# Patient Record
Sex: FEMALE | Race: WHITE | Employment: FULL TIME | URBAN - NONMETROPOLITAN AREA
[De-identification: names, ages, dates, MRNs, and addresses within clinical notes are randomized per-mention and may not be internally consistent; named-entity substitution may affect disease eponyms.]

---

## 2017-01-06 ENCOUNTER — OFFICE VISIT (OUTPATIENT)
Dept: PRIMARY CARE CLINIC | Age: 57
End: 2017-01-06

## 2017-01-06 VITALS
WEIGHT: 180 LBS | HEIGHT: 66 IN | TEMPERATURE: 97.8 F | SYSTOLIC BLOOD PRESSURE: 118 MMHG | RESPIRATION RATE: 18 BRPM | DIASTOLIC BLOOD PRESSURE: 74 MMHG | OXYGEN SATURATION: 96 % | HEART RATE: 106 BPM | BODY MASS INDEX: 28.93 KG/M2

## 2017-01-06 DIAGNOSIS — J20.9 ACUTE WHEEZY BRONCHITIS: ICD-10-CM

## 2017-01-06 DIAGNOSIS — J32.0 CHRONIC MAXILLARY SINUSITIS: Primary | ICD-10-CM

## 2017-01-06 PROCEDURE — 99213 OFFICE O/P EST LOW 20 MIN: CPT | Performed by: NURSE PRACTITIONER

## 2017-01-06 RX ORDER — DOXYCYCLINE HYCLATE 100 MG/1
100 CAPSULE ORAL 2 TIMES DAILY
Qty: 20 CAPSULE | Refills: 0 | Status: ON HOLD | OUTPATIENT
Start: 2017-01-06 | End: 2017-01-17 | Stop reason: HOSPADM

## 2017-01-06 RX ORDER — PREDNISONE 20 MG/1
20 TABLET ORAL 2 TIMES DAILY
Qty: 10 TABLET | Refills: 0 | Status: SHIPPED | OUTPATIENT
Start: 2017-01-06 | End: 2017-01-11

## 2017-01-06 ASSESSMENT — ENCOUNTER SYMPTOMS
SWOLLEN GLANDS: 0
SORE THROAT: 1
HOARSE VOICE: 0
SINUS COMPLAINT: 1
SINUS PRESSURE: 1
COUGH: 1
SHORTNESS OF BREATH: 1

## 2017-01-09 ENCOUNTER — PATIENT MESSAGE (OUTPATIENT)
Dept: FAMILY MEDICINE CLINIC | Age: 57
End: 2017-01-09

## 2017-01-09 DIAGNOSIS — Z12.11 SCREEN FOR COLON CANCER: Primary | ICD-10-CM

## 2017-01-12 PROBLEM — K63.1 BOWEL PERFORATION (HCC): Status: ACTIVE | Noted: 2017-01-12

## 2017-01-23 ENCOUNTER — OFFICE VISIT (OUTPATIENT)
Dept: FAMILY MEDICINE CLINIC | Age: 57
End: 2017-01-23

## 2017-01-23 ENCOUNTER — TELEPHONE (OUTPATIENT)
Dept: SURGERY | Age: 57
End: 2017-01-23

## 2017-01-23 VITALS
DIASTOLIC BLOOD PRESSURE: 70 MMHG | OXYGEN SATURATION: 97 % | HEIGHT: 67 IN | HEART RATE: 94 BPM | SYSTOLIC BLOOD PRESSURE: 118 MMHG | BODY MASS INDEX: 27.15 KG/M2 | WEIGHT: 173 LBS

## 2017-01-23 DIAGNOSIS — J40 BRONCHITIS: Primary | ICD-10-CM

## 2017-01-23 DIAGNOSIS — B37.31 VAGINAL YEAST INFECTION: ICD-10-CM

## 2017-01-23 DIAGNOSIS — K63.1 BOWEL PERFORATION (HCC): ICD-10-CM

## 2017-01-23 PROCEDURE — 99215 OFFICE O/P EST HI 40 MIN: CPT | Performed by: NURSE PRACTITIONER

## 2017-01-23 RX ORDER — FLUCONAZOLE 100 MG/1
100 TABLET ORAL DAILY
Qty: 7 TABLET | Refills: 0 | Status: SHIPPED | OUTPATIENT
Start: 2017-01-23 | End: 2017-01-30

## 2017-01-23 ASSESSMENT — ENCOUNTER SYMPTOMS
NAUSEA: 0
BLOOD IN STOOL: 0
COUGH: 1
RECTAL PAIN: 0
EYES NEGATIVE: 1
SHORTNESS OF BREATH: 0
VOMITING: 0
ALLERGIC/IMMUNOLOGIC NEGATIVE: 1
CONSTIPATION: 0
SINUS PRESSURE: 0
TROUBLE SWALLOWING: 0
ANAL BLEEDING: 0
DIARRHEA: 1
CHEST TIGHTNESS: 0
ABDOMINAL PAIN: 1

## 2017-01-31 ENCOUNTER — OFFICE VISIT (OUTPATIENT)
Dept: SURGERY | Age: 57
End: 2017-01-31

## 2017-01-31 VITALS
HEIGHT: 67 IN | TEMPERATURE: 97.3 F | DIASTOLIC BLOOD PRESSURE: 80 MMHG | SYSTOLIC BLOOD PRESSURE: 120 MMHG | HEART RATE: 80 BPM | WEIGHT: 172.6 LBS | BODY MASS INDEX: 27.09 KG/M2

## 2017-01-31 DIAGNOSIS — K57.20 DIVERTICULITIS OF LARGE INTESTINE WITH PERFORATION WITHOUT BLEEDING: Primary | ICD-10-CM

## 2017-01-31 PROCEDURE — G8419 CALC BMI OUT NRM PARAM NOF/U: HCPCS | Performed by: SURGERY

## 2017-01-31 PROCEDURE — G8427 DOCREV CUR MEDS BY ELIG CLIN: HCPCS | Performed by: SURGERY

## 2017-01-31 PROCEDURE — 99214 OFFICE O/P EST MOD 30 MIN: CPT | Performed by: SURGERY

## 2017-01-31 PROCEDURE — G8484 FLU IMMUNIZE NO ADMIN: HCPCS | Performed by: SURGERY

## 2017-01-31 PROCEDURE — 1111F DSCHRG MED/CURRENT MED MERGE: CPT | Performed by: SURGERY

## 2017-01-31 PROCEDURE — 3014F SCREEN MAMMO DOC REV: CPT | Performed by: SURGERY

## 2017-01-31 PROCEDURE — 3017F COLORECTAL CA SCREEN DOC REV: CPT | Performed by: SURGERY

## 2017-01-31 PROCEDURE — 1036F TOBACCO NON-USER: CPT | Performed by: SURGERY

## 2017-01-31 RX ORDER — POLYETHYLENE GLYCOL 3350, SODIUM CHLORIDE, SODIUM BICARBONATE, POTASSIUM CHLORIDE 420; 11.2; 5.72; 1.48 G/4L; G/4L; G/4L; G/4L
POWDER, FOR SOLUTION ORAL
Qty: 4000 ML | Refills: 0 | Status: SHIPPED | OUTPATIENT
Start: 2017-01-31 | End: 2017-03-14 | Stop reason: ALTCHOICE

## 2017-02-02 DIAGNOSIS — K57.20 COLONIC DIVERTICULAR ABSCESS: Primary | ICD-10-CM

## 2017-02-03 ENCOUNTER — TELEPHONE (OUTPATIENT)
Dept: SURGERY | Age: 57
End: 2017-02-03

## 2017-02-03 DIAGNOSIS — K57.20 COLONIC DIVERTICULAR ABSCESS: Primary | ICD-10-CM

## 2017-02-06 DIAGNOSIS — G89.29 CHRONIC PAIN OF LEFT KNEE: Primary | ICD-10-CM

## 2017-02-06 DIAGNOSIS — M25.562 CHRONIC PAIN OF LEFT KNEE: Primary | ICD-10-CM

## 2017-02-08 ENCOUNTER — OFFICE VISIT (OUTPATIENT)
Dept: ORTHOPEDIC SURGERY | Age: 57
End: 2017-02-08

## 2017-02-08 VITALS
BODY MASS INDEX: 27.09 KG/M2 | WEIGHT: 172.62 LBS | DIASTOLIC BLOOD PRESSURE: 68 MMHG | SYSTOLIC BLOOD PRESSURE: 122 MMHG | HEIGHT: 67 IN | HEART RATE: 88 BPM

## 2017-02-08 DIAGNOSIS — M17.12 PRIMARY OSTEOARTHRITIS OF LEFT KNEE: ICD-10-CM

## 2017-02-08 DIAGNOSIS — M25.562 LEFT KNEE PAIN, UNSPECIFIED CHRONICITY: Primary | ICD-10-CM

## 2017-02-08 PROCEDURE — 3014F SCREEN MAMMO DOC REV: CPT | Performed by: ORTHOPAEDIC SURGERY

## 2017-02-08 PROCEDURE — G8484 FLU IMMUNIZE NO ADMIN: HCPCS | Performed by: ORTHOPAEDIC SURGERY

## 2017-02-08 PROCEDURE — 99213 OFFICE O/P EST LOW 20 MIN: CPT | Performed by: ORTHOPAEDIC SURGERY

## 2017-02-08 PROCEDURE — 3017F COLORECTAL CA SCREEN DOC REV: CPT | Performed by: ORTHOPAEDIC SURGERY

## 2017-02-08 PROCEDURE — G8427 DOCREV CUR MEDS BY ELIG CLIN: HCPCS | Performed by: ORTHOPAEDIC SURGERY

## 2017-02-08 PROCEDURE — 1111F DSCHRG MED/CURRENT MED MERGE: CPT | Performed by: ORTHOPAEDIC SURGERY

## 2017-02-08 PROCEDURE — G8419 CALC BMI OUT NRM PARAM NOF/U: HCPCS | Performed by: ORTHOPAEDIC SURGERY

## 2017-02-08 PROCEDURE — 1036F TOBACCO NON-USER: CPT | Performed by: ORTHOPAEDIC SURGERY

## 2017-02-16 ENCOUNTER — OFFICE VISIT (OUTPATIENT)
Dept: SURGERY | Age: 57
End: 2017-02-16

## 2017-02-16 VITALS
BODY MASS INDEX: 27.28 KG/M2 | TEMPERATURE: 98.4 F | DIASTOLIC BLOOD PRESSURE: 70 MMHG | SYSTOLIC BLOOD PRESSURE: 120 MMHG | HEIGHT: 67 IN | WEIGHT: 173.8 LBS | HEART RATE: 104 BPM

## 2017-02-16 DIAGNOSIS — R12 HEARTBURN: ICD-10-CM

## 2017-02-16 DIAGNOSIS — K57.20 COLONIC DIVERTICULAR ABSCESS: Primary | ICD-10-CM

## 2017-02-16 DIAGNOSIS — R10.31 RIGHT LOWER QUADRANT ABDOMINAL PAIN: Primary | ICD-10-CM

## 2017-02-16 PROCEDURE — G8419 CALC BMI OUT NRM PARAM NOF/U: HCPCS | Performed by: SURGERY

## 2017-02-16 PROCEDURE — G8427 DOCREV CUR MEDS BY ELIG CLIN: HCPCS | Performed by: SURGERY

## 2017-02-16 PROCEDURE — 3017F COLORECTAL CA SCREEN DOC REV: CPT | Performed by: SURGERY

## 2017-02-16 PROCEDURE — 99214 OFFICE O/P EST MOD 30 MIN: CPT | Performed by: SURGERY

## 2017-02-16 PROCEDURE — 1111F DSCHRG MED/CURRENT MED MERGE: CPT | Performed by: SURGERY

## 2017-02-16 PROCEDURE — G8484 FLU IMMUNIZE NO ADMIN: HCPCS | Performed by: SURGERY

## 2017-02-16 PROCEDURE — 3014F SCREEN MAMMO DOC REV: CPT | Performed by: SURGERY

## 2017-02-16 PROCEDURE — 1036F TOBACCO NON-USER: CPT | Performed by: SURGERY

## 2017-02-27 ENCOUNTER — ANESTHESIA EVENT (OUTPATIENT)
Dept: OPERATING ROOM | Age: 57
End: 2017-02-27
Payer: COMMERCIAL

## 2017-02-27 ENCOUNTER — ANESTHESIA (OUTPATIENT)
Dept: OPERATING ROOM | Age: 57
End: 2017-02-27
Payer: COMMERCIAL

## 2017-02-27 ENCOUNTER — HOSPITAL ENCOUNTER (OUTPATIENT)
Age: 57
Setting detail: OUTPATIENT SURGERY
Discharge: HOME | End: 2017-02-27
Attending: SURGERY | Admitting: SURGERY
Payer: COMMERCIAL

## 2017-02-27 VITALS
SYSTOLIC BLOOD PRESSURE: 108 MMHG | OXYGEN SATURATION: 98 % | TEMPERATURE: 97.8 F | WEIGHT: 170 LBS | BODY MASS INDEX: 27.32 KG/M2 | DIASTOLIC BLOOD PRESSURE: 72 MMHG | HEART RATE: 82 BPM | HEIGHT: 66 IN | RESPIRATION RATE: 16 BRPM

## 2017-02-27 VITALS — DIASTOLIC BLOOD PRESSURE: 72 MMHG | SYSTOLIC BLOOD PRESSURE: 122 MMHG | OXYGEN SATURATION: 99 %

## 2017-02-27 PROCEDURE — 45378 DIAGNOSTIC COLONOSCOPY: CPT | Performed by: SURGERY

## 2017-02-27 PROCEDURE — 7100000011 HC PHASE II RECOVERY - ADDTL 15 MIN: Performed by: SURGERY

## 2017-02-27 PROCEDURE — 88305 TISSUE EXAM BY PATHOLOGIST: CPT

## 2017-02-27 PROCEDURE — 43239 EGD BIOPSY SINGLE/MULTIPLE: CPT | Performed by: SURGERY

## 2017-02-27 PROCEDURE — 6360000002 HC RX W HCPCS: Performed by: NURSE ANESTHETIST, CERTIFIED REGISTERED

## 2017-02-27 PROCEDURE — 3609012400 HC EGD TRANSORAL BIOPSY SINGLE/MULTIPLE: Performed by: SURGERY

## 2017-02-27 PROCEDURE — 7100000010 HC PHASE II RECOVERY - FIRST 15 MIN: Performed by: SURGERY

## 2017-02-27 PROCEDURE — 2580000003 HC RX 258: Performed by: SURGERY

## 2017-02-27 PROCEDURE — 3609027000 HC COLONOSCOPY: Performed by: SURGERY

## 2017-02-27 RX ORDER — PROPOFOL 10 MG/ML
INJECTION, EMULSION INTRAVENOUS PRN
Status: DISCONTINUED | OUTPATIENT
Start: 2017-02-27 | End: 2017-02-27 | Stop reason: SDUPTHER

## 2017-02-27 RX ORDER — SODIUM CHLORIDE, SODIUM LACTATE, POTASSIUM CHLORIDE, CALCIUM CHLORIDE 600; 310; 30; 20 MG/100ML; MG/100ML; MG/100ML; MG/100ML
INJECTION, SOLUTION INTRAVENOUS CONTINUOUS
Status: DISCONTINUED | OUTPATIENT
Start: 2017-02-27 | End: 2017-02-27 | Stop reason: HOSPADM

## 2017-02-27 RX ADMIN — SODIUM CHLORIDE, POTASSIUM CHLORIDE, SODIUM LACTATE AND CALCIUM CHLORIDE: 600; 310; 30; 20 INJECTION, SOLUTION INTRAVENOUS at 11:43

## 2017-02-27 RX ADMIN — PROPOFOL 380 MG: 10 INJECTION, EMULSION INTRAVENOUS at 12:47

## 2017-02-27 ASSESSMENT — PAIN SCALES - GENERAL
PAINLEVEL_OUTOF10: 0

## 2017-02-27 ASSESSMENT — PAIN - FUNCTIONAL ASSESSMENT: PAIN_FUNCTIONAL_ASSESSMENT: 0-10

## 2017-02-28 ENCOUNTER — PATIENT MESSAGE (OUTPATIENT)
Dept: SURGERY | Age: 57
End: 2017-02-28

## 2017-02-28 DIAGNOSIS — K57.20 DIVERTICULITIS OF LARGE INTESTINE WITH PERFORATION WITHOUT BLEEDING: Primary | ICD-10-CM

## 2017-03-01 LAB — SURGICAL PATHOLOGY REPORT: NORMAL

## 2017-03-03 ENCOUNTER — TELEPHONE (OUTPATIENT)
Dept: ORTHOPEDIC SURGERY | Age: 57
End: 2017-03-03

## 2017-03-03 DIAGNOSIS — J32.9 CHRONIC SINUSITIS, UNSPECIFIED LOCATION: ICD-10-CM

## 2017-03-14 ENCOUNTER — OFFICE VISIT (OUTPATIENT)
Dept: SURGERY | Age: 57
End: 2017-03-14
Payer: COMMERCIAL

## 2017-03-14 VITALS
BODY MASS INDEX: 28.54 KG/M2 | WEIGHT: 177.6 LBS | SYSTOLIC BLOOD PRESSURE: 120 MMHG | TEMPERATURE: 96.5 F | HEART RATE: 80 BPM | HEIGHT: 66 IN | DIASTOLIC BLOOD PRESSURE: 80 MMHG

## 2017-03-14 DIAGNOSIS — K21.00 GASTROESOPHAGEAL REFLUX DISEASE WITH ESOPHAGITIS: ICD-10-CM

## 2017-03-14 DIAGNOSIS — K57.20 DIVERTICULITIS OF LARGE INTESTINE WITH PERFORATION WITHOUT BLEEDING: Primary | ICD-10-CM

## 2017-03-14 PROCEDURE — 3017F COLORECTAL CA SCREEN DOC REV: CPT | Performed by: SURGERY

## 2017-03-14 PROCEDURE — G8419 CALC BMI OUT NRM PARAM NOF/U: HCPCS | Performed by: SURGERY

## 2017-03-14 PROCEDURE — 99213 OFFICE O/P EST LOW 20 MIN: CPT | Performed by: SURGERY

## 2017-03-14 PROCEDURE — G8427 DOCREV CUR MEDS BY ELIG CLIN: HCPCS | Performed by: SURGERY

## 2017-03-14 PROCEDURE — 3014F SCREEN MAMMO DOC REV: CPT | Performed by: SURGERY

## 2017-03-14 PROCEDURE — 1036F TOBACCO NON-USER: CPT | Performed by: SURGERY

## 2017-03-14 PROCEDURE — G8484 FLU IMMUNIZE NO ADMIN: HCPCS | Performed by: SURGERY

## 2017-03-22 ENCOUNTER — OFFICE VISIT (OUTPATIENT)
Dept: ALLERGY | Age: 57
End: 2017-03-22
Payer: COMMERCIAL

## 2017-03-22 VITALS
DIASTOLIC BLOOD PRESSURE: 88 MMHG | WEIGHT: 181 LBS | SYSTOLIC BLOOD PRESSURE: 126 MMHG | BODY MASS INDEX: 29.09 KG/M2 | HEART RATE: 88 BPM | HEIGHT: 66 IN

## 2017-03-22 DIAGNOSIS — K21.9 GASTROESOPHAGEAL REFLUX DISEASE, ESOPHAGITIS PRESENCE NOT SPECIFIED: ICD-10-CM

## 2017-03-22 DIAGNOSIS — J45.998 ASTHMA, PERSISTENT CONTROLLED: Primary | ICD-10-CM

## 2017-03-22 DIAGNOSIS — J32.9 CHRONIC RECURRENT SINUSITIS: ICD-10-CM

## 2017-03-22 DIAGNOSIS — N28.1 BILATERAL RENAL CYSTS: ICD-10-CM

## 2017-03-22 DIAGNOSIS — K21.9 LPRD (LARYNGOPHARYNGEAL REFLUX DISEASE): ICD-10-CM

## 2017-03-22 PROCEDURE — 1036F TOBACCO NON-USER: CPT | Performed by: NURSE PRACTITIONER

## 2017-03-22 PROCEDURE — G8484 FLU IMMUNIZE NO ADMIN: HCPCS | Performed by: NURSE PRACTITIONER

## 2017-03-22 PROCEDURE — G8419 CALC BMI OUT NRM PARAM NOF/U: HCPCS | Performed by: NURSE PRACTITIONER

## 2017-03-22 PROCEDURE — 99214 OFFICE O/P EST MOD 30 MIN: CPT | Performed by: NURSE PRACTITIONER

## 2017-03-22 PROCEDURE — G8427 DOCREV CUR MEDS BY ELIG CLIN: HCPCS | Performed by: NURSE PRACTITIONER

## 2017-03-22 PROCEDURE — 94010 BREATHING CAPACITY TEST: CPT | Performed by: NURSE PRACTITIONER

## 2017-03-22 PROCEDURE — 3017F COLORECTAL CA SCREEN DOC REV: CPT | Performed by: NURSE PRACTITIONER

## 2017-03-22 PROCEDURE — 3014F SCREEN MAMMO DOC REV: CPT | Performed by: NURSE PRACTITIONER

## 2017-04-24 DIAGNOSIS — E11.9 TYPE 2 DIABETES MELLITUS WITHOUT COMPLICATION, WITHOUT LONG-TERM CURRENT USE OF INSULIN (HCC): ICD-10-CM

## 2017-05-01 LAB
CHOLESTEROL, TOTAL: 252 MG/DL
CHOLESTEROL/HDL RATIO: ABNORMAL
HDLC SERPL-MCNC: ABNORMAL MG/DL (ref 35–70)
LDL CHOLESTEROL CALCULATED: 161 MG/DL (ref 0–160)
TRIGL SERPL-MCNC: 175 MG/DL
VLDLC SERPL CALC-MCNC: ABNORMAL MG/DL

## 2017-05-15 DIAGNOSIS — E03.9 ACQUIRED HYPOTHYROIDISM: ICD-10-CM

## 2017-05-15 RX ORDER — LEVOTHYROXINE SODIUM 0.15 MG/1
TABLET ORAL
Qty: 90 TABLET | Refills: 3 | OUTPATIENT
Start: 2017-05-15

## 2017-05-19 ENCOUNTER — HOSPITAL ENCOUNTER (OUTPATIENT)
Dept: MAMMOGRAPHY | Age: 57
Discharge: HOME | End: 2017-05-19
Payer: COMMERCIAL

## 2017-05-19 DIAGNOSIS — Z12.31 VISIT FOR SCREENING MAMMOGRAM: ICD-10-CM

## 2017-05-19 PROCEDURE — 77063 BREAST TOMOSYNTHESIS BI: CPT

## 2017-05-23 DIAGNOSIS — K21.9 GERD (GASTROESOPHAGEAL REFLUX DISEASE): ICD-10-CM

## 2017-05-23 DIAGNOSIS — K21.9 LPRD (LARYNGOPHARYNGEAL REFLUX DISEASE): ICD-10-CM

## 2017-05-23 RX ORDER — RANITIDINE 300 MG/1
TABLET ORAL
Qty: 90 TABLET | Refills: 2 | Status: SHIPPED | OUTPATIENT
Start: 2017-05-23 | End: 2018-05-16 | Stop reason: SDUPTHER

## 2017-05-30 ENCOUNTER — PATIENT MESSAGE (OUTPATIENT)
Dept: ALLERGY | Age: 57
End: 2017-05-30

## 2017-05-31 ENCOUNTER — OFFICE VISIT (OUTPATIENT)
Dept: FAMILY MEDICINE CLINIC | Age: 57
End: 2017-05-31
Payer: COMMERCIAL

## 2017-05-31 VITALS
BODY MASS INDEX: 30.63 KG/M2 | HEIGHT: 66 IN | WEIGHT: 190.6 LBS | SYSTOLIC BLOOD PRESSURE: 118 MMHG | HEART RATE: 86 BPM | OXYGEN SATURATION: 98 % | TEMPERATURE: 98.4 F | DIASTOLIC BLOOD PRESSURE: 70 MMHG

## 2017-05-31 DIAGNOSIS — E03.9 ACQUIRED HYPOTHYROIDISM: ICD-10-CM

## 2017-05-31 DIAGNOSIS — E11.9 TYPE 2 DIABETES MELLITUS WITHOUT COMPLICATION, WITHOUT LONG-TERM CURRENT USE OF INSULIN (HCC): Primary | ICD-10-CM

## 2017-05-31 PROCEDURE — 3017F COLORECTAL CA SCREEN DOC REV: CPT | Performed by: FAMILY MEDICINE

## 2017-05-31 PROCEDURE — 1036F TOBACCO NON-USER: CPT | Performed by: FAMILY MEDICINE

## 2017-05-31 PROCEDURE — G8427 DOCREV CUR MEDS BY ELIG CLIN: HCPCS | Performed by: FAMILY MEDICINE

## 2017-05-31 PROCEDURE — 99214 OFFICE O/P EST MOD 30 MIN: CPT | Performed by: FAMILY MEDICINE

## 2017-05-31 PROCEDURE — 3014F SCREEN MAMMO DOC REV: CPT | Performed by: FAMILY MEDICINE

## 2017-05-31 PROCEDURE — 3044F HG A1C LEVEL LT 7.0%: CPT | Performed by: FAMILY MEDICINE

## 2017-05-31 PROCEDURE — G8417 CALC BMI ABV UP PARAM F/U: HCPCS | Performed by: FAMILY MEDICINE

## 2017-05-31 RX ORDER — LEVOTHYROXINE SODIUM 0.15 MG/1
TABLET ORAL
Qty: 90 TABLET | Refills: 3 | Status: SHIPPED | OUTPATIENT
Start: 2017-05-31 | End: 2019-09-24

## 2017-05-31 RX ORDER — BORON 6 MG
1 TABLET ORAL DAILY
COMMUNITY
Start: 2016-07-01 | End: 2017-10-31

## 2017-05-31 RX ORDER — PANTOPRAZOLE SODIUM 40 MG/1
40 TABLET, DELAYED RELEASE ORAL DAILY
Qty: 90 TABLET | Refills: 3 | Status: SHIPPED | OUTPATIENT
Start: 2017-05-31 | End: 2018-05-16 | Stop reason: SDUPTHER

## 2017-05-31 ASSESSMENT — ENCOUNTER SYMPTOMS
CHEST TIGHTNESS: 0
NAUSEA: 0
COUGH: 0
BACK PAIN: 0
DIARRHEA: 0
CONSTIPATION: 0
WHEEZING: 0
SHORTNESS OF BREATH: 0
ABDOMINAL PAIN: 1
SINUS PRESSURE: 0

## 2017-06-08 ENCOUNTER — TELEPHONE (OUTPATIENT)
Dept: FAMILY MEDICINE CLINIC | Age: 57
End: 2017-06-08

## 2017-06-14 LAB
CREATININE URINE: 102 MG/DL
HBA1C MFR BLD: 5.9 %
MICROALBUMIN/CREAT 24H UR: 1.8 MG/G{CREAT}

## 2017-06-30 ENCOUNTER — HOSPITAL ENCOUNTER (EMERGENCY)
Age: 57
Discharge: HOME | End: 2017-06-30
Attending: EMERGENCY MEDICINE
Payer: COMMERCIAL

## 2017-06-30 ENCOUNTER — APPOINTMENT (OUTPATIENT)
Dept: CT IMAGING | Age: 57
End: 2017-06-30
Payer: COMMERCIAL

## 2017-06-30 VITALS
TEMPERATURE: 97.2 F | BODY MASS INDEX: 30.22 KG/M2 | DIASTOLIC BLOOD PRESSURE: 80 MMHG | SYSTOLIC BLOOD PRESSURE: 139 MMHG | OXYGEN SATURATION: 96 % | WEIGHT: 188 LBS | RESPIRATION RATE: 16 BRPM | HEIGHT: 66 IN | HEART RATE: 99 BPM

## 2017-06-30 DIAGNOSIS — K57.32 DIVERTICULITIS OF COLON: Primary | ICD-10-CM

## 2017-06-30 LAB
-: ABNORMAL
ABSOLUTE EOS #: 0.35 K/UL (ref 0–0.4)
ABSOLUTE LYMPH #: 1.47 K/UL (ref 1–4.8)
ABSOLUTE MONO #: 1.1 K/UL (ref 0.1–1.2)
ALBUMIN SERPL-MCNC: 4.5 G/DL (ref 3.5–5.2)
ALBUMIN/GLOBULIN RATIO: 1.5 (ref 1–2.5)
ALP BLD-CCNC: 69 U/L (ref 35–104)
ALT SERPL-CCNC: 19 U/L (ref 5–33)
AMORPHOUS: ABNORMAL
AMYLASE: 44 U/L (ref 28–100)
ANION GAP SERPL CALCULATED.3IONS-SCNC: 15 MMOL/L (ref 9–17)
AST SERPL-CCNC: 16 U/L
BACTERIA: ABNORMAL
BASOPHILS # BLD: 1 %
BASOPHILS ABSOLUTE: 0.09 K/UL (ref 0–0.2)
BILIRUB SERPL-MCNC: 0.27 MG/DL (ref 0.3–1.2)
BILIRUBIN DIRECT: 0.12 MG/DL
BILIRUBIN URINE: NEGATIVE
BILIRUBIN, INDIRECT: 0.15 MG/DL (ref 0–1)
BUN BLDV-MCNC: 14 MG/DL (ref 6–20)
BUN/CREAT BLD: 25 (ref 9–20)
CALCIUM SERPL-MCNC: 9.6 MG/DL (ref 8.6–10.4)
CASTS UA: ABNORMAL /LPF (ref 0–2)
CHLORIDE BLD-SCNC: 101 MMOL/L (ref 98–107)
CO2: 25 MMOL/L (ref 20–31)
COLOR: ABNORMAL
COMMENT UA: ABNORMAL
CREAT SERPL-MCNC: 0.57 MG/DL (ref 0.5–0.9)
CRYSTALS, UA: ABNORMAL /HPF
DIFFERENTIAL TYPE: ABNORMAL
EOSINOPHILS RELATIVE PERCENT: 3 %
EPITHELIAL CELLS UA: ABNORMAL /HPF (ref 0–5)
GFR AFRICAN AMERICAN: >60 ML/MIN
GFR NON-AFRICAN AMERICAN: >60 ML/MIN
GFR SERPL CREATININE-BSD FRML MDRD: ABNORMAL ML/MIN/{1.73_M2}
GFR SERPL CREATININE-BSD FRML MDRD: ABNORMAL ML/MIN/{1.73_M2}
GLOBULIN: 3 G/DL (ref 1.5–3.8)
GLUCOSE BLD-MCNC: 124 MG/DL (ref 70–99)
GLUCOSE URINE: NEGATIVE
HCT VFR BLD CALC: 37.7 % (ref 36–46)
HEMOGLOBIN: 12.8 G/DL (ref 12–16)
KETONES, URINE: NEGATIVE
LEUKOCYTE ESTERASE, URINE: ABNORMAL
LIPASE: 29 U/L (ref 13–60)
LYMPHOCYTES # BLD: 11 %
MCH RBC QN AUTO: 29.8 PG (ref 26–34)
MCHC RBC AUTO-ENTMCNC: 34 G/DL (ref 31–37)
MCV RBC AUTO: 87.8 FL (ref 80–100)
MONOCYTES # BLD: 8 %
MUCUS: ABNORMAL
NITRITE, URINE: NEGATIVE
OTHER OBSERVATIONS UA: ABNORMAL
PDW BLD-RTO: 12.8 % (ref 11–14.5)
PH UA: 5.5 (ref 5–6)
PLATELET # BLD: 250 K/UL (ref 140–450)
PLATELET ESTIMATE: ABNORMAL
PMV BLD AUTO: 7.3 FL (ref 6–12)
POTASSIUM SERPL-SCNC: 3.8 MMOL/L (ref 3.7–5.3)
PROTEIN UA: NEGATIVE
RBC # BLD: 4.3 M/UL (ref 4–5.2)
RBC # BLD: ABNORMAL 10*6/UL
RBC UA: ABNORMAL /HPF (ref 0–4)
RENAL EPITHELIAL, UA: ABNORMAL /HPF
SEG NEUTROPHILS: 77 %
SEGMENTED NEUTROPHILS ABSOLUTE COUNT: 10.37 K/UL (ref 1.8–7.7)
SODIUM BLD-SCNC: 141 MMOL/L (ref 135–144)
SPECIFIC GRAVITY UA: 1.03 (ref 1.01–1.02)
TOTAL PROTEIN: 7.5 G/DL (ref 6.4–8.3)
TRICHOMONAS: ABNORMAL
TURBIDITY: ABNORMAL
URINE HGB: NEGATIVE
UROBILINOGEN, URINE: NORMAL
WBC # BLD: 13.3 K/UL (ref 3.5–11)
WBC # BLD: ABNORMAL 10*3/UL
WBC UA: ABNORMAL /HPF (ref 0–4)
YEAST: ABNORMAL

## 2017-06-30 PROCEDURE — 6370000000 HC RX 637 (ALT 250 FOR IP): Performed by: EMERGENCY MEDICINE

## 2017-06-30 PROCEDURE — 80048 BASIC METABOLIC PNL TOTAL CA: CPT

## 2017-06-30 PROCEDURE — 81001 URINALYSIS AUTO W/SCOPE: CPT

## 2017-06-30 PROCEDURE — 82150 ASSAY OF AMYLASE: CPT

## 2017-06-30 PROCEDURE — 74177 CT ABD & PELVIS W/CONTRAST: CPT | Performed by: RADIOLOGY

## 2017-06-30 PROCEDURE — 96375 TX/PRO/DX INJ NEW DRUG ADDON: CPT

## 2017-06-30 PROCEDURE — 6360000002 HC RX W HCPCS: Performed by: EMERGENCY MEDICINE

## 2017-06-30 PROCEDURE — 36415 COLL VENOUS BLD VENIPUNCTURE: CPT

## 2017-06-30 PROCEDURE — 96374 THER/PROPH/DIAG INJ IV PUSH: CPT

## 2017-06-30 PROCEDURE — 2580000003 HC RX 258: Performed by: EMERGENCY MEDICINE

## 2017-06-30 PROCEDURE — 96361 HYDRATE IV INFUSION ADD-ON: CPT

## 2017-06-30 PROCEDURE — 83690 ASSAY OF LIPASE: CPT

## 2017-06-30 PROCEDURE — 74177 CT ABD & PELVIS W/CONTRAST: CPT

## 2017-06-30 PROCEDURE — 85025 COMPLETE CBC W/AUTO DIFF WBC: CPT

## 2017-06-30 PROCEDURE — 6360000004 HC RX CONTRAST MEDICATION: Performed by: EMERGENCY MEDICINE

## 2017-06-30 PROCEDURE — 6370000000 HC RX 637 (ALT 250 FOR IP)

## 2017-06-30 PROCEDURE — 80076 HEPATIC FUNCTION PANEL: CPT

## 2017-06-30 PROCEDURE — 99284 EMERGENCY DEPT VISIT MOD MDM: CPT

## 2017-06-30 RX ORDER — HYDROCODONE BITARTRATE AND ACETAMINOPHEN 5; 325 MG/1; MG/1
1 TABLET ORAL EVERY 6 HOURS PRN
Qty: 10 TABLET | Refills: 0 | Status: ON HOLD | OUTPATIENT
Start: 2017-06-30 | End: 2017-09-04 | Stop reason: HOSPADM

## 2017-06-30 RX ORDER — HYDROCODONE BITARTRATE AND ACETAMINOPHEN 5; 325 MG/1; MG/1
2 TABLET ORAL ONCE
Status: DISCONTINUED | OUTPATIENT
Start: 2017-06-30 | End: 2017-06-30 | Stop reason: HOSPADM

## 2017-06-30 RX ORDER — ONDANSETRON 4 MG/1
4 TABLET, FILM COATED ORAL EVERY 8 HOURS PRN
Qty: 10 TABLET | Refills: 0 | Status: ON HOLD | OUTPATIENT
Start: 2017-06-30 | End: 2017-09-04 | Stop reason: HOSPADM

## 2017-06-30 RX ORDER — 0.9 % SODIUM CHLORIDE 0.9 %
1000 INTRAVENOUS SOLUTION INTRAVENOUS ONCE
Status: COMPLETED | OUTPATIENT
Start: 2017-06-30 | End: 2017-06-30

## 2017-06-30 RX ORDER — MULTIVIT WITH MINERALS/LUTEIN
1000 TABLET ORAL 2 TIMES DAILY
COMMUNITY
End: 2018-03-27

## 2017-06-30 RX ORDER — CIPROFLOXACIN 250 MG/1
500 TABLET, FILM COATED ORAL ONCE
Status: COMPLETED | OUTPATIENT
Start: 2017-06-30 | End: 2017-06-30

## 2017-06-30 RX ORDER — ONDANSETRON 2 MG/ML
4 INJECTION INTRAMUSCULAR; INTRAVENOUS ONCE
Status: COMPLETED | OUTPATIENT
Start: 2017-06-30 | End: 2017-06-30

## 2017-06-30 RX ORDER — METRONIDAZOLE 250 MG/1
500 TABLET ORAL ONCE
Status: COMPLETED | OUTPATIENT
Start: 2017-06-30 | End: 2017-06-30

## 2017-06-30 RX ORDER — CIPROFLOXACIN 500 MG/1
500 TABLET, FILM COATED ORAL 2 TIMES DAILY
Qty: 20 TABLET | Refills: 0 | Status: SHIPPED | OUTPATIENT
Start: 2017-06-30 | End: 2017-07-10

## 2017-06-30 RX ORDER — METRONIDAZOLE 500 MG/1
500 TABLET ORAL 3 TIMES DAILY
Qty: 30 TABLET | Refills: 0 | Status: ON HOLD | OUTPATIENT
Start: 2017-06-30 | End: 2017-08-30 | Stop reason: ALTCHOICE

## 2017-06-30 RX ADMIN — METRONIDAZOLE 500 MG: 250 TABLET ORAL at 05:21

## 2017-06-30 RX ADMIN — IOHEXOL 50 ML: 300 INJECTION, SOLUTION INTRAVENOUS at 04:13

## 2017-06-30 RX ADMIN — IOVERSOL 130 ML: 741 INJECTION INTRA-ARTERIAL; INTRAVENOUS at 04:17

## 2017-06-30 RX ADMIN — CIPROFLOXACIN HYDROCHLORIDE 500 MG: 250 TABLET, FILM COATED ORAL at 05:21

## 2017-06-30 RX ADMIN — SODIUM CHLORIDE 1000 ML: 9 INJECTION, SOLUTION INTRAVENOUS at 03:15

## 2017-06-30 RX ADMIN — ONDANSETRON 4 MG: 2 INJECTION INTRAMUSCULAR; INTRAVENOUS at 03:15

## 2017-06-30 RX ADMIN — HYDROMORPHONE HYDROCHLORIDE 0.5 MG: 1 INJECTION, SOLUTION INTRAMUSCULAR; INTRAVENOUS; SUBCUTANEOUS at 03:23

## 2017-06-30 ASSESSMENT — PAIN SCALES - GENERAL
PAINLEVEL_OUTOF10: 5
PAINLEVEL_OUTOF10: 6
PAINLEVEL_OUTOF10: 8

## 2017-06-30 ASSESSMENT — PAIN DESCRIPTION - PAIN TYPE: TYPE: CHRONIC PAIN

## 2017-06-30 ASSESSMENT — PAIN DESCRIPTION - LOCATION: LOCATION: ABDOMEN

## 2017-07-13 ENCOUNTER — TELEPHONE (OUTPATIENT)
Dept: SURGERY | Age: 57
End: 2017-07-13

## 2017-07-27 ENCOUNTER — OFFICE VISIT (OUTPATIENT)
Dept: SURGERY | Age: 57
End: 2017-07-27
Payer: COMMERCIAL

## 2017-07-27 VITALS
HEIGHT: 66 IN | SYSTOLIC BLOOD PRESSURE: 148 MMHG | TEMPERATURE: 97.2 F | DIASTOLIC BLOOD PRESSURE: 88 MMHG | BODY MASS INDEX: 30.7 KG/M2 | HEART RATE: 82 BPM | WEIGHT: 191 LBS

## 2017-07-27 DIAGNOSIS — K57.20 DIVERTICULITIS OF LARGE INTESTINE WITH ABSCESS WITHOUT BLEEDING: Primary | ICD-10-CM

## 2017-07-27 PROCEDURE — 3014F SCREEN MAMMO DOC REV: CPT | Performed by: SURGERY

## 2017-07-27 PROCEDURE — G8417 CALC BMI ABV UP PARAM F/U: HCPCS | Performed by: SURGERY

## 2017-07-27 PROCEDURE — 99215 OFFICE O/P EST HI 40 MIN: CPT | Performed by: SURGERY

## 2017-07-27 PROCEDURE — 1036F TOBACCO NON-USER: CPT | Performed by: SURGERY

## 2017-07-27 PROCEDURE — 3017F COLORECTAL CA SCREEN DOC REV: CPT | Performed by: SURGERY

## 2017-07-27 PROCEDURE — G8427 DOCREV CUR MEDS BY ELIG CLIN: HCPCS | Performed by: SURGERY

## 2017-07-31 ENCOUNTER — OFFICE VISIT (OUTPATIENT)
Dept: UROLOGY | Age: 57
End: 2017-07-31

## 2017-07-31 VITALS
HEART RATE: 76 BPM | HEIGHT: 66 IN | DIASTOLIC BLOOD PRESSURE: 70 MMHG | BODY MASS INDEX: 30.89 KG/M2 | TEMPERATURE: 98.5 F | WEIGHT: 192.2 LBS | SYSTOLIC BLOOD PRESSURE: 130 MMHG

## 2017-07-31 DIAGNOSIS — R39.15 URGENCY OF MICTURITION: Primary | ICD-10-CM

## 2017-07-31 DIAGNOSIS — N82.4 COLOVAGINAL FISTULA: ICD-10-CM

## 2017-08-21 ENCOUNTER — NURSE ONLY (OUTPATIENT)
Dept: SURGERY | Age: 57
End: 2017-08-21

## 2017-08-21 ENCOUNTER — HOSPITAL ENCOUNTER (OUTPATIENT)
Dept: LAB | Age: 57
Discharge: HOME | End: 2017-08-21
Payer: COMMERCIAL

## 2017-08-21 VITALS
WEIGHT: 193 LBS | DIASTOLIC BLOOD PRESSURE: 88 MMHG | BODY MASS INDEX: 31.02 KG/M2 | TEMPERATURE: 98.1 F | HEIGHT: 66 IN | HEART RATE: 100 BPM | SYSTOLIC BLOOD PRESSURE: 130 MMHG

## 2017-08-21 DIAGNOSIS — K57.20 DIVERTICULITIS OF LARGE INTESTINE WITH ABSCESS WITHOUT BLEEDING: Primary | ICD-10-CM

## 2017-08-21 DIAGNOSIS — Z01.818 PRE-OP TESTING: ICD-10-CM

## 2017-08-21 DIAGNOSIS — K57.20 DIVERTICULITIS OF LARGE INTESTINE WITH ABSCESS WITHOUT BLEEDING: ICD-10-CM

## 2017-08-21 LAB
ALBUMIN SERPL-MCNC: 4.6 G/DL (ref 3.5–5.2)
ALBUMIN/GLOBULIN RATIO: 1.6 (ref 1–2.5)
ALP BLD-CCNC: 67 U/L (ref 35–104)
ALT SERPL-CCNC: 36 U/L (ref 5–33)
ANION GAP SERPL CALCULATED.3IONS-SCNC: 12 MMOL/L (ref 9–17)
AST SERPL-CCNC: 20 U/L
BILIRUB SERPL-MCNC: 0.36 MG/DL (ref 0.3–1.2)
BUN BLDV-MCNC: 16 MG/DL (ref 6–20)
BUN/CREAT BLD: 36 (ref 9–20)
CALCIUM SERPL-MCNC: 9.6 MG/DL (ref 8.6–10.4)
CHLORIDE BLD-SCNC: 101 MMOL/L (ref 98–107)
CO2: 27 MMOL/L (ref 20–31)
CREAT SERPL-MCNC: 0.44 MG/DL (ref 0.5–0.9)
GFR AFRICAN AMERICAN: >60 ML/MIN
GFR NON-AFRICAN AMERICAN: >60 ML/MIN
GFR SERPL CREATININE-BSD FRML MDRD: ABNORMAL ML/MIN/{1.73_M2}
GFR SERPL CREATININE-BSD FRML MDRD: ABNORMAL ML/MIN/{1.73_M2}
GLUCOSE BLD-MCNC: 122 MG/DL (ref 70–99)
HCT VFR BLD CALC: 42.3 % (ref 36–46)
HEMOGLOBIN: 14.1 G/DL (ref 12–16)
MCH RBC QN AUTO: 29.6 PG (ref 26–34)
MCHC RBC AUTO-ENTMCNC: 33.2 G/DL (ref 31–37)
MCV RBC AUTO: 88.9 FL (ref 80–100)
PDW BLD-RTO: 14.3 % (ref 11–14.5)
PLATELET # BLD: 250 K/UL (ref 140–450)
PMV BLD AUTO: 7 FL (ref 6–12)
POTASSIUM SERPL-SCNC: 3.9 MMOL/L (ref 3.7–5.3)
RBC # BLD: 4.76 M/UL (ref 4–5.2)
SODIUM BLD-SCNC: 140 MMOL/L (ref 135–144)
TOTAL PROTEIN: 7.5 G/DL (ref 6.4–8.3)
WBC # BLD: 6.9 K/UL (ref 3.5–11)

## 2017-08-21 PROCEDURE — 85027 COMPLETE CBC AUTOMATED: CPT

## 2017-08-21 PROCEDURE — 80053 COMPREHEN METABOLIC PANEL: CPT

## 2017-08-21 PROCEDURE — 36415 COLL VENOUS BLD VENIPUNCTURE: CPT

## 2017-08-21 RX ORDER — NEOMYCIN SULFATE 500 MG/1
TABLET ORAL
Qty: 6 TABLET | Refills: 0 | Status: ON HOLD | OUTPATIENT
Start: 2017-08-21 | End: 2017-09-04 | Stop reason: HOSPADM

## 2017-08-21 RX ORDER — ERYTHROMYCIN 500 MG/1
TABLET, COATED ORAL
Qty: 6 TABLET | Refills: 0 | Status: ON HOLD | OUTPATIENT
Start: 2017-08-21 | End: 2017-09-04 | Stop reason: HOSPADM

## 2017-08-21 RX ORDER — POLYETHYLENE GLYCOL 3350, SODIUM CHLORIDE, SODIUM BICARBONATE, POTASSIUM CHLORIDE 420; 11.2; 5.72; 1.48 G/4L; G/4L; G/4L; G/4L
4000 POWDER, FOR SOLUTION ORAL ONCE
Qty: 1 BOTTLE | Refills: 0 | Status: SHIPPED | OUTPATIENT
Start: 2017-08-21 | End: 2017-08-21

## 2017-08-22 ENCOUNTER — HOSPITAL ENCOUNTER (OUTPATIENT)
Dept: NON INVASIVE DIAGNOSTICS | Age: 57
Discharge: HOME | End: 2017-08-22
Payer: COMMERCIAL

## 2017-08-22 DIAGNOSIS — Z01.818 PRE-OP TESTING: ICD-10-CM

## 2017-08-22 PROCEDURE — 93005 ELECTROCARDIOGRAM TRACING: CPT

## 2017-08-23 LAB
EKG ATRIAL RATE: 87 BPM
EKG P AXIS: 0 DEGREES
EKG P-R INTERVAL: 142 MS
EKG Q-T INTERVAL: 384 MS
EKG QRS DURATION: 82 MS
EKG QTC CALCULATION (BAZETT): 462 MS
EKG R AXIS: 38 DEGREES
EKG T AXIS: 29 DEGREES
EKG VENTRICULAR RATE: 87 BPM

## 2017-08-25 ENCOUNTER — TELEPHONE (OUTPATIENT)
Dept: SURGERY | Age: 57
End: 2017-08-25

## 2017-08-28 ENCOUNTER — HOSPITAL ENCOUNTER (OUTPATIENT)
Dept: LAB | Age: 57
Discharge: HOME | DRG: 330 | End: 2017-08-28
Payer: COMMERCIAL

## 2017-08-28 ENCOUNTER — OFFICE VISIT (OUTPATIENT)
Dept: OBGYN | Age: 57
End: 2017-08-28
Payer: COMMERCIAL

## 2017-08-28 VITALS
DIASTOLIC BLOOD PRESSURE: 72 MMHG | RESPIRATION RATE: 16 BRPM | HEART RATE: 80 BPM | HEIGHT: 66 IN | SYSTOLIC BLOOD PRESSURE: 124 MMHG | WEIGHT: 195 LBS | BODY MASS INDEX: 31.34 KG/M2

## 2017-08-28 DIAGNOSIS — K57.20 DIVERTICULITIS OF LARGE INTESTINE WITH ABSCESS WITHOUT BLEEDING: ICD-10-CM

## 2017-08-28 DIAGNOSIS — N82.8 FISTULA OF VAGINA: Primary | ICD-10-CM

## 2017-08-28 DIAGNOSIS — Z01.818 PRE-OP TESTING: ICD-10-CM

## 2017-08-28 LAB
ABO/RH: NORMAL
ANTIBODY SCREEN: NEGATIVE
ARM BAND NUMBER: NORMAL
EXPIRATION DATE: NORMAL

## 2017-08-28 PROCEDURE — 3014F SCREEN MAMMO DOC REV: CPT | Performed by: OBSTETRICS & GYNECOLOGY

## 2017-08-28 PROCEDURE — 86850 RBC ANTIBODY SCREEN: CPT

## 2017-08-28 PROCEDURE — 86900 BLOOD TYPING SEROLOGIC ABO: CPT

## 2017-08-28 PROCEDURE — G8417 CALC BMI ABV UP PARAM F/U: HCPCS | Performed by: OBSTETRICS & GYNECOLOGY

## 2017-08-28 PROCEDURE — 1036F TOBACCO NON-USER: CPT | Performed by: OBSTETRICS & GYNECOLOGY

## 2017-08-28 PROCEDURE — 86901 BLOOD TYPING SEROLOGIC RH(D): CPT

## 2017-08-28 PROCEDURE — G8427 DOCREV CUR MEDS BY ELIG CLIN: HCPCS | Performed by: OBSTETRICS & GYNECOLOGY

## 2017-08-28 PROCEDURE — 36415 COLL VENOUS BLD VENIPUNCTURE: CPT

## 2017-08-28 PROCEDURE — 99203 OFFICE O/P NEW LOW 30 MIN: CPT | Performed by: OBSTETRICS & GYNECOLOGY

## 2017-08-28 PROCEDURE — 3017F COLORECTAL CA SCREEN DOC REV: CPT | Performed by: OBSTETRICS & GYNECOLOGY

## 2017-08-29 ENCOUNTER — OFFICE VISIT (OUTPATIENT)
Dept: FAMILY MEDICINE CLINIC | Age: 57
End: 2017-08-29
Payer: COMMERCIAL

## 2017-08-29 ENCOUNTER — OFFICE VISIT (OUTPATIENT)
Dept: SURGERY | Age: 57
End: 2017-08-29
Payer: COMMERCIAL

## 2017-08-29 ENCOUNTER — ANESTHESIA EVENT (OUTPATIENT)
Dept: OPERATING ROOM | Age: 57
DRG: 330 | End: 2017-08-29
Payer: COMMERCIAL

## 2017-08-29 VITALS
TEMPERATURE: 97.6 F | HEART RATE: 93 BPM | SYSTOLIC BLOOD PRESSURE: 138 MMHG | BODY MASS INDEX: 31.31 KG/M2 | DIASTOLIC BLOOD PRESSURE: 84 MMHG | HEIGHT: 66 IN | WEIGHT: 194.8 LBS

## 2017-08-29 VITALS
DIASTOLIC BLOOD PRESSURE: 78 MMHG | HEART RATE: 98 BPM | BODY MASS INDEX: 31.66 KG/M2 | SYSTOLIC BLOOD PRESSURE: 130 MMHG | HEIGHT: 66 IN | WEIGHT: 197 LBS | OXYGEN SATURATION: 97 %

## 2017-08-29 DIAGNOSIS — E11.9 TYPE 2 DIABETES MELLITUS WITHOUT COMPLICATION, WITHOUT LONG-TERM CURRENT USE OF INSULIN (HCC): Primary | ICD-10-CM

## 2017-08-29 DIAGNOSIS — K63.1 BOWEL PERFORATION (HCC): ICD-10-CM

## 2017-08-29 DIAGNOSIS — K57.20 DIVERTICULITIS OF LARGE INTESTINE WITH ABSCESS WITHOUT BLEEDING: Primary | ICD-10-CM

## 2017-08-29 DIAGNOSIS — Z01.818 PREOPERATIVE CLEARANCE: ICD-10-CM

## 2017-08-29 PROCEDURE — 3014F SCREEN MAMMO DOC REV: CPT | Performed by: FAMILY MEDICINE

## 2017-08-29 PROCEDURE — G8417 CALC BMI ABV UP PARAM F/U: HCPCS | Performed by: SURGERY

## 2017-08-29 PROCEDURE — 1036F TOBACCO NON-USER: CPT | Performed by: FAMILY MEDICINE

## 2017-08-29 PROCEDURE — 99214 OFFICE O/P EST MOD 30 MIN: CPT | Performed by: FAMILY MEDICINE

## 2017-08-29 PROCEDURE — 3014F SCREEN MAMMO DOC REV: CPT | Performed by: SURGERY

## 2017-08-29 PROCEDURE — G8427 DOCREV CUR MEDS BY ELIG CLIN: HCPCS | Performed by: SURGERY

## 2017-08-29 PROCEDURE — 3017F COLORECTAL CA SCREEN DOC REV: CPT | Performed by: SURGERY

## 2017-08-29 PROCEDURE — G8417 CALC BMI ABV UP PARAM F/U: HCPCS | Performed by: FAMILY MEDICINE

## 2017-08-29 PROCEDURE — 99214 OFFICE O/P EST MOD 30 MIN: CPT | Performed by: SURGERY

## 2017-08-29 PROCEDURE — 3046F HEMOGLOBIN A1C LEVEL >9.0%: CPT | Performed by: FAMILY MEDICINE

## 2017-08-29 PROCEDURE — G8427 DOCREV CUR MEDS BY ELIG CLIN: HCPCS | Performed by: FAMILY MEDICINE

## 2017-08-29 PROCEDURE — 1036F TOBACCO NON-USER: CPT | Performed by: SURGERY

## 2017-08-29 PROCEDURE — 3017F COLORECTAL CA SCREEN DOC REV: CPT | Performed by: FAMILY MEDICINE

## 2017-08-29 ASSESSMENT — ENCOUNTER SYMPTOMS
CONSTIPATION: 0
CHEST TIGHTNESS: 0
COUGH: 0
DIARRHEA: 0
WHEEZING: 0
VOMITING: 1
BACK PAIN: 0
NAUSEA: 1
ABDOMINAL PAIN: 1
SHORTNESS OF BREATH: 0

## 2017-08-30 ENCOUNTER — HOSPITAL ENCOUNTER (INPATIENT)
Age: 57
LOS: 5 days | Discharge: HOME | DRG: 330 | End: 2017-09-04
Attending: SURGERY | Admitting: SURGERY
Payer: COMMERCIAL

## 2017-08-30 ENCOUNTER — ANESTHESIA (OUTPATIENT)
Dept: OPERATING ROOM | Age: 57
DRG: 330 | End: 2017-08-30
Payer: COMMERCIAL

## 2017-08-30 VITALS
RESPIRATION RATE: 14 BRPM | SYSTOLIC BLOOD PRESSURE: 127 MMHG | TEMPERATURE: 96.4 F | DIASTOLIC BLOOD PRESSURE: 83 MMHG | OXYGEN SATURATION: 99 %

## 2017-08-30 PROBLEM — N82.4 COLOVAGINAL FISTULA: Status: ACTIVE | Noted: 2017-08-30

## 2017-08-30 LAB — GLUCOSE BLD-MCNC: 112 MG/DL (ref 65–105)

## 2017-08-30 PROCEDURE — 6360000002 HC RX W HCPCS: Performed by: SURGERY

## 2017-08-30 PROCEDURE — 82947 ASSAY GLUCOSE BLOOD QUANT: CPT

## 2017-08-30 PROCEDURE — 6360000002 HC RX W HCPCS: Performed by: NURSE ANESTHETIST, CERTIFIED REGISTERED

## 2017-08-30 PROCEDURE — 1200000000 HC SEMI PRIVATE

## 2017-08-30 PROCEDURE — 62322 NJX INTERLAMINAR LMBR/SAC: CPT | Performed by: NURSE ANESTHETIST, CERTIFIED REGISTERED

## 2017-08-30 PROCEDURE — 2580000003 HC RX 258: Performed by: SURGERY

## 2017-08-30 PROCEDURE — 3600000004 HC SURGERY LEVEL 4 BASE: Performed by: SURGERY

## 2017-08-30 PROCEDURE — 6370000000 HC RX 637 (ALT 250 FOR IP): Performed by: SURGERY

## 2017-08-30 PROCEDURE — 7100000000 HC PACU RECOVERY - FIRST 15 MIN: Performed by: SURGERY

## 2017-08-30 PROCEDURE — 2580000003 HC RX 258: Performed by: NURSE ANESTHETIST, CERTIFIED REGISTERED

## 2017-08-30 PROCEDURE — 88307 TISSUE EXAM BY PATHOLOGIST: CPT

## 2017-08-30 PROCEDURE — 3700000000 HC ANESTHESIA ATTENDED CARE: Performed by: SURGERY

## 2017-08-30 PROCEDURE — 0T9B80Z DRAINAGE OF BLADDER WITH DRAINAGE DEVICE, VIA NATURAL OR ARTIFICIAL OPENING ENDOSCOPIC: ICD-10-PCS | Performed by: UROLOGY

## 2017-08-30 PROCEDURE — 0UT10ZZ RESECTION OF LEFT OVARY, OPEN APPROACH: ICD-10-PCS | Performed by: SURGERY

## 2017-08-30 PROCEDURE — 94770 HC ETCO2 MONITOR DAILY: CPT

## 2017-08-30 PROCEDURE — 44139 MOBILIZATION OF COLON: CPT | Performed by: SURGERY

## 2017-08-30 PROCEDURE — 2500000003 HC RX 250 WO HCPCS: Performed by: NURSE ANESTHETIST, CERTIFIED REGISTERED

## 2017-08-30 PROCEDURE — 88304 TISSUE EXAM BY PATHOLOGIST: CPT

## 2017-08-30 PROCEDURE — 6370000000 HC RX 637 (ALT 250 FOR IP): Performed by: NURSE ANESTHETIST, CERTIFIED REGISTERED

## 2017-08-30 PROCEDURE — 3600000014 HC SURGERY LEVEL 4 ADDTL 15MIN: Performed by: SURGERY

## 2017-08-30 PROCEDURE — 7100000001 HC PACU RECOVERY - ADDTL 15 MIN: Performed by: SURGERY

## 2017-08-30 PROCEDURE — 0DTN0ZZ RESECTION OF SIGMOID COLON, OPEN APPROACH: ICD-10-PCS | Performed by: SURGERY

## 2017-08-30 PROCEDURE — 88305 TISSUE EXAM BY PATHOLOGIST: CPT

## 2017-08-30 PROCEDURE — 2720000010 HC SURG SUPPLY STERILE: Performed by: SURGERY

## 2017-08-30 PROCEDURE — 0TNB0ZZ RELEASE BLADDER, OPEN APPROACH: ICD-10-PCS | Performed by: SURGERY

## 2017-08-30 PROCEDURE — 2060000000 HC ICU INTERMEDIATE R&B

## 2017-08-30 PROCEDURE — 44145 PARTIAL REMOVAL OF COLON: CPT | Performed by: SURGERY

## 2017-08-30 PROCEDURE — 2500000003 HC RX 250 WO HCPCS: Performed by: SURGERY

## 2017-08-30 PROCEDURE — 94761 N-INVAS EAR/PLS OXIMETRY MLT: CPT

## 2017-08-30 PROCEDURE — 00840 ANES IPER PX LOWER ABD NOS: CPT | Performed by: NURSE ANESTHETIST, CERTIFIED REGISTERED

## 2017-08-30 PROCEDURE — 94640 AIRWAY INHALATION TREATMENT: CPT

## 2017-08-30 PROCEDURE — 0UQG0ZZ REPAIR VAGINA, OPEN APPROACH: ICD-10-PCS | Performed by: SURGERY

## 2017-08-30 PROCEDURE — 3700000001 HC ADD 15 MINUTES (ANESTHESIA): Performed by: SURGERY

## 2017-08-30 RX ORDER — FENTANYL CITRATE 50 UG/ML
25 INJECTION, SOLUTION INTRAMUSCULAR; INTRAVENOUS EVERY 5 MIN PRN
Status: DISCONTINUED | OUTPATIENT
Start: 2017-08-30 | End: 2017-08-30 | Stop reason: HOSPADM

## 2017-08-30 RX ORDER — IPRATROPIUM BROMIDE AND ALBUTEROL SULFATE 2.5; .5 MG/3ML; MG/3ML
1 SOLUTION RESPIRATORY (INHALATION) ONCE
Status: COMPLETED | OUTPATIENT
Start: 2017-08-30 | End: 2017-08-30

## 2017-08-30 RX ORDER — ONDANSETRON 2 MG/ML
INJECTION INTRAMUSCULAR; INTRAVENOUS PRN
Status: DISCONTINUED | OUTPATIENT
Start: 2017-08-30 | End: 2017-08-30 | Stop reason: SDUPTHER

## 2017-08-30 RX ORDER — MORPHINE SULFATE 2 MG/ML
1 INJECTION, SOLUTION INTRAMUSCULAR; INTRAVENOUS EVERY 5 MIN PRN
Status: DISCONTINUED | OUTPATIENT
Start: 2017-08-30 | End: 2017-08-30 | Stop reason: HOSPADM

## 2017-08-30 RX ORDER — MORPHINE SULFATE 2 MG/ML
2 INJECTION, SOLUTION INTRAMUSCULAR; INTRAVENOUS EVERY 5 MIN PRN
Status: DISCONTINUED | OUTPATIENT
Start: 2017-08-30 | End: 2017-08-30 | Stop reason: HOSPADM

## 2017-08-30 RX ORDER — MORPHINE SULFATE 2 MG/ML
2 INJECTION, SOLUTION INTRAMUSCULAR; INTRAVENOUS
Status: DISCONTINUED | OUTPATIENT
Start: 2017-08-30 | End: 2017-09-02

## 2017-08-30 RX ORDER — HYDROCODONE BITARTRATE AND ACETAMINOPHEN 5; 325 MG/1; MG/1
1 TABLET ORAL PRN
Status: DISCONTINUED | OUTPATIENT
Start: 2017-08-30 | End: 2017-08-30 | Stop reason: HOSPADM

## 2017-08-30 RX ORDER — SODIUM CHLORIDE, SODIUM LACTATE, POTASSIUM CHLORIDE, CALCIUM CHLORIDE 600; 310; 30; 20 MG/100ML; MG/100ML; MG/100ML; MG/100ML
INJECTION, SOLUTION INTRAVENOUS CONTINUOUS
Status: DISCONTINUED | OUTPATIENT
Start: 2017-08-30 | End: 2017-08-30

## 2017-08-30 RX ORDER — FENTANYL CITRATE 50 UG/ML
INJECTION, SOLUTION INTRAMUSCULAR; INTRAVENOUS PRN
Status: DISCONTINUED | OUTPATIENT
Start: 2017-08-30 | End: 2017-08-30 | Stop reason: SDUPTHER

## 2017-08-30 RX ORDER — DEXTROSE, SODIUM CHLORIDE, AND POTASSIUM CHLORIDE 5; .45; .15 G/100ML; G/100ML; G/100ML
INJECTION INTRAVENOUS CONTINUOUS
Status: DISCONTINUED | OUTPATIENT
Start: 2017-08-30 | End: 2017-09-03

## 2017-08-30 RX ORDER — BUPIVACAINE HYDROCHLORIDE 2.5 MG/ML
INJECTION, SOLUTION EPIDURAL; INFILTRATION; INTRACAUDAL PRN
Status: DISCONTINUED | OUTPATIENT
Start: 2017-08-30 | End: 2017-08-30 | Stop reason: SDUPTHER

## 2017-08-30 RX ORDER — ROCURONIUM BROMIDE 10 MG/ML
INJECTION, SOLUTION INTRAVENOUS PRN
Status: DISCONTINUED | OUTPATIENT
Start: 2017-08-30 | End: 2017-08-30 | Stop reason: SDUPTHER

## 2017-08-30 RX ORDER — DEXAMETHASONE SODIUM PHOSPHATE 4 MG/ML
INJECTION, SOLUTION INTRA-ARTICULAR; INTRALESIONAL; INTRAMUSCULAR; INTRAVENOUS; SOFT TISSUE PRN
Status: DISCONTINUED | OUTPATIENT
Start: 2017-08-30 | End: 2017-08-30 | Stop reason: SDUPTHER

## 2017-08-30 RX ORDER — FENTANYL CITRATE 50 UG/ML
50 INJECTION, SOLUTION INTRAMUSCULAR; INTRAVENOUS EVERY 5 MIN PRN
Status: DISCONTINUED | OUTPATIENT
Start: 2017-08-30 | End: 2017-08-30 | Stop reason: HOSPADM

## 2017-08-30 RX ORDER — DIPHENHYDRAMINE HYDROCHLORIDE 50 MG/ML
12.5 INJECTION INTRAMUSCULAR; INTRAVENOUS
Status: DISCONTINUED | OUTPATIENT
Start: 2017-08-30 | End: 2017-08-30 | Stop reason: HOSPADM

## 2017-08-30 RX ORDER — PROMETHAZINE HYDROCHLORIDE 25 MG/ML
12.5 INJECTION, SOLUTION INTRAMUSCULAR; INTRAVENOUS ONCE
Status: COMPLETED | OUTPATIENT
Start: 2017-08-30 | End: 2017-08-30

## 2017-08-30 RX ORDER — NALOXONE HYDROCHLORIDE 0.4 MG/ML
0.4 INJECTION, SOLUTION INTRAMUSCULAR; INTRAVENOUS; SUBCUTANEOUS PRN
Status: DISCONTINUED | OUTPATIENT
Start: 2017-08-30 | End: 2017-08-31

## 2017-08-30 RX ORDER — DIPHENHYDRAMINE HYDROCHLORIDE 50 MG/ML
25 INJECTION INTRAMUSCULAR; INTRAVENOUS EVERY 6 HOURS PRN
Status: DISCONTINUED | OUTPATIENT
Start: 2017-08-30 | End: 2017-08-31

## 2017-08-30 RX ORDER — GLYCOPYRROLATE 0.2 MG/ML
INJECTION INTRAMUSCULAR; INTRAVENOUS PRN
Status: DISCONTINUED | OUTPATIENT
Start: 2017-08-30 | End: 2017-08-30 | Stop reason: SDUPTHER

## 2017-08-30 RX ORDER — ONDANSETRON 2 MG/ML
4 INJECTION INTRAMUSCULAR; INTRAVENOUS EVERY 6 HOURS PRN
Status: DISCONTINUED | OUTPATIENT
Start: 2017-08-30 | End: 2017-08-30

## 2017-08-30 RX ORDER — MORPHINE SULFATE 4 MG/ML
4 INJECTION, SOLUTION INTRAMUSCULAR; INTRAVENOUS
Status: DISCONTINUED | OUTPATIENT
Start: 2017-08-30 | End: 2017-09-02

## 2017-08-30 RX ORDER — HYDROCODONE BITARTRATE AND ACETAMINOPHEN 5; 325 MG/1; MG/1
2 TABLET ORAL EVERY 4 HOURS PRN
Status: DISCONTINUED | OUTPATIENT
Start: 2017-08-30 | End: 2017-09-04 | Stop reason: HOSPADM

## 2017-08-30 RX ORDER — LIDOCAINE HYDROCHLORIDE 10 MG/ML
INJECTION, SOLUTION INFILTRATION; PERINEURAL PRN
Status: DISCONTINUED | OUTPATIENT
Start: 2017-08-30 | End: 2017-08-30 | Stop reason: SDUPTHER

## 2017-08-30 RX ORDER — ACETAMINOPHEN 325 MG/1
650 TABLET ORAL EVERY 4 HOURS PRN
Status: DISCONTINUED | OUTPATIENT
Start: 2017-08-30 | End: 2017-09-04 | Stop reason: HOSPADM

## 2017-08-30 RX ORDER — METOCLOPRAMIDE HYDROCHLORIDE 5 MG/ML
INJECTION INTRAMUSCULAR; INTRAVENOUS PRN
Status: DISCONTINUED | OUTPATIENT
Start: 2017-08-30 | End: 2017-08-30 | Stop reason: SDUPTHER

## 2017-08-30 RX ORDER — ONDANSETRON 2 MG/ML
4 INJECTION INTRAMUSCULAR; INTRAVENOUS
Status: DISCONTINUED | OUTPATIENT
Start: 2017-08-30 | End: 2017-08-30 | Stop reason: HOSPADM

## 2017-08-30 RX ORDER — MEPERIDINE HYDROCHLORIDE 50 MG/ML
12.5 INJECTION INTRAMUSCULAR; INTRAVENOUS; SUBCUTANEOUS EVERY 5 MIN PRN
Status: DISCONTINUED | OUTPATIENT
Start: 2017-08-30 | End: 2017-08-30 | Stop reason: HOSPADM

## 2017-08-30 RX ORDER — SCOLOPAMINE TRANSDERMAL SYSTEM 1 MG/1
1 PATCH, EXTENDED RELEASE TRANSDERMAL
Status: DISCONTINUED | OUTPATIENT
Start: 2017-08-30 | End: 2017-08-31

## 2017-08-30 RX ORDER — NALBUPHINE HCL 10 MG/ML
5 AMPUL (ML) INJECTION EVERY 4 HOURS PRN
Status: DISCONTINUED | OUTPATIENT
Start: 2017-08-30 | End: 2017-08-31

## 2017-08-30 RX ORDER — ONDANSETRON 2 MG/ML
4 INJECTION INTRAMUSCULAR; INTRAVENOUS EVERY 6 HOURS PRN
Status: DISCONTINUED | OUTPATIENT
Start: 2017-08-30 | End: 2017-09-04 | Stop reason: HOSPADM

## 2017-08-30 RX ORDER — PROPOFOL 10 MG/ML
INJECTION, EMULSION INTRAVENOUS PRN
Status: DISCONTINUED | OUTPATIENT
Start: 2017-08-30 | End: 2017-08-30 | Stop reason: SDUPTHER

## 2017-08-30 RX ORDER — MIDAZOLAM HYDROCHLORIDE 1 MG/ML
INJECTION INTRAMUSCULAR; INTRAVENOUS PRN
Status: DISCONTINUED | OUTPATIENT
Start: 2017-08-30 | End: 2017-08-30 | Stop reason: SDUPTHER

## 2017-08-30 RX ORDER — HYDROCODONE BITARTRATE AND ACETAMINOPHEN 5; 325 MG/1; MG/1
2 TABLET ORAL PRN
Status: DISCONTINUED | OUTPATIENT
Start: 2017-08-30 | End: 2017-08-30 | Stop reason: HOSPADM

## 2017-08-30 RX ORDER — HYDROCODONE BITARTRATE AND ACETAMINOPHEN 5; 325 MG/1; MG/1
1 TABLET ORAL EVERY 4 HOURS PRN
Status: DISCONTINUED | OUTPATIENT
Start: 2017-08-30 | End: 2017-09-04 | Stop reason: HOSPADM

## 2017-08-30 RX ADMIN — FENTANYL CITRATE 100 MCG: 50 INJECTION, SOLUTION INTRAMUSCULAR; INTRAVENOUS at 09:20

## 2017-08-30 RX ADMIN — PHENYLEPHRINE HYDROCHLORIDE 200 MCG: 10 INJECTION INTRAMUSCULAR; INTRAVENOUS; SUBCUTANEOUS at 09:56

## 2017-08-30 RX ADMIN — SODIUM CHLORIDE, POTASSIUM CHLORIDE, SODIUM LACTATE AND CALCIUM CHLORIDE: 600; 310; 30; 20 INJECTION, SOLUTION INTRAVENOUS at 09:43

## 2017-08-30 RX ADMIN — NEOSTIGMINE METHYLSULFATE 3 MG: 1 INJECTION INTRAMUSCULAR; INTRAVENOUS; SUBCUTANEOUS at 14:25

## 2017-08-30 RX ADMIN — PROMETHAZINE HYDROCHLORIDE 12.5 MG: 25 INJECTION INTRAMUSCULAR; INTRAVENOUS at 15:39

## 2017-08-30 RX ADMIN — SODIUM CHLORIDE, POTASSIUM CHLORIDE, SODIUM LACTATE AND CALCIUM CHLORIDE: 600; 310; 30; 20 INJECTION, SOLUTION INTRAVENOUS at 13:21

## 2017-08-30 RX ADMIN — PHENYLEPHRINE HYDROCHLORIDE 300 MCG: 10 INJECTION INTRAMUSCULAR; INTRAVENOUS; SUBCUTANEOUS at 11:42

## 2017-08-30 RX ADMIN — LIDOCAINE HYDROCHLORIDE 2 ML: 10 INJECTION, SOLUTION INFILTRATION; PERINEURAL at 09:26

## 2017-08-30 RX ADMIN — DEXAMETHASONE SODIUM PHOSPHATE 8 MG: 4 INJECTION, SOLUTION INTRAMUSCULAR; INTRAVENOUS at 14:15

## 2017-08-30 RX ADMIN — BUPIVACAINE HYDROCHLORIDE: 5 INJECTION, SOLUTION EPIDURAL; INTRACAUDAL at 22:55

## 2017-08-30 RX ADMIN — ENOXAPARIN SODIUM 40 MG: 40 INJECTION SUBCUTANEOUS at 09:50

## 2017-08-30 RX ADMIN — CEFOXITIN 2 G: 2 INJECTION, POWDER, FOR SOLUTION INTRAVENOUS at 09:55

## 2017-08-30 RX ADMIN — CEFOXITIN 2 G: 2 INJECTION, POWDER, FOR SOLUTION INTRAVENOUS at 20:34

## 2017-08-30 RX ADMIN — MORPHINE SULFATE 2 MG: 2 INJECTION, SOLUTION INTRAMUSCULAR; INTRAVENOUS at 23:00

## 2017-08-30 RX ADMIN — SODIUM CHLORIDE, POTASSIUM CHLORIDE, SODIUM LACTATE AND CALCIUM CHLORIDE: 600; 310; 30; 20 INJECTION, SOLUTION INTRAVENOUS at 12:09

## 2017-08-30 RX ADMIN — PROPOFOL 50 MG: 10 INJECTION, EMULSION INTRAVENOUS at 14:14

## 2017-08-30 RX ADMIN — PHENYLEPHRINE HYDROCHLORIDE 200 MCG: 10 INJECTION INTRAMUSCULAR; INTRAVENOUS; SUBCUTANEOUS at 10:05

## 2017-08-30 RX ADMIN — SODIUM CHLORIDE, POTASSIUM CHLORIDE, SODIUM LACTATE AND CALCIUM CHLORIDE: 600; 310; 30; 20 INJECTION, SOLUTION INTRAVENOUS at 10:42

## 2017-08-30 RX ADMIN — SODIUM CHLORIDE, POTASSIUM CHLORIDE, SODIUM LACTATE AND CALCIUM CHLORIDE: 600; 310; 30; 20 INJECTION, SOLUTION INTRAVENOUS at 09:20

## 2017-08-30 RX ADMIN — ROCURONIUM BROMIDE 20 MG: 10 INJECTION INTRAVENOUS at 12:24

## 2017-08-30 RX ADMIN — PROPOFOL 150 MG: 10 INJECTION, EMULSION INTRAVENOUS at 09:38

## 2017-08-30 RX ADMIN — FENTANYL CITRATE 100 MCG: 50 INJECTION, SOLUTION INTRAMUSCULAR; INTRAVENOUS at 12:01

## 2017-08-30 RX ADMIN — FENTANYL CITRATE 50 MCG: 50 INJECTION, SOLUTION INTRAMUSCULAR; INTRAVENOUS at 11:03

## 2017-08-30 RX ADMIN — MIDAZOLAM HYDROCHLORIDE 2 MG: 1 INJECTION, SOLUTION INTRAMUSCULAR; INTRAVENOUS at 09:20

## 2017-08-30 RX ADMIN — METOCLOPRAMIDE 10 MG: 5 INJECTION, SOLUTION INTRAMUSCULAR; INTRAVENOUS at 14:15

## 2017-08-30 RX ADMIN — GLYCOPYRROLATE 0.4 MG: 0.2 INJECTION INTRAMUSCULAR; INTRAVENOUS at 14:25

## 2017-08-30 RX ADMIN — POTASSIUM CHLORIDE, DEXTROSE MONOHYDRATE AND SODIUM CHLORIDE: 150; 5; 450 INJECTION, SOLUTION INTRAVENOUS at 17:24

## 2017-08-30 RX ADMIN — MORPHINE SULFATE 1 MG: 2 INJECTION, SOLUTION INTRAMUSCULAR; INTRAVENOUS at 16:19

## 2017-08-30 RX ADMIN — BUPIVACAINE HYDROCHLORIDE 10 ML: 2.5 INJECTION, SOLUTION EPIDURAL; INFILTRATION; INTRACAUDAL at 15:03

## 2017-08-30 RX ADMIN — ONDANSETRON 8 MG: 2 INJECTION INTRAMUSCULAR; INTRAVENOUS at 14:15

## 2017-08-30 RX ADMIN — ROCURONIUM BROMIDE 50 MG: 10 INJECTION INTRAVENOUS at 09:38

## 2017-08-30 RX ADMIN — BUPIVACAINE HYDROCHLORIDE: 5 INJECTION, SOLUTION EPIDURAL; INTRACAUDAL at 17:55

## 2017-08-30 RX ADMIN — PHENYLEPHRINE HYDROCHLORIDE 15 MCG/MIN: 10 INJECTION INTRAMUSCULAR; INTRAVENOUS; SUBCUTANEOUS at 10:05

## 2017-08-30 RX ADMIN — CEFOXITIN 2 G: 2 INJECTION, POWDER, FOR SOLUTION INTRAVENOUS at 13:53

## 2017-08-30 RX ADMIN — SODIUM CHLORIDE, POTASSIUM CHLORIDE, SODIUM LACTATE AND CALCIUM CHLORIDE: 600; 310; 30; 20 INJECTION, SOLUTION INTRAVENOUS at 09:00

## 2017-08-30 RX ADMIN — BUPIVACAINE HYDROCHLORIDE: 5 INJECTION, SOLUTION EPIDURAL; INTRACAUDAL at 17:21

## 2017-08-30 RX ADMIN — CEFOXITIN 2 G: 2 INJECTION, POWDER, FOR SOLUTION INTRAVENOUS at 11:53

## 2017-08-30 RX ADMIN — IPRATROPIUM BROMIDE AND ALBUTEROL SULFATE 1 AMPULE: .5; 3 SOLUTION RESPIRATORY (INHALATION) at 15:12

## 2017-08-30 RX ADMIN — ROCURONIUM BROMIDE 10 MG: 10 INJECTION INTRAVENOUS at 14:14

## 2017-08-30 RX ADMIN — SODIUM CHLORIDE, POTASSIUM CHLORIDE, SODIUM LACTATE AND CALCIUM CHLORIDE: 600; 310; 30; 20 INJECTION, SOLUTION INTRAVENOUS at 13:43

## 2017-08-30 RX ADMIN — FENTANYL CITRATE 50 MCG: 50 INJECTION, SOLUTION INTRAMUSCULAR; INTRAVENOUS at 11:10

## 2017-08-30 RX ADMIN — ROCURONIUM BROMIDE 30 MG: 10 INJECTION INTRAVENOUS at 11:10

## 2017-08-30 RX ADMIN — FENTANYL CITRATE 100 MCG: 50 INJECTION, SOLUTION INTRAMUSCULAR; INTRAVENOUS at 12:58

## 2017-08-30 RX ADMIN — SODIUM CHLORIDE, POTASSIUM CHLORIDE, SODIUM LACTATE AND CALCIUM CHLORIDE: 600; 310; 30; 20 INJECTION, SOLUTION INTRAVENOUS at 16:45

## 2017-08-30 RX ADMIN — LIDOCAINE HYDROCHLORIDE 3 ML: 10; .005 INJECTION, SOLUTION EPIDURAL; INFILTRATION; INTRACAUDAL; PERINEURAL at 09:37

## 2017-08-30 RX ADMIN — FENTANYL CITRATE 8 ML/HR: 50 INJECTION, SOLUTION INTRAMUSCULAR; INTRAVENOUS at 12:12

## 2017-08-30 ASSESSMENT — PULMONARY FUNCTION TESTS
PIF_VALUE: 24
PIF_VALUE: 25
PIF_VALUE: 24
PIF_VALUE: 26
PIF_VALUE: 20
PIF_VALUE: 23
PIF_VALUE: 12
PIF_VALUE: 23
PIF_VALUE: 23
PIF_VALUE: 25
PIF_VALUE: 23
PIF_VALUE: 22
PIF_VALUE: 20
PIF_VALUE: 23
PIF_VALUE: 23
PIF_VALUE: 24
PIF_VALUE: 21
PIF_VALUE: 28
PIF_VALUE: 23
PIF_VALUE: 23
PIF_VALUE: 19

## 2017-08-30 ASSESSMENT — PAIN DESCRIPTION - PAIN TYPE
TYPE: SURGICAL PAIN

## 2017-08-30 ASSESSMENT — PAIN - FUNCTIONAL ASSESSMENT: PAIN_FUNCTIONAL_ASSESSMENT: 0-10

## 2017-08-30 ASSESSMENT — PAIN SCALES - GENERAL
PAINLEVEL_OUTOF10: 8
PAINLEVEL_OUTOF10: 5
PAINLEVEL_OUTOF10: 8
PAINLEVEL_OUTOF10: 6
PAINLEVEL_OUTOF10: 8
PAINLEVEL_OUTOF10: 8
PAINLEVEL_OUTOF10: 7
PAINLEVEL_OUTOF10: 5

## 2017-08-30 ASSESSMENT — PAIN DESCRIPTION - ORIENTATION
ORIENTATION: MID

## 2017-08-30 ASSESSMENT — PAIN DESCRIPTION - LOCATION
LOCATION: ABDOMEN

## 2017-08-30 ASSESSMENT — PAIN DESCRIPTION - DESCRIPTORS
DESCRIPTORS: SHARP

## 2017-08-30 ASSESSMENT — PAIN DESCRIPTION - PROGRESSION
CLINICAL_PROGRESSION: NOT CHANGED

## 2017-08-31 LAB
ALBUMIN SERPL-MCNC: 3.4 G/DL (ref 3.5–5.2)
ALBUMIN/GLOBULIN RATIO: 1.5 (ref 1–2.5)
ALP BLD-CCNC: 37 U/L (ref 35–104)
ALT SERPL-CCNC: 27 U/L (ref 5–33)
ANION GAP SERPL CALCULATED.3IONS-SCNC: 11 MMOL/L (ref 9–17)
AST SERPL-CCNC: 16 U/L
BILIRUB SERPL-MCNC: 0.37 MG/DL (ref 0.3–1.2)
BUN BLDV-MCNC: 10 MG/DL (ref 6–20)
BUN/CREAT BLD: 18 (ref 9–20)
CALCIUM SERPL-MCNC: 8.6 MG/DL (ref 8.6–10.4)
CHLORIDE BLD-SCNC: 103 MMOL/L (ref 98–107)
CO2: 25 MMOL/L (ref 20–31)
CREAT SERPL-MCNC: 0.56 MG/DL (ref 0.5–0.9)
GFR AFRICAN AMERICAN: >60 ML/MIN
GFR NON-AFRICAN AMERICAN: >60 ML/MIN
GFR SERPL CREATININE-BSD FRML MDRD: ABNORMAL ML/MIN/{1.73_M2}
GFR SERPL CREATININE-BSD FRML MDRD: ABNORMAL ML/MIN/{1.73_M2}
GLUCOSE BLD-MCNC: 126 MG/DL (ref 65–105)
GLUCOSE BLD-MCNC: 130 MG/DL (ref 65–105)
GLUCOSE BLD-MCNC: 137 MG/DL (ref 65–105)
GLUCOSE BLD-MCNC: 181 MG/DL (ref 70–99)
HCT VFR BLD CALC: 31 % (ref 36–46)
HEMOGLOBIN: 10.3 G/DL (ref 12–16)
MCH RBC QN AUTO: 29.6 PG (ref 26–34)
MCHC RBC AUTO-ENTMCNC: 33.3 G/DL (ref 31–37)
MCV RBC AUTO: 88.8 FL (ref 80–100)
PDW BLD-RTO: 14.2 % (ref 11–14.5)
PLATELET # BLD: 226 K/UL (ref 140–450)
PMV BLD AUTO: 6.8 FL (ref 6–12)
POTASSIUM SERPL-SCNC: 4.5 MMOL/L (ref 3.7–5.3)
RBC # BLD: 3.49 M/UL (ref 4–5.2)
SODIUM BLD-SCNC: 139 MMOL/L (ref 135–144)
TOTAL PROTEIN: 5.7 G/DL (ref 6.4–8.3)
WBC # BLD: 10.5 K/UL (ref 3.5–11)

## 2017-08-31 PROCEDURE — 2580000003 HC RX 258: Performed by: SURGERY

## 2017-08-31 PROCEDURE — 2500000003 HC RX 250 WO HCPCS: Performed by: SURGERY

## 2017-08-31 PROCEDURE — 36415 COLL VENOUS BLD VENIPUNCTURE: CPT

## 2017-08-31 PROCEDURE — 1200000000 HC SEMI PRIVATE

## 2017-08-31 PROCEDURE — 6360000002 HC RX W HCPCS: Performed by: SURGERY

## 2017-08-31 PROCEDURE — 94640 AIRWAY INHALATION TREATMENT: CPT

## 2017-08-31 PROCEDURE — 94770 HC ETCO2 MONITOR DAILY: CPT

## 2017-08-31 PROCEDURE — 94150 VITAL CAPACITY TEST: CPT

## 2017-08-31 PROCEDURE — 80053 COMPREHEN METABOLIC PANEL: CPT

## 2017-08-31 PROCEDURE — 99232 SBSQ HOSP IP/OBS MODERATE 35: CPT | Performed by: INTERNAL MEDICINE

## 2017-08-31 PROCEDURE — 6360000002 HC RX W HCPCS: Performed by: INTERNAL MEDICINE

## 2017-08-31 PROCEDURE — 6370000000 HC RX 637 (ALT 250 FOR IP): Performed by: INTERNAL MEDICINE

## 2017-08-31 PROCEDURE — 94761 N-INVAS EAR/PLS OXIMETRY MLT: CPT

## 2017-08-31 PROCEDURE — 82947 ASSAY GLUCOSE BLOOD QUANT: CPT

## 2017-08-31 PROCEDURE — 85027 COMPLETE CBC AUTOMATED: CPT

## 2017-08-31 RX ORDER — ALBUTEROL SULFATE 2.5 MG/3ML
2.5 SOLUTION RESPIRATORY (INHALATION) 4 TIMES DAILY
Status: DISCONTINUED | OUTPATIENT
Start: 2017-08-31 | End: 2017-09-04 | Stop reason: HOSPADM

## 2017-08-31 RX ORDER — NICOTINE POLACRILEX 4 MG
15 LOZENGE BUCCAL PRN
Status: DISCONTINUED | OUTPATIENT
Start: 2017-08-31 | End: 2017-09-04 | Stop reason: HOSPADM

## 2017-08-31 RX ORDER — ALBUTEROL SULFATE 2.5 MG/3ML
2.5 SOLUTION RESPIRATORY (INHALATION)
Status: DISCONTINUED | OUTPATIENT
Start: 2017-08-31 | End: 2017-08-31 | Stop reason: SDUPTHER

## 2017-08-31 RX ORDER — DEXTROSE MONOHYDRATE 25 G/50ML
12.5 INJECTION, SOLUTION INTRAVENOUS PRN
Status: DISCONTINUED | OUTPATIENT
Start: 2017-08-31 | End: 2017-09-04 | Stop reason: HOSPADM

## 2017-08-31 RX ORDER — DEXTROSE MONOHYDRATE 50 MG/ML
100 INJECTION, SOLUTION INTRAVENOUS PRN
Status: DISCONTINUED | OUTPATIENT
Start: 2017-08-31 | End: 2017-09-04 | Stop reason: HOSPADM

## 2017-08-31 RX ORDER — INSULIN GLARGINE 100 [IU]/ML
10 INJECTION, SOLUTION SUBCUTANEOUS DAILY
Status: DISCONTINUED | OUTPATIENT
Start: 2017-08-31 | End: 2017-09-04 | Stop reason: HOSPADM

## 2017-08-31 RX ORDER — SODIUM CHLORIDE FOR INHALATION 0.9 %
3 VIAL, NEBULIZER (ML) INHALATION
Status: DISCONTINUED | OUTPATIENT
Start: 2017-08-31 | End: 2017-08-31 | Stop reason: ALTCHOICE

## 2017-08-31 RX ORDER — ALBUTEROL SULFATE 2.5 MG/3ML
2.5 SOLUTION RESPIRATORY (INHALATION)
Status: DISCONTINUED | OUTPATIENT
Start: 2017-08-31 | End: 2017-09-04 | Stop reason: HOSPADM

## 2017-08-31 RX ADMIN — MORPHINE SULFATE 4 MG: 4 INJECTION, SOLUTION INTRAMUSCULAR; INTRAVENOUS at 16:21

## 2017-08-31 RX ADMIN — BUPIVACAINE HYDROCHLORIDE: 5 INJECTION, SOLUTION EPIDURAL; INTRACAUDAL at 09:11

## 2017-08-31 RX ADMIN — MORPHINE SULFATE 2 MG: 2 INJECTION, SOLUTION INTRAMUSCULAR; INTRAVENOUS at 09:15

## 2017-08-31 RX ADMIN — MORPHINE SULFATE 4 MG: 4 INJECTION, SOLUTION INTRAMUSCULAR; INTRAVENOUS at 20:34

## 2017-08-31 RX ADMIN — POTASSIUM CHLORIDE, DEXTROSE MONOHYDRATE AND SODIUM CHLORIDE: 150; 5; 450 INJECTION, SOLUTION INTRAVENOUS at 00:01

## 2017-08-31 RX ADMIN — POTASSIUM CHLORIDE, DEXTROSE MONOHYDRATE AND SODIUM CHLORIDE: 150; 5; 450 INJECTION, SOLUTION INTRAVENOUS at 22:10

## 2017-08-31 RX ADMIN — CEFOXITIN 2 G: 2 INJECTION, POWDER, FOR SOLUTION INTRAVENOUS at 14:05

## 2017-08-31 RX ADMIN — POTASSIUM CHLORIDE, DEXTROSE MONOHYDRATE AND SODIUM CHLORIDE: 150; 5; 450 INJECTION, SOLUTION INTRAVENOUS at 07:18

## 2017-08-31 RX ADMIN — ALBUTEROL SULFATE 2.5 MG: 2.5 SOLUTION RESPIRATORY (INHALATION) at 16:52

## 2017-08-31 RX ADMIN — ALBUTEROL SULFATE 2.5 MG: 2.5 SOLUTION RESPIRATORY (INHALATION) at 20:10

## 2017-08-31 RX ADMIN — CEFOXITIN 2 G: 2 INJECTION, POWDER, FOR SOLUTION INTRAVENOUS at 20:34

## 2017-08-31 RX ADMIN — Medication 10 UNITS: at 09:35

## 2017-08-31 RX ADMIN — POTASSIUM CHLORIDE, DEXTROSE MONOHYDRATE AND SODIUM CHLORIDE: 150; 5; 450 INJECTION, SOLUTION INTRAVENOUS at 14:54

## 2017-08-31 RX ADMIN — MORPHINE SULFATE 4 MG: 4 INJECTION, SOLUTION INTRAMUSCULAR; INTRAVENOUS at 14:05

## 2017-08-31 RX ADMIN — BUPIVACAINE HYDROCHLORIDE: 5 INJECTION, SOLUTION EPIDURAL; INTRACAUDAL at 04:02

## 2017-08-31 RX ADMIN — CEFOXITIN 2 G: 2 INJECTION, POWDER, FOR SOLUTION INTRAVENOUS at 07:28

## 2017-08-31 RX ADMIN — CEFOXITIN 2 G: 2 INJECTION, POWDER, FOR SOLUTION INTRAVENOUS at 02:51

## 2017-08-31 RX ADMIN — ENOXAPARIN SODIUM 40 MG: 40 INJECTION SUBCUTANEOUS at 17:56

## 2017-08-31 ASSESSMENT — PAIN SCALES - GENERAL
PAINLEVEL_OUTOF10: 8
PAINLEVEL_OUTOF10: 8
PAINLEVEL_OUTOF10: 6
PAINLEVEL_OUTOF10: 7
PAINLEVEL_OUTOF10: 6
PAINLEVEL_OUTOF10: 4

## 2017-08-31 ASSESSMENT — PAIN DESCRIPTION - FREQUENCY
FREQUENCY: CONTINUOUS

## 2017-08-31 ASSESSMENT — PAIN DESCRIPTION - PROGRESSION
CLINICAL_PROGRESSION: RAPIDLY WORSENING
CLINICAL_PROGRESSION: GRADUALLY IMPROVING
CLINICAL_PROGRESSION: NOT CHANGED
CLINICAL_PROGRESSION: GRADUALLY WORSENING

## 2017-08-31 ASSESSMENT — PAIN DESCRIPTION - PAIN TYPE
TYPE: SURGICAL PAIN

## 2017-08-31 ASSESSMENT — PAIN DESCRIPTION - ORIENTATION
ORIENTATION: MID

## 2017-08-31 ASSESSMENT — PAIN DESCRIPTION - LOCATION
LOCATION: ABDOMEN

## 2017-08-31 ASSESSMENT — PAIN DESCRIPTION - DESCRIPTORS
DESCRIPTORS: ACHING
DESCRIPTORS: SHARP;ACHING

## 2017-08-31 ASSESSMENT — PAIN DESCRIPTION - ONSET
ONSET: ON-GOING

## 2017-09-01 LAB
ABSOLUTE EOS #: 0.2 K/UL (ref 0–0.4)
ABSOLUTE LYMPH #: 1.1 K/UL (ref 1–4.8)
ABSOLUTE MONO #: 1 K/UL (ref 0.1–1.2)
ANION GAP SERPL CALCULATED.3IONS-SCNC: 10 MMOL/L (ref 9–17)
BASOPHILS # BLD: 0 %
BASOPHILS ABSOLUTE: 0 K/UL (ref 0–0.2)
BUN BLDV-MCNC: 6 MG/DL (ref 6–20)
BUN/CREAT BLD: 14 (ref 9–20)
CALCIUM SERPL-MCNC: 8.5 MG/DL (ref 8.6–10.4)
CHLORIDE BLD-SCNC: 101 MMOL/L (ref 98–107)
CO2: 26 MMOL/L (ref 20–31)
CREAT SERPL-MCNC: 0.42 MG/DL (ref 0.5–0.9)
DIFFERENTIAL TYPE: ABNORMAL
EOSINOPHILS RELATIVE PERCENT: 2 %
GFR AFRICAN AMERICAN: >60 ML/MIN
GFR NON-AFRICAN AMERICAN: >60 ML/MIN
GFR SERPL CREATININE-BSD FRML MDRD: ABNORMAL ML/MIN/{1.73_M2}
GFR SERPL CREATININE-BSD FRML MDRD: ABNORMAL ML/MIN/{1.73_M2}
GLUCOSE BLD-MCNC: 109 MG/DL (ref 65–105)
GLUCOSE BLD-MCNC: 113 MG/DL (ref 65–105)
GLUCOSE BLD-MCNC: 139 MG/DL (ref 65–105)
GLUCOSE BLD-MCNC: 149 MG/DL (ref 65–105)
GLUCOSE BLD-MCNC: 156 MG/DL (ref 70–99)
HCT VFR BLD CALC: 28.2 % (ref 36–46)
HEMOGLOBIN: 9.3 G/DL (ref 12–16)
LYMPHOCYTES # BLD: 11 %
MCH RBC QN AUTO: 29.2 PG (ref 26–34)
MCHC RBC AUTO-ENTMCNC: 33 G/DL (ref 31–37)
MCV RBC AUTO: 88.5 FL (ref 80–100)
MONOCYTES # BLD: 10 %
PDW BLD-RTO: 14 % (ref 11–14.5)
PLATELET # BLD: 191 K/UL (ref 140–450)
PLATELET ESTIMATE: ABNORMAL
PMV BLD AUTO: 6.8 FL (ref 6–12)
POTASSIUM SERPL-SCNC: 3.9 MMOL/L (ref 3.7–5.3)
RBC # BLD: 3.18 M/UL (ref 4–5.2)
RBC # BLD: ABNORMAL 10*6/UL
SEG NEUTROPHILS: 77 %
SEGMENTED NEUTROPHILS ABSOLUTE COUNT: 7.7 K/UL (ref 1.8–7.7)
SODIUM BLD-SCNC: 137 MMOL/L (ref 135–144)
SURGICAL PATHOLOGY REPORT: NORMAL
WBC # BLD: 10.2 K/UL (ref 3.5–11)
WBC # BLD: ABNORMAL 10*3/UL

## 2017-09-01 PROCEDURE — 6360000002 HC RX W HCPCS: Performed by: SURGERY

## 2017-09-01 PROCEDURE — 6370000000 HC RX 637 (ALT 250 FOR IP): Performed by: INTERNAL MEDICINE

## 2017-09-01 PROCEDURE — 2580000003 HC RX 258: Performed by: INTERNAL MEDICINE

## 2017-09-01 PROCEDURE — 94640 AIRWAY INHALATION TREATMENT: CPT

## 2017-09-01 PROCEDURE — 99232 SBSQ HOSP IP/OBS MODERATE 35: CPT | Performed by: INTERNAL MEDICINE

## 2017-09-01 PROCEDURE — 6360000002 HC RX W HCPCS: Performed by: INTERNAL MEDICINE

## 2017-09-01 PROCEDURE — 36415 COLL VENOUS BLD VENIPUNCTURE: CPT

## 2017-09-01 PROCEDURE — 94760 N-INVAS EAR/PLS OXIMETRY 1: CPT

## 2017-09-01 PROCEDURE — 1200000000 HC SEMI PRIVATE

## 2017-09-01 PROCEDURE — 6370000000 HC RX 637 (ALT 250 FOR IP): Performed by: PHYSICIAN ASSISTANT

## 2017-09-01 PROCEDURE — 2580000003 HC RX 258: Performed by: SURGERY

## 2017-09-01 PROCEDURE — 6370000000 HC RX 637 (ALT 250 FOR IP)

## 2017-09-01 PROCEDURE — 85025 COMPLETE CBC W/AUTO DIFF WBC: CPT

## 2017-09-01 PROCEDURE — G8978 MOBILITY CURRENT STATUS: HCPCS | Performed by: PHYSICAL THERAPIST

## 2017-09-01 PROCEDURE — 97116 GAIT TRAINING THERAPY: CPT | Performed by: PHYSICAL THERAPIST

## 2017-09-01 PROCEDURE — 82947 ASSAY GLUCOSE BLOOD QUANT: CPT

## 2017-09-01 PROCEDURE — 80048 BASIC METABOLIC PNL TOTAL CA: CPT

## 2017-09-01 PROCEDURE — 97162 PT EVAL MOD COMPLEX 30 MIN: CPT | Performed by: PHYSICAL THERAPIST

## 2017-09-01 RX ORDER — BACLOFEN 10 MG/1
TABLET ORAL
Status: COMPLETED
Start: 2017-09-01 | End: 2017-09-01

## 2017-09-01 RX ORDER — BACLOFEN 10 MG/1
10 TABLET ORAL 3 TIMES DAILY
Status: DISCONTINUED | OUTPATIENT
Start: 2017-09-01 | End: 2017-09-04 | Stop reason: HOSPADM

## 2017-09-01 RX ORDER — LOSARTAN POTASSIUM 25 MG/1
25 TABLET ORAL DAILY
Status: DISCONTINUED | OUTPATIENT
Start: 2017-09-01 | End: 2017-09-04 | Stop reason: HOSPADM

## 2017-09-01 RX ORDER — SIMETHICONE 80 MG
80 TABLET,CHEWABLE ORAL ONCE
Status: COMPLETED | OUTPATIENT
Start: 2017-09-01 | End: 2017-09-01

## 2017-09-01 RX ORDER — LEVOTHYROXINE SODIUM 0.07 MG/1
150 TABLET ORAL DAILY
Status: DISCONTINUED | OUTPATIENT
Start: 2017-09-01 | End: 2017-09-04 | Stop reason: HOSPADM

## 2017-09-01 RX ADMIN — CEFOXITIN 2 G: 2 INJECTION, POWDER, FOR SOLUTION INTRAVENOUS at 02:00

## 2017-09-01 RX ADMIN — ALBUTEROL SULFATE 2.5 MG: 2.5 SOLUTION RESPIRATORY (INHALATION) at 20:13

## 2017-09-01 RX ADMIN — ALBUTEROL SULFATE 2.5 MG: 2.5 SOLUTION RESPIRATORY (INHALATION) at 12:07

## 2017-09-01 RX ADMIN — CEFOXITIN 2 G: 2 INJECTION, POWDER, FOR SOLUTION INTRAVENOUS at 07:54

## 2017-09-01 RX ADMIN — POTASSIUM CHLORIDE, DEXTROSE MONOHYDRATE AND SODIUM CHLORIDE: 150; 5; 450 INJECTION, SOLUTION INTRAVENOUS at 04:52

## 2017-09-01 RX ADMIN — MORPHINE SULFATE 2 MG: 2 INJECTION, SOLUTION INTRAMUSCULAR; INTRAVENOUS at 20:50

## 2017-09-01 RX ADMIN — INSULIN LISPRO 1 UNITS: 100 INJECTION, SOLUTION INTRAVENOUS; SUBCUTANEOUS at 06:33

## 2017-09-01 RX ADMIN — ALBUTEROL SULFATE 2.5 MG: 2.5 SOLUTION RESPIRATORY (INHALATION) at 16:02

## 2017-09-01 RX ADMIN — MORPHINE SULFATE 2 MG: 2 INJECTION, SOLUTION INTRAMUSCULAR; INTRAVENOUS at 14:00

## 2017-09-01 RX ADMIN — CEFOXITIN SODIUM 2 G: 2 POWDER, FOR SOLUTION INTRAVENOUS at 16:03

## 2017-09-01 RX ADMIN — SIMETHICONE CHEW TAB 80 MG 80 MG: 80 TABLET ORAL at 04:31

## 2017-09-01 RX ADMIN — BACLOFEN 10 MG: 10 TABLET ORAL at 10:53

## 2017-09-01 RX ADMIN — MORPHINE SULFATE 2 MG: 2 INJECTION, SOLUTION INTRAMUSCULAR; INTRAVENOUS at 04:52

## 2017-09-01 RX ADMIN — BACLOFEN 10 MG: 10 TABLET ORAL at 13:44

## 2017-09-01 RX ADMIN — POTASSIUM CHLORIDE, DEXTROSE MONOHYDRATE AND SODIUM CHLORIDE: 150; 5; 450 INJECTION, SOLUTION INTRAVENOUS at 12:14

## 2017-09-01 RX ADMIN — LEVOTHYROXINE SODIUM 150 MCG: 75 TABLET ORAL at 13:43

## 2017-09-01 RX ADMIN — MORPHINE SULFATE 4 MG: 4 INJECTION, SOLUTION INTRAMUSCULAR; INTRAVENOUS at 07:51

## 2017-09-01 RX ADMIN — ALBUTEROL SULFATE 2.5 MG: 2.5 SOLUTION RESPIRATORY (INHALATION) at 08:04

## 2017-09-01 RX ADMIN — MORPHINE SULFATE 2 MG: 2 INJECTION, SOLUTION INTRAMUSCULAR; INTRAVENOUS at 00:13

## 2017-09-01 RX ADMIN — ENOXAPARIN SODIUM 40 MG: 40 INJECTION SUBCUTANEOUS at 10:53

## 2017-09-01 RX ADMIN — ONDANSETRON 4 MG: 2 INJECTION INTRAMUSCULAR; INTRAVENOUS at 07:50

## 2017-09-01 RX ADMIN — Medication 10 UNITS: at 07:55

## 2017-09-01 RX ADMIN — MORPHINE SULFATE 4 MG: 4 INJECTION, SOLUTION INTRAMUSCULAR; INTRAVENOUS at 16:15

## 2017-09-01 RX ADMIN — LOSARTAN POTASSIUM 25 MG: 25 TABLET, FILM COATED ORAL at 13:44

## 2017-09-01 RX ADMIN — MORPHINE SULFATE 4 MG: 4 INJECTION, SOLUTION INTRAMUSCULAR; INTRAVENOUS at 10:45

## 2017-09-01 RX ADMIN — CEFOXITIN SODIUM 2 G: 2 POWDER, FOR SOLUTION INTRAVENOUS at 22:18

## 2017-09-01 RX ADMIN — BACLOFEN 10 MG: 10 TABLET ORAL at 20:50

## 2017-09-01 RX ADMIN — ONDANSETRON 4 MG: 2 INJECTION INTRAMUSCULAR; INTRAVENOUS at 14:03

## 2017-09-01 RX ADMIN — POTASSIUM CHLORIDE, DEXTROSE MONOHYDRATE AND SODIUM CHLORIDE: 150; 5; 450 INJECTION, SOLUTION INTRAVENOUS at 19:56

## 2017-09-01 ASSESSMENT — PAIN SCALES - GENERAL
PAINLEVEL_OUTOF10: 3
PAINLEVEL_OUTOF10: 7
PAINLEVEL_OUTOF10: 6
PAINLEVEL_OUTOF10: 5
PAINLEVEL_OUTOF10: 0
PAINLEVEL_OUTOF10: 8
PAINLEVEL_OUTOF10: 0
PAINLEVEL_OUTOF10: 0
PAINLEVEL_OUTOF10: 3
PAINLEVEL_OUTOF10: 5
PAINLEVEL_OUTOF10: 0
PAINLEVEL_OUTOF10: 5
PAINLEVEL_OUTOF10: 8
PAINLEVEL_OUTOF10: 0
PAINLEVEL_OUTOF10: 6

## 2017-09-01 ASSESSMENT — PAIN DESCRIPTION - LOCATION
LOCATION: ABDOMEN

## 2017-09-01 ASSESSMENT — PAIN DESCRIPTION - PAIN TYPE
TYPE: SURGICAL PAIN

## 2017-09-01 ASSESSMENT — PAIN DESCRIPTION - DESCRIPTORS
DESCRIPTORS: ACHING;SHARP
DESCRIPTORS: CRAMPING
DESCRIPTORS: ACHING
DESCRIPTORS: BURNING;SORE
DESCRIPTORS: ACHING

## 2017-09-01 ASSESSMENT — PAIN DESCRIPTION - ORIENTATION
ORIENTATION: MID
ORIENTATION: MID

## 2017-09-01 ASSESSMENT — PAIN DESCRIPTION - PROGRESSION: CLINICAL_PROGRESSION: GRADUALLY IMPROVING

## 2017-09-01 ASSESSMENT — PAIN DESCRIPTION - FREQUENCY
FREQUENCY: CONTINUOUS
FREQUENCY: INTERMITTENT
FREQUENCY: CONTINUOUS
FREQUENCY: INTERMITTENT

## 2017-09-01 ASSESSMENT — PAIN DESCRIPTION - ONSET: ONSET: ON-GOING

## 2017-09-02 LAB
ABSOLUTE EOS #: 0.3 K/UL (ref 0–0.4)
ABSOLUTE LYMPH #: 1.4 K/UL (ref 1–4.8)
ABSOLUTE MONO #: 0.9 K/UL (ref 0.1–1.2)
ANION GAP SERPL CALCULATED.3IONS-SCNC: 10 MMOL/L (ref 9–17)
BASOPHILS # BLD: 0 %
BASOPHILS ABSOLUTE: 0 K/UL (ref 0–0.2)
BUN BLDV-MCNC: 4 MG/DL (ref 6–20)
BUN/CREAT BLD: 10 (ref 9–20)
CALCIUM SERPL-MCNC: 8.6 MG/DL (ref 8.6–10.4)
CHLORIDE BLD-SCNC: 100 MMOL/L (ref 98–107)
CO2: 28 MMOL/L (ref 20–31)
CREAT SERPL-MCNC: 0.42 MG/DL (ref 0.5–0.9)
DIFFERENTIAL TYPE: ABNORMAL
EOSINOPHILS RELATIVE PERCENT: 3 %
GFR AFRICAN AMERICAN: >60 ML/MIN
GFR NON-AFRICAN AMERICAN: >60 ML/MIN
GFR SERPL CREATININE-BSD FRML MDRD: ABNORMAL ML/MIN/{1.73_M2}
GFR SERPL CREATININE-BSD FRML MDRD: ABNORMAL ML/MIN/{1.73_M2}
GLUCOSE BLD-MCNC: 114 MG/DL (ref 65–105)
GLUCOSE BLD-MCNC: 135 MG/DL (ref 65–105)
GLUCOSE BLD-MCNC: 136 MG/DL (ref 70–99)
GLUCOSE BLD-MCNC: 140 MG/DL (ref 65–105)
HCT VFR BLD CALC: 28.4 % (ref 36–46)
HEMOGLOBIN: 9.3 G/DL (ref 12–16)
LYMPHOCYTES # BLD: 15 %
MCH RBC QN AUTO: 29.3 PG (ref 26–34)
MCHC RBC AUTO-ENTMCNC: 32.8 G/DL (ref 31–37)
MCV RBC AUTO: 89.3 FL (ref 80–100)
MONOCYTES # BLD: 9 %
PDW BLD-RTO: 14 % (ref 11–14.5)
PLATELET # BLD: 213 K/UL (ref 140–450)
PLATELET ESTIMATE: ABNORMAL
PMV BLD AUTO: 6.9 FL (ref 6–12)
POTASSIUM SERPL-SCNC: 4.1 MMOL/L (ref 3.7–5.3)
RBC # BLD: 3.18 M/UL (ref 4–5.2)
RBC # BLD: ABNORMAL 10*6/UL
SEG NEUTROPHILS: 73 %
SEGMENTED NEUTROPHILS ABSOLUTE COUNT: 7.1 K/UL (ref 1.8–7.7)
SODIUM BLD-SCNC: 138 MMOL/L (ref 135–144)
WBC # BLD: 9.7 K/UL (ref 3.5–11)
WBC # BLD: ABNORMAL 10*3/UL

## 2017-09-02 PROCEDURE — 36415 COLL VENOUS BLD VENIPUNCTURE: CPT

## 2017-09-02 PROCEDURE — 99232 SBSQ HOSP IP/OBS MODERATE 35: CPT | Performed by: FAMILY MEDICINE

## 2017-09-02 PROCEDURE — 6360000002 HC RX W HCPCS: Performed by: INTERNAL MEDICINE

## 2017-09-02 PROCEDURE — 82947 ASSAY GLUCOSE BLOOD QUANT: CPT

## 2017-09-02 PROCEDURE — 80048 BASIC METABOLIC PNL TOTAL CA: CPT

## 2017-09-02 PROCEDURE — 6370000000 HC RX 637 (ALT 250 FOR IP): Performed by: SURGERY

## 2017-09-02 PROCEDURE — 99024 POSTOP FOLLOW-UP VISIT: CPT | Performed by: SURGERY

## 2017-09-02 PROCEDURE — 2580000003 HC RX 258: Performed by: SURGERY

## 2017-09-02 PROCEDURE — 97116 GAIT TRAINING THERAPY: CPT

## 2017-09-02 PROCEDURE — 6360000002 HC RX W HCPCS: Performed by: SURGERY

## 2017-09-02 PROCEDURE — 1200000000 HC SEMI PRIVATE

## 2017-09-02 PROCEDURE — 94760 N-INVAS EAR/PLS OXIMETRY 1: CPT

## 2017-09-02 PROCEDURE — 94150 VITAL CAPACITY TEST: CPT

## 2017-09-02 PROCEDURE — 94640 AIRWAY INHALATION TREATMENT: CPT

## 2017-09-02 PROCEDURE — 6370000000 HC RX 637 (ALT 250 FOR IP): Performed by: INTERNAL MEDICINE

## 2017-09-02 PROCEDURE — 2580000003 HC RX 258: Performed by: INTERNAL MEDICINE

## 2017-09-02 PROCEDURE — 97110 THERAPEUTIC EXERCISES: CPT

## 2017-09-02 PROCEDURE — 85025 COMPLETE CBC W/AUTO DIFF WBC: CPT

## 2017-09-02 RX ORDER — MORPHINE SULFATE 2 MG/ML
2 INJECTION, SOLUTION INTRAMUSCULAR; INTRAVENOUS EVERY 4 HOURS PRN
Status: DISCONTINUED | OUTPATIENT
Start: 2017-09-02 | End: 2017-09-04 | Stop reason: HOSPADM

## 2017-09-02 RX ADMIN — MORPHINE SULFATE 4 MG: 4 INJECTION, SOLUTION INTRAMUSCULAR; INTRAVENOUS at 04:19

## 2017-09-02 RX ADMIN — CEFOXITIN SODIUM 2 G: 2 POWDER, FOR SOLUTION INTRAVENOUS at 21:49

## 2017-09-02 RX ADMIN — BACLOFEN 10 MG: 10 TABLET ORAL at 13:11

## 2017-09-02 RX ADMIN — POTASSIUM CHLORIDE, DEXTROSE MONOHYDRATE AND SODIUM CHLORIDE: 150; 5; 450 INJECTION, SOLUTION INTRAVENOUS at 21:01

## 2017-09-02 RX ADMIN — CEFOXITIN SODIUM 2 G: 2 POWDER, FOR SOLUTION INTRAVENOUS at 15:42

## 2017-09-02 RX ADMIN — MORPHINE SULFATE 2 MG: 2 INJECTION, SOLUTION INTRAMUSCULAR; INTRAVENOUS at 07:55

## 2017-09-02 RX ADMIN — Medication 10 UNITS: at 10:21

## 2017-09-02 RX ADMIN — MORPHINE SULFATE 2 MG: 2 INJECTION, SOLUTION INTRAMUSCULAR; INTRAVENOUS at 10:37

## 2017-09-02 RX ADMIN — ALBUTEROL SULFATE 2.5 MG: 2.5 SOLUTION RESPIRATORY (INHALATION) at 08:13

## 2017-09-02 RX ADMIN — CEFOXITIN SODIUM 2 G: 2 POWDER, FOR SOLUTION INTRAVENOUS at 04:04

## 2017-09-02 RX ADMIN — ALBUTEROL SULFATE 2.5 MG: 2.5 SOLUTION RESPIRATORY (INHALATION) at 15:56

## 2017-09-02 RX ADMIN — ALBUTEROL SULFATE 2.5 MG: 2.5 SOLUTION RESPIRATORY (INHALATION) at 19:43

## 2017-09-02 RX ADMIN — BACLOFEN 10 MG: 10 TABLET ORAL at 20:43

## 2017-09-02 RX ADMIN — CEFOXITIN SODIUM 2 G: 2 POWDER, FOR SOLUTION INTRAVENOUS at 10:20

## 2017-09-02 RX ADMIN — MORPHINE SULFATE 4 MG: 4 INJECTION, SOLUTION INTRAMUSCULAR; INTRAVENOUS at 00:18

## 2017-09-02 RX ADMIN — BACLOFEN 10 MG: 10 TABLET ORAL at 07:54

## 2017-09-02 RX ADMIN — ONDANSETRON 4 MG: 2 INJECTION INTRAMUSCULAR; INTRAVENOUS at 21:01

## 2017-09-02 RX ADMIN — POTASSIUM CHLORIDE, DEXTROSE MONOHYDRATE AND SODIUM CHLORIDE: 150; 5; 450 INJECTION, SOLUTION INTRAVENOUS at 13:12

## 2017-09-02 RX ADMIN — HYDROCODONE BITARTRATE AND ACETAMINOPHEN 2 TABLET: 5; 325 TABLET ORAL at 11:35

## 2017-09-02 RX ADMIN — LOSARTAN POTASSIUM 25 MG: 25 TABLET, FILM COATED ORAL at 07:54

## 2017-09-02 RX ADMIN — ALBUTEROL SULFATE 2.5 MG: 2.5 SOLUTION RESPIRATORY (INHALATION) at 11:56

## 2017-09-02 RX ADMIN — POTASSIUM CHLORIDE, DEXTROSE MONOHYDRATE AND SODIUM CHLORIDE: 150; 5; 450 INJECTION, SOLUTION INTRAVENOUS at 04:04

## 2017-09-02 RX ADMIN — HYDROCODONE BITARTRATE AND ACETAMINOPHEN 1 TABLET: 5; 325 TABLET ORAL at 15:50

## 2017-09-02 RX ADMIN — ONDANSETRON 4 MG: 2 INJECTION INTRAMUSCULAR; INTRAVENOUS at 04:16

## 2017-09-02 RX ADMIN — ENOXAPARIN SODIUM 40 MG: 40 INJECTION SUBCUTANEOUS at 07:55

## 2017-09-02 RX ADMIN — ONDANSETRON 4 MG: 2 INJECTION INTRAMUSCULAR; INTRAVENOUS at 15:50

## 2017-09-02 RX ADMIN — HYDROCODONE BITARTRATE AND ACETAMINOPHEN 1 TABLET: 5; 325 TABLET ORAL at 22:45

## 2017-09-02 RX ADMIN — INSULIN LISPRO 1 UNITS: 100 INJECTION, SOLUTION INTRAVENOUS; SUBCUTANEOUS at 13:02

## 2017-09-02 RX ADMIN — LEVOTHYROXINE SODIUM 150 MCG: 75 TABLET ORAL at 07:54

## 2017-09-02 ASSESSMENT — PAIN SCALES - GENERAL
PAINLEVEL_OUTOF10: 7
PAINLEVEL_OUTOF10: 6
PAINLEVEL_OUTOF10: 5
PAINLEVEL_OUTOF10: 4
PAINLEVEL_OUTOF10: 0
PAINLEVEL_OUTOF10: 6
PAINLEVEL_OUTOF10: 0
PAINLEVEL_OUTOF10: 8
PAINLEVEL_OUTOF10: 0
PAINLEVEL_OUTOF10: 6
PAINLEVEL_OUTOF10: 8
PAINLEVEL_OUTOF10: 5
PAINLEVEL_OUTOF10: 0
PAINLEVEL_OUTOF10: 0

## 2017-09-02 ASSESSMENT — PAIN DESCRIPTION - ORIENTATION: ORIENTATION: LEFT

## 2017-09-02 ASSESSMENT — PAIN DESCRIPTION - LOCATION
LOCATION: ABDOMEN

## 2017-09-02 ASSESSMENT — PAIN DESCRIPTION - DESCRIPTORS
DESCRIPTORS: SORE;SPASM
DESCRIPTORS: CONSTANT
DESCRIPTORS: ACHING;CRAMPING;SPASM
DESCRIPTORS: ACHING

## 2017-09-02 ASSESSMENT — PAIN DESCRIPTION - PAIN TYPE
TYPE: SURGICAL PAIN

## 2017-09-03 ENCOUNTER — APPOINTMENT (OUTPATIENT)
Dept: GENERAL RADIOLOGY | Age: 57
DRG: 330 | End: 2017-09-03
Attending: SURGERY
Payer: COMMERCIAL

## 2017-09-03 LAB
ABSOLUTE EOS #: 0.3 K/UL (ref 0–0.4)
ABSOLUTE LYMPH #: 0.9 K/UL (ref 1–4.8)
ABSOLUTE MONO #: 0.6 K/UL (ref 0.1–1.2)
ANION GAP SERPL CALCULATED.3IONS-SCNC: 11 MMOL/L (ref 9–17)
BASOPHILS # BLD: 0 %
BASOPHILS ABSOLUTE: 0 K/UL (ref 0–0.2)
BUN BLDV-MCNC: 4 MG/DL (ref 6–20)
BUN/CREAT BLD: 10 (ref 9–20)
CALCIUM SERPL-MCNC: 8.8 MG/DL (ref 8.6–10.4)
CHLORIDE BLD-SCNC: 101 MMOL/L (ref 98–107)
CO2: 28 MMOL/L (ref 20–31)
CREAT SERPL-MCNC: 0.41 MG/DL (ref 0.5–0.9)
DIFFERENTIAL TYPE: ABNORMAL
EOSINOPHILS RELATIVE PERCENT: 4 %
GFR AFRICAN AMERICAN: >60 ML/MIN
GFR NON-AFRICAN AMERICAN: >60 ML/MIN
GFR SERPL CREATININE-BSD FRML MDRD: ABNORMAL ML/MIN/{1.73_M2}
GFR SERPL CREATININE-BSD FRML MDRD: ABNORMAL ML/MIN/{1.73_M2}
GLUCOSE BLD-MCNC: 107 MG/DL (ref 65–105)
GLUCOSE BLD-MCNC: 107 MG/DL (ref 70–99)
GLUCOSE BLD-MCNC: 115 MG/DL (ref 65–105)
HCT VFR BLD CALC: 28 % (ref 36–46)
HEMOGLOBIN: 9.3 G/DL (ref 12–16)
LYMPHOCYTES # BLD: 12 %
MCH RBC QN AUTO: 29.7 PG (ref 26–34)
MCHC RBC AUTO-ENTMCNC: 33.2 G/DL (ref 31–37)
MCV RBC AUTO: 89.5 FL (ref 80–100)
MONOCYTES # BLD: 8 %
PDW BLD-RTO: 13.9 % (ref 11–14.5)
PLATELET # BLD: 230 K/UL (ref 140–450)
PLATELET ESTIMATE: ABNORMAL
PMV BLD AUTO: 6.7 FL (ref 6–12)
POTASSIUM SERPL-SCNC: 4 MMOL/L (ref 3.7–5.3)
RBC # BLD: 3.13 M/UL (ref 4–5.2)
RBC # BLD: ABNORMAL 10*6/UL
SEG NEUTROPHILS: 76 %
SEGMENTED NEUTROPHILS ABSOLUTE COUNT: 5.7 K/UL (ref 1.8–7.7)
SODIUM BLD-SCNC: 140 MMOL/L (ref 135–144)
WBC # BLD: 7.6 K/UL (ref 3.5–11)
WBC # BLD: ABNORMAL 10*3/UL

## 2017-09-03 PROCEDURE — 2580000003 HC RX 258: Performed by: INTERNAL MEDICINE

## 2017-09-03 PROCEDURE — 85025 COMPLETE CBC W/AUTO DIFF WBC: CPT

## 2017-09-03 PROCEDURE — 2580000003 HC RX 258: Performed by: SURGERY

## 2017-09-03 PROCEDURE — 99232 SBSQ HOSP IP/OBS MODERATE 35: CPT | Performed by: FAMILY MEDICINE

## 2017-09-03 PROCEDURE — 74022 RADEX COMPL AQT ABD SERIES: CPT

## 2017-09-03 PROCEDURE — 6360000002 HC RX W HCPCS: Performed by: INTERNAL MEDICINE

## 2017-09-03 PROCEDURE — 97116 GAIT TRAINING THERAPY: CPT

## 2017-09-03 PROCEDURE — 6370000000 HC RX 637 (ALT 250 FOR IP): Performed by: INTERNAL MEDICINE

## 2017-09-03 PROCEDURE — 6370000000 HC RX 637 (ALT 250 FOR IP): Performed by: SURGERY

## 2017-09-03 PROCEDURE — 94150 VITAL CAPACITY TEST: CPT

## 2017-09-03 PROCEDURE — 94640 AIRWAY INHALATION TREATMENT: CPT

## 2017-09-03 PROCEDURE — 36415 COLL VENOUS BLD VENIPUNCTURE: CPT

## 2017-09-03 PROCEDURE — 97110 THERAPEUTIC EXERCISES: CPT

## 2017-09-03 PROCEDURE — 1200000000 HC SEMI PRIVATE

## 2017-09-03 PROCEDURE — 80048 BASIC METABOLIC PNL TOTAL CA: CPT

## 2017-09-03 PROCEDURE — 99024 POSTOP FOLLOW-UP VISIT: CPT | Performed by: SURGERY

## 2017-09-03 PROCEDURE — 82947 ASSAY GLUCOSE BLOOD QUANT: CPT

## 2017-09-03 PROCEDURE — 94760 N-INVAS EAR/PLS OXIMETRY 1: CPT

## 2017-09-03 PROCEDURE — 6360000002 HC RX W HCPCS: Performed by: SURGERY

## 2017-09-03 RX ORDER — PANTOPRAZOLE SODIUM 40 MG/1
40 TABLET, DELAYED RELEASE ORAL
Status: DISCONTINUED | OUTPATIENT
Start: 2017-09-04 | End: 2017-09-04 | Stop reason: HOSPADM

## 2017-09-03 RX ORDER — MAGNESIUM HYDROXIDE/ALUMINUM HYDROXICE/SIMETHICONE 120; 1200; 1200 MG/30ML; MG/30ML; MG/30ML
30 SUSPENSION ORAL EVERY 4 HOURS PRN
Status: DISCONTINUED | OUTPATIENT
Start: 2017-09-03 | End: 2017-09-04 | Stop reason: HOSPADM

## 2017-09-03 RX ADMIN — POTASSIUM CHLORIDE, DEXTROSE MONOHYDRATE AND SODIUM CHLORIDE: 150; 5; 450 INJECTION, SOLUTION INTRAVENOUS at 07:16

## 2017-09-03 RX ADMIN — HYDROCODONE BITARTRATE AND ACETAMINOPHEN 1 TABLET: 5; 325 TABLET ORAL at 15:52

## 2017-09-03 RX ADMIN — CEFOXITIN SODIUM 2 G: 2 POWDER, FOR SOLUTION INTRAVENOUS at 22:13

## 2017-09-03 RX ADMIN — HYDROCODONE BITARTRATE AND ACETAMINOPHEN 1 TABLET: 5; 325 TABLET ORAL at 06:36

## 2017-09-03 RX ADMIN — ALBUTEROL SULFATE 2.5 MG: 2.5 SOLUTION RESPIRATORY (INHALATION) at 20:06

## 2017-09-03 RX ADMIN — CEFOXITIN SODIUM 2 G: 2 POWDER, FOR SOLUTION INTRAVENOUS at 03:47

## 2017-09-03 RX ADMIN — LEVOTHYROXINE SODIUM 150 MCG: 75 TABLET ORAL at 08:55

## 2017-09-03 RX ADMIN — ALBUTEROL SULFATE 2.5 MG: 2.5 SOLUTION RESPIRATORY (INHALATION) at 16:48

## 2017-09-03 RX ADMIN — ENOXAPARIN SODIUM 40 MG: 40 INJECTION SUBCUTANEOUS at 08:55

## 2017-09-03 RX ADMIN — ALBUTEROL SULFATE 2.5 MG: 2.5 SOLUTION RESPIRATORY (INHALATION) at 08:03

## 2017-09-03 RX ADMIN — ALUMINUM HYDROXIDE, MAGNESIUM HYDROXIDE, AND SIMETHICONE 30 ML: 200; 200; 20 SUSPENSION ORAL at 22:51

## 2017-09-03 RX ADMIN — CEFOXITIN SODIUM 2 G: 2 POWDER, FOR SOLUTION INTRAVENOUS at 15:20

## 2017-09-03 RX ADMIN — CEFOXITIN SODIUM 2 G: 2 POWDER, FOR SOLUTION INTRAVENOUS at 09:36

## 2017-09-03 RX ADMIN — ALBUTEROL SULFATE 2.5 MG: 2.5 SOLUTION RESPIRATORY (INHALATION) at 12:10

## 2017-09-03 RX ADMIN — BACLOFEN 10 MG: 10 TABLET ORAL at 20:45

## 2017-09-03 RX ADMIN — Medication 10 UNITS: at 08:55

## 2017-09-03 RX ADMIN — BACLOFEN 10 MG: 10 TABLET ORAL at 15:20

## 2017-09-03 RX ADMIN — BACLOFEN 10 MG: 10 TABLET ORAL at 08:55

## 2017-09-03 RX ADMIN — ACETAMINOPHEN 650 MG: 325 TABLET ORAL at 20:45

## 2017-09-03 RX ADMIN — LOSARTAN POTASSIUM 25 MG: 25 TABLET, FILM COATED ORAL at 08:55

## 2017-09-03 RX ADMIN — HYDROCODONE BITARTRATE AND ACETAMINOPHEN 1 TABLET: 5; 325 TABLET ORAL at 11:49

## 2017-09-03 RX ADMIN — ONDANSETRON 4 MG: 2 INJECTION INTRAMUSCULAR; INTRAVENOUS at 15:53

## 2017-09-03 ASSESSMENT — PAIN SCALES - GENERAL
PAINLEVEL_OUTOF10: 6
PAINLEVEL_OUTOF10: 2
PAINLEVEL_OUTOF10: 0
PAINLEVEL_OUTOF10: 6
PAINLEVEL_OUTOF10: 3
PAINLEVEL_OUTOF10: 2
PAINLEVEL_OUTOF10: 0
PAINLEVEL_OUTOF10: 7

## 2017-09-03 ASSESSMENT — PAIN DESCRIPTION - PAIN TYPE
TYPE: SURGICAL PAIN
TYPE: SURGICAL PAIN

## 2017-09-03 ASSESSMENT — PAIN DESCRIPTION - DESCRIPTORS
DESCRIPTORS: SPASM
DESCRIPTORS: SPASM

## 2017-09-03 ASSESSMENT — PAIN DESCRIPTION - LOCATION
LOCATION: ABDOMEN
LOCATION: ABDOMEN

## 2017-09-04 VITALS
DIASTOLIC BLOOD PRESSURE: 83 MMHG | HEIGHT: 66 IN | BODY MASS INDEX: 30.62 KG/M2 | SYSTOLIC BLOOD PRESSURE: 145 MMHG | RESPIRATION RATE: 16 BRPM | WEIGHT: 190.5 LBS | OXYGEN SATURATION: 100 % | HEART RATE: 98 BPM | TEMPERATURE: 98.9 F

## 2017-09-04 LAB
ABSOLUTE EOS #: 0.3 K/UL (ref 0–0.4)
ABSOLUTE LYMPH #: 1.1 K/UL (ref 1–4.8)
ABSOLUTE MONO #: 0.6 K/UL (ref 0.1–1.2)
ANION GAP SERPL CALCULATED.3IONS-SCNC: 11 MMOL/L (ref 9–17)
BASOPHILS # BLD: 1 %
BASOPHILS ABSOLUTE: 0 K/UL (ref 0–0.2)
BUN BLDV-MCNC: 5 MG/DL (ref 6–20)
BUN/CREAT BLD: 10 (ref 9–20)
CALCIUM SERPL-MCNC: 9 MG/DL (ref 8.6–10.4)
CHLORIDE BLD-SCNC: 103 MMOL/L (ref 98–107)
CO2: 29 MMOL/L (ref 20–31)
CREAT SERPL-MCNC: 0.49 MG/DL (ref 0.5–0.9)
DIFFERENTIAL TYPE: ABNORMAL
EOSINOPHILS RELATIVE PERCENT: 5 %
GFR AFRICAN AMERICAN: >60 ML/MIN
GFR NON-AFRICAN AMERICAN: >60 ML/MIN
GFR SERPL CREATININE-BSD FRML MDRD: ABNORMAL ML/MIN/{1.73_M2}
GFR SERPL CREATININE-BSD FRML MDRD: ABNORMAL ML/MIN/{1.73_M2}
GLUCOSE BLD-MCNC: 103 MG/DL (ref 65–105)
GLUCOSE BLD-MCNC: 108 MG/DL (ref 65–105)
GLUCOSE BLD-MCNC: 115 MG/DL (ref 70–99)
HCT VFR BLD CALC: 28 % (ref 36–46)
HEMOGLOBIN: 9.2 G/DL (ref 12–16)
LYMPHOCYTES # BLD: 16 %
MCH RBC QN AUTO: 29.1 PG (ref 26–34)
MCHC RBC AUTO-ENTMCNC: 32.8 G/DL (ref 31–37)
MCV RBC AUTO: 88.8 FL (ref 80–100)
MONOCYTES # BLD: 9 %
PDW BLD-RTO: 13.8 % (ref 11–14.5)
PLATELET # BLD: 271 K/UL (ref 140–450)
PLATELET ESTIMATE: ABNORMAL
PMV BLD AUTO: 6.5 FL (ref 6–12)
POTASSIUM SERPL-SCNC: 3.8 MMOL/L (ref 3.7–5.3)
RBC # BLD: 3.16 M/UL (ref 4–5.2)
RBC # BLD: ABNORMAL 10*6/UL
SEG NEUTROPHILS: 69 %
SEGMENTED NEUTROPHILS ABSOLUTE COUNT: 4.7 K/UL (ref 1.8–7.7)
SODIUM BLD-SCNC: 143 MMOL/L (ref 135–144)
WBC # BLD: 6.7 K/UL (ref 3.5–11)
WBC # BLD: ABNORMAL 10*3/UL

## 2017-09-04 PROCEDURE — 6360000002 HC RX W HCPCS: Performed by: INTERNAL MEDICINE

## 2017-09-04 PROCEDURE — 97116 GAIT TRAINING THERAPY: CPT

## 2017-09-04 PROCEDURE — 6360000002 HC RX W HCPCS: Performed by: SURGERY

## 2017-09-04 PROCEDURE — 99232 SBSQ HOSP IP/OBS MODERATE 35: CPT | Performed by: FAMILY MEDICINE

## 2017-09-04 PROCEDURE — 99024 POSTOP FOLLOW-UP VISIT: CPT | Performed by: SURGERY

## 2017-09-04 PROCEDURE — 6370000000 HC RX 637 (ALT 250 FOR IP): Performed by: INTERNAL MEDICINE

## 2017-09-04 PROCEDURE — 94640 AIRWAY INHALATION TREATMENT: CPT

## 2017-09-04 PROCEDURE — 6370000000 HC RX 637 (ALT 250 FOR IP): Performed by: SURGERY

## 2017-09-04 PROCEDURE — 2580000003 HC RX 258: Performed by: INTERNAL MEDICINE

## 2017-09-04 PROCEDURE — 85025 COMPLETE CBC W/AUTO DIFF WBC: CPT

## 2017-09-04 PROCEDURE — 94760 N-INVAS EAR/PLS OXIMETRY 1: CPT

## 2017-09-04 PROCEDURE — 36415 COLL VENOUS BLD VENIPUNCTURE: CPT

## 2017-09-04 PROCEDURE — 80048 BASIC METABOLIC PNL TOTAL CA: CPT

## 2017-09-04 PROCEDURE — 82947 ASSAY GLUCOSE BLOOD QUANT: CPT

## 2017-09-04 PROCEDURE — 97110 THERAPEUTIC EXERCISES: CPT

## 2017-09-04 RX ORDER — IBUPROFEN 200 MG
400 TABLET ORAL 3 TIMES DAILY PRN
Qty: 120 TABLET | Refills: 3 | Status: ON HOLD | COMMUNITY
Start: 2017-09-04 | End: 2017-09-14 | Stop reason: HOSPADM

## 2017-09-04 RX ADMIN — ACETAMINOPHEN 650 MG: 325 TABLET ORAL at 12:22

## 2017-09-04 RX ADMIN — CEFOXITIN SODIUM 2 G: 2 POWDER, FOR SOLUTION INTRAVENOUS at 04:39

## 2017-09-04 RX ADMIN — ALUMINUM HYDROXIDE, MAGNESIUM HYDROXIDE, AND SIMETHICONE 30 ML: 200; 200; 20 SUSPENSION ORAL at 10:50

## 2017-09-04 RX ADMIN — LOSARTAN POTASSIUM 25 MG: 25 TABLET, FILM COATED ORAL at 08:16

## 2017-09-04 RX ADMIN — CEFOXITIN SODIUM 2 G: 2 POWDER, FOR SOLUTION INTRAVENOUS at 10:50

## 2017-09-04 RX ADMIN — ACETAMINOPHEN 650 MG: 325 TABLET ORAL at 04:44

## 2017-09-04 RX ADMIN — LEVOTHYROXINE SODIUM 150 MCG: 75 TABLET ORAL at 08:16

## 2017-09-04 RX ADMIN — Medication 10 UNITS: at 08:16

## 2017-09-04 RX ADMIN — ENOXAPARIN SODIUM 40 MG: 40 INJECTION SUBCUTANEOUS at 08:16

## 2017-09-04 RX ADMIN — BACLOFEN 10 MG: 10 TABLET ORAL at 08:16

## 2017-09-04 RX ADMIN — PANTOPRAZOLE SODIUM 40 MG: 40 TABLET, DELAYED RELEASE ORAL at 08:16

## 2017-09-04 RX ADMIN — ALBUTEROL SULFATE 2.5 MG: 2.5 SOLUTION RESPIRATORY (INHALATION) at 08:08

## 2017-09-04 ASSESSMENT — PAIN DESCRIPTION - LOCATION: LOCATION: ABDOMEN

## 2017-09-04 ASSESSMENT — PAIN SCALES - GENERAL
PAINLEVEL_OUTOF10: 3
PAINLEVEL_OUTOF10: 4
PAINLEVEL_OUTOF10: 5

## 2017-09-04 ASSESSMENT — PAIN DESCRIPTION - DESCRIPTORS: DESCRIPTORS: CONSTANT

## 2017-09-04 ASSESSMENT — PAIN DESCRIPTION - PAIN TYPE: TYPE: CHRONIC PAIN

## 2017-09-05 ENCOUNTER — TELEPHONE (OUTPATIENT)
Dept: FAMILY MEDICINE CLINIC | Age: 57
End: 2017-09-05

## 2017-09-06 ENCOUNTER — APPOINTMENT (OUTPATIENT)
Dept: CT IMAGING | Age: 57
DRG: 856 | End: 2017-09-06
Payer: COMMERCIAL

## 2017-09-06 ENCOUNTER — APPOINTMENT (OUTPATIENT)
Dept: GENERAL RADIOLOGY | Age: 57
DRG: 856 | End: 2017-09-06
Payer: COMMERCIAL

## 2017-09-06 ENCOUNTER — HOSPITAL ENCOUNTER (INPATIENT)
Age: 57
LOS: 8 days | Discharge: HOME | DRG: 856 | End: 2017-09-14
Attending: EMERGENCY MEDICINE | Admitting: SURGERY
Payer: COMMERCIAL

## 2017-09-06 DIAGNOSIS — R10.84 GENERALIZED ABDOMINAL PAIN: Primary | ICD-10-CM

## 2017-09-06 LAB
ABSOLUTE EOS #: 0.1 K/UL (ref 0–0.4)
ABSOLUTE EOS #: 0.4 K/UL (ref 0–0.4)
ABSOLUTE LYMPH #: 0.9 K/UL (ref 1–4.8)
ABSOLUTE LYMPH #: 1.3 K/UL (ref 1–4.8)
ABSOLUTE MONO #: 1 K/UL (ref 0.1–1.2)
ABSOLUTE MONO #: 1.3 K/UL (ref 0.1–1.2)
ALBUMIN SERPL-MCNC: 3.2 G/DL (ref 3.5–5.2)
ALBUMIN SERPL-MCNC: 3.9 G/DL (ref 3.5–5.2)
ALBUMIN/GLOBULIN RATIO: 1.3 (ref 1–2.5)
ALBUMIN/GLOBULIN RATIO: 1.3 (ref 1–2.5)
ALP BLD-CCNC: 102 U/L (ref 35–104)
ALP BLD-CCNC: 127 U/L (ref 35–104)
ALT SERPL-CCNC: 53 U/L (ref 5–33)
ALT SERPL-CCNC: 59 U/L (ref 5–33)
ANION GAP SERPL CALCULATED.3IONS-SCNC: 14 MMOL/L (ref 9–17)
ANION GAP SERPL CALCULATED.3IONS-SCNC: 15 MMOL/L (ref 9–17)
AST SERPL-CCNC: 32 U/L
AST SERPL-CCNC: 42 U/L
BASOPHILS # BLD: 1 %
BASOPHILS # BLD: 1 %
BASOPHILS ABSOLUTE: 0.1 K/UL (ref 0–0.2)
BASOPHILS ABSOLUTE: 0.1 K/UL (ref 0–0.2)
BILIRUB SERPL-MCNC: 0.3 MG/DL (ref 0.3–1.2)
BILIRUB SERPL-MCNC: 0.35 MG/DL (ref 0.3–1.2)
BILIRUBIN DIRECT: 0.09 MG/DL
BILIRUBIN, INDIRECT: 0.21 MG/DL (ref 0–1)
BUN BLDV-MCNC: 7 MG/DL (ref 6–20)
BUN BLDV-MCNC: 8 MG/DL (ref 6–20)
BUN/CREAT BLD: 17 (ref 9–20)
CALCIUM SERPL-MCNC: 8.1 MG/DL (ref 8.6–10.4)
CALCIUM SERPL-MCNC: 9.1 MG/DL (ref 8.6–10.4)
CHLORIDE BLD-SCNC: 100 MMOL/L (ref 98–107)
CHLORIDE BLD-SCNC: 101 MMOL/L (ref 98–107)
CO2: 22 MMOL/L (ref 20–31)
CO2: 26 MMOL/L (ref 20–31)
CREAT SERPL-MCNC: 0.42 MG/DL (ref 0.5–0.9)
CREAT SERPL-MCNC: 0.47 MG/DL (ref 0.5–0.9)
DIFFERENTIAL TYPE: ABNORMAL
DIFFERENTIAL TYPE: ABNORMAL
EOSINOPHILS RELATIVE PERCENT: 1 %
EOSINOPHILS RELATIVE PERCENT: 3 %
GFR AFRICAN AMERICAN: >60 ML/MIN
GFR AFRICAN AMERICAN: >60 ML/MIN
GFR NON-AFRICAN AMERICAN: >60 ML/MIN
GFR NON-AFRICAN AMERICAN: >60 ML/MIN
GFR SERPL CREATININE-BSD FRML MDRD: ABNORMAL ML/MIN/{1.73_M2}
GLUCOSE BLD-MCNC: 111 MG/DL (ref 70–99)
GLUCOSE BLD-MCNC: 114 MG/DL (ref 70–99)
HCT VFR BLD CALC: 31.3 % (ref 36–46)
HCT VFR BLD CALC: 36.4 % (ref 36–46)
HEMOGLOBIN: 10.5 G/DL (ref 12–16)
HEMOGLOBIN: 12.1 G/DL (ref 12–16)
LACTIC ACID: 0.5 MMOL/L (ref 0.5–2.2)
LYMPHOCYTES # BLD: 10 %
LYMPHOCYTES # BLD: 9 %
MCH RBC QN AUTO: 29.1 PG (ref 26–34)
MCH RBC QN AUTO: 29.4 PG (ref 26–34)
MCHC RBC AUTO-ENTMCNC: 33.1 G/DL (ref 31–37)
MCHC RBC AUTO-ENTMCNC: 33.4 G/DL (ref 31–37)
MCV RBC AUTO: 87.1 FL (ref 80–100)
MCV RBC AUTO: 88.7 FL (ref 80–100)
MONOCYTES # BLD: 10 %
MONOCYTES # BLD: 9 %
PDW BLD-RTO: 13.5 % (ref 11–14.5)
PDW BLD-RTO: 14 % (ref 11–14.5)
PLATELET # BLD: 264 K/UL (ref 140–450)
PLATELET # BLD: 359 K/UL (ref 140–450)
PLATELET ESTIMATE: ABNORMAL
PLATELET ESTIMATE: ABNORMAL
PMV BLD AUTO: 6.3 FL (ref 6–12)
PMV BLD AUTO: 6.3 FL (ref 6–12)
POTASSIUM SERPL-SCNC: 3.5 MMOL/L (ref 3.7–5.3)
POTASSIUM SERPL-SCNC: 3.9 MMOL/L (ref 3.7–5.3)
RBC # BLD: 3.59 M/UL (ref 4–5.2)
RBC # BLD: 4.1 M/UL (ref 4–5.2)
RBC # BLD: ABNORMAL 10*6/UL
RBC # BLD: ABNORMAL 10*6/UL
SEG NEUTROPHILS: 77 %
SEG NEUTROPHILS: 79 %
SEGMENTED NEUTROPHILS ABSOLUTE COUNT: 10.6 K/UL (ref 1.8–7.7)
SEGMENTED NEUTROPHILS ABSOLUTE COUNT: 8.2 K/UL (ref 1.8–7.7)
SODIUM BLD-SCNC: 137 MMOL/L (ref 135–144)
SODIUM BLD-SCNC: 141 MMOL/L (ref 135–144)
TOTAL PROTEIN: 6.1 G/DL (ref 6.4–8.3)
TOTAL PROTEIN: 6.9 G/DL (ref 6.4–8.3)
WBC # BLD: 10.3 K/UL (ref 3.5–11)
WBC # BLD: 13.6 K/UL (ref 3.5–11)
WBC # BLD: ABNORMAL 10*3/UL
WBC # BLD: ABNORMAL 10*3/UL

## 2017-09-06 PROCEDURE — 74177 CT ABD & PELVIS W/CONTRAST: CPT

## 2017-09-06 PROCEDURE — 96375 TX/PRO/DX INJ NEW DRUG ADDON: CPT

## 2017-09-06 PROCEDURE — 6360000002 HC RX W HCPCS: Performed by: INTERNAL MEDICINE

## 2017-09-06 PROCEDURE — 87505 NFCT AGENT DETECTION GI: CPT

## 2017-09-06 PROCEDURE — 6360000004 HC RX CONTRAST MEDICATION: Performed by: SURGERY

## 2017-09-06 PROCEDURE — 85025 COMPLETE CBC W/AUTO DIFF WBC: CPT

## 2017-09-06 PROCEDURE — 2500000003 HC RX 250 WO HCPCS: Performed by: INTERNAL MEDICINE

## 2017-09-06 PROCEDURE — 80053 COMPREHEN METABOLIC PANEL: CPT

## 2017-09-06 PROCEDURE — 2580000003 HC RX 258: Performed by: EMERGENCY MEDICINE

## 2017-09-06 PROCEDURE — 87493 C DIFF AMPLIFIED PROBE: CPT

## 2017-09-06 PROCEDURE — 2060000000 HC ICU INTERMEDIATE R&B

## 2017-09-06 PROCEDURE — C9113 INJ PANTOPRAZOLE SODIUM, VIA: HCPCS | Performed by: NURSE PRACTITIONER

## 2017-09-06 PROCEDURE — 82248 BILIRUBIN DIRECT: CPT

## 2017-09-06 PROCEDURE — 74022 RADEX COMPL AQT ABD SERIES: CPT

## 2017-09-06 PROCEDURE — 87040 BLOOD CULTURE FOR BACTERIA: CPT

## 2017-09-06 PROCEDURE — 99255 IP/OBS CONSLTJ NEW/EST HI 80: CPT | Performed by: INTERNAL MEDICINE

## 2017-09-06 PROCEDURE — 36415 COLL VENOUS BLD VENIPUNCTURE: CPT

## 2017-09-06 PROCEDURE — 6360000002 HC RX W HCPCS: Performed by: NURSE PRACTITIONER

## 2017-09-06 PROCEDURE — 6360000002 HC RX W HCPCS: Performed by: EMERGENCY MEDICINE

## 2017-09-06 PROCEDURE — 74176 CT ABD & PELVIS W/O CONTRAST: CPT

## 2017-09-06 PROCEDURE — 2580000003 HC RX 258: Performed by: INTERNAL MEDICINE

## 2017-09-06 PROCEDURE — 83605 ASSAY OF LACTIC ACID: CPT

## 2017-09-06 PROCEDURE — 99285 EMERGENCY DEPT VISIT HI MDM: CPT

## 2017-09-06 PROCEDURE — 96374 THER/PROPH/DIAG INJ IV PUSH: CPT

## 2017-09-06 PROCEDURE — 6360000004 HC RX CONTRAST MEDICATION: Performed by: EMERGENCY MEDICINE

## 2017-09-06 PROCEDURE — 94640 AIRWAY INHALATION TREATMENT: CPT

## 2017-09-06 PROCEDURE — 2580000003 HC RX 258: Performed by: NURSE PRACTITIONER

## 2017-09-06 PROCEDURE — 51702 INSERT TEMP BLADDER CATH: CPT

## 2017-09-06 PROCEDURE — 96376 TX/PRO/DX INJ SAME DRUG ADON: CPT

## 2017-09-06 RX ORDER — MORPHINE SULFATE 2 MG/ML
2 INJECTION, SOLUTION INTRAMUSCULAR; INTRAVENOUS
Status: DISCONTINUED | OUTPATIENT
Start: 2017-09-06 | End: 2017-09-08

## 2017-09-06 RX ORDER — ALBUTEROL SULFATE 2.5 MG/3ML
2.5 SOLUTION RESPIRATORY (INHALATION) 3 TIMES DAILY
Status: DISCONTINUED | OUTPATIENT
Start: 2017-09-06 | End: 2017-09-14 | Stop reason: HOSPADM

## 2017-09-06 RX ORDER — FENTANYL CITRATE 50 UG/ML
50 INJECTION, SOLUTION INTRAMUSCULAR; INTRAVENOUS ONCE
Status: COMPLETED | OUTPATIENT
Start: 2017-09-06 | End: 2017-09-06

## 2017-09-06 RX ORDER — ONDANSETRON 2 MG/ML
8 INJECTION INTRAMUSCULAR; INTRAVENOUS EVERY 4 HOURS PRN
Status: DISCONTINUED | OUTPATIENT
Start: 2017-09-06 | End: 2017-09-14 | Stop reason: HOSPADM

## 2017-09-06 RX ORDER — MORPHINE SULFATE 4 MG/ML
4 INJECTION, SOLUTION INTRAMUSCULAR; INTRAVENOUS
Status: DISCONTINUED | OUTPATIENT
Start: 2017-09-06 | End: 2017-09-08

## 2017-09-06 RX ORDER — SODIUM CHLORIDE 9 MG/ML
INJECTION, SOLUTION INTRAVENOUS CONTINUOUS
Status: DISCONTINUED | OUTPATIENT
Start: 2017-09-06 | End: 2017-09-08

## 2017-09-06 RX ORDER — ALBUTEROL SULFATE 2.5 MG/3ML
2.5 SOLUTION RESPIRATORY (INHALATION)
Status: DISCONTINUED | OUTPATIENT
Start: 2017-09-06 | End: 2017-09-14 | Stop reason: HOSPADM

## 2017-09-06 RX ORDER — POTASSIUM CHLORIDE 7.45 MG/ML
10 INJECTION INTRAVENOUS PRN
Status: DISCONTINUED | OUTPATIENT
Start: 2017-09-06 | End: 2017-09-14 | Stop reason: HOSPADM

## 2017-09-06 RX ORDER — 0.9 % SODIUM CHLORIDE 0.9 %
1000 INTRAVENOUS SOLUTION INTRAVENOUS ONCE
Status: COMPLETED | OUTPATIENT
Start: 2017-09-06 | End: 2017-09-06

## 2017-09-06 RX ORDER — ONDANSETRON 2 MG/ML
4 INJECTION INTRAMUSCULAR; INTRAVENOUS ONCE
Status: COMPLETED | OUTPATIENT
Start: 2017-09-06 | End: 2017-09-06

## 2017-09-06 RX ORDER — SODIUM CHLORIDE 0.9 % (FLUSH) 0.9 %
10 SYRINGE (ML) INJECTION PRN
Status: DISCONTINUED | OUTPATIENT
Start: 2017-09-06 | End: 2017-09-14 | Stop reason: HOSPADM

## 2017-09-06 RX ORDER — 0.9 % SODIUM CHLORIDE 0.9 %
10 VIAL (ML) INJECTION DAILY
Status: DISCONTINUED | OUTPATIENT
Start: 2017-09-06 | End: 2017-09-09

## 2017-09-06 RX ORDER — PROMETHAZINE HYDROCHLORIDE 25 MG/ML
INJECTION, SOLUTION INTRAMUSCULAR; INTRAVENOUS
Status: DISPENSED
Start: 2017-09-06 | End: 2017-09-07

## 2017-09-06 RX ORDER — SODIUM CHLORIDE FOR INHALATION 0.9 %
3 VIAL, NEBULIZER (ML) INHALATION
Status: DISCONTINUED | OUTPATIENT
Start: 2017-09-06 | End: 2017-09-06 | Stop reason: ALTCHOICE

## 2017-09-06 RX ORDER — SODIUM CHLORIDE 0.9 % (FLUSH) 0.9 %
10 SYRINGE (ML) INJECTION EVERY 12 HOURS SCHEDULED
Status: DISCONTINUED | OUTPATIENT
Start: 2017-09-06 | End: 2017-09-14 | Stop reason: HOSPADM

## 2017-09-06 RX ORDER — PANTOPRAZOLE SODIUM 40 MG/10ML
40 INJECTION, POWDER, LYOPHILIZED, FOR SOLUTION INTRAVENOUS DAILY
Status: DISCONTINUED | OUTPATIENT
Start: 2017-09-06 | End: 2017-09-09

## 2017-09-06 RX ORDER — ALBUTEROL SULFATE 2.5 MG/3ML
2.5 SOLUTION RESPIRATORY (INHALATION)
Status: DISCONTINUED | OUTPATIENT
Start: 2017-09-06 | End: 2017-09-06 | Stop reason: SDUPTHER

## 2017-09-06 RX ORDER — 0.9 % SODIUM CHLORIDE 0.9 %
500 INTRAVENOUS SOLUTION INTRAVENOUS ONCE
Status: COMPLETED | OUTPATIENT
Start: 2017-09-06 | End: 2017-09-06

## 2017-09-06 RX ORDER — KETOROLAC TROMETHAMINE 30 MG/ML
30 INJECTION, SOLUTION INTRAMUSCULAR; INTRAVENOUS ONCE
Status: COMPLETED | OUTPATIENT
Start: 2017-09-06 | End: 2017-09-06

## 2017-09-06 RX ORDER — HYDROMORPHONE HCL 110MG/55ML
3 PATIENT CONTROLLED ANALGESIA SYRINGE INTRAVENOUS ONCE
Status: DISCONTINUED | OUTPATIENT
Start: 2017-09-06 | End: 2017-09-14 | Stop reason: HOSPADM

## 2017-09-06 RX ADMIN — KETOROLAC TROMETHAMINE 30 MG: 30 INJECTION, SOLUTION INTRAMUSCULAR at 06:19

## 2017-09-06 RX ADMIN — FENTANYL CITRATE 50 MCG: 50 INJECTION INTRAMUSCULAR; INTRAVENOUS at 08:18

## 2017-09-06 RX ADMIN — SODIUM CHLORIDE: 9 INJECTION, SOLUTION INTRAVENOUS at 19:30

## 2017-09-06 RX ADMIN — CEFOXITIN SODIUM 1 G: 2 POWDER, FOR SOLUTION INTRAVENOUS at 15:27

## 2017-09-06 RX ADMIN — ALBUTEROL SULFATE 2.5 MG: 2.5 SOLUTION RESPIRATORY (INHALATION) at 20:13

## 2017-09-06 RX ADMIN — Medication 10 ML: at 20:58

## 2017-09-06 RX ADMIN — HYDROMORPHONE HYDROCHLORIDE 1 MG: 1 INJECTION, SOLUTION INTRAMUSCULAR; INTRAVENOUS; SUBCUTANEOUS at 17:08

## 2017-09-06 RX ADMIN — Medication 25 MG: at 20:59

## 2017-09-06 RX ADMIN — FENTANYL CITRATE 50 MCG: 50 INJECTION INTRAMUSCULAR; INTRAVENOUS at 06:22

## 2017-09-06 RX ADMIN — HYDROMORPHONE HYDROCHLORIDE 1 MG: 1 INJECTION, SOLUTION INTRAMUSCULAR; INTRAVENOUS; SUBCUTANEOUS at 14:55

## 2017-09-06 RX ADMIN — PANTOPRAZOLE SODIUM 40 MG: 40 INJECTION, POWDER, FOR SOLUTION INTRAVENOUS at 20:58

## 2017-09-06 RX ADMIN — SODIUM CHLORIDE 1000 ML: 9 INJECTION, SOLUTION INTRAVENOUS at 06:19

## 2017-09-06 RX ADMIN — SODIUM CHLORIDE 1000 ML: 9 INJECTION, SOLUTION INTRAVENOUS at 14:54

## 2017-09-06 RX ADMIN — CEFOXITIN SODIUM 2 G: 2 POWDER, FOR SOLUTION INTRAVENOUS at 10:36

## 2017-09-06 RX ADMIN — METRONIDAZOLE 500 MG: 500 INJECTION, SOLUTION INTRAVENOUS at 16:11

## 2017-09-06 RX ADMIN — Medication 10 ML: at 20:59

## 2017-09-06 RX ADMIN — ONDANSETRON 4 MG: 2 INJECTION INTRAMUSCULAR; INTRAVENOUS at 09:01

## 2017-09-06 RX ADMIN — ONDANSETRON 4 MG: 2 INJECTION INTRAMUSCULAR; INTRAVENOUS at 06:19

## 2017-09-06 RX ADMIN — IOHEXOL 130 ML: 350 INJECTION, SOLUTION INTRAVENOUS at 09:44

## 2017-09-06 RX ADMIN — SODIUM CHLORIDE 500 ML: 9 INJECTION, SOLUTION INTRAVENOUS at 17:54

## 2017-09-06 RX ADMIN — ENOXAPARIN SODIUM 40 MG: 40 INJECTION SUBCUTANEOUS at 18:00

## 2017-09-06 RX ADMIN — POTASSIUM CHLORIDE 10 MEQ: 10 INJECTION, SOLUTION INTRAVENOUS at 22:22

## 2017-09-06 RX ADMIN — DIATRIZOATE MEGLUMINE AND DIATRIZOATE SODIUM 120 ML: 660; 100 LIQUID ORAL; RECTAL at 17:19

## 2017-09-06 RX ADMIN — IOHEXOL 50 ML: 240 INJECTION, SOLUTION INTRATHECAL; INTRAVASCULAR; INTRAVENOUS; ORAL at 08:30

## 2017-09-06 RX ADMIN — SODIUM CHLORIDE 1000 ML: 9 INJECTION, SOLUTION INTRAVENOUS at 18:38

## 2017-09-06 RX ADMIN — POTASSIUM CHLORIDE 10 MEQ: 10 INJECTION, SOLUTION INTRAVENOUS at 23:08

## 2017-09-06 RX ADMIN — METRONIDAZOLE 500 MG: 500 INJECTION, SOLUTION INTRAVENOUS at 20:58

## 2017-09-06 ASSESSMENT — PAIN DESCRIPTION - DESCRIPTORS: DESCRIPTORS: SPASM

## 2017-09-06 ASSESSMENT — ENCOUNTER SYMPTOMS
DIARRHEA: 1
EYES NEGATIVE: 1
RESPIRATORY NEGATIVE: 1
ABDOMINAL PAIN: 1

## 2017-09-06 ASSESSMENT — PAIN SCALES - GENERAL
PAINLEVEL_OUTOF10: 6
PAINLEVEL_OUTOF10: 6
PAINLEVEL_OUTOF10: 5
PAINLEVEL_OUTOF10: 8
PAINLEVEL_OUTOF10: 4
PAINLEVEL_OUTOF10: 10
PAINLEVEL_OUTOF10: 3
PAINLEVEL_OUTOF10: 7
PAINLEVEL_OUTOF10: 10

## 2017-09-06 ASSESSMENT — PAIN DESCRIPTION - PAIN TYPE
TYPE: ACUTE PAIN
TYPE: ACUTE PAIN

## 2017-09-06 ASSESSMENT — PAIN DESCRIPTION - LOCATION
LOCATION: ABDOMEN
LOCATION: ABDOMEN

## 2017-09-06 ASSESSMENT — PAIN DESCRIPTION - PROGRESSION: CLINICAL_PROGRESSION: GRADUALLY WORSENING

## 2017-09-06 ASSESSMENT — PAIN DESCRIPTION - DIRECTION: RADIATING_TOWARDS: BACK

## 2017-09-06 ASSESSMENT — PAIN DESCRIPTION - ONSET: ONSET: ON-GOING

## 2017-09-06 ASSESSMENT — PAIN DESCRIPTION - ORIENTATION: ORIENTATION: LOWER

## 2017-09-07 LAB
-: ABNORMAL
ABSOLUTE EOS #: 0.2 K/UL (ref 0–0.4)
ABSOLUTE EOS #: 0.2 K/UL (ref 0–0.4)
ABSOLUTE LYMPH #: 1.1 K/UL (ref 1–4.8)
ABSOLUTE LYMPH #: 1.1 K/UL (ref 1–4.8)
ABSOLUTE MONO #: 1 K/UL (ref 0.1–1.2)
ABSOLUTE MONO #: 1 K/UL (ref 0.1–1.2)
ALBUMIN SERPL-MCNC: 2.9 G/DL (ref 3.5–5.2)
ALBUMIN/GLOBULIN RATIO: 1 (ref 1–2.5)
ALP BLD-CCNC: 91 U/L (ref 35–104)
ALT SERPL-CCNC: 54 U/L (ref 5–33)
AMORPHOUS: ABNORMAL
ANION GAP SERPL CALCULATED.3IONS-SCNC: 14 MMOL/L (ref 9–17)
ANION GAP SERPL CALCULATED.3IONS-SCNC: 14 MMOL/L (ref 9–17)
AST SERPL-CCNC: 33 U/L
BACTERIA: ABNORMAL
BASOPHILS # BLD: 0 %
BASOPHILS # BLD: 1 %
BASOPHILS ABSOLUTE: 0 K/UL (ref 0–0.2)
BASOPHILS ABSOLUTE: 0.1 K/UL (ref 0–0.2)
BILIRUB SERPL-MCNC: 0.35 MG/DL (ref 0.3–1.2)
BILIRUBIN URINE: NEGATIVE
BUN BLDV-MCNC: 5 MG/DL (ref 6–20)
BUN BLDV-MCNC: 6 MG/DL (ref 6–20)
BUN/CREAT BLD: 12 (ref 9–20)
BUN/CREAT BLD: ABNORMAL (ref 9–20)
CALCIUM SERPL-MCNC: 8 MG/DL (ref 8.6–10.4)
CALCIUM SERPL-MCNC: 8.2 MG/DL (ref 8.6–10.4)
CAMPYLOBACTER PCR: NORMAL
CASTS UA: ABNORMAL /LPF (ref 0–2)
CHLORIDE BLD-SCNC: 103 MMOL/L (ref 98–107)
CHLORIDE BLD-SCNC: 104 MMOL/L (ref 98–107)
CO2: 22 MMOL/L (ref 20–31)
CO2: 22 MMOL/L (ref 20–31)
COLOR: ABNORMAL
COMMENT UA: ABNORMAL
CREAT SERPL-MCNC: 0.51 MG/DL (ref 0.5–0.9)
CREAT SERPL-MCNC: <0.4 MG/DL (ref 0.5–0.9)
CRYSTALS, UA: ABNORMAL /HPF
DIFFERENTIAL TYPE: ABNORMAL
DIFFERENTIAL TYPE: ABNORMAL
DIRECT EXAM: NORMAL
EOSINOPHILS RELATIVE PERCENT: 2 %
EOSINOPHILS RELATIVE PERCENT: 2 %
EPITHELIAL CELLS UA: ABNORMAL /HPF (ref 0–5)
GFR AFRICAN AMERICAN: >60 ML/MIN
GFR AFRICAN AMERICAN: ABNORMAL ML/MIN
GFR NON-AFRICAN AMERICAN: >60 ML/MIN
GFR NON-AFRICAN AMERICAN: ABNORMAL ML/MIN
GFR SERPL CREATININE-BSD FRML MDRD: ABNORMAL ML/MIN/{1.73_M2}
GLUCOSE BLD-MCNC: 109 MG/DL (ref 70–99)
GLUCOSE BLD-MCNC: 91 MG/DL (ref 70–99)
GLUCOSE URINE: NEGATIVE
HCT VFR BLD CALC: 27 % (ref 36–46)
HCT VFR BLD CALC: 27.7 % (ref 36–46)
HEMOGLOBIN: 8.6 G/DL (ref 12–16)
HEMOGLOBIN: 8.6 G/DL (ref 12–16)
KETONES, URINE: NEGATIVE
LEUKOCYTE ESTERASE, URINE: ABNORMAL
LYMPHOCYTES # BLD: 10 %
LYMPHOCYTES # BLD: 9 %
Lab: NORMAL
MCH RBC QN AUTO: 28.4 PG (ref 26–34)
MCH RBC QN AUTO: 29.4 PG (ref 26–34)
MCHC RBC AUTO-ENTMCNC: 30.9 G/DL (ref 31–37)
MCHC RBC AUTO-ENTMCNC: 31.9 G/DL (ref 31–37)
MCV RBC AUTO: 89.2 FL (ref 80–100)
MCV RBC AUTO: 95.3 FL (ref 80–100)
MONOCYTES # BLD: 8 %
MONOCYTES # BLD: 9 %
MUCUS: ABNORMAL
NITRITE, URINE: NEGATIVE
OTHER OBSERVATIONS UA: ABNORMAL
PDW BLD-RTO: 14 % (ref 11–14.5)
PDW BLD-RTO: 14.7 % (ref 11–14.5)
PH UA: 5.5 (ref 5–6)
PLATELET # BLD: 271 K/UL (ref 140–450)
PLATELET # BLD: 317 K/UL (ref 140–450)
PLATELET ESTIMATE: ABNORMAL
PLATELET ESTIMATE: ABNORMAL
PMV BLD AUTO: 5.9 FL (ref 6–12)
PMV BLD AUTO: 6.4 FL (ref 6–12)
POTASSIUM SERPL-SCNC: 3.6 MMOL/L (ref 3.7–5.3)
POTASSIUM SERPL-SCNC: 3.8 MMOL/L (ref 3.7–5.3)
POTASSIUM SERPL-SCNC: 3.8 MMOL/L (ref 3.7–5.3)
PROTEIN UA: NEGATIVE
RBC # BLD: 2.91 M/UL (ref 4–5.2)
RBC # BLD: 3.03 M/UL (ref 4–5.2)
RBC # BLD: ABNORMAL 10*6/UL
RBC # BLD: ABNORMAL 10*6/UL
RBC UA: ABNORMAL /HPF (ref 0–4)
RENAL EPITHELIAL, UA: ABNORMAL /HPF
SALMONELLA PCR: NORMAL
SEG NEUTROPHILS: 79 %
SEG NEUTROPHILS: 80 %
SEGMENTED NEUTROPHILS ABSOLUTE COUNT: 8.8 K/UL (ref 1.8–7.7)
SEGMENTED NEUTROPHILS ABSOLUTE COUNT: 9.9 K/UL (ref 1.8–7.7)
SHIGATOXIN GENE PCR: NORMAL
SHIGELLA SP PCR: NORMAL
SODIUM BLD-SCNC: 139 MMOL/L (ref 135–144)
SODIUM BLD-SCNC: 140 MMOL/L (ref 135–144)
SPECIFIC GRAVITY UA: 1 (ref 1.01–1.02)
SPECIMEN DESCRIPTION: NORMAL
SPECIMEN: NORMAL
STATUS: NORMAL
TOTAL PROTEIN: 5.7 G/DL (ref 6.4–8.3)
TRICHOMONAS: ABNORMAL
TURBIDITY: ABNORMAL
URINE HGB: ABNORMAL
UROBILINOGEN, URINE: NORMAL
WBC # BLD: 11.2 K/UL (ref 3.5–11)
WBC # BLD: 12.4 K/UL (ref 3.5–11)
WBC # BLD: ABNORMAL 10*3/UL
WBC # BLD: ABNORMAL 10*3/UL
WBC UA: ABNORMAL /HPF (ref 0–4)
YEAST: ABNORMAL

## 2017-09-07 PROCEDURE — 80048 BASIC METABOLIC PNL TOTAL CA: CPT

## 2017-09-07 PROCEDURE — 6360000002 HC RX W HCPCS: Performed by: NURSE PRACTITIONER

## 2017-09-07 PROCEDURE — 94640 AIRWAY INHALATION TREATMENT: CPT

## 2017-09-07 PROCEDURE — 6370000000 HC RX 637 (ALT 250 FOR IP)

## 2017-09-07 PROCEDURE — 85025 COMPLETE CBC W/AUTO DIFF WBC: CPT

## 2017-09-07 PROCEDURE — 36556 INSERT NON-TUNNEL CV CATH: CPT

## 2017-09-07 PROCEDURE — 2580000003 HC RX 258: Performed by: NURSE PRACTITIONER

## 2017-09-07 PROCEDURE — C9113 INJ PANTOPRAZOLE SODIUM, VIA: HCPCS | Performed by: NURSE PRACTITIONER

## 2017-09-07 PROCEDURE — 6360000002 HC RX W HCPCS: Performed by: INTERNAL MEDICINE

## 2017-09-07 PROCEDURE — 94761 N-INVAS EAR/PLS OXIMETRY MLT: CPT

## 2017-09-07 PROCEDURE — 2060000000 HC ICU INTERMEDIATE R&B

## 2017-09-07 PROCEDURE — 2580000003 HC RX 258: Performed by: INTERNAL MEDICINE

## 2017-09-07 PROCEDURE — 80053 COMPREHEN METABOLIC PANEL: CPT

## 2017-09-07 PROCEDURE — 36415 COLL VENOUS BLD VENIPUNCTURE: CPT

## 2017-09-07 PROCEDURE — 84132 ASSAY OF SERUM POTASSIUM: CPT

## 2017-09-07 PROCEDURE — 2500000003 HC RX 250 WO HCPCS: Performed by: INTERNAL MEDICINE

## 2017-09-07 PROCEDURE — 81001 URINALYSIS AUTO W/SCOPE: CPT

## 2017-09-07 PROCEDURE — 6360000002 HC RX W HCPCS

## 2017-09-07 PROCEDURE — 99232 SBSQ HOSP IP/OBS MODERATE 35: CPT | Performed by: INTERNAL MEDICINE

## 2017-09-07 RX ORDER — DIAZEPAM 5 MG/ML
10 INJECTION, SOLUTION INTRAMUSCULAR; INTRAVENOUS ONCE
Status: DISCONTINUED | OUTPATIENT
Start: 2017-09-07 | End: 2017-09-07

## 2017-09-07 RX ORDER — DIAZEPAM 5 MG/1
5 TABLET ORAL EVERY 6 HOURS PRN
Status: DISCONTINUED | OUTPATIENT
Start: 2017-09-07 | End: 2017-09-08

## 2017-09-07 RX ORDER — OXYBUTYNIN CHLORIDE 5 MG/1
15 TABLET, EXTENDED RELEASE ORAL NIGHTLY
Status: DISCONTINUED | OUTPATIENT
Start: 2017-09-07 | End: 2017-09-12

## 2017-09-07 RX ORDER — ACETAMINOPHEN 10 MG/ML
1000 INJECTION, SOLUTION INTRAVENOUS EVERY 6 HOURS PRN
Status: DISCONTINUED | OUTPATIENT
Start: 2017-09-07 | End: 2017-09-14 | Stop reason: HOSPADM

## 2017-09-07 RX ORDER — DIAZEPAM 5 MG/ML
5 INJECTION, SOLUTION INTRAMUSCULAR; INTRAVENOUS ONCE
Status: COMPLETED | OUTPATIENT
Start: 2017-09-07 | End: 2017-09-07

## 2017-09-07 RX ORDER — OXYBUTYNIN CHLORIDE 5 MG/1
TABLET, EXTENDED RELEASE ORAL
Status: COMPLETED
Start: 2017-09-07 | End: 2017-09-07

## 2017-09-07 RX ORDER — DIAZEPAM 5 MG/ML
INJECTION, SOLUTION INTRAMUSCULAR; INTRAVENOUS
Status: COMPLETED
Start: 2017-09-07 | End: 2017-09-07

## 2017-09-07 RX ADMIN — CEFOXITIN SODIUM 1 G: 2 POWDER, FOR SOLUTION INTRAVENOUS at 17:36

## 2017-09-07 RX ADMIN — HYDROMORPHONE HYDROCHLORIDE 1 MG: 1 INJECTION, SOLUTION INTRAMUSCULAR; INTRAVENOUS; SUBCUTANEOUS at 13:22

## 2017-09-07 RX ADMIN — ENOXAPARIN SODIUM 40 MG: 40 INJECTION SUBCUTANEOUS at 08:53

## 2017-09-07 RX ADMIN — POTASSIUM CHLORIDE 10 MEQ: 10 INJECTION, SOLUTION INTRAVENOUS at 01:13

## 2017-09-07 RX ADMIN — METRONIDAZOLE 500 MG: 500 INJECTION, SOLUTION INTRAVENOUS at 20:35

## 2017-09-07 RX ADMIN — CEFOXITIN SODIUM 1 G: 2 POWDER, FOR SOLUTION INTRAVENOUS at 00:11

## 2017-09-07 RX ADMIN — ACETAMINOPHEN 1000 MG: 10 INJECTION, SOLUTION INTRAVENOUS at 00:42

## 2017-09-07 RX ADMIN — SODIUM CHLORIDE: 9 INJECTION, SOLUTION INTRAVENOUS at 03:00

## 2017-09-07 RX ADMIN — OXYBUTYNIN CHLORIDE 15 MG: 5 TABLET, EXTENDED RELEASE ORAL at 19:42

## 2017-09-07 RX ADMIN — ALBUTEROL SULFATE 2.5 MG: 2.5 SOLUTION RESPIRATORY (INHALATION) at 20:12

## 2017-09-07 RX ADMIN — CEFOXITIN SODIUM 1 G: 2 POWDER, FOR SOLUTION INTRAVENOUS at 05:50

## 2017-09-07 RX ADMIN — Medication 10 ML: at 08:54

## 2017-09-07 RX ADMIN — METRONIDAZOLE 500 MG: 500 INJECTION, SOLUTION INTRAVENOUS at 15:09

## 2017-09-07 RX ADMIN — POTASSIUM CHLORIDE 10 MEQ: 10 INJECTION, SOLUTION INTRAVENOUS at 00:11

## 2017-09-07 RX ADMIN — CEFOXITIN SODIUM 1 G: 2 POWDER, FOR SOLUTION INTRAVENOUS at 11:40

## 2017-09-07 RX ADMIN — MORPHINE SULFATE 2 MG: 2 INJECTION, SOLUTION INTRAMUSCULAR; INTRAVENOUS at 08:54

## 2017-09-07 RX ADMIN — METRONIDAZOLE 500 MG: 500 INJECTION, SOLUTION INTRAVENOUS at 03:00

## 2017-09-07 RX ADMIN — MORPHINE SULFATE 2 MG: 2 INJECTION, SOLUTION INTRAMUSCULAR; INTRAVENOUS at 00:42

## 2017-09-07 RX ADMIN — PANTOPRAZOLE SODIUM 40 MG: 40 INJECTION, POWDER, FOR SOLUTION INTRAVENOUS at 08:53

## 2017-09-07 RX ADMIN — DIAZEPAM 5 MG: 5 INJECTION, SOLUTION INTRAMUSCULAR; INTRAVENOUS at 18:24

## 2017-09-07 RX ADMIN — HYDROMORPHONE HYDROCHLORIDE 1 MG: 1 INJECTION, SOLUTION INTRAMUSCULAR; INTRAVENOUS; SUBCUTANEOUS at 10:25

## 2017-09-07 RX ADMIN — METRONIDAZOLE 500 MG: 500 INJECTION, SOLUTION INTRAVENOUS at 08:53

## 2017-09-07 RX ADMIN — ALBUTEROL SULFATE 2.5 MG: 2.5 SOLUTION RESPIRATORY (INHALATION) at 07:57

## 2017-09-07 RX ADMIN — SODIUM CHLORIDE: 9 INJECTION, SOLUTION INTRAVENOUS at 11:40

## 2017-09-07 RX ADMIN — HYDROMORPHONE HYDROCHLORIDE 1 MG: 1 INJECTION, SOLUTION INTRAMUSCULAR; INTRAVENOUS; SUBCUTANEOUS at 19:19

## 2017-09-07 RX ADMIN — ALBUTEROL SULFATE 2.5 MG: 2.5 SOLUTION RESPIRATORY (INHALATION) at 15:12

## 2017-09-07 RX ADMIN — SODIUM CHLORIDE: 9 INJECTION, SOLUTION INTRAVENOUS at 20:30

## 2017-09-07 ASSESSMENT — PAIN SCALES - GENERAL
PAINLEVEL_OUTOF10: 0
PAINLEVEL_OUTOF10: 5
PAINLEVEL_OUTOF10: 10
PAINLEVEL_OUTOF10: 6
PAINLEVEL_OUTOF10: 7
PAINLEVEL_OUTOF10: 4
PAINLEVEL_OUTOF10: 7
PAINLEVEL_OUTOF10: 6
PAINLEVEL_OUTOF10: 5
PAINLEVEL_OUTOF10: 5

## 2017-09-07 ASSESSMENT — PAIN DESCRIPTION - LOCATION
LOCATION: ABDOMEN
LOCATION: ABDOMEN

## 2017-09-07 ASSESSMENT — PAIN DESCRIPTION - PAIN TYPE
TYPE: ACUTE PAIN
TYPE: ACUTE PAIN

## 2017-09-07 ASSESSMENT — PAIN DESCRIPTION - ORIENTATION: ORIENTATION: LOWER

## 2017-09-07 ASSESSMENT — PAIN DESCRIPTION - PROGRESSION: CLINICAL_PROGRESSION: NOT CHANGED

## 2017-09-07 ASSESSMENT — PAIN DESCRIPTION - DESCRIPTORS: DESCRIPTORS: SPASM

## 2017-09-07 ASSESSMENT — PAIN DESCRIPTION - FREQUENCY: FREQUENCY: CONTINUOUS

## 2017-09-07 ASSESSMENT — PAIN DESCRIPTION - DIRECTION: RADIATING_TOWARDS: BACK

## 2017-09-07 ASSESSMENT — PAIN DESCRIPTION - ONSET: ONSET: ON-GOING

## 2017-09-08 ENCOUNTER — ANESTHESIA (OUTPATIENT)
Dept: OPERATING ROOM | Age: 57
DRG: 856 | End: 2017-09-08
Payer: COMMERCIAL

## 2017-09-08 ENCOUNTER — APPOINTMENT (OUTPATIENT)
Dept: GENERAL RADIOLOGY | Age: 57
DRG: 856 | End: 2017-09-08
Payer: COMMERCIAL

## 2017-09-08 ENCOUNTER — ANESTHESIA EVENT (OUTPATIENT)
Dept: OPERATING ROOM | Age: 57
DRG: 856 | End: 2017-09-08
Payer: COMMERCIAL

## 2017-09-08 ENCOUNTER — APPOINTMENT (OUTPATIENT)
Dept: CT IMAGING | Age: 57
DRG: 856 | End: 2017-09-08
Payer: COMMERCIAL

## 2017-09-08 VITALS
SYSTOLIC BLOOD PRESSURE: 131 MMHG | OXYGEN SATURATION: 99 % | RESPIRATION RATE: 5 BRPM | DIASTOLIC BLOOD PRESSURE: 73 MMHG

## 2017-09-08 VITALS
TEMPERATURE: 98.2 F | DIASTOLIC BLOOD PRESSURE: 65 MMHG | SYSTOLIC BLOOD PRESSURE: 119 MMHG | OXYGEN SATURATION: 99 % | RESPIRATION RATE: 7 BRPM

## 2017-09-08 LAB
ABSOLUTE EOS #: 0.4 K/UL (ref 0–0.4)
ABSOLUTE EOS #: 0.4 K/UL (ref 0–0.4)
ABSOLUTE LYMPH #: 1 K/UL (ref 1–4.8)
ABSOLUTE LYMPH #: 1 K/UL (ref 1–4.8)
ABSOLUTE MONO #: 1 K/UL (ref 0.1–1.2)
ABSOLUTE MONO #: 1 K/UL (ref 0.1–1.2)
ALBUMIN SERPL-MCNC: 3.2 G/DL (ref 3.5–5.2)
ALBUMIN/GLOBULIN RATIO: 1.2 (ref 1–2.5)
ALP BLD-CCNC: 111 U/L (ref 35–104)
ALT SERPL-CCNC: 43 U/L (ref 5–33)
ANION GAP SERPL CALCULATED.3IONS-SCNC: 12 MMOL/L (ref 9–17)
ANION GAP SERPL CALCULATED.3IONS-SCNC: 14 MMOL/L (ref 9–17)
AST SERPL-CCNC: 18 U/L
BASOPHILS # BLD: 1 %
BASOPHILS # BLD: 1 %
BASOPHILS ABSOLUTE: 0.1 K/UL (ref 0–0.2)
BASOPHILS ABSOLUTE: 0.1 K/UL (ref 0–0.2)
BILIRUB SERPL-MCNC: 0.24 MG/DL (ref 0.3–1.2)
BUN BLDV-MCNC: 3 MG/DL (ref 6–20)
BUN BLDV-MCNC: 5 MG/DL (ref 6–20)
BUN/CREAT BLD: ABNORMAL (ref 9–20)
BUN/CREAT BLD: ABNORMAL (ref 9–20)
CALCIUM SERPL-MCNC: 8.2 MG/DL (ref 8.6–10.4)
CALCIUM SERPL-MCNC: 8.4 MG/DL (ref 8.6–10.4)
CHLORIDE BLD-SCNC: 100 MMOL/L (ref 98–107)
CHLORIDE BLD-SCNC: 103 MMOL/L (ref 98–107)
CO2: 23 MMOL/L (ref 20–31)
CO2: 25 MMOL/L (ref 20–31)
CREAT SERPL-MCNC: <0.4 MG/DL (ref 0.5–0.9)
CREAT SERPL-MCNC: <0.4 MG/DL (ref 0.5–0.9)
DIFFERENTIAL TYPE: ABNORMAL
DIFFERENTIAL TYPE: ABNORMAL
EOSINOPHILS RELATIVE PERCENT: 3 %
EOSINOPHILS RELATIVE PERCENT: 3 %
GFR AFRICAN AMERICAN: ABNORMAL ML/MIN
GFR AFRICAN AMERICAN: ABNORMAL ML/MIN
GFR NON-AFRICAN AMERICAN: ABNORMAL ML/MIN
GFR NON-AFRICAN AMERICAN: ABNORMAL ML/MIN
GFR SERPL CREATININE-BSD FRML MDRD: ABNORMAL ML/MIN/{1.73_M2}
GLUCOSE BLD-MCNC: 93 MG/DL (ref 70–99)
GLUCOSE BLD-MCNC: 93 MG/DL (ref 70–99)
HCT VFR BLD CALC: 25 % (ref 36–46)
HCT VFR BLD CALC: 25.3 % (ref 36–46)
HCT VFR BLD CALC: 26.1 % (ref 36–46)
HEMOGLOBIN: 8.1 G/DL (ref 12–16)
HEMOGLOBIN: 8.2 G/DL (ref 12–16)
HEMOGLOBIN: 8.5 G/DL (ref 12–16)
LACTIC ACID: 0.6 MMOL/L (ref 0.5–2.2)
LYMPHOCYTES # BLD: 8 %
LYMPHOCYTES # BLD: 8 %
MCH RBC QN AUTO: 28.6 PG (ref 26–34)
MCH RBC QN AUTO: 28.8 PG (ref 26–34)
MCH RBC QN AUTO: 28.9 PG (ref 26–34)
MCHC RBC AUTO-ENTMCNC: 32.2 G/DL (ref 31–37)
MCHC RBC AUTO-ENTMCNC: 32.3 G/DL (ref 31–37)
MCHC RBC AUTO-ENTMCNC: 32.5 G/DL (ref 31–37)
MCV RBC AUTO: 88.4 FL (ref 80–100)
MCV RBC AUTO: 88.8 FL (ref 80–100)
MCV RBC AUTO: 89.4 FL (ref 80–100)
MONOCYTES # BLD: 8 %
MONOCYTES # BLD: 9 %
PDW BLD-RTO: 13.7 % (ref 11–14.5)
PDW BLD-RTO: 14.1 % (ref 11–14.5)
PDW BLD-RTO: 14.1 % (ref 11–14.5)
PLATELET # BLD: 334 K/UL (ref 140–450)
PLATELET # BLD: 357 K/UL (ref 140–450)
PLATELET # BLD: 368 K/UL (ref 140–450)
PLATELET ESTIMATE: ABNORMAL
PLATELET ESTIMATE: ABNORMAL
PMV BLD AUTO: 5.9 FL (ref 6–12)
PMV BLD AUTO: 5.9 FL (ref 6–12)
PMV BLD AUTO: 6.4 FL (ref 6–12)
POTASSIUM SERPL-SCNC: 3.5 MMOL/L (ref 3.7–5.3)
POTASSIUM SERPL-SCNC: 3.8 MMOL/L (ref 3.7–5.3)
RBC # BLD: 2.82 M/UL (ref 4–5.2)
RBC # BLD: 2.83 M/UL (ref 4–5.2)
RBC # BLD: 2.95 M/UL (ref 4–5.2)
RBC # BLD: ABNORMAL 10*6/UL
RBC # BLD: ABNORMAL 10*6/UL
SEG NEUTROPHILS: 79 %
SEG NEUTROPHILS: 80 %
SEGMENTED NEUTROPHILS ABSOLUTE COUNT: 9 K/UL (ref 1.8–7.7)
SEGMENTED NEUTROPHILS ABSOLUTE COUNT: 9.8 K/UL (ref 1.8–7.7)
SODIUM BLD-SCNC: 137 MMOL/L (ref 135–144)
SODIUM BLD-SCNC: 140 MMOL/L (ref 135–144)
TOTAL PROTEIN: 5.8 G/DL (ref 6.4–8.3)
WBC # BLD: 11.4 K/UL (ref 3.5–11)
WBC # BLD: 11.5 K/UL (ref 3.5–11)
WBC # BLD: 12.3 K/UL (ref 3.5–11)
WBC # BLD: ABNORMAL 10*3/UL
WBC # BLD: ABNORMAL 10*3/UL

## 2017-09-08 PROCEDURE — 7100000001 HC PACU RECOVERY - ADDTL 15 MIN: Performed by: SURGERY

## 2017-09-08 PROCEDURE — 2580000003 HC RX 258: Performed by: NURSE ANESTHETIST, CERTIFIED REGISTERED

## 2017-09-08 PROCEDURE — 6360000002 HC RX W HCPCS

## 2017-09-08 PROCEDURE — 85025 COMPLETE CBC W/AUTO DIFF WBC: CPT

## 2017-09-08 PROCEDURE — 6360000002 HC RX W HCPCS: Performed by: INTERNAL MEDICINE

## 2017-09-08 PROCEDURE — 2720000010 HC SURG SUPPLY STERILE: Performed by: SURGERY

## 2017-09-08 PROCEDURE — 87075 CULTR BACTERIA EXCEPT BLOOD: CPT

## 2017-09-08 PROCEDURE — 2060000000 HC ICU INTERMEDIATE R&B

## 2017-09-08 PROCEDURE — 0DJD8ZZ INSPECTION OF LOWER INTESTINAL TRACT, VIA NATURAL OR ARTIFICIAL OPENING ENDOSCOPIC: ICD-10-PCS | Performed by: SURGERY

## 2017-09-08 PROCEDURE — C9113 INJ PANTOPRAZOLE SODIUM, VIA: HCPCS | Performed by: NURSE PRACTITIONER

## 2017-09-08 PROCEDURE — A6222 GAUZE <=16 IN NO W/SAL W/O B: HCPCS | Performed by: SURGERY

## 2017-09-08 PROCEDURE — 00810 PR ANESTH,INTESTINE,SCOPE,LOW: CPT | Performed by: NURSE ANESTHETIST, CERTIFIED REGISTERED

## 2017-09-08 PROCEDURE — 87070 CULTURE OTHR SPECIMN AEROBIC: CPT

## 2017-09-08 PROCEDURE — 94640 AIRWAY INHALATION TREATMENT: CPT

## 2017-09-08 PROCEDURE — 87205 SMEAR GRAM STAIN: CPT

## 2017-09-08 PROCEDURE — 2500000003 HC RX 250 WO HCPCS: Performed by: SURGERY

## 2017-09-08 PROCEDURE — 2500000003 HC RX 250 WO HCPCS: Performed by: INTERNAL MEDICINE

## 2017-09-08 PROCEDURE — 36415 COLL VENOUS BLD VENIPUNCTURE: CPT

## 2017-09-08 PROCEDURE — 83605 ASSAY OF LACTIC ACID: CPT

## 2017-09-08 PROCEDURE — 45330 DIAGNOSTIC SIGMOIDOSCOPY: CPT | Performed by: SURGERY

## 2017-09-08 PROCEDURE — 3609027000 HC COLONOSCOPY: Performed by: SURGERY

## 2017-09-08 PROCEDURE — 3700000001 HC ADD 15 MINUTES (ANESTHESIA): Performed by: SURGERY

## 2017-09-08 PROCEDURE — 51798 US URINE CAPACITY MEASURE: CPT

## 2017-09-08 PROCEDURE — 71010 XR CHEST PORTABLE: CPT

## 2017-09-08 PROCEDURE — 74177 CT ABD & PELVIS W/CONTRAST: CPT

## 2017-09-08 PROCEDURE — 94761 N-INVAS EAR/PLS OXIMETRY MLT: CPT

## 2017-09-08 PROCEDURE — 3600000014 HC SURGERY LEVEL 4 ADDTL 15MIN: Performed by: SURGERY

## 2017-09-08 PROCEDURE — 85027 COMPLETE CBC AUTOMATED: CPT

## 2017-09-08 PROCEDURE — 0W9J00Z DRAINAGE OF PELVIC CAVITY WITH DRAINAGE DEVICE, OPEN APPROACH: ICD-10-PCS | Performed by: SURGERY

## 2017-09-08 PROCEDURE — 6360000002 HC RX W HCPCS: Performed by: NURSE ANESTHETIST, CERTIFIED REGISTERED

## 2017-09-08 PROCEDURE — 49020 DRAINAGE ABDOM ABSCESS OPEN: CPT | Performed by: SURGERY

## 2017-09-08 PROCEDURE — 2580000003 HC RX 258: Performed by: NURSE PRACTITIONER

## 2017-09-08 PROCEDURE — 3700000000 HC ANESTHESIA ATTENDED CARE: Performed by: SURGERY

## 2017-09-08 PROCEDURE — 2580000003 HC RX 258: Performed by: INTERNAL MEDICINE

## 2017-09-08 PROCEDURE — 3600000004 HC SURGERY LEVEL 4 BASE: Performed by: SURGERY

## 2017-09-08 PROCEDURE — 00840 ANES IPER PX LOWER ABD NOS: CPT | Performed by: NURSE ANESTHETIST, CERTIFIED REGISTERED

## 2017-09-08 PROCEDURE — 6360000004 HC RX CONTRAST MEDICATION: Performed by: INTERNAL MEDICINE

## 2017-09-08 PROCEDURE — 0DNW0ZZ RELEASE PERITONEUM, OPEN APPROACH: ICD-10-PCS | Performed by: SURGERY

## 2017-09-08 PROCEDURE — 2500000003 HC RX 250 WO HCPCS: Performed by: NURSE ANESTHETIST, CERTIFIED REGISTERED

## 2017-09-08 PROCEDURE — 6360000002 HC RX W HCPCS: Performed by: NURSE PRACTITIONER

## 2017-09-08 PROCEDURE — 6370000000 HC RX 637 (ALT 250 FOR IP): Performed by: SURGERY

## 2017-09-08 PROCEDURE — 80048 BASIC METABOLIC PNL TOTAL CA: CPT

## 2017-09-08 PROCEDURE — 7100000000 HC PACU RECOVERY - FIRST 15 MIN: Performed by: SURGERY

## 2017-09-08 PROCEDURE — 99232 SBSQ HOSP IP/OBS MODERATE 35: CPT | Performed by: INTERNAL MEDICINE

## 2017-09-08 PROCEDURE — A6257 TRANSPARENT FILM <= 16 SQ IN: HCPCS | Performed by: SURGERY

## 2017-09-08 PROCEDURE — 80053 COMPREHEN METABOLIC PANEL: CPT

## 2017-09-08 RX ORDER — PROPOFOL 10 MG/ML
INJECTION, EMULSION INTRAVENOUS PRN
Status: DISCONTINUED | OUTPATIENT
Start: 2017-09-08 | End: 2017-09-08 | Stop reason: SDUPTHER

## 2017-09-08 RX ORDER — NEOSTIGMINE METHYLSULFATE 1 MG/ML
INJECTION, SOLUTION INTRAVENOUS PRN
Status: DISCONTINUED | OUTPATIENT
Start: 2017-09-08 | End: 2017-09-08 | Stop reason: SDUPTHER

## 2017-09-08 RX ORDER — MORPHINE SULFATE 2 MG/ML
2 INJECTION, SOLUTION INTRAMUSCULAR; INTRAVENOUS EVERY 5 MIN PRN
Status: DISCONTINUED | OUTPATIENT
Start: 2017-09-08 | End: 2017-09-08 | Stop reason: HOSPADM

## 2017-09-08 RX ORDER — METOPROLOL TARTRATE 5 MG/5ML
INJECTION INTRAVENOUS PRN
Status: DISCONTINUED | OUTPATIENT
Start: 2017-09-08 | End: 2017-09-08 | Stop reason: SDUPTHER

## 2017-09-08 RX ORDER — HYDROCODONE BITARTRATE AND ACETAMINOPHEN 5; 325 MG/1; MG/1
2 TABLET ORAL EVERY 4 HOURS PRN
Status: DISCONTINUED | OUTPATIENT
Start: 2017-09-08 | End: 2017-09-14 | Stop reason: HOSPADM

## 2017-09-08 RX ORDER — HYDROCODONE BITARTRATE AND ACETAMINOPHEN 5; 325 MG/1; MG/1
1 TABLET ORAL EVERY 4 HOURS PRN
Status: DISCONTINUED | OUTPATIENT
Start: 2017-09-08 | End: 2017-09-14 | Stop reason: HOSPADM

## 2017-09-08 RX ORDER — MORPHINE SULFATE 2 MG/ML
1 INJECTION, SOLUTION INTRAMUSCULAR; INTRAVENOUS EVERY 5 MIN PRN
Status: DISCONTINUED | OUTPATIENT
Start: 2017-09-08 | End: 2017-09-08 | Stop reason: HOSPADM

## 2017-09-08 RX ORDER — DEXTROSE, SODIUM CHLORIDE, AND POTASSIUM CHLORIDE 5; .45; .15 G/100ML; G/100ML; G/100ML
INJECTION INTRAVENOUS CONTINUOUS
Status: DISCONTINUED | OUTPATIENT
Start: 2017-09-08 | End: 2017-09-13

## 2017-09-08 RX ORDER — ONDANSETRON 2 MG/ML
4 INJECTION INTRAMUSCULAR; INTRAVENOUS
Status: DISCONTINUED | OUTPATIENT
Start: 2017-09-08 | End: 2017-09-08 | Stop reason: HOSPADM

## 2017-09-08 RX ORDER — ONDANSETRON 2 MG/ML
4 INJECTION INTRAMUSCULAR; INTRAVENOUS EVERY 6 HOURS PRN
Status: DISCONTINUED | OUTPATIENT
Start: 2017-09-08 | End: 2017-09-08 | Stop reason: SDUPTHER

## 2017-09-08 RX ORDER — DEXAMETHASONE SODIUM PHOSPHATE 4 MG/ML
INJECTION, SOLUTION INTRA-ARTICULAR; INTRALESIONAL; INTRAMUSCULAR; INTRAVENOUS; SOFT TISSUE PRN
Status: DISCONTINUED | OUTPATIENT
Start: 2017-09-08 | End: 2017-09-08 | Stop reason: SDUPTHER

## 2017-09-08 RX ORDER — FENTANYL CITRATE 50 UG/ML
50 INJECTION, SOLUTION INTRAMUSCULAR; INTRAVENOUS EVERY 5 MIN PRN
Status: DISCONTINUED | OUTPATIENT
Start: 2017-09-08 | End: 2017-09-08 | Stop reason: HOSPADM

## 2017-09-08 RX ORDER — ROCURONIUM BROMIDE 10 MG/ML
INJECTION, SOLUTION INTRAVENOUS PRN
Status: DISCONTINUED | OUTPATIENT
Start: 2017-09-08 | End: 2017-09-08 | Stop reason: SDUPTHER

## 2017-09-08 RX ORDER — KETOROLAC TROMETHAMINE 30 MG/ML
INJECTION, SOLUTION INTRAMUSCULAR; INTRAVENOUS PRN
Status: DISCONTINUED | OUTPATIENT
Start: 2017-09-08 | End: 2017-09-08 | Stop reason: SDUPTHER

## 2017-09-08 RX ORDER — MORPHINE SULFATE 2 MG/ML
2 INJECTION, SOLUTION INTRAMUSCULAR; INTRAVENOUS
Status: DISCONTINUED | OUTPATIENT
Start: 2017-09-08 | End: 2017-09-13

## 2017-09-08 RX ORDER — HYDROCODONE BITARTRATE AND ACETAMINOPHEN 5; 325 MG/1; MG/1
2 TABLET ORAL PRN
Status: DISCONTINUED | OUTPATIENT
Start: 2017-09-08 | End: 2017-09-08 | Stop reason: HOSPADM

## 2017-09-08 RX ORDER — SODIUM CHLORIDE, SODIUM LACTATE, POTASSIUM CHLORIDE, CALCIUM CHLORIDE 600; 310; 30; 20 MG/100ML; MG/100ML; MG/100ML; MG/100ML
INJECTION, SOLUTION INTRAVENOUS CONTINUOUS PRN
Status: DISCONTINUED | OUTPATIENT
Start: 2017-09-08 | End: 2017-09-08 | Stop reason: SDUPTHER

## 2017-09-08 RX ORDER — FENTANYL CITRATE 50 UG/ML
25 INJECTION, SOLUTION INTRAMUSCULAR; INTRAVENOUS EVERY 5 MIN PRN
Status: DISCONTINUED | OUTPATIENT
Start: 2017-09-08 | End: 2017-09-08 | Stop reason: HOSPADM

## 2017-09-08 RX ORDER — GLYCOPYRROLATE 0.2 MG/ML
INJECTION INTRAMUSCULAR; INTRAVENOUS PRN
Status: DISCONTINUED | OUTPATIENT
Start: 2017-09-08 | End: 2017-09-08 | Stop reason: SDUPTHER

## 2017-09-08 RX ORDER — MIDAZOLAM HYDROCHLORIDE 1 MG/ML
INJECTION INTRAMUSCULAR; INTRAVENOUS
Status: COMPLETED
Start: 2017-09-08 | End: 2017-09-08

## 2017-09-08 RX ORDER — MORPHINE SULFATE 4 MG/ML
4 INJECTION, SOLUTION INTRAMUSCULAR; INTRAVENOUS
Status: DISCONTINUED | OUTPATIENT
Start: 2017-09-08 | End: 2017-09-13

## 2017-09-08 RX ORDER — MEPERIDINE HYDROCHLORIDE 50 MG/ML
12.5 INJECTION INTRAMUSCULAR; INTRAVENOUS; SUBCUTANEOUS EVERY 5 MIN PRN
Status: DISCONTINUED | OUTPATIENT
Start: 2017-09-08 | End: 2017-09-08 | Stop reason: HOSPADM

## 2017-09-08 RX ORDER — ACETAMINOPHEN 325 MG/1
650 TABLET ORAL EVERY 4 HOURS PRN
Status: DISCONTINUED | OUTPATIENT
Start: 2017-09-08 | End: 2017-09-14 | Stop reason: HOSPADM

## 2017-09-08 RX ORDER — DIAZEPAM 5 MG/ML
INJECTION, SOLUTION INTRAMUSCULAR; INTRAVENOUS
Status: COMPLETED
Start: 2017-09-08 | End: 2017-09-08

## 2017-09-08 RX ORDER — DIPHENHYDRAMINE HYDROCHLORIDE 50 MG/ML
12.5 INJECTION INTRAMUSCULAR; INTRAVENOUS
Status: DISCONTINUED | OUTPATIENT
Start: 2017-09-08 | End: 2017-09-08 | Stop reason: HOSPADM

## 2017-09-08 RX ORDER — DIAZEPAM 5 MG/ML
5 INJECTION, SOLUTION INTRAMUSCULAR; INTRAVENOUS EVERY 6 HOURS PRN
Status: DISCONTINUED | OUTPATIENT
Start: 2017-09-08 | End: 2017-09-14 | Stop reason: HOSPADM

## 2017-09-08 RX ORDER — ONDANSETRON 2 MG/ML
INJECTION INTRAMUSCULAR; INTRAVENOUS PRN
Status: DISCONTINUED | OUTPATIENT
Start: 2017-09-08 | End: 2017-09-08 | Stop reason: SDUPTHER

## 2017-09-08 RX ORDER — MIDAZOLAM HYDROCHLORIDE 1 MG/ML
2 INJECTION INTRAMUSCULAR; INTRAVENOUS ONCE
Status: COMPLETED | OUTPATIENT
Start: 2017-09-08 | End: 2017-09-08

## 2017-09-08 RX ORDER — HYDROCODONE BITARTRATE AND ACETAMINOPHEN 5; 325 MG/1; MG/1
1 TABLET ORAL PRN
Status: DISCONTINUED | OUTPATIENT
Start: 2017-09-08 | End: 2017-09-08 | Stop reason: HOSPADM

## 2017-09-08 RX ADMIN — ROCURONIUM BROMIDE 40 MG: 10 INJECTION INTRAVENOUS at 18:16

## 2017-09-08 RX ADMIN — GLYCOPYRROLATE 0.6 MG: 0.2 INJECTION, SOLUTION INTRAMUSCULAR; INTRAVENOUS at 20:12

## 2017-09-08 RX ADMIN — HYDROMORPHONE HYDROCHLORIDE 1 MG: 1 INJECTION, SOLUTION INTRAMUSCULAR; INTRAVENOUS; SUBCUTANEOUS at 00:18

## 2017-09-08 RX ADMIN — MIDAZOLAM 2 MG: 1 INJECTION INTRAMUSCULAR; INTRAVENOUS at 15:25

## 2017-09-08 RX ADMIN — ONDANSETRON 4 MG: 2 INJECTION INTRAMUSCULAR; INTRAVENOUS at 18:19

## 2017-09-08 RX ADMIN — IOHEXOL 130 ML: 350 INJECTION, SOLUTION INTRAVENOUS at 10:43

## 2017-09-08 RX ADMIN — ALBUTEROL SULFATE 2.5 MG: 2.5 SOLUTION RESPIRATORY (INHALATION) at 07:59

## 2017-09-08 RX ADMIN — DEXAMETHASONE SODIUM PHOSPHATE 8 MG: 4 INJECTION, SOLUTION INTRAMUSCULAR; INTRAVENOUS at 18:19

## 2017-09-08 RX ADMIN — HYDROMORPHONE HYDROCHLORIDE 1 MG: 1 INJECTION, SOLUTION INTRAMUSCULAR; INTRAVENOUS; SUBCUTANEOUS at 04:30

## 2017-09-08 RX ADMIN — HYDROMORPHONE HYDROCHLORIDE 1 MG: 1 INJECTION, SOLUTION INTRAMUSCULAR; INTRAVENOUS; SUBCUTANEOUS at 11:38

## 2017-09-08 RX ADMIN — Medication 10 ML: at 08:08

## 2017-09-08 RX ADMIN — HYDROMORPHONE HYDROCHLORIDE 1 MG: 1 INJECTION, SOLUTION INTRAMUSCULAR; INTRAVENOUS; SUBCUTANEOUS at 15:02

## 2017-09-08 RX ADMIN — HYDROMORPHONE HYDROCHLORIDE 1 MG: 1 INJECTION, SOLUTION INTRAMUSCULAR; INTRAVENOUS; SUBCUTANEOUS at 17:07

## 2017-09-08 RX ADMIN — MIDAZOLAM HYDROCHLORIDE 2 MG: 1 INJECTION INTRAMUSCULAR; INTRAVENOUS at 15:25

## 2017-09-08 RX ADMIN — NEOSTIGMINE METHYLSULFATE 3 MG: 1 INJECTION, SOLUTION INTRAVENOUS at 20:12

## 2017-09-08 RX ADMIN — HYDROMORPHONE HYDROCHLORIDE 1 MG: 1 INJECTION, SOLUTION INTRAMUSCULAR; INTRAVENOUS; SUBCUTANEOUS at 09:16

## 2017-09-08 RX ADMIN — ONDANSETRON 4 MG: 2 INJECTION INTRAMUSCULAR; INTRAVENOUS at 20:03

## 2017-09-08 RX ADMIN — METOPROLOL TARTRATE 1 MG: 5 INJECTION, SOLUTION INTRAVENOUS at 18:20

## 2017-09-08 RX ADMIN — POTASSIUM CHLORIDE 10 MEQ: 10 INJECTION, SOLUTION INTRAVENOUS at 14:31

## 2017-09-08 RX ADMIN — CEFOXITIN SODIUM 1 G: 2 POWDER, FOR SOLUTION INTRAVENOUS at 12:08

## 2017-09-08 RX ADMIN — KETOROLAC TROMETHAMINE 30 MG: 30 INJECTION, SOLUTION INTRAMUSCULAR at 20:01

## 2017-09-08 RX ADMIN — POTASSIUM CHLORIDE 10 MEQ: 10 INJECTION, SOLUTION INTRAVENOUS at 08:01

## 2017-09-08 RX ADMIN — METRONIDAZOLE 500 MG: 500 INJECTION, SOLUTION INTRAVENOUS at 15:02

## 2017-09-08 RX ADMIN — POTASSIUM CHLORIDE 10 MEQ: 10 INJECTION, SOLUTION INTRAVENOUS at 11:55

## 2017-09-08 RX ADMIN — METRONIDAZOLE 500 MG: 500 INJECTION, SOLUTION INTRAVENOUS at 08:04

## 2017-09-08 RX ADMIN — PANTOPRAZOLE SODIUM 40 MG: 40 INJECTION, POWDER, FOR SOLUTION INTRAVENOUS at 08:06

## 2017-09-08 RX ADMIN — SODIUM CHLORIDE, POTASSIUM CHLORIDE, SODIUM LACTATE AND CALCIUM CHLORIDE: 600; 310; 30; 20 INJECTION, SOLUTION INTRAVENOUS at 19:55

## 2017-09-08 RX ADMIN — PHENYLEPHRINE HYDROCHLORIDE 200 MCG: 10 INJECTION INTRAVENOUS at 19:46

## 2017-09-08 RX ADMIN — Medication 10 ML: at 00:06

## 2017-09-08 RX ADMIN — Medication 10 ML: at 08:07

## 2017-09-08 RX ADMIN — SODIUM CHLORIDE, POTASSIUM CHLORIDE, SODIUM LACTATE AND CALCIUM CHLORIDE: 600; 310; 30; 20 INJECTION, SOLUTION INTRAVENOUS at 18:06

## 2017-09-08 RX ADMIN — PROPOFOL 60 MG: 10 INJECTION, EMULSION INTRAVENOUS at 18:00

## 2017-09-08 RX ADMIN — ROCURONIUM BROMIDE 20 MG: 10 INJECTION INTRAVENOUS at 19:30

## 2017-09-08 RX ADMIN — HYDROMORPHONE HYDROCHLORIDE 1 MG: 1 INJECTION, SOLUTION INTRAMUSCULAR; INTRAVENOUS; SUBCUTANEOUS at 16:02

## 2017-09-08 RX ADMIN — SODIUM CHLORIDE: 9 INJECTION, SOLUTION INTRAVENOUS at 05:42

## 2017-09-08 RX ADMIN — METRONIDAZOLE 500 MG: 500 INJECTION, SOLUTION INTRAVENOUS at 03:49

## 2017-09-08 RX ADMIN — POTASSIUM CHLORIDE 10 MEQ: 10 INJECTION, SOLUTION INTRAVENOUS at 13:16

## 2017-09-08 RX ADMIN — CEFOXITIN SODIUM 1 G: 2 POWDER, FOR SOLUTION INTRAVENOUS at 00:06

## 2017-09-08 RX ADMIN — METOPROLOL TARTRATE 1 MG: 5 INJECTION, SOLUTION INTRAVENOUS at 18:22

## 2017-09-08 RX ADMIN — ENOXAPARIN SODIUM 40 MG: 40 INJECTION SUBCUTANEOUS at 08:11

## 2017-09-08 RX ADMIN — SODIUM CHLORIDE, POTASSIUM CHLORIDE, SODIUM LACTATE AND CALCIUM CHLORIDE: 600; 310; 30; 20 INJECTION, SOLUTION INTRAVENOUS at 19:02

## 2017-09-08 RX ADMIN — CEFOXITIN SODIUM 1 G: 2 POWDER, FOR SOLUTION INTRAVENOUS at 05:42

## 2017-09-08 RX ADMIN — PROPOFOL 150 MG: 10 INJECTION, EMULSION INTRAVENOUS at 18:15

## 2017-09-08 RX ADMIN — HYDROMORPHONE HYDROCHLORIDE 0.25 MG: 1 INJECTION, SOLUTION INTRAMUSCULAR; INTRAVENOUS; SUBCUTANEOUS at 19:10

## 2017-09-08 RX ADMIN — DIAZEPAM 5 MG: 5 INJECTION, SOLUTION INTRAMUSCULAR; INTRAVENOUS at 11:52

## 2017-09-08 RX ADMIN — DIAZEPAM 5 MG: 5 TABLET ORAL at 04:30

## 2017-09-08 RX ADMIN — HYDROMORPHONE HYDROCHLORIDE 1.5 MG: 1 INJECTION, SOLUTION INTRAMUSCULAR; INTRAVENOUS; SUBCUTANEOUS at 18:15

## 2017-09-08 RX ADMIN — SODIUM CHLORIDE, POTASSIUM CHLORIDE, SODIUM LACTATE AND CALCIUM CHLORIDE: 600; 310; 30; 20 INJECTION, SOLUTION INTRAVENOUS at 18:15

## 2017-09-08 RX ADMIN — METOPROLOL TARTRATE 1 MG: 5 INJECTION, SOLUTION INTRAVENOUS at 18:24

## 2017-09-08 RX ADMIN — MORPHINE SULFATE 2 MG: 2 INJECTION, SOLUTION INTRAMUSCULAR; INTRAVENOUS at 08:15

## 2017-09-08 ASSESSMENT — PULMONARY FUNCTION TESTS
PIF_VALUE: 17
PIF_VALUE: 32
PIF_VALUE: 34
PIF_VALUE: 31
PIF_VALUE: 22
PIF_VALUE: 25
PIF_VALUE: 35
PIF_VALUE: 29
PIF_VALUE: 30
PIF_VALUE: 30
PIF_VALUE: 34
PIF_VALUE: 31
PIF_VALUE: 34
PIF_VALUE: 24
PIF_VALUE: 31
PIF_VALUE: 32
PIF_VALUE: 36
PIF_VALUE: 35
PIF_VALUE: 25
PIF_VALUE: 31
PIF_VALUE: 29
PIF_VALUE: 30
PIF_VALUE: 27
PIF_VALUE: 30
PIF_VALUE: 35
PIF_VALUE: 28
PIF_VALUE: 8
PIF_VALUE: 31
PIF_VALUE: 34
PIF_VALUE: 34
PIF_VALUE: 35
PIF_VALUE: 30
PIF_VALUE: 34
PIF_VALUE: 24
PIF_VALUE: 32
PIF_VALUE: 26
PIF_VALUE: 30
PIF_VALUE: 19
PIF_VALUE: 35
PIF_VALUE: 19
PIF_VALUE: 33
PIF_VALUE: 28
PIF_VALUE: 29
PIF_VALUE: 36
PIF_VALUE: 21
PIF_VALUE: 35
PIF_VALUE: 25
PIF_VALUE: 35
PIF_VALUE: 22
PIF_VALUE: 29
PIF_VALUE: 34
PIF_VALUE: 32
PIF_VALUE: 30
PIF_VALUE: 22
PIF_VALUE: 34
PIF_VALUE: 29
PIF_VALUE: 20
PIF_VALUE: 29
PIF_VALUE: 35
PIF_VALUE: 33
PIF_VALUE: 30
PIF_VALUE: 29
PIF_VALUE: 30
PIF_VALUE: 29
PIF_VALUE: 26
PIF_VALUE: 30
PIF_VALUE: 29
PIF_VALUE: 25
PIF_VALUE: 32
PIF_VALUE: 21
PIF_VALUE: 28
PIF_VALUE: 20
PIF_VALUE: 22
PIF_VALUE: 30
PIF_VALUE: 30
PIF_VALUE: 35
PIF_VALUE: 34
PIF_VALUE: 25
PIF_VALUE: 32
PIF_VALUE: 27
PIF_VALUE: 34
PIF_VALUE: 28
PIF_VALUE: 25
PIF_VALUE: 30
PIF_VALUE: 24
PIF_VALUE: 23
PIF_VALUE: 29
PIF_VALUE: 27
PIF_VALUE: 35
PIF_VALUE: 19
PIF_VALUE: 27
PIF_VALUE: 20
PIF_VALUE: 27
PIF_VALUE: 24
PIF_VALUE: 26
PIF_VALUE: 26
PIF_VALUE: 25
PIF_VALUE: 25
PIF_VALUE: 35
PIF_VALUE: 29
PIF_VALUE: 26
PIF_VALUE: 35
PIF_VALUE: 2
PIF_VALUE: 32
PIF_VALUE: 6
PIF_VALUE: 24
PIF_VALUE: 28
PIF_VALUE: 25
PIF_VALUE: 28
PIF_VALUE: 22
PIF_VALUE: 32
PIF_VALUE: 25
PIF_VALUE: 22
PIF_VALUE: 32
PIF_VALUE: 26
PIF_VALUE: 32
PIF_VALUE: 20
PIF_VALUE: 35
PIF_VALUE: 33
PIF_VALUE: 26
PIF_VALUE: 36
PIF_VALUE: 29
PIF_VALUE: 36
PIF_VALUE: 27
PIF_VALUE: 25
PIF_VALUE: 0
PIF_VALUE: 34
PIF_VALUE: 32
PIF_VALUE: 35
PIF_VALUE: 34
PIF_VALUE: 30
PIF_VALUE: 19
PIF_VALUE: 29
PIF_VALUE: 25
PIF_VALUE: 27
PIF_VALUE: 1
PIF_VALUE: 20
PIF_VALUE: 35
PIF_VALUE: 30
PIF_VALUE: 28
PIF_VALUE: 0
PIF_VALUE: 21
PIF_VALUE: 26
PIF_VALUE: 27

## 2017-09-08 ASSESSMENT — PAIN DESCRIPTION - ORIENTATION
ORIENTATION: LOWER

## 2017-09-08 ASSESSMENT — PAIN DESCRIPTION - PAIN TYPE
TYPE: ACUTE PAIN

## 2017-09-08 ASSESSMENT — PAIN SCALES - GENERAL
PAINLEVEL_OUTOF10: 4
PAINLEVEL_OUTOF10: 10
PAINLEVEL_OUTOF10: 7
PAINLEVEL_OUTOF10: 0
PAINLEVEL_OUTOF10: 8
PAINLEVEL_OUTOF10: 0
PAINLEVEL_OUTOF10: 4
PAINLEVEL_OUTOF10: 0
PAINLEVEL_OUTOF10: 4
PAINLEVEL_OUTOF10: 0
PAINLEVEL_OUTOF10: 3
PAINLEVEL_OUTOF10: 8
PAINLEVEL_OUTOF10: 0
PAINLEVEL_OUTOF10: 7
PAINLEVEL_OUTOF10: 4
PAINLEVEL_OUTOF10: 7
PAINLEVEL_OUTOF10: 0
PAINLEVEL_OUTOF10: 0
PAINLEVEL_OUTOF10: 3
PAINLEVEL_OUTOF10: 4
PAINLEVEL_OUTOF10: 0
PAINLEVEL_OUTOF10: 7

## 2017-09-08 ASSESSMENT — PAIN DESCRIPTION - DESCRIPTORS
DESCRIPTORS: OTHER (COMMENT)
DESCRIPTORS: SPASM
DESCRIPTORS: CRAMPING;SPASM

## 2017-09-08 ASSESSMENT — PAIN DESCRIPTION - FREQUENCY
FREQUENCY: CONTINUOUS
FREQUENCY: INTERMITTENT
FREQUENCY: INTERMITTENT

## 2017-09-08 ASSESSMENT — PAIN DESCRIPTION - PROGRESSION: CLINICAL_PROGRESSION: NOT CHANGED

## 2017-09-08 ASSESSMENT — PAIN DESCRIPTION - LOCATION
LOCATION: ABDOMEN

## 2017-09-09 LAB
ABSOLUTE EOS #: 0 K/UL (ref 0–0.4)
ABSOLUTE LYMPH #: 0.6 K/UL (ref 1–4.8)
ABSOLUTE MONO #: 0.6 K/UL (ref 0.1–1.2)
ALBUMIN SERPL-MCNC: 2.9 G/DL (ref 3.5–5.2)
ALBUMIN/GLOBULIN RATIO: 1.2 (ref 1–2.5)
ALP BLD-CCNC: 97 U/L (ref 35–104)
ALT SERPL-CCNC: 32 U/L (ref 5–33)
ANION GAP SERPL CALCULATED.3IONS-SCNC: 13 MMOL/L (ref 9–17)
AST SERPL-CCNC: 11 U/L
BASOPHILS # BLD: 0 %
BASOPHILS ABSOLUTE: 0 K/UL (ref 0–0.2)
BILIRUB SERPL-MCNC: 0.17 MG/DL (ref 0.3–1.2)
BUN BLDV-MCNC: 8 MG/DL (ref 6–20)
BUN/CREAT BLD: ABNORMAL (ref 9–20)
CALCIUM SERPL-MCNC: 8.4 MG/DL (ref 8.6–10.4)
CHLORIDE BLD-SCNC: 100 MMOL/L (ref 98–107)
CO2: 23 MMOL/L (ref 20–31)
CREAT SERPL-MCNC: <0.4 MG/DL (ref 0.5–0.9)
DIFFERENTIAL TYPE: ABNORMAL
EOSINOPHILS RELATIVE PERCENT: 0 %
GFR AFRICAN AMERICAN: ABNORMAL ML/MIN
GFR NON-AFRICAN AMERICAN: ABNORMAL ML/MIN
GFR SERPL CREATININE-BSD FRML MDRD: ABNORMAL ML/MIN/{1.73_M2}
GFR SERPL CREATININE-BSD FRML MDRD: ABNORMAL ML/MIN/{1.73_M2}
GLUCOSE BLD-MCNC: 234 MG/DL (ref 70–99)
HCT VFR BLD CALC: 25.3 % (ref 36–46)
HEMOGLOBIN: 8.1 G/DL (ref 12–16)
LYMPHOCYTES # BLD: 5 %
MCH RBC QN AUTO: 28.2 PG (ref 26–34)
MCHC RBC AUTO-ENTMCNC: 32.2 G/DL (ref 31–37)
MCV RBC AUTO: 87.7 FL (ref 80–100)
MONOCYTES # BLD: 5 %
PDW BLD-RTO: 13.8 % (ref 11–14.5)
PLATELET # BLD: 397 K/UL (ref 140–450)
PLATELET ESTIMATE: ABNORMAL
PMV BLD AUTO: 6 FL (ref 6–12)
POTASSIUM SERPL-SCNC: 4.3 MMOL/L (ref 3.7–5.3)
RBC # BLD: 2.88 M/UL (ref 4–5.2)
RBC # BLD: ABNORMAL 10*6/UL
SEG NEUTROPHILS: 90 %
SEGMENTED NEUTROPHILS ABSOLUTE COUNT: 11.3 K/UL (ref 1.8–7.7)
SODIUM BLD-SCNC: 136 MMOL/L (ref 135–144)
TOTAL PROTEIN: 5.4 G/DL (ref 6.4–8.3)
WBC # BLD: 12.4 K/UL (ref 3.5–11)
WBC # BLD: ABNORMAL 10*3/UL

## 2017-09-09 PROCEDURE — 2580000003 HC RX 258: Performed by: NURSE PRACTITIONER

## 2017-09-09 PROCEDURE — S0028 INJECTION, FAMOTIDINE, 20 MG: HCPCS | Performed by: NURSE PRACTITIONER

## 2017-09-09 PROCEDURE — 80053 COMPREHEN METABOLIC PANEL: CPT

## 2017-09-09 PROCEDURE — 36415 COLL VENOUS BLD VENIPUNCTURE: CPT

## 2017-09-09 PROCEDURE — 94760 N-INVAS EAR/PLS OXIMETRY 1: CPT

## 2017-09-09 PROCEDURE — 2580000003 HC RX 258: Performed by: INTERNAL MEDICINE

## 2017-09-09 PROCEDURE — 2500000003 HC RX 250 WO HCPCS: Performed by: INTERNAL MEDICINE

## 2017-09-09 PROCEDURE — 6360000002 HC RX W HCPCS: Performed by: INTERNAL MEDICINE

## 2017-09-09 PROCEDURE — 6360000002 HC RX W HCPCS

## 2017-09-09 PROCEDURE — 2060000000 HC ICU INTERMEDIATE R&B

## 2017-09-09 PROCEDURE — 99232 SBSQ HOSP IP/OBS MODERATE 35: CPT | Performed by: FAMILY MEDICINE

## 2017-09-09 PROCEDURE — 2500000003 HC RX 250 WO HCPCS: Performed by: SURGERY

## 2017-09-09 PROCEDURE — 85025 COMPLETE CBC W/AUTO DIFF WBC: CPT

## 2017-09-09 PROCEDURE — C9113 INJ PANTOPRAZOLE SODIUM, VIA: HCPCS | Performed by: NURSE PRACTITIONER

## 2017-09-09 PROCEDURE — 2500000003 HC RX 250 WO HCPCS: Performed by: NURSE PRACTITIONER

## 2017-09-09 PROCEDURE — 94761 N-INVAS EAR/PLS OXIMETRY MLT: CPT

## 2017-09-09 PROCEDURE — 94640 AIRWAY INHALATION TREATMENT: CPT

## 2017-09-09 PROCEDURE — 6360000002 HC RX W HCPCS: Performed by: NURSE PRACTITIONER

## 2017-09-09 PROCEDURE — 6360000002 HC RX W HCPCS: Performed by: SURGERY

## 2017-09-09 RX ORDER — BUDESONIDE AND FORMOTEROL FUMARATE DIHYDRATE 80; 4.5 UG/1; UG/1
2 AEROSOL RESPIRATORY (INHALATION) 2 TIMES DAILY
Status: DISCONTINUED | OUTPATIENT
Start: 2017-09-09 | End: 2017-09-14 | Stop reason: HOSPADM

## 2017-09-09 RX ORDER — HEPARIN SODIUM (PORCINE) LOCK FLUSH IV SOLN 100 UNIT/ML 100 UNIT/ML
SOLUTION INTRAVENOUS
Status: COMPLETED
Start: 2017-09-09 | End: 2017-09-09

## 2017-09-09 RX ORDER — PANTOPRAZOLE SODIUM 40 MG/10ML
40 INJECTION, POWDER, LYOPHILIZED, FOR SOLUTION INTRAVENOUS 2 TIMES DAILY
Status: DISCONTINUED | OUTPATIENT
Start: 2017-09-09 | End: 2017-09-12

## 2017-09-09 RX ORDER — 0.9 % SODIUM CHLORIDE 0.9 %
10 VIAL (ML) INJECTION 2 TIMES DAILY
Status: DISCONTINUED | OUTPATIENT
Start: 2017-09-09 | End: 2017-09-12

## 2017-09-09 RX ADMIN — I.V. FAT EMULSION 250 ML: 20 EMULSION INTRAVENOUS at 00:14

## 2017-09-09 RX ADMIN — CEFOXITIN SODIUM 1 G: 2 POWDER, FOR SOLUTION INTRAVENOUS at 13:14

## 2017-09-09 RX ADMIN — METRONIDAZOLE 500 MG: 500 INJECTION, SOLUTION INTRAVENOUS at 09:26

## 2017-09-09 RX ADMIN — CEFOXITIN SODIUM 1 G: 2 POWDER, FOR SOLUTION INTRAVENOUS at 06:22

## 2017-09-09 RX ADMIN — Medication 10 ML: at 09:50

## 2017-09-09 RX ADMIN — METRONIDAZOLE 500 MG: 500 INJECTION, SOLUTION INTRAVENOUS at 15:11

## 2017-09-09 RX ADMIN — DIAZEPAM 5 MG: 5 INJECTION, SOLUTION INTRAMUSCULAR; INTRAVENOUS at 15:48

## 2017-09-09 RX ADMIN — HYDROMORPHONE HYDROCHLORIDE 1 MG: 1 INJECTION, SOLUTION INTRAMUSCULAR; INTRAVENOUS; SUBCUTANEOUS at 22:52

## 2017-09-09 RX ADMIN — PANTOPRAZOLE SODIUM 40 MG: 40 INJECTION, POWDER, FOR SOLUTION INTRAVENOUS at 09:50

## 2017-09-09 RX ADMIN — METRONIDAZOLE 500 MG: 500 INJECTION, SOLUTION INTRAVENOUS at 20:55

## 2017-09-09 RX ADMIN — CEFOXITIN SODIUM 1 G: 2 POWDER, FOR SOLUTION INTRAVENOUS at 18:52

## 2017-09-09 RX ADMIN — FAMOTIDINE 20 MG: 10 INJECTION, SOLUTION INTRAVENOUS at 20:44

## 2017-09-09 RX ADMIN — METRONIDAZOLE 500 MG: 500 INJECTION, SOLUTION INTRAVENOUS at 03:52

## 2017-09-09 RX ADMIN — HYDROMORPHONE HYDROCHLORIDE 0.5 MG: 1 INJECTION, SOLUTION INTRAMUSCULAR; INTRAVENOUS; SUBCUTANEOUS at 13:20

## 2017-09-09 RX ADMIN — PANTOPRAZOLE SODIUM 40 MG: 40 INJECTION, POWDER, FOR SOLUTION INTRAVENOUS at 20:44

## 2017-09-09 RX ADMIN — HYDROMORPHONE HYDROCHLORIDE 0.5 MG: 1 INJECTION, SOLUTION INTRAMUSCULAR; INTRAVENOUS; SUBCUTANEOUS at 19:22

## 2017-09-09 RX ADMIN — ASCORBIC ACID, VITAMIN A PALMITATE, CHOLECALCIFEROL, THIAMINE HYDROCHLORIDE, RIBOFLAVIN-5 PHOSPHATE SODIUM, PYRIDOXINE HYDROCHLORIDE, NIACINAMIDE, DEXPANTHENOL, ALPHA-TOCOPHEROL ACETATE, VITAMIN K1, FOLIC ACID, BIOTIN, CYANOCOBALAMIN: 200; 3300; 200; 6; 3.6; 6; 40; 15; 10; 150; 600; 60; 5 INJECTION, SOLUTION INTRAVENOUS at 00:17

## 2017-09-09 RX ADMIN — Medication 10 ML: at 07:05

## 2017-09-09 RX ADMIN — ASCORBIC ACID, VITAMIN A PALMITATE, CHOLECALCIFEROL, THIAMINE HYDROCHLORIDE, RIBOFLAVIN-5 PHOSPHATE SODIUM, PYRIDOXINE HYDROCHLORIDE, NIACINAMIDE, DEXPANTHENOL, ALPHA-TOCOPHEROL ACETATE, VITAMIN K1, FOLIC ACID, BIOTIN, CYANOCOBALAMIN: 200; 3300; 200; 6; 3.6; 6; 40; 15; 10; 150; 600; 60; 5 INJECTION, SOLUTION INTRAVENOUS at 19:13

## 2017-09-09 RX ADMIN — HEPARIN SODIUM (PORCINE) LOCK FLUSH IV SOLN 100 UNIT/ML 100 UNITS: 100 SOLUTION at 20:54

## 2017-09-09 RX ADMIN — HEPARIN SODIUM (PORCINE) LOCK FLUSH IV SOLN 100 UNIT/ML 100 UNITS: 100 SOLUTION at 10:00

## 2017-09-09 RX ADMIN — HYDROMORPHONE HYDROCHLORIDE 1 MG: 1 INJECTION, SOLUTION INTRAMUSCULAR; INTRAVENOUS; SUBCUTANEOUS at 15:17

## 2017-09-09 RX ADMIN — POTASSIUM CHLORIDE, DEXTROSE MONOHYDRATE AND SODIUM CHLORIDE: 150; 5; 450 INJECTION, SOLUTION INTRAVENOUS at 15:16

## 2017-09-09 RX ADMIN — ENOXAPARIN SODIUM 40 MG: 40 INJECTION SUBCUTANEOUS at 09:26

## 2017-09-09 RX ADMIN — Medication 10 ML: at 20:44

## 2017-09-09 RX ADMIN — HYDROMORPHONE HYDROCHLORIDE 1 MG: 1 INJECTION, SOLUTION INTRAMUSCULAR; INTRAVENOUS; SUBCUTANEOUS at 07:02

## 2017-09-09 RX ADMIN — Medication 10 ML: at 09:26

## 2017-09-09 RX ADMIN — POTASSIUM CHLORIDE, DEXTROSE MONOHYDRATE AND SODIUM CHLORIDE: 150; 5; 450 INJECTION, SOLUTION INTRAVENOUS at 00:16

## 2017-09-09 RX ADMIN — I.V. FAT EMULSION 250 ML: 20 EMULSION INTRAVENOUS at 18:52

## 2017-09-09 RX ADMIN — CEFOXITIN SODIUM 1 G: 2 POWDER, FOR SOLUTION INTRAVENOUS at 00:14

## 2017-09-09 RX ADMIN — ALBUTEROL SULFATE 2.5 MG: 2.5 SOLUTION RESPIRATORY (INHALATION) at 08:25

## 2017-09-09 RX ADMIN — HYDROMORPHONE HYDROCHLORIDE 1 MG: 1 INJECTION, SOLUTION INTRAMUSCULAR; INTRAVENOUS; SUBCUTANEOUS at 10:43

## 2017-09-09 RX ADMIN — BUDESONIDE AND FORMOTEROL FUMARATE DIHYDRATE 2 PUFF: 80; 4.5 AEROSOL RESPIRATORY (INHALATION) at 21:23

## 2017-09-09 RX ADMIN — ALBUTEROL SULFATE 2.5 MG: 2.5 SOLUTION RESPIRATORY (INHALATION) at 21:23

## 2017-09-09 ASSESSMENT — PAIN SCALES - GENERAL
PAINLEVEL_OUTOF10: 7
PAINLEVEL_OUTOF10: 2
PAINLEVEL_OUTOF10: 2
PAINLEVEL_OUTOF10: 7
PAINLEVEL_OUTOF10: 7
PAINLEVEL_OUTOF10: 6
PAINLEVEL_OUTOF10: 0
PAINLEVEL_OUTOF10: 7
PAINLEVEL_OUTOF10: 6
PAINLEVEL_OUTOF10: 3
PAINLEVEL_OUTOF10: 6

## 2017-09-09 ASSESSMENT — PAIN DESCRIPTION - LOCATION
LOCATION: ABDOMEN
LOCATION: ABDOMEN

## 2017-09-09 ASSESSMENT — PAIN DESCRIPTION - DESCRIPTORS
DESCRIPTORS: ACHING;SORE;CRAMPING
DESCRIPTORS: ACHING;SORE;TENDER

## 2017-09-09 ASSESSMENT — PAIN DESCRIPTION - FREQUENCY: FREQUENCY: INTERMITTENT

## 2017-09-09 ASSESSMENT — PAIN DESCRIPTION - PAIN TYPE
TYPE: SURGICAL PAIN
TYPE: SURGICAL PAIN

## 2017-09-09 ASSESSMENT — PAIN DESCRIPTION - ONSET: ONSET: GRADUAL

## 2017-09-10 LAB
ABSOLUTE EOS #: 0.3 K/UL (ref 0–0.4)
ABSOLUTE LYMPH #: 1.4 K/UL (ref 1–4.8)
ABSOLUTE MONO #: 0.8 K/UL (ref 0.1–1.2)
ANION GAP SERPL CALCULATED.3IONS-SCNC: 11 MMOL/L (ref 9–17)
BASOPHILS # BLD: 1 %
BASOPHILS ABSOLUTE: 0.1 K/UL (ref 0–0.2)
BUN BLDV-MCNC: 9 MG/DL (ref 6–20)
BUN/CREAT BLD: ABNORMAL (ref 9–20)
CALCIUM SERPL-MCNC: 8.2 MG/DL (ref 8.6–10.4)
CHLORIDE BLD-SCNC: 104 MMOL/L (ref 98–107)
CO2: 28 MMOL/L (ref 20–31)
CREAT SERPL-MCNC: <0.4 MG/DL (ref 0.5–0.9)
DIFFERENTIAL TYPE: ABNORMAL
EOSINOPHILS RELATIVE PERCENT: 3 %
GFR AFRICAN AMERICAN: ABNORMAL ML/MIN
GFR NON-AFRICAN AMERICAN: ABNORMAL ML/MIN
GFR SERPL CREATININE-BSD FRML MDRD: ABNORMAL ML/MIN/{1.73_M2}
GFR SERPL CREATININE-BSD FRML MDRD: ABNORMAL ML/MIN/{1.73_M2}
GLUCOSE BLD-MCNC: 156 MG/DL (ref 65–105)
GLUCOSE BLD-MCNC: 176 MG/DL (ref 70–99)
HCT VFR BLD CALC: 24.3 % (ref 36–46)
HEMOGLOBIN: 8 G/DL (ref 12–16)
LYMPHOCYTES # BLD: 14 %
MCH RBC QN AUTO: 28.8 PG (ref 26–34)
MCHC RBC AUTO-ENTMCNC: 32.8 G/DL (ref 31–37)
MCV RBC AUTO: 87.8 FL (ref 80–100)
MONOCYTES # BLD: 8 %
PDW BLD-RTO: 13.6 % (ref 11–14.5)
PLATELET # BLD: 457 K/UL (ref 140–450)
PLATELET ESTIMATE: ABNORMAL
PMV BLD AUTO: 6.2 FL (ref 6–12)
POTASSIUM SERPL-SCNC: 3.9 MMOL/L (ref 3.7–5.3)
RBC # BLD: 2.77 M/UL (ref 4–5.2)
RBC # BLD: ABNORMAL 10*6/UL
SEG NEUTROPHILS: 74 %
SEGMENTED NEUTROPHILS ABSOLUTE COUNT: 7.8 K/UL (ref 1.8–7.7)
SODIUM BLD-SCNC: 143 MMOL/L (ref 135–144)
WBC # BLD: 10.5 K/UL (ref 3.5–11)
WBC # BLD: ABNORMAL 10*3/UL

## 2017-09-10 PROCEDURE — 82947 ASSAY GLUCOSE BLOOD QUANT: CPT

## 2017-09-10 PROCEDURE — G8979 MOBILITY GOAL STATUS: HCPCS | Performed by: PHYSICAL THERAPIST

## 2017-09-10 PROCEDURE — 36591 DRAW BLOOD OFF VENOUS DEVICE: CPT

## 2017-09-10 PROCEDURE — 97116 GAIT TRAINING THERAPY: CPT | Performed by: PHYSICAL THERAPIST

## 2017-09-10 PROCEDURE — 6360000002 HC RX W HCPCS: Performed by: SURGERY

## 2017-09-10 PROCEDURE — 6370000000 HC RX 637 (ALT 250 FOR IP): Performed by: SURGERY

## 2017-09-10 PROCEDURE — S0028 INJECTION, FAMOTIDINE, 20 MG: HCPCS | Performed by: NURSE PRACTITIONER

## 2017-09-10 PROCEDURE — 2580000003 HC RX 258: Performed by: NURSE PRACTITIONER

## 2017-09-10 PROCEDURE — 94640 AIRWAY INHALATION TREATMENT: CPT

## 2017-09-10 PROCEDURE — 6360000002 HC RX W HCPCS: Performed by: INTERNAL MEDICINE

## 2017-09-10 PROCEDURE — 6360000002 HC RX W HCPCS

## 2017-09-10 PROCEDURE — C9113 INJ PANTOPRAZOLE SODIUM, VIA: HCPCS | Performed by: NURSE PRACTITIONER

## 2017-09-10 PROCEDURE — 2500000003 HC RX 250 WO HCPCS: Performed by: INTERNAL MEDICINE

## 2017-09-10 PROCEDURE — 94760 N-INVAS EAR/PLS OXIMETRY 1: CPT

## 2017-09-10 PROCEDURE — 6360000002 HC RX W HCPCS: Performed by: NURSE PRACTITIONER

## 2017-09-10 PROCEDURE — 2060000000 HC ICU INTERMEDIATE R&B

## 2017-09-10 PROCEDURE — 80048 BASIC METABOLIC PNL TOTAL CA: CPT

## 2017-09-10 PROCEDURE — 97162 PT EVAL MOD COMPLEX 30 MIN: CPT | Performed by: PHYSICAL THERAPIST

## 2017-09-10 PROCEDURE — 2500000003 HC RX 250 WO HCPCS: Performed by: FAMILY MEDICINE

## 2017-09-10 PROCEDURE — G8978 MOBILITY CURRENT STATUS: HCPCS | Performed by: PHYSICAL THERAPIST

## 2017-09-10 PROCEDURE — 2580000003 HC RX 258: Performed by: INTERNAL MEDICINE

## 2017-09-10 PROCEDURE — 2500000003 HC RX 250 WO HCPCS: Performed by: NURSE PRACTITIONER

## 2017-09-10 PROCEDURE — 99232 SBSQ HOSP IP/OBS MODERATE 35: CPT | Performed by: FAMILY MEDICINE

## 2017-09-10 PROCEDURE — 36415 COLL VENOUS BLD VENIPUNCTURE: CPT

## 2017-09-10 PROCEDURE — 85025 COMPLETE CBC W/AUTO DIFF WBC: CPT

## 2017-09-10 RX ORDER — NYSTATIN 100000 U/G
CREAM TOPICAL 2 TIMES DAILY
Status: DISCONTINUED | OUTPATIENT
Start: 2017-09-10 | End: 2017-09-14 | Stop reason: HOSPADM

## 2017-09-10 RX ORDER — HEPARIN SODIUM (PORCINE) LOCK FLUSH IV SOLN 100 UNIT/ML 100 UNIT/ML
SOLUTION INTRAVENOUS
Status: COMPLETED
Start: 2017-09-10 | End: 2017-09-10

## 2017-09-10 RX ORDER — HEPARIN SODIUM (PORCINE) LOCK FLUSH IV SOLN 100 UNIT/ML 100 UNIT/ML
SOLUTION INTRAVENOUS
Status: DISPENSED
Start: 2017-09-10 | End: 2017-09-11

## 2017-09-10 RX ADMIN — PANTOPRAZOLE SODIUM 40 MG: 40 INJECTION, POWDER, FOR SOLUTION INTRAVENOUS at 09:22

## 2017-09-10 RX ADMIN — MAGNESIUM HYDROXIDE 30 ML: 400 SUSPENSION ORAL at 12:19

## 2017-09-10 RX ADMIN — METRONIDAZOLE 500 MG: 500 INJECTION, SOLUTION INTRAVENOUS at 02:43

## 2017-09-10 RX ADMIN — ALBUTEROL SULFATE 2.5 MG: 2.5 SOLUTION RESPIRATORY (INHALATION) at 21:08

## 2017-09-10 RX ADMIN — PANTOPRAZOLE SODIUM 40 MG: 40 INJECTION, POWDER, FOR SOLUTION INTRAVENOUS at 21:24

## 2017-09-10 RX ADMIN — METRONIDAZOLE 500 MG: 500 INJECTION, SOLUTION INTRAVENOUS at 09:22

## 2017-09-10 RX ADMIN — MAGNESIUM HYDROXIDE 30 ML: 400 SUSPENSION ORAL at 21:22

## 2017-09-10 RX ADMIN — OXYBUTYNIN CHLORIDE 15 MG: 5 TABLET, EXTENDED RELEASE ORAL at 21:23

## 2017-09-10 RX ADMIN — HEPARIN SODIUM (PORCINE) LOCK FLUSH IV SOLN 100 UNIT/ML 100 UNITS: 100 SOLUTION at 21:26

## 2017-09-10 RX ADMIN — BUDESONIDE AND FORMOTEROL FUMARATE DIHYDRATE 2 PUFF: 80; 4.5 AEROSOL RESPIRATORY (INHALATION) at 09:35

## 2017-09-10 RX ADMIN — HYDROMORPHONE HYDROCHLORIDE 1 MG: 1 INJECTION, SOLUTION INTRAMUSCULAR; INTRAVENOUS; SUBCUTANEOUS at 02:43

## 2017-09-10 RX ADMIN — HYDROMORPHONE HYDROCHLORIDE 1 MG: 1 INJECTION, SOLUTION INTRAMUSCULAR; INTRAVENOUS; SUBCUTANEOUS at 15:19

## 2017-09-10 RX ADMIN — HYDROMORPHONE HYDROCHLORIDE 1 MG: 1 INJECTION, SOLUTION INTRAMUSCULAR; INTRAVENOUS; SUBCUTANEOUS at 06:37

## 2017-09-10 RX ADMIN — HYDROMORPHONE HYDROCHLORIDE 0.5 MG: 1 INJECTION, SOLUTION INTRAMUSCULAR; INTRAVENOUS; SUBCUTANEOUS at 18:14

## 2017-09-10 RX ADMIN — ENOXAPARIN SODIUM 40 MG: 40 INJECTION SUBCUTANEOUS at 09:24

## 2017-09-10 RX ADMIN — CEFOXITIN SODIUM 1 G: 2 POWDER, FOR SOLUTION INTRAVENOUS at 18:11

## 2017-09-10 RX ADMIN — HYDROMORPHONE HYDROCHLORIDE 0.5 MG: 1 INJECTION, SOLUTION INTRAMUSCULAR; INTRAVENOUS; SUBCUTANEOUS at 12:20

## 2017-09-10 RX ADMIN — Medication 10 ML: at 09:24

## 2017-09-10 RX ADMIN — FAMOTIDINE 20 MG: 10 INJECTION, SOLUTION INTRAVENOUS at 09:23

## 2017-09-10 RX ADMIN — Medication 10 ML: at 21:25

## 2017-09-10 RX ADMIN — Medication 10 ML: at 21:26

## 2017-09-10 RX ADMIN — ASCORBIC ACID, VITAMIN A PALMITATE, CHOLECALCIFEROL, THIAMINE HYDROCHLORIDE, RIBOFLAVIN-5 PHOSPHATE SODIUM, PYRIDOXINE HYDROCHLORIDE, NIACINAMIDE, DEXPANTHENOL, ALPHA-TOCOPHEROL ACETATE, VITAMIN K1, FOLIC ACID, BIOTIN, CYANOCOBALAMIN: 200; 3300; 200; 6; 3.6; 6; 40; 15; 10; 150; 600; 60; 5 INJECTION, SOLUTION INTRAVENOUS at 19:15

## 2017-09-10 RX ADMIN — ALBUTEROL SULFATE 2.5 MG: 2.5 SOLUTION RESPIRATORY (INHALATION) at 09:34

## 2017-09-10 RX ADMIN — CEFOXITIN SODIUM 1 G: 2 POWDER, FOR SOLUTION INTRAVENOUS at 12:20

## 2017-09-10 RX ADMIN — METRONIDAZOLE 500 MG: 500 INJECTION, SOLUTION INTRAVENOUS at 22:02

## 2017-09-10 RX ADMIN — CEFOXITIN SODIUM 1 G: 2 POWDER, FOR SOLUTION INTRAVENOUS at 00:29

## 2017-09-10 RX ADMIN — ALBUTEROL SULFATE 2.5 MG: 2.5 SOLUTION RESPIRATORY (INHALATION) at 16:09

## 2017-09-10 RX ADMIN — NYSTATIN: 100000 CREAM TOPICAL at 21:27

## 2017-09-10 RX ADMIN — HEPARIN SODIUM (PORCINE) LOCK FLUSH IV SOLN 100 UNIT/ML 100 UNITS: 100 SOLUTION at 09:23

## 2017-09-10 RX ADMIN — HYDROMORPHONE HYDROCHLORIDE 0.5 MG: 1 INJECTION, SOLUTION INTRAMUSCULAR; INTRAVENOUS; SUBCUTANEOUS at 08:19

## 2017-09-10 RX ADMIN — METRONIDAZOLE 500 MG: 500 INJECTION, SOLUTION INTRAVENOUS at 16:01

## 2017-09-10 RX ADMIN — DIAZEPAM 5 MG: 5 INJECTION, SOLUTION INTRAMUSCULAR; INTRAVENOUS at 02:53

## 2017-09-10 RX ADMIN — BUDESONIDE AND FORMOTEROL FUMARATE DIHYDRATE 2 PUFF: 80; 4.5 AEROSOL RESPIRATORY (INHALATION) at 21:09

## 2017-09-10 RX ADMIN — CEFOXITIN SODIUM 1 G: 2 POWDER, FOR SOLUTION INTRAVENOUS at 06:13

## 2017-09-10 RX ADMIN — NYSTATIN: 100000 CREAM TOPICAL at 12:20

## 2017-09-10 RX ADMIN — HYDROMORPHONE HYDROCHLORIDE 1 MG: 1 INJECTION, SOLUTION INTRAMUSCULAR; INTRAVENOUS; SUBCUTANEOUS at 22:00

## 2017-09-10 ASSESSMENT — PAIN SCALES - GENERAL
PAINLEVEL_OUTOF10: 2
PAINLEVEL_OUTOF10: 4
PAINLEVEL_OUTOF10: 0
PAINLEVEL_OUTOF10: 7
PAINLEVEL_OUTOF10: 0
PAINLEVEL_OUTOF10: 7
PAINLEVEL_OUTOF10: 0
PAINLEVEL_OUTOF10: 8
PAINLEVEL_OUTOF10: 0
PAINLEVEL_OUTOF10: 6
PAINLEVEL_OUTOF10: 7
PAINLEVEL_OUTOF10: 7
PAINLEVEL_OUTOF10: 6

## 2017-09-10 ASSESSMENT — PAIN DESCRIPTION - PAIN TYPE
TYPE: SURGICAL PAIN
TYPE: SURGICAL PAIN

## 2017-09-10 ASSESSMENT — PAIN DESCRIPTION - DESCRIPTORS
DESCRIPTORS: ACHING;CRAMPING;SORE
DESCRIPTORS: ACHING;SORE;CRAMPING

## 2017-09-10 ASSESSMENT — PAIN DESCRIPTION - ONSET: ONSET: SUDDEN

## 2017-09-10 ASSESSMENT — PAIN DESCRIPTION - PROGRESSION: CLINICAL_PROGRESSION: NOT CHANGED

## 2017-09-10 ASSESSMENT — PAIN DESCRIPTION - LOCATION
LOCATION: ABDOMEN
LOCATION: ABDOMEN

## 2017-09-10 ASSESSMENT — PAIN DESCRIPTION - ORIENTATION: ORIENTATION: LOWER

## 2017-09-10 ASSESSMENT — PAIN DESCRIPTION - FREQUENCY: FREQUENCY: CONTINUOUS

## 2017-09-11 LAB
ABSOLUTE BANDS #: 0.45 K/UL
ABSOLUTE EOS #: 0.27 K/UL (ref 0–0.4)
ABSOLUTE LYMPH #: 1.26 K/UL (ref 1–4.8)
ABSOLUTE MONO #: 0.72 K/UL (ref 0.1–1.2)
ALBUMIN SERPL-MCNC: 2.9 G/DL (ref 3.5–5.2)
ALBUMIN/GLOBULIN RATIO: 1.3 (ref 1–2.5)
ALP BLD-CCNC: 83 U/L (ref 35–104)
ALT SERPL-CCNC: 23 U/L (ref 5–33)
ANION GAP SERPL CALCULATED.3IONS-SCNC: 11 MMOL/L (ref 9–17)
AST SERPL-CCNC: 15 U/L
BANDS: 5 %
BASOPHILS # BLD: 0 % (ref 0–1)
BASOPHILS ABSOLUTE: 0 K/UL (ref 0–0.2)
BILIRUB SERPL-MCNC: 0.11 MG/DL (ref 0.3–1.2)
BILIRUBIN DIRECT: <0.08 MG/DL
BILIRUBIN, INDIRECT: ABNORMAL MG/DL (ref 0–1)
BUN BLDV-MCNC: 8 MG/DL (ref 6–20)
CALCIUM SERPL-MCNC: 8.2 MG/DL (ref 8.6–10.4)
CHLORIDE BLD-SCNC: 102 MMOL/L (ref 98–107)
CO2: 28 MMOL/L (ref 20–31)
CREAT SERPL-MCNC: <0.4 MG/DL (ref 0.5–0.9)
DIFFERENTIAL TYPE: ABNORMAL
EOSINOPHILS RELATIVE PERCENT: 3 % (ref 1–7)
GFR AFRICAN AMERICAN: ABNORMAL ML/MIN
GFR NON-AFRICAN AMERICAN: ABNORMAL ML/MIN
GFR SERPL CREATININE-BSD FRML MDRD: ABNORMAL ML/MIN/{1.73_M2}
GFR SERPL CREATININE-BSD FRML MDRD: ABNORMAL ML/MIN/{1.73_M2}
GLUCOSE BLD-MCNC: 141 MG/DL (ref 65–105)
GLUCOSE BLD-MCNC: 166 MG/DL (ref 70–99)
GLUCOSE BLD-MCNC: 185 MG/DL (ref 65–105)
HCT VFR BLD CALC: 25.6 % (ref 36–46)
HEMOGLOBIN: 8.3 G/DL (ref 12–16)
LYMPHOCYTES # BLD: 14 % (ref 16–46)
MCH RBC QN AUTO: 28.5 PG (ref 26–34)
MCHC RBC AUTO-ENTMCNC: 32.3 G/DL (ref 31–37)
MCV RBC AUTO: 88.2 FL (ref 80–100)
MONOCYTES # BLD: 8 % (ref 4–11)
MORPHOLOGY: ABNORMAL
PDW BLD-RTO: 14 % (ref 11–14.5)
PLATELET # BLD: 443 K/UL (ref 140–450)
PLATELET ESTIMATE: ABNORMAL
PMV BLD AUTO: 5.9 FL (ref 6–12)
POTASSIUM SERPL-SCNC: 4.2 MMOL/L (ref 3.7–5.3)
RBC # BLD: 2.9 M/UL (ref 4–5.2)
RBC # BLD: ABNORMAL 10*6/UL
SEG NEUTROPHILS: 70 % (ref 43–77)
SEGMENTED NEUTROPHILS ABSOLUTE COUNT: 6.3 K/UL (ref 1.8–7.7)
SODIUM BLD-SCNC: 141 MMOL/L (ref 135–144)
TOTAL PROTEIN: 5.2 G/DL (ref 6.4–8.3)
WBC # BLD: 9 K/UL (ref 3.5–11)
WBC # BLD: ABNORMAL 10*3/UL

## 2017-09-11 PROCEDURE — 2060000000 HC ICU INTERMEDIATE R&B

## 2017-09-11 PROCEDURE — 97110 THERAPEUTIC EXERCISES: CPT

## 2017-09-11 PROCEDURE — 82947 ASSAY GLUCOSE BLOOD QUANT: CPT

## 2017-09-11 PROCEDURE — 6370000000 HC RX 637 (ALT 250 FOR IP): Performed by: SURGERY

## 2017-09-11 PROCEDURE — 2580000003 HC RX 258: Performed by: NURSE PRACTITIONER

## 2017-09-11 PROCEDURE — C9113 INJ PANTOPRAZOLE SODIUM, VIA: HCPCS | Performed by: NURSE PRACTITIONER

## 2017-09-11 PROCEDURE — 2500000003 HC RX 250 WO HCPCS: Performed by: INTERNAL MEDICINE

## 2017-09-11 PROCEDURE — 2500000003 HC RX 250 WO HCPCS: Performed by: NURSE PRACTITIONER

## 2017-09-11 PROCEDURE — 82248 BILIRUBIN DIRECT: CPT

## 2017-09-11 PROCEDURE — 2500000003 HC RX 250 WO HCPCS: Performed by: SURGERY

## 2017-09-11 PROCEDURE — 94760 N-INVAS EAR/PLS OXIMETRY 1: CPT

## 2017-09-11 PROCEDURE — 99232 SBSQ HOSP IP/OBS MODERATE 35: CPT | Performed by: INTERNAL MEDICINE

## 2017-09-11 PROCEDURE — 6360000002 HC RX W HCPCS: Performed by: NURSE PRACTITIONER

## 2017-09-11 PROCEDURE — 80053 COMPREHEN METABOLIC PANEL: CPT

## 2017-09-11 PROCEDURE — 94640 AIRWAY INHALATION TREATMENT: CPT

## 2017-09-11 PROCEDURE — S0028 INJECTION, FAMOTIDINE, 20 MG: HCPCS | Performed by: NURSE PRACTITIONER

## 2017-09-11 PROCEDURE — 6360000002 HC RX W HCPCS: Performed by: INTERNAL MEDICINE

## 2017-09-11 PROCEDURE — 6360000002 HC RX W HCPCS: Performed by: SURGERY

## 2017-09-11 PROCEDURE — 2580000003 HC RX 258: Performed by: SURGERY

## 2017-09-11 PROCEDURE — 36591 DRAW BLOOD OFF VENOUS DEVICE: CPT

## 2017-09-11 PROCEDURE — 2580000003 HC RX 258: Performed by: INTERNAL MEDICINE

## 2017-09-11 PROCEDURE — 6360000002 HC RX W HCPCS

## 2017-09-11 PROCEDURE — 85025 COMPLETE CBC W/AUTO DIFF WBC: CPT

## 2017-09-11 PROCEDURE — 36415 COLL VENOUS BLD VENIPUNCTURE: CPT

## 2017-09-11 RX ORDER — PROMETHAZINE HYDROCHLORIDE 25 MG/ML
INJECTION, SOLUTION INTRAMUSCULAR; INTRAVENOUS
Status: COMPLETED
Start: 2017-09-11 | End: 2017-09-11

## 2017-09-11 RX ORDER — HEPARIN SODIUM (PORCINE) LOCK FLUSH IV SOLN 100 UNIT/ML 100 UNIT/ML
SOLUTION INTRAVENOUS
Status: DISPENSED
Start: 2017-09-11 | End: 2017-09-11

## 2017-09-11 RX ORDER — HEPARIN SODIUM (PORCINE) LOCK FLUSH IV SOLN 100 UNIT/ML 100 UNIT/ML
SOLUTION INTRAVENOUS
Status: COMPLETED
Start: 2017-09-11 | End: 2017-09-11

## 2017-09-11 RX ORDER — HEPARIN SODIUM (PORCINE) LOCK FLUSH IV SOLN 100 UNIT/ML 100 UNIT/ML
SOLUTION INTRAVENOUS
Status: DISCONTINUED
Start: 2017-09-11 | End: 2017-09-11 | Stop reason: WASHOUT

## 2017-09-11 RX ADMIN — MORPHINE SULFATE 2 MG: 2 INJECTION, SOLUTION INTRAMUSCULAR; INTRAVENOUS at 19:55

## 2017-09-11 RX ADMIN — I.V. FAT EMULSION 250 ML: 20 EMULSION INTRAVENOUS at 18:38

## 2017-09-11 RX ADMIN — MORPHINE SULFATE 2 MG: 2 INJECTION, SOLUTION INTRAMUSCULAR; INTRAVENOUS at 12:30

## 2017-09-11 RX ADMIN — PANTOPRAZOLE SODIUM 40 MG: 40 INJECTION, POWDER, FOR SOLUTION INTRAVENOUS at 22:09

## 2017-09-11 RX ADMIN — BUDESONIDE AND FORMOTEROL FUMARATE DIHYDRATE 2 PUFF: 80; 4.5 AEROSOL RESPIRATORY (INHALATION) at 07:49

## 2017-09-11 RX ADMIN — BUDESONIDE AND FORMOTEROL FUMARATE DIHYDRATE 2 PUFF: 80; 4.5 AEROSOL RESPIRATORY (INHALATION) at 20:59

## 2017-09-11 RX ADMIN — DIAZEPAM 5 MG: 5 INJECTION, SOLUTION INTRAMUSCULAR; INTRAVENOUS at 08:47

## 2017-09-11 RX ADMIN — METRONIDAZOLE 500 MG: 500 INJECTION, SOLUTION INTRAVENOUS at 15:08

## 2017-09-11 RX ADMIN — HEPARIN SODIUM (PORCINE) LOCK FLUSH IV SOLN 100 UNIT/ML 100 UNITS: 100 SOLUTION at 10:50

## 2017-09-11 RX ADMIN — CEFOXITIN SODIUM 1 G: 2 POWDER, FOR SOLUTION INTRAVENOUS at 05:47

## 2017-09-11 RX ADMIN — ALBUTEROL SULFATE 2.5 MG: 2.5 SOLUTION RESPIRATORY (INHALATION) at 20:58

## 2017-09-11 RX ADMIN — DEXTROSE MONOHYDRATE 300 MG: 50 INJECTION, SOLUTION INTRAVENOUS at 17:30

## 2017-09-11 RX ADMIN — POTASSIUM CHLORIDE, DEXTROSE MONOHYDRATE AND SODIUM CHLORIDE: 150; 5; 450 INJECTION, SOLUTION INTRAVENOUS at 10:54

## 2017-09-11 RX ADMIN — Medication 10 ML: at 22:07

## 2017-09-11 RX ADMIN — ENOXAPARIN SODIUM 40 MG: 40 INJECTION SUBCUTANEOUS at 08:41

## 2017-09-11 RX ADMIN — CEFOXITIN SODIUM 1 G: 2 POWDER, FOR SOLUTION INTRAVENOUS at 18:36

## 2017-09-11 RX ADMIN — HEPARIN SODIUM (PORCINE) LOCK FLUSH IV SOLN 100 UNIT/ML 100 UNITS: 100 SOLUTION at 22:06

## 2017-09-11 RX ADMIN — Medication 10 ML: at 22:12

## 2017-09-11 RX ADMIN — HYDROMORPHONE HYDROCHLORIDE 1 MG: 1 INJECTION, SOLUTION INTRAMUSCULAR; INTRAVENOUS; SUBCUTANEOUS at 22:04

## 2017-09-11 RX ADMIN — MAGNESIUM HYDROXIDE 30 ML: 400 SUSPENSION ORAL at 09:02

## 2017-09-11 RX ADMIN — PANTOPRAZOLE SODIUM 40 MG: 40 INJECTION, POWDER, FOR SOLUTION INTRAVENOUS at 10:17

## 2017-09-11 RX ADMIN — ALBUTEROL SULFATE 2.5 MG: 2.5 SOLUTION RESPIRATORY (INHALATION) at 16:46

## 2017-09-11 RX ADMIN — MAGNESIUM HYDROXIDE 30 ML: 400 SUSPENSION ORAL at 14:32

## 2017-09-11 RX ADMIN — DEXTROSE MONOHYDRATE 300 MG: 50 INJECTION, SOLUTION INTRAVENOUS at 22:46

## 2017-09-11 RX ADMIN — Medication 10 ML: at 03:24

## 2017-09-11 RX ADMIN — HYDROMORPHONE HYDROCHLORIDE 1 MG: 1 INJECTION, SOLUTION INTRAMUSCULAR; INTRAVENOUS; SUBCUTANEOUS at 03:23

## 2017-09-11 RX ADMIN — MORPHINE SULFATE 2 MG: 2 INJECTION, SOLUTION INTRAMUSCULAR; INTRAVENOUS at 17:41

## 2017-09-11 RX ADMIN — FAMOTIDINE 20 MG: 10 INJECTION, SOLUTION INTRAVENOUS at 08:32

## 2017-09-11 RX ADMIN — ASCORBIC ACID, VITAMIN A PALMITATE, CHOLECALCIFEROL, THIAMINE HYDROCHLORIDE, RIBOFLAVIN-5 PHOSPHATE SODIUM, PYRIDOXINE HYDROCHLORIDE, NIACINAMIDE, DEXPANTHENOL, ALPHA-TOCOPHEROL ACETATE, VITAMIN K1, FOLIC ACID, BIOTIN, CYANOCOBALAMIN: 200; 3300; 200; 6; 3.6; 6; 40; 15; 10; 150; 600; 60; 5 INJECTION, SOLUTION INTRAVENOUS at 18:39

## 2017-09-11 RX ADMIN — Medication 10 ML: at 08:32

## 2017-09-11 RX ADMIN — Medication 10 ML: at 10:18

## 2017-09-11 RX ADMIN — PROMETHAZINE HYDROCHLORIDE 25 MG: 25 INJECTION INTRAMUSCULAR; INTRAVENOUS at 22:13

## 2017-09-11 RX ADMIN — CEFOXITIN SODIUM 1 G: 2 POWDER, FOR SOLUTION INTRAVENOUS at 11:59

## 2017-09-11 RX ADMIN — METRONIDAZOLE 500 MG: 500 INJECTION, SOLUTION INTRAVENOUS at 08:35

## 2017-09-11 RX ADMIN — ACETAMINOPHEN 1000 MG: 10 INJECTION, SOLUTION INTRAVENOUS at 10:23

## 2017-09-11 RX ADMIN — NYSTATIN: 100000 CREAM TOPICAL at 08:51

## 2017-09-11 RX ADMIN — CEFOXITIN SODIUM 1 G: 2 POWDER, FOR SOLUTION INTRAVENOUS at 00:31

## 2017-09-11 RX ADMIN — ALBUTEROL SULFATE 2.5 MG: 2.5 SOLUTION RESPIRATORY (INHALATION) at 07:47

## 2017-09-11 RX ADMIN — METRONIDAZOLE 500 MG: 500 INJECTION, SOLUTION INTRAVENOUS at 03:04

## 2017-09-11 RX ADMIN — HYDROMORPHONE HYDROCHLORIDE 1 MG: 1 INJECTION, SOLUTION INTRAMUSCULAR; INTRAVENOUS; SUBCUTANEOUS at 00:30

## 2017-09-11 RX ADMIN — HYDROMORPHONE HYDROCHLORIDE 1 MG: 1 INJECTION, SOLUTION INTRAMUSCULAR; INTRAVENOUS; SUBCUTANEOUS at 08:29

## 2017-09-11 ASSESSMENT — PAIN SCALES - GENERAL
PAINLEVEL_OUTOF10: 0
PAINLEVEL_OUTOF10: 0
PAINLEVEL_OUTOF10: 5
PAINLEVEL_OUTOF10: 5
PAINLEVEL_OUTOF10: 1
PAINLEVEL_OUTOF10: 5
PAINLEVEL_OUTOF10: 0
PAINLEVEL_OUTOF10: 5
PAINLEVEL_OUTOF10: 7
PAINLEVEL_OUTOF10: 7
PAINLEVEL_OUTOF10: 2
PAINLEVEL_OUTOF10: 10
PAINLEVEL_OUTOF10: 3
PAINLEVEL_OUTOF10: 8
PAINLEVEL_OUTOF10: 0
PAINLEVEL_OUTOF10: 5
PAINLEVEL_OUTOF10: 7

## 2017-09-11 ASSESSMENT — PAIN DESCRIPTION - PAIN TYPE
TYPE: SURGICAL PAIN
TYPE: SURGICAL PAIN
TYPE: ACUTE PAIN
TYPE: SURGICAL PAIN

## 2017-09-11 ASSESSMENT — PAIN DESCRIPTION - DESCRIPTORS
DESCRIPTORS: ACHING;CONSTANT;CRAMPING
DESCRIPTORS: SORE
DESCRIPTORS: THROBBING

## 2017-09-11 ASSESSMENT — PAIN DESCRIPTION - LOCATION
LOCATION: ABDOMEN
LOCATION: ABDOMEN
LOCATION: HEAD
LOCATION: ABDOMEN

## 2017-09-11 ASSESSMENT — PAIN DESCRIPTION - FREQUENCY: FREQUENCY: CONTINUOUS

## 2017-09-11 ASSESSMENT — PAIN DESCRIPTION - ORIENTATION
ORIENTATION: MID
ORIENTATION: RIGHT;LEFT;LOWER;MID;UPPER
ORIENTATION: RIGHT;LEFT;LOWER;MID;UPPER
ORIENTATION: MID

## 2017-09-11 ASSESSMENT — PAIN DESCRIPTION - PROGRESSION: CLINICAL_PROGRESSION: NOT CHANGED

## 2017-09-12 LAB
ABSOLUTE EOS #: 0.5 K/UL (ref 0–0.4)
ABSOLUTE LYMPH #: 1.5 K/UL (ref 1–4.8)
ABSOLUTE MONO #: 0.7 K/UL (ref 0.1–1.2)
ALBUMIN SERPL-MCNC: 3.1 G/DL (ref 3.5–5.2)
ALBUMIN/GLOBULIN RATIO: 1.2 (ref 1–2.5)
ALP BLD-CCNC: 86 U/L (ref 35–104)
ALT SERPL-CCNC: 25 U/L (ref 5–33)
ANION GAP SERPL CALCULATED.3IONS-SCNC: 11 MMOL/L (ref 9–17)
AST SERPL-CCNC: 19 U/L
BASOPHILS # BLD: 1 % (ref 0–1)
BASOPHILS ABSOLUTE: 0.1 K/UL (ref 0–0.2)
BILIRUB SERPL-MCNC: 0.12 MG/DL (ref 0.3–1.2)
BUN BLDV-MCNC: 8 MG/DL (ref 6–20)
BUN/CREAT BLD: 20 (ref 9–20)
CALCIUM SERPL-MCNC: 8.7 MG/DL (ref 8.6–10.4)
CHLORIDE BLD-SCNC: 101 MMOL/L (ref 98–107)
CO2: 28 MMOL/L (ref 20–31)
CREAT SERPL-MCNC: 0.41 MG/DL (ref 0.5–0.9)
DIFFERENTIAL TYPE: ABNORMAL
EOSINOPHILS RELATIVE PERCENT: 5 % (ref 1–7)
GFR AFRICAN AMERICAN: >60 ML/MIN
GFR NON-AFRICAN AMERICAN: >60 ML/MIN
GFR SERPL CREATININE-BSD FRML MDRD: ABNORMAL ML/MIN/{1.73_M2}
GFR SERPL CREATININE-BSD FRML MDRD: ABNORMAL ML/MIN/{1.73_M2}
GLUCOSE BLD-MCNC: 162 MG/DL (ref 70–99)
GLUCOSE BLD-MCNC: 163 MG/DL (ref 65–105)
GLUCOSE BLD-MCNC: 188 MG/DL (ref 65–105)
HCT VFR BLD CALC: 29.7 % (ref 36–46)
HEMOGLOBIN: 9.5 G/DL (ref 12–16)
LYMPHOCYTES # BLD: 15 % (ref 16–46)
MCH RBC QN AUTO: 28.4 PG (ref 26–34)
MCHC RBC AUTO-ENTMCNC: 32 G/DL (ref 31–37)
MCV RBC AUTO: 88.8 FL (ref 80–100)
MONOCYTES # BLD: 7 % (ref 4–11)
PDW BLD-RTO: 14.4 % (ref 11–14.5)
PLATELET # BLD: 457 K/UL (ref 140–450)
PLATELET ESTIMATE: ABNORMAL
PMV BLD AUTO: 6.1 FL (ref 6–12)
POTASSIUM SERPL-SCNC: 4.5 MMOL/L (ref 3.7–5.3)
RBC # BLD: 3.35 M/UL (ref 4–5.2)
RBC # BLD: ABNORMAL 10*6/UL
SEG NEUTROPHILS: 72 % (ref 43–77)
SEGMENTED NEUTROPHILS ABSOLUTE COUNT: 7.3 K/UL (ref 1.8–7.7)
SODIUM BLD-SCNC: 140 MMOL/L (ref 135–144)
TOTAL PROTEIN: 5.6 G/DL (ref 6.4–8.3)
WBC # BLD: 10.1 K/UL (ref 3.5–11)
WBC # BLD: ABNORMAL 10*3/UL

## 2017-09-12 PROCEDURE — 2500000003 HC RX 250 WO HCPCS: Performed by: SURGERY

## 2017-09-12 PROCEDURE — 6370000000 HC RX 637 (ALT 250 FOR IP): Performed by: SURGERY

## 2017-09-12 PROCEDURE — G8987 SELF CARE CURRENT STATUS: HCPCS | Performed by: OCCUPATIONAL THERAPIST

## 2017-09-12 PROCEDURE — 6360000002 HC RX W HCPCS: Performed by: INTERNAL MEDICINE

## 2017-09-12 PROCEDURE — 2500000003 HC RX 250 WO HCPCS: Performed by: INTERNAL MEDICINE

## 2017-09-12 PROCEDURE — 36415 COLL VENOUS BLD VENIPUNCTURE: CPT

## 2017-09-12 PROCEDURE — 2580000003 HC RX 258: Performed by: INTERNAL MEDICINE

## 2017-09-12 PROCEDURE — S0028 INJECTION, FAMOTIDINE, 20 MG: HCPCS | Performed by: NURSE PRACTITIONER

## 2017-09-12 PROCEDURE — 97110 THERAPEUTIC EXERCISES: CPT | Performed by: PHYSICAL THERAPY ASSISTANT

## 2017-09-12 PROCEDURE — 94761 N-INVAS EAR/PLS OXIMETRY MLT: CPT

## 2017-09-12 PROCEDURE — C9113 INJ PANTOPRAZOLE SODIUM, VIA: HCPCS | Performed by: NURSE PRACTITIONER

## 2017-09-12 PROCEDURE — 2580000003 HC RX 258: Performed by: NURSE PRACTITIONER

## 2017-09-12 PROCEDURE — 6370000000 HC RX 637 (ALT 250 FOR IP)

## 2017-09-12 PROCEDURE — G8988 SELF CARE GOAL STATUS: HCPCS | Performed by: OCCUPATIONAL THERAPIST

## 2017-09-12 PROCEDURE — 2500000003 HC RX 250 WO HCPCS: Performed by: NURSE PRACTITIONER

## 2017-09-12 PROCEDURE — 99232 SBSQ HOSP IP/OBS MODERATE 35: CPT | Performed by: INTERNAL MEDICINE

## 2017-09-12 PROCEDURE — G8989 SELF CARE D/C STATUS: HCPCS | Performed by: OCCUPATIONAL THERAPIST

## 2017-09-12 PROCEDURE — 85025 COMPLETE CBC W/AUTO DIFF WBC: CPT

## 2017-09-12 PROCEDURE — 97165 OT EVAL LOW COMPLEX 30 MIN: CPT | Performed by: OCCUPATIONAL THERAPIST

## 2017-09-12 PROCEDURE — 6360000002 HC RX W HCPCS: Performed by: NURSE PRACTITIONER

## 2017-09-12 PROCEDURE — 6360000002 HC RX W HCPCS: Performed by: SURGERY

## 2017-09-12 PROCEDURE — 6360000002 HC RX W HCPCS

## 2017-09-12 PROCEDURE — 36591 DRAW BLOOD OFF VENOUS DEVICE: CPT

## 2017-09-12 PROCEDURE — 2580000003 HC RX 258: Performed by: SURGERY

## 2017-09-12 PROCEDURE — 2060000000 HC ICU INTERMEDIATE R&B

## 2017-09-12 PROCEDURE — 6370000000 HC RX 637 (ALT 250 FOR IP): Performed by: INTERNAL MEDICINE

## 2017-09-12 PROCEDURE — 94640 AIRWAY INHALATION TREATMENT: CPT

## 2017-09-12 PROCEDURE — 82947 ASSAY GLUCOSE BLOOD QUANT: CPT

## 2017-09-12 PROCEDURE — 80053 COMPREHEN METABOLIC PANEL: CPT

## 2017-09-12 PROCEDURE — 94150 VITAL CAPACITY TEST: CPT

## 2017-09-12 RX ORDER — LEVOTHYROXINE SODIUM 0.07 MG/1
150 TABLET ORAL DAILY
Status: DISCONTINUED | OUTPATIENT
Start: 2017-09-12 | End: 2017-09-14 | Stop reason: HOSPADM

## 2017-09-12 RX ORDER — SACCHAROMYCES BOULARDII 250 MG
250 CAPSULE ORAL 2 TIMES DAILY
Status: DISCONTINUED | OUTPATIENT
Start: 2017-09-12 | End: 2017-09-14 | Stop reason: HOSPADM

## 2017-09-12 RX ORDER — RANITIDINE 150 MG/1
300 CAPSULE ORAL NIGHTLY
Status: DISCONTINUED | OUTPATIENT
Start: 2017-09-12 | End: 2017-09-12 | Stop reason: CLARIF

## 2017-09-12 RX ORDER — BUDESONIDE AND FORMOTEROL FUMARATE DIHYDRATE 80; 4.5 UG/1; UG/1
2 AEROSOL RESPIRATORY (INHALATION) 2 TIMES DAILY
Status: DISCONTINUED | OUTPATIENT
Start: 2017-09-12 | End: 2017-09-12 | Stop reason: CLARIF

## 2017-09-12 RX ORDER — PANTOPRAZOLE SODIUM 40 MG/1
40 TABLET, DELAYED RELEASE ORAL
Status: DISCONTINUED | OUTPATIENT
Start: 2017-09-13 | End: 2017-09-14 | Stop reason: HOSPADM

## 2017-09-12 RX ORDER — LOSARTAN POTASSIUM 25 MG/1
25 TABLET ORAL DAILY
Status: DISCONTINUED | OUTPATIENT
Start: 2017-09-12 | End: 2017-09-14 | Stop reason: HOSPADM

## 2017-09-12 RX ORDER — PROMETHAZINE HYDROCHLORIDE 25 MG/ML
INJECTION, SOLUTION INTRAMUSCULAR; INTRAVENOUS
Status: COMPLETED
Start: 2017-09-12 | End: 2017-09-12

## 2017-09-12 RX ORDER — FLUTICASONE PROPIONATE 50 MCG
SPRAY, SUSPENSION (ML) NASAL
Status: COMPLETED
Start: 2017-09-12 | End: 2017-09-12

## 2017-09-12 RX ORDER — LEVOTHYROXINE SODIUM 0.07 MG/1
TABLET ORAL
Status: COMPLETED
Start: 2017-09-12 | End: 2017-09-12

## 2017-09-12 RX ORDER — LOSARTAN POTASSIUM 25 MG/1
TABLET ORAL
Status: COMPLETED
Start: 2017-09-12 | End: 2017-09-12

## 2017-09-12 RX ORDER — ASCORBIC ACID 500 MG
1000 TABLET ORAL 2 TIMES DAILY
Status: DISCONTINUED | OUTPATIENT
Start: 2017-09-12 | End: 2017-09-14 | Stop reason: HOSPADM

## 2017-09-12 RX ORDER — HEPARIN SODIUM (PORCINE) LOCK FLUSH IV SOLN 100 UNIT/ML 100 UNIT/ML
SOLUTION INTRAVENOUS
Status: COMPLETED
Start: 2017-09-12 | End: 2017-09-12

## 2017-09-12 RX ORDER — FAMOTIDINE 20 MG/1
40 TABLET, FILM COATED ORAL EVERY EVENING
Status: DISCONTINUED | OUTPATIENT
Start: 2017-09-12 | End: 2017-09-14 | Stop reason: HOSPADM

## 2017-09-12 RX ORDER — FLUTICASONE PROPIONATE 50 MCG
2 SPRAY, SUSPENSION (ML) NASAL DAILY
Status: DISCONTINUED | OUTPATIENT
Start: 2017-09-12 | End: 2017-09-14 | Stop reason: HOSPADM

## 2017-09-12 RX ORDER — LACTOBACILLUS RHAMNOSUS GG 10B CELL
1 CAPSULE ORAL 3 TIMES DAILY
Status: DISCONTINUED | OUTPATIENT
Start: 2017-09-12 | End: 2017-09-14 | Stop reason: HOSPADM

## 2017-09-12 RX ORDER — PROMETHAZINE HYDROCHLORIDE 25 MG/ML
12.5 INJECTION, SOLUTION INTRAMUSCULAR; INTRAVENOUS EVERY 6 HOURS PRN
Status: DISCONTINUED | OUTPATIENT
Start: 2017-09-12 | End: 2017-09-14 | Stop reason: HOSPADM

## 2017-09-12 RX ADMIN — Medication 10 ML: at 09:13

## 2017-09-12 RX ADMIN — Medication 10 ML: at 13:55

## 2017-09-12 RX ADMIN — DIAZEPAM 5 MG: 5 INJECTION, SOLUTION INTRAMUSCULAR; INTRAVENOUS at 22:01

## 2017-09-12 RX ADMIN — MAGNESIUM HYDROXIDE 30 ML: 400 SUSPENSION ORAL at 19:14

## 2017-09-12 RX ADMIN — HYDROMORPHONE HYDROCHLORIDE 1 MG: 1 INJECTION, SOLUTION INTRAMUSCULAR; INTRAVENOUS; SUBCUTANEOUS at 13:54

## 2017-09-12 RX ADMIN — DEXTROSE MONOHYDRATE 300 MG: 50 INJECTION, SOLUTION INTRAVENOUS at 04:26

## 2017-09-12 RX ADMIN — HYDROMORPHONE HYDROCHLORIDE 1 MG: 1 INJECTION, SOLUTION INTRAMUSCULAR; INTRAVENOUS; SUBCUTANEOUS at 05:26

## 2017-09-12 RX ADMIN — BUDESONIDE AND FORMOTEROL FUMARATE DIHYDRATE 2 PUFF: 80; 4.5 AEROSOL RESPIRATORY (INHALATION) at 08:30

## 2017-09-12 RX ADMIN — DEXTROSE MONOHYDRATE 300 MG: 50 INJECTION, SOLUTION INTRAVENOUS at 16:39

## 2017-09-12 RX ADMIN — Medication 10 ML: at 09:14

## 2017-09-12 RX ADMIN — Medication 10 ML: at 11:28

## 2017-09-12 RX ADMIN — ALBUTEROL SULFATE 2.5 MG: 2.5 SOLUTION RESPIRATORY (INHALATION) at 15:19

## 2017-09-12 RX ADMIN — LOSARTAN POTASSIUM 25 MG: 25 TABLET, FILM COATED ORAL at 12:12

## 2017-09-12 RX ADMIN — ENOXAPARIN SODIUM 40 MG: 40 INJECTION SUBCUTANEOUS at 09:16

## 2017-09-12 RX ADMIN — Medication 250 MG: at 22:07

## 2017-09-12 RX ADMIN — LEVOTHYROXINE SODIUM 150 MCG: 75 TABLET ORAL at 12:12

## 2017-09-12 RX ADMIN — PANTOPRAZOLE SODIUM 40 MG: 40 INJECTION, POWDER, FOR SOLUTION INTRAVENOUS at 09:05

## 2017-09-12 RX ADMIN — DIAZEPAM 5 MG: 5 INJECTION, SOLUTION INTRAMUSCULAR; INTRAVENOUS at 11:27

## 2017-09-12 RX ADMIN — Medication 10 ML: at 09:06

## 2017-09-12 RX ADMIN — NYSTATIN: 100000 CREAM TOPICAL at 03:09

## 2017-09-12 RX ADMIN — Medication 10 ML: at 09:05

## 2017-09-12 RX ADMIN — Medication 250 MG: at 12:13

## 2017-09-12 RX ADMIN — Medication 2 SPRAY: at 12:13

## 2017-09-12 RX ADMIN — BUDESONIDE AND FORMOTEROL FUMARATE DIHYDRATE 2 PUFF: 80; 4.5 AEROSOL RESPIRATORY (INHALATION) at 19:48

## 2017-09-12 RX ADMIN — MAGNESIUM HYDROXIDE 30 ML: 400 SUSPENSION ORAL at 13:44

## 2017-09-12 RX ADMIN — NYSTATIN: 100000 CREAM TOPICAL at 09:04

## 2017-09-12 RX ADMIN — CEFOXITIN SODIUM 1 G: 2 POWDER, FOR SOLUTION INTRAVENOUS at 05:53

## 2017-09-12 RX ADMIN — ALBUTEROL SULFATE 2.5 MG: 2.5 SOLUTION RESPIRATORY (INHALATION) at 08:30

## 2017-09-12 RX ADMIN — MAGNESIUM HYDROXIDE 30 ML: 400 SUSPENSION ORAL at 09:09

## 2017-09-12 RX ADMIN — Medication 1 CAPSULE: at 12:13

## 2017-09-12 RX ADMIN — Medication 10 ML: at 11:29

## 2017-09-12 RX ADMIN — Medication 10 ML: at 09:21

## 2017-09-12 RX ADMIN — Medication 10 ML: at 09:10

## 2017-09-12 RX ADMIN — ASCORBIC ACID, VITAMIN A PALMITATE, CHOLECALCIFEROL, THIAMINE HYDROCHLORIDE, RIBOFLAVIN-5 PHOSPHATE SODIUM, PYRIDOXINE HYDROCHLORIDE, NIACINAMIDE, DEXPANTHENOL, ALPHA-TOCOPHEROL ACETATE, VITAMIN K1, FOLIC ACID, BIOTIN, CYANOCOBALAMIN: 200; 3300; 200; 6; 3.6; 6; 40; 15; 10; 150; 600; 60; 5 INJECTION, SOLUTION INTRAVENOUS at 17:45

## 2017-09-12 RX ADMIN — FAMOTIDINE 40 MG: 20 TABLET ORAL at 17:23

## 2017-09-12 RX ADMIN — Medication 1 CAPSULE: at 22:07

## 2017-09-12 RX ADMIN — CEFOXITIN SODIUM 1 G: 2 POWDER, FOR SOLUTION INTRAVENOUS at 00:24

## 2017-09-12 RX ADMIN — OXYCODONE HYDROCHLORIDE AND ACETAMINOPHEN 1000 MG: 500 TABLET ORAL at 22:02

## 2017-09-12 RX ADMIN — POTASSIUM CHLORIDE, DEXTROSE MONOHYDRATE AND SODIUM CHLORIDE: 150; 5; 450 INJECTION, SOLUTION INTRAVENOUS at 23:42

## 2017-09-12 RX ADMIN — FAMOTIDINE 20 MG: 10 INJECTION, SOLUTION INTRAVENOUS at 09:09

## 2017-09-12 RX ADMIN — CEFOXITIN SODIUM 1 G: 2 POWDER, FOR SOLUTION INTRAVENOUS at 23:38

## 2017-09-12 RX ADMIN — I.V. FAT EMULSION 250 ML: 20 EMULSION INTRAVENOUS at 17:34

## 2017-09-12 RX ADMIN — CEFOXITIN SODIUM 1 G: 2 POWDER, FOR SOLUTION INTRAVENOUS at 17:27

## 2017-09-12 RX ADMIN — ALBUTEROL SULFATE 2.5 MG: 2.5 SOLUTION RESPIRATORY (INHALATION) at 19:47

## 2017-09-12 RX ADMIN — CEFOXITIN SODIUM 1 G: 2 POWDER, FOR SOLUTION INTRAVENOUS at 11:35

## 2017-09-12 RX ADMIN — DEXTROSE MONOHYDRATE 300 MG: 50 INJECTION, SOLUTION INTRAVENOUS at 10:40

## 2017-09-12 ASSESSMENT — PAIN DESCRIPTION - PAIN TYPE
TYPE: SURGICAL PAIN
TYPE: ACUTE PAIN

## 2017-09-12 ASSESSMENT — PAIN SCALES - GENERAL
PAINLEVEL_OUTOF10: 0
PAINLEVEL_OUTOF10: 3
PAINLEVEL_OUTOF10: 8
PAINLEVEL_OUTOF10: 2
PAINLEVEL_OUTOF10: 7
PAINLEVEL_OUTOF10: 2
PAINLEVEL_OUTOF10: 0
PAINLEVEL_OUTOF10: 4

## 2017-09-12 ASSESSMENT — PAIN DESCRIPTION - LOCATION
LOCATION: ABDOMEN

## 2017-09-12 ASSESSMENT — PAIN DESCRIPTION - FREQUENCY: FREQUENCY: INTERMITTENT

## 2017-09-12 ASSESSMENT — PAIN DESCRIPTION - PROGRESSION: CLINICAL_PROGRESSION: GRADUALLY WORSENING

## 2017-09-12 ASSESSMENT — PAIN DESCRIPTION - ORIENTATION: ORIENTATION: LOWER

## 2017-09-12 ASSESSMENT — PAIN DESCRIPTION - DESCRIPTORS
DESCRIPTORS: SPASM
DESCRIPTORS: SPASM

## 2017-09-13 LAB
ABSOLUTE EOS #: 0.4 K/UL (ref 0–0.4)
ABSOLUTE LYMPH #: 1.4 K/UL (ref 1–4.8)
ABSOLUTE MONO #: 0.7 K/UL (ref 0.1–1.2)
ANION GAP SERPL CALCULATED.3IONS-SCNC: 9 MMOL/L (ref 9–17)
BASOPHILS # BLD: 1 % (ref 0–1)
BASOPHILS ABSOLUTE: 0.1 K/UL (ref 0–0.2)
BUN BLDV-MCNC: 10 MG/DL (ref 6–20)
BUN/CREAT BLD: 24 (ref 9–20)
CALCIUM SERPL-MCNC: 8.6 MG/DL (ref 8.6–10.4)
CHLORIDE BLD-SCNC: 99 MMOL/L (ref 98–107)
CO2: 30 MMOL/L (ref 20–31)
CREAT SERPL-MCNC: 0.41 MG/DL (ref 0.5–0.9)
CULTURE: NORMAL
DIFFERENTIAL TYPE: ABNORMAL
EOSINOPHILS RELATIVE PERCENT: 5 % (ref 1–7)
GFR AFRICAN AMERICAN: >60 ML/MIN
GFR NON-AFRICAN AMERICAN: >60 ML/MIN
GFR SERPL CREATININE-BSD FRML MDRD: ABNORMAL ML/MIN/{1.73_M2}
GFR SERPL CREATININE-BSD FRML MDRD: ABNORMAL ML/MIN/{1.73_M2}
GLUCOSE BLD-MCNC: 182 MG/DL (ref 70–99)
HCT VFR BLD CALC: 28.3 % (ref 36–46)
HEMOGLOBIN: 9.2 G/DL (ref 12–16)
LYMPHOCYTES # BLD: 16 % (ref 16–46)
Lab: NORMAL
MCH RBC QN AUTO: 28.7 PG (ref 26–34)
MCHC RBC AUTO-ENTMCNC: 32.4 G/DL (ref 31–37)
MCV RBC AUTO: 88.6 FL (ref 80–100)
MONOCYTES # BLD: 8 % (ref 4–11)
PDW BLD-RTO: 14.5 % (ref 11–14.5)
PLATELET # BLD: 406 K/UL (ref 140–450)
PLATELET ESTIMATE: ABNORMAL
PMV BLD AUTO: 6.2 FL (ref 6–12)
POTASSIUM SERPL-SCNC: 4.4 MMOL/L (ref 3.7–5.3)
RBC # BLD: 3.19 M/UL (ref 4–5.2)
RBC # BLD: ABNORMAL 10*6/UL
SEG NEUTROPHILS: 70 % (ref 43–77)
SEGMENTED NEUTROPHILS ABSOLUTE COUNT: 6.3 K/UL (ref 1.8–7.7)
SODIUM BLD-SCNC: 138 MMOL/L (ref 135–144)
SPECIMEN DESCRIPTION: NORMAL
STATUS: NORMAL
WBC # BLD: 8.9 K/UL (ref 3.5–11)
WBC # BLD: ABNORMAL 10*3/UL

## 2017-09-13 PROCEDURE — 85025 COMPLETE CBC W/AUTO DIFF WBC: CPT

## 2017-09-13 PROCEDURE — 2580000003 HC RX 258: Performed by: SURGERY

## 2017-09-13 PROCEDURE — 2500000003 HC RX 250 WO HCPCS: Performed by: SURGERY

## 2017-09-13 PROCEDURE — 94640 AIRWAY INHALATION TREATMENT: CPT

## 2017-09-13 PROCEDURE — 36415 COLL VENOUS BLD VENIPUNCTURE: CPT

## 2017-09-13 PROCEDURE — 6360000002 HC RX W HCPCS: Performed by: INTERNAL MEDICINE

## 2017-09-13 PROCEDURE — 6370000000 HC RX 637 (ALT 250 FOR IP): Performed by: INTERNAL MEDICINE

## 2017-09-13 PROCEDURE — 2580000003 HC RX 258: Performed by: INTERNAL MEDICINE

## 2017-09-13 PROCEDURE — 99232 SBSQ HOSP IP/OBS MODERATE 35: CPT | Performed by: INTERNAL MEDICINE

## 2017-09-13 PROCEDURE — 97116 GAIT TRAINING THERAPY: CPT | Performed by: PHYSICAL THERAPY ASSISTANT

## 2017-09-13 PROCEDURE — 6370000000 HC RX 637 (ALT 250 FOR IP)

## 2017-09-13 PROCEDURE — 97110 THERAPEUTIC EXERCISES: CPT | Performed by: PHYSICAL THERAPY ASSISTANT

## 2017-09-13 PROCEDURE — 6370000000 HC RX 637 (ALT 250 FOR IP): Performed by: SURGERY

## 2017-09-13 PROCEDURE — 80048 BASIC METABOLIC PNL TOTAL CA: CPT

## 2017-09-13 PROCEDURE — 6360000002 HC RX W HCPCS

## 2017-09-13 PROCEDURE — 6360000002 HC RX W HCPCS: Performed by: SURGERY

## 2017-09-13 PROCEDURE — 2060000000 HC ICU INTERMEDIATE R&B

## 2017-09-13 PROCEDURE — 94760 N-INVAS EAR/PLS OXIMETRY 1: CPT

## 2017-09-13 RX ORDER — FENTANYL 25 UG/H
1 PATCH TRANSDERMAL
Status: DISCONTINUED | OUTPATIENT
Start: 2017-09-13 | End: 2017-09-13

## 2017-09-13 RX ORDER — HEPARIN SODIUM (PORCINE) LOCK FLUSH IV SOLN 100 UNIT/ML 100 UNIT/ML
SOLUTION INTRAVENOUS
Status: COMPLETED
Start: 2017-09-13 | End: 2017-09-13

## 2017-09-13 RX ORDER — FENTANYL 12 UG/H
PATCH TRANSDERMAL
Status: DISCONTINUED
Start: 2017-09-13 | End: 2017-09-14 | Stop reason: HOSPADM

## 2017-09-13 RX ORDER — FENTANYL 12 UG/H
1 PATCH TRANSDERMAL
Status: DISCONTINUED | OUTPATIENT
Start: 2017-09-13 | End: 2017-09-14 | Stop reason: HOSPADM

## 2017-09-13 RX ORDER — PANTOPRAZOLE SODIUM 40 MG/1
TABLET, DELAYED RELEASE ORAL
Status: COMPLETED
Start: 2017-09-13 | End: 2017-09-13

## 2017-09-13 RX ORDER — HEPARIN SODIUM (PORCINE) LOCK FLUSH IV SOLN 100 UNIT/ML 100 UNIT/ML
SOLUTION INTRAVENOUS
Status: DISPENSED
Start: 2017-09-13 | End: 2017-09-13

## 2017-09-13 RX ORDER — HEPARIN SODIUM (PORCINE) LOCK FLUSH IV SOLN 100 UNIT/ML 100 UNIT/ML
SOLUTION INTRAVENOUS
Status: DISPENSED
Start: 2017-09-13 | End: 2017-09-14

## 2017-09-13 RX ADMIN — Medication: at 20:18

## 2017-09-13 RX ADMIN — Medication 10 ML: at 10:03

## 2017-09-13 RX ADMIN — Medication 1 CAPSULE: at 20:18

## 2017-09-13 RX ADMIN — DEXTROSE MONOHYDRATE 300 MG: 50 INJECTION, SOLUTION INTRAVENOUS at 00:26

## 2017-09-13 RX ADMIN — Medication 10 ML: at 09:59

## 2017-09-13 RX ADMIN — OXYCODONE HYDROCHLORIDE AND ACETAMINOPHEN 1000 MG: 500 TABLET ORAL at 20:18

## 2017-09-13 RX ADMIN — BUDESONIDE AND FORMOTEROL FUMARATE DIHYDRATE 2 PUFF: 80; 4.5 AEROSOL RESPIRATORY (INHALATION) at 08:36

## 2017-09-13 RX ADMIN — Medication 10 ML: at 17:35

## 2017-09-13 RX ADMIN — Medication 2 SPRAY: at 08:48

## 2017-09-13 RX ADMIN — LOSARTAN POTASSIUM 25 MG: 25 TABLET, FILM COATED ORAL at 08:50

## 2017-09-13 RX ADMIN — NYSTATIN: 100000 CREAM TOPICAL at 21:00

## 2017-09-13 RX ADMIN — PANTOPRAZOLE SODIUM 40 MG: 40 TABLET, DELAYED RELEASE ORAL at 06:43

## 2017-09-13 RX ADMIN — HEPARIN SODIUM (PORCINE) LOCK FLUSH IV SOLN 100 UNIT/ML 100 UNITS: 100 SOLUTION at 08:52

## 2017-09-13 RX ADMIN — CEFOXITIN SODIUM 1 G: 2 POWDER, FOR SOLUTION INTRAVENOUS at 06:01

## 2017-09-13 RX ADMIN — HYDROMORPHONE HYDROCHLORIDE 1 MG: 1 INJECTION, SOLUTION INTRAMUSCULAR; INTRAVENOUS; SUBCUTANEOUS at 00:35

## 2017-09-13 RX ADMIN — DIAZEPAM 5 MG: 5 INJECTION, SOLUTION INTRAMUSCULAR; INTRAVENOUS at 10:03

## 2017-09-13 RX ADMIN — Medication 10 ML: at 10:00

## 2017-09-13 RX ADMIN — MAGNESIUM HYDROXIDE 30 ML: 400 SUSPENSION ORAL at 13:37

## 2017-09-13 RX ADMIN — DEXTROSE MONOHYDRATE 300 MG: 50 INJECTION, SOLUTION INTRAVENOUS at 05:00

## 2017-09-13 RX ADMIN — Medication 10 ML: at 10:08

## 2017-09-13 RX ADMIN — BUDESONIDE AND FORMOTEROL FUMARATE DIHYDRATE 2 PUFF: 80; 4.5 AEROSOL RESPIRATORY (INHALATION) at 19:51

## 2017-09-13 RX ADMIN — Medication 1 CAPSULE: at 08:49

## 2017-09-13 RX ADMIN — Medication 10 ML: at 10:02

## 2017-09-13 RX ADMIN — Medication 10 ML: at 17:36

## 2017-09-13 RX ADMIN — Medication 10 ML: at 17:34

## 2017-09-13 RX ADMIN — Medication 1 CAPSULE: at 13:37

## 2017-09-13 RX ADMIN — Medication 10 ML: at 08:52

## 2017-09-13 RX ADMIN — ALBUTEROL SULFATE 2.5 MG: 2.5 SOLUTION RESPIRATORY (INHALATION) at 16:05

## 2017-09-13 RX ADMIN — NYSTATIN: 100000 CREAM TOPICAL at 00:39

## 2017-09-13 RX ADMIN — Medication 250 MG: at 08:49

## 2017-09-13 RX ADMIN — LEVOTHYROXINE SODIUM 150 MCG: 75 TABLET ORAL at 06:44

## 2017-09-13 RX ADMIN — Medication 10 ML: at 11:30

## 2017-09-13 RX ADMIN — MAGNESIUM HYDROXIDE 30 ML: 400 SUSPENSION ORAL at 08:49

## 2017-09-13 RX ADMIN — ACETAMINOPHEN 650 MG: 325 TABLET ORAL at 20:17

## 2017-09-13 RX ADMIN — CEFOXITIN SODIUM 1 G: 2 POWDER, FOR SOLUTION INTRAVENOUS at 11:32

## 2017-09-13 RX ADMIN — DEXTROSE MONOHYDRATE 300 MG: 50 INJECTION, SOLUTION INTRAVENOUS at 10:57

## 2017-09-13 RX ADMIN — NYSTATIN: 100000 CREAM TOPICAL at 08:51

## 2017-09-13 RX ADMIN — Medication 10 ML: at 10:01

## 2017-09-13 RX ADMIN — OXYCODONE HYDROCHLORIDE AND ACETAMINOPHEN 1000 MG: 500 TABLET ORAL at 08:50

## 2017-09-13 RX ADMIN — ALBUTEROL SULFATE 2.5 MG: 2.5 SOLUTION RESPIRATORY (INHALATION) at 08:35

## 2017-09-13 RX ADMIN — ALBUTEROL SULFATE 2.5 MG: 2.5 SOLUTION RESPIRATORY (INHALATION) at 19:51

## 2017-09-13 RX ADMIN — FAMOTIDINE 40 MG: 20 TABLET ORAL at 17:34

## 2017-09-13 RX ADMIN — ENOXAPARIN SODIUM 40 MG: 40 INJECTION SUBCUTANEOUS at 08:49

## 2017-09-13 ASSESSMENT — PAIN SCALES - GENERAL
PAINLEVEL_OUTOF10: 4
PAINLEVEL_OUTOF10: 0
PAINLEVEL_OUTOF10: 7
PAINLEVEL_OUTOF10: 0
PAINLEVEL_OUTOF10: 0
PAINLEVEL_OUTOF10: 3
PAINLEVEL_OUTOF10: 0

## 2017-09-13 ASSESSMENT — PAIN DESCRIPTION - PAIN TYPE: TYPE: SURGICAL PAIN

## 2017-09-13 ASSESSMENT — PAIN DESCRIPTION - LOCATION: LOCATION: ABDOMEN

## 2017-09-14 VITALS
OXYGEN SATURATION: 97 % | WEIGHT: 194.2 LBS | SYSTOLIC BLOOD PRESSURE: 124 MMHG | RESPIRATION RATE: 18 BRPM | DIASTOLIC BLOOD PRESSURE: 78 MMHG | HEIGHT: 66 IN | HEART RATE: 102 BPM | BODY MASS INDEX: 31.21 KG/M2 | TEMPERATURE: 97.8 F

## 2017-09-14 LAB
ABSOLUTE EOS #: 0.4 K/UL (ref 0–0.4)
ABSOLUTE LYMPH #: 1.2 K/UL (ref 1–4.8)
ABSOLUTE MONO #: 0.7 K/UL (ref 0.1–1.2)
ANION GAP SERPL CALCULATED.3IONS-SCNC: 13 MMOL/L (ref 9–17)
BASOPHILS # BLD: 1 % (ref 0–1)
BASOPHILS ABSOLUTE: 0.1 K/UL (ref 0–0.2)
BUN BLDV-MCNC: 11 MG/DL (ref 6–20)
BUN/CREAT BLD: 23 (ref 9–20)
CALCIUM SERPL-MCNC: 8.8 MG/DL (ref 8.6–10.4)
CHLORIDE BLD-SCNC: 100 MMOL/L (ref 98–107)
CO2: 26 MMOL/L (ref 20–31)
CREAT SERPL-MCNC: 0.48 MG/DL (ref 0.5–0.9)
CULTURE: ABNORMAL
DIFFERENTIAL TYPE: ABNORMAL
DIRECT EXAM: ABNORMAL
DIRECT EXAM: ABNORMAL
EOSINOPHILS RELATIVE PERCENT: 6 % (ref 1–7)
GFR AFRICAN AMERICAN: >60 ML/MIN
GFR NON-AFRICAN AMERICAN: >60 ML/MIN
GFR SERPL CREATININE-BSD FRML MDRD: ABNORMAL ML/MIN/{1.73_M2}
GFR SERPL CREATININE-BSD FRML MDRD: ABNORMAL ML/MIN/{1.73_M2}
GLUCOSE BLD-MCNC: 138 MG/DL (ref 70–99)
HCT VFR BLD CALC: 29.9 % (ref 36–46)
HEMOGLOBIN: 9.6 G/DL (ref 12–16)
LYMPHOCYTES # BLD: 17 % (ref 16–46)
Lab: ABNORMAL
MCH RBC QN AUTO: 28.3 PG (ref 26–34)
MCHC RBC AUTO-ENTMCNC: 32.1 G/DL (ref 31–37)
MCV RBC AUTO: 88.2 FL (ref 80–100)
MONOCYTES # BLD: 10 % (ref 4–11)
PDW BLD-RTO: 14.8 % (ref 11–14.5)
PLATELET # BLD: 432 K/UL (ref 140–450)
PLATELET ESTIMATE: ABNORMAL
PMV BLD AUTO: 6.5 FL (ref 6–12)
POTASSIUM SERPL-SCNC: 4.1 MMOL/L (ref 3.7–5.3)
RBC # BLD: 3.39 M/UL (ref 4–5.2)
RBC # BLD: ABNORMAL 10*6/UL
SEG NEUTROPHILS: 66 % (ref 43–77)
SEGMENTED NEUTROPHILS ABSOLUTE COUNT: 4.9 K/UL (ref 1.8–7.7)
SODIUM BLD-SCNC: 139 MMOL/L (ref 135–144)
SPECIMEN DESCRIPTION: ABNORMAL
SPECIMEN DESCRIPTION: ABNORMAL
STATUS: ABNORMAL
WBC # BLD: 7.2 K/UL (ref 3.5–11)
WBC # BLD: ABNORMAL 10*3/UL

## 2017-09-14 PROCEDURE — 6370000000 HC RX 637 (ALT 250 FOR IP): Performed by: INTERNAL MEDICINE

## 2017-09-14 PROCEDURE — 6360000002 HC RX W HCPCS: Performed by: INTERNAL MEDICINE

## 2017-09-14 PROCEDURE — 36415 COLL VENOUS BLD VENIPUNCTURE: CPT

## 2017-09-14 PROCEDURE — 99232 SBSQ HOSP IP/OBS MODERATE 35: CPT | Performed by: INTERNAL MEDICINE

## 2017-09-14 PROCEDURE — 85025 COMPLETE CBC W/AUTO DIFF WBC: CPT

## 2017-09-14 PROCEDURE — 2580000003 HC RX 258: Performed by: INTERNAL MEDICINE

## 2017-09-14 PROCEDURE — 94640 AIRWAY INHALATION TREATMENT: CPT

## 2017-09-14 PROCEDURE — 94760 N-INVAS EAR/PLS OXIMETRY 1: CPT

## 2017-09-14 PROCEDURE — 80048 BASIC METABOLIC PNL TOTAL CA: CPT

## 2017-09-14 PROCEDURE — 6360000002 HC RX W HCPCS: Performed by: SURGERY

## 2017-09-14 RX ORDER — SACCHAROMYCES BOULARDII 250 MG
250 CAPSULE ORAL 2 TIMES DAILY
Qty: 60 CAPSULE | Refills: 0 | COMMUNITY
Start: 2017-09-14 | End: 2017-10-14

## 2017-09-14 RX ORDER — LACTOBACILLUS RHAMNOSUS GG 10B CELL
1 CAPSULE ORAL 3 TIMES DAILY
Qty: 90 CAPSULE | Refills: 0 | COMMUNITY
Start: 2017-09-14 | End: 2017-10-31

## 2017-09-14 RX ORDER — FENTANYL 12 UG/H
1 PATCH TRANSDERMAL
Qty: 5 PATCH | Refills: 0 | Status: SHIPPED | OUTPATIENT
Start: 2017-09-14 | End: 2017-09-26

## 2017-09-14 RX ORDER — NYSTATIN 100000 U/G
CREAM TOPICAL
Qty: 1 TUBE | Refills: 0 | Status: SHIPPED | OUTPATIENT
Start: 2017-09-14 | End: 2018-03-27

## 2017-09-14 RX ADMIN — ALBUTEROL SULFATE 2.5 MG: 2.5 SOLUTION RESPIRATORY (INHALATION) at 07:50

## 2017-09-14 RX ADMIN — Medication 250 MG: at 09:16

## 2017-09-14 RX ADMIN — ENOXAPARIN SODIUM 40 MG: 40 INJECTION SUBCUTANEOUS at 09:16

## 2017-09-14 RX ADMIN — OXYCODONE HYDROCHLORIDE AND ACETAMINOPHEN 1000 MG: 500 TABLET ORAL at 09:16

## 2017-09-14 RX ADMIN — BUDESONIDE AND FORMOTEROL FUMARATE DIHYDRATE 2 PUFF: 80; 4.5 AEROSOL RESPIRATORY (INHALATION) at 07:51

## 2017-09-14 RX ADMIN — Medication 10 ML: at 10:04

## 2017-09-14 RX ADMIN — Medication 10 ML: at 05:47

## 2017-09-14 RX ADMIN — Medication 2 SPRAY: at 09:17

## 2017-09-14 RX ADMIN — Medication 1 CAPSULE: at 14:17

## 2017-09-14 RX ADMIN — LOSARTAN POTASSIUM 25 MG: 25 TABLET, FILM COATED ORAL at 09:16

## 2017-09-14 RX ADMIN — PANTOPRAZOLE SODIUM 40 MG: 40 TABLET, DELAYED RELEASE ORAL at 05:50

## 2017-09-14 RX ADMIN — Medication 1 CAPSULE: at 09:16

## 2017-09-14 RX ADMIN — LEVOTHYROXINE SODIUM 150 MCG: 75 TABLET ORAL at 05:49

## 2017-09-14 RX ADMIN — NYSTATIN: 100000 CREAM TOPICAL at 09:18

## 2017-09-15 LAB
CULTURE: ABNORMAL
CULTURE: ABNORMAL
DIRECT EXAM: ABNORMAL
Lab: ABNORMAL
SPECIMEN DESCRIPTION: ABNORMAL
STATUS: ABNORMAL

## 2017-09-18 DIAGNOSIS — E11.9 TYPE 2 DIABETES MELLITUS WITHOUT COMPLICATION, WITHOUT LONG-TERM CURRENT USE OF INSULIN (HCC): ICD-10-CM

## 2017-09-18 LAB
CULTURE: NORMAL
DIRECT EXAM: NORMAL
Lab: NORMAL
SPECIMEN DESCRIPTION: NORMAL
SPECIMEN DESCRIPTION: NORMAL
STATUS: NORMAL

## 2017-09-20 ENCOUNTER — NURSE ONLY (OUTPATIENT)
Dept: SURGERY | Age: 57
End: 2017-09-20

## 2017-09-20 VITALS
DIASTOLIC BLOOD PRESSURE: 80 MMHG | WEIGHT: 181 LBS | BODY MASS INDEX: 29.21 KG/M2 | HEART RATE: 90 BPM | TEMPERATURE: 98.2 F | SYSTOLIC BLOOD PRESSURE: 110 MMHG

## 2017-09-20 DIAGNOSIS — Z98.890 POST-OPERATIVE STATE: Primary | ICD-10-CM

## 2017-09-26 ENCOUNTER — OFFICE VISIT (OUTPATIENT)
Dept: SURGERY | Age: 57
End: 2017-09-26

## 2017-09-26 VITALS
DIASTOLIC BLOOD PRESSURE: 76 MMHG | HEIGHT: 66 IN | SYSTOLIC BLOOD PRESSURE: 114 MMHG | WEIGHT: 180 LBS | TEMPERATURE: 97.9 F | HEART RATE: 94 BPM | BODY MASS INDEX: 28.93 KG/M2

## 2017-09-26 DIAGNOSIS — Z98.890 POST-OPERATIVE STATE: Primary | ICD-10-CM

## 2017-09-26 PROCEDURE — 99024 POSTOP FOLLOW-UP VISIT: CPT | Performed by: SURGERY

## 2017-09-28 ENCOUNTER — PATIENT MESSAGE (OUTPATIENT)
Dept: ALLERGY | Age: 57
End: 2017-09-28

## 2017-09-28 ENCOUNTER — PATIENT MESSAGE (OUTPATIENT)
Dept: FAMILY MEDICINE CLINIC | Age: 57
End: 2017-09-28

## 2017-09-29 RX ORDER — FLUCONAZOLE 150 MG/1
150 TABLET ORAL ONCE
Qty: 1 TABLET | Refills: 1 | Status: SHIPPED | OUTPATIENT
Start: 2017-09-29 | End: 2017-09-29

## 2017-10-10 DIAGNOSIS — J45.998 ASTHMA, PERSISTENT CONTROLLED: ICD-10-CM

## 2017-10-10 RX ORDER — BUDESONIDE AND FORMOTEROL FUMARATE DIHYDRATE 80; 4.5 UG/1; UG/1
AEROSOL RESPIRATORY (INHALATION)
Qty: 3 INHALER | Refills: 3 | Status: SHIPPED | OUTPATIENT
Start: 2017-10-10 | End: 2018-08-20 | Stop reason: SDUPTHER

## 2017-10-10 NOTE — TELEPHONE ENCOUNTER
From: John Giraldo  Sent: 10/10/2017 10:03 AM EDT  Subject: Medication Renewal Request    Sudhakar Herrera.  Flor Trejo would like a refill of the following medications:  budesonide-formoterol (SYMBICORT) 80-4.5 MCG/ACT LYNNE Streeter NP]    Preferred pharmacy: [facility], [address] [phone] - F [phone]    Comment:

## 2017-10-13 ENCOUNTER — APPOINTMENT (OUTPATIENT)
Dept: GENERAL RADIOLOGY | Age: 57
DRG: 247 | End: 2017-10-13
Payer: COMMERCIAL

## 2017-10-13 ENCOUNTER — HOSPITAL ENCOUNTER (INPATIENT)
Age: 57
LOS: 1 days | Discharge: HOME | DRG: 247 | End: 2017-10-14
Attending: SPECIALIST | Admitting: INTERNAL MEDICINE
Payer: COMMERCIAL

## 2017-10-13 ENCOUNTER — TELEPHONE (OUTPATIENT)
Dept: SURGERY | Age: 57
End: 2017-10-13

## 2017-10-13 ENCOUNTER — APPOINTMENT (OUTPATIENT)
Dept: CT IMAGING | Age: 57
DRG: 247 | End: 2017-10-13
Payer: COMMERCIAL

## 2017-10-13 DIAGNOSIS — K56.600 PARTIAL SMALL BOWEL OBSTRUCTION (HCC): ICD-10-CM

## 2017-10-13 DIAGNOSIS — R10.13 ABDOMINAL PAIN, EPIGASTRIC: Primary | ICD-10-CM

## 2017-10-13 LAB
-: NORMAL
ABSOLUTE EOS #: 0.5 K/UL (ref 0–0.4)
ABSOLUTE LYMPH #: 1.7 K/UL (ref 1–4.8)
ABSOLUTE MONO #: 0.6 K/UL (ref 0.1–1.2)
ALBUMIN SERPL-MCNC: 4.9 G/DL (ref 3.5–5.2)
ALBUMIN/GLOBULIN RATIO: 1.8 (ref 1–2.5)
ALP BLD-CCNC: 56 U/L (ref 35–104)
ALT SERPL-CCNC: 20 U/L (ref 5–33)
AMORPHOUS: NORMAL
AMYLASE: 58 U/L (ref 28–100)
ANION GAP SERPL CALCULATED.3IONS-SCNC: 20 MMOL/L (ref 9–17)
AST SERPL-CCNC: 18 U/L
BACTERIA: NORMAL
BASOPHILS # BLD: 1 % (ref 0–1)
BASOPHILS ABSOLUTE: 0.1 K/UL (ref 0–0.2)
BILIRUB SERPL-MCNC: 0.2 MG/DL (ref 0.3–1.2)
BILIRUBIN URINE: NEGATIVE
BUN BLDV-MCNC: 16 MG/DL (ref 6–20)
BUN/CREAT BLD: 29 (ref 9–20)
CALCIUM SERPL-MCNC: 10.2 MG/DL (ref 8.6–10.4)
CASTS UA: NORMAL /LPF (ref 0–2)
CHLORIDE BLD-SCNC: 100 MMOL/L (ref 98–107)
CO2: 23 MMOL/L (ref 20–31)
COLOR: ABNORMAL
COMMENT UA: ABNORMAL
CREAT SERPL-MCNC: 0.55 MG/DL (ref 0.5–0.9)
CRYSTALS, UA: NORMAL /HPF
DIFFERENTIAL TYPE: ABNORMAL
EOSINOPHILS RELATIVE PERCENT: 5 % (ref 1–7)
EPITHELIAL CELLS UA: NORMAL /HPF (ref 0–5)
GFR AFRICAN AMERICAN: >60 ML/MIN
GFR NON-AFRICAN AMERICAN: >60 ML/MIN
GFR SERPL CREATININE-BSD FRML MDRD: ABNORMAL ML/MIN/{1.73_M2}
GFR SERPL CREATININE-BSD FRML MDRD: ABNORMAL ML/MIN/{1.73_M2}
GLUCOSE BLD-MCNC: 121 MG/DL (ref 70–99)
GLUCOSE URINE: NEGATIVE
HCT VFR BLD CALC: 39.5 % (ref 36–46)
HEMOGLOBIN: 13.1 G/DL (ref 12–16)
KETONES, URINE: NEGATIVE
LACTIC ACID: 1 MMOL/L (ref 0.5–2.2)
LACTIC ACID: 2.8 MMOL/L (ref 0.5–2.2)
LEUKOCYTE ESTERASE, URINE: NEGATIVE
LIPASE: 41 U/L (ref 13–60)
LYMPHOCYTES # BLD: 16 % (ref 16–46)
MAGNESIUM: 2 MG/DL (ref 1.6–2.6)
MCH RBC QN AUTO: 28.9 PG (ref 26–34)
MCHC RBC AUTO-ENTMCNC: 33.2 G/DL (ref 31–37)
MCV RBC AUTO: 86.9 FL (ref 80–100)
MONOCYTES # BLD: 5 % (ref 4–11)
MUCUS: NORMAL
NITRITE, URINE: NEGATIVE
OTHER OBSERVATIONS UA: NORMAL
PDW BLD-RTO: 14.5 % (ref 11–14.5)
PH UA: 5.5 (ref 5–6)
PLATELET # BLD: 310 K/UL (ref 140–450)
PLATELET ESTIMATE: ABNORMAL
PMV BLD AUTO: 7.4 FL (ref 6–12)
POTASSIUM SERPL-SCNC: 4.1 MMOL/L (ref 3.7–5.3)
PROTEIN UA: NEGATIVE
RBC # BLD: 4.55 M/UL (ref 4–5.2)
RBC # BLD: ABNORMAL 10*6/UL
RBC UA: NORMAL /HPF (ref 0–4)
RENAL EPITHELIAL, UA: NORMAL /HPF
SEG NEUTROPHILS: 73 % (ref 43–77)
SEGMENTED NEUTROPHILS ABSOLUTE COUNT: 7.6 K/UL (ref 1.8–7.7)
SODIUM BLD-SCNC: 143 MMOL/L (ref 135–144)
SPECIFIC GRAVITY UA: 1 (ref 1.01–1.02)
TOTAL PROTEIN: 7.7 G/DL (ref 6.4–8.3)
TRICHOMONAS: NORMAL
TURBIDITY: ABNORMAL
URINE HGB: NEGATIVE
UROBILINOGEN, URINE: NORMAL
WBC # BLD: 10.5 K/UL (ref 3.5–11)
WBC # BLD: ABNORMAL 10*3/UL
WBC UA: NORMAL /HPF (ref 0–4)
YEAST: NORMAL

## 2017-10-13 PROCEDURE — 6360000004 HC RX CONTRAST MEDICATION: Performed by: SPECIALIST

## 2017-10-13 PROCEDURE — 36415 COLL VENOUS BLD VENIPUNCTURE: CPT

## 2017-10-13 PROCEDURE — 82150 ASSAY OF AMYLASE: CPT

## 2017-10-13 PROCEDURE — 6360000002 HC RX W HCPCS: Performed by: SPECIALIST

## 2017-10-13 PROCEDURE — 99253 IP/OBS CNSLTJ NEW/EST LOW 45: CPT | Performed by: SURGERY

## 2017-10-13 PROCEDURE — 83605 ASSAY OF LACTIC ACID: CPT

## 2017-10-13 PROCEDURE — 99223 1ST HOSP IP/OBS HIGH 75: CPT | Performed by: INTERNAL MEDICINE

## 2017-10-13 PROCEDURE — 6360000002 HC RX W HCPCS: Performed by: INTERNAL MEDICINE

## 2017-10-13 PROCEDURE — 96375 TX/PRO/DX INJ NEW DRUG ADDON: CPT

## 2017-10-13 PROCEDURE — G0378 HOSPITAL OBSERVATION PER HR: HCPCS

## 2017-10-13 PROCEDURE — 74177 CT ABD & PELVIS W/CONTRAST: CPT

## 2017-10-13 PROCEDURE — 96374 THER/PROPH/DIAG INJ IV PUSH: CPT

## 2017-10-13 PROCEDURE — 99285 EMERGENCY DEPT VISIT HI MDM: CPT

## 2017-10-13 PROCEDURE — 96372 THER/PROPH/DIAG INJ SC/IM: CPT

## 2017-10-13 PROCEDURE — 74000 XR ABDOMEN LIMITED (KUB): CPT

## 2017-10-13 PROCEDURE — 2580000003 HC RX 258: Performed by: INTERNAL MEDICINE

## 2017-10-13 PROCEDURE — 80053 COMPREHEN METABOLIC PANEL: CPT

## 2017-10-13 PROCEDURE — 85025 COMPLETE CBC W/AUTO DIFF WBC: CPT

## 2017-10-13 PROCEDURE — 94760 N-INVAS EAR/PLS OXIMETRY 1: CPT

## 2017-10-13 PROCEDURE — 83690 ASSAY OF LIPASE: CPT

## 2017-10-13 PROCEDURE — 81001 URINALYSIS AUTO W/SCOPE: CPT

## 2017-10-13 PROCEDURE — 83735 ASSAY OF MAGNESIUM: CPT

## 2017-10-13 PROCEDURE — 2060000000 HC ICU INTERMEDIATE R&B

## 2017-10-13 PROCEDURE — 6370000000 HC RX 637 (ALT 250 FOR IP): Performed by: SURGERY

## 2017-10-13 PROCEDURE — 93005 ELECTROCARDIOGRAM TRACING: CPT

## 2017-10-13 PROCEDURE — 2580000003 HC RX 258: Performed by: SPECIALIST

## 2017-10-13 RX ORDER — ONDANSETRON 2 MG/ML
INJECTION INTRAMUSCULAR; INTRAVENOUS
Status: DISPENSED
Start: 2017-10-13 | End: 2017-10-14

## 2017-10-13 RX ORDER — 0.9 % SODIUM CHLORIDE 0.9 %
2000 INTRAVENOUS SOLUTION INTRAVENOUS ONCE
Status: COMPLETED | OUTPATIENT
Start: 2017-10-13 | End: 2017-10-13

## 2017-10-13 RX ORDER — ONDANSETRON 2 MG/ML
4 INJECTION INTRAMUSCULAR; INTRAVENOUS EVERY 6 HOURS PRN
Status: DISCONTINUED | OUTPATIENT
Start: 2017-10-13 | End: 2017-10-13 | Stop reason: SDUPTHER

## 2017-10-13 RX ORDER — SODIUM CHLORIDE 0.9 % (FLUSH) 0.9 %
10 SYRINGE (ML) INJECTION PRN
Status: DISCONTINUED | OUTPATIENT
Start: 2017-10-13 | End: 2017-10-14 | Stop reason: HOSPADM

## 2017-10-13 RX ORDER — SODIUM CHLORIDE 9 MG/ML
INJECTION, SOLUTION INTRAVENOUS CONTINUOUS
Status: DISCONTINUED | OUTPATIENT
Start: 2017-10-13 | End: 2017-10-13

## 2017-10-13 RX ORDER — 0.9 % SODIUM CHLORIDE 0.9 %
500 INTRAVENOUS SOLUTION INTRAVENOUS ONCE
Status: COMPLETED | OUTPATIENT
Start: 2017-10-13 | End: 2017-10-13

## 2017-10-13 RX ORDER — LACTOBACILLUS RHAMNOSUS GG 10B CELL
1 CAPSULE ORAL 3 TIMES DAILY
Status: DISCONTINUED | OUTPATIENT
Start: 2017-10-13 | End: 2017-10-14 | Stop reason: HOSPADM

## 2017-10-13 RX ORDER — ONDANSETRON 2 MG/ML
4 INJECTION INTRAMUSCULAR; INTRAVENOUS ONCE
Status: COMPLETED | OUTPATIENT
Start: 2017-10-13 | End: 2017-10-13

## 2017-10-13 RX ORDER — FLUTICASONE PROPIONATE 50 MCG
2 SPRAY, SUSPENSION (ML) NASAL DAILY
Status: DISCONTINUED | OUTPATIENT
Start: 2017-10-13 | End: 2017-10-14 | Stop reason: HOSPADM

## 2017-10-13 RX ORDER — LOSARTAN POTASSIUM 25 MG/1
25 TABLET ORAL DAILY
Status: DISCONTINUED | OUTPATIENT
Start: 2017-10-13 | End: 2017-10-14 | Stop reason: HOSPADM

## 2017-10-13 RX ORDER — LEVOTHYROXINE SODIUM 0.07 MG/1
150 TABLET ORAL DAILY
Status: DISCONTINUED | OUTPATIENT
Start: 2017-10-13 | End: 2017-10-14 | Stop reason: HOSPADM

## 2017-10-13 RX ORDER — LORAZEPAM 2 MG/ML
1 INJECTION INTRAMUSCULAR EVERY 6 HOURS PRN
Status: DISCONTINUED | OUTPATIENT
Start: 2017-10-13 | End: 2017-10-14 | Stop reason: HOSPADM

## 2017-10-13 RX ORDER — ACETAMINOPHEN 325 MG/1
650 TABLET ORAL EVERY 4 HOURS PRN
Status: DISCONTINUED | OUTPATIENT
Start: 2017-10-13 | End: 2017-10-14 | Stop reason: HOSPADM

## 2017-10-13 RX ORDER — SODIUM CHLORIDE 0.9 % (FLUSH) 0.9 %
10 SYRINGE (ML) INJECTION EVERY 12 HOURS SCHEDULED
Status: DISCONTINUED | OUTPATIENT
Start: 2017-10-13 | End: 2017-10-14 | Stop reason: HOSPADM

## 2017-10-13 RX ORDER — ONDANSETRON 2 MG/ML
4 INJECTION INTRAMUSCULAR; INTRAVENOUS EVERY 6 HOURS PRN
Status: DISCONTINUED | OUTPATIENT
Start: 2017-10-13 | End: 2017-10-13

## 2017-10-13 RX ORDER — SACCHAROMYCES BOULARDII 250 MG
250 CAPSULE ORAL 2 TIMES DAILY
Status: DISCONTINUED | OUTPATIENT
Start: 2017-10-13 | End: 2017-10-14 | Stop reason: HOSPADM

## 2017-10-13 RX ORDER — SODIUM CHLORIDE 9 MG/ML
INJECTION, SOLUTION INTRAVENOUS CONTINUOUS
Status: DISCONTINUED | OUTPATIENT
Start: 2017-10-13 | End: 2017-10-14

## 2017-10-13 RX ORDER — HYDROCODONE BITARTRATE AND ACETAMINOPHEN 5; 325 MG/1; MG/1
1 TABLET ORAL EVERY 4 HOURS PRN
Status: DISCONTINUED | OUTPATIENT
Start: 2017-10-13 | End: 2017-10-14 | Stop reason: HOSPADM

## 2017-10-13 RX ORDER — PANTOPRAZOLE SODIUM 40 MG/1
40 TABLET, DELAYED RELEASE ORAL DAILY
Status: DISCONTINUED | OUTPATIENT
Start: 2017-10-13 | End: 2017-10-13

## 2017-10-13 RX ORDER — ONDANSETRON 2 MG/ML
8 INJECTION INTRAMUSCULAR; INTRAVENOUS EVERY 4 HOURS PRN
Status: DISCONTINUED | OUTPATIENT
Start: 2017-10-13 | End: 2017-10-14 | Stop reason: HOSPADM

## 2017-10-13 RX ORDER — HYDROCODONE BITARTRATE AND ACETAMINOPHEN 5; 325 MG/1; MG/1
2 TABLET ORAL EVERY 4 HOURS PRN
Status: DISCONTINUED | OUTPATIENT
Start: 2017-10-13 | End: 2017-10-14 | Stop reason: HOSPADM

## 2017-10-13 RX ADMIN — SODIUM CHLORIDE 2000 ML: 9 INJECTION, SOLUTION INTRAVENOUS at 17:59

## 2017-10-13 RX ADMIN — LORAZEPAM 1 MG: 2 INJECTION INTRAMUSCULAR; INTRAVENOUS at 17:59

## 2017-10-13 RX ADMIN — SODIUM CHLORIDE 500 ML: 9 INJECTION, SOLUTION INTRAVENOUS at 14:00

## 2017-10-13 RX ADMIN — HYDROMORPHONE HYDROCHLORIDE 1 MG: 1 INJECTION, SOLUTION INTRAMUSCULAR; INTRAVENOUS; SUBCUTANEOUS at 14:01

## 2017-10-13 RX ADMIN — SODIUM CHLORIDE: 9 INJECTION, SOLUTION INTRAVENOUS at 20:39

## 2017-10-13 RX ADMIN — ONDANSETRON 4 MG: 2 INJECTION INTRAMUSCULAR; INTRAVENOUS at 14:01

## 2017-10-13 RX ADMIN — HYDROMORPHONE HYDROCHLORIDE 1 MG: 1 INJECTION, SOLUTION INTRAMUSCULAR; INTRAVENOUS; SUBCUTANEOUS at 20:34

## 2017-10-13 RX ADMIN — IOVERSOL 130 ML: 741 INJECTION INTRA-ARTERIAL; INTRAVENOUS at 14:43

## 2017-10-13 RX ADMIN — ENOXAPARIN SODIUM 40 MG: 40 INJECTION SUBCUTANEOUS at 18:15

## 2017-10-13 RX ADMIN — ONDANSETRON 4 MG: 2 INJECTION INTRAMUSCULAR; INTRAVENOUS at 16:55

## 2017-10-13 RX ADMIN — CHLORASEPTIC 1 SPRAY: 1.5 LIQUID ORAL at 18:14

## 2017-10-13 ASSESSMENT — PAIN SCALES - GENERAL
PAINLEVEL_OUTOF10: 10
PAINLEVEL_OUTOF10: 3
PAINLEVEL_OUTOF10: 4
PAINLEVEL_OUTOF10: 3
PAINLEVEL_OUTOF10: 10
PAINLEVEL_OUTOF10: 5

## 2017-10-13 ASSESSMENT — PAIN DESCRIPTION - PAIN TYPE
TYPE: ACUTE PAIN

## 2017-10-13 ASSESSMENT — PAIN DESCRIPTION - LOCATION
LOCATION: ABDOMEN
LOCATION: ABDOMEN
LOCATION: HEAD

## 2017-10-13 ASSESSMENT — ENCOUNTER SYMPTOMS
VOMITING: 1
NAUSEA: 1
SHORTNESS OF BREATH: 0
BLOOD IN STOOL: 0
ABDOMINAL PAIN: 1

## 2017-10-13 ASSESSMENT — PAIN DESCRIPTION - DESCRIPTORS
DESCRIPTORS: CRAMPING
DESCRIPTORS: CRAMPING

## 2017-10-13 ASSESSMENT — PAIN DESCRIPTION - FREQUENCY: FREQUENCY: INTERMITTENT

## 2017-10-13 ASSESSMENT — PAIN DESCRIPTION - ORIENTATION
ORIENTATION: UPPER
ORIENTATION: MID;UPPER

## 2017-10-13 ASSESSMENT — PAIN DESCRIPTION - ONSET: ONSET: ON-GOING

## 2017-10-13 NOTE — H&P
HOSPITALIST ADMISSION H&P      REASON FOR ADMISSION:  Partial small bowel obstruction--vs--ileus---uncontrolled abdominal pain--note prior extensive bowel surgeries and extended hospitalizations for the same  ESTIMATED LENGTH OF STAY:  > 2 midnights---2-5 days    ATTENDING/ADMITTING PHYSICIAN: Dominique Osborne MD  PCP: Olga Lara MD    HISTORY OF PRESENT ILLNESS:      The patient is a 61 y.o. female patient of Olga Lara MD who presents with onset of abdominal pain---sharp and cramping---CT abdomen-pelvis---10.13.2017--demonstrates ileus-vs---developing PSBO---General Surgery contacted by ER and recommended admission----lactic acid = 2.8---actively vomiting at this time---gastric contents------onset was sudden nausea upon awakening---subsequently, abdominal cramping--contraction-like---last bowel movement c. 1400h today---small--hard-----since hospitalization stated that she had been doing well and eating increasing meals---probably not as much water as she would have liked----denies fever-chills     See below for additional PMH.     Patient jrdl-licwyxygzi-ohhvjbqu-available records reviewed, including, but not limited to,  ER reports--labs---imaging--office records---personal notes        Past Medical History:   Diagnosis Date    Bilateral corneal scars     Diverticulitis     ON COLONSCOPY    DJD (degenerative joint disease), lumbar     Dry eye syndrome     DVT (deep venous thrombosis) (HCC)     after splenic artery repair in right calf    Gastro - esophageal reflux disease     Hyperlipidemia     HIGH CHOLESTEROL/HIGH TRIGLYCERIDES    Hyperplastic colon polyp     Hypertension     Hypothyroidism     LGSIL (low grade squamous intraepithelial dysplasia) 11/2014    referred to GYN/S/P colpo with bx LGSIL, repeat pap in 6 months    Non-celiac gluten sensitivity     wheezing is directly proportionate to wheat intake    Osteoarthritis 2004    RT KNEE S/P INJURY    Sciatica of right side     Severe CMP:    Lab Results   Component Value Date     10/13/2017    K 4.1 10/13/2017     10/13/2017    CO2 23 10/13/2017    BUN 16 10/13/2017    CREATININE 0.55 10/13/2017    GFRAA >60 10/13/2017    LABGLOM >60 10/13/2017    GLUCOSE 121 10/13/2017    PROT 7.7 10/13/2017    LABALBU 4.9 10/13/2017    CALCIUM 10.2 10/13/2017    BILITOT 0.20 10/13/2017    ALKPHOS 56 10/13/2017    AST 18 10/13/2017    ALT 20 10/13/2017     AMYLASE:    Lab Results   Component Value Date    AMYLASE 58 10/13/2017     LIPASE:    Lab Results   Component Value Date    LIPASE 41 10/13/2017       ASSESSMENT:      Patient Active Problem List   Diagnosis    Gastroesophageal reflux disease    Hypothyroidism    Essential hypertension    Osteoarthritis    Hyperlipidemia    DJD (degenerative joint disease), lumbar    Acute diverticulitis of intestine    Hyperplastic colon polyp    Tinnitus    Sciatica of right side    Splenic artery aneurysm (HCC)    Presence of endovascular aneurysm coil compatible with safe magnetic resonance imaging    DVT (deep venous thrombosis) (HCC)    LGSIL (low grade squamous intraepithelial dysplasia)    Sinusitis, chronic    Asthma    Non-celiac gluten sensitivity    Severe persistent asthma    Diabetes mellitus, type 2 ([address]    Varicose veins of bilateral lower extremities with pain    Bowel perforation (HCC)    Bronchitis    Diverticulitis of large intestine with abscess without bleeding    Colovaginal fistula    Abdominal pain    Intra-abdominal abscess (HCC)    Partial small bowel obstruction    Abdominal pain, epigastric     BRUNNER, TANYA S. mdh dc  FP  61  WF  Gila Carlin,  INES;  Jalyn Thorne, GS,  hi Paul, Urology;  Misael Pickering, OB-Gyn]  FULL CODE     LOVENOX        Anti-infectives:      Abdominal pain---10.13.2017---ileus--vs--PSBO--consider acute gastroenteritis          CT abdomen--pelvis---10.13.2017dilated loops small bowel---                   developing inflammation rectosigmoid colonmild-to-                             moderate colonic diverticulosisfree air                             present normal appendix---prior splenic                             artery aneurysm embolizationgall bladder                             distension small HH2 mm pulmonary                              nodule inferior right fissure                    AAS---2017no obstruction     Hypertension  Hyperlipidemia  Hypothyroidism  GERD  Diabetes Mellitus Type 2  Asthma   DVT---   Hyponatremia   Non-celiac gluten sensitivity     PMH:  osteoarthritis, DJD, hyperplastic colon polyps, tinnitus,             right-sided sciatica, splenic artery aneurysmtreated             with MRI-safe coil, LGSILlow grade squamous              epithelial dysplasia, chronic sinusitis BLE varicose veins,             bilateral corneal scars, coughmixed zulema--hypoxia---             2017, urinary retention2017---cuevas, sinusitis  PSH:   right partial knee--synovectomychondroplasty              meniscus tear, tubal MHYQRUIQ8598, ---,               wisdom tooth extraction, skin tag removal, right TKA---              , L4-5 ROT3049, colonoscopy---hyperplastic               polyp colposcopybiopsy---LGSIL--diverticular              disease, right kneeSynvisc2006,               nasal sinus, arterial aneurysm repairsplenic              artery EIAS9340,    Allergies:        NKDA  Intolerances:  codeine---nausea-vomiting, Darvonpropoxyphene                         nausea-vomiting, Percocetoxycodonenausea-vomiting    Code Status:   FULL CODE  OARRS---10.13.2017---no abuse or diversion---medications have been from hospital and/or ER and from either [facility] or hospital pharmacies    PLAN:    1. Abdominal pain----IV fluids---lactic acid----General Surgery  2. AAS AM---10.14.2017  3.   NPO except for medications until seen-evaluated

## 2017-10-13 NOTE — ED PROVIDER NOTES
[facility]  eMERGENCY dEPARTMENT eNCOUnter      Pt Name: Eitan Dean  MRN: [id number]  [place] 1960  Date of evaluation: 10/13/2017      CHIEF COMPLAINT       Chief Complaint   Patient presents with    Abdominal Pain     upper abdomen- had bowel resection on  with revision on  for abscess/perforation - severe upper abdominal pain- like a contraction for the past 2 hours         HISTORY OF PRESENT ILLNESS    Eitan Dean is a 61 y.o. female who presents to the emergency department accompanied by her family after patient started having nausea and one episode of vomiting at 9 a.m. This morning followed by upper abdominal pain mostly in the epigastric area as well as mid back pain since 10 a.m. This morning. She denies any fever or chills. She denies any hematemesis, melena or hematochezia and denies any urinary frequency, urgency, dysuria or hematuria. Patient has history of perforated diverticulitis in the past and underwent the bowel resection and the vaginal wall repair on 2017 followed by drainage of the pelvic abscess on 2017. Her only other abdominal surgery includes  section times one. She describes pain as a severe crampy upper abdominal pain 10 or 10 in intensity. There are no exacerbating or relieving factors and patient has not taken any pain medications. She denies any chest pain, shortness of breath, lightheadedness, dizziness, palpitations or diaphoresis. REVIEW OF SYSTEMS       Review of Systems   Constitutional: Negative for chills and fever. Respiratory: Negative for shortness of breath. Cardiovascular: Negative for chest pain and palpitations. Gastrointestinal: Positive for abdominal pain, nausea and vomiting. Negative for blood in stool. Genitourinary: Negative for dysuria, frequency and hematuria. All other systems reviewed and are negative.          PAST MEDICAL HISTORY    has a past medical history of Bilateral corneal scars; Diverticulitis; DJD (degenerative joint disease), lumbar; Dry eye syndrome; DVT (deep venous thrombosis) ([facility] Gastro - esophageal reflux disease; Hyperlipidemia; Hyperplastic colon polyp; Hypertension; Hypothyroidism; LGSIL (low grade squamous intraepithelial dysplasia); Non-celiac gluten sensitivity; Osteoarthritis; Sciatica of right side; Severe persistent asthma; Splenic artery aneurysm ([facility] Tinnitus; and Type 2 diabetes mellitus, controlled ([facility] SURGICAL HISTORY      has a past surgical history that includes Knee arthroscopy (Right, ); Tubal ligation ();  section (); [place] tooth extraction; Skin tag removal; Total knee arthroplasty (Right, 2011); other surgical history (10/25/2011); other surgical history (Right,  and ); Nasal sinus surgery (2014); Arterial aneurysm repair (); Colposcopy (2015); colon ca scrn not hi rsk ind (N/A, 2017); egd transoral biopsy single/multiple (N/A, 2017); Colonoscopy (2012); Colonoscopy (2017); Small intestine surgery (N/A, 2017); Cystoscopy (Bilateral, 2017); laparoscopy (N/A, 2017); and colon ca scrn not hi rsk ind (N/A, 2017). CURRENT MEDICATIONS       Previous Medications    ASCORBIC ACID (VITAMIN C) 1000 MG TABLET    Take 1,000 mg by mouth 2 times daily    B COMPLEX VITAMINS (VITAMIN B COMPLEX PO)    Take 1 capsule by mouth    BORON 6 MG TABS    1 tablet daily     BUDESONIDE-FORMOTEROL (SYMBICORT) 80-4.5 MCG/ACT AERO    2 puffs inhaled twice daily. Rinse mouth well after use.     CHOLECALCIFEROL (VITAMIN D3) 5000 UNITS CAPS    Take 2 capsules by mouth daily     FLUTICASONE (FLONASE) 50 MCG/ACT NASAL SPRAY    2 sprays by Nasal route daily    LACTOBACILLUS (CULTURELLE) CAPS CAPSULE    Take 1 capsule by mouth 3 times daily    LEVOTHYROXINE (SYNTHROID) 150 MCG TABLET    TAKE 1 TABLET DAILY    LOSARTAN (COZAAR) 25 MG TABLET    Take 1 tablet by mouth daily well-nourished. No distress. HENT:   Head: Normocephalic and atraumatic. Nose: Nose normal.   Mouth/Throat: Oropharynx is clear and moist. No oropharyngeal exudate. Eyes: EOM are normal. Pupils are equal, round, and reactive to light. No scleral icterus. Neck: Neck supple. No JVD present. No tracheal deviation present. No thyromegaly present. Cardiovascular: Normal rate, regular rhythm, normal heart sounds and intact distal pulses. Exam reveals no gallop and no friction rub. No murmur heard. Pulmonary/Chest: Effort normal and breath sounds normal. No respiratory distress. She has no wheezes. She has no rales. Abdominal: Soft. She exhibits no distension and no mass. Bowel sounds are decreased. There is tenderness in the epigastric area. There is no rigidity, no rebound, no guarding and no CVA tenderness. Musculoskeletal: She exhibits no edema or tenderness. Lymphadenopathy:     She has no cervical adenopathy. Neurological: She is alert and oriented to person, place, and time. She displays normal reflexes. No cranial nerve deficit. Skin: Skin is warm and dry. No rash noted. No erythema. Psychiatric: She has a normal mood and affect. Her behavior is normal. Judgment and thought content normal.   Nursing note and vitals reviewed. DIFFERENTIAL DIAGNOSIS/ MDM:     Cholecystitis, appendicitis, pancreatitis,  urinary tract infection, renal stone, small bowel obstruction,diverticulitis, gastritis, enteritis, colitis.     DIAGNOSTIC RESULTS     EKG: All EKG's are interpreted by the Emergency Department Physician who either signs or Co-signs this chart in the absence of a cardiologist.    EKG Interpretation    Interpreted by me    Rhythm: normal sinus   Rate: normal  Axis: normal  Ectopy: none  Conduction: normal  ST Segments: no acute change  T Waves: no acute change  Q Waves: none    Clinical Impression: no acute changes and normal EKG      RADIOLOGY:   I directly visualized the following ileus. Recommend follow-up radiographic imaging. 2. Fatty liver. 3. Bilateral renal cysts. ED BEDSIDE ULTRASOUND:       LABS:  Labs Reviewed   CBC WITH AUTO DIFFERENTIAL - Abnormal; Notable for the following:        Result Value    Absolute Eos # 0.50 (*)     All other components within normal limits   COMPREHENSIVE METABOLIC PANEL - Abnormal; Notable for the following:     Glucose 121 (*)     Bun/Cre Ratio 29 (*)     Anion Gap 20 (*)     Total Bilirubin 0.20 (*)     All other components within normal limits   LACTIC ACID - Abnormal; Notable for the following:     Lactic Acid 2.8 (*)     All other components within normal limits   LIPASE   AMYLASE   UA W/REFLEX CULTURE       I reviewed all the lab results, patient has elevated lactic acid level, rest of the labs are unremarkable. EMERGENCY DEPARTMENT COURSE:   Vitals:    Vitals:    10/13/17 1334 10/13/17 1459 10/13/17 1520 10/13/17 1522   BP: (!) 145/76 (!) 146/69     Pulse: 76 77     Resp: 20 14  12   Temp: 97.3 °F (36.3 °C) 97.7 °F (36.5 °C)     TempSrc: Tympanic      SpO2: 95%  (!) 85% 95%   Weight: 180 lb (81.6 kg)      Height: 5' 8\" (1.727 m)        -------------------------  BP: (!) 146/69, Temp: 97.7 °F (36.5 °C), Pulse: 77, Resp: 12    Orders Placed This Encounter   Medications    0.9 % sodium chloride bolus    0.9 % sodium chloride infusion    HYDROmorphone (DILAUDID) injection 1 mg    ondansetron (ZOFRAN) injection 4 mg    ioversol (OPTIRAY) 74 % injection 130 mL       During emergency department course, patient was given normal saline bolus followed by infusion as well as Dilaudid 1 mg and Zofran 4 mg IV push. She is feeling much better and resting comfortably. Her nausea is resolved and abdominal pain has improved significantly. I have reviewed the disposition diagnosis with the patient and or their family/guardian. I have answered their questions and given discharge instructions.  They voiced understanding of these instructions and did not have any further questions or complaints. Re-evaluation Notes    Upon reevaluation, patient is resting comfortably and states the she is feeling much better. Her abdomen is soft with mild epigastric tenderness but no rebound tenderness. She has hypoactive bowel sounds. CRITICAL CARE:   None        CONSULTS:    I discussed the case with Dr. Elias Soria and Dr. Jessica Wilburn and plan is to admit the patient on the medical floor for further evaluation and management. Admission condition is stable. PROCEDURES:  None    FINAL IMPRESSION      1. Abdominal pain, epigastric    2. Partial small bowel obstruction          DISPOSITION/PLAN   DISPOSITION     Condition on Disposition    stable    PATIENT REFERRED TO:  No follow-up provider specified. DISCHARGE MEDICATIONS:  New Prescriptions    No medications on file       (Please note that portions of this note were completed with a voice recognition program.  Efforts were made to edit the dictations but occasionally words are mis-transcribed.)    Mg MD, F.A.C.E.P.   Attending Emergency Physician                           José Manuel Thompson MD  10/13/17 2588

## 2017-10-13 NOTE — TELEPHONE ENCOUNTER
10/13/2017 Patient called letting Dr Chester Spore office know that she is heading to the Emergency Room as she feels she may have a blockage.   Writer informed patient she will let Dr Lexus Pichardo and his staff know

## 2017-10-14 ENCOUNTER — APPOINTMENT (OUTPATIENT)
Dept: GENERAL RADIOLOGY | Age: 57
DRG: 247 | End: 2017-10-14
Payer: COMMERCIAL

## 2017-10-14 VITALS
BODY MASS INDEX: 29.1 KG/M2 | HEIGHT: 67 IN | OXYGEN SATURATION: 97 % | RESPIRATION RATE: 14 BRPM | TEMPERATURE: 99.2 F | DIASTOLIC BLOOD PRESSURE: 85 MMHG | SYSTOLIC BLOOD PRESSURE: 136 MMHG | WEIGHT: 185.4 LBS | HEART RATE: 97 BPM

## 2017-10-14 LAB
ANION GAP SERPL CALCULATED.3IONS-SCNC: 12 MMOL/L (ref 9–17)
BUN BLDV-MCNC: 11 MG/DL (ref 6–20)
BUN/CREAT BLD: 26 (ref 9–20)
CALCIUM SERPL-MCNC: 8.8 MG/DL (ref 8.6–10.4)
CHLORIDE BLD-SCNC: 108 MMOL/L (ref 98–107)
CO2: 24 MMOL/L (ref 20–31)
CREAT SERPL-MCNC: 0.43 MG/DL (ref 0.5–0.9)
GFR AFRICAN AMERICAN: >60 ML/MIN
GFR NON-AFRICAN AMERICAN: >60 ML/MIN
GFR SERPL CREATININE-BSD FRML MDRD: ABNORMAL ML/MIN/{1.73_M2}
GFR SERPL CREATININE-BSD FRML MDRD: ABNORMAL ML/MIN/{1.73_M2}
GLUCOSE BLD-MCNC: 120 MG/DL (ref 65–105)
GLUCOSE BLD-MCNC: 94 MG/DL (ref 70–99)
GLUCOSE BLD-MCNC: 97 MG/DL (ref 65–105)
HCT VFR BLD CALC: 34.7 % (ref 36–46)
HEMOGLOBIN: 10.8 G/DL (ref 12–16)
MCH RBC QN AUTO: 28.1 PG (ref 26–34)
MCHC RBC AUTO-ENTMCNC: 31.2 G/DL (ref 31–37)
MCV RBC AUTO: 90 FL (ref 80–100)
PDW BLD-RTO: 15 % (ref 11–14.5)
PLATELET # BLD: 214 K/UL (ref 140–450)
PMV BLD AUTO: 7.8 FL (ref 6–12)
POTASSIUM SERPL-SCNC: 3.8 MMOL/L (ref 3.7–5.3)
RBC # BLD: 3.86 M/UL (ref 4–5.2)
SODIUM BLD-SCNC: 144 MMOL/L (ref 135–144)
WBC # BLD: 6.8 K/UL (ref 3.5–11)

## 2017-10-14 PROCEDURE — 36415 COLL VENOUS BLD VENIPUNCTURE: CPT

## 2017-10-14 PROCEDURE — 96376 TX/PRO/DX INJ SAME DRUG ADON: CPT

## 2017-10-14 PROCEDURE — G0378 HOSPITAL OBSERVATION PER HR: HCPCS

## 2017-10-14 PROCEDURE — 82947 ASSAY GLUCOSE BLOOD QUANT: CPT

## 2017-10-14 PROCEDURE — 6360000002 HC RX W HCPCS: Performed by: INTERNAL MEDICINE

## 2017-10-14 PROCEDURE — 74022 RADEX COMPL AQT ABD SERIES: CPT

## 2017-10-14 PROCEDURE — 96372 THER/PROPH/DIAG INJ SC/IM: CPT

## 2017-10-14 PROCEDURE — 94760 N-INVAS EAR/PLS OXIMETRY 1: CPT

## 2017-10-14 PROCEDURE — 2580000003 HC RX 258: Performed by: SURGERY

## 2017-10-14 PROCEDURE — 6360000002 HC RX W HCPCS

## 2017-10-14 PROCEDURE — 99239 HOSP IP/OBS DSCHRG MGMT >30: CPT | Performed by: FAMILY MEDICINE

## 2017-10-14 PROCEDURE — 2580000003 HC RX 258: Performed by: INTERNAL MEDICINE

## 2017-10-14 PROCEDURE — 6370000000 HC RX 637 (ALT 250 FOR IP): Performed by: INTERNAL MEDICINE

## 2017-10-14 PROCEDURE — 85027 COMPLETE CBC AUTOMATED: CPT

## 2017-10-14 PROCEDURE — 80048 BASIC METABOLIC PNL TOTAL CA: CPT

## 2017-10-14 PROCEDURE — 99231 SBSQ HOSP IP/OBS SF/LOW 25: CPT | Performed by: SURGERY

## 2017-10-14 PROCEDURE — C9113 INJ PANTOPRAZOLE SODIUM, VIA: HCPCS

## 2017-10-14 PROCEDURE — 96375 TX/PRO/DX INJ NEW DRUG ADDON: CPT

## 2017-10-14 RX ORDER — PANTOPRAZOLE SODIUM 40 MG/10ML
40 INJECTION, POWDER, LYOPHILIZED, FOR SOLUTION INTRAVENOUS DAILY
Status: DISCONTINUED | OUTPATIENT
Start: 2017-10-14 | End: 2017-10-14 | Stop reason: HOSPADM

## 2017-10-14 RX ORDER — PANTOPRAZOLE SODIUM 40 MG/10ML
INJECTION, POWDER, LYOPHILIZED, FOR SOLUTION INTRAVENOUS
Status: COMPLETED
Start: 2017-10-14 | End: 2017-10-14

## 2017-10-14 RX ORDER — 0.9 % SODIUM CHLORIDE 0.9 %
10 VIAL (ML) INJECTION DAILY
Status: DISCONTINUED | OUTPATIENT
Start: 2017-10-14 | End: 2017-10-14 | Stop reason: HOSPADM

## 2017-10-14 RX ADMIN — LORAZEPAM 1 MG: 2 INJECTION INTRAMUSCULAR; INTRAVENOUS at 04:18

## 2017-10-14 RX ADMIN — ACETAMINOPHEN 650 MG: 325 TABLET ORAL at 17:47

## 2017-10-14 RX ADMIN — ONDANSETRON 8 MG: 2 INJECTION INTRAMUSCULAR; INTRAVENOUS at 06:54

## 2017-10-14 RX ADMIN — Medication 10 ML: at 09:56

## 2017-10-14 RX ADMIN — CHLORASEPTIC 1 SPRAY: 1.5 LIQUID ORAL at 00:09

## 2017-10-14 RX ADMIN — SODIUM CHLORIDE: 9 INJECTION, SOLUTION INTRAVENOUS at 06:59

## 2017-10-14 RX ADMIN — ACETAMINOPHEN 650 MG: 325 TABLET ORAL at 12:38

## 2017-10-14 RX ADMIN — Medication 1 CAPSULE: at 13:59

## 2017-10-14 RX ADMIN — PANTOPRAZOLE SODIUM 40 MG: 40 INJECTION, POWDER, LYOPHILIZED, FOR SOLUTION INTRAVENOUS at 09:56

## 2017-10-14 RX ADMIN — HYDROMORPHONE HYDROCHLORIDE 1 MG: 1 INJECTION, SOLUTION INTRAMUSCULAR; INTRAVENOUS; SUBCUTANEOUS at 04:08

## 2017-10-14 RX ADMIN — ONDANSETRON 8 MG: 2 INJECTION INTRAMUSCULAR; INTRAVENOUS at 00:13

## 2017-10-14 RX ADMIN — PANTOPRAZOLE SODIUM 40 MG: 40 INJECTION, POWDER, FOR SOLUTION INTRAVENOUS at 09:56

## 2017-10-14 RX ADMIN — CHLORASEPTIC 1 SPRAY: 1.5 LIQUID ORAL at 12:16

## 2017-10-14 RX ADMIN — SODIUM CHLORIDE: 9 INJECTION, SOLUTION INTRAVENOUS at 12:40

## 2017-10-14 RX ADMIN — CHLORASEPTIC 1 SPRAY: 1.5 LIQUID ORAL at 03:54

## 2017-10-14 RX ADMIN — FLUTICASONE PROPIONATE 2 SPRAY: 50 SPRAY, METERED NASAL at 08:16

## 2017-10-14 RX ADMIN — SODIUM CHLORIDE: 9 INJECTION, SOLUTION INTRAVENOUS at 03:04

## 2017-10-14 RX ADMIN — CHLORASEPTIC 1 SPRAY: 1.5 LIQUID ORAL at 09:56

## 2017-10-14 RX ADMIN — ENOXAPARIN SODIUM 40 MG: 40 INJECTION SUBCUTANEOUS at 08:19

## 2017-10-14 ASSESSMENT — PAIN SCALES - GENERAL
PAINLEVEL_OUTOF10: 0
PAINLEVEL_OUTOF10: 4
PAINLEVEL_OUTOF10: 0
PAINLEVEL_OUTOF10: 3
PAINLEVEL_OUTOF10: 0
PAINLEVEL_OUTOF10: 3
PAINLEVEL_OUTOF10: 0
PAINLEVEL_OUTOF10: 0

## 2017-10-14 ASSESSMENT — PAIN DESCRIPTION - DESCRIPTORS: DESCRIPTORS: HEADACHE

## 2017-10-14 ASSESSMENT — PAIN DESCRIPTION - LOCATION: LOCATION: HEAD

## 2017-10-14 ASSESSMENT — PAIN DESCRIPTION - PAIN TYPE: TYPE: ACUTE PAIN

## 2017-10-14 NOTE — PROGRESS NOTES
pneumoperitoneum or organomegaly. There is no intestinal dilatation or obstructive pattern. Metallic density is noted in the left upper abdomen adjacent to stomach, stable. There is a surgical clip in the pelvis. No intestinal dilatation or obstructive pattern is identified. Ct Abdomen Pelvis W Iv Contrast    Result Date: 10/13/2017  EXAMINATION: CT OF THE ABDOMEN AND PELVIS WITH CONTRAST, 10/13/2017 2:47 pm TECHNIQUE: CT of the abdomen and pelvis was performed with the administration of intravenous contrast. Multiplanar reformatted images are provided for review. Dose modulation, iterative reconstruction, and/or weight based adjustment of the mA/kV was utilized to reduce the radiation dose to as low as reasonably achievable. COMPARISON: 09/08/2017 HISTORY: ORDERING SYSTEM PROVIDED HISTORY: Upper abdominal pain, TECHNOLOGIST PROVIDED HISTORY: Ordering Physician Provided Reason for Exam: Severe abdominal and back pain that started this morning. History of bowel perforation, 14 inches of colon removed, abcess behind uterus, vaginal fistula, splenic aneurysm repair. Acuity: Acute Type of Exam: Initial FINDINGS: Lower Chest:  Visualized portion of the lower chest demonstrates no acute abnormality. Organs: The liver is fatty infiltrated. The spleen, gallbladder, adrenal glands and pancreas are normal.  The kidneys enhance normally with stable right renal midpole cyst measuring 9.6 mm. The left kidney demonstrates a stable parapelvic cyst measuring 1.4 cm and cortical cyst involving the inferior pole measuring 7.7 mm. GI/Bowel: Postsurgical anastomosis involving the sigmoid colon from partial resection. The small bowel loops are fluid-filled and dilated. Findings represent either developing ileus or partial small bowel obstruction. Question transition point within the central abdomen. Pelvis: The bladder and rectum are normal. Peritoneum/Retroperitoneum: No intraperitoneal free air or free fluid.   No evidence of mesenteric or retroperitoneal lymphadenopathy. Bones/Soft Tissues: No suspicious lytic or blastic osseous lesion. 1. Dilated loops of small bowel representing either developing partial small bowel obstruction or ileus. Recommend follow-up radiographic imaging. 2. Fatty liver. 3. Bilateral renal cysts. Assessment :   1. Ileus/resolving/tolerating reg diet/surgery seeing  2. htn     Plan:   1. Will d/c pt home as surgery as oked pt to be d/c  2.   Follow up with pcp in 1 week    Patient Active Problem List:     Gastroesophageal reflux disease     Hypothyroidism     Essential hypertension     Osteoarthritis     Hyperlipidemia     DJD (degenerative joint disease), lumbar     Acute diverticulitis of intestine     Hyperplastic colon polyp     Tinnitus     Sciatica of right side     Splenic artery aneurysm (HCC)     Presence of endovascular aneurysm coil compatible with safe magnetic resonance imaging     DVT (deep venous thrombosis) (HCC)     LGSIL (low grade squamous intraepithelial dysplasia)     Sinusitis, chronic     Asthma     Non-celiac gluten sensitivity     Severe persistent asthma     Diabetes mellitus, type 2 (HCC)     Varicose veins of bilateral lower extremities with pain     Bowel perforation (HCC)     Bronchitis     Diverticulitis of large intestine with abscess without bleeding     Colovaginal fistula     Abdominal pain     Intra-abdominal abscess (HCC)     Partial small bowel obstruction     Abdominal pain, epigastric      Anticipated Disposition upon discharge: [x] Home                                                                         [] Home with Home Health                                                                         [] Rony Jim                                                                         [] [address] [facility] [address]      Patient is admitted as inpatient status because of co-morbidities listed above, severity of signs and symptoms as

## 2017-10-14 NOTE — PROGRESS NOTES
Pt tolerates supper well. No c/o nausea/vomiting. Pt has had three bowel movements today. Cleared for discharge.

## 2017-10-14 NOTE — PROGRESS NOTES
Dr Renee Yanez contacted for pt c/o heartburn. Verbal order over the phone to give 40 mg of IV Protonix - order placed per verbal read back. N/G tube to be clamped for 4 hrs to see how patient tolerates it. Dr to be in later to see pt.

## 2017-10-14 NOTE — PROGRESS NOTES
Feeling much better today. Tolerated NG clamping and xray looked normal.    Started on clear liquids after NG was pulled. She is tolerating those without a problem. SBO vs ileus, seems to be resolved;  I she continues to tolerate he diet, I think she could go home.

## 2017-10-14 NOTE — PROGRESS NOTES
Pt refuses all morning meds, except Lovenox, due to placement of N/G tube and unable to swallow meds.

## 2017-10-14 NOTE — PROGRESS NOTES
NG tube removed. Pt tolerated removal well. A total of 250 mL was recorded prior to removing it from the charting. 250 mL total was based on the time it was put in, until the time it was removed - this information was given to Dr John Pimentel.

## 2017-10-14 NOTE — PLAN OF CARE
Problem: SAFETY  Goal: Free from accidental physical injury  Outcome: Ongoing    Goal: Free from intentional harm  Outcome: Ongoing      Problem: DAILY CARE  Goal: Daily care needs are met  Outcome: Ongoing      Problem: PAIN  Goal: Patient's pain/discomfort is manageable  Outcome: Ongoing      Problem: SKIN INTEGRITY  Goal: Skin integrity is maintained or improved  Outcome: Ongoing      Problem: KNOWLEDGE DEFICIT  Goal: Patient/S.O. demonstrates understanding of disease process, treatment plan, medications, and discharge instructions.   Outcome: Ongoing      Problem: DISCHARGE BARRIERS  Goal: Patient's continuum of care needs are met  Outcome: Ongoing      Problem: Fluid Volume - Imbalance:  Goal: Absence of imbalanced fluid volume signs and symptoms  Absence of imbalanced fluid volume signs and symptoms   Outcome: Ongoing      Problem: Nausea/Vomiting:  Goal: Absence of nausea/vomiting  Absence of nausea/vomiting   Outcome: Ongoing    Goal: Able to drink  Able to drink   Outcome: Ongoing    Goal: Able to eat  Able to eat   Outcome: Ongoing    Goal: Ability to achieve adequate nutritional intake will improve  Ability to achieve adequate nutritional intake will improve   Outcome: Ongoing

## 2017-10-14 NOTE — PLAN OF CARE
Problem: SAFETY  Goal: Free from accidental physical injury  Outcome: Ongoing    Goal: Free from intentional harm  Outcome: Ongoing      Problem: DAILY CARE  Goal: Daily care needs are met  Outcome: Ongoing      Problem: PAIN  Goal: Patient's pain/discomfort is manageable  Outcome: Ongoing      Problem: SKIN INTEGRITY  Goal: Skin integrity is maintained or improved  Outcome: Ongoing      Problem: KNOWLEDGE DEFICIT  Goal: Patient/S.O. demonstrates understanding of disease process, treatment plan, medications, and discharge instructions. Outcome: Ongoing      Problem: DISCHARGE BARRIERS  Goal: Patient's continuum of care needs are met  Outcome: Ongoing      Problem: Fluid Volume - Imbalance:  Goal: Absence of imbalanced fluid volume signs and symptoms  Absence of imbalanced fluid volume signs and symptoms   Outcome: Ongoing      Problem: Nausea/Vomiting:  Goal: Absence of nausea/vomiting  Absence of nausea/vomiting   Outcome: Ongoing    Goal: Able to drink  Able to drink   Outcome: Ongoing    Goal: Able to eat  Able to eat   Outcome: Ongoing      Problem: Pain:  Goal: Pain level will decrease  Pain level will decrease   Outcome: Ongoing    Goal: Control of acute pain  Control of acute pain   Outcome: Ongoing    Goal: Control of chronic pain  Control of chronic pain   Outcome: Ongoing      Problem: Falls - Risk of  Goal: Absence of falls  Outcome: Ongoing      Problem:  Bowel/Gastric:  Goal: Control of bowel function will improve  Control of bowel function will improve  Outcome: Ongoing    Goal: Ability to achieve a regular elimination pattern will improve  Ability to achieve a regular elimination pattern will improve  Outcome: Ongoing      Problem: Nutritional:  Goal: Ability to follow a diet with enough fiber (20 to 30 grams) for normal bowel function will improve  Ability to follow a diet with enough fiber (20 to 30 grams) for normal bowel function will improve  Outcome: Ongoing      Problem: Skin Integrity:  Goal: Risk for impaired skin integrity will decrease  Risk for impaired skin integrity will decrease  Outcome: Ongoing

## 2017-10-16 ENCOUNTER — TELEPHONE (OUTPATIENT)
Dept: SURGERY | Age: 57
End: 2017-10-16

## 2017-10-16 ENCOUNTER — TELEPHONE (OUTPATIENT)
Dept: FAMILY MEDICINE CLINIC | Age: 57
End: 2017-10-16

## 2017-10-16 LAB
EKG ATRIAL RATE: 76 BPM
EKG P AXIS: 48 DEGREES
EKG P-R INTERVAL: 148 MS
EKG Q-T INTERVAL: 434 MS
EKG QRS DURATION: 86 MS
EKG QTC CALCULATION (BAZETT): 488 MS
EKG R AXIS: 23 DEGREES
EKG T AXIS: 29 DEGREES
EKG VENTRICULAR RATE: 76 BPM

## 2017-10-16 NOTE — TELEPHONE ENCOUNTER
[address] [facility] Discharge Phone [phone] [facility] Transitions Initial Follow Up Call    Call within 2 business days of discharge: Yes  Attempt 1 10-16-17  attepmt 2 10-17-17  1. Hospital Information    Discharged from: [facility]      Discharge to home  Discharge Date: 10/14/17   Admission Date: 10/13/17    Non-face-to-face services provided:  Scheduled appointment with PCP- Dr RAMOS  [facility] records: reviewed    Was instructed to follow up in: 1 week    Hospital Diagnosis: abdominal pain       Hospital Consultants:  Gen Surgeon    Initial contact Date: 10/16/2017  Type of Contact:  by phone      2. Assessment of Current Condition      Questions: How have you felt since discharge from the hospital:   n/a      Did you receive a discharge summary from the hospital? n/a      Are you eating and drinking OK? n/a    Are there any new complaints of pain? n/a    If you have a wound - is the dressing clean, dry, and intact?  n/a    Reinforce Education    Does the patient know -   1. What to do if her symptoms worsen? n/a   2. For what symptoms she should call the doctor?  n/a   3. What symptoms she should get help with right away?  n/a   4. Does she know how to contact her physician?n/a      Are there any questions about the patients medications? n/a   Does the patient have a list of all the medications that were prescribed to be taken after discharge? n/a   Does the patient have all the medications that were prescribed?  n/a   Is the patient taking all the prescribed medications?n/a     Does the patient understand what all the medications are taken for? ( Update medication list and refresh medication smartlinks below)        All Active Meds in Chart - (keep all current active meds, add hospital meds)  Current Outpatient Prescriptions   Medication Sig Dispense Refill    budesonide-formoterol (SYMBICORT) 80-4.5 MCG/ACT AERO 2 puffs inhaled twice daily. Rinse mouth well after use.  3

## 2017-10-17 NOTE — TELEPHONE ENCOUNTER
I am glad she is feeling better. I think her OV in 2 weeks is ok as long as she is feeling better  If she is worried or not feeling right I could work her in sooner. Thanks.

## 2017-10-31 ENCOUNTER — HOSPITAL ENCOUNTER (OUTPATIENT)
Dept: LAB | Age: 57
Setting detail: SPECIMEN
Discharge: HOME | End: 2017-10-31
Payer: COMMERCIAL

## 2017-10-31 ENCOUNTER — OFFICE VISIT (OUTPATIENT)
Dept: SURGERY | Age: 57
End: 2017-10-31

## 2017-10-31 VITALS
TEMPERATURE: 97.3 F | DIASTOLIC BLOOD PRESSURE: 80 MMHG | WEIGHT: 183 LBS | HEART RATE: 98 BPM | BODY MASS INDEX: 28.72 KG/M2 | HEIGHT: 67 IN | SYSTOLIC BLOOD PRESSURE: 104 MMHG

## 2017-10-31 DIAGNOSIS — Z98.890 POST-OPERATIVE STATE: Primary | ICD-10-CM

## 2017-10-31 DIAGNOSIS — Z98.890 POST-OPERATIVE STATE: ICD-10-CM

## 2017-10-31 LAB
HCT VFR BLD CALC: 40.3 % (ref 36–46)
HEMOGLOBIN: 13.4 G/DL (ref 12–16)
MCH RBC QN AUTO: 28.6 PG (ref 26–34)
MCHC RBC AUTO-ENTMCNC: 33.2 G/DL (ref 31–37)
MCV RBC AUTO: 86.2 FL (ref 80–100)
PDW BLD-RTO: 14.3 % (ref 11–14.5)
PLATELET # BLD: 303 K/UL (ref 140–450)
PMV BLD AUTO: 7.2 FL (ref 6–12)
RBC # BLD: 4.68 M/UL (ref 4–5.2)
WBC # BLD: 8.4 K/UL (ref 3.5–11)

## 2017-10-31 PROCEDURE — 85027 COMPLETE CBC AUTOMATED: CPT

## 2017-10-31 PROCEDURE — 36415 COLL VENOUS BLD VENIPUNCTURE: CPT

## 2017-10-31 PROCEDURE — 99024 POSTOP FOLLOW-UP VISIT: CPT | Performed by: SURGERY

## 2017-10-31 RX ORDER — IBUPROFEN 200 MG
200 TABLET ORAL EVERY 6 HOURS PRN
COMMUNITY
End: 2018-03-27

## 2017-11-08 ENCOUNTER — OFFICE VISIT (OUTPATIENT)
Dept: ALLERGY | Age: 57
End: 2017-11-08
Payer: COMMERCIAL

## 2017-11-08 ENCOUNTER — HOSPITAL ENCOUNTER (OUTPATIENT)
Dept: LAB | Age: 57
Setting detail: SPECIMEN
Discharge: HOME | End: 2017-11-08
Payer: COMMERCIAL

## 2017-11-08 ENCOUNTER — OFFICE VISIT (OUTPATIENT)
Dept: INTERNAL MEDICINE | Age: 57
End: 2017-11-08
Payer: COMMERCIAL

## 2017-11-08 VITALS
RESPIRATION RATE: 16 BRPM | SYSTOLIC BLOOD PRESSURE: 130 MMHG | DIASTOLIC BLOOD PRESSURE: 74 MMHG | WEIGHT: 185 LBS | BODY MASS INDEX: 29.03 KG/M2 | HEART RATE: 80 BPM | HEIGHT: 67 IN

## 2017-11-08 VITALS
HEIGHT: 67 IN | WEIGHT: 184 LBS | SYSTOLIC BLOOD PRESSURE: 126 MMHG | DIASTOLIC BLOOD PRESSURE: 80 MMHG | HEART RATE: 88 BPM | BODY MASS INDEX: 28.88 KG/M2

## 2017-11-08 DIAGNOSIS — E11.9 TYPE 2 DIABETES MELLITUS WITHOUT COMPLICATION, WITHOUT LONG-TERM CURRENT USE OF INSULIN (HCC): ICD-10-CM

## 2017-11-08 DIAGNOSIS — E03.9 ACQUIRED HYPOTHYROIDISM: ICD-10-CM

## 2017-11-08 DIAGNOSIS — J32.9 CHRONIC RECURRENT SINUSITIS: ICD-10-CM

## 2017-11-08 DIAGNOSIS — K21.9 LPRD (LARYNGOPHARYNGEAL REFLUX DISEASE): Primary | ICD-10-CM

## 2017-11-08 DIAGNOSIS — K21.9 GASTROESOPHAGEAL REFLUX DISEASE, ESOPHAGITIS PRESENCE NOT SPECIFIED: ICD-10-CM

## 2017-11-08 DIAGNOSIS — E78.2 MIXED HYPERLIPIDEMIA: ICD-10-CM

## 2017-11-08 DIAGNOSIS — I10 ESSENTIAL HYPERTENSION: Primary | ICD-10-CM

## 2017-11-08 DIAGNOSIS — J45.998 ASTHMA, PERSISTENT CONTROLLED: ICD-10-CM

## 2017-11-08 DIAGNOSIS — I10 ESSENTIAL HYPERTENSION: ICD-10-CM

## 2017-11-08 LAB
ESTIMATED AVERAGE GLUCOSE: 108 MG/DL
HBA1C MFR BLD: 5.4 % (ref 4.8–5.9)

## 2017-11-08 PROCEDURE — 36415 COLL VENOUS BLD VENIPUNCTURE: CPT

## 2017-11-08 PROCEDURE — 3014F SCREEN MAMMO DOC REV: CPT | Performed by: NURSE PRACTITIONER

## 2017-11-08 PROCEDURE — 83036 HEMOGLOBIN GLYCOSYLATED A1C: CPT

## 2017-11-08 PROCEDURE — 94010 BREATHING CAPACITY TEST: CPT | Performed by: NURSE PRACTITIONER

## 2017-11-08 PROCEDURE — 99204 OFFICE O/P NEW MOD 45 MIN: CPT | Performed by: INTERNAL MEDICINE

## 2017-11-08 PROCEDURE — 1036F TOBACCO NON-USER: CPT | Performed by: INTERNAL MEDICINE

## 2017-11-08 PROCEDURE — 1111F DSCHRG MED/CURRENT MED MERGE: CPT | Performed by: INTERNAL MEDICINE

## 2017-11-08 PROCEDURE — 3017F COLORECTAL CA SCREEN DOC REV: CPT | Performed by: INTERNAL MEDICINE

## 2017-11-08 PROCEDURE — 99213 OFFICE O/P EST LOW 20 MIN: CPT | Performed by: NURSE PRACTITIONER

## 2017-11-08 PROCEDURE — 3044F HG A1C LEVEL LT 7.0%: CPT | Performed by: INTERNAL MEDICINE

## 2017-11-08 PROCEDURE — 1111F DSCHRG MED/CURRENT MED MERGE: CPT | Performed by: NURSE PRACTITIONER

## 2017-11-08 PROCEDURE — 3014F SCREEN MAMMO DOC REV: CPT | Performed by: INTERNAL MEDICINE

## 2017-11-08 PROCEDURE — 1036F TOBACCO NON-USER: CPT | Performed by: NURSE PRACTITIONER

## 2017-11-08 PROCEDURE — G8484 FLU IMMUNIZE NO ADMIN: HCPCS | Performed by: NURSE PRACTITIONER

## 2017-11-08 PROCEDURE — G8417 CALC BMI ABV UP PARAM F/U: HCPCS | Performed by: INTERNAL MEDICINE

## 2017-11-08 PROCEDURE — 3017F COLORECTAL CA SCREEN DOC REV: CPT | Performed by: NURSE PRACTITIONER

## 2017-11-08 PROCEDURE — G8427 DOCREV CUR MEDS BY ELIG CLIN: HCPCS | Performed by: INTERNAL MEDICINE

## 2017-11-08 PROCEDURE — G8427 DOCREV CUR MEDS BY ELIG CLIN: HCPCS | Performed by: NURSE PRACTITIONER

## 2017-11-08 PROCEDURE — G8417 CALC BMI ABV UP PARAM F/U: HCPCS | Performed by: NURSE PRACTITIONER

## 2017-11-08 PROCEDURE — G8484 FLU IMMUNIZE NO ADMIN: HCPCS | Performed by: INTERNAL MEDICINE

## 2017-11-08 ASSESSMENT — ENCOUNTER SYMPTOMS
DIARRHEA: 0
BACK PAIN: 0
SHORTNESS OF BREATH: 0
EYE PAIN: 0
COUGH: 0
BLOOD IN STOOL: 0
ABDOMINAL PAIN: 0
VOMITING: 0
CONSTIPATION: 0
NAUSEA: 0

## 2017-11-08 NOTE — PROGRESS NOTES
[address]  [facility] INTERNAL MED  [address] [id number]  Dept: [phone]  Dept Fax: [phone]  Loc: [phone]    John Giraldo is a 61 y.o. female who presents today for her medical conditions/complaints as noted below. John Giraldo is c/o of   Chief Complaint   Patient presents with    Established New Doctor    Hypertension    Hyperlipidemia    Diabetes         HPI:     Hypertension   This is a chronic (Essential hypertension) problem. The current episode started more than 1 year ago. The problem has been waxing and waning since onset. The problem is controlled. Pertinent negatives include no chest pain, headaches, neck pain, palpitations or shortness of breath. Hyperlipidemia   This is a chronic problem. The current episode started more than 1 year ago. The problem is controlled. Recent lipid tests were reviewed and are variable. Pertinent negatives include no chest pain or shortness of breath. Diabetes   She presents for her follow-up diabetic visit. She has type 2 diabetes mellitus. Her disease course has been fluctuating. Pertinent negatives for hypoglycemia include no confusion, dizziness, headaches, nervousness/anxiousness or pallor. Pertinent negatives for diabetes include no chest pain, no polydipsia, no polyuria and no weakness. Hemoglobin A1C (%)   Date Value   06/14/2017 5.9   06/30/2016 6.1   02/26/2016 6.7                Microalb/Crt.  Ratio (mcg/mg creat)   Date Value   06/05/2015 CANNOT BE CALCULATED     LDL Cholesterol (mg/dL)   Date Value   06/05/2015 131 (H)   03/03/2015 115   10/28/2014 92     LDL Calculated (mg/dL)   Date Value   05/01/2017 161 (A)   06/30/2016 150   11/12/2015 145         AST (U/L)   Date Value   10/13/2017 18     ALT (U/L)   Date Value   10/13/2017 20     BUN (mg/dL)   Date Value   10/14/2017 11     BP Readings from Last 3 Encounters:   11/08/17 130/74   11/08/17 126/80   10/31/17 104/80              Past Medical Krystian Russell MD at [address] [facility] [place] SCRN NOT  [address] IND N/A 9/8/2017    COLONOSCOPY performed by Harriet Berg MD at [address] [facility] EGD TRANSORAL BIOPSY SINGLE/MULTIPLE N/A 2/27/2017    EGD BIOPSY performed by Harriet Berg MD at [address] N/A 8/30/2017    ATTEMPTED LAPAROCOPY PROCEDURE CONVERTED TO OPEN Sigmoid Colectomy PERFORMED BY DR. VO resection colovaginal fistula PERFORMED BY. DR. Grzegorz Ortez  performed by Harriet Berg MD at [facility] [address] Right 07/2011    TOTAL    TUBAL LIGATION  1991    WISDOM TOOTH EXTRACTION         Family History   Problem Relation Age of Onset    Coronary Art Dis Mother     High Blood Pressure Mother     High Cholesterol Mother     Allergies Mother     Arthritis Mother     Diabetes Daughter      pre diabetes    Other Maternal Grandfather      PUD GI problems       Social History   Substance Use Topics    Smoking status: Never Smoker    Smokeless tobacco: Never Used      Comment: never smoked syant rrt 10/13/2017    Alcohol use No      Comment: Consume about 4 wine coolers a year      Current Outpatient Prescriptions   Medication Sig Dispense Refill    UNABLE TO FIND betamethasone 500 mg, tobramycin 100 mg and fluconazole 100 mg - all diluted in one liter of normal saline. Patient sig: add 20 ml to one irrigation per day.  Barberry-Oreg Grape-Goldenseal (BERBERINE COMPLEX PO) Take by mouth daily      Probiotic Product (SOLUBLE FIBER/PROBIOTICS PO) Take by mouth 2 times daily      ibuprofen (ADVIL;MOTRIN) 200 MG tablet Take 200 mg by mouth every 6 hours as needed for Pain      budesonide-formoterol (SYMBICORT) 80-4.5 MCG/ACT AERO 2 puffs inhaled twice daily. Rinse mouth well after use.  3 Inhaler 3    metFORMIN (GLUCOPHAGE) 500 MG tablet TAKE 2 TABLETS BY MOUTH TWO TIMES A DAY WITH MEALS 120 tablet 3    Ascorbic Acid (VITAMIN C) 1000 MG tablet Take 1,000 mg by mouth 2 times daily      levothyroxine (SYNTHROID) 150 MCG tablet TAKE 1 TABLET DAILY 90 tablet 3    pantoprazole (PROTONIX) 40 MG tablet Take 1 tablet by mouth daily 90 tablet 3    ranitidine (ZANTAC) 300 MG tablet TAKE 1 TABLET ONCE DAILY AT BEDTIME 90 tablet 2    losartan (COZAAR) 25 MG tablet Take 1 tablet by mouth daily 90 tablet 2    fluticasone (FLONASE) 50 MCG/ACT nasal spray 2 sprays by Nasal route daily 3 Bottle 1    Cholecalciferol (VITAMIN D3) 5000 UNITS CAPS Take 2 capsules by mouth daily       nystatin (MYCOSTATIN) 304480 UNIT/GM cream Apply topically 2 times daily. 1 Tube 0     No current facility-administered medications for this visit. Allergies   Allergen Reactions    Codeine Nausea And Vomiting and Other (See Comments)     Chest tightness    Darvon [Propoxyphene] Nausea And Vomiting and Other (See Comments)     Chest tightness    Lisinopril Other (See Comments)     COUGH    Percocet [Oxycodone-Acetaminophen] Nausea And Vomiting and Other (See Comments)     Chest tightness       Health Maintenance   Topic Date Due    Diabetic retinal exam  06/18/2016    Diabetic foot exam  01/21/2017    Cervical cancer screen  06/26/2017    Flu vaccine (1) 12/08/2017 (Originally 9/1/2017)    Hepatitis C screen  12/07/2036 (Originally 1960)    HIV screen  12/07/2036 (Originally 1/22/1974)    Lipid screen  05/01/2018    Diabetic hemoglobin A1C test  06/14/2018    Diabetic microalbuminuria test  06/14/2018    Breast cancer screen  05/19/2019    DTaP/Tdap/Td vaccine (2 - Td) 08/30/2019    Colon cancer screen colonoscopy  09/08/2022    Pneumococcal med risk  Completed       Subjective:      Review of Systems   Constitutional: Negative for chills and fever. HENT: Negative for hearing loss. Eyes: Negative for pain and visual disturbance. Respiratory: Negative for cough and shortness of breath. Cardiovascular: Negative for chest pain, palpitations and leg swelling.    Gastrointestinal: Negative for

## 2017-11-08 NOTE — PROGRESS NOTES
Symptoms experienced by patient  Runny nose  Yes Circles under eyes No Post nasal drip Yes Difficulty breathing No   Stuffy nose Yes Grumpiness No Hoarseness Yes Short of breath No   Sniffling  Yes Fatigue Yes Face pain/pressure Yes Tight/heavy chest No   Sneezing Yes Nosebleeds No Sore throat Yes cough yes   Blowing nose Yes Nasal/sinus surgery Yes Headache  Yes Cough up mucus Yes   Itchy/teary eyes No Nasal polyps No Upper teeth hurt No Cough of blood No   Itchy ears No Hearing loss Yes Night cough No Chest pain No   Itchy nose No Ear problems No Throat clearing yes Asthma No   Itchy throat No Tubes in ears No Bad breath No Wheezing Yes   Itchy roof of mouth No Snoring yes Bad taste in mouth No Pneumonia No   Nose rubbing No Mouth breathing No  Ankle swelling No    Overbite No  Difficulty breathing on exertion No    Drooling (toddler) No             Does patient experience? Heartburn and indigestion Yes  Vomiting easily Yes  Use of antacids (Rolaids, Tums, Maalox) Yes  Regurgitation of stomach contents Yes  Chronic cough worse after meals No  Chronic cough cough worse after laying down No  Chronic hoarseness or voice change Yes  Chest pain Yes  Stomach pain No  Neck pain No  Sore throat-frequent No  Feeling of throat closing or something stuck in throat No  Frequent burps or hiccups No  Adult onset asthma No  Asthma not relieved with usual treatment No  Anemia No  Asthma worse after meals, alcohol, lying down, bending to tie shoes, or after heartburn No  Chronic sinus disease Yes    Within the last month, how did the following problems affect the patient?   0=No Problem; 5=Severe Problem    Hoarseness or a problem with your voice 3  Clearing your throat 3  Excess throat mucus or postnasal drip 3  Difficulty swallowing food, liquids, pills 3  Coughing after you ate or after lying down 3  Breathing difficulties or choking episodes 0  Troublesome or annoying cough 0  Sensations of something sticking in your throat

## 2017-11-08 NOTE — PROGRESS NOTES
Subjective:      Patient ID: Tremaine Dumont is a 61 y.o. female. HPI Presents for follow up. She had a series of hospitalizations recently for resection of a colon perforation. She says she is just this week feeling like she's \"getting back to being myself. \"  She did not have any problems with breathing during her hospitalizations. She has noticed an increase in sinus symptoms she believes to be related to not being able to stay on a Paleo diet with she was in the hospital.  She has no concerns today other than needing her sinus rinse solution refilled. Review of Systems   See symptom questionnaires and history as above. All other systems reviewed and are negative as pertaining to this appointment. Grade in school/occupation: 's Services  Patient lives with:     Objective:   Physical Exam     Constitutional  Appears stated age. Head normocephalic and atraumatic. Psych  Alert and oriented. Pleasant and cooperative. Chest  Rises and falls symmetrically with no evidence of respiratory distress. Lungs  0/40 - no wheeze or other adventitious breath sounds    Nose  Pallor: 2-Healthy/Pink    Bogginess: 2-turbinate occupies less than or equal to 50% of the diameter of naris    Wetness: 2-scant secretions    Redness: 2-healthy pink    Mouth  Pharynx clear, uvula midline, dentition WDL. Ears  Ear canals WDL, TM's dereck grey with visible light reflex. Eyes  Pupils equal and reactive. EOM's WDL. Heart  Heart rate regular. Rhythm without murmur, click, rub or gallop. Skin  Warm, dry and intact. Neck  Supple. ROM WDL for age. No lymphadenopathy or thyromegaly. Musculoskeletal  ROM WDL for stated age. Extremities without clubbing, cyanosis or edema.      Assessment:      LPR  GERD  History of chronic, recurrent sinusitis  Dyspnea and cough secondary to LPR      Plan:      Continue present medications  Continue sinus rinses - additive solution refilled  RV 6 months

## 2017-12-05 ENCOUNTER — OFFICE VISIT (OUTPATIENT)
Dept: SURGERY | Age: 57
End: 2017-12-05

## 2017-12-05 VITALS
DIASTOLIC BLOOD PRESSURE: 60 MMHG | WEIGHT: 184 LBS | HEART RATE: 84 BPM | SYSTOLIC BLOOD PRESSURE: 150 MMHG | HEIGHT: 67 IN | TEMPERATURE: 97.6 F | BODY MASS INDEX: 28.88 KG/M2

## 2017-12-05 DIAGNOSIS — Z98.890 POST-OPERATIVE STATE: Primary | ICD-10-CM

## 2017-12-05 PROCEDURE — 99024 POSTOP FOLLOW-UP VISIT: CPT | Performed by: SURGERY

## 2017-12-05 NOTE — PROGRESS NOTES
Patient is a 67-year-old female who underwent a sigmoid colectomy and repair of a colovaginal fistula secondary to diverticulosis. This was done around the first week of September. She had a very significant amount of inflammation. The first surgery. She had a postop abscess had to be drained. She had a rough postop. .    Today, she is doing better. Her energy level is coming back. She is eating well, having normal bowel movements. She does have a vaginal discharge which is minimal, but she is worried about it with her previous fistula. At this point, we will continue to encourage her that it'll take a good 6 months to get a full recovery. We will see her back in March. We will also get her an OB/GYN consult to evaluate the vaginal discharge.

## 2017-12-15 ENCOUNTER — OFFICE VISIT (OUTPATIENT)
Dept: OBGYN | Age: 57
End: 2017-12-15
Payer: COMMERCIAL

## 2017-12-15 ENCOUNTER — HOSPITAL ENCOUNTER (OUTPATIENT)
Age: 57
Setting detail: SPECIMEN
Discharge: HOME | End: 2017-12-15
Payer: COMMERCIAL

## 2017-12-15 VITALS
BODY MASS INDEX: 29.19 KG/M2 | HEIGHT: 67 IN | HEART RATE: 72 BPM | WEIGHT: 186 LBS | SYSTOLIC BLOOD PRESSURE: 132 MMHG | DIASTOLIC BLOOD PRESSURE: 84 MMHG | RESPIRATION RATE: 16 BRPM

## 2017-12-15 DIAGNOSIS — N89.8 VAGINAL DISCHARGE: Primary | ICD-10-CM

## 2017-12-15 DIAGNOSIS — N89.8 VAGINAL DISCHARGE: ICD-10-CM

## 2017-12-15 PROCEDURE — 1036F TOBACCO NON-USER: CPT | Performed by: OBSTETRICS & GYNECOLOGY

## 2017-12-15 PROCEDURE — G8417 CALC BMI ABV UP PARAM F/U: HCPCS | Performed by: OBSTETRICS & GYNECOLOGY

## 2017-12-15 PROCEDURE — 87070 CULTURE OTHR SPECIMN AEROBIC: CPT

## 2017-12-15 PROCEDURE — 3017F COLORECTAL CA SCREEN DOC REV: CPT | Performed by: OBSTETRICS & GYNECOLOGY

## 2017-12-15 PROCEDURE — 3014F SCREEN MAMMO DOC REV: CPT | Performed by: OBSTETRICS & GYNECOLOGY

## 2017-12-15 PROCEDURE — G8427 DOCREV CUR MEDS BY ELIG CLIN: HCPCS | Performed by: OBSTETRICS & GYNECOLOGY

## 2017-12-15 PROCEDURE — 99213 OFFICE O/P EST LOW 20 MIN: CPT | Performed by: OBSTETRICS & GYNECOLOGY

## 2017-12-15 PROCEDURE — G8484 FLU IMMUNIZE NO ADMIN: HCPCS | Performed by: OBSTETRICS & GYNECOLOGY

## 2017-12-19 LAB
CULTURE: ABNORMAL
Lab: ABNORMAL
SPECIMEN DESCRIPTION: ABNORMAL
SPECIMEN DESCRIPTION: ABNORMAL
STATUS: ABNORMAL

## 2017-12-26 DIAGNOSIS — J32.4 CHRONIC PANSINUSITIS: ICD-10-CM

## 2017-12-27 RX ORDER — FLUTICASONE PROPIONATE 50 MCG
SPRAY, SUSPENSION (ML) NASAL
Qty: 3 BOTTLE | Refills: 3 | Status: SHIPPED | OUTPATIENT
Start: 2017-12-27 | End: 2019-04-01 | Stop reason: SDUPTHER

## 2018-02-22 ENCOUNTER — TELEPHONE (OUTPATIENT)
Dept: INTERNAL MEDICINE | Age: 58
End: 2018-02-22

## 2018-02-22 ENCOUNTER — PATIENT MESSAGE (OUTPATIENT)
Dept: INTERNAL MEDICINE | Age: 58
End: 2018-02-22

## 2018-02-22 DIAGNOSIS — M25.561 BILATERAL CHRONIC KNEE PAIN: Primary | ICD-10-CM

## 2018-02-22 DIAGNOSIS — M25.562 BILATERAL CHRONIC KNEE PAIN: Primary | ICD-10-CM

## 2018-02-22 DIAGNOSIS — G89.29 BILATERAL CHRONIC KNEE PAIN: Primary | ICD-10-CM

## 2018-02-22 RX ORDER — DICLOFENAC SODIUM 75 MG/1
75 TABLET, DELAYED RELEASE ORAL 2 TIMES DAILY PRN
Qty: 90 TABLET | Refills: 2 | Status: SHIPPED | OUTPATIENT
Start: 2018-02-22 | End: 2022-10-27

## 2018-02-26 LAB
AVERAGE GLUCOSE: NORMAL
AVERAGE GLUCOSE: NORMAL
CHOLESTEROL, TOTAL: 257 MG/DL
CHOLESTEROL, TOTAL: 257 MG/DL
CHOLESTEROL/HDL RATIO: 4.8
CHOLESTEROL/HDL RATIO: ABNORMAL
HBA1C MFR BLD: 5.6 %
HBA1C MFR BLD: 5.6 %
HDLC SERPL-MCNC: 54 MG/DL (ref 35–70)
HDLC SERPL-MCNC: 54 MG/DL (ref 35–70)
LDL CHOLESTEROL CALCULATED: 164 MG/DL (ref 0–160)
LDL CHOLESTEROL CALCULATED: 164 MG/DL (ref 0–160)
TRIGL SERPL-MCNC: 229 MG/DL
TRIGL SERPL-MCNC: 229 MG/DL
VLDLC SERPL CALC-MCNC: 203 MG/DL
VLDLC SERPL CALC-MCNC: ABNORMAL MG/DL

## 2018-03-06 ENCOUNTER — TELEPHONE (OUTPATIENT)
Dept: PRIMARY CARE CLINIC | Age: 58
End: 2018-03-06

## 2018-03-27 ENCOUNTER — OFFICE VISIT (OUTPATIENT)
Dept: SURGERY | Age: 58
End: 2018-03-27

## 2018-03-27 VITALS
HEART RATE: 78 BPM | HEIGHT: 67 IN | SYSTOLIC BLOOD PRESSURE: 126 MMHG | DIASTOLIC BLOOD PRESSURE: 90 MMHG | WEIGHT: 194 LBS | BODY MASS INDEX: 30.45 KG/M2

## 2018-03-27 DIAGNOSIS — Z98.890 POST-OPERATIVE STATE: Primary | ICD-10-CM

## 2018-03-27 PROCEDURE — 99024 POSTOP FOLLOW-UP VISIT: CPT | Performed by: SURGERY

## 2018-03-27 NOTE — PROGRESS NOTES
Patient is about 6 months out from her sigmoid colectomy for diverticulitis and rectovaginal fistula. She is doing fairly well. She finally has gotten back to her baseline according to her. After about 6 months. She has occasional twinges of pain and a questionable vaginal discharge times. She was seen by OB/GYN and they did not think there was a definite fistula. At this point. Abdomen is completely benign. We will continue to observe her and I will see her on a when necessary basis.

## 2018-05-16 ENCOUNTER — OFFICE VISIT (OUTPATIENT)
Dept: ALLERGY | Age: 58
End: 2018-05-16
Payer: COMMERCIAL

## 2018-05-16 VITALS
DIASTOLIC BLOOD PRESSURE: 88 MMHG | HEIGHT: 67 IN | SYSTOLIC BLOOD PRESSURE: 132 MMHG | BODY MASS INDEX: 29.82 KG/M2 | HEART RATE: 98 BPM | WEIGHT: 190 LBS

## 2018-05-16 DIAGNOSIS — J45.998 ASTHMA, PERSISTENT CONTROLLED: ICD-10-CM

## 2018-05-16 DIAGNOSIS — K21.9 LPRD (LARYNGOPHARYNGEAL REFLUX DISEASE): Primary | ICD-10-CM

## 2018-05-16 DIAGNOSIS — J32.9 CHRONIC RECURRENT SINUSITIS: ICD-10-CM

## 2018-05-16 DIAGNOSIS — K21.9 GASTROESOPHAGEAL REFLUX DISEASE, ESOPHAGITIS PRESENCE NOT SPECIFIED: ICD-10-CM

## 2018-05-16 PROCEDURE — 3017F COLORECTAL CA SCREEN DOC REV: CPT | Performed by: NURSE PRACTITIONER

## 2018-05-16 PROCEDURE — 1036F TOBACCO NON-USER: CPT | Performed by: NURSE PRACTITIONER

## 2018-05-16 PROCEDURE — G8417 CALC BMI ABV UP PARAM F/U: HCPCS | Performed by: NURSE PRACTITIONER

## 2018-05-16 PROCEDURE — G8427 DOCREV CUR MEDS BY ELIG CLIN: HCPCS | Performed by: NURSE PRACTITIONER

## 2018-05-16 PROCEDURE — 99213 OFFICE O/P EST LOW 20 MIN: CPT | Performed by: NURSE PRACTITIONER

## 2018-05-16 RX ORDER — RANITIDINE 300 MG/1
TABLET ORAL
Qty: 90 TABLET | Refills: 2 | Status: SHIPPED | OUTPATIENT
Start: 2018-05-16 | End: 2019-04-01 | Stop reason: SDUPTHER

## 2018-05-16 RX ORDER — PANTOPRAZOLE SODIUM 40 MG/1
40 TABLET, DELAYED RELEASE ORAL DAILY
Qty: 90 TABLET | Refills: 3 | Status: SHIPPED | OUTPATIENT
Start: 2018-05-16 | End: 2018-09-25 | Stop reason: SDUPTHER

## 2018-06-11 ENCOUNTER — OFFICE VISIT (OUTPATIENT)
Dept: OPHTHALMOLOGY | Age: 58
End: 2018-06-11
Payer: COMMERCIAL

## 2018-06-11 DIAGNOSIS — H25.813 COMBINED FORMS OF AGE-RELATED CATARACT OF BOTH EYES: ICD-10-CM

## 2018-06-11 DIAGNOSIS — Z98.890 HISTORY OF RADIAL KERATOTOMY: ICD-10-CM

## 2018-06-11 DIAGNOSIS — E11.9 DIABETES TYPE 2, NO OCULAR INVOLVEMENT (HCC): Primary | ICD-10-CM

## 2018-06-11 PROCEDURE — 99214 OFFICE O/P EST MOD 30 MIN: CPT | Performed by: OPHTHALMOLOGY

## 2018-06-11 RX ORDER — BENOXINATE HCL/FLUORESCEIN SOD 0.4%-0.25%
1 DROPS OPHTHALMIC (EYE) ONCE
Status: COMPLETED | OUTPATIENT
Start: 2018-06-11 | End: 2018-06-11

## 2018-06-11 RX ORDER — TROPICAMIDE 10 MG/ML
1 SOLUTION/ DROPS OPHTHALMIC ONCE
Status: COMPLETED | OUTPATIENT
Start: 2018-06-11 | End: 2018-06-11

## 2018-06-11 RX ORDER — PHENYLEPHRINE HCL 2.5 %
1 DROPS OPHTHALMIC (EYE) ONCE
Status: COMPLETED | OUTPATIENT
Start: 2018-06-11 | End: 2018-06-11

## 2018-06-11 RX ADMIN — Medication 1 DROP: at 14:16

## 2018-06-11 RX ADMIN — TROPICAMIDE 1 DROP: 10 SOLUTION/ DROPS OPHTHALMIC at 14:16

## 2018-06-11 ASSESSMENT — TONOMETRY
IOP_METHOD: TONOPEN
OS_IOP_MMHG: 16
OD_IOP_MMHG: 17

## 2018-06-11 ASSESSMENT — VISUAL ACUITY
OS_SC: 20/30
METHOD: SNELLEN - LINEAR
OD_SC: 20/25

## 2018-06-11 ASSESSMENT — SLIT LAMP EXAM - LIDS
COMMENTS: MILD BLEPHARITIS. MILD MGD
COMMENTS: MILD BLEPHARITIS. MILD MGD

## 2018-06-15 ENCOUNTER — HOSPITAL ENCOUNTER (OUTPATIENT)
Dept: MAMMOGRAPHY | Age: 58
Discharge: HOME | End: 2018-06-17
Payer: COMMERCIAL

## 2018-06-15 DIAGNOSIS — Z12.31 VISIT FOR SCREENING MAMMOGRAM: ICD-10-CM

## 2018-06-15 PROCEDURE — 77063 BREAST TOMOSYNTHESIS BI: CPT

## 2018-08-20 DIAGNOSIS — J45.998 ASTHMA, PERSISTENT CONTROLLED: ICD-10-CM

## 2018-08-20 RX ORDER — BUDESONIDE AND FORMOTEROL FUMARATE DIHYDRATE 80; 4.5 UG/1; UG/1
AEROSOL RESPIRATORY (INHALATION)
Qty: 3 INHALER | Refills: 3 | Status: SHIPPED | OUTPATIENT
Start: 2018-08-20 | End: 2018-10-17

## 2018-08-20 NOTE — TELEPHONE ENCOUNTER
From: Ever Isaac  Sent: 8/16/2018 5:08 PM EDT  Subject: Medication Renewal Request    Brooklynn Harp.  Fabien Mancia would like a refill of the following medications:     budesonide-formoterol (SYMBICORT) 80-4.5 MCG/ACT LYNNE Wilson, APRN - CNP]    Preferred pharmacy: [facility] [place] , [address] [facility] [phone] - F [phone]

## 2018-09-14 ENCOUNTER — OFFICE VISIT (OUTPATIENT)
Dept: PRIMARY CARE CLINIC | Age: 58
End: 2018-09-14
Payer: COMMERCIAL

## 2018-09-14 VITALS
DIASTOLIC BLOOD PRESSURE: 68 MMHG | BODY MASS INDEX: 31.92 KG/M2 | WEIGHT: 203.4 LBS | HEART RATE: 96 BPM | SYSTOLIC BLOOD PRESSURE: 134 MMHG | TEMPERATURE: 97.2 F | OXYGEN SATURATION: 98 % | HEIGHT: 67 IN

## 2018-09-14 DIAGNOSIS — J01.40 ACUTE NON-RECURRENT PANSINUSITIS: Primary | ICD-10-CM

## 2018-09-14 PROCEDURE — 3017F COLORECTAL CA SCREEN DOC REV: CPT | Performed by: FAMILY MEDICINE

## 2018-09-14 PROCEDURE — 1036F TOBACCO NON-USER: CPT | Performed by: FAMILY MEDICINE

## 2018-09-14 PROCEDURE — 99214 OFFICE O/P EST MOD 30 MIN: CPT | Performed by: FAMILY MEDICINE

## 2018-09-14 PROCEDURE — G8417 CALC BMI ABV UP PARAM F/U: HCPCS | Performed by: FAMILY MEDICINE

## 2018-09-14 PROCEDURE — G8427 DOCREV CUR MEDS BY ELIG CLIN: HCPCS | Performed by: FAMILY MEDICINE

## 2018-09-14 RX ORDER — AMOXICILLIN 500 MG/1
500 CAPSULE ORAL 2 TIMES DAILY
Qty: 20 CAPSULE | Refills: 0 | Status: SHIPPED | OUTPATIENT
Start: 2018-09-14 | End: 2018-09-25 | Stop reason: ALTCHOICE

## 2018-09-14 ASSESSMENT — ENCOUNTER SYMPTOMS
NAUSEA: 0
DIARRHEA: 0
CHOKING: 0
CHEST TIGHTNESS: 1
TROUBLE SWALLOWING: 0
SORE THROAT: 0
COUGH: 1
WHEEZING: 1
CONSTIPATION: 0
SHORTNESS OF BREATH: 1
SINUS PRESSURE: 1

## 2018-09-14 NOTE — PROGRESS NOTES
Sarahi [address]  [address] [facility]  Dept: [phone]  Dept Fax: [phone]  Loc: [phone]    Panchito Cook is a 60 y.o. female who presents today for her medical conditions/complaints as noted below. Panchito Cook is c/o of   Chief Complaint   Patient presents with    Cough     chest congestion, feels tight on left side, productive cough, possible bronchitis, using albuterol every 4 hours       HPI:     HPI Here today for an asthma flare up. She was recently seen in [facility] and she was diagnosed with an asthma flare up. At that time she refused a CXR and antibiotics. She was recently treated with prednisone and albuterol. She was out of town in a high pollen environment, ran out of her nasal spray and she has been on vacation.        Past Medical History:   Diagnosis Date    Bilateral corneal scars     Diverticulitis     ON COLONSCOPY    DJD (degenerative joint disease), lumbar     Dry eye syndrome     DVT (deep venous thrombosis) (HCC)     after splenic artery repair in right calf    Gastro - esophageal reflux disease     Hyperlipidemia     HIGH CHOLESTEROL/HIGH TRIGLYCERIDES    Hyperplastic colon polyp     Hypertension     Hypothyroidism     LGSIL (low grade squamous intraepithelial dysplasia) 11/2014    referred to GYN/S/P colpo with bx LGSIL, repeat pap in 6 months    Non-celiac gluten sensitivity     wheezing is directly proportionate to wheat intake    Osteoarthritis 2004    RT KNEE S/P INJURY    Sciatica of right side     Severe persistent asthma     patient has seen that wheat consumption causes exacerbations    Splenic artery aneurysm (HCC)     s/p splenic artery repair    Tinnitus     Type 2 diabetes mellitus, controlled ([facility] [address] 5/2011          Social History   Substance Use Topics    Smoking status: Never Smoker    Smokeless tobacco: Never Used      Comment: never smoked syant rrt 10/13/2017    Alcohol use No Comment: Consume about 4 wine coolers a year      Current Outpatient Prescriptions   Medication Sig Dispense Refill    amoxicillin (AMOXIL) 500 MG capsule Take 1 capsule by mouth 2 times daily 20 capsule 0    budesonide-formoterol (SYMBICORT) 80-4.5 MCG/ACT AERO 2 puffs inhaled twice daily. Rinse mouth well after use. 3 Inhaler 3    pantoprazole (PROTONIX) 40 MG tablet Take 1 tablet by mouth daily 90 tablet 3    ranitidine (ZANTAC) 300 MG tablet TAKE 1 TABLET ONCE DAILY AT BEDTIME 90 tablet 2    diclofenac (VOLTAREN) 75 MG EC tablet Take 1 tablet by mouth 2 times daily as needed for Pain 90 tablet 2    fluticasone (FLONASE) 50 MCG/ACT nasal spray Two sprays to each nostril once daily 3 Bottle 3    UNABLE TO FIND betamethasone 500 mg, tobramycin 100 mg and fluconazole 100 mg - all diluted in one liter of normal saline. Patient sig: add 20 ml to one irrigation per day.  Barberry-Oreg Grape-Goldenseal (BERBERINE COMPLEX PO) Take by mouth daily      Probiotic Product (SOLUBLE FIBER/PROBIOTICS PO) Take by mouth 2 times daily      metFORMIN (GLUCOPHAGE) 500 MG tablet TAKE 2 TABLETS BY MOUTH TWO TIMES A DAY WITH MEALS 120 tablet 3    levothyroxine (SYNTHROID) 150 MCG tablet TAKE 1 TABLET DAILY (Patient taking differently: Take 175 mcg by mouth daily TAKE 1 TABLET DAILY) 90 tablet 3    losartan (COZAAR) 25 MG tablet Take 1 tablet by mouth daily 90 tablet 2    Cholecalciferol (VITAMIN D3) 5000 UNITS CAPS Take 2 capsules by mouth daily        No current facility-administered medications for this visit.         Allergies   Allergen Reactions    Codeine Nausea And Vomiting and Other (See Comments)     Chest tightness    Darvon [Propoxyphene] Nausea And Vomiting and Other (See Comments)     Chest tightness    Lisinopril Other (See Comments)     COUGH    Percocet [Oxycodone-Acetaminophen] Nausea And Vomiting and Other (See Comments)     Chest tightness       Subjective:      Review of Systems °F (36.2 °C) (Tympanic)   Ht 5' 7\" (1.702 m)   Wt 203 lb 6.4 oz (92.3 kg)   LMP 04/29/2009 (Approximate)   SpO2 98%   BMI 31.86 kg/m²     Assessment:       Diagnosis Orders   1. Acute non-recurrent pansinusitis               Plan:       Sinusitis: New; I will treat with amoxicillin. she was also advised to use flonase, nasal saline and mucinex for her congestion. Return if symptoms worsen or fail to improve. Orders Placed This Encounter   Medications    amoxicillin (AMOXIL) 500 MG capsule     Sig: Take 1 capsule by mouth 2 times daily     Dispense:  20 capsule     Refill:  0       Patient given educational materials - see patient instructions. Discussed use, benefit, and side effects of prescribed medications. All patient questions answered. Pt voiced understanding. Reviewed health maintenance. Instructed to continue current medications, diet and exercise. Patient agreed with treatment plan. Follow up as directed.      Electronically signed by Leena Ge MD on 9/14/2018 at 3:21 PM

## 2018-09-16 ENCOUNTER — PATIENT MESSAGE (OUTPATIENT)
Dept: ALLERGY | Age: 58
End: 2018-09-16

## 2018-09-17 NOTE — TELEPHONE ENCOUNTER
From: Thao Renee  To: Win Brunson APRN - CNP  Sent: 9/16/2018 5:38 PM EDT  Subject: Non-Urgent Medical Question    Oh Horrors, my sinus and asthma and flared up badly. Lots of inflammation and congestion. Went to [facility] and they gave me Prednisone 20 mg graduated down for 7 days (finished 9-15) but chest got super tight, while on business meeting in [place], went to [facility] and they gave me two treatments of Albuterol and Albuerol inhaler. I got home on Friday 9-14 and ended up in Urgent Care at [facility] and Dr. Arvind Sykes. gave me Amoxicillin. You will see the notes for [address] in the [place]. I am still doing the Neti rinse that you have Carolee compounding for me. I have started using my Nebulizer the last two days. But I still feel so punk. Do you have anything else that you would suggest. I am so frustrated!

## 2018-09-18 ENCOUNTER — OFFICE VISIT (OUTPATIENT)
Dept: OPHTHALMOLOGY | Age: 58
End: 2018-09-18
Payer: COMMERCIAL

## 2018-09-18 DIAGNOSIS — H25.813 COMBINED FORMS OF AGE-RELATED CATARACT OF BOTH EYES: ICD-10-CM

## 2018-09-18 DIAGNOSIS — E11.9 DIABETES TYPE 2, NO OCULAR INVOLVEMENT (HCC): Primary | ICD-10-CM

## 2018-09-18 DIAGNOSIS — Z98.890 HISTORY OF RADIAL KERATOTOMY: ICD-10-CM

## 2018-09-18 PROCEDURE — 2022F DILAT RTA XM EVC RTNOPTHY: CPT | Performed by: OPHTHALMOLOGY

## 2018-09-18 PROCEDURE — 1036F TOBACCO NON-USER: CPT | Performed by: OPHTHALMOLOGY

## 2018-09-18 PROCEDURE — 3017F COLORECTAL CA SCREEN DOC REV: CPT | Performed by: OPHTHALMOLOGY

## 2018-09-18 PROCEDURE — 99213 OFFICE O/P EST LOW 20 MIN: CPT | Performed by: OPHTHALMOLOGY

## 2018-09-18 PROCEDURE — 3044F HG A1C LEVEL LT 7.0%: CPT | Performed by: OPHTHALMOLOGY

## 2018-09-18 PROCEDURE — G8427 DOCREV CUR MEDS BY ELIG CLIN: HCPCS | Performed by: OPHTHALMOLOGY

## 2018-09-18 PROCEDURE — 92250 FUNDUS PHOTOGRAPHY W/I&R: CPT | Performed by: OPHTHALMOLOGY

## 2018-09-18 PROCEDURE — G8417 CALC BMI ABV UP PARAM F/U: HCPCS | Performed by: OPHTHALMOLOGY

## 2018-09-18 ASSESSMENT — SLIT LAMP EXAM - LIDS
COMMENTS: MILD BLEPHARITIS. MILD MGD
COMMENTS: MILD BLEPHARITIS. MILD MGD

## 2018-09-18 ASSESSMENT — VISUAL ACUITY
OS_SC: 20/20
METHOD: SNELLEN - LINEAR
OD_SC: 20/20

## 2018-09-18 ASSESSMENT — TONOMETRY
OS_IOP_MMHG: 20
IOP_METHOD: NON-CONTACT AIR PUFF
OD_IOP_MMHG: 23

## 2018-09-25 ENCOUNTER — OFFICE VISIT (OUTPATIENT)
Dept: FAMILY MEDICINE CLINIC | Age: 58
End: 2018-09-25
Payer: COMMERCIAL

## 2018-09-25 ENCOUNTER — HOSPITAL ENCOUNTER (OUTPATIENT)
Dept: GENERAL RADIOLOGY | Age: 58
Discharge: HOME | End: 2018-09-27
Payer: COMMERCIAL

## 2018-09-25 ENCOUNTER — PATIENT MESSAGE (OUTPATIENT)
Dept: FAMILY MEDICINE CLINIC | Age: 58
End: 2018-09-25

## 2018-09-25 VITALS — DIASTOLIC BLOOD PRESSURE: 86 MMHG | WEIGHT: 200 LBS | BODY MASS INDEX: 31.32 KG/M2 | SYSTOLIC BLOOD PRESSURE: 132 MMHG

## 2018-09-25 DIAGNOSIS — J45.998 UNCONTROLLED PERSISTENT ASTHMA: ICD-10-CM

## 2018-09-25 DIAGNOSIS — J32.4 CHRONIC PANSINUSITIS: ICD-10-CM

## 2018-09-25 DIAGNOSIS — J32.4 CHRONIC PANSINUSITIS: Primary | ICD-10-CM

## 2018-09-25 DIAGNOSIS — K21.9 GASTROESOPHAGEAL REFLUX DISEASE, ESOPHAGITIS PRESENCE NOT SPECIFIED: ICD-10-CM

## 2018-09-25 DIAGNOSIS — K21.9 LPRD (LARYNGOPHARYNGEAL REFLUX DISEASE): ICD-10-CM

## 2018-09-25 DIAGNOSIS — R05.3 CHRONIC COUGH: Primary | ICD-10-CM

## 2018-09-25 DIAGNOSIS — R05.3 CHRONIC COUGH: ICD-10-CM

## 2018-09-25 PROCEDURE — 99214 OFFICE O/P EST MOD 30 MIN: CPT | Performed by: NURSE PRACTITIONER

## 2018-09-25 PROCEDURE — G8427 DOCREV CUR MEDS BY ELIG CLIN: HCPCS | Performed by: NURSE PRACTITIONER

## 2018-09-25 PROCEDURE — 3017F COLORECTAL CA SCREEN DOC REV: CPT | Performed by: NURSE PRACTITIONER

## 2018-09-25 PROCEDURE — 94010 BREATHING CAPACITY TEST: CPT | Performed by: NURSE PRACTITIONER

## 2018-09-25 PROCEDURE — 1036F TOBACCO NON-USER: CPT | Performed by: NURSE PRACTITIONER

## 2018-09-25 PROCEDURE — 70210 X-RAY EXAM OF SINUSES: CPT

## 2018-09-25 PROCEDURE — G8417 CALC BMI ABV UP PARAM F/U: HCPCS | Performed by: NURSE PRACTITIONER

## 2018-09-25 RX ORDER — PANTOPRAZOLE SODIUM 40 MG/1
TABLET, DELAYED RELEASE ORAL
Qty: 180 TABLET | Refills: 3 | Status: SHIPPED | OUTPATIENT
Start: 2018-09-25 | End: 2019-11-10 | Stop reason: SDUPTHER

## 2018-09-25 RX ORDER — CEFDINIR 300 MG/1
CAPSULE ORAL
Qty: 60 CAPSULE | Refills: 0 | Status: SHIPPED | OUTPATIENT
Start: 2018-09-25 | End: 2019-02-04

## 2018-10-01 ENCOUNTER — PATIENT MESSAGE (OUTPATIENT)
Dept: ALLERGY | Age: 58
End: 2018-10-01

## 2018-10-01 DIAGNOSIS — B37.31 VAGINAL CANDIDIASIS: Primary | ICD-10-CM

## 2018-10-01 RX ORDER — FLUCONAZOLE 150 MG/1
150 TABLET ORAL ONCE
Qty: 1 TABLET | Refills: 0 | Status: SHIPPED | OUTPATIENT
Start: 2018-10-01 | End: 2018-10-01

## 2018-10-01 NOTE — TELEPHONE ENCOUNTER
From: Harry De Luna  To: Sharon Shannon APRN - CNP  Sent: 10/1/2018 1:30 PM EDT  Subject: Non-Urgent Medical Question    Thanks for setting up the 10-17 appt. Could I have a scrip for vaginal yeast infection? I have started having some sensitivity in that area and I have a firm track record of these. I will be traveling Thurs through next Tuesday, I do not want to be out of town with one of those horrid things. Thanks so much! [employer].

## 2018-10-17 ENCOUNTER — OFFICE VISIT (OUTPATIENT)
Dept: ALLERGY | Age: 58
End: 2018-10-17
Payer: COMMERCIAL

## 2018-10-17 VITALS
BODY MASS INDEX: 31.86 KG/M2 | WEIGHT: 203 LBS | SYSTOLIC BLOOD PRESSURE: 132 MMHG | HEIGHT: 67 IN | DIASTOLIC BLOOD PRESSURE: 80 MMHG

## 2018-10-17 DIAGNOSIS — K21.9 LPRD (LARYNGOPHARYNGEAL REFLUX DISEASE): ICD-10-CM

## 2018-10-17 DIAGNOSIS — J45.909 NOT WELL CONTROLLED ASTHMA WITHOUT COMPLICATION: ICD-10-CM

## 2018-10-17 DIAGNOSIS — J32.9 CHRONIC RECURRENT SINUSITIS: Primary | ICD-10-CM

## 2018-10-17 DIAGNOSIS — K21.9 GASTROESOPHAGEAL REFLUX DISEASE, ESOPHAGITIS PRESENCE NOT SPECIFIED: ICD-10-CM

## 2018-10-17 PROCEDURE — 3017F COLORECTAL CA SCREEN DOC REV: CPT | Performed by: NURSE PRACTITIONER

## 2018-10-17 PROCEDURE — G8427 DOCREV CUR MEDS BY ELIG CLIN: HCPCS | Performed by: NURSE PRACTITIONER

## 2018-10-17 PROCEDURE — 1036F TOBACCO NON-USER: CPT | Performed by: NURSE PRACTITIONER

## 2018-10-17 PROCEDURE — G8417 CALC BMI ABV UP PARAM F/U: HCPCS | Performed by: NURSE PRACTITIONER

## 2018-10-17 PROCEDURE — 99213 OFFICE O/P EST LOW 20 MIN: CPT | Performed by: NURSE PRACTITIONER

## 2018-10-17 PROCEDURE — 94010 BREATHING CAPACITY TEST: CPT | Performed by: NURSE PRACTITIONER

## 2018-10-17 PROCEDURE — G8484 FLU IMMUNIZE NO ADMIN: HCPCS | Performed by: NURSE PRACTITIONER

## 2018-10-17 RX ORDER — BUDESONIDE AND FORMOTEROL FUMARATE DIHYDRATE 160; 4.5 UG/1; UG/1
AEROSOL RESPIRATORY (INHALATION)
Qty: 1 INHALER | Refills: 11 | Status: SHIPPED | OUTPATIENT
Start: 2018-10-17 | End: 2018-10-18 | Stop reason: SDUPTHER

## 2018-10-18 DIAGNOSIS — J45.909 NOT WELL CONTROLLED ASTHMA WITHOUT COMPLICATION: ICD-10-CM

## 2018-10-18 RX ORDER — BUDESONIDE AND FORMOTEROL FUMARATE DIHYDRATE 160; 4.5 UG/1; UG/1
AEROSOL RESPIRATORY (INHALATION)
Qty: 3 INHALER | Refills: 3 | Status: SHIPPED | OUTPATIENT
Start: 2018-10-18 | End: 2019-11-26 | Stop reason: SDUPTHER

## 2018-10-24 NOTE — PROGRESS NOTES
Subjective:      Patient ID: Corey Baez is a 60 y.o. female. HPI Presents for follow up. She is much improved since increasing medications and treating her recurrent sinusitis. Discussed further medication changes. We have elected to keep everything as is for now and with re evaluate again in a couple months. Review of Systems   See symptom questionnaires and history as above. All other systems reviewed and are negative as pertaining to this appointment. Grade in school/occupation: [employer]  Patient lives with:     Objective:   Physical Exam     Constitutional  Appears stated age. Head normocephalic and atraumatic. Psych  Alert and oriented. Pleasant and cooperative. Chest  Rises and falls symmetrically with no evidence of respiratory distress. Lungs  0/40 - no wheeze or other adventitious breath sounds    Nose  Pallor: 2-Healthy/Pink    Bogginess: 2-turbinate occupies less than or equal to 50% of the diameter of naris    Wetness: 2-scant secretions    Redness: 2-healthy pink    Mouth  Pharynx clear, uvula midline, dentition WDL. Ears  Ear canals WDL, TM's dereck grey with visible light reflex. Eyes  Pupils equal and reactive. EOM's WDL. Heart  Heart rate regular. Rhythm without murmur, click, rub or gallop. Skin  Warm, dry and intact. Neck  Supple. ROM WDL for age. No lymphadenopathy or thyromegaly. Musculoskeletal  ROM WDL for stated age. Extremities without clubbing, cyanosis or edema.        Assessment:      LPR  GERD  Sinusitis - resolving  Cough - resolving  Asthma - persistent, controlled      Plan:      Finish antibiotic  Continue sinus rinses  Continue present medications  RV 8 weeks          Alyson Naranjo, APRN - CNP

## 2019-01-04 DIAGNOSIS — M25.561 BILATERAL CHRONIC KNEE PAIN: ICD-10-CM

## 2019-01-04 DIAGNOSIS — M25.562 BILATERAL CHRONIC KNEE PAIN: ICD-10-CM

## 2019-01-04 DIAGNOSIS — G89.29 BILATERAL CHRONIC KNEE PAIN: ICD-10-CM

## 2019-01-07 RX ORDER — DICLOFENAC SODIUM 75 MG/1
75 TABLET, DELAYED RELEASE ORAL 2 TIMES DAILY PRN
Qty: 90 TABLET | Refills: 2 | OUTPATIENT
Start: 2019-01-07

## 2019-02-04 ENCOUNTER — PATIENT MESSAGE (OUTPATIENT)
Dept: FAMILY MEDICINE CLINIC | Age: 59
End: 2019-02-04

## 2019-02-04 DIAGNOSIS — J32.9 CHRONIC RECURRENT SINUSITIS: Primary | ICD-10-CM

## 2019-02-04 RX ORDER — CEFDINIR 300 MG/1
CAPSULE ORAL
Qty: 60 CAPSULE | Refills: 0 | Status: SHIPPED | OUTPATIENT
Start: 2019-02-04 | End: 2019-03-05 | Stop reason: ALTCHOICE

## 2019-02-04 RX ORDER — PREDNISONE 1 MG/1
TABLET ORAL
Qty: 32 TABLET | Refills: 2 | Status: SHIPPED | OUTPATIENT
Start: 2019-02-04 | End: 2019-03-05 | Stop reason: SDUPTHER

## 2019-03-05 ENCOUNTER — OFFICE VISIT (OUTPATIENT)
Dept: FAMILY MEDICINE CLINIC | Age: 59
End: 2019-03-05
Payer: COMMERCIAL

## 2019-03-05 VITALS
HEIGHT: 67 IN | DIASTOLIC BLOOD PRESSURE: 86 MMHG | BODY MASS INDEX: 32.02 KG/M2 | HEART RATE: 100 BPM | WEIGHT: 204 LBS | SYSTOLIC BLOOD PRESSURE: 134 MMHG

## 2019-03-05 DIAGNOSIS — K21.9 LPRD (LARYNGOPHARYNGEAL REFLUX DISEASE): ICD-10-CM

## 2019-03-05 DIAGNOSIS — B37.31 VAGINAL CANDIDIASIS: ICD-10-CM

## 2019-03-05 DIAGNOSIS — K21.9 GASTROESOPHAGEAL REFLUX DISEASE, ESOPHAGITIS PRESENCE NOT SPECIFIED: ICD-10-CM

## 2019-03-05 DIAGNOSIS — J45.998 ASTHMA, PERSISTENT CONTROLLED: Primary | ICD-10-CM

## 2019-03-05 DIAGNOSIS — J32.9 CHRONIC RECURRENT SINUSITIS: ICD-10-CM

## 2019-03-05 PROCEDURE — 3017F COLORECTAL CA SCREEN DOC REV: CPT | Performed by: NURSE PRACTITIONER

## 2019-03-05 PROCEDURE — G8484 FLU IMMUNIZE NO ADMIN: HCPCS | Performed by: NURSE PRACTITIONER

## 2019-03-05 PROCEDURE — G8427 DOCREV CUR MEDS BY ELIG CLIN: HCPCS | Performed by: NURSE PRACTITIONER

## 2019-03-05 PROCEDURE — 94010 BREATHING CAPACITY TEST: CPT | Performed by: NURSE PRACTITIONER

## 2019-03-05 PROCEDURE — 1036F TOBACCO NON-USER: CPT | Performed by: NURSE PRACTITIONER

## 2019-03-05 PROCEDURE — 99213 OFFICE O/P EST LOW 20 MIN: CPT | Performed by: NURSE PRACTITIONER

## 2019-03-05 PROCEDURE — G8417 CALC BMI ABV UP PARAM F/U: HCPCS | Performed by: NURSE PRACTITIONER

## 2019-03-05 RX ORDER — PREDNISONE 1 MG/1
TABLET ORAL
Qty: 32 TABLET | Refills: 2 | Status: SHIPPED | OUTPATIENT
Start: 2019-03-05 | End: 2019-08-07 | Stop reason: SDUPTHER

## 2019-03-05 RX ORDER — FLUCONAZOLE 150 MG/1
150 TABLET ORAL ONCE
Qty: 1 TABLET | Refills: 0 | Status: SHIPPED | OUTPATIENT
Start: 2019-03-05 | End: 2019-03-25 | Stop reason: SDUPTHER

## 2019-03-05 ASSESSMENT — PATIENT HEALTH QUESTIONNAIRE - PHQ9
SUM OF ALL RESPONSES TO PHQ QUESTIONS 1-9: 0
2. FEELING DOWN, DEPRESSED OR HOPELESS: 0
SUM OF ALL RESPONSES TO PHQ QUESTIONS 1-9: 0
SUM OF ALL RESPONSES TO PHQ9 QUESTIONS 1 & 2: 0
1. LITTLE INTEREST OR PLEASURE IN DOING THINGS: 0

## 2019-03-24 ENCOUNTER — PATIENT MESSAGE (OUTPATIENT)
Dept: FAMILY MEDICINE CLINIC | Age: 59
End: 2019-03-24

## 2019-03-25 DIAGNOSIS — B37.31 VAGINAL CANDIDIASIS: ICD-10-CM

## 2019-03-25 DIAGNOSIS — J32.9 CHRONIC RECURRENT SINUSITIS: ICD-10-CM

## 2019-03-25 RX ORDER — CEFDINIR 300 MG/1
CAPSULE ORAL
Qty: 42 CAPSULE | Refills: 0 | Status: SHIPPED | OUTPATIENT
Start: 2019-03-25 | End: 2019-05-06 | Stop reason: SDUPTHER

## 2019-03-25 RX ORDER — FLUCONAZOLE 150 MG/1
150 TABLET ORAL ONCE
Qty: 1 TABLET | Refills: 0 | Status: SHIPPED | OUTPATIENT
Start: 2019-03-25 | End: 2019-03-25

## 2019-04-01 DIAGNOSIS — K21.9 LPRD (LARYNGOPHARYNGEAL REFLUX DISEASE): ICD-10-CM

## 2019-04-01 DIAGNOSIS — K21.9 GASTROESOPHAGEAL REFLUX DISEASE, ESOPHAGITIS PRESENCE NOT SPECIFIED: ICD-10-CM

## 2019-04-01 DIAGNOSIS — J32.4 CHRONIC PANSINUSITIS: ICD-10-CM

## 2019-04-01 RX ORDER — RANITIDINE 300 MG/1
TABLET ORAL
Qty: 90 TABLET | Refills: 2 | Status: SHIPPED | OUTPATIENT
Start: 2019-04-01 | End: 2020-01-23

## 2019-04-01 RX ORDER — FLUTICASONE PROPIONATE 50 MCG
SPRAY, SUSPENSION (ML) NASAL
Qty: 3 BOTTLE | Refills: 3 | Status: SHIPPED | OUTPATIENT
Start: 2019-04-01 | End: 2020-09-22

## 2019-04-22 ENCOUNTER — PATIENT MESSAGE (OUTPATIENT)
Dept: SURGERY | Age: 59
End: 2019-04-22

## 2019-04-22 NOTE — TELEPHONE ENCOUNTER
From: Lyle Brock  To: Neelima Celaya MD  Sent: 4/22/2019 2:41 PM EDT  Subject: Non-Urgent Medical Question    Dr. Deanna Brown had told me that if I ever have any questions, not to hesitate reaching out to him. I had colon surgery in fall of 2017. I have done well with the colon until the last two months. Even with a proper diet and serving size; after I eat, I feel nauseated, bloated and just unwell. It does pass after awhile until I dare to eat again. One heck of a diet plan! I can schedule an appt with Dr. Mary Ribera of [facility] in [facility] would appreciate having some input into why I might be experiencing this reaction so that I can ask for more targeted care and tests that might be needed. Thanks so much for taking time to read this. You guys are the best!!!! Aneesh Botello.

## 2019-04-26 NOTE — TELEPHONE ENCOUNTER
Patient was offered appointment 4/25/19 and 4/29/19 but she was out of town until 5/01/19. We will try to work her in earlier than next available if Dr. Mary Alice Nguyen opens up some time. She is aware of this.

## 2019-05-06 ENCOUNTER — OFFICE VISIT (OUTPATIENT)
Dept: FAMILY MEDICINE CLINIC | Age: 59
End: 2019-05-06
Payer: COMMERCIAL

## 2019-05-06 VITALS
WEIGHT: 205 LBS | DIASTOLIC BLOOD PRESSURE: 84 MMHG | HEIGHT: 67 IN | SYSTOLIC BLOOD PRESSURE: 120 MMHG | TEMPERATURE: 97.1 F | BODY MASS INDEX: 32.18 KG/M2 | HEART RATE: 100 BPM | RESPIRATION RATE: 16 BRPM

## 2019-05-06 DIAGNOSIS — K21.9 GASTROESOPHAGEAL REFLUX DISEASE, ESOPHAGITIS PRESENCE NOT SPECIFIED: ICD-10-CM

## 2019-05-06 DIAGNOSIS — J32.9 CHRONIC RECURRENT SINUSITIS: ICD-10-CM

## 2019-05-06 DIAGNOSIS — B37.31 VAGINAL CANDIDIASIS: ICD-10-CM

## 2019-05-06 DIAGNOSIS — J45.901 ASTHMA NOT DEPENDENT ON SYSTEMIC STEROIDS WITH ACUTE EXACERBATION: Primary | ICD-10-CM

## 2019-05-06 DIAGNOSIS — K21.9 LPRD (LARYNGOPHARYNGEAL REFLUX DISEASE): ICD-10-CM

## 2019-05-06 PROCEDURE — G8427 DOCREV CUR MEDS BY ELIG CLIN: HCPCS | Performed by: NURSE PRACTITIONER

## 2019-05-06 PROCEDURE — 3017F COLORECTAL CA SCREEN DOC REV: CPT | Performed by: NURSE PRACTITIONER

## 2019-05-06 PROCEDURE — 1036F TOBACCO NON-USER: CPT | Performed by: NURSE PRACTITIONER

## 2019-05-06 PROCEDURE — G8417 CALC BMI ABV UP PARAM F/U: HCPCS | Performed by: NURSE PRACTITIONER

## 2019-05-06 PROCEDURE — 94640 AIRWAY INHALATION TREATMENT: CPT | Performed by: NURSE PRACTITIONER

## 2019-05-06 PROCEDURE — 99214 OFFICE O/P EST MOD 30 MIN: CPT | Performed by: NURSE PRACTITIONER

## 2019-05-06 RX ORDER — FLUCONAZOLE 150 MG/1
150 TABLET ORAL ONCE
Qty: 2 TABLET | Refills: 0 | Status: SHIPPED | OUTPATIENT
Start: 2019-05-06 | End: 2019-08-22 | Stop reason: SDUPTHER

## 2019-05-06 RX ORDER — CEFDINIR 300 MG/1
CAPSULE ORAL
Qty: 42 CAPSULE | Refills: 0 | Status: SHIPPED | OUTPATIENT
Start: 2019-05-06 | End: 2019-08-14 | Stop reason: ALTCHOICE

## 2019-05-06 RX ORDER — IPRATROPIUM BROMIDE AND ALBUTEROL SULFATE 2.5; .5 MG/3ML; MG/3ML
1 SOLUTION RESPIRATORY (INHALATION) ONCE
Status: COMPLETED | OUTPATIENT
Start: 2019-05-06 | End: 2019-05-06

## 2019-05-06 RX ORDER — PREDNISONE 20 MG/1
20 TABLET ORAL ONCE
Status: COMPLETED | OUTPATIENT
Start: 2019-05-06 | End: 2019-05-06

## 2019-05-06 RX ORDER — PREDNISONE 20 MG/1
TABLET ORAL
Qty: 16 TABLET | Refills: 0 | Status: SHIPPED | OUTPATIENT
Start: 2019-05-06 | End: 2019-06-13 | Stop reason: SDUPTHER

## 2019-05-06 RX ORDER — IPRATROPIUM BROMIDE AND ALBUTEROL SULFATE 2.5; .5 MG/3ML; MG/3ML
1 SOLUTION RESPIRATORY (INHALATION) EVERY 4 HOURS PRN
Qty: 360 ML | Refills: 2 | Status: SHIPPED | OUTPATIENT
Start: 2019-05-06

## 2019-05-06 RX ADMIN — IPRATROPIUM BROMIDE AND ALBUTEROL SULFATE 1 AMPULE: 2.5; .5 SOLUTION RESPIRATORY (INHALATION) at 09:55

## 2019-05-06 RX ADMIN — PREDNISONE 20 MG: 20 TABLET ORAL at 09:54

## 2019-05-06 NOTE — PROGRESS NOTES
Subjective:      Patient ID: Rica Head is a 60 y.o. female. Asthma   She complains of chest tightness, cough, difficulty breathing, hoarse voice, shortness of breath, sputum production and wheezing. There is no frequent throat clearing or hemoptysis. This is a recurrent problem. The current episode started in the past 7 days. The problem occurs constantly. The problem has been gradually worsening. The cough is productive of sputum. Associated symptoms include dyspnea on exertion, postnasal drip and rhinorrhea. Pertinent negatives include no chest pain or fever. Her symptoms are aggravated by URI. Her symptoms are alleviated by beta-agonist, rest and steroid inhaler. She reports minimal improvement on treatment. Risk factors for lung disease include travel. Her past medical history is significant for asthma. Seen at [facility] last week due to increased asthma symptoms starting several days prior to seeking treatments. She was given Omnicef and prednisone which helped a little. She has also been using some  Xopenex she still had at home. She presented to Urgent Care at [facility] today as she is still not feeling better. Review of Systems   Constitutional: Positive for activity change. Negative for fever. HENT: Positive for congestion, hoarse voice, postnasal drip, rhinorrhea and voice change. Respiratory: Positive for cough, sputum production, chest tightness, shortness of breath and wheezing. Negative for hemoptysis. Cardiovascular: Positive for dyspnea on exertion. Negative for chest pain, palpitations and leg swelling. All other systems reviewed and are negative. Objective:   Physical Exam     Constitutional  Appears stated age. Head normocephalic and atraumatic. Psych  Alert and oriented. Pleasant and cooperative. Chest  Rises and falls symmetrically with no evidence of respiratory distress.      Lungs  2/40 - wheeze on forced expiration only, upper fields no additional adventitious breath sounds poor air movement prior to neb. Improved air movement and decreased wheezing following neb. Nose  Pallor: 2-Healthy/Pink    Bogginess: 2-turbinate occupies less than or equal to 50% of the diameter of naris    Wetness: 3-increased secretions, grey stranding    Redness: 2-healthy pink    Mouth  Pharynx clear, uvula midline, dentition WDL. Ears  Ear canals WDL, TM's dereck grey with visible light reflex. Eyes  Pupils equal and reactive. EOM's WDL. Heart  Heart rate regular. Rhythm without murmur, click, rub or gallop. Skin  Warm, dry and intact. Neck  Supple. ROM WDL for age. No lymphadenopathy or thyromegaly. Musculoskeletal  ROM WDL for stated age. Extremities without clubbing, cyanosis or edema.      Assessment:      Asthma exacerbation   Recurrent sinusitis  LPR  GERD  History of vaginal candidiasis with prolonged course of antibiotics      Plan:      Prednisone burst and taper  Continue Omnicef for another 3 weeks  Duoneb every 4 hours as needed  Diflucan 150 mg in one week, may repeat in one week if needed  Continue sinus rinses  Continue other medications as previous  RV one week for re evaluation of asthma control        Juana Meza, APRN - CNP

## 2019-05-07 ENCOUNTER — TELEPHONE (OUTPATIENT)
Dept: FAMILY MEDICINE CLINIC | Age: 59
End: 2019-05-07

## 2019-05-07 NOTE — TELEPHONE ENCOUNTER
Last Appt:  5/6/2019  Next Appt:   5/14/2019       Called to check and see how patient was doing from yesterday. She reports she had a rough night, that she just felt tight in her chest.  This morning she is feeling much better. She did report the duo neb ordered yesterday was not available but will be able to  that today after 1 pm. She is aware to call and follow up if she need anything further.

## 2019-05-09 ASSESSMENT — ENCOUNTER SYMPTOMS
VOICE CHANGE: 1
RHINORRHEA: 1
FREQUENT THROAT CLEARING: 0
WHEEZING: 1
SPUTUM PRODUCTION: 1
DIFFICULTY BREATHING: 1
HOARSE VOICE: 1
SHORTNESS OF BREATH: 1
CHEST TIGHTNESS: 1
HEMOPTYSIS: 0
COUGH: 1

## 2019-05-14 ENCOUNTER — OFFICE VISIT (OUTPATIENT)
Dept: FAMILY MEDICINE CLINIC | Age: 59
End: 2019-05-14
Payer: COMMERCIAL

## 2019-05-14 VITALS
SYSTOLIC BLOOD PRESSURE: 132 MMHG | DIASTOLIC BLOOD PRESSURE: 88 MMHG | WEIGHT: 206 LBS | BODY MASS INDEX: 32.33 KG/M2 | HEIGHT: 67 IN

## 2019-05-14 DIAGNOSIS — J45.901 ASTHMA NOT DEPENDENT ON SYSTEMIC STEROIDS WITH ACUTE EXACERBATION: Primary | ICD-10-CM

## 2019-05-14 DIAGNOSIS — K21.9 LPRD (LARYNGOPHARYNGEAL REFLUX DISEASE): ICD-10-CM

## 2019-05-14 DIAGNOSIS — K21.9 GASTROESOPHAGEAL REFLUX DISEASE, ESOPHAGITIS PRESENCE NOT SPECIFIED: ICD-10-CM

## 2019-05-14 DIAGNOSIS — J32.9 CHRONIC RECURRENT SINUSITIS: ICD-10-CM

## 2019-05-14 PROCEDURE — G8417 CALC BMI ABV UP PARAM F/U: HCPCS | Performed by: NURSE PRACTITIONER

## 2019-05-14 PROCEDURE — G8427 DOCREV CUR MEDS BY ELIG CLIN: HCPCS | Performed by: NURSE PRACTITIONER

## 2019-05-14 PROCEDURE — 1036F TOBACCO NON-USER: CPT | Performed by: NURSE PRACTITIONER

## 2019-05-14 PROCEDURE — 3017F COLORECTAL CA SCREEN DOC REV: CPT | Performed by: NURSE PRACTITIONER

## 2019-05-14 PROCEDURE — 99213 OFFICE O/P EST LOW 20 MIN: CPT | Performed by: NURSE PRACTITIONER

## 2019-05-14 RX ORDER — PREDNISONE 20 MG/1
20 TABLET ORAL 2 TIMES DAILY
Qty: 10 TABLET | Refills: 0 | Status: SHIPPED | OUTPATIENT
Start: 2019-05-14 | End: 2019-05-19

## 2019-05-14 NOTE — PROGRESS NOTES
Symptoms experienced by patient  Runny nose  Yes Circles under eyes No Post nasal drip Yes Difficulty breathing Yes   Stuffy nose No Grumpiness No Hoarseness Yes Short of breath Yes   Sniffling  No Fatigue No Face pain/pressure Yes Tight/heavy chest Yes   Sneezing No Nosebleeds No Sore throat Yes cough Yes   Blowing nose No Nasal/sinus surgery Yes Headache  Yes Cough up mucus Yes   Itchy/teary eyes No Nasal polyps No Upper teeth hurt No Cough up blood No   Itchy ears No Hearing loss Yes Night cough Yes Chest pain Yes   Itchy nose No Ear problems No Throat clearing No Asthma Yes   Itchy throat No Tubes in ears No Bad breath No Wheezing Yes   Itchy roof of mouth No Snoring No Bad taste in mouth No Pneumonia No   Nose rubbing No Mouth breathing No  Ankle swelling No    Overbite No  Difficulty breathing on exertion yes    Drooling (toddler) No         How many times per week do you use your rescue inhaler? 3    How many nights per month do you wake up due to asthma 0    How many mornings per week do you wake up with asthma symptoms? 0        Does patient experience? Heartburn and indigestion Yes  Vomiting easily No  Use of antacids (Rolaids, Tums, Maalox) Yes  Regurgitation of stomach contents No  Chronic cough worse after meals Yes  Chronic cough cough worse after laying down Yes  Chronic hoarseness or voice change Yes  Chest pain Yes  Stomach pain No  Neck pain No  Sore throat-frequent No  Feeling of throat closing or something stuck in throat No  Frequent burps or hiccups No  Adult onset asthma Yes  Asthma not relieved with usual treatment Yes  Anemia No  Asthma worse after meals, alcohol, lying down, bending to tie shoes, or after heartburn Yes  Chronic sinus disease Yes    Within the last month, how did the following problems affect the patient?   0=No Problem; 5=Severe Problem    Hoarseness or a problem with your voice 3  Clearing your throat 3  Excess throat mucus or postnasal drip 4  Difficulty swallowing food, liquids, pills 1  Coughing after you ate or after lying down 3  Breathing difficulties or choking episodes 3  Troublesome or annoying cough 3  Sensations of something sticking in your throat or a lump in your throat 1  Heartburn, chest pain, indigestion, or stomach acid coming up 3    Total: 24

## 2019-05-14 NOTE — PROGRESS NOTES
Subjective:      Patient ID: Sandi Sanchez is a 60 y.o. female. HPI Presents for one week follow up of asthma exacerbation secondary to sinusitis. She says she is feeling better. She is finishing the prednisone taper. She is doing sinus rinses 1-2 times daily. She continues on RICARDO KEE. Her cough is still there but improving. Her primary symptom is lingering fatigue. Review of Systems   See symptom questionnaires and history as above. All other systems reviewed and are negative as pertaining to this appointment. Grade in school/occupation: [employer]  Patient lives with:     Objective:   Physical Exam     Constitutional  Appears stated age. Head normocephalic and atraumatic. Psych  Alert and oriented. Pleasant and cooperative. Chest  Rises and falls symmetrically with no evidence of respiratory distress. Lungs  0/40 - no wheeze or other adventitious breath sounds    Nose  Pallor: 2-Healthy/Pink    Bogginess: 2-turbinate occupies less than or equal to 50% of the diameter of naris    Wetness: 2-scant secretions    Redness: 2-healthy pink    Mouth  Pharynx clear, uvula midline, dentition WDL. Ears  Ear canals WDL, TM's dereck grey with visible light reflex. Eyes  Pupils equal and reactive. EOM's WDL. Heart  Heart rate regular. Rhythm without murmur, click, rub or gallop. Skin  Warm, dry and intact. Neck  Supple. ROM WDL for age. No lymphadenopathy or thyromegaly. Musculoskeletal  ROM WDL for stated age. Extremities without clubbing, cyanosis or edema.      Assessment:      Asthma exacerbation  Recurrent sinusitis  LPR  GERD      Plan:      Increase Protonix to twice daily for the next 10 days  Continue prednisone taper  Continue other medications as previous  RV 3 months, sooner if not continuing to improve        Pillo Moreira, APRN - CNP

## 2019-05-15 ENCOUNTER — TELEPHONE (OUTPATIENT)
Dept: FAMILY MEDICINE CLINIC | Age: 59
End: 2019-05-15

## 2019-05-15 NOTE — TELEPHONE ENCOUNTER
Pt calling requesting to speak with Gato Biswas about a script for an sinuses irrigation. Please call pt.

## 2019-05-15 NOTE — TELEPHONE ENCOUNTER
[facility] and verified [facility] refill is needed. Form is on desk for signature.

## 2019-06-06 ENCOUNTER — PATIENT MESSAGE (OUTPATIENT)
Dept: FAMILY MEDICINE CLINIC | Age: 59
End: 2019-06-06

## 2019-06-06 NOTE — TELEPHONE ENCOUNTER
From: Cherelle Esqueda  To: DEREK Bustamante - CNP  Sent: 6/6/2019 9:23 AM EDT  Subject: Prescription Question    Greetings chickas! I took vacation this week and it has been restfull and renewing. Beckey Conquest Beckey Conquest I think I needed it more than I realized. Regarding my scrip for sinus irrigation. ... [facility] told me that you gave them a 3 refill scrip. The scrip only last me about 3 weeks so I am refilling it often. There have been times we have been caught where I needed a refill and they were having some communication issues. Could we possibly do a scrip for 6 refills or more? Just a thought. ..... Have a good weekend! Natalie Arias.

## 2019-06-13 DIAGNOSIS — J32.9 CHRONIC RECURRENT SINUSITIS: ICD-10-CM

## 2019-06-13 RX ORDER — PREDNISONE 20 MG/1
TABLET ORAL
Qty: 8 TABLET | Refills: 2 | Status: SHIPPED | OUTPATIENT
Start: 2019-06-13 | End: 2019-08-14 | Stop reason: ALTCHOICE

## 2019-06-17 ENCOUNTER — PATIENT MESSAGE (OUTPATIENT)
Dept: FAMILY MEDICINE CLINIC | Age: 59
End: 2019-06-17

## 2019-06-18 DIAGNOSIS — T78.40XD ALLERGIC STATE, SUBSEQUENT ENCOUNTER: Primary | ICD-10-CM

## 2019-07-22 DIAGNOSIS — T78.40XD ALLERGIC STATE, SUBSEQUENT ENCOUNTER: ICD-10-CM

## 2019-07-26 ENCOUNTER — OFFICE VISIT (OUTPATIENT)
Dept: OPHTHALMOLOGY | Age: 59
End: 2019-07-26
Payer: COMMERCIAL

## 2019-07-26 DIAGNOSIS — E11.9 DIABETES TYPE 2, NO OCULAR INVOLVEMENT (HCC): Primary | ICD-10-CM

## 2019-07-26 DIAGNOSIS — H25.813 COMBINED FORMS OF AGE-RELATED CATARACT OF BOTH EYES: ICD-10-CM

## 2019-07-26 DIAGNOSIS — Z98.890 HISTORY OF RADIAL KERATOTOMY: ICD-10-CM

## 2019-07-26 PROCEDURE — 2024F 7 FLD RTA PHOTO EVC RTNOPTHY: CPT | Performed by: OPHTHALMOLOGY

## 2019-07-26 PROCEDURE — 1036F TOBACCO NON-USER: CPT | Performed by: OPHTHALMOLOGY

## 2019-07-26 PROCEDURE — 92250 FUNDUS PHOTOGRAPHY W/I&R: CPT | Performed by: OPHTHALMOLOGY

## 2019-07-26 PROCEDURE — 99214 OFFICE O/P EST MOD 30 MIN: CPT | Performed by: OPHTHALMOLOGY

## 2019-07-26 PROCEDURE — 3046F HEMOGLOBIN A1C LEVEL >9.0%: CPT | Performed by: OPHTHALMOLOGY

## 2019-07-26 PROCEDURE — G8427 DOCREV CUR MEDS BY ELIG CLIN: HCPCS | Performed by: OPHTHALMOLOGY

## 2019-07-26 PROCEDURE — G8417 CALC BMI ABV UP PARAM F/U: HCPCS | Performed by: OPHTHALMOLOGY

## 2019-07-26 PROCEDURE — 3017F COLORECTAL CA SCREEN DOC REV: CPT | Performed by: OPHTHALMOLOGY

## 2019-07-26 RX ORDER — TROPICAMIDE 10 MG/ML
1 SOLUTION/ DROPS OPHTHALMIC ONCE
Status: COMPLETED | OUTPATIENT
Start: 2019-07-26 | End: 2019-07-26

## 2019-07-26 RX ORDER — PHENYLEPHRINE HCL 2.5 %
1 DROPS OPHTHALMIC (EYE) ONCE
Status: COMPLETED | OUTPATIENT
Start: 2019-07-26 | End: 2019-07-26

## 2019-07-26 RX ADMIN — TROPICAMIDE 1 DROP: 10 SOLUTION/ DROPS OPHTHALMIC at 14:54

## 2019-07-26 RX ADMIN — Medication 1 DROP: at 14:54

## 2019-07-26 ASSESSMENT — SLIT LAMP EXAM - LIDS
COMMENTS: MILD BLEPHARITIS. MILD MGD
COMMENTS: MILD BLEPHARITIS. MILD MGD

## 2019-07-26 ASSESSMENT — VISUAL ACUITY
OD_SC: 20/25
OD_PH_SC: 20/20
OS_SC: 20/20
METHOD: SNELLEN - LINEAR

## 2019-07-26 ASSESSMENT — TONOMETRY
OS_IOP_MMHG: 21
OD_IOP_MMHG: 22
IOP_METHOD: NON-CONTACT AIR PUFF

## 2019-07-26 ASSESSMENT — CONF VISUAL FIELD
OD_NORMAL: 1
OS_NORMAL: 1

## 2019-07-26 ASSESSMENT — ENCOUNTER SYMPTOMS
GASTROINTESTINAL NEGATIVE: 0
ALLERGIC/IMMUNOLOGIC NEGATIVE: 0
RESPIRATORY NEGATIVE: 0
EYES NEGATIVE: 0

## 2019-07-28 DIAGNOSIS — J32.9 CHRONIC RECURRENT SINUSITIS: ICD-10-CM

## 2019-08-06 ENCOUNTER — TELEPHONE (OUTPATIENT)
Dept: GENERAL RADIOLOGY | Age: 59
End: 2019-08-06

## 2019-08-09 ENCOUNTER — HOSPITAL ENCOUNTER (OUTPATIENT)
Dept: MAMMOGRAPHY | Age: 59
Discharge: HOME | End: 2019-08-11
Payer: COMMERCIAL

## 2019-08-09 DIAGNOSIS — Z12.39 BREAST CANCER SCREENING: ICD-10-CM

## 2019-08-09 PROCEDURE — 77063 BREAST TOMOSYNTHESIS BI: CPT

## 2019-08-14 ENCOUNTER — OFFICE VISIT (OUTPATIENT)
Dept: FAMILY MEDICINE CLINIC | Age: 59
End: 2019-08-14
Payer: COMMERCIAL

## 2019-08-14 ENCOUNTER — HOSPITAL ENCOUNTER (OUTPATIENT)
Dept: GENERAL RADIOLOGY | Age: 59
Discharge: HOME | End: 2019-08-16
Payer: COMMERCIAL

## 2019-08-14 VITALS
HEART RATE: 78 BPM | SYSTOLIC BLOOD PRESSURE: 130 MMHG | HEIGHT: 67 IN | BODY MASS INDEX: 30.92 KG/M2 | WEIGHT: 197 LBS | DIASTOLIC BLOOD PRESSURE: 74 MMHG

## 2019-08-14 DIAGNOSIS — J45.998 UNCONTROLLED PERSISTENT ASTHMA: ICD-10-CM

## 2019-08-14 DIAGNOSIS — K21.9 GASTROESOPHAGEAL REFLUX DISEASE, ESOPHAGITIS PRESENCE NOT SPECIFIED: ICD-10-CM

## 2019-08-14 DIAGNOSIS — J32.9 CHRONIC RECURRENT SINUSITIS: Primary | ICD-10-CM

## 2019-08-14 DIAGNOSIS — K21.9 LPRD (LARYNGOPHARYNGEAL REFLUX DISEASE): ICD-10-CM

## 2019-08-14 DIAGNOSIS — J32.9 CHRONIC RECURRENT SINUSITIS: ICD-10-CM

## 2019-08-14 PROCEDURE — G8427 DOCREV CUR MEDS BY ELIG CLIN: HCPCS | Performed by: NURSE PRACTITIONER

## 2019-08-14 PROCEDURE — 1036F TOBACCO NON-USER: CPT | Performed by: NURSE PRACTITIONER

## 2019-08-14 PROCEDURE — 99214 OFFICE O/P EST MOD 30 MIN: CPT | Performed by: NURSE PRACTITIONER

## 2019-08-14 PROCEDURE — 3017F COLORECTAL CA SCREEN DOC REV: CPT | Performed by: NURSE PRACTITIONER

## 2019-08-14 PROCEDURE — 70210 X-RAY EXAM OF SINUSES: CPT

## 2019-08-14 PROCEDURE — 94010 BREATHING CAPACITY TEST: CPT | Performed by: NURSE PRACTITIONER

## 2019-08-14 PROCEDURE — G8417 CALC BMI ABV UP PARAM F/U: HCPCS | Performed by: NURSE PRACTITIONER

## 2019-08-14 RX ORDER — CEFDINIR 300 MG/1
CAPSULE ORAL
Qty: 60 CAPSULE | Refills: 0 | Status: SHIPPED | OUTPATIENT
Start: 2019-08-14 | End: 2019-09-10 | Stop reason: SDUPTHER

## 2019-08-14 RX ORDER — PREDNISONE 1 MG/1
TABLET ORAL
Qty: 32 TABLET | Refills: 2 | Status: SHIPPED | OUTPATIENT
Start: 2019-08-14 | End: 2019-09-24

## 2019-08-22 ENCOUNTER — PATIENT MESSAGE (OUTPATIENT)
Dept: FAMILY MEDICINE CLINIC | Age: 59
End: 2019-08-22

## 2019-08-22 DIAGNOSIS — B37.31 VAGINAL CANDIDIASIS: ICD-10-CM

## 2019-08-22 RX ORDER — FLUCONAZOLE 150 MG/1
150 TABLET ORAL ONCE
Qty: 2 TABLET | Refills: 0 | Status: SHIPPED | OUTPATIENT
Start: 2019-08-22 | End: 2019-08-22

## 2019-09-09 ENCOUNTER — TELEPHONE (OUTPATIENT)
Dept: FAMILY MEDICINE CLINIC | Age: 59
End: 2019-09-09

## 2019-09-09 ENCOUNTER — HOSPITAL ENCOUNTER (OUTPATIENT)
Dept: GENERAL RADIOLOGY | Age: 59
Discharge: HOME | End: 2019-09-11
Payer: COMMERCIAL

## 2019-09-09 DIAGNOSIS — J32.9 CHRONIC RECURRENT SINUSITIS: ICD-10-CM

## 2019-09-09 PROCEDURE — 70210 X-RAY EXAM OF SINUSES: CPT

## 2019-09-10 RX ORDER — CEFDINIR 300 MG/1
CAPSULE ORAL
Qty: 28 CAPSULE | Refills: 0 | Status: SHIPPED | OUTPATIENT
Start: 2019-09-10 | End: 2019-09-24 | Stop reason: ALTCHOICE

## 2019-09-24 ENCOUNTER — OFFICE VISIT (OUTPATIENT)
Dept: FAMILY MEDICINE CLINIC | Age: 59
End: 2019-09-24
Payer: COMMERCIAL

## 2019-09-24 VITALS
DIASTOLIC BLOOD PRESSURE: 84 MMHG | WEIGHT: 202 LBS | HEART RATE: 114 BPM | SYSTOLIC BLOOD PRESSURE: 132 MMHG | BODY MASS INDEX: 31.71 KG/M2 | TEMPERATURE: 99.6 F | HEIGHT: 67 IN

## 2019-09-24 DIAGNOSIS — J32.4 CHRONIC PANSINUSITIS: ICD-10-CM

## 2019-09-24 DIAGNOSIS — J45.901 ASTHMA NOT DEPENDENT ON SYSTEMIC STEROIDS WITH ACUTE EXACERBATION: ICD-10-CM

## 2019-09-24 DIAGNOSIS — B37.31 VAGINAL YEAST INFECTION: ICD-10-CM

## 2019-09-24 DIAGNOSIS — K21.9 GASTROESOPHAGEAL REFLUX DISEASE, ESOPHAGITIS PRESENCE NOT SPECIFIED: Primary | ICD-10-CM

## 2019-09-24 PROCEDURE — 94010 BREATHING CAPACITY TEST: CPT | Performed by: NURSE PRACTITIONER

## 2019-09-24 PROCEDURE — G8417 CALC BMI ABV UP PARAM F/U: HCPCS | Performed by: NURSE PRACTITIONER

## 2019-09-24 PROCEDURE — G8427 DOCREV CUR MEDS BY ELIG CLIN: HCPCS | Performed by: NURSE PRACTITIONER

## 2019-09-24 PROCEDURE — 99214 OFFICE O/P EST MOD 30 MIN: CPT | Performed by: NURSE PRACTITIONER

## 2019-09-24 PROCEDURE — 1036F TOBACCO NON-USER: CPT | Performed by: NURSE PRACTITIONER

## 2019-09-24 PROCEDURE — 3017F COLORECTAL CA SCREEN DOC REV: CPT | Performed by: NURSE PRACTITIONER

## 2019-09-24 RX ORDER — CIPROFLOXACIN 500 MG/1
500 TABLET, FILM COATED ORAL 2 TIMES DAILY
Qty: 42 TABLET | Refills: 0 | Status: SHIPPED | OUTPATIENT
Start: 2019-09-24 | End: 2019-10-15

## 2019-09-24 RX ORDER — FLUCONAZOLE 150 MG/1
TABLET ORAL
Qty: 4 TABLET | Refills: 0 | Status: SHIPPED | OUTPATIENT
Start: 2019-09-24 | End: 2019-11-11 | Stop reason: SDUPTHER

## 2019-09-24 RX ORDER — LEVOTHYROXINE SODIUM 175 MCG
175 TABLET ORAL DAILY
COMMUNITY
Start: 2019-08-03

## 2019-09-24 RX ORDER — PREDNISONE 20 MG/1
TABLET ORAL
Qty: 16 TABLET | Refills: 0 | Status: SHIPPED | OUTPATIENT
Start: 2019-09-24 | End: 2019-10-17

## 2019-10-11 ENCOUNTER — PATIENT MESSAGE (OUTPATIENT)
Dept: FAMILY MEDICINE CLINIC | Age: 59
End: 2019-10-11

## 2019-10-16 ENCOUNTER — PATIENT MESSAGE (OUTPATIENT)
Dept: FAMILY MEDICINE CLINIC | Age: 59
End: 2019-10-16

## 2019-10-16 DIAGNOSIS — J32.4 CHRONIC PANSINUSITIS: ICD-10-CM

## 2019-10-17 RX ORDER — PREDNISONE 20 MG/1
20 TABLET ORAL 2 TIMES DAILY
Qty: 10 TABLET | Refills: 2 | Status: SHIPPED | OUTPATIENT
Start: 2019-10-17 | End: 2019-10-22

## 2019-10-29 ENCOUNTER — PATIENT MESSAGE (OUTPATIENT)
Dept: FAMILY MEDICINE CLINIC | Age: 59
End: 2019-10-29

## 2019-10-29 DIAGNOSIS — J32.9 CHRONIC RECURRENT SINUSITIS: Primary | ICD-10-CM

## 2019-10-29 RX ORDER — PREDNISONE 20 MG/1
TABLET ORAL
Qty: 16 TABLET | Refills: 0 | Status: SHIPPED | OUTPATIENT
Start: 2019-10-29 | End: 2019-11-19 | Stop reason: SDUPTHER

## 2019-10-29 RX ORDER — CEFDINIR 300 MG/1
CAPSULE ORAL
Qty: 60 CAPSULE | Refills: 0 | Status: SHIPPED | OUTPATIENT
Start: 2019-10-29 | End: 2019-12-18 | Stop reason: ALTCHOICE

## 2019-11-06 ASSESSMENT — ENCOUNTER SYMPTOMS
SINUS PRESSURE: 1
HEARTBURN: 1
COUGH: 1
HOARSE VOICE: 1
SHORTNESS OF BREATH: 1
SINUS PAIN: 1

## 2019-11-10 ENCOUNTER — PATIENT MESSAGE (OUTPATIENT)
Dept: FAMILY MEDICINE CLINIC | Age: 59
End: 2019-11-10

## 2019-11-10 DIAGNOSIS — B37.31 VAGINAL YEAST INFECTION: ICD-10-CM

## 2019-11-10 DIAGNOSIS — K21.9 LPRD (LARYNGOPHARYNGEAL REFLUX DISEASE): ICD-10-CM

## 2019-11-10 DIAGNOSIS — K21.9 GASTROESOPHAGEAL REFLUX DISEASE, ESOPHAGITIS PRESENCE NOT SPECIFIED: ICD-10-CM

## 2019-11-11 RX ORDER — FLUCONAZOLE 150 MG/1
TABLET ORAL
Qty: 4 TABLET | Refills: 0 | Status: SHIPPED | OUTPATIENT
Start: 2019-11-11 | End: 2019-12-18

## 2019-11-11 RX ORDER — PANTOPRAZOLE SODIUM 40 MG/1
TABLET, DELAYED RELEASE ORAL
Qty: 180 TABLET | Refills: 3 | Status: SHIPPED | OUTPATIENT
Start: 2019-11-11 | End: 2021-03-15 | Stop reason: SDUPTHER

## 2019-11-14 ENCOUNTER — OFFICE VISIT (OUTPATIENT)
Dept: SURGERY | Age: 59
End: 2019-11-14
Payer: COMMERCIAL

## 2019-11-14 VITALS
DIASTOLIC BLOOD PRESSURE: 80 MMHG | HEIGHT: 67 IN | BODY MASS INDEX: 32.18 KG/M2 | SYSTOLIC BLOOD PRESSURE: 120 MMHG | WEIGHT: 205 LBS | HEART RATE: 160 BPM

## 2019-11-14 DIAGNOSIS — K22.2 GERD WITH STRICTURE: ICD-10-CM

## 2019-11-14 DIAGNOSIS — K21.9 GERD WITH STRICTURE: ICD-10-CM

## 2019-11-14 DIAGNOSIS — K44.9 HIATAL HERNIA: Primary | ICD-10-CM

## 2019-11-14 PROCEDURE — 3017F COLORECTAL CA SCREEN DOC REV: CPT | Performed by: SURGERY

## 2019-11-14 PROCEDURE — G8484 FLU IMMUNIZE NO ADMIN: HCPCS | Performed by: SURGERY

## 2019-11-14 PROCEDURE — 1036F TOBACCO NON-USER: CPT | Performed by: SURGERY

## 2019-11-14 PROCEDURE — G8417 CALC BMI ABV UP PARAM F/U: HCPCS | Performed by: SURGERY

## 2019-11-14 PROCEDURE — G8427 DOCREV CUR MEDS BY ELIG CLIN: HCPCS | Performed by: SURGERY

## 2019-11-14 PROCEDURE — 99214 OFFICE O/P EST MOD 30 MIN: CPT | Performed by: SURGERY

## 2019-11-15 ENCOUNTER — PATIENT MESSAGE (OUTPATIENT)
Dept: FAMILY MEDICINE CLINIC | Age: 59
End: 2019-11-15

## 2019-11-16 ENCOUNTER — PATIENT MESSAGE (OUTPATIENT)
Dept: FAMILY MEDICINE CLINIC | Age: 59
End: 2019-11-16

## 2019-11-19 DIAGNOSIS — J32.9 CHRONIC RECURRENT SINUSITIS: ICD-10-CM

## 2019-11-19 RX ORDER — PREDNISONE 20 MG/1
TABLET ORAL
Qty: 16 TABLET | Refills: 3 | Status: SHIPPED | OUTPATIENT
Start: 2019-11-19 | End: 2019-12-09 | Stop reason: SDUPTHER

## 2019-11-26 ENCOUNTER — OFFICE VISIT (OUTPATIENT)
Dept: FAMILY MEDICINE CLINIC | Age: 59
End: 2019-11-26
Payer: COMMERCIAL

## 2019-11-26 ENCOUNTER — TELEPHONE (OUTPATIENT)
Dept: FAMILY MEDICINE CLINIC | Age: 59
End: 2019-11-26

## 2019-11-26 VITALS
SYSTOLIC BLOOD PRESSURE: 102 MMHG | HEIGHT: 67 IN | BODY MASS INDEX: 31.86 KG/M2 | DIASTOLIC BLOOD PRESSURE: 76 MMHG | WEIGHT: 203 LBS

## 2019-11-26 DIAGNOSIS — J32.4 CHRONIC PANSINUSITIS: ICD-10-CM

## 2019-11-26 DIAGNOSIS — J45.909 NOT WELL CONTROLLED ASTHMA WITHOUT COMPLICATION: ICD-10-CM

## 2019-11-26 DIAGNOSIS — J45.901 ASTHMA NOT DEPENDENT ON SYSTEMIC STEROIDS WITH ACUTE EXACERBATION: Primary | ICD-10-CM

## 2019-11-26 PROCEDURE — 3017F COLORECTAL CA SCREEN DOC REV: CPT | Performed by: NURSE PRACTITIONER

## 2019-11-26 PROCEDURE — 99214 OFFICE O/P EST MOD 30 MIN: CPT | Performed by: NURSE PRACTITIONER

## 2019-11-26 PROCEDURE — 94010 BREATHING CAPACITY TEST: CPT | Performed by: NURSE PRACTITIONER

## 2019-11-26 PROCEDURE — 1036F TOBACCO NON-USER: CPT | Performed by: NURSE PRACTITIONER

## 2019-11-26 PROCEDURE — G8484 FLU IMMUNIZE NO ADMIN: HCPCS | Performed by: NURSE PRACTITIONER

## 2019-11-26 PROCEDURE — G8417 CALC BMI ABV UP PARAM F/U: HCPCS | Performed by: NURSE PRACTITIONER

## 2019-11-26 PROCEDURE — G8428 CUR MEDS NOT DOCUMENT: HCPCS | Performed by: NURSE PRACTITIONER

## 2019-11-26 RX ORDER — BUDESONIDE AND FORMOTEROL FUMARATE DIHYDRATE 160; 4.5 UG/1; UG/1
AEROSOL RESPIRATORY (INHALATION)
Qty: 3 INHALER | Refills: 3 | Status: SHIPPED | OUTPATIENT
Start: 2019-11-26 | End: 2021-01-07

## 2019-11-26 RX ORDER — PREDNISONE 20 MG/1
20 TABLET ORAL 2 TIMES DAILY
Qty: 10 TABLET | Refills: 2 | Status: SHIPPED | OUTPATIENT
Start: 2019-11-26 | End: 2021-07-10 | Stop reason: SDUPTHER

## 2019-11-26 RX ORDER — CEFDINIR 300 MG/1
300 CAPSULE ORAL 2 TIMES DAILY
Qty: 20 CAPSULE | Refills: 0 | Status: SHIPPED | OUTPATIENT
Start: 2019-11-26 | End: 2019-12-06

## 2019-11-30 ASSESSMENT — ENCOUNTER SYMPTOMS
SORE THROAT: 0
DIFFICULTY BREATHING: 0
SWOLLEN GLANDS: 0
TROUBLE SWALLOWING: 0
SINUS PRESSURE: 1
HOARSE VOICE: 1
HEMOPTYSIS: 0
SPUTUM PRODUCTION: 1
HEARTBURN: 1
RHINORRHEA: 0
FREQUENT THROAT CLEARING: 1
WHEEZING: 1
CHEST TIGHTNESS: 1
SHORTNESS OF BREATH: 1
COUGH: 1

## 2019-12-05 ENCOUNTER — HOSPITAL ENCOUNTER (OUTPATIENT)
Dept: GENERAL RADIOLOGY | Age: 59
Discharge: HOME | End: 2019-12-07
Payer: COMMERCIAL

## 2019-12-05 DIAGNOSIS — K44.9 HIATAL HERNIA: ICD-10-CM

## 2019-12-05 PROCEDURE — 74220 X-RAY XM ESOPHAGUS 1CNTRST: CPT

## 2019-12-05 PROCEDURE — 2500000003 HC RX 250 WO HCPCS: Performed by: SURGERY

## 2019-12-05 RX ADMIN — BARIUM SULFATE 120 ML: 960 POWDER, FOR SUSPENSION ORAL at 09:32

## 2019-12-05 RX ADMIN — BARIUM SULFATE 140 ML: 980 POWDER, FOR SUSPENSION ORAL at 09:32

## 2019-12-07 ENCOUNTER — PATIENT MESSAGE (OUTPATIENT)
Dept: FAMILY MEDICINE CLINIC | Age: 59
End: 2019-12-07

## 2019-12-07 DIAGNOSIS — J32.9 CHRONIC RECURRENT SINUSITIS: ICD-10-CM

## 2019-12-09 ENCOUNTER — TELEPHONE (OUTPATIENT)
Dept: FAMILY MEDICINE CLINIC | Age: 59
End: 2019-12-09

## 2019-12-09 ENCOUNTER — HOSPITAL ENCOUNTER (OUTPATIENT)
Dept: GENERAL RADIOLOGY | Age: 59
Discharge: HOME | End: 2019-12-11
Payer: COMMERCIAL

## 2019-12-09 DIAGNOSIS — R06.02 SHORTNESS OF BREATH: ICD-10-CM

## 2019-12-09 DIAGNOSIS — R05.8 PRODUCTIVE COUGH: ICD-10-CM

## 2019-12-09 DIAGNOSIS — R06.02 SHORTNESS OF BREATH: Primary | ICD-10-CM

## 2019-12-09 PROCEDURE — 71046 X-RAY EXAM CHEST 2 VIEWS: CPT

## 2019-12-09 RX ORDER — PREDNISONE 20 MG/1
TABLET ORAL
Qty: 16 TABLET | Refills: 3 | Status: SHIPPED | OUTPATIENT
Start: 2019-12-09 | End: 2019-12-18

## 2019-12-10 ENCOUNTER — PATIENT MESSAGE (OUTPATIENT)
Dept: FAMILY MEDICINE CLINIC | Age: 59
End: 2019-12-10

## 2019-12-10 DIAGNOSIS — R05.9 COUGH: Primary | ICD-10-CM

## 2019-12-10 RX ORDER — BENZONATATE 200 MG/1
200 CAPSULE ORAL 3 TIMES DAILY PRN
Qty: 30 CAPSULE | Refills: 0 | Status: SHIPPED | OUTPATIENT
Start: 2019-12-10 | End: 2019-12-17

## 2019-12-16 ENCOUNTER — TELEPHONE (OUTPATIENT)
Dept: FAMILY MEDICINE CLINIC | Age: 59
End: 2019-12-16

## 2019-12-18 ENCOUNTER — HOSPITAL ENCOUNTER (OUTPATIENT)
Dept: GENERAL RADIOLOGY | Age: 59
Discharge: HOME | End: 2019-12-20
Payer: COMMERCIAL

## 2019-12-18 ENCOUNTER — OFFICE VISIT (OUTPATIENT)
Dept: FAMILY MEDICINE CLINIC | Age: 59
End: 2019-12-18
Payer: COMMERCIAL

## 2019-12-18 VITALS
DIASTOLIC BLOOD PRESSURE: 72 MMHG | OXYGEN SATURATION: 96 % | TEMPERATURE: 98.6 F | BODY MASS INDEX: 31.55 KG/M2 | HEART RATE: 114 BPM | HEIGHT: 67 IN | WEIGHT: 201 LBS | SYSTOLIC BLOOD PRESSURE: 126 MMHG

## 2019-12-18 DIAGNOSIS — R05.8 PRODUCTIVE COUGH: ICD-10-CM

## 2019-12-18 DIAGNOSIS — B37.31 VAGINAL YEAST INFECTION: ICD-10-CM

## 2019-12-18 DIAGNOSIS — K21.9 GASTROESOPHAGEAL REFLUX DISEASE, ESOPHAGITIS PRESENCE NOT SPECIFIED: ICD-10-CM

## 2019-12-18 DIAGNOSIS — J32.9 CHRONIC RECURRENT SINUSITIS: Primary | ICD-10-CM

## 2019-12-18 DIAGNOSIS — J45.901 ASTHMA NOT DEPENDENT ON SYSTEMIC STEROIDS WITH ACUTE EXACERBATION: ICD-10-CM

## 2019-12-18 DIAGNOSIS — K21.9 LPRD (LARYNGOPHARYNGEAL REFLUX DISEASE): ICD-10-CM

## 2019-12-18 DIAGNOSIS — R06.02 SHORTNESS OF BREATH: ICD-10-CM

## 2019-12-18 PROCEDURE — G8417 CALC BMI ABV UP PARAM F/U: HCPCS | Performed by: NURSE PRACTITIONER

## 2019-12-18 PROCEDURE — 1036F TOBACCO NON-USER: CPT | Performed by: NURSE PRACTITIONER

## 2019-12-18 PROCEDURE — G8427 DOCREV CUR MEDS BY ELIG CLIN: HCPCS | Performed by: NURSE PRACTITIONER

## 2019-12-18 PROCEDURE — 71046 X-RAY EXAM CHEST 2 VIEWS: CPT

## 2019-12-18 PROCEDURE — 3017F COLORECTAL CA SCREEN DOC REV: CPT | Performed by: NURSE PRACTITIONER

## 2019-12-18 PROCEDURE — G8484 FLU IMMUNIZE NO ADMIN: HCPCS | Performed by: NURSE PRACTITIONER

## 2019-12-18 PROCEDURE — 99214 OFFICE O/P EST MOD 30 MIN: CPT | Performed by: NURSE PRACTITIONER

## 2019-12-18 RX ORDER — AMOXICILLIN AND CLAVULANATE POTASSIUM 875; 125 MG/1; MG/1
1 TABLET, FILM COATED ORAL 2 TIMES DAILY
Qty: 60 TABLET | Refills: 0 | Status: SHIPPED | OUTPATIENT
Start: 2019-12-18 | End: 2020-01-08 | Stop reason: ALTCHOICE

## 2019-12-18 RX ORDER — FLUCONAZOLE 150 MG/1
150 TABLET ORAL ONCE
Qty: 4 TABLET | Refills: 0 | Status: SHIPPED | OUTPATIENT
Start: 2019-12-18 | End: 2019-12-18

## 2019-12-18 RX ORDER — PREDNISONE 20 MG/1
20 TABLET ORAL 2 TIMES DAILY
Qty: 100 TABLET | Refills: 0 | Status: SHIPPED | OUTPATIENT
Start: 2019-12-18 | End: 2020-02-10 | Stop reason: SDUPTHER

## 2019-12-18 RX ORDER — GUAIFENESIN AND CODEINE PHOSPHATE 100; 10 MG/5ML; MG/5ML
10 SOLUTION ORAL NIGHTLY
Qty: 100 ML | Refills: 0 | Status: SHIPPED | OUTPATIENT
Start: 2019-12-18 | End: 2019-12-25

## 2019-12-18 ASSESSMENT — ENCOUNTER SYMPTOMS
RHINORRHEA: 1
WHEEZING: 1
SORE THROAT: 0
HEARTBURN: 0
HEMOPTYSIS: 0
COUGH: 1
SHORTNESS OF BREATH: 1

## 2020-01-01 NOTE — OP NOTE
EGD PROCEDURE NOTE AND  48 HOUR BRAVO:     Pre op diagnosis:    1. GERD  2. Chronic sinusitis    Operative Procedure:    1. EGD with cold biopsy   2.  deployment of Bravo device into  esophagus    Surgeon:    Dr. Kayla Meier     Anesthesia:    IV sedation per CRNA    EBL:  minimal      Procedure:    Patient was taken to the endoscopy suite and placed in a left lateral decubitus position. They were given IV sedation as mentioned above. The gastric scope was passed through the mouth piece and down the esophagus, stomach and into the second portion of the duodenum. It was withdrawn slowly and cold biopsies were taken in the antrum,body of the stomach . [facility] The scope was retroflexed in the stomach and GE junction was examined. The following findings were noted. On the removal of the gastroscope during the EGD, the GE junction was measured. Then we calculated the placement of the device by subtracting 6 cm from the GE junction. We then inserted the bravo deployment catheter up to the measured point. We then reinserted the gastroscope into the upper esophagus to verify that the catheter was in the esophagus and not into the trachea. It was in the esophagus, so we proceeded. We connected the device to suction for 30 sec. We then depressed the deployment button and held down for 5 sec. Then we released the device and removed the catheter. The bravo device was successfully deployed. We recheck its placement with the gastroscope one last time and it was in the propper place. The pt was then taken back to RR . Final Diagnosis:    1. GERD  2. S/P successful deployment of 48 hour bravo device  3. GE at 36 cm  4. Mild gastritis  5. Grade 2 ulcerative esophagitis    Plan:    1. Follow post Bravo directions given by office preoperatively  2. Then will make further recommendations after 48 bailey results    ADDENDUM:  Endo Review :  2/10/2020    Pathology:    -- Diagnosis --   1.   STOMACH, ANTRUM, BIOPSY:   -

## 2020-01-01 NOTE — H&P
Diabetes Daughter           pre diabetes    Other Maternal Grandfather           PUD GI problems         Social History               Socioeconomic History    Marital status:        Spouse name: Not on file    Number of children: Not on file    Years of education: Not on file    Highest education level: Not on file   Occupational History    Occupation: counselor       Employer: [employer]   Social Needs    Financial resource strain: Not on file    Food insecurity:       Worry: Not on file       Inability: Not on file    Transportation needs:       Medical: Not on file       Non-medical: Not on file   Tobacco Use    Smoking status: Never Smoker    Smokeless tobacco: Never Used    Tobacco comment: never smoked syant rrt 10/13/2017   Substance and Sexual Activity    Alcohol use: No       Alcohol/week: 0.0 standard drinks       Comment: Consume about 4 wine coolers a year    Drug use: No    Sexual activity: Yes       Partners: Male       Comment:    Lifestyle    Physical activity:       Days per week: Not on file       Minutes per session: Not on file    Stress: Not on file   Relationships    Social connections:       Talks on phone: Not on file       Gets together: Not on file       Attends Zoroastrianism service: Not on file       Active member of club or organization: Not on file       Attends meetings of clubs or organizations: Not on file       Relationship status: Not on file    Intimate partner violence:       Fear of current or ex partner: Not on file       Emotionally abused: Not on file       Physically abused: Not on file       Forced sexual activity: Not on file   Other Topics Concern    Not on file   Social History Narrative    Not on file         Past Surgical History         Past Surgical History:   Procedure Laterality Date    ARTERIAL ANEURYSM REPAIR        splenic artery coil repair     SECTION   1986    COLONOSCOPY   2012     HYPERPLASTIC POLYP,

## 2020-01-02 ENCOUNTER — HOSPITAL ENCOUNTER (OUTPATIENT)
Age: 60
Setting detail: OUTPATIENT SURGERY
Discharge: HOME | End: 2020-01-02
Attending: SURGERY | Admitting: SURGERY
Payer: COMMERCIAL

## 2020-01-02 ENCOUNTER — ANESTHESIA EVENT (OUTPATIENT)
Dept: OPERATING ROOM | Age: 60
End: 2020-01-02
Payer: COMMERCIAL

## 2020-01-02 ENCOUNTER — ANESTHESIA (OUTPATIENT)
Dept: OPERATING ROOM | Age: 60
End: 2020-01-02
Payer: COMMERCIAL

## 2020-01-02 VITALS
HEART RATE: 108 BPM | RESPIRATION RATE: 16 BRPM | BODY MASS INDEX: 30.92 KG/M2 | HEIGHT: 67 IN | SYSTOLIC BLOOD PRESSURE: 117 MMHG | WEIGHT: 197 LBS | OXYGEN SATURATION: 96 % | TEMPERATURE: 97.2 F | DIASTOLIC BLOOD PRESSURE: 61 MMHG

## 2020-01-02 VITALS — OXYGEN SATURATION: 96 % | DIASTOLIC BLOOD PRESSURE: 64 MMHG | SYSTOLIC BLOOD PRESSURE: 117 MMHG

## 2020-01-02 LAB — GLUCOSE BLD-MCNC: 232 MG/DL (ref 65–105)

## 2020-01-02 PROCEDURE — 2580000003 HC RX 258: Performed by: SURGERY

## 2020-01-02 PROCEDURE — 7100000010 HC PHASE II RECOVERY - FIRST 15 MIN: Performed by: SURGERY

## 2020-01-02 PROCEDURE — 7100000011 HC PHASE II RECOVERY - ADDTL 15 MIN: Performed by: SURGERY

## 2020-01-02 PROCEDURE — 3700000000 HC ANESTHESIA ATTENDED CARE: Performed by: SURGERY

## 2020-01-02 PROCEDURE — 6360000002 HC RX W HCPCS: Performed by: SURGERY

## 2020-01-02 PROCEDURE — 3609012400 HC EGD TRANSORAL BIOPSY SINGLE/MULTIPLE: Performed by: SURGERY

## 2020-01-02 PROCEDURE — 2720000010 HC SURG SUPPLY STERILE: Performed by: SURGERY

## 2020-01-02 PROCEDURE — 82947 ASSAY GLUCOSE BLOOD QUANT: CPT

## 2020-01-02 PROCEDURE — 43239 EGD BIOPSY SINGLE/MULTIPLE: CPT | Performed by: SURGERY

## 2020-01-02 PROCEDURE — 6360000002 HC RX W HCPCS: Performed by: NURSE ANESTHETIST, CERTIFIED REGISTERED

## 2020-01-02 PROCEDURE — 94640 AIRWAY INHALATION TREATMENT: CPT

## 2020-01-02 PROCEDURE — 91035 G-ESOPH REFLX TST W/ELECTROD: CPT | Performed by: SURGERY

## 2020-01-02 PROCEDURE — 3700000001 HC ADD 15 MINUTES (ANESTHESIA): Performed by: SURGERY

## 2020-01-02 PROCEDURE — 2500000003 HC RX 250 WO HCPCS: Performed by: NURSE ANESTHETIST, CERTIFIED REGISTERED

## 2020-01-02 PROCEDURE — 88305 TISSUE EXAM BY PATHOLOGIST: CPT

## 2020-01-02 PROCEDURE — 6370000000 HC RX 637 (ALT 250 FOR IP): Performed by: SURGERY

## 2020-01-02 PROCEDURE — 2709999900 HC NON-CHARGEABLE SUPPLY: Performed by: SURGERY

## 2020-01-02 RX ORDER — LIDOCAINE HYDROCHLORIDE 40 MG/ML
INJECTION, SOLUTION RETROBULBAR; TOPICAL PRN
Status: DISCONTINUED | OUTPATIENT
Start: 2020-01-02 | End: 2020-01-02 | Stop reason: SDUPTHER

## 2020-01-02 RX ORDER — PROPOFOL 10 MG/ML
INJECTION, EMULSION INTRAVENOUS PRN
Status: DISCONTINUED | OUTPATIENT
Start: 2020-01-02 | End: 2020-01-02 | Stop reason: SDUPTHER

## 2020-01-02 RX ORDER — IPRATROPIUM BROMIDE AND ALBUTEROL SULFATE 2.5; .5 MG/3ML; MG/3ML
1 SOLUTION RESPIRATORY (INHALATION)
Status: DISCONTINUED | OUTPATIENT
Start: 2020-01-02 | End: 2020-01-02 | Stop reason: HOSPADM

## 2020-01-02 RX ORDER — METHYLPREDNISOLONE SODIUM SUCCINATE 125 MG/2ML
125 INJECTION, POWDER, LYOPHILIZED, FOR SOLUTION INTRAMUSCULAR; INTRAVENOUS
Status: DISCONTINUED | OUTPATIENT
Start: 2020-01-02 | End: 2020-01-02 | Stop reason: HOSPADM

## 2020-01-02 RX ORDER — SODIUM CHLORIDE, SODIUM LACTATE, POTASSIUM CHLORIDE, CALCIUM CHLORIDE 600; 310; 30; 20 MG/100ML; MG/100ML; MG/100ML; MG/100ML
INJECTION, SOLUTION INTRAVENOUS CONTINUOUS
Status: DISCONTINUED | OUTPATIENT
Start: 2020-01-02 | End: 2020-01-02 | Stop reason: SDUPTHER

## 2020-01-02 RX ORDER — ONDANSETRON 2 MG/ML
INJECTION INTRAMUSCULAR; INTRAVENOUS PRN
Status: DISCONTINUED | OUTPATIENT
Start: 2020-01-02 | End: 2020-01-02 | Stop reason: SDUPTHER

## 2020-01-02 RX ORDER — SODIUM CHLORIDE, SODIUM LACTATE, POTASSIUM CHLORIDE, CALCIUM CHLORIDE 600; 310; 30; 20 MG/100ML; MG/100ML; MG/100ML; MG/100ML
INJECTION, SOLUTION INTRAVENOUS CONTINUOUS
Status: DISCONTINUED | OUTPATIENT
Start: 2020-01-02 | End: 2020-01-02 | Stop reason: HOSPADM

## 2020-01-02 RX ORDER — MIDAZOLAM HYDROCHLORIDE 1 MG/ML
INJECTION INTRAMUSCULAR; INTRAVENOUS PRN
Status: DISCONTINUED | OUTPATIENT
Start: 2020-01-02 | End: 2020-01-02 | Stop reason: SDUPTHER

## 2020-01-02 RX ORDER — BENZONATATE 100 MG/1
200 CAPSULE ORAL 3 TIMES DAILY PRN
COMMUNITY
End: 2020-05-11

## 2020-01-02 RX ADMIN — PROPOFOL 30 MG: 10 INJECTION, EMULSION INTRAVENOUS at 09:45

## 2020-01-02 RX ADMIN — SODIUM CHLORIDE, POTASSIUM CHLORIDE, SODIUM LACTATE AND CALCIUM CHLORIDE: 600; 310; 30; 20 INJECTION, SOLUTION INTRAVENOUS at 09:25

## 2020-01-02 RX ADMIN — PROPOFOL 50 MG: 10 INJECTION, EMULSION INTRAVENOUS at 09:42

## 2020-01-02 RX ADMIN — PROPOFOL 70 MG: 10 INJECTION, EMULSION INTRAVENOUS at 09:48

## 2020-01-02 RX ADMIN — PROPOFOL 100 MG: 10 INJECTION, EMULSION INTRAVENOUS at 09:38

## 2020-01-02 RX ADMIN — METHYLPREDNISOLONE SODIUM SUCCINATE 125 MG: 125 INJECTION, POWDER, FOR SOLUTION INTRAMUSCULAR; INTRAVENOUS at 09:07

## 2020-01-02 RX ADMIN — LIDOCAINE HYDROCHLORIDE 60 MG: 40 INJECTION, SOLUTION RETROBULBAR; TOPICAL at 09:38

## 2020-01-02 RX ADMIN — ONDANSETRON 4 MG: 2 INJECTION INTRAMUSCULAR; INTRAVENOUS at 09:40

## 2020-01-02 RX ADMIN — IPRATROPIUM BROMIDE AND ALBUTEROL SULFATE 1 AMPULE: .5; 3 SOLUTION RESPIRATORY (INHALATION) at 09:04

## 2020-01-02 RX ADMIN — MIDAZOLAM 2 MG: 1 INJECTION INTRAMUSCULAR; INTRAVENOUS at 09:32

## 2020-01-02 ASSESSMENT — PAIN SCALES - GENERAL
PAINLEVEL_OUTOF10: 0

## 2020-01-02 ASSESSMENT — PAIN - FUNCTIONAL ASSESSMENT: PAIN_FUNCTIONAL_ASSESSMENT: 0-10

## 2020-01-02 NOTE — ANESTHESIA PRE PROCEDURE
Department of Anesthesiology  Preprocedure Note       Name:  Jaison Valadez   Age:  60 y.o.  :  1960                                          MRN:  [id number]         Date:  2020      Surgeon: Benetta Nissen):  Shin Anguiano MD    Procedure: EGD w/ 48 hour BRAVO (N/A )    Medications prior to admission:   Prior to Admission medications    Medication Sig Start Date End Date Taking? Authorizing Provider   benzonatate (TESSALON) 100 MG capsule Take 200 mg by mouth 3 times daily as needed for Cough   Yes Historical Provider, MD   predniSONE (DELTASONE) 20 MG tablet Take 1 tablet by mouth 2 times daily 19  Yes Elizebeth All, APRN - CNP   amoxicillin-clavulanate (AUGMENTIN) 875-125 MG per tablet Take 1 tablet by mouth 2 times daily 19 Yes Elizebeth All, APRN - CNP   albuterol (PROVENTIL) (5 MG/ML) 0.5% nebulizer solution Take 1 mL by nebulization 4 times daily as needed for Wheezing 19  Yes Elizebeth All, APRN - CNP   budesonide-formoterol (SYMBICORT) 160-4.5 MCG/ACT AERO 2 puffs inhaled twice daily. Rinse mouth well after use.  19  Yes Elizebeth All, APRN - CNP   pantoprazole (PROTONIX) 40 MG tablet Take one tablet twice daily 30-60 minutes prior to breakfast and evening meal 19  Yes Elizebeth All, APRN - CNP   SYNTHROID 175 MCG tablet Take 175 mcg by mouth 8/3/19  Yes Historical Provider, MD   MOLYBDENUM PO Take 500 mcg by mouth daily   Yes Historical Provider, MD   ranitidine (ZANTAC) 300 MG tablet TAKE 1 TABLET ONCE DAILY AT BEDTIME 19  Yes Elizebeth All, APRN - CNP   Probiotic Product (SOLUBLE FIBER/PROBIOTICS PO) Take by mouth 2 times daily   Yes Historical Provider, MD   metFORMIN (GLUCOPHAGE) 500 MG tablet TAKE 2 TABLETS BY MOUTH TWO TIMES A DAY WITH MEALS 17  Yes Kelly Arrington MD   losartan (COZAAR) 25 MG tablet Take 1 tablet by mouth daily 16  Yes [facility] CRISTINA Natarajan   Cholecalciferol (VITAMIN D3) 5000 UNITS CAPS Take 2 capsules by mouth daily    Yes Historical Provider, MD   ipratropium-albuterol (DUONEB) 0.5-2.5 (3) MG/3ML SOLN nebulizer solution Inhale 3 mLs into the lungs every 4 hours as needed for Shortness of Breath 5/6/19   DEREK Yarbrough CNP   fluticasone (FLONASE) 50 MCG/ACT nasal spray Two sprays to each nostril once daily 4/1/19   EDREK Yarbrough CNP   diclofenac (VOLTAREN) 75 MG EC tablet Take 1 tablet by mouth 2 times daily as needed for Pain  Patient not taking: Reported on 11/14/2019 2/22/18   Griffin Murillo MD       Current medications:    Current Facility-Administered Medications   Medication Dose Route Frequency Provider Last Rate Last Dose    lactated ringers infusion   Intravenous Continuous Derik Arshad MD        ipratropium-albuterol (DUONEB) nebulizer solution 1 ampule  1 ampule Inhalation 60 Min Pre-Op Derik Arshad MD   1 ampule at 01/02/20 1652    methylPREDNISolone sodium (SOLU-MEDROL) injection 125 mg  125 mg Intravenous 60 Min Pre-Op Derik Arshad MD           Allergies:     Allergies   Allergen Reactions    Codeine Nausea And Vomiting and Other (See Comments)     Chest tightness    Darvon [Propoxyphene] Nausea And Vomiting and Other (See Comments)     Chest tightness    Lisinopril Other (See Comments)     COUGH    Percocet [Oxycodone-Acetaminophen] Nausea And Vomiting and Other (See Comments)     Chest tightness       Problem List:    Patient Active Problem List   Diagnosis Code    Gastroesophageal reflux disease K21.9    Hypothyroidism E03.9    Essential hypertension I10    Hyperlipidemia E78.5    DJD (degenerative joint disease), lumbar M47.816    Acute diverticulitis of intestine K57.92    Hyperplastic colon polyp K63.5    Tinnitus H93.19    Sciatica of right side M54.31    Splenic artery aneurysm (HCC) I72.8    Presence of endovascular aneurysm coil compatible with safe magnetic resonance imaging H52.924    DVT (deep venous thrombosis) (HCC) I82.409    LGSIL (low grade stents performed by Alexis Mena MD at [facility] ARTHROSCOPY Right 2004    PARTIAL SYNOVECTOMY AND CHONDROPLASTY DUE TO MENSICUS TEAR    LAPAROSCOPY N/A 9/8/2017    EXPLORATORY LAPAROSCOPY converted to open exploration with drainage of pelvic abscess performed by Ayo Roberts MD at [facility] 38  08/19/2014    clearing an infection done at [facility] 41  10/25/2011    L4/L5 epidural steroid injection    OTHER SURGICAL HISTORY Right 2006 and 2010    synvisc injections, two sets    [place] COLON CA SCRN NOT HI RSK IND N/A 2/27/2017    COLONOSCOPY performed by Ayo Roberts MD at [address] IND N/A 9/8/2017    COLONOSCOPY performed by Ayo Roberts MD at [address] [facility] EGD TRANSORAL BIOPSY SINGLE/MULTIPLE N/A 2/27/2017    EGD BIOPSY performed by Ayo Roberts MD at [address] N/A 8/30/2017    ATTEMPTED LAPAROCOPY PROCEDURE CONVERTED TO OPEN Sigmoid Colectomy PERFORMED BY DR. VO resection colovaginal fistula PERFORMED BY. DR. Zoraida Rodriguez  performed by Ayo Roberts MD at [address] [facility] Right 07/2011    TOTAL    TUBAL LIGATION  1991    WISDOM TOOTH EXTRACTION         Social History:    Social History     Tobacco Use    Smoking status: Never Smoker    Smokeless tobacco: Never Used    Tobacco comment: never smoked evelyn rrt 10/13/2017   Substance Use Topics    Alcohol use: No     Alcohol/week: 0.0 standard drinks     Comment: Consume about 4 wine coolers a year                                Counseling given: Not Answered  Comment: never smoked evelyn rrt 10/13/2017      Vital Signs (Current):   Vitals:    01/02/20 0859   BP: (!) 144/82   Pulse: 88   Resp: 18   Temp: 36.2 °C (97.2 °F)   TempSrc: Temporal   SpO2: 95%   Weight: 197 lb (89.4 kg)   Height: 5' 7.01\" (1.702 m)                                              BP Readings from Last 3 Encounters:   01/02/20 (!) 144/82   12/18/19

## 2020-01-03 LAB — SURGICAL PATHOLOGY REPORT: NORMAL

## 2020-01-08 ENCOUNTER — PATIENT MESSAGE (OUTPATIENT)
Dept: FAMILY MEDICINE CLINIC | Age: 60
End: 2020-01-08

## 2020-01-08 ENCOUNTER — OFFICE VISIT (OUTPATIENT)
Dept: FAMILY MEDICINE CLINIC | Age: 60
End: 2020-01-08
Payer: COMMERCIAL

## 2020-01-08 VITALS
SYSTOLIC BLOOD PRESSURE: 136 MMHG | TEMPERATURE: 99.3 F | WEIGHT: 198 LBS | HEIGHT: 67 IN | DIASTOLIC BLOOD PRESSURE: 86 MMHG | BODY MASS INDEX: 31.08 KG/M2

## 2020-01-08 PROCEDURE — 94640 AIRWAY INHALATION TREATMENT: CPT | Performed by: NURSE PRACTITIONER

## 2020-01-08 PROCEDURE — 99214 OFFICE O/P EST MOD 30 MIN: CPT | Performed by: NURSE PRACTITIONER

## 2020-01-08 PROCEDURE — G8484 FLU IMMUNIZE NO ADMIN: HCPCS | Performed by: NURSE PRACTITIONER

## 2020-01-08 PROCEDURE — 3017F COLORECTAL CA SCREEN DOC REV: CPT | Performed by: NURSE PRACTITIONER

## 2020-01-08 PROCEDURE — 1036F TOBACCO NON-USER: CPT | Performed by: NURSE PRACTITIONER

## 2020-01-08 PROCEDURE — G8417 CALC BMI ABV UP PARAM F/U: HCPCS | Performed by: NURSE PRACTITIONER

## 2020-01-08 PROCEDURE — G8427 DOCREV CUR MEDS BY ELIG CLIN: HCPCS | Performed by: NURSE PRACTITIONER

## 2020-01-08 RX ORDER — FLUCONAZOLE 150 MG/1
TABLET ORAL
COMMUNITY
Start: 2019-12-18 | End: 2020-01-08 | Stop reason: SDUPTHER

## 2020-01-08 RX ORDER — FLUCONAZOLE 150 MG/1
150 TABLET ORAL ONCE
Qty: 4 TABLET | Refills: 0 | Status: SHIPPED | OUTPATIENT
Start: 2020-01-08 | End: 2020-01-08

## 2020-01-08 RX ORDER — CEFDINIR 300 MG/1
CAPSULE ORAL
Qty: 60 CAPSULE | Refills: 0 | Status: SHIPPED | OUTPATIENT
Start: 2020-01-08 | End: 2020-02-19 | Stop reason: ALTCHOICE

## 2020-01-08 RX ORDER — ALBUTEROL SULFATE 2.5 MG/3ML
2.5 SOLUTION RESPIRATORY (INHALATION) ONCE
Status: COMPLETED | OUTPATIENT
Start: 2020-01-08 | End: 2020-01-08

## 2020-01-08 RX ADMIN — ALBUTEROL SULFATE 2.5 MG: 2.5 SOLUTION RESPIRATORY (INHALATION) at 12:37

## 2020-01-08 ASSESSMENT — PATIENT HEALTH QUESTIONNAIRE - PHQ9
2. FEELING DOWN, DEPRESSED OR HOPELESS: 0
SUM OF ALL RESPONSES TO PHQ QUESTIONS 1-9: 0
SUM OF ALL RESPONSES TO PHQ9 QUESTIONS 1 & 2: 0
1. LITTLE INTEREST OR PLEASURE IN DOING THINGS: 0
SUM OF ALL RESPONSES TO PHQ QUESTIONS 1-9: 0

## 2020-01-14 ENCOUNTER — OFFICE VISIT (OUTPATIENT)
Dept: SURGERY | Age: 60
End: 2020-01-14
Payer: COMMERCIAL

## 2020-01-14 ENCOUNTER — HOSPITAL ENCOUNTER (OUTPATIENT)
Dept: NON INVASIVE DIAGNOSTICS | Age: 60
Discharge: HOME | End: 2020-01-14
Payer: COMMERCIAL

## 2020-01-14 VITALS
HEIGHT: 67 IN | TEMPERATURE: 96.2 F | HEART RATE: 116 BPM | DIASTOLIC BLOOD PRESSURE: 80 MMHG | SYSTOLIC BLOOD PRESSURE: 120 MMHG | BODY MASS INDEX: 30.39 KG/M2 | WEIGHT: 193.6 LBS

## 2020-01-14 PROCEDURE — 3017F COLORECTAL CA SCREEN DOC REV: CPT | Performed by: SURGERY

## 2020-01-14 PROCEDURE — G8427 DOCREV CUR MEDS BY ELIG CLIN: HCPCS | Performed by: SURGERY

## 2020-01-14 PROCEDURE — 93005 ELECTROCARDIOGRAM TRACING: CPT

## 2020-01-14 PROCEDURE — G8484 FLU IMMUNIZE NO ADMIN: HCPCS | Performed by: SURGERY

## 2020-01-14 PROCEDURE — 1036F TOBACCO NON-USER: CPT | Performed by: SURGERY

## 2020-01-14 PROCEDURE — G8417 CALC BMI ABV UP PARAM F/U: HCPCS | Performed by: SURGERY

## 2020-01-14 PROCEDURE — 99214 OFFICE O/P EST MOD 30 MIN: CPT | Performed by: SURGERY

## 2020-01-14 NOTE — PROGRESS NOTES
Nicole Michaud is a 60 y.o. female          CC:    . Gastroesophageal Reflux      HISTORY OF PRESENT ILLNESS:    Patient is a 70-year-old female who we have been working up for GERD. She has had some heartburn feeling of reflux in the back of her throat but most severe is her cough and congestion and sinusitis and her upper airway. We have treated her with maximum medical therapy and she still has significant symptoms. We did a 48-hour Bravo and she had a DeMeester score of 16-25. She had a esophagram which showed significant reflux up to the middle and top third of the esophagus. When she stopped her medications her upper airway inflammation got worse. Her cough got worse. She has been fighting this for the last year. She is ready to have surgery if a candidate. We do not have a manometry or gastric emptying study. We will do those 2 studies and then get medical clearance and then will up for possible laparoscopic/robotic Nissen fundoplication or partial wrap based on study results.           Review of Systems:    General:  Fever: Negative  Weight Change:Negative  Night Sweats: Negative    Eye:  Blurry Vision:Negative  Double Vision: Negative    Ent:  Headaches: Negative  Sore throat: Negative    Allergy/Immunology:  Hives: Negative  Latex allergy: Negative    Hematology/Lymphatic:  Bleeding Problems: Negative  Blood Clots: Negative  Swollen Lymph Nodes: Negative    Lungs:  Cough: Negative  SOB: Negative  Wheezing:Negative    Cardiovascular:  Chest Pain: Negative  Palpitations:Negative    GI:   Decreased Appetite: Negative  Heartburn: Negative  Dysphagia: Negative  Nausea/Vomiting: Negative  Abdominal Pain: Negative  Change in Bowels:Negative  Constipation: Negative  Diarrhea: Negative  Rectal Bleeding: Negative    :   Dysuria: Negative  Increase Urinary Frequency/Urgency: Negative    Neuro:  Seizures: Negative  Confusion: Negative        PAST MEDICAL HISTORY:        Family History   Problem Relation Age of Onset    Coronary Art Dis Mother     High Blood Pressure Mother     High Cholesterol Mother     Allergies Mother     Arthritis Mother     Diabetes Daughter         pre diabetes    Other Maternal Grandfather         PUD GI problems     Social History     Socioeconomic History    Marital status:      Spouse name: Not on file    Number of children: Not on file    Years of education: Not on file    Highest education level: Not on file   Occupational History    Occupation: counselor     Employer: [employer]   Social Needs    Financial resource strain: Not on file    Food insecurity:     Worry: Not on file     Inability: Not on file    Transportation needs:     Medical: Not on file     Non-medical: Not on file   Tobacco Use    Smoking status: Never Smoker    Smokeless tobacco: Never Used    Tobacco comment: never smoked syant rrt 10/13/2017   Substance and Sexual Activity    Alcohol use: No     Alcohol/week: 0.0 standard drinks     Comment: Consume about 4 wine coolers a year    Drug use: No    Sexual activity: Yes     Partners: Male     Comment:    Lifestyle    Physical activity:     Days per week: Not on file     Minutes per session: Not on file    Stress: Not on file   Relationships    Social connections:     Talks on phone: Not on file     Gets together: Not on file     Attends Zoroastrianism service: Not on file     Active member of club or organization: Not on file     Attends meetings of clubs or organizations: Not on file     Relationship status: Not on file    Intimate partner violence:     Fear of current or ex partner: Not on file     Emotionally abused: Not on file     Physically abused: Not on file     Forced sexual activity: Not on file   Other Topics Concern    Not on file   Social History Narrative    Not on file     Past Surgical History:   Procedure Laterality Date    ARTERIAL ANEURYSM REPAIR  2015    splenic artery coil repair   [facility]  COLONOSCOPY  01/30/2012    HYPERPLASTIC POLYP, DIVERTICULAR DISEASE    COLONOSCOPY  02/27/2017    moderate sigmoid diverticulosis. Dr. Collin Campos  11/2015    with [address] Bilateral 8/30/2017    CYSTO insertion lighted stents performed by Myke Salguero MD at [facility] ARTHROSCOPY Right 2004    PARTIAL SYNOVECTOMY AND CHONDROPLASTY DUE TO MENSICUS TEAR    LAPAROSCOPY N/A 9/8/2017    EXPLORATORY LAPAROSCOPY converted to open exploration with drainage of pelvic abscess performed by Jennifer Huston MD at [facility] 38  08/19/2014    clearing an infection done at [facility] 41  10/25/2011    L4/L5 epidural steroid injection    OTHER SURGICAL HISTORY Right 2006 and 2010    synvisc injections, two sets    TN COLON CA SCRN NOT HI RSK IND N/A 2/27/2017    COLONOSCOPY performed by Jennifer Huston MD at [address] [facility] [address]  [address] IND N/A 9/8/2017    COLONOSCOPY performed by Jennifer Huston MD at [address] [facility] EGD TRANSORAL BIOPSY SINGLE/MULTIPLE N/A 2/27/2017    EGD BIOPSY performed by Jennifer Huston MD at [address] [facility] [address] N/A 8/30/2017    ATTEMPTED LAPAROCOPY PROCEDURE CONVERTED TO OPEN Sigmoid Colectomy PERFORMED BY DR. VO resection colovaginal fistula PERFORMED BY. DR. Yuval Esquivel  performed by Jennifer Huston MD at [facility] Right 07/2011    TOTAL    TUBAL LIGATION  1991    UPPER GASTROINTESTINAL ENDOSCOPY N/A 1/2/2020    EGD BIOPSY W/ 50 HR LEARY performed by Jennifer Huston MD at [address] [facility][note 3]       Past Medical History:   Diagnosis Date    Bilateral corneal scars     Diverticulitis     ON COLONSCOPY    DJD (degenerative joint disease), lumbar     Dry eye syndrome     DVT (deep venous thrombosis) (HCC)     after splenic artery repair in right calf    Gastro - esophageal reflux disease     Hyperlipidemia     HIGH CHOLESTEROL/HIGH TRIGLYCERIDES    Hyperplastic colon polyp     Hypertension     Hypothyroidism     LGSIL (low grade squamous intraepithelial dysplasia) 11/2014    referred to GYN/S/P colpo with bx LGSIL, repeat pap in 6 months    Non-celiac gluten sensitivity     wheezing is directly proportionate to wheat intake    Osteoarthritis 2004    RT KNEE S/P INJURY    Sciatica of right side     Severe persistent asthma     patient has seen that wheat consumption causes exacerbations    Splenic artery aneurysm [facility])     s/p splenic artery repair    Tinnitus     Type 2 diabetes mellitus, controlled ([facility] [address] 5/2011     Current Outpatient Medications on File Prior to Visit   Medication Sig Dispense Refill    cefdinir (OMNICEF) 300 MG capsule Take one capsule twice daily for 30 days. 60 capsule 0    predniSONE (DELTASONE) 20 MG tablet Take 1 tablet by mouth 2 times daily 100 tablet 0    albuterol (PROVENTIL) (5 MG/ML) 0.5% nebulizer solution Take 1 mL by nebulization 4 times daily as needed for Wheezing 120 each 3    budesonide-formoterol (SYMBICORT) 160-4.5 MCG/ACT AERO 2 puffs inhaled twice daily. Rinse mouth well after use.  3 Inhaler 3    pantoprazole (PROTONIX) 40 MG tablet Take one tablet twice daily 30-60 minutes prior to breakfast and evening meal 180 tablet 3    SYNTHROID 175 MCG tablet Take 175 mcg by mouth      MOLYBDENUM PO Take 500 mcg by mouth daily      fluticasone (FLONASE) 50 MCG/ACT nasal spray Two sprays to each nostril once daily 3 Bottle 3    ranitidine (ZANTAC) 300 MG tablet TAKE 1 TABLET ONCE DAILY AT BEDTIME 90 tablet 2    Probiotic Product (SOLUBLE FIBER/PROBIOTICS PO) Take by mouth 2 times daily      metFORMIN (GLUCOPHAGE) 500 MG tablet TAKE 2 TABLETS BY MOUTH TWO TIMES A DAY WITH MEALS 120 tablet 3    losartan (COZAAR) 25 MG tablet Take 1 tablet by mouth daily 90 tablet 2    Cholecalciferol (VITAMIN D3) 5000 UNITS CAPS Take 2 capsules by mouth daily Takes 2000 units daily      benzonatate (TESSALON) 100 MG capsule Take 200 mg by mouth 3 times daily as needed for Cough      ipratropium-albuterol (DUONEB) 0.5-2.5 (3) MG/3ML SOLN nebulizer solution Inhale 3 mLs into the lungs every 4 hours as needed for Shortness of Breath (Patient not taking: Reported on 1/14/2020) 360 mL 2    diclofenac (VOLTAREN) 75 MG EC tablet Take 1 tablet by mouth 2 times daily as needed for Pain (Patient not taking: Reported on 1/8/2020) 90 tablet 2     No current facility-administered medications on file prior to visit. Allergies as of 01/14/2020 - Review Complete 01/14/2020   Allergen Reaction Noted    Codeine Nausea And Vomiting and Other (See Comments) 06/27/2013    Darvon [propoxyphene] Nausea And Vomiting and Other (See Comments) 06/27/2013    Lisinopril Other (See Comments) 06/27/2013    Percocet [oxycodone-acetaminophen] Nausea And Vomiting and Other (See Comments) 10/17/2013         PHYSICAL EXAM:    Blood pressure 120/80, pulse 116, temperature 96.2 °F (35.7 °C), temperature source Tympanic, height 5' 7.01\" (1.702 m), weight 193 lb 9.6 oz (87.8 kg), last menstrual period 04/29/2009, not currently breastfeeding. Gen:  A and O x 3, NAD, well nourished  Eyes:  Sclera non icterus, PERRL  Head:  Normocephalic, non-tender  Neck:  Supple, no adenopathy, thyroid non tender and no masses,no carotid bruits  Lungs:  CTA, symmetrical  Chest:  RRR, no murmurs  Abd:  Soft, NT, ND, no HSM, no hernias, no bruits  Ext:  No edema, no cyanosis  Psych: reveals appropriate mood, memory and judgment,  Neuro:  Reveals no gross motor or sensory deficits,   Msk:  5/5 strength all 4 extremities, no joint tenderness          ASSESS MENT:    1. Uncontrolled GERD with heartburn, cough, and upper airway inflammation/hoarseness      PLAN:    1. Continue max med tx  2. Get manometry done  3. Gastric emptying study   4. Get medical clearance  5.   Then schedule for Robot/lap assisted nissen fundoplication

## 2020-01-15 ENCOUNTER — OFFICE VISIT (OUTPATIENT)
Dept: FAMILY MEDICINE CLINIC | Age: 60
End: 2020-01-15
Payer: COMMERCIAL

## 2020-01-15 VITALS
WEIGHT: 193 LBS | HEIGHT: 67 IN | SYSTOLIC BLOOD PRESSURE: 132 MMHG | DIASTOLIC BLOOD PRESSURE: 86 MMHG | HEART RATE: 116 BPM | BODY MASS INDEX: 30.29 KG/M2

## 2020-01-15 LAB
EKG ATRIAL RATE: 124 BPM
EKG P AXIS: 51 DEGREES
EKG P-R INTERVAL: 114 MS
EKG Q-T INTERVAL: 322 MS
EKG QRS DURATION: 82 MS
EKG QTC CALCULATION (BAZETT): 462 MS
EKG R AXIS: 9 DEGREES
EKG T AXIS: 46 DEGREES
EKG VENTRICULAR RATE: 124 BPM

## 2020-01-15 PROCEDURE — G8417 CALC BMI ABV UP PARAM F/U: HCPCS | Performed by: NURSE PRACTITIONER

## 2020-01-15 PROCEDURE — 1036F TOBACCO NON-USER: CPT | Performed by: NURSE PRACTITIONER

## 2020-01-15 PROCEDURE — G8484 FLU IMMUNIZE NO ADMIN: HCPCS | Performed by: NURSE PRACTITIONER

## 2020-01-15 PROCEDURE — G8427 DOCREV CUR MEDS BY ELIG CLIN: HCPCS | Performed by: NURSE PRACTITIONER

## 2020-01-15 PROCEDURE — 99214 OFFICE O/P EST MOD 30 MIN: CPT | Performed by: NURSE PRACTITIONER

## 2020-01-15 PROCEDURE — 3017F COLORECTAL CA SCREEN DOC REV: CPT | Performed by: NURSE PRACTITIONER

## 2020-01-15 ASSESSMENT — ENCOUNTER SYMPTOMS
FREQUENT THROAT CLEARING: 1
SORE THROAT: 0
WHEEZING: 1
SINUS PRESSURE: 1
SHORTNESS OF BREATH: 1
DIFFICULTY BREATHING: 1
TROUBLE SWALLOWING: 0
SWOLLEN GLANDS: 0
SPUTUM PRODUCTION: 1
HOARSE VOICE: 1
HEARTBURN: 0
CHEST TIGHTNESS: 1
HEMOPTYSIS: 0
RHINORRHEA: 0
COUGH: 1

## 2020-01-15 NOTE — PROGRESS NOTES
Medical/cardiac clearance appointment scheduled with [facility] tomorrow. Gastric emptying study scheduled. Manometry scheduled. Follow up appointment with Dr. Dominic Snow scheduled after all of the above. Patient will get EKG here today. She will get labs drawn at [facility].

## 2020-01-15 NOTE — PROGRESS NOTES
Subjective:      Patient ID: Yomaira Marmolejo is a 60 y.o. female. Sinusitis   This is a chronic problem. The current episode started more than 1 month ago. The problem has been gradually worsening since onset. There has been no fever. Her pain is at a severity of 0/10. She is experiencing no pain. Associated symptoms include congestion, coughing, headaches, a hoarse voice, shortness of breath and sinus pressure. Pertinent negatives include no chills, diaphoresis, ear pain, neck pain, sneezing, sore throat or swollen glands. Past treatments include antibiotics (prednisone and sinus rinses). The treatment provided mild relief. Asthma   She complains of chest tightness, cough, difficulty breathing, frequent throat clearing, hoarse voice, shortness of breath, sputum production and wheezing. There is no hemoptysis. This is a recurrent problem. The current episode started more than 1 month ago. The problem occurs constantly. The problem has been gradually worsening. The cough is productive of sputum. Associated symptoms include appetite change, dyspnea on exertion, headaches, malaise/fatigue, nasal congestion, PND and postnasal drip. Pertinent negatives include no chest pain, ear congestion, ear pain, fever, heartburn, myalgias, orthopnea, rhinorrhea, sneezing, sore throat, sweats, trouble swallowing or weight loss. Her symptoms are aggravated by any activity, URI and lying down. Her symptoms are alleviated by nothing. She reports minimal improvement on treatment. Her symptoms are not alleviated by beta-agonist. Her past medical history is significant for asthma. There is no history of bronchiectasis, bronchitis, COPD, emphysema or pneumonia. Review of Systems   Constitutional: Positive for appetite change and malaise/fatigue. Negative for chills, diaphoresis, fever and weight loss. HENT: Positive for congestion, hoarse voice, postnasal drip and sinus pressure.  Negative for ear pain, rhinorrhea, sneezing,
5  Difficulty swallowing food, liquids, pills 4  Coughing after you ate or after lying down 5  Breathing difficulties or choking episodes 4  Troublesome or annoying cough 5  Sensations of something sticking in your throat or a lump in your throat 4  Heartburn, chest pain, indigestion, or stomach acid coming up 5    Total: 45

## 2020-01-16 ENCOUNTER — HOSPITAL ENCOUNTER (INPATIENT)
Age: 60
LOS: 5 days | Discharge: HOME | DRG: 281 | End: 2020-01-21
Attending: HOSPITALIST | Admitting: HOSPITALIST
Payer: COMMERCIAL

## 2020-01-16 ENCOUNTER — HOSPITAL ENCOUNTER (EMERGENCY)
Age: 60
Discharge: ANOTHER HOSPITAL | End: 2020-01-16
Attending: EMERGENCY MEDICINE
Payer: COMMERCIAL

## 2020-01-16 ENCOUNTER — APPOINTMENT (OUTPATIENT)
Dept: CT IMAGING | Age: 60
DRG: 281 | End: 2020-01-16
Attending: HOSPITALIST
Payer: COMMERCIAL

## 2020-01-16 ENCOUNTER — APPOINTMENT (OUTPATIENT)
Dept: INTERVENTIONAL RADIOLOGY/VASCULAR | Age: 60
End: 2020-01-16
Payer: COMMERCIAL

## 2020-01-16 ENCOUNTER — APPOINTMENT (OUTPATIENT)
Dept: CT IMAGING | Age: 60
End: 2020-01-16
Payer: COMMERCIAL

## 2020-01-16 VITALS
HEART RATE: 117 BPM | SYSTOLIC BLOOD PRESSURE: 137 MMHG | DIASTOLIC BLOOD PRESSURE: 101 MMHG | HEIGHT: 67 IN | RESPIRATION RATE: 24 BRPM | WEIGHT: 192 LBS | BODY MASS INDEX: 30.13 KG/M2 | OXYGEN SATURATION: 97 % | TEMPERATURE: 97.4 F

## 2020-01-16 PROBLEM — R06.03 RESPIRATORY DISTRESS: Status: ACTIVE | Noted: 2020-01-16

## 2020-01-16 LAB
ABSOLUTE EOS #: <0.03 K/UL (ref 0–0.44)
ABSOLUTE IMMATURE GRANULOCYTE: 0.23 K/UL (ref 0–0.3)
ABSOLUTE LYMPH #: 3.57 K/UL (ref 1.1–3.7)
ABSOLUTE MONO #: 0.74 K/UL (ref 0.1–1.2)
ANION GAP SERPL CALCULATED.3IONS-SCNC: 16 MMOL/L (ref 9–17)
BASOPHILS # BLD: 0 % (ref 0–2)
BASOPHILS ABSOLUTE: 0.04 K/UL (ref 0–0.2)
BNP INTERPRETATION: ABNORMAL
BUN BLDV-MCNC: 17 MG/DL (ref 8–23)
BUN/CREAT BLD: 25 (ref 9–20)
CALCIUM SERPL-MCNC: 9.2 MG/DL (ref 8.6–10.4)
CHLORIDE BLD-SCNC: 103 MMOL/L (ref 98–107)
CO2: 23 MMOL/L (ref 20–31)
CREAT SERPL-MCNC: 0.68 MG/DL (ref 0.5–0.9)
D-DIMER QUANTITATIVE: 1.11 MG/L FEU
DIFFERENTIAL TYPE: ABNORMAL
EKG ATRIAL RATE: 112 BPM
EKG P AXIS: 54 DEGREES
EKG P-R INTERVAL: 120 MS
EKG Q-T INTERVAL: 340 MS
EKG QRS DURATION: 82 MS
EKG QTC CALCULATION (BAZETT): 464 MS
EKG R AXIS: 7 DEGREES
EKG T AXIS: 42 DEGREES
EKG VENTRICULAR RATE: 112 BPM
EOSINOPHILS RELATIVE PERCENT: 0 % (ref 1–4)
GFR AFRICAN AMERICAN: >60 ML/MIN
GFR NON-AFRICAN AMERICAN: >60 ML/MIN
GFR SERPL CREATININE-BSD FRML MDRD: ABNORMAL ML/MIN/{1.73_M2}
GFR SERPL CREATININE-BSD FRML MDRD: ABNORMAL ML/MIN/{1.73_M2}
GLUCOSE BLD-MCNC: 249 MG/DL (ref 65–105)
GLUCOSE BLD-MCNC: 252 MG/DL (ref 70–99)
HCT VFR BLD CALC: 41.7 % (ref 36.3–47.1)
HEMOGLOBIN: 13.8 G/DL (ref 11.9–15.1)
IMMATURE GRANULOCYTES: 2 %
INR BLD: 0.9
LYMPHOCYTES # BLD: 26 % (ref 24–43)
MCH RBC QN AUTO: 29.1 PG (ref 25.2–33.5)
MCHC RBC AUTO-ENTMCNC: 33.1 G/DL (ref 25.2–33.5)
MCV RBC AUTO: 88 FL (ref 82.6–102.9)
MONOCYTES # BLD: 5 % (ref 3–12)
NRBC AUTOMATED: 0 PER 100 WBC
PARTIAL THROMBOPLASTIN TIME: 22.4 SEC (ref 27–35)
PDW BLD-RTO: 13.8 % (ref 11.8–14.4)
PLATELET # BLD: 250 K/UL (ref 138–453)
PLATELET ESTIMATE: ABNORMAL
PMV BLD AUTO: 9.2 FL (ref 8.1–13.5)
POTASSIUM SERPL-SCNC: 3.9 MMOL/L (ref 3.7–5.3)
PRO-BNP: 2244 PG/ML
PROTHROMBIN TIME: 9.4 SEC (ref 9.4–11.3)
RBC # BLD: 4.74 M/UL (ref 3.95–5.11)
RBC # BLD: ABNORMAL 10*6/UL
SEG NEUTROPHILS: 67 % (ref 36–65)
SEGMENTED NEUTROPHILS ABSOLUTE COUNT: 9.4 K/UL (ref 1.5–8.1)
SODIUM BLD-SCNC: 142 MMOL/L (ref 135–144)
TROPONIN INTERP: ABNORMAL
TROPONIN T: ABNORMAL NG/ML
TROPONIN, HIGH SENSITIVITY: 55 NG/L (ref 0–14)
TROPONIN, HIGH SENSITIVITY: 60 NG/L (ref 0–14)
TROPONIN, HIGH SENSITIVITY: 63 NG/L (ref 0–14)
WBC # BLD: 14 K/UL (ref 3.5–11.3)
WBC # BLD: ABNORMAL 10*3/UL

## 2020-01-16 PROCEDURE — 6360000002 HC RX W HCPCS: Performed by: NURSE PRACTITIONER

## 2020-01-16 PROCEDURE — 36415 COLL VENOUS BLD VENIPUNCTURE: CPT

## 2020-01-16 PROCEDURE — 82947 ASSAY GLUCOSE BLOOD QUANT: CPT

## 2020-01-16 PROCEDURE — 93971 EXTREMITY STUDY: CPT

## 2020-01-16 PROCEDURE — 85025 COMPLETE CBC W/AUTO DIFF WBC: CPT

## 2020-01-16 PROCEDURE — 80048 BASIC METABOLIC PNL TOTAL CA: CPT

## 2020-01-16 PROCEDURE — 6370000000 HC RX 637 (ALT 250 FOR IP): Performed by: NURSE PRACTITIONER

## 2020-01-16 PROCEDURE — 84484 ASSAY OF TROPONIN QUANT: CPT

## 2020-01-16 PROCEDURE — 83036 HEMOGLOBIN GLYCOSYLATED A1C: CPT

## 2020-01-16 PROCEDURE — 96372 THER/PROPH/DIAG INJ SC/IM: CPT

## 2020-01-16 PROCEDURE — 85730 THROMBOPLASTIN TIME PARTIAL: CPT

## 2020-01-16 PROCEDURE — 6360000002 HC RX W HCPCS: Performed by: EMERGENCY MEDICINE

## 2020-01-16 PROCEDURE — 6370000000 HC RX 637 (ALT 250 FOR IP): Performed by: EMERGENCY MEDICINE

## 2020-01-16 PROCEDURE — 99222 1ST HOSP IP/OBS MODERATE 55: CPT | Performed by: NURSE PRACTITIONER

## 2020-01-16 PROCEDURE — 94761 N-INVAS EAR/PLS OXIMETRY MLT: CPT

## 2020-01-16 PROCEDURE — 99285 EMERGENCY DEPT VISIT HI MDM: CPT

## 2020-01-16 PROCEDURE — 94640 AIRWAY INHALATION TREATMENT: CPT

## 2020-01-16 PROCEDURE — 93005 ELECTROCARDIOGRAM TRACING: CPT | Performed by: EMERGENCY MEDICINE

## 2020-01-16 PROCEDURE — 2060000000 HC ICU INTERMEDIATE R&B

## 2020-01-16 PROCEDURE — 2709999900 CT CHEST PULMONARY EMBOLISM W CONTRAST

## 2020-01-16 PROCEDURE — 85379 FIBRIN DEGRADATION QUANT: CPT

## 2020-01-16 PROCEDURE — 85610 PROTHROMBIN TIME: CPT

## 2020-01-16 PROCEDURE — 2580000003 HC RX 258: Performed by: NURSE PRACTITIONER

## 2020-01-16 PROCEDURE — 83880 ASSAY OF NATRIURETIC PEPTIDE: CPT

## 2020-01-16 PROCEDURE — 6360000004 HC RX CONTRAST MEDICATION: Performed by: EMERGENCY MEDICINE

## 2020-01-16 RX ORDER — POTASSIUM CHLORIDE 7.45 MG/ML
10 INJECTION INTRAVENOUS PRN
Status: DISCONTINUED | OUTPATIENT
Start: 2020-01-16 | End: 2020-01-21 | Stop reason: HOSPADM

## 2020-01-16 RX ORDER — PREDNISONE 20 MG/1
20 TABLET ORAL 2 TIMES DAILY
Status: DISCONTINUED | OUTPATIENT
Start: 2020-01-16 | End: 2020-01-21 | Stop reason: HOSPADM

## 2020-01-16 RX ORDER — POTASSIUM CHLORIDE 20 MEQ/1
40 TABLET, EXTENDED RELEASE ORAL PRN
Status: DISCONTINUED | OUTPATIENT
Start: 2020-01-16 | End: 2020-01-21 | Stop reason: HOSPADM

## 2020-01-16 RX ORDER — ATORVASTATIN CALCIUM 40 MG/1
40 TABLET, FILM COATED ORAL NIGHTLY
Status: DISCONTINUED | OUTPATIENT
Start: 2020-01-16 | End: 2020-01-21 | Stop reason: HOSPADM

## 2020-01-16 RX ORDER — NICOTINE POLACRILEX 4 MG
15 LOZENGE BUCCAL PRN
Status: DISCONTINUED | OUTPATIENT
Start: 2020-01-16 | End: 2020-01-21 | Stop reason: HOSPADM

## 2020-01-16 RX ORDER — SODIUM CHLORIDE 0.9 % (FLUSH) 0.9 %
10 SYRINGE (ML) INJECTION EVERY 12 HOURS SCHEDULED
Status: DISCONTINUED | OUTPATIENT
Start: 2020-01-16 | End: 2020-01-21 | Stop reason: HOSPADM

## 2020-01-16 RX ORDER — FAMOTIDINE 20 MG/1
20 TABLET, FILM COATED ORAL DAILY
Status: DISCONTINUED | OUTPATIENT
Start: 2020-01-16 | End: 2020-01-21 | Stop reason: HOSPADM

## 2020-01-16 RX ORDER — LEVOTHYROXINE SODIUM 175 UG/1
175 TABLET ORAL DAILY
Status: DISCONTINUED | OUTPATIENT
Start: 2020-01-16 | End: 2020-01-21 | Stop reason: HOSPADM

## 2020-01-16 RX ORDER — CARVEDILOL 3.12 MG/1
3.12 TABLET ORAL 2 TIMES DAILY WITH MEALS
Status: DISCONTINUED | OUTPATIENT
Start: 2020-01-16 | End: 2020-01-20

## 2020-01-16 RX ORDER — NITROGLYCERIN 0.4 MG/1
0.4 TABLET SUBLINGUAL EVERY 5 MIN PRN
Status: DISCONTINUED | OUTPATIENT
Start: 2020-01-16 | End: 2020-01-21 | Stop reason: HOSPADM

## 2020-01-16 RX ORDER — VITAMIN B COMPLEX
2000 TABLET ORAL DAILY
Status: DISCONTINUED | OUTPATIENT
Start: 2020-01-16 | End: 2020-01-21 | Stop reason: HOSPADM

## 2020-01-16 RX ORDER — LOSARTAN POTASSIUM 25 MG/1
25 TABLET ORAL DAILY
Status: DISCONTINUED | OUTPATIENT
Start: 2020-01-16 | End: 2020-01-21 | Stop reason: HOSPADM

## 2020-01-16 RX ORDER — ASPIRIN 81 MG/1
324 TABLET, CHEWABLE ORAL ONCE
Status: COMPLETED | OUTPATIENT
Start: 2020-01-16 | End: 2020-01-16

## 2020-01-16 RX ORDER — MAGNESIUM SULFATE 1 G/100ML
1 INJECTION INTRAVENOUS PRN
Status: DISCONTINUED | OUTPATIENT
Start: 2020-01-16 | End: 2020-01-21 | Stop reason: HOSPADM

## 2020-01-16 RX ORDER — DEXTROSE MONOHYDRATE 50 MG/ML
100 INJECTION, SOLUTION INTRAVENOUS PRN
Status: DISCONTINUED | OUTPATIENT
Start: 2020-01-16 | End: 2020-01-21 | Stop reason: HOSPADM

## 2020-01-16 RX ORDER — FLUTICASONE PROPIONATE 50 MCG
1 SPRAY, SUSPENSION (ML) NASAL DAILY
Status: DISCONTINUED | OUTPATIENT
Start: 2020-01-16 | End: 2020-01-21 | Stop reason: HOSPADM

## 2020-01-16 RX ORDER — IPRATROPIUM BROMIDE AND ALBUTEROL SULFATE 2.5; .5 MG/3ML; MG/3ML
1 SOLUTION RESPIRATORY (INHALATION)
Status: DISCONTINUED | OUTPATIENT
Start: 2020-01-16 | End: 2020-01-19

## 2020-01-16 RX ORDER — SODIUM CHLORIDE 0.9 % (FLUSH) 0.9 %
10 SYRINGE (ML) INJECTION PRN
Status: DISCONTINUED | OUTPATIENT
Start: 2020-01-16 | End: 2020-01-21 | Stop reason: HOSPADM

## 2020-01-16 RX ORDER — DEXTROSE MONOHYDRATE 25 G/50ML
12.5 INJECTION, SOLUTION INTRAVENOUS PRN
Status: DISCONTINUED | OUTPATIENT
Start: 2020-01-16 | End: 2020-01-21 | Stop reason: HOSPADM

## 2020-01-16 RX ORDER — ONDANSETRON 2 MG/ML
4 INJECTION INTRAMUSCULAR; INTRAVENOUS EVERY 6 HOURS PRN
Status: DISCONTINUED | OUTPATIENT
Start: 2020-01-16 | End: 2020-01-21 | Stop reason: HOSPADM

## 2020-01-16 RX ADMIN — ENOXAPARIN SODIUM 90 MG: 100 INJECTION SUBCUTANEOUS at 10:37

## 2020-01-16 RX ADMIN — SODIUM CHLORIDE, PRESERVATIVE FREE 10 ML: 5 INJECTION INTRAVENOUS at 20:17

## 2020-01-16 RX ADMIN — INSULIN LISPRO 2 UNITS: 100 INJECTION, SOLUTION INTRAVENOUS; SUBCUTANEOUS at 16:25

## 2020-01-16 RX ADMIN — MOMETASONE FUROATE AND FORMOTEROL FUMARATE DIHYDRATE 2 PUFF: 200; 5 AEROSOL RESPIRATORY (INHALATION) at 21:09

## 2020-01-16 RX ADMIN — DESMOPRESSIN ACETATE 40 MG: 0.2 TABLET ORAL at 20:17

## 2020-01-16 RX ADMIN — ASPIRIN 81 MG 324 MG: 81 TABLET ORAL at 10:36

## 2020-01-16 RX ADMIN — CARVEDILOL 3.12 MG: 3.12 TABLET, FILM COATED ORAL at 16:16

## 2020-01-16 RX ADMIN — PREDNISONE 20 MG: 20 TABLET ORAL at 20:17

## 2020-01-16 RX ADMIN — ENOXAPARIN SODIUM 90 MG: 100 INJECTION SUBCUTANEOUS at 20:17

## 2020-01-16 RX ADMIN — FAMOTIDINE 20 MG: 20 TABLET, FILM COATED ORAL at 16:17

## 2020-01-16 RX ADMIN — IOPAMIDOL 90 ML: 755 INJECTION, SOLUTION INTRAVENOUS at 09:52

## 2020-01-16 RX ADMIN — IPRATROPIUM BROMIDE AND ALBUTEROL SULFATE 1 AMPULE: .5; 3 SOLUTION RESPIRATORY (INHALATION) at 16:01

## 2020-01-16 RX ADMIN — INSULIN LISPRO 1 UNITS: 100 INJECTION, SOLUTION INTRAVENOUS; SUBCUTANEOUS at 22:10

## 2020-01-16 RX ADMIN — IPRATROPIUM BROMIDE AND ALBUTEROL SULFATE 1 AMPULE: .5; 3 SOLUTION RESPIRATORY (INHALATION) at 21:10

## 2020-01-16 ASSESSMENT — PAIN DESCRIPTION - LOCATION: LOCATION: LEG

## 2020-01-16 ASSESSMENT — PAIN SCALES - GENERAL
PAINLEVEL_OUTOF10: 3
PAINLEVEL_OUTOF10: 0

## 2020-01-16 ASSESSMENT — PAIN DESCRIPTION - ORIENTATION: ORIENTATION: RIGHT

## 2020-01-16 ASSESSMENT — PAIN DESCRIPTION - PAIN TYPE: TYPE: ACUTE PAIN

## 2020-01-16 NOTE — ED PROVIDER NOTES
[facility] ED  [address]  [place] [address] [place]  Phone: [phone]      Pt Name: Tad Swan  RXK:[id number]  Birthdate 1960  Date of evaluation: 2020      CHIEF COMPLAINT       Chief Complaint   Patient presents with    Leg Pain     rt  medial near popliteal       HISTORY OF PRESENT ILLNESS   48-year-old female presents to the emergency department today complaining of right medial distal thigh pain. Pain started yesterday. It resembles the similar pain in the past when she had a DVT in her right leg. She is recently been worked up for asthma at the [facility] as well as reflux. She is scheduled in the near future for fundoplication. She acknowledges that she is short of breath as well as her heart is fast however she reports that this is a \"good day\" and she reports otherwise that she is at her baseline. No fevers or chills. No mitigating precipitating or exacerbating factors. There is been no other evaluation or management of the symptoms prior to arrival.  No associated nausea vomiting diaphoresis. REVIEW OF SYSTEMS     Review of Systems   All other systems reviewed and are negative. PAST MEDICAL HISTORY    has a past medical history of Bilateral corneal scars, Diverticulitis, DJD (degenerative joint disease), lumbar, Dry eye syndrome, DVT (deep venous thrombosis) ([address] Gastro - esophageal reflux disease, Hyperlipidemia, Hyperplastic colon polyp, Hypertension, Hypothyroidism, LGSIL (low grade squamous intraepithelial dysplasia), Non-celiac gluten sensitivity, Osteoarthritis, Sciatica of right side, Severe persistent asthma, Splenic artery aneurysm ([address] Tinnitus, and Type 2 diabetes mellitus, controlled ([address] SURGICAL HISTORY      has a past surgical history that includes Knee arthroscopy (Right, ); Tubal ligation ();  section ();  Rhodes tooth extraction; Skin tag removal; Total knee arthroplasty (Right, 2011); other surgical history (10/25/2011); other surgical history (Right, 2006 and 2010); Nasal sinus surgery (08/19/2014); Arterial aneurysm repair (2015); Colposcopy (11/2015); pr colon ca scrn not hi rsk ind (N/A, 2/27/2017); pr egd transoral biopsy single/multiple (N/A, 2/27/2017); Colonoscopy (01/30/2012); Colonoscopy (02/27/2017); Small intestine surgery (N/A, 8/30/2017); Cystoscopy (Bilateral, 8/30/2017); laparoscopy (N/A, 9/8/2017); pr colon ca scrn not hi rsk ind (N/A, 9/8/2017); and Upper gastrointestinal endoscopy (N/A, 1/2/2020). CURRENT MEDICATIONS       Previous Medications    ALBUTEROL (PROVENTIL) (5 MG/ML) 0.5% NEBULIZER SOLUTION    Take 1 mL by nebulization 4 times daily as needed for Wheezing    BENZONATATE (TESSALON) 100 MG CAPSULE    Take 200 mg by mouth 3 times daily as needed for Cough    BUDESONIDE-FORMOTEROL (SYMBICORT) 160-4.5 MCG/ACT AERO    2 puffs inhaled twice daily. Rinse mouth well after use. CEFDINIR (OMNICEF) 300 MG CAPSULE    Take one capsule twice daily for 30 days.     CHOLECALCIFEROL (VITAMIN D3) 5000 UNITS CAPS    Take 2 capsules by mouth daily Takes 2000 units daily    DICLOFENAC (VOLTAREN) 75 MG EC TABLET    Take 1 tablet by mouth 2 times daily as needed for Pain    FLUTICASONE (FLONASE) 50 MCG/ACT NASAL SPRAY    Two sprays to each nostril once daily    IPRATROPIUM-ALBUTEROL (DUONEB) 0.5-2.5 (3) MG/3ML SOLN NEBULIZER SOLUTION    Inhale 3 mLs into the lungs every 4 hours as needed for Shortness of Breath    LOSARTAN (COZAAR) 25 MG TABLET    Take 1 tablet by mouth daily    METFORMIN (GLUCOPHAGE) 500 MG TABLET    TAKE 2 TABLETS BY MOUTH TWO TIMES A DAY WITH MEALS    MOLYBDENUM PO    Take 500 mcg by mouth daily    PANTOPRAZOLE (PROTONIX) 40 MG TABLET    Take one tablet twice daily 30-60 minutes prior to breakfast and evening meal    PREDNISONE (DELTASONE) 20 MG TABLET    Take 1 tablet by mouth 2 times daily    PROBIOTIC PRODUCT (SOLUBLE FIBER/PROBIOTICS PO)    Take by mouth 2 times daily RANITIDINE (ZANTAC) 300 MG TABLET    TAKE 1 TABLET ONCE DAILY AT BEDTIME    SYNTHROID 175 MCG TABLET    Take 175 mcg by mouth       ALLERGIES     is allergic to codeine; darvon [propoxyphene]; lisinopril; and percocet [oxycodone-acetaminophen]. FAMILY HISTORY     She indicated that her mother is alive. She reported the following about her father: unknown history. She indicated that her maternal grandmother is . She indicated that her maternal grandfather is . She indicated that her paternal grandmother is . She indicated that her paternal grandfather is . She indicated that her daughter is alive. She indicated that her son is alive. family history includes Allergies in her mother; Arthritis in her mother; Coronary Art Dis in her mother; Diabetes in her daughter; High Blood Pressure in her mother; High Cholesterol in her mother; Other in her maternal grandfather. SOCIAL HISTORY      reports that she has never smoked. She has never used smokeless tobacco. She reports that she does not drink alcohol or use drugs. PHYSICAL EXAM     INITIAL VITALS:  height is 5' 7\" (1.702 m) and weight is 192 lb (87.1 kg). Her tympanic temperature is 97.4 °F (36.3 °C). Her blood pressure is 151/95 (abnormal) and her pulse is 123. Her respiration is 22 and oxygen saturation is 95%. Physical Exam  Vitals signs reviewed. Constitutional:       General: She is not in acute distress. Appearance: She is well-developed. HENT:      Head: Normocephalic and atraumatic. Eyes:      Conjunctiva/sclera: Conjunctivae normal.      Pupils: Pupils are equal, round, and reactive to light. Neck:      Musculoskeletal: Normal range of motion and neck supple. Trachea: No tracheal deviation. Cardiovascular:      Rate and Rhythm: Regular rhythm. Tachycardia present. Pulmonary:      Effort: No respiratory distress. Breath sounds: Wheezing present.       Comments: Patient is tachypneic with a prolonged expiratory phase and a subtle expiratory wheeze. Abdominal:      General: Bowel sounds are normal. There is no distension. Palpations: Abdomen is soft. Tenderness: There is no tenderness. Musculoskeletal: Normal range of motion. General: No tenderness. Skin:     General: Skin is warm and dry. Neurological:      Mental Status: She is alert and oriented to person, place, and time. Psychiatric:         Behavior: Behavior normal.         Thought Content: Thought content normal.         Judgment: Judgment normal.           DIFFERENTIAL DIAGNOSIS/ MDM:   Patient's primary concern of DVT however she is tachycardic and is tachypneic here. I am also concerned of pulmonary embolism. DIAGNOSTIC RESULTS     EKG:  EKG is interpreted by myself showing sinus tachycardia with a rate of 112. She has left atrial enlargement and abnormal R wave progression through the anterior leads. No acute ST segment changes. Axis and intervals are otherwise normal.  RADIOLOGY:   Xr Chest Standard (2 Vw)    Result Date: 12/18/2019  EXAMINATION: TWO XRAY VIEWS OF THE CHEST 12/18/2019 11:52 am COMPARISON: 12/09/2019 HISTORY: ORDERING SYSTEM PROVIDED HISTORY: Productive cough TECHNOLOGIST PROVIDED HISTORY: persistent cough Reason for Exam: Persistent cough for 10 days, laryngitis, she states the day after she had a esophagram the cough started and nothing is making better FINDINGS: The lungs are without acute focal process. There is no effusion or pneumothorax. The cardiomediastinal silhouette is stable. The osseous structures are stable. No acute process.          LABS:  Results for orders placed or performed during the hospital encounter of 01/16/20   CBC Auto Differential   Result Value Ref Range    WBC 14.0 (H) 3.5 - 11.3 k/uL    RBC 4.74 3.95 - 5.11 m/uL    Hemoglobin 13.8 11.9 - 15.1 g/dL    Hematocrit 41.7 36.3 - 47.1 %    MCV 88.0 82.6 - 102.9 fL    MCH 29.1 25.2 - 33.5 pg    MCHC 33.1 25.2 - 33.5 g/dL    RDW 13.8 11.8 - 14.4 %    Platelets 410 435 - 784 k/uL    MPV 9.2 8.1 - 13.5 fL    NRBC Automated 0.0 0.0 per 100 WBC    Differential Type NOT REPORTED     WBC Morphology NOT REPORTED     RBC Morphology NOT REPORTED     Platelet Estimate NOT REPORTED     Seg Neutrophils 67 (H) 36 - 65 %    Lymphocytes 26 24 - 43 %    Monocytes 5 3 - 12 %    Eosinophils % 0 (L) 1 - 4 %    Basophils 0 0 - 2 %    Immature Granulocytes 2 (H) 0 %    Segs Absolute 9.40 (H) 1.50 - 8.10 k/uL    Absolute Lymph # 3.57 1.10 - 3.70 k/uL    Absolute Mono # 0.74 0.10 - 1.20 k/uL    Absolute Eos # <0.03 0.00 - 0.44 k/uL    Basophils Absolute 0.04 0.00 - 0.20 k/uL    Absolute Immature Granulocyte 0.23 0.00 - 0.30 k/uL   Basic Metabolic Panel w/ Reflex to MG   Result Value Ref Range    Glucose 252 (H) 70 - 99 mg/dL    BUN 17 8 - 23 mg/dL    CREATININE 0.68 0.50 - 0.90 mg/dL    Bun/Cre Ratio 25 (H) 9 - 20    Calcium 9.2 8.6 - 10.4 mg/dL    Sodium 142 135 - 144 mmol/L    Potassium 3.9 3.7 - 5.3 mmol/L    Chloride 103 98 - 107 mmol/L    CO2 23 20 - 31 mmol/L    Anion Gap 16 9 - 17 mmol/L    GFR Non-African American >60 >60 mL/min    GFR African American >60 >60 mL/min    GFR Comment          GFR Staging NOT REPORTED    Protime-INR   Result Value Ref Range    Protime 9.4 9.4 - 11.3 sec    INR 0.9    APTT   Result Value Ref Range    PTT 22.4 (L) 27.0 - 35.0 sec   Troponin   Result Value Ref Range    Troponin, High Sensitivity 63 (HH) 0 - 14 ng/L    Troponin T NOT REPORTED <0.03 ng/mL    Troponin Interp NOT REPORTED    EKG 12 Lead   Result Value Ref Range    Ventricular Rate 112 BPM    Atrial Rate 112 BPM    P-R Interval 120 ms    QRS Duration 82 ms    Q-T Interval 340 ms    QTc Calculation (Bazett) 464 ms    P Axis 54 degrees    R Axis 7 degrees    T Axis 42 degrees       EMERGENCY DEPARTMENT COURSE:     Thepatient was given the following medications:  Orders Placed This Encounter   Medications    iopamidol (ISOVUE-370) 76 % injection 90 mL  aspirin chewable tablet 324 mg    enoxaparin (LOVENOX) injection 90 mg        Vitals:    Vitals:    01/16/20 0817 01/16/20 1013   BP: (!) 153/94 (!) 151/95   Pulse: 128 123   Resp: 24 22   Temp: 97.4 °F (36.3 °C)    TempSrc: Tympanic    SpO2: 97% 95%   Weight: 192 lb (87.1 kg)    Height: 5' 7\" (1.702 m)      -------------------------  BP: (!) 151/95, Temp: 97.4 °F (36.3 °C), Pulse: 123, Resp: 22      Re-evaluation Notes  10:29 AM  I reviewed the results with the patient. I am concerned with her elevated troponin of her having nSTEMI. She does report this past Thursday or Friday that she had an episode of diaphoresis which she could not account for. She was afebrile at that point. Currently there is no evidence of DVT although she has a superficial thrombophlebitis. With the elevated troponin, I do feel that she requires transfer to a tertiary care facility especially in light that she still remains tachycardic and I cannot explain that. She does still have a little bit of an expiratory wheeze and is tachypneic. I currently have a page out to the hospitalist at [facility]. Rylee. 10:52 AM  I have discussed this patient with the hospitalist at [address] who was kind enough to accept this patient. CONSULTS:  None    CRITICAL CARE:   None    PROCEDURES:  None    FINAL IMPRESSION      1. Respiratory distress    2. NSTEMI (non-ST elevated myocardial infarction) ([facility] [address]    3. Thrombophlebitis of superficial veins of right lower extremity          DISPOSITION/PLAN   DISPOSITION Decision To Transfer 01/16/2020 10:26:47 AM      Condition on Disposition  Fair    PATIENT REFERRED TO:  No follow-up provider specified.     DISCHARGE MEDICATIONS:  [unfilled]    (Please note that portions of this note were completed with a voice recognitionprogram.  Efforts were made to edit the dictations but occasionally words are mis-transcribed.)    Patria Snowden MD, F.A.C.E.P, F.A.A.E.M  Emergency Physician Attending Gurpreet Mccullough MD  01/16/20 [id number]

## 2020-01-16 NOTE — PROGRESS NOTES
Smoking Cessation - topics covered   []  Health Risks  []  Benefits of Quitting   []  Smoking Cessation  [x]  Patient has no history of tobacco use per note in significant history. []  Patient is former smoker per note in significant history. Patient quit in   [x]  No need for tobacco cessation education. []  Booklet given  []  Patient verbalizes understanding. []  Patient denies need for tobacco cessation education. []  Unable to meet with patient today. Will follow up as able.   Trygve Mini  3:07 PM

## 2020-01-16 NOTE — FLOWSHEET NOTE
Assessment: Patient is waiting for test result but wishes to go home. Intervention: Engaged in conversation. Patient expressed gratitude for visit and offer of continued prayers. Plan: Chaplains remain available for spiritual and emotional support.      01/16/20 1033   Encounter Summary   Services provided to: Patient and family together   Referral/Consult From: Paramjit   Continue Visiting   (1/17/20)   Complexity of Encounter Low   Length of Encounter 15 minutes   Spiritual/Orthodoxy   Type Spiritual support   Assessment Approachable   Intervention Active listening;Sustaining presence/ Ministry of presence   Outcome Expressed gratitude;Engaged in conversation

## 2020-01-17 PROBLEM — R79.89 ELEVATED TROPONIN: Status: ACTIVE | Noted: 2020-01-17

## 2020-01-17 PROBLEM — R06.09 EXERTIONAL DYSPNEA: Status: ACTIVE | Noted: 2020-01-17

## 2020-01-17 PROBLEM — R77.8 ELEVATED TROPONIN: Status: ACTIVE | Noted: 2020-01-17

## 2020-01-17 PROBLEM — I80.01 THROMBOPHLEBITIS OF SUPERFICIAL VEINS OF RIGHT LOWER EXTREMITY: Status: ACTIVE | Noted: 2020-01-17

## 2020-01-17 LAB
ALBUMIN SERPL-MCNC: 3.5 G/DL (ref 3.5–5.2)
ALBUMIN/GLOBULIN RATIO: 1.4 (ref 1–2.5)
ALP BLD-CCNC: 46 U/L (ref 35–104)
ALT SERPL-CCNC: 61 U/L (ref 5–33)
ANION GAP SERPL CALCULATED.3IONS-SCNC: 16 MMOL/L (ref 9–17)
AST SERPL-CCNC: 29 U/L
BILIRUB SERPL-MCNC: 0.34 MG/DL (ref 0.3–1.2)
BNP INTERPRETATION: ABNORMAL
BUN BLDV-MCNC: 10 MG/DL (ref 8–23)
BUN/CREAT BLD: ABNORMAL (ref 9–20)
CALCIUM SERPL-MCNC: 9 MG/DL (ref 8.6–10.4)
CHLORIDE BLD-SCNC: 105 MMOL/L (ref 98–107)
CHOLESTEROL/HDL RATIO: 4.1
CHOLESTEROL: 239 MG/DL
CO2: 21 MMOL/L (ref 20–31)
CREAT SERPL-MCNC: 0.38 MG/DL (ref 0.5–0.9)
ESTIMATED AVERAGE GLUCOSE: 275 MG/DL
GFR AFRICAN AMERICAN: >60 ML/MIN
GFR NON-AFRICAN AMERICAN: >60 ML/MIN
GFR SERPL CREATININE-BSD FRML MDRD: ABNORMAL ML/MIN/{1.73_M2}
GFR SERPL CREATININE-BSD FRML MDRD: ABNORMAL ML/MIN/{1.73_M2}
GLUCOSE BLD-MCNC: 192 MG/DL (ref 65–105)
GLUCOSE BLD-MCNC: 209 MG/DL (ref 65–105)
GLUCOSE BLD-MCNC: 232 MG/DL (ref 70–99)
GLUCOSE BLD-MCNC: 262 MG/DL (ref 65–105)
GLUCOSE BLD-MCNC: 280 MG/DL (ref 65–105)
GLUCOSE BLD-MCNC: 294 MG/DL (ref 65–105)
HBA1C MFR BLD: 11.2 % (ref 4–6)
HCT VFR BLD CALC: 41.9 % (ref 36.3–47.1)
HDLC SERPL-MCNC: 58 MG/DL
HEMOGLOBIN: 14 G/DL (ref 11.9–15.1)
LDL CHOLESTEROL: 112 MG/DL (ref 0–130)
LV EF: 28 %
LVEF MODALITY: NORMAL
MAGNESIUM: 2.1 MG/DL (ref 1.6–2.6)
MCH RBC QN AUTO: 29.7 PG (ref 25.2–33.5)
MCHC RBC AUTO-ENTMCNC: 33.4 G/DL (ref 28.4–34.8)
MCV RBC AUTO: 89 FL (ref 82.6–102.9)
NRBC AUTOMATED: 0 PER 100 WBC
PDW BLD-RTO: 14.2 % (ref 11.8–14.4)
PLATELET # BLD: 253 K/UL (ref 138–453)
PMV BLD AUTO: 10 FL (ref 8.1–13.5)
POTASSIUM SERPL-SCNC: 3.8 MMOL/L (ref 3.7–5.3)
PRO-BNP: 2010 PG/ML
RBC # BLD: 4.71 M/UL (ref 3.95–5.11)
SODIUM BLD-SCNC: 142 MMOL/L (ref 135–144)
TOTAL PROTEIN: 6 G/DL (ref 6.4–8.3)
TRIGL SERPL-MCNC: 343 MG/DL
TROPONIN INTERP: ABNORMAL
TROPONIN T: ABNORMAL NG/ML
TROPONIN, HIGH SENSITIVITY: 52 NG/L (ref 0–14)
TSH SERPL DL<=0.05 MIU/L-ACNC: 1.33 MIU/L (ref 0.3–5)
VLDLC SERPL CALC-MCNC: ABNORMAL MG/DL (ref 1–30)
WBC # BLD: 11.6 K/UL (ref 3.5–11.3)

## 2020-01-17 PROCEDURE — 85027 COMPLETE CBC AUTOMATED: CPT

## 2020-01-17 PROCEDURE — 84484 ASSAY OF TROPONIN QUANT: CPT

## 2020-01-17 PROCEDURE — 6370000000 HC RX 637 (ALT 250 FOR IP): Performed by: NURSE PRACTITIONER

## 2020-01-17 PROCEDURE — 82947 ASSAY GLUCOSE BLOOD QUANT: CPT

## 2020-01-17 PROCEDURE — 36415 COLL VENOUS BLD VENIPUNCTURE: CPT

## 2020-01-17 PROCEDURE — 84443 ASSAY THYROID STIM HORMONE: CPT

## 2020-01-17 PROCEDURE — 94761 N-INVAS EAR/PLS OXIMETRY MLT: CPT

## 2020-01-17 PROCEDURE — 93306 TTE W/DOPPLER COMPLETE: CPT

## 2020-01-17 PROCEDURE — 6370000000 HC RX 637 (ALT 250 FOR IP): Performed by: HOSPITALIST

## 2020-01-17 PROCEDURE — 99232 SBSQ HOSP IP/OBS MODERATE 35: CPT | Performed by: HOSPITALIST

## 2020-01-17 PROCEDURE — 83735 ASSAY OF MAGNESIUM: CPT

## 2020-01-17 PROCEDURE — 93005 ELECTROCARDIOGRAM TRACING: CPT | Performed by: NURSE PRACTITIONER

## 2020-01-17 PROCEDURE — 2500000003 HC RX 250 WO HCPCS: Performed by: NURSE PRACTITIONER

## 2020-01-17 PROCEDURE — 2580000003 HC RX 258: Performed by: NURSE PRACTITIONER

## 2020-01-17 PROCEDURE — 80061 LIPID PANEL: CPT

## 2020-01-17 PROCEDURE — 2060000000 HC ICU INTERMEDIATE R&B

## 2020-01-17 PROCEDURE — 80053 COMPREHEN METABOLIC PANEL: CPT

## 2020-01-17 PROCEDURE — 94640 AIRWAY INHALATION TREATMENT: CPT

## 2020-01-17 PROCEDURE — 6360000002 HC RX W HCPCS: Performed by: NURSE PRACTITIONER

## 2020-01-17 PROCEDURE — 83880 ASSAY OF NATRIURETIC PEPTIDE: CPT

## 2020-01-17 RX ORDER — METOPROLOL TARTRATE 5 MG/5ML
5 INJECTION INTRAVENOUS EVERY 4 HOURS PRN
Status: DISCONTINUED | OUTPATIENT
Start: 2020-01-17 | End: 2020-01-21 | Stop reason: HOSPADM

## 2020-01-17 RX ORDER — DIPHENHYDRAMINE HCL 25 MG
50 TABLET ORAL NIGHTLY PRN
Status: DISCONTINUED | OUTPATIENT
Start: 2020-01-17 | End: 2020-01-21 | Stop reason: HOSPADM

## 2020-01-17 RX ORDER — PANTOPRAZOLE SODIUM 40 MG/1
40 TABLET, DELAYED RELEASE ORAL
Status: DISCONTINUED | OUTPATIENT
Start: 2020-01-17 | End: 2020-01-21 | Stop reason: HOSPADM

## 2020-01-17 RX ORDER — ACETAMINOPHEN 325 MG/1
650 TABLET ORAL EVERY 4 HOURS PRN
Status: DISCONTINUED | OUTPATIENT
Start: 2020-01-17 | End: 2020-01-21 | Stop reason: HOSPADM

## 2020-01-17 RX ADMIN — DIPHENHYDRAMINE HCL 50 MG: 25 TABLET ORAL at 21:23

## 2020-01-17 RX ADMIN — METOPROLOL TARTRATE 5 MG: 5 INJECTION, SOLUTION INTRAVENOUS at 04:23

## 2020-01-17 RX ADMIN — LEVOTHYROXINE SODIUM 175 MCG: 175 TABLET ORAL at 08:07

## 2020-01-17 RX ADMIN — CARVEDILOL 3.12 MG: 3.12 TABLET, FILM COATED ORAL at 21:23

## 2020-01-17 RX ADMIN — FLUTICASONE PROPIONATE 1 SPRAY: 50 SPRAY, METERED NASAL at 08:09

## 2020-01-17 RX ADMIN — Medication 10 ML: at 04:24

## 2020-01-17 RX ADMIN — FAMOTIDINE 20 MG: 20 TABLET, FILM COATED ORAL at 08:07

## 2020-01-17 RX ADMIN — ENOXAPARIN SODIUM 90 MG: 100 INJECTION SUBCUTANEOUS at 08:07

## 2020-01-17 RX ADMIN — SODIUM CHLORIDE, PRESERVATIVE FREE 10 ML: 5 INJECTION INTRAVENOUS at 08:09

## 2020-01-17 RX ADMIN — LOSARTAN POTASSIUM 25 MG: 25 TABLET, FILM COATED ORAL at 08:07

## 2020-01-17 RX ADMIN — CARVEDILOL 3.12 MG: 3.12 TABLET, FILM COATED ORAL at 08:07

## 2020-01-17 RX ADMIN — IPRATROPIUM BROMIDE AND ALBUTEROL SULFATE 1 AMPULE: .5; 3 SOLUTION RESPIRATORY (INHALATION) at 16:11

## 2020-01-17 RX ADMIN — ACETAMINOPHEN 650 MG: 325 TABLET ORAL at 04:23

## 2020-01-17 RX ADMIN — MOMETASONE FUROATE AND FORMOTEROL FUMARATE DIHYDRATE 2 PUFF: 200; 5 AEROSOL RESPIRATORY (INHALATION) at 06:54

## 2020-01-17 RX ADMIN — INSULIN LISPRO 2 UNITS: 100 INJECTION, SOLUTION INTRAVENOUS; SUBCUTANEOUS at 21:23

## 2020-01-17 RX ADMIN — INSULIN LISPRO 3 UNITS: 100 INJECTION, SOLUTION INTRAVENOUS; SUBCUTANEOUS at 12:37

## 2020-01-17 RX ADMIN — VITAMIN D, TAB 1000IU (100/BT) 2000 UNITS: 25 TAB at 08:06

## 2020-01-17 RX ADMIN — INSULIN LISPRO 3 UNITS: 100 INJECTION, SOLUTION INTRAVENOUS; SUBCUTANEOUS at 18:19

## 2020-01-17 RX ADMIN — IPRATROPIUM BROMIDE AND ALBUTEROL SULFATE 1 AMPULE: .5; 3 SOLUTION RESPIRATORY (INHALATION) at 20:06

## 2020-01-17 RX ADMIN — IPRATROPIUM BROMIDE AND ALBUTEROL SULFATE 1 AMPULE: .5; 3 SOLUTION RESPIRATORY (INHALATION) at 06:54

## 2020-01-17 RX ADMIN — DESMOPRESSIN ACETATE 40 MG: 0.2 TABLET ORAL at 21:23

## 2020-01-17 RX ADMIN — PREDNISONE 20 MG: 20 TABLET ORAL at 08:07

## 2020-01-17 RX ADMIN — MOMETASONE FUROATE AND FORMOTEROL FUMARATE DIHYDRATE 2 PUFF: 200; 5 AEROSOL RESPIRATORY (INHALATION) at 20:15

## 2020-01-17 RX ADMIN — PREDNISONE 20 MG: 20 TABLET ORAL at 21:23

## 2020-01-17 RX ADMIN — ENOXAPARIN SODIUM 90 MG: 100 INJECTION SUBCUTANEOUS at 21:23

## 2020-01-17 ASSESSMENT — ENCOUNTER SYMPTOMS
ABDOMINAL PAIN: 0
BLOOD IN STOOL: 0
DIARRHEA: 0
COUGH: 0
VOMITING: 0
WHEEZING: 0
CONSTIPATION: 0
VOICE CHANGE: 1
STRIDOR: 0
SHORTNESS OF BREATH: 1
NAUSEA: 0

## 2020-01-17 ASSESSMENT — PAIN SCALES - GENERAL: PAINLEVEL_OUTOF10: 3

## 2020-01-17 NOTE — H&P
[address] [facility]    HISTORY AND PHYSICAL EXAMINATION            Date:   1/17/2020  Patient name:  Eulogio Johnson  Date of admission:  1/16/2020  2:23 PM  MRN:   [id number]  Account:  [de-identified]  YOB: 1960  PCP:    Deyvi Kingston MD  Room:   2022/2022-01  Code Status:    Full Code    Chief Complaint:     Right leg pain concern for DVT    History Obtained From:     patient, electronic medical record    History of Present Illness: Eulogio Johnson is a 60 y.o. Non-/non  female who presents with right leg pain nodule for DVT and is admitted to the hospital for the management of elevated troponin, thrombophlebitis    This is a pleasant 68-year-old female with a significant medical history of hypertension hypothyroidism diabetes type 2 splenic artery aneurysm and DVT who presented to outlying emergency room complaining of the right upper thigh / posterior thigh pain with a nodule which was concerning for another DVT of that leg. While at [facility] emergency room data revealed that she was tachycardic and tachypneic provider had a concern for a DVT and PE. Her EKG showed a sinus tachycardia with a rate of 112 with a left atrial enlargement and abnormal R wave progression through the anterior leads however no acute ST segmental changes. The chest x-ray had no acute process, laboratory studies showed a hemogram with a mild leukocytosis, a Bolick panel doubt acute abnormalities. High sensitive troponin of 63. There was no evidence of a DVT or pulmonary embolism on imaging and testing however there was some superficial thrombophlebitis and with the elevated troponin [place] emergency room felt like she needed to be transferred to a tertiary care center for further evaluation.           Past Medical History:     Past Medical History:   Diagnosis Date    Bilateral corneal scars     Disease of blood and blood forming organ 10/25/2011    L4/L5 epidural steroid injection    OTHER SURGICAL HISTORY Right 2006 and 2010    synvisc injections, two sets    VA COLON CA SCRN NOT HI RSK IND N/A 2/27/2017    COLONOSCOPY performed by Sue Claros MD at [address] IND N/A 9/8/2017    COLONOSCOPY performed by Sue Claros MD at [address] EGD TRANSORAL BIOPSY SINGLE/MULTIPLE N/A 2/27/2017    EGD BIOPSY performed by Sue Claros MD at [address] N/A 8/30/2017    ATTEMPTED LAPAROCOPY PROCEDURE CONVERTED TO OPEN Sigmoid Colectomy PERFORMED BY DR. VO resection colovaginal fistula PERFORMED BY. DR. Kajal Almaraz  performed by Sue Claros MD at [facility] 07/2011    TOTAL    TUBAL LIGATION  1991    UPPER GASTROINTESTINAL ENDOSCOPY N/A 1/2/2020    EGD BIOPSY W/ 48 HR LEARY performed by Sue Claros MD at [place] [address] EXTRACTION          Medications Prior to Admission:     Prior to Admission medications    Medication Sig Start Date End Date Taking? Authorizing Provider   cefdinir (OMNICEF) 300 MG capsule Take one capsule twice daily for 30 days.  1/8/20  Yes DEREK Markham CNP   predniSONE (DELTASONE) 20 MG tablet Take 1 tablet by mouth 2 times daily 12/18/19  Yes DEREK Markham CNP   albuterol (PROVENTIL) (5 MG/ML) 0.5% nebulizer solution Take 1 mL by nebulization 4 times daily as needed for Wheezing 11/26/19  Yes DEREK Markham CNP   pantoprazole (PROTONIX) 40 MG tablet Take one tablet twice daily 30-60 minutes prior to breakfast and evening meal 11/11/19  Yes DEREK Markham CNP   SYNTHROID 175 MCG tablet Take 175 mcg by mouth 8/3/19  Yes Historical Provider, MD   MOLYBDENUM PO Take 500 mcg by mouth daily   Yes Historical Provider, MD   ipratropium-albuterol (DUONEB) 0.5-2.5 (3) MG/3ML SOLN nebulizer solution Inhale 3 mLs into the lungs every 4 hours as needed for Shortness of Breath 5/6/19  Yes DEREK Calle CNP   fluticasone [facility]) 50 MCG/ACT nasal spray Two sprays to each nostril once daily 4/1/19  Yes DEREK Calle CNP   ranitidine (ZANTAC) 300 MG tablet TAKE 1 TABLET ONCE DAILY AT BEDTIME 4/1/19  Yes DEREK Calle CNP   diclofenac (VOLTAREN) 75 MG EC tablet Take 1 tablet by mouth 2 times daily as needed for Pain 2/22/18  Yes Yuko Lynch MD   Probiotic Product (SOLUBLE FIBER/PROBIOTICS PO) Take by mouth 2 times daily   Yes Historical Provider, MD   metFORMIN (GLUCOPHAGE) 500 MG tablet TAKE 2 TABLETS BY MOUTH TWO TIMES A DAY WITH MEALS 9/18/17  Yes Gary Gottlieb MD   losartan (COZAAR) 25 MG tablet Take 1 tablet by mouth daily 12/7/16  Yes CRISTINA Gaspar   Cholecalciferol (VITAMIN D3) 5000 UNITS CAPS Take 2 capsules by mouth daily Takes 2000 units daily   Yes Historical Provider, MD   benzonatate (TESSALON) 100 MG capsule Take 200 mg by mouth 3 times daily as needed for Cough    Historical Provider, MD   budesonide-formoterol (SYMBICORT) 160-4.5 MCG/ACT AERO 2 puffs inhaled twice daily. Rinse mouth well after use. 11/26/19   DEREK Calle CNP        Allergies:     Codeine; Darvon [propoxyphene]; Lisinopril; and Percocet [oxycodone-acetaminophen]    Social History:     Tobacco:    reports that she has never smoked. She has never used smokeless tobacco.  Alcohol:      reports no history of alcohol use. Drug Use:  reports no history of drug use. Family History:     Family History   Problem Relation Age of Onset    Coronary Art Dis Mother     High Blood Pressure Mother     High Cholesterol Mother     Allergies Mother     Arthritis Mother     Diabetes Daughter         pre diabetes    Other Maternal Grandfather         PUD GI problems       Review of Systems:     Positive and Negative as described in HPI. Review of Systems   Constitutional: Negative for chills, diaphoresis and fever.    HENT: reactive. Neck:      Musculoskeletal: Neck supple. Thyroid: No thyromegaly. Vascular: No carotid bruit. Trachea: Trachea and phonation normal. No tracheal deviation. Cardiovascular:      Rate and Rhythm: Normal rate and regular rhythm. Chest Wall: PMI is not displaced. Pulses:           Carotid pulses are 1+ on the right side and 1+ on the left side. Radial pulses are 1+ on the right side and 1+ on the left side. Dorsalis pedis pulses are 1+ on the right side and 1+ on the left side. Posterior tibial pulses are 1+ on the right side and 1+ on the left side. Heart sounds: Normal heart sounds. No murmur. Comments: Right lower extremity and right medial aspect of the thigh with a superficial thrombo-phlebitis and varicose veins  Pulmonary:      Effort: Pulmonary effort is normal. No respiratory distress. Breath sounds: Normal breath sounds. No stridor. No decreased breath sounds. Abdominal:      General: Bowel sounds are normal. There is no distension. Palpations: Abdomen is soft. There is no mass. Tenderness: There is no tenderness. There is no guarding. Musculoskeletal:         General: No tenderness. Right lower leg: No edema. Left lower leg: No edema. Skin:     General: Skin is warm and dry. Findings: No erythema, lesion or rash. Neurological:      Mental Status: She is alert and oriented to person, place, and time. She is not disoriented. Cranial Nerves: No cranial nerve deficit. Psychiatric:         Speech: Speech normal.         Behavior: Behavior normal. Behavior is cooperative.          Investigations:      Laboratory Testing:  Recent Results (from the past 24 hour(s))   CBC Auto Differential    Collection Time: 01/16/20  8:45 AM   Result Value Ref Range    WBC 14.0 (H) 3.5 - 11.3 k/uL    RBC 4.74 3.95 - 5.11 m/uL    Hemoglobin 13.8 11.9 - 15.1 g/dL    Hematocrit 41.7 36.3 - 47.1 %    MCV 88.0 82.6 - 102.9 fL MCH 29.1 25.2 - 33.5 pg    MCHC 33.1 25.2 - 33.5 g/dL    RDW 13.8 11.8 - 14.4 %    Platelets 144 987 - 945 k/uL    MPV 9.2 8.1 - 13.5 fL    NRBC Automated 0.0 0.0 per 100 WBC    Differential Type NOT REPORTED     WBC Morphology NOT REPORTED     RBC Morphology NOT REPORTED     Platelet Estimate NOT REPORTED     Seg Neutrophils 67 (H) 36 - 65 %    Lymphocytes 26 24 - 43 %    Monocytes 5 3 - 12 %    Eosinophils % 0 (L) 1 - 4 %    Basophils 0 0 - 2 %    Immature Granulocytes 2 (H) 0 %    Segs Absolute 9.40 (H) 1.50 - 8.10 k/uL    Absolute Lymph # 3.57 1.10 - 3.70 k/uL    Absolute Mono # 0.74 0.10 - 1.20 k/uL    Absolute Eos # <0.03 0.00 - 0.44 k/uL    Basophils Absolute 0.04 0.00 - 0.20 k/uL    Absolute Immature Granulocyte 0.23 0.00 - 0.30 k/uL   Basic Metabolic Panel w/ Reflex to MG    Collection Time: 01/16/20  8:45 AM   Result Value Ref Range    Glucose 252 (H) 70 - 99 mg/dL    BUN 17 8 - 23 mg/dL    CREATININE 0.68 0.50 - 0.90 mg/dL    Bun/Cre Ratio 25 (H) 9 - 20    Calcium 9.2 8.6 - 10.4 mg/dL    Sodium 142 135 - 144 mmol/L    Potassium 3.9 3.7 - 5.3 mmol/L    Chloride 103 98 - 107 mmol/L    CO2 23 20 - 31 mmol/L    Anion Gap 16 9 - 17 mmol/L    GFR Non-African American >60 >60 mL/min    GFR African American >60 >60 mL/min    GFR Comment          GFR Staging NOT REPORTED    Protime-INR    Collection Time: 01/16/20  8:45 AM   Result Value Ref Range    Protime 9.4 9.4 - 11.3 sec    INR 0.9    APTT    Collection Time: 01/16/20  8:45 AM   Result Value Ref Range    PTT 22.4 (L) 27.0 - 35.0 sec   Troponin    Collection Time: 01/16/20  8:45 AM   Result Value Ref Range    Troponin, High Sensitivity 63 (HH) 0 - 14 ng/L    Troponin T NOT REPORTED <0.03 ng/mL    Troponin Interp NOT REPORTED    EKG 12 Lead    Collection Time: 01/16/20  8:48 AM   Result Value Ref Range    Ventricular Rate 112 BPM    Atrial Rate 112 BPM    P-R Interval 120 ms    QRS Duration 82 ms    Q-T Interval 340 ms    QTc Calculation (Bazett) 464 ms P Axis 54 degrees    R Axis 7 degrees    T Axis 42 degrees   Troponin    Collection Time: 01/16/20  4:23 PM   Result Value Ref Range    Troponin, High Sensitivity 55 (HH) 0 - 14 ng/L    Troponin T NOT REPORTED <0.03 ng/mL    Troponin Interp NOT REPORTED    D-dimer, quantitative    Collection Time: 01/16/20  4:23 PM   Result Value Ref Range    D-Dimer, Quant 1.11 mg/L FEU   Brain natriuretic peptide    Collection Time: 01/16/20  4:23 PM   Result Value Ref Range    Pro-BNP 2,244 (H) <300 pg/mL    BNP Interpretation Pro-BNP Reference Range:    POC Glucose Fingerstick    Collection Time: 01/16/20  4:24 PM   Result Value Ref Range    POC Glucose 249 (H) 65 - 105 mg/dL   Troponin    Collection Time: 01/16/20  9:01 PM   Result Value Ref Range    Troponin, High Sensitivity 60 (HH) 0 - 14 ng/L    Troponin T NOT REPORTED <0.03 ng/mL    Troponin Interp NOT REPORTED    EKG 12 lead    Collection Time: 01/17/20  5:15 AM   Result Value Ref Range    Ventricular Rate 96 BPM    Atrial Rate 96 BPM    P-R Interval 114 ms    QRS Duration 84 ms    Q-T Interval 378 ms    QTc Calculation (Bazett) 477 ms    P Axis 50 degrees    R Axis 3 degrees    T Axis 55 degrees       Imaging/Diagnostics:  Ct Chest Pulmonary Embolism W Contrast    Result Date: 1/16/2020  Suboptimal evaluation of the pulmonary arteries due to respiratory motion artifact. No convincing evidence of pulmonary embolism or right heart strain. Scattered ground-glass opacities throughout the lungs bilaterally, most predominant in the left lower lobe. Findings are nonspecific however may reflect multiple fractures or inflammatory process. Follow-up CT in 6-12 months to be considered to confirm resolution. Vl Dup Lower Extremity Venous Right    Result Date: 1/16/2020  No evidence of DVT in the right lower extremity. Superficial thrombosis mid right greater saphenous vein.        Assessment :      Hospital Problems           Last Modified POA    * (Principal) Respiratory distress 1/16/2020 Yes    Gastroesophageal reflux disease 1/16/2020 Yes    Hypothyroidism 1/16/2020 Yes    Essential hypertension 1/16/2020 Yes    DJD (degenerative joint disease), lumbar 1/16/2020 Yes    Non-celiac gluten sensitivity 1/16/2020 Yes    Overview Signed 1/21/2016 10:43 AM by Sindi Delvalle, DO     wheezing is directly proportionate to wheat intake         Diabetes type 2, no ocular involvement ([facility] [address] 1/16/2020 Yes    Elevated troponin 1/17/2020 Yes    Exertional dyspnea 1/17/2020 Yes    Thrombophlebitis of superficial veins of right lower extremity 1/17/2020 Yes          Plan:     Patient status inpatient in the Progressive Unit/Step down    1. Elevated troponin.-Patient without any chest pain however anginal equivalent of exertional dyspnea and dyspnea that is brought on by walking very short distances. Need to trend troponin. Monitor for EKG changes  2. Elevated BNP-the exertional dyspnea is the only other correlating symptoms will check echocardiogram to assure there is no wall motion abnormalities or factors putting her at risk for CHF right now I do not see clinical signs of fluid overload as and no abnormal lung sounds or increased edema. No orthopnea or JVD  3. Exertional dyspnea-as noted above with work-up to rule out CHF she has had multiple work-ups for this asthma that she was then notified that is not asthma it is LPR and severe gastroesophageal reflux and she needs to go to surgery for repair. States the dyspnea occurs with activity and very short ambulation she denies any orthopnea. Cough fevers or chills  4.  hypertension-continue with home medications monitor blood pressure  5. Diabetes type 2-states diabetes has been controlled until she was just recently placed on steroids for consecutive doses and treatments insulin therapy required in the outpatient setting however will place on insulin sliding scale to optimize On[place]ul management and risk reduction  6.  GI

## 2020-01-17 NOTE — PROGRESS NOTES
Physical Therapy  DATE: 2020    NAME: Sneha Puga  MRN: [id number]   : 1960    Patient not seen this date for Physical Therapy due to:  [] Blood transfusion in progress  [] Hemodialysis  []  Patient Declined  [] Spine Precautions   [] Strict Bedrest  [] Surgery/ Procedure  [] Testing      [x] Other: Elevated troponin, awaiting cardiology consult. [] PT being discontinued at this time. Patient independent. No further needs. [] PT being discontinued at this time as the patient has been transferred to palliative care. No further needs.     Rivas Brady, PT

## 2020-01-17 NOTE — PROGRESS NOTES
[facility]    Progress Note    1/17/2020    2:20 PM    Name:   Roni Doyle  MRN:     [id number]     Acct:      [de-identified]   Room:   2022/2022-01  IP Day:  1  Admit Date:  1/16/2020  2:23 PM    PCP:   Remy Boudreaux MD  Code Status:  Full Code    Subjective:     C/C: Elevated troponin  Interval History Status: not changed. Patient was seen and examined at bedside. no acute overnight event. No new issues. Patient feels slightly worse because she missed some of her medications. Brief History: This is a 56-year-old female with medical history significant for large hiatal hernia with complication with reflux and esophageal ulcerations. Patient is currently taking Protonix famotidine prednisone and antibiotics for her presumed aspiration pneumonia and complications of acid reflux. Patient was transferred to [facility] for evaluation of elevated troponin. Patient had mild elevation of troponin and it appears to be mild baseline elevation. Patient's pulmonary issue was exacerbated by barium swallow testing that was done on December 12, 2019. Patient had reaction to barium and since then her breathing was about the same. Review of Systems:     Constitutional:  negative for chills, fevers, sweats  Respiratory: Patient has chronic shortness of breath and hoarse voice. Cardiovascular:  negative for chest pain, chest pressure/discomfort, lower extremity edema, palpitations. Patient's been tachycardic for some time since December. Gastrointestinal:  negative for abdominal pain, constipation, diarrhea, nausea, vomiting  Neurological:  negative for dizziness, headache    Medications: Allergies:     Allergies   Allergen Reactions    Codeine Nausea And Vomiting and Other (See Comments)     Chest tightness    Darvon [Propoxyphene] Nausea And Vomiting and Other (See Comments)     Chest tightness    Lisinopril Other (See oriented to person, place and time and normal affect  Lungs: Good bilateral air movement. Coarse breath sounds bilaterally. Heart:  sinus tachycardia with normal S1 and S2. No definite murmur appreciated. Abdomen:  soft, nontender, nondistended, normal bowel sounds, no masses, hepatomegaly, splenomegaly  Extremities:  no edema, redness, tenderness in the calves  Skin:  no gross lesions, rashes, induration    Assessment:        Hospital Problems           Last Modified POA    * (Principal) Respiratory distress 1/16/2020 Yes    Gastroesophageal reflux disease 1/16/2020 Yes    Hypothyroidism 1/16/2020 Yes    Essential hypertension 1/16/2020 Yes    DJD (degenerative joint disease), lumbar 1/16/2020 Yes    Non-celiac gluten sensitivity 1/16/2020 Yes    Overview Signed 1/21/2016 10:43 AM by Hari Pillai DO     wheezing is directly proportionate to wheat intake         Diabetes type 2, no ocular involvement ([facility] [address] 1/16/2020 Yes    Elevated troponin 1/17/2020 Yes    Exertional dyspnea 1/17/2020 Yes    Thrombophlebitis of superficial veins of right lower extremity 1/17/2020 Yes          Plan:        1. Elevated troponin -this is likely patient's baseline. Patient may have some demand ischemia. Evaluate for congestive heart failure. Echocardiogram and cardiology consult in place. Follow-up with the results. 2. Exertional shortness of breath -CHF work-up ongoing. Possibly reactive pneumonitis from barium swallow reaction. Patient is on prednisone. 3. Hiatal hernia -patient is working with outside surgeon for arranging hiatal hernia surgery. 4. Essential hypertension  5. Diabetes mellitus type 2 -sliding scale insulin and diabetic diet.     Margaret Ruiz DO  1/17/2020  2:20 PM

## 2020-01-17 NOTE — PLAN OF CARE
BRONCHOSPASM/BRONCHOCONSTRICTION     [x]         IMPROVE AERATION/BREATH SOUNDS  [x]   ADMINISTER BRONCHODILATOR THERAPY AS APPROPRIATE  [x]   ASSESS BREATH SOUNDS  []   IMPLEMENT AEROSOL/MDI PROTOCOL  [x]   PATIENT EDUCATION AS NEEDED   PROVIDE ADEQUATE OXYGENATION WITH ACCEPTABLE SP02/ABG'S    [x]  IDENTIFY APPROPRIATE OXYGEN THERAPY  [x]   MONITOR SP02/ABG'S AS NEEDED   [x]   PATIENT EDUCATION AS NEEDED

## 2020-01-18 LAB
GLUCOSE BLD-MCNC: 213 MG/DL (ref 65–105)
GLUCOSE BLD-MCNC: 268 MG/DL (ref 65–105)
GLUCOSE BLD-MCNC: 274 MG/DL (ref 65–105)
GLUCOSE BLD-MCNC: 313 MG/DL (ref 65–105)

## 2020-01-18 PROCEDURE — 2060000000 HC ICU INTERMEDIATE R&B

## 2020-01-18 PROCEDURE — 6370000000 HC RX 637 (ALT 250 FOR IP): Performed by: HOSPITALIST

## 2020-01-18 PROCEDURE — 99232 SBSQ HOSP IP/OBS MODERATE 35: CPT | Performed by: HOSPITALIST

## 2020-01-18 PROCEDURE — 2580000003 HC RX 258: Performed by: NURSE PRACTITIONER

## 2020-01-18 PROCEDURE — 6370000000 HC RX 637 (ALT 250 FOR IP): Performed by: NURSE PRACTITIONER

## 2020-01-18 PROCEDURE — 6360000002 HC RX W HCPCS: Performed by: NURSE PRACTITIONER

## 2020-01-18 PROCEDURE — 82947 ASSAY GLUCOSE BLOOD QUANT: CPT

## 2020-01-18 PROCEDURE — 94640 AIRWAY INHALATION TREATMENT: CPT

## 2020-01-18 RX ORDER — INSULIN GLARGINE 100 [IU]/ML
10 INJECTION, SOLUTION SUBCUTANEOUS NIGHTLY
Status: DISCONTINUED | OUTPATIENT
Start: 2020-01-18 | End: 2020-01-21 | Stop reason: HOSPADM

## 2020-01-18 RX ORDER — GUAIFENESIN 100 MG/5ML
200 SOLUTION ORAL EVERY 4 HOURS PRN
Status: DISCONTINUED | OUTPATIENT
Start: 2020-01-18 | End: 2020-01-21 | Stop reason: HOSPADM

## 2020-01-18 RX ORDER — ASPIRIN 81 MG/1
81 TABLET ORAL DAILY
Status: DISCONTINUED | OUTPATIENT
Start: 2020-01-19 | End: 2020-01-21 | Stop reason: HOSPADM

## 2020-01-18 RX ADMIN — IPRATROPIUM BROMIDE AND ALBUTEROL SULFATE 1 AMPULE: .5; 3 SOLUTION RESPIRATORY (INHALATION) at 17:02

## 2020-01-18 RX ADMIN — INSULIN LISPRO 3 UNITS: 100 INJECTION, SOLUTION INTRAVENOUS; SUBCUTANEOUS at 17:36

## 2020-01-18 RX ADMIN — SODIUM CHLORIDE, PRESERVATIVE FREE 10 ML: 5 INJECTION INTRAVENOUS at 09:28

## 2020-01-18 RX ADMIN — IPRATROPIUM BROMIDE AND ALBUTEROL SULFATE 1 AMPULE: .5; 3 SOLUTION RESPIRATORY (INHALATION) at 08:45

## 2020-01-18 RX ADMIN — PREDNISONE 20 MG: 20 TABLET ORAL at 09:26

## 2020-01-18 RX ADMIN — LOSARTAN POTASSIUM 25 MG: 25 TABLET, FILM COATED ORAL at 09:25

## 2020-01-18 RX ADMIN — PREDNISONE 20 MG: 20 TABLET ORAL at 20:43

## 2020-01-18 RX ADMIN — CARVEDILOL 3.12 MG: 3.12 TABLET, FILM COATED ORAL at 17:34

## 2020-01-18 RX ADMIN — PHENOL 1 SPRAY: 1.5 LIQUID ORAL at 15:00

## 2020-01-18 RX ADMIN — IPRATROPIUM BROMIDE AND ALBUTEROL SULFATE 1 AMPULE: .5; 3 SOLUTION RESPIRATORY (INHALATION) at 21:40

## 2020-01-18 RX ADMIN — ENOXAPARIN SODIUM 90 MG: 100 INJECTION SUBCUTANEOUS at 09:26

## 2020-01-18 RX ADMIN — MOMETASONE FUROATE AND FORMOTEROL FUMARATE DIHYDRATE 2 PUFF: 200; 5 AEROSOL RESPIRATORY (INHALATION) at 21:40

## 2020-01-18 RX ADMIN — INSULIN LISPRO 6 UNITS: 100 INJECTION, SOLUTION INTRAVENOUS; SUBCUTANEOUS at 23:08

## 2020-01-18 RX ADMIN — ASPIRIN 325 MG: 325 TABLET, COATED ORAL at 09:25

## 2020-01-18 RX ADMIN — MOMETASONE FUROATE AND FORMOTEROL FUMARATE DIHYDRATE 2 PUFF: 200; 5 AEROSOL RESPIRATORY (INHALATION) at 08:44

## 2020-01-18 RX ADMIN — ENOXAPARIN SODIUM 90 MG: 100 INJECTION SUBCUTANEOUS at 20:43

## 2020-01-18 RX ADMIN — CARVEDILOL 3.12 MG: 3.12 TABLET, FILM COATED ORAL at 09:25

## 2020-01-18 RX ADMIN — SODIUM CHLORIDE, PRESERVATIVE FREE 10 ML: 5 INJECTION INTRAVENOUS at 20:47

## 2020-01-18 RX ADMIN — FAMOTIDINE 20 MG: 20 TABLET, FILM COATED ORAL at 09:26

## 2020-01-18 RX ADMIN — INSULIN GLARGINE 10 UNITS: 100 INJECTION, SOLUTION SUBCUTANEOUS at 23:08

## 2020-01-18 RX ADMIN — DESMOPRESSIN ACETATE 40 MG: 0.2 TABLET ORAL at 20:43

## 2020-01-18 RX ADMIN — PANTOPRAZOLE SODIUM 40 MG: 40 TABLET, DELAYED RELEASE ORAL at 15:01

## 2020-01-18 RX ADMIN — GUAIFENESIN 200 MG: 200 SOLUTION ORAL at 20:43

## 2020-01-18 RX ADMIN — IPRATROPIUM BROMIDE AND ALBUTEROL SULFATE 1 AMPULE: .5; 3 SOLUTION RESPIRATORY (INHALATION) at 13:05

## 2020-01-18 RX ADMIN — VITAMIN D, TAB 1000IU (100/BT) 2000 UNITS: 25 TAB at 09:25

## 2020-01-18 RX ADMIN — FLUTICASONE PROPIONATE 1 SPRAY: 50 SPRAY, METERED NASAL at 09:28

## 2020-01-18 RX ADMIN — LEVOTHYROXINE SODIUM 175 MCG: 175 TABLET ORAL at 06:18

## 2020-01-18 RX ADMIN — DIPHENHYDRAMINE HCL 25 MG: 25 TABLET ORAL at 20:46

## 2020-01-18 RX ADMIN — PANTOPRAZOLE SODIUM 40 MG: 40 TABLET, DELAYED RELEASE ORAL at 06:18

## 2020-01-18 RX ADMIN — INSULIN LISPRO 2 UNITS: 100 INJECTION, SOLUTION INTRAVENOUS; SUBCUTANEOUS at 09:29

## 2020-01-18 RX ADMIN — INSULIN LISPRO 3 UNITS: 100 INJECTION, SOLUTION INTRAVENOUS; SUBCUTANEOUS at 12:43

## 2020-01-18 NOTE — PLAN OF CARE
Problem: Falls - Risk of:  Goal: Will remain free from falls  Description  Will remain free from falls  Outcome: Ongoing  Goal: Absence of physical injury  Description  Absence of physical injury  Outcome: Ongoing     Problem:  Activity:  Goal: Ability to tolerate increased activity will improve  Description  Ability to tolerate increased activity will improve  Outcome: Ongoing

## 2020-01-18 NOTE — PROGRESS NOTES
Pt requested to try to sleep tonight and asked if we can hold vs until morning. Pt is able to call out for needs, and will be on telemetry  All night. Writer will continue to monitor.

## 2020-01-18 NOTE — PLAN OF CARE
Problem: Falls - Risk of:  Goal: Will remain free from falls  Description  Will remain free from falls  1/18/2020 0753 by Kylah Escobar RN  Outcome: Ongoing  1/17/2020 2320 by Urszula Diamond RN  Outcome: Ongoing  Goal: Absence of physical injury  Description  Absence of physical injury  1/18/2020 0753 by Kylah Escobar RN  Outcome: Ongoing  1/17/2020 2320 by Urszula Diamond RN  Outcome: Ongoing

## 2020-01-18 NOTE — CONSULTS
squamous intraepithelial dysplasia), Non-celiac gluten sensitivity, Osteoarthritis, Other disorders of kidney and ureter in diseases classified elsewhere, Sciatica of right side, Severe persistent asthma, Splenic artery aneurysm ([facility] [address] Tinnitus, and Type 2 diabetes mellitus, controlled ([facility] [address] Past Surgical History:   has a past surgical history that includes Knee arthroscopy (Right, ); Tubal ligation ();  section (); [place] tooth extraction; Skin tag removal; Total knee arthroplasty (Right, 2011); other surgical history (10/25/2011); other surgical history (Right,  and ); Nasal sinus surgery (2014); Arterial aneurysm repair (); Colposcopy (2015); pr colon ca scrn not hi rsk ind (N/A, 2017); pr egd transoral biopsy single/multiple (N/A, 2017); Colonoscopy (2012); Colonoscopy (2017); Small intestine surgery (N/A, 2017); Cystoscopy (Bilateral, 2017); laparoscopy (N/A, 2017); pr colon ca scrn not hi rsk ind (N/A, 2017); Upper gastrointestinal endoscopy (N/A, 2020); Abdomen surgery; joint replacement; skin biopsy; vascular surgery; Endoscopy, colon, diagnostic; eye surgery; and Dilatation, esophagus. Home Medications:    Prior to Admission medications    Medication Sig Start Date End Date Taking? Authorizing Provider   cefdinir (OMNICEF) 300 MG capsule Take one capsule twice daily for 30 days.  20  Yes DEREK Zavala CNP   predniSONE (DELTASONE) 20 MG tablet Take 1 tablet by mouth 2 times daily 19  Yes DEREK Zavala CNP   albuterol (PROVENTIL) (5 MG/ML) 0.5% nebulizer solution Take 1 mL by nebulization 4 times daily as needed for Wheezing 19  Yes DEREK Zavala CNP   pantoprazole (PROTONIX) 40 MG tablet Take one tablet twice daily 30-60 minutes prior to breakfast and evening meal 19  Yes Sophie Duck, APRN - CNP   SYNTHROID 175 MCG tablet Take 175 mcg by mouth 8/3/19 Yes Historical Provider, MD   MOLYBDENUM PO Take 500 mcg by mouth daily   Yes Historical Provider, MD   ipratropium-albuterol (DUONEB) 0.5-2.5 (3) MG/3ML SOLN nebulizer solution Inhale 3 mLs into the lungs every 4 hours as needed for Shortness of Breath 5/6/19  Yes DEREK Miranda CNP   fluticasone (FLONASE) 50 MCG/ACT nasal spray Two sprays to each nostril once daily 4/1/19  Yes DEREK Miranda CNP   ranitidine (ZANTAC) 300 MG tablet TAKE 1 TABLET ONCE DAILY AT BEDTIME 4/1/19  Yes DEREK Miranda CNP   diclofenac (VOLTAREN) 75 MG EC tablet Take 1 tablet by mouth 2 times daily as needed for Pain 2/22/18  Yes Veronica Gill MD   Probiotic Product (SOLUBLE FIBER/PROBIOTICS PO) Take by mouth 2 times daily   Yes Historical Provider, MD   metFORMIN (GLUCOPHAGE) 500 MG tablet TAKE 2 TABLETS BY MOUTH TWO TIMES A DAY WITH MEALS 9/18/17  Yes Gem Whelan MD   losartan (COZAAR) 25 MG tablet Take 1 tablet by mouth daily 12/7/16  Yes CRISTINA Levy   Cholecalciferol (VITAMIN D3) 5000 UNITS CAPS Take 2 capsules by mouth daily Takes 2000 units daily   Yes Historical Provider, MD   benzonatate (TESSALON) 100 MG capsule Take 200 mg by mouth 3 times daily as needed for Cough    Historical Provider, MD   budesonide-formoterol (SYMBICORT) 160-4.5 MCG/ACT AERO 2 puffs inhaled twice daily. Rinse mouth well after use.  11/26/19   DEREK Miranda CNP      Current Facility-Administered Medications: acetaminophen (TYLENOL) tablet 650 mg, 650 mg, Oral, Q4H PRN  metoprolol (LOPRESSOR) injection 5 mg, 5 mg, Intravenous, Q4H PRN  pantoprazole (PROTONIX) tablet 40 mg, 40 mg, Oral, BID AC  diphenhydrAMINE (BENADRYL) tablet 50 mg, 50 mg, Oral, Nightly PRN  ipratropium-albuterol (DUONEB) nebulizer solution 1 ampule, 1 ampule, Inhalation, Q4H While awake  mometasone-formoterol (DULERA) 200-5 MCG/ACT inhaler 2 puff, 2 puff, Inhalation, BID  Vitamin D (CHOLECALCIFEROL) tablet 2,000 Units, 2,000 Units, Oral, Daily  fluticasone (FLONASE) 50 MCG/ACT nasal spray 1 spray, 1 spray, Each Nostril, Daily  losartan (COZAAR) tablet 25 mg, 25 mg, Oral, Daily  predniSONE (DELTASONE) tablet 20 mg, 20 mg, Oral, BID  levothyroxine (SYNTHROID) tablet 175 mcg, 175 mcg, Oral, Daily  sodium chloride flush 0.9 % injection 10 mL, 10 mL, Intravenous, 2 times per day  sodium chloride flush 0.9 % injection 10 mL, 10 mL, Intravenous, PRN  potassium chloride (KLOR-CON M) extended release tablet 40 mEq, 40 mEq, Oral, PRN **OR** potassium bicarb-citric acid (EFFER-K) effervescent tablet 40 mEq, 40 mEq, Oral, PRN **OR** potassium chloride 10 mEq/100 mL IVPB (Peripheral Line), 10 mEq, Intravenous, PRN  potassium chloride 10 mEq/100 mL IVPB (Peripheral Line), 10 mEq, Intravenous, PRN  magnesium sulfate 1 g in dextrose 5% 100 mL IVPB, 1 g, Intravenous, PRN  magnesium hydroxide (MILK OF MAGNESIA) 400 MG/5ML suspension 30 mL, 30 mL, Oral, Daily PRN  ondansetron (ZOFRAN) injection 4 mg, 4 mg, Intravenous, Q6H PRN  atorvastatin (LIPITOR) tablet 40 mg, 40 mg, Oral, Nightly  aspirin EC tablet 325 mg, 325 mg, Oral, Daily  nitroGLYCERIN (NITROSTAT) SL tablet 0.4 mg, 0.4 mg, Sublingual, Q5 Min PRN  famotidine (PEPCID) tablet 20 mg, 20 mg, Oral, Daily  carvedilol (COREG) tablet 3.125 mg, 3.125 mg, Oral, BID WC  enoxaparin (LOVENOX) injection 90 mg, 1 mg/kg, Subcutaneous, BID  insulin lispro (HUMALOG) injection vial 0-6 Units, 0-6 Units, Subcutaneous, TID WC  insulin lispro (HUMALOG) injection vial 0-3 Units, 0-3 Units, Subcutaneous, Nightly  glucose (GLUTOSE) 40 % oral gel 15 g, 15 g, Oral, PRN  dextrose 50 % IV solution, 12.5 g, Intravenous, PRN  glucagon (rDNA) injection 1 mg, 1 mg, Intramuscular, PRN  dextrose 5 % solution, 100 mL/hr, Intravenous, PRN      Allergies:  Codeine; Darvon [propoxyphene]; Lisinopril; and Percocet [oxycodone-acetaminophen]      Social History:   reports that she has never smoked.  She has never used smokeless tobacco. She Diabetes type 2, no ocular involvement (HCC)    History of radial keratotomy    Combined forms of age-related cataract of both eyes    Respiratory distress    Elevated troponin    Exertional dyspnea    Thrombophlebitis of superficial veins of right lower extremity         RECOMMENDATIONS:  1. Continue aspirin  2. Continue lipitor  3. Blood pressure management per primary. On coreg and losartan. 4. Continue therapeutic lovenox. 5. Plan tentative cardiac cath [facility]. Keep npo after midnight prior. Discussed with patient and nurse. Thank you for allowing us to participate in [address]. Will follow with you.       Electronically signed on 01/18/20 at 8:52 AM by:    Baltazar Enamorado MD   Fellow, [address]

## 2020-01-19 LAB
ANION GAP SERPL CALCULATED.3IONS-SCNC: 18 MMOL/L (ref 9–17)
BUN BLDV-MCNC: 13 MG/DL (ref 8–23)
BUN/CREAT BLD: ABNORMAL (ref 9–20)
CALCIUM SERPL-MCNC: 9.3 MG/DL (ref 8.6–10.4)
CHLORIDE BLD-SCNC: 100 MMOL/L (ref 98–107)
CO2: 20 MMOL/L (ref 20–31)
CREAT SERPL-MCNC: 0.56 MG/DL (ref 0.5–0.9)
EKG ATRIAL RATE: 96 BPM
EKG P AXIS: 50 DEGREES
EKG P-R INTERVAL: 114 MS
EKG Q-T INTERVAL: 378 MS
EKG QRS DURATION: 84 MS
EKG QTC CALCULATION (BAZETT): 477 MS
EKG R AXIS: 3 DEGREES
EKG T AXIS: 55 DEGREES
EKG VENTRICULAR RATE: 96 BPM
GFR AFRICAN AMERICAN: >60 ML/MIN
GFR NON-AFRICAN AMERICAN: >60 ML/MIN
GFR SERPL CREATININE-BSD FRML MDRD: ABNORMAL ML/MIN/{1.73_M2}
GFR SERPL CREATININE-BSD FRML MDRD: ABNORMAL ML/MIN/{1.73_M2}
GLUCOSE BLD-MCNC: 256 MG/DL (ref 65–105)
GLUCOSE BLD-MCNC: 265 MG/DL (ref 65–105)
GLUCOSE BLD-MCNC: 305 MG/DL (ref 65–105)
GLUCOSE BLD-MCNC: 339 MG/DL (ref 65–105)
GLUCOSE BLD-MCNC: 400 MG/DL (ref 70–99)
HCT VFR BLD CALC: 41 % (ref 36.3–47.1)
HEMOGLOBIN: 13.3 G/DL (ref 11.9–15.1)
MCH RBC QN AUTO: 28.8 PG (ref 25.2–33.5)
MCHC RBC AUTO-ENTMCNC: 32.4 G/DL (ref 28.4–34.8)
MCV RBC AUTO: 88.7 FL (ref 82.6–102.9)
NRBC AUTOMATED: 0 PER 100 WBC
PDW BLD-RTO: 14.3 % (ref 11.8–14.4)
PLATELET # BLD: 291 K/UL (ref 138–453)
PMV BLD AUTO: 10.1 FL (ref 8.1–13.5)
POTASSIUM SERPL-SCNC: 3.4 MMOL/L (ref 3.7–5.3)
RBC # BLD: 4.62 M/UL (ref 3.95–5.11)
SODIUM BLD-SCNC: 138 MMOL/L (ref 135–144)
WBC # BLD: 11.4 K/UL (ref 3.5–11.3)

## 2020-01-19 PROCEDURE — 6360000002 HC RX W HCPCS: Performed by: HOSPITALIST

## 2020-01-19 PROCEDURE — 6370000000 HC RX 637 (ALT 250 FOR IP): Performed by: HOSPITALIST

## 2020-01-19 PROCEDURE — 36415 COLL VENOUS BLD VENIPUNCTURE: CPT

## 2020-01-19 PROCEDURE — 2580000003 HC RX 258: Performed by: NURSE PRACTITIONER

## 2020-01-19 PROCEDURE — 80048 BASIC METABOLIC PNL TOTAL CA: CPT

## 2020-01-19 PROCEDURE — 82947 ASSAY GLUCOSE BLOOD QUANT: CPT

## 2020-01-19 PROCEDURE — 6370000000 HC RX 637 (ALT 250 FOR IP): Performed by: NURSE PRACTITIONER

## 2020-01-19 PROCEDURE — 85027 COMPLETE CBC AUTOMATED: CPT

## 2020-01-19 PROCEDURE — 94640 AIRWAY INHALATION TREATMENT: CPT

## 2020-01-19 PROCEDURE — 93010 ELECTROCARDIOGRAM REPORT: CPT | Performed by: INTERNAL MEDICINE

## 2020-01-19 PROCEDURE — 6370000000 HC RX 637 (ALT 250 FOR IP): Performed by: STUDENT IN AN ORGANIZED HEALTH CARE EDUCATION/TRAINING PROGRAM

## 2020-01-19 PROCEDURE — 99232 SBSQ HOSP IP/OBS MODERATE 35: CPT | Performed by: HOSPITALIST

## 2020-01-19 PROCEDURE — 6360000002 HC RX W HCPCS: Performed by: NURSE PRACTITIONER

## 2020-01-19 PROCEDURE — 2060000000 HC ICU INTERMEDIATE R&B

## 2020-01-19 RX ORDER — ALBUTEROL SULFATE 2.5 MG/3ML
2.5 SOLUTION RESPIRATORY (INHALATION) 4 TIMES DAILY
Status: DISCONTINUED | OUTPATIENT
Start: 2020-01-19 | End: 2020-01-21 | Stop reason: HOSPADM

## 2020-01-19 RX ORDER — ALBUTEROL SULFATE 2.5 MG/3ML
2.5 SOLUTION RESPIRATORY (INHALATION) EVERY 6 HOURS PRN
Status: DISCONTINUED | OUTPATIENT
Start: 2020-01-19 | End: 2020-01-21 | Stop reason: HOSPADM

## 2020-01-19 RX ORDER — ALBUTEROL SULFATE 2.5 MG/3ML
2.5 SOLUTION RESPIRATORY (INHALATION)
Status: DISCONTINUED | OUTPATIENT
Start: 2020-01-19 | End: 2020-01-19

## 2020-01-19 RX ADMIN — INSULIN LISPRO 5 UNITS: 100 INJECTION, SOLUTION INTRAVENOUS; SUBCUTANEOUS at 22:04

## 2020-01-19 RX ADMIN — INSULIN LISPRO 9 UNITS: 100 INJECTION, SOLUTION INTRAVENOUS; SUBCUTANEOUS at 08:16

## 2020-01-19 RX ADMIN — INSULIN GLARGINE 10 UNITS: 100 INJECTION, SOLUTION SUBCUTANEOUS at 22:07

## 2020-01-19 RX ADMIN — PREDNISONE 20 MG: 20 TABLET ORAL at 08:53

## 2020-01-19 RX ADMIN — IPRATROPIUM BROMIDE AND ALBUTEROL SULFATE 1 AMPULE: .5; 3 SOLUTION RESPIRATORY (INHALATION) at 08:23

## 2020-01-19 RX ADMIN — LEVOTHYROXINE SODIUM 175 MCG: 175 TABLET ORAL at 07:33

## 2020-01-19 RX ADMIN — ENOXAPARIN SODIUM 90 MG: 100 INJECTION SUBCUTANEOUS at 08:57

## 2020-01-19 RX ADMIN — ALBUTEROL SULFATE 2.5 MG: 2.5 SOLUTION RESPIRATORY (INHALATION) at 20:45

## 2020-01-19 RX ADMIN — Medication 81 MG: at 08:53

## 2020-01-19 RX ADMIN — MOMETASONE FUROATE AND FORMOTEROL FUMARATE DIHYDRATE 2 PUFF: 200; 5 AEROSOL RESPIRATORY (INHALATION) at 08:23

## 2020-01-19 RX ADMIN — GUAIFENESIN 200 MG: 200 SOLUTION ORAL at 17:38

## 2020-01-19 RX ADMIN — VITAMIN D, TAB 1000IU (100/BT) 2000 UNITS: 25 TAB at 08:53

## 2020-01-19 RX ADMIN — MOMETASONE FUROATE AND FORMOTEROL FUMARATE DIHYDRATE 2 PUFF: 200; 5 AEROSOL RESPIRATORY (INHALATION) at 20:45

## 2020-01-19 RX ADMIN — LOSARTAN POTASSIUM 25 MG: 25 TABLET, FILM COATED ORAL at 08:53

## 2020-01-19 RX ADMIN — DIPHENHYDRAMINE HCL 25 MG: 25 TABLET ORAL at 20:49

## 2020-01-19 RX ADMIN — ALBUTEROL SULFATE 2.5 MG: 2.5 SOLUTION RESPIRATORY (INHALATION) at 11:42

## 2020-01-19 RX ADMIN — SODIUM CHLORIDE, PRESERVATIVE FREE 10 ML: 5 INJECTION INTRAVENOUS at 20:49

## 2020-01-19 RX ADMIN — PANTOPRAZOLE SODIUM 40 MG: 40 TABLET, DELAYED RELEASE ORAL at 16:00

## 2020-01-19 RX ADMIN — INSULIN LISPRO 12 UNITS: 100 INJECTION, SOLUTION INTRAVENOUS; SUBCUTANEOUS at 17:28

## 2020-01-19 RX ADMIN — CARVEDILOL 3.12 MG: 3.12 TABLET, FILM COATED ORAL at 08:53

## 2020-01-19 RX ADMIN — ENOXAPARIN SODIUM 90 MG: 100 INJECTION SUBCUTANEOUS at 20:49

## 2020-01-19 RX ADMIN — SODIUM CHLORIDE, PRESERVATIVE FREE 10 ML: 5 INJECTION INTRAVENOUS at 08:54

## 2020-01-19 RX ADMIN — FAMOTIDINE 20 MG: 20 TABLET, FILM COATED ORAL at 08:53

## 2020-01-19 RX ADMIN — POTASSIUM BICARBONATE 40 MEQ: 782 TABLET, EFFERVESCENT ORAL at 20:48

## 2020-01-19 RX ADMIN — ALBUTEROL SULFATE 2.5 MG: 2.5 SOLUTION RESPIRATORY (INHALATION) at 15:26

## 2020-01-19 RX ADMIN — DESMOPRESSIN ACETATE 40 MG: 0.2 TABLET ORAL at 20:49

## 2020-01-19 RX ADMIN — PANTOPRAZOLE SODIUM 40 MG: 40 TABLET, DELAYED RELEASE ORAL at 07:33

## 2020-01-19 RX ADMIN — CARVEDILOL 3.12 MG: 3.12 TABLET, FILM COATED ORAL at 17:33

## 2020-01-19 RX ADMIN — PREDNISONE 20 MG: 20 TABLET ORAL at 20:49

## 2020-01-19 RX ADMIN — INSULIN LISPRO 12 UNITS: 100 INJECTION, SOLUTION INTRAVENOUS; SUBCUTANEOUS at 12:31

## 2020-01-19 NOTE — PROGRESS NOTES
Pt reports mild throat discomfort. Request for throat spray sent to Dr Sarah Flanagan. Awaiting orders.

## 2020-01-19 NOTE — PLAN OF CARE
Problem: RESPIRATORY  Intervention: Respiratory assessment  Note:   [facility] FINCH[facility] Respiratory distress [R06.03] . Vitals:    01/19/20 0824   BP: 134/80   Pulse: 117   Resp: 18   Temp: 98.5 °F (36.9 °C)   SpO2: 95%   . Patients home meds are   Prior to Admission medications    Medication Sig Start Date End Date Taking? Authorizing Provider   cefdinir (OMNICEF) 300 MG capsule Take one capsule twice daily for 30 days.  1/8/20  Yes DEREK Calle CNP   predniSONE (DELTASONE) 20 MG tablet Take 1 tablet by mouth 2 times daily 12/18/19  Yes DEREK Calle CNP   albuterol (PROVENTIL) (5 MG/ML) 0.5% nebulizer solution Take 1 mL by nebulization 4 times daily as needed for Wheezing 11/26/19  Yes DEREK Calle CNP   pantoprazole (PROTONIX) 40 MG tablet Take one tablet twice daily 30-60 minutes prior to breakfast and evening meal 11/11/19  Yes DEREK Calle CNP   SYNTHROID 175 MCG tablet Take 175 mcg by mouth 8/3/19  Yes Historical Provider, MD   MOLYBDENUM PO Take 500 mcg by mouth daily   Yes Historical Provider, MD   ipratropium-albuterol (DUONEB) 0.5-2.5 (3) MG/3ML SOLN nebulizer solution Inhale 3 mLs into the lungs every 4 hours as needed for Shortness of Breath 5/6/19  Yes DEREK Calle CNP   fluticasone (FLONASE) 50 MCG/ACT nasal spray Two sprays to each nostril once daily 4/1/19  Yes DEREK Calle CNP   ranitidine (ZANTAC) 300 MG tablet TAKE 1 TABLET ONCE DAILY AT BEDTIME 4/1/19  Yes DEREK Calle CNP   diclofenac (VOLTAREN) 75 MG EC tablet Take 1 tablet by mouth 2 times daily as needed for Pain 2/22/18  Yes Yuko Lynch MD   Probiotic Product (SOLUBLE FIBER/PROBIOTICS PO) Take by mouth 2 times daily   Yes Historical Provider, MD   metFORMIN (GLUCOPHAGE) 500 MG tablet TAKE 2 TABLETS BY MOUTH TWO TIMES A DAY WITH MEALS 9/18/17  Yes Gary Gottlieb MD   losartan (COZAAR) 25 MG tablet Take 1 tablet by mouth daily 12/7/16  Yes CRISTINA Samano   Cholecalciferol (VITAMIN D3) 5000 UNITS CAPS Take 2 capsules by mouth daily Takes 2000 units daily   Yes Historical Provider, MD   benzonatate (TESSALON) 100 MG capsule Take 200 mg by mouth 3 times daily as needed for Cough    Historical Provider, MD   budesonide-formoterol (SYMBICORT) 160-4.5 MCG/ACT AERO 2 puffs inhaled twice daily. Rinse mouth well after use. 11/26/19   DEREK [place] - CNP   . Assessment   Admit for Elevated Tropin's. Cath on Monday. Issue with CHF and decrease in EF  Has unresolved cough and wheezing. On IV steroids. Has Hiatal Hernia with reflux that could to contribute with wheezing on Reflux med. As well as fluid overload. History of Asthma. No COPD. Home meds reviewed. On Dulera. Will change Duo Neb to Albuterol   Will initiate Aerosol Protocol and continue to monitor and treat. Home meds reviewed. Doubt Peak Flow results would be beneficial due to fluid in lungs. Etc.      FEV1/FVC defer    RR 24  Breath Sounds: wheeze      · Bronchodilator assessment at level  3  · Hyperinflation assessment at level   · Secretion Management assessment at level    ·   · [x]    Bronchodilator Assessment  BRONCHODILATOR ASSESSMENT SCORE  Score 0 1 2 3 4 5   Breath Sounds   []  Patient Baseline []  No Wheeze good aeration []  Faint, scattered wheezing, good aeration [x]  Expiratory Wheezing and or moderately diminished []  Insp/Exp wheeze and/or very diminished []  Insp/Exp and/ or marked distress   Respiratory Rate   []  Patient Baseline []  Less than 20 []  Less than 20 [x]  20-25 []  Greater than 25 []  Greater than 25   Peak flow % of Pred or PB [x]  NA   []  Greater than 90%  []  81-90% []  71-80% []  Less than or equal to 70%  or unable to perform []  Unable due to Respiratory Distress   Dyspnea re []  Patient Baseline []  No SOB []  No SOB [x]  SOB on exertion []  SOB min activity []  At rest/acute   e FEV% Predicted       [x]  NA []  Above 69%  []  Unable []  Above 60-69%  []  Unable []  Above 50-59%  []  Unable []  Above 35-49%  []  Unable []  Less than 35%  []  Unable                 []  Hyperinflation Assessment  Score 1 2 3   CXR and Breath Sounds   []  Clear []  No atelectasis  Basilar aeration []  Atelectasis or absent basilar breath sounds   Incentive Spirometry Volume  (Per IBW)   []  Greater than or equal to 15ml/Kg []  less than 15ml/Kg []  less than 15ml/Kg   Surgery within last 2 weeks []  None or general   []  Abdominal or thoracic surgery  []  Abdominal or thoracic   Chronic Pulmonary Historyre []  No []  Yes []  Yes     []  Secretion Management Assessment  Score 1 2 3   Bilateral Breath Sounds   []  Occasional Rhonchi []  Scattered Rhonchi []  Course Rhonchi and/or poor aeration   Sputum    []  Small amount of thin secretions []  Moderate amount of viscous secretions []  Copius, Viscious Yellow/ Secretions   CXR as reported by physician []  clear  []  Unavailable []  Infiltrates and/or consolidation  []  Unavailable []  Mucus Plugging and or lobar consolidation  []  Unavailable   Cough []  Strong, productive cough []  Weak productive cough []  No cough or weak non-productive cough   CATALINA FINCH  10:44 AM                            FEMALE                                  MALE                            FEV1 Predicted Normal Values                        FEV1 Predicted Normal Values          Age                                     Height in Feet and Inches       Age                                     Height in Feet and Inches       4' 11\" 5' 1\" 5' 3\" 5' 5\" 5' 7\" 5' 9\" 5' 11\" 6' 1\"  4' 11\" 5' 1\" 5' 3\" 5' 5\" 5' 7\" 5' 9\" 5' 11\" 6' 1\"   42 - 45 2.49 2.66 2.84 3.03 3.22 3.42 3.62 3.83 42 - 45 2.82 3.03 3.26 3.49 3.72 3.96 4.22 4.47   46 - 49 2.40 2.57 2.76 2.94 3.14 3.33 3.54 3.75 46 - 49 2.70 2.92 3.14 3.37 3.61 3.85 4.10 4.36   50 - 53 2.31 2.48 2.66 2.85 3.04 3.24 3.45 3.66 50 - 53 2.58 2.80 3.02 3.25 3.49 3.73 3.98 4.24   54 - 57 2.21

## 2020-01-19 NOTE — PROGRESS NOTES
WC    insulin lispro  0-9 Units Subcutaneous Nightly    insulin glargine  10 Units Subcutaneous Nightly    pantoprazole  40 mg Oral BID AC    mometasone-formoterol  2 puff Inhalation BID    Vitamin D  2,000 Units Oral Daily    fluticasone  1 spray Each Nostril Daily    losartan  25 mg Oral Daily    predniSONE  20 mg Oral BID    levothyroxine  175 mcg Oral Daily    sodium chloride flush  10 mL Intravenous 2 times per day    atorvastatin  40 mg Oral Nightly    famotidine  20 mg Oral Daily    carvedilol  3.125 mg Oral BID WC    enoxaparin  1 mg/kg Subcutaneous BID     Continuous Infusions:    dextrose       PRN Meds: albuterol, phenol, guaiFENesin, acetaminophen, metoprolol, diphenhydrAMINE, sodium chloride flush, potassium chloride **OR** potassium alternative oral replacement **OR** potassium chloride, potassium chloride, magnesium sulfate, magnesium hydroxide, ondansetron, nitroGLYCERIN, glucose, dextrose, glucagon (rDNA), dextrose    Data:     Past Medical History:   has a past medical history of Asthma, Bilateral corneal scars, Disease of blood and blood forming organ, Diverticulitis, DJD (degenerative joint disease), lumbar, Dry eye syndrome, DVT (deep venous thrombosis) (HCC), Gastro - esophageal reflux disease, History of blood transfusion, Hyperlipidemia, Hyperplastic colon polyp, Hypertension, Hypothyroidism, LGSIL (low grade squamous intraepithelial dysplasia), Non-celiac gluten sensitivity, Osteoarthritis, Other disorders of kidney and ureter in diseases classified elsewhere, Sciatica of right side, Severe persistent asthma, Splenic artery aneurysm (HCC), Tinnitus, and Type 2 diabetes mellitus, controlled ([facility] [address] Social History:   reports that she has never smoked. She has never used smokeless tobacco. She reports that she does not drink alcohol or use drugs.      Family History:   Family History   Problem Relation Age of Onset    Coronary Art Dis Mother     High Blood Pressure Mother     --  64*  --   --   --   --   --   --   --    ALKPHOS  --   --  45  --   --   --   --   --   --   --    BILITOT  --   --  0.34  --   --   --   --   --   --   --    CHOL  --   --  239*  --   --   --   --   --   --   --    HDL  --   --  58  --   --   --   --   --   --   --    LDLCHOLESTEROL  --   --  112  --   --   --   --   --   --   --    CHOLHDLRATIO  --   --  4.1  --   --   --   --   --   --   --    TRIG  --   --  343*  --   --   --   --   --   --   --    VLDL  --   --  NOT REPORTED*  --   --   --   --   --   --   --    POCGLU  --    < >  --    < > 280* 213* 274* 268* 313* 256*    < > = values in this interval not displayed. ABG:No results found for: POCPH, PHART, PH, POCPCO2, DWO4ZVL, PCO2, POCPO2, PO2ART, PO2, POCHCO3, PWV5TAB, HCO3, NBEA, PBEA, BEART, BE, THGBART, THB, UJX9QLR, VGNO6CHN, V2GUNFXG, O2SAT, FIO2  Lab Results   Component Value Date/Time    SPECIAL NOT REPORTED 12/15/2017 02:00 PM     Lab Results   Component Value Date/Time    CULTURE ENTERIC NARCISO PRESENT HEAVY GROWTH (A) 12/15/2017 02:00 PM    CULTURE NORMAL URO-GENITAL NARCISO 12/15/2017 02:00 PM    CULTURE NEGATIVE FOR NEISSERIA GONORRHOEAE 12/15/2017 02:00 PM    CULTURE NEGATIVE FOR GROUP B STREPTOCOCCI 12/15/2017 02:00 PM    CULTURE  12/15/2017 02:00 PM     Performed at [address], [address] [place] [address] [phone]       Radiology:  Ct Chest Pulmonary Embolism W Contrast    Result Date: 1/16/2020  Suboptimal evaluation of the pulmonary arteries due to respiratory motion artifact. No convincing evidence of pulmonary embolism or right heart strain. Scattered ground-glass opacities throughout the lungs bilaterally, most predominant in the left lower lobe. Findings are nonspecific however may reflect multiple fractures or inflammatory process. Follow-up CT in 6-12 months to be considered to confirm resolution. Vl Dup Lower Extremity Venous Right    Result Date: 1/16/2020  No evidence of DVT in the right lower extremity. diet.    Bing Rivera DO  1/19/2020  12:05 PM

## 2020-01-19 NOTE — PROGRESS NOTES
2,000 Units Oral Daily    fluticasone  1 spray Each Nostril Daily    losartan  25 mg Oral Daily    predniSONE  20 mg Oral BID    levothyroxine  175 mcg Oral Daily    sodium chloride flush  10 mL Intravenous 2 times per day    atorvastatin  40 mg Oral Nightly    famotidine  20 mg Oral Daily    carvedilol  3.125 mg Oral BID WC    enoxaparin  1 mg/kg Subcutaneous BID    insulin lispro  0-6 Units Subcutaneous TID WC    insulin lispro  0-3 Units Subcutaneous Nightly     Continuous Infusions:    dextrose       PRN Meds: phenol, acetaminophen, metoprolol, diphenhydrAMINE, sodium chloride flush, potassium chloride **OR** potassium alternative oral replacement **OR** potassium chloride, potassium chloride, magnesium sulfate, magnesium hydroxide, ondansetron, nitroGLYCERIN, glucose, dextrose, glucagon (rDNA), dextrose    Data:     Past Medical History:   has a past medical history of Bilateral corneal scars, Disease of blood and blood forming organ, Diverticulitis, DJD (degenerative joint disease), lumbar, Dry eye syndrome, DVT (deep venous thrombosis) ([facility]), Gastro - esophageal reflux disease, History of blood transfusion, Hyperlipidemia, Hyperplastic colon polyp, Hypertension, Hypothyroidism, LGSIL (low grade squamous intraepithelial dysplasia), Non-celiac gluten sensitivity, Osteoarthritis, Other disorders of kidney and ureter in diseases classified elsewhere, Sciatica of right side, Severe persistent asthma, Splenic artery aneurysm (HCC), Tinnitus, and Type 2 diabetes mellitus, controlled ([address] Social History:   reports that she has never smoked. She has never used smokeless tobacco. She reports that she does not drink alcohol or use drugs.      Family History:   Family History   Problem Relation Age of Onset    Coronary Art Dis Mother     High Blood Pressure Mother     High Cholesterol Mother     Allergies Mother     Arthritis Mother     Diabetes Daughter         pre diabetes    Other Maternal Grandfather         PUD GI problems       Vitals:  /87   Pulse 107   Temp 97.9 °F (36.6 °C) (Oral)   Resp 18   Ht 5' 7\" (1.702 m)   Wt 194 lb 4.8 oz (88.1 kg)   LMP 2009 (Approximate)   SpO2 92%   BMI 30.43 kg/m²   Temp (24hrs), Av.2 °F (36.8 °C), Min:97.9 °F (36.6 °C), Max:98.8 °F (37.1 °C)    Recent Labs     20  2114 20  0830 20  1226 20  1707   POCGLU 280* 213* 274* 268*       I/O (24Hr):     Intake/Output Summary (Last 24 hours) at 2020 1906  Last data filed at 2020 1300  Gross per 24 hour   Intake 550 ml   Output 300 ml   Net 250 ml       Labs:  Hematology:  Recent Labs     20  0845 20  1623 20  0437   WBC 14.0*  --  11.6*   RBC 4.74  --  4.71   HGB 13.8  --  14.0   HCT 41.7  --  41.9   MCV 88.0  --  89.0   MCH 29.1  --  29.7   MCHC 33.1  --  33.4   RDW 13.8  --  14.2     --  253   MPV 9.2  --  10.0   INR 0.9  --   --    DDIMER  --  1.11  --      Chemistry:  Recent Labs     20  0845 20  1623 20  2101 20  0437     --   --  142   K 3.9  --   --  3.8     --   --  105   CO2 23  --   --  21   GLUCOSE 252*  --   --  232*   BUN 17  --   --  10   CREATININE 0.68  --   --  0.38*   MG  --   --   --  2.1   ANIONGAP 16  --   --  16   LABGLOM >60  --   --  >60   GFRAA >60  --   --  >60   CALCIUM 9.2  --   --  9.0   PROBNP  --  2,244*  --  2,010*   TROPHS 63* 55* 60* 52*     Recent Labs     20  1623  20  0437  20  1031 20  1642 20  2114 20  0830 20  1226 20  1707   PROT  --   --  6.0*  --   --   --   --   --   --   --    LABALBU  --   --  3.5  --   --   --   --   --   --   --    LABA1C 11.2*  --   --   --   --   --   --   --   --   --    TSH  --   --  1.33  --   --   --   --   --   --   --    AST  --   --  29  --   --   --   --   --   --   --    ALT  --   --  61*  --   --   --   --   --   --   --    ALKPHOS  --   --  46  --   --   --   --   --   --   -- BILITOT  --   --  0.34  --   --   --   --   --   --   --    CHOL  --   --  239*  --   --   --   --   --   --   --    HDL  --   --  58  --   --   --   --   --   --   --    LDLCHOLESTEROL  --   --  112  --   --   --   --   --   --   --    CHOLHDLRATIO  --   --  4.1  --   --   --   --   --   --   --    TRIG  --   --  343*  --   --   --   --   --   --   --    VLDL  --   --  NOT REPORTED*  --   --   --   --   --   --   --    POCGLU  --    < >  --    < > 262* 294* 280* 213* 274* 268*    < > = values in this interval not displayed. ABG:No results found for: POCPH, PHART, PH, POCPCO2, TCN9QHC, PCO2, POCPO2, PO2ART, PO2, POCHCO3, SEQ7EWV, HCO3, NBEA, PBEA, BEART, BE, THGBART, THB, SNM6MES, NUMR6BXZ, D6SMIDMU, O2SAT, FIO2  Lab Results   Component Value Date/Time    SPECIAL NOT REPORTED 12/15/2017 02:00 PM     Lab Results   Component Value Date/Time    CULTURE ENTERIC NARCISO PRESENT HEAVY GROWTH (A) 12/15/2017 02:00 PM    CULTURE NORMAL URO-GENITAL NARCISO 12/15/2017 02:00 PM    CULTURE NEGATIVE FOR NEISSERIA GONORRHOEAE 12/15/2017 02:00 PM    CULTURE NEGATIVE FOR GROUP B STREPTOCOCCI 12/15/2017 02:00 PM    CULTURE  12/15/2017 02:00 PM     Performed at [address] [phone]       Radiology:  Ct Chest Pulmonary Embolism W Contrast    Result Date: 1/16/2020  Suboptimal evaluation of the pulmonary arteries due to respiratory motion artifact. No convincing evidence of pulmonary embolism or right heart strain. Scattered ground-glass opacities throughout the lungs bilaterally, most predominant in the left lower lobe. Findings are nonspecific however may reflect multiple fractures or inflammatory process. Follow-up CT in 6-12 months to be considered to confirm resolution. Vl Dup Lower Extremity Venous Right    Result Date: 1/16/2020  No evidence of DVT in the right lower extremity. Superficial thrombosis mid right greater saphenous vein.        Physical Examination:        General appearance:  alert, cooperative and no distress  Mental Status:  oriented to person, place and time and normal affect  Lungs: Diffuse wheezing. Decreased air movement. Heart:  regular rate and rhythm, no murmur  Abdomen:  soft, nontender, nondistended, normal bowel sounds, no masses, hepatomegaly, splenomegaly  Extremities:  no edema, redness, tenderness in the calves  Skin:  no gross lesions, rashes, induration    Assessment:        Hospital Problems           Last Modified POA    * (Principal) Respiratory distress 1/16/2020 Yes    Gastroesophageal reflux disease 1/16/2020 Yes    Hypothyroidism 1/16/2020 Yes    Essential hypertension 1/16/2020 Yes    DJD (degenerative joint disease), lumbar 1/16/2020 Yes    Non-celiac gluten sensitivity 1/16/2020 Yes    Overview Signed 1/21/2016 10:43 AM by Sindi Delvalle DO     wheezing is directly proportionate to wheat intake         Diabetes type 2, no ocular involvement ([facility] [address] 1/16/2020 Yes    Elevated troponin 1/17/2020 Yes    Exertional dyspnea 1/17/2020 Yes    Thrombophlebitis of superficial veins of right lower extremity 1/17/2020 Yes          Plan:        1. Elevated troponin -this is likely patient's baseline. Patient may have some demand ischemia. 2. Congestive heart failure -patient's ejection fraction is significantly decreased. Official report is pending. Cardiology is planning cardiac cath on Monday. 3. Exertional shortness of breath - multifactorial.  Patient has ongoing lung disease and has a new diagnosis of CHF. Currently on steroids and nebulizer treatment. Cardiology to adjust patient's medications for her CHF. 4. Hiatal hernia -patient is working with outside surgeon for arranging hiatal hernia surgery. 5. Essential hypertension  6. Diabetes mellitus type 2 -sliding scale insulin and diabetic diet.     So Sandra DO  1/18/2020  7:06 PM

## 2020-01-19 NOTE — PROGRESS NOTES
Request for cough syrup for pt's persistent non productive cough sent to [employer]. Awaiting orders.

## 2020-01-19 NOTE — PLAN OF CARE
Problem: Falls - Risk of:  Goal: Will remain free from falls  Description  Will remain free from falls  1/18/2020 2003 by Brett Lopez RN  Outcome: Ongoing  1/18/2020 0753 by Nirav Ratliff RN  Outcome: Ongoing  Goal: Absence of physical injury  Description  Absence of physical injury  1/18/2020 2003 by Brett Lopez RN  Outcome: Ongoing  1/18/2020 0753 by Nirav Ratliff RN  Outcome: Ongoing     Problem:  Activity:  Goal: Ability to tolerate increased activity will improve  Description  Ability to tolerate increased activity will improve  1/18/2020 2003 by Brett Lopez RN  Outcome: Ongoing  1/18/2020 0753 by Nirav Ratliff RN  Outcome: Ongoing

## 2020-01-20 ENCOUNTER — APPOINTMENT (OUTPATIENT)
Dept: CARDIAC CATH/INVASIVE PROCEDURES | Age: 60
DRG: 281 | End: 2020-01-20
Attending: HOSPITALIST
Payer: COMMERCIAL

## 2020-01-20 LAB
GLUCOSE BLD-MCNC: 252 MG/DL (ref 65–105)
GLUCOSE BLD-MCNC: 275 MG/DL (ref 65–105)
GLUCOSE BLD-MCNC: 330 MG/DL (ref 65–105)
GLUCOSE BLD-MCNC: 357 MG/DL (ref 65–105)
POTASSIUM SERPL-SCNC: 3.2 MMOL/L (ref 3.7–5.3)

## 2020-01-20 PROCEDURE — 94640 AIRWAY INHALATION TREATMENT: CPT

## 2020-01-20 PROCEDURE — B2151ZZ FLUOROSCOPY OF LEFT HEART USING LOW OSMOLAR CONTRAST: ICD-10-PCS | Performed by: INTERNAL MEDICINE

## 2020-01-20 PROCEDURE — 2580000003 HC RX 258: Performed by: STUDENT IN AN ORGANIZED HEALTH CARE EDUCATION/TRAINING PROGRAM

## 2020-01-20 PROCEDURE — 6360000004 HC RX CONTRAST MEDICATION

## 2020-01-20 PROCEDURE — C1725 CATH, TRANSLUMIN NON-LASER: HCPCS

## 2020-01-20 PROCEDURE — 2580000003 HC RX 258: Performed by: INTERNAL MEDICINE

## 2020-01-20 PROCEDURE — 6370000000 HC RX 637 (ALT 250 FOR IP): Performed by: STUDENT IN AN ORGANIZED HEALTH CARE EDUCATION/TRAINING PROGRAM

## 2020-01-20 PROCEDURE — 2709999900 HC NON-CHARGEABLE SUPPLY

## 2020-01-20 PROCEDURE — 36415 COLL VENOUS BLD VENIPUNCTURE: CPT

## 2020-01-20 PROCEDURE — C1769 GUIDE WIRE: HCPCS

## 2020-01-20 PROCEDURE — B2111ZZ FLUOROSCOPY OF MULTIPLE CORONARY ARTERIES USING LOW OSMOLAR CONTRAST: ICD-10-PCS | Performed by: INTERNAL MEDICINE

## 2020-01-20 PROCEDURE — 6360000002 HC RX W HCPCS: Performed by: STUDENT IN AN ORGANIZED HEALTH CARE EDUCATION/TRAINING PROGRAM

## 2020-01-20 PROCEDURE — 82947 ASSAY GLUCOSE BLOOD QUANT: CPT

## 2020-01-20 PROCEDURE — 93458 L HRT ARTERY/VENTRICLE ANGIO: CPT | Performed by: INTERNAL MEDICINE

## 2020-01-20 PROCEDURE — 84132 ASSAY OF SERUM POTASSIUM: CPT

## 2020-01-20 PROCEDURE — 6360000002 HC RX W HCPCS: Performed by: HOSPITALIST

## 2020-01-20 PROCEDURE — 2500000003 HC RX 250 WO HCPCS

## 2020-01-20 PROCEDURE — 6360000002 HC RX W HCPCS

## 2020-01-20 PROCEDURE — 99232 SBSQ HOSP IP/OBS MODERATE 35: CPT | Performed by: HOSPITALIST

## 2020-01-20 PROCEDURE — 4A023N7 MEASUREMENT OF CARDIAC SAMPLING AND PRESSURE, LEFT HEART, PERCUTANEOUS APPROACH: ICD-10-PCS | Performed by: INTERNAL MEDICINE

## 2020-01-20 PROCEDURE — C1894 INTRO/SHEATH, NON-LASER: HCPCS

## 2020-01-20 PROCEDURE — 2060000000 HC ICU INTERMEDIATE R&B

## 2020-01-20 RX ORDER — METOPROLOL SUCCINATE 50 MG/1
50 TABLET, EXTENDED RELEASE ORAL DAILY
Status: DISCONTINUED | OUTPATIENT
Start: 2020-01-20 | End: 2020-01-21 | Stop reason: HOSPADM

## 2020-01-20 RX ORDER — FUROSEMIDE 10 MG/ML
40 INJECTION INTRAMUSCULAR; INTRAVENOUS ONCE
Status: COMPLETED | OUTPATIENT
Start: 2020-01-20 | End: 2020-01-20

## 2020-01-20 RX ORDER — SODIUM CHLORIDE 0.9 % (FLUSH) 0.9 %
10 SYRINGE (ML) INJECTION PRN
Status: DISCONTINUED | OUTPATIENT
Start: 2020-01-20 | End: 2020-01-21 | Stop reason: HOSPADM

## 2020-01-20 RX ORDER — SODIUM CHLORIDE 9 MG/ML
INJECTION, SOLUTION INTRAVENOUS CONTINUOUS
Status: DISCONTINUED | OUTPATIENT
Start: 2020-01-20 | End: 2020-01-20

## 2020-01-20 RX ORDER — FUROSEMIDE 40 MG/1
40 TABLET ORAL DAILY
Status: DISCONTINUED | OUTPATIENT
Start: 2020-01-20 | End: 2020-01-21 | Stop reason: HOSPADM

## 2020-01-20 RX ORDER — POTASSIUM CHLORIDE 20 MEQ/1
20 TABLET, EXTENDED RELEASE ORAL
Status: DISCONTINUED | OUTPATIENT
Start: 2020-01-21 | End: 2020-01-21 | Stop reason: HOSPADM

## 2020-01-20 RX ORDER — SODIUM CHLORIDE 0.9 % (FLUSH) 0.9 %
10 SYRINGE (ML) INJECTION EVERY 12 HOURS SCHEDULED
Status: DISCONTINUED | OUTPATIENT
Start: 2020-01-20 | End: 2020-01-21 | Stop reason: HOSPADM

## 2020-01-20 RX ADMIN — INSULIN LISPRO 9 UNITS: 100 INJECTION, SOLUTION INTRAVENOUS; SUBCUTANEOUS at 12:30

## 2020-01-20 RX ADMIN — INSULIN LISPRO 12 UNITS: 100 INJECTION, SOLUTION INTRAVENOUS; SUBCUTANEOUS at 18:01

## 2020-01-20 RX ADMIN — MOMETASONE FUROATE AND FORMOTEROL FUMARATE DIHYDRATE 2 PUFF: 200; 5 AEROSOL RESPIRATORY (INHALATION) at 21:15

## 2020-01-20 RX ADMIN — SODIUM CHLORIDE, PRESERVATIVE FREE 10 ML: 5 INJECTION INTRAVENOUS at 20:57

## 2020-01-20 RX ADMIN — ACETAMINOPHEN 650 MG: 325 TABLET ORAL at 13:12

## 2020-01-20 RX ADMIN — ALBUTEROL SULFATE 2.5 MG: 2.5 SOLUTION RESPIRATORY (INHALATION) at 08:23

## 2020-01-20 RX ADMIN — GUAIFENESIN 200 MG: 200 SOLUTION ORAL at 12:20

## 2020-01-20 RX ADMIN — INSULIN LISPRO 7 UNITS: 100 INJECTION, SOLUTION INTRAVENOUS; SUBCUTANEOUS at 20:51

## 2020-01-20 RX ADMIN — METOPROLOL SUCCINATE 50 MG: 50 TABLET, FILM COATED, EXTENDED RELEASE ORAL at 12:18

## 2020-01-20 RX ADMIN — INSULIN GLARGINE 10 UNITS: 100 INJECTION, SOLUTION SUBCUTANEOUS at 20:51

## 2020-01-20 RX ADMIN — SODIUM CHLORIDE: 9 INJECTION, SOLUTION INTRAVENOUS at 09:00

## 2020-01-20 RX ADMIN — PANTOPRAZOLE SODIUM 40 MG: 40 TABLET, DELAYED RELEASE ORAL at 10:54

## 2020-01-20 RX ADMIN — POTASSIUM CHLORIDE 40 MEQ: 1500 TABLET, EXTENDED RELEASE ORAL at 15:29

## 2020-01-20 RX ADMIN — FUROSEMIDE 40 MG: 10 INJECTION, SOLUTION INTRAMUSCULAR; INTRAVENOUS at 12:30

## 2020-01-20 RX ADMIN — LOSARTAN POTASSIUM 25 MG: 25 TABLET, FILM COATED ORAL at 12:11

## 2020-01-20 RX ADMIN — Medication 10 ML: at 12:35

## 2020-01-20 RX ADMIN — ALBUTEROL SULFATE 2.5 MG: 2.5 SOLUTION RESPIRATORY (INHALATION) at 11:53

## 2020-01-20 RX ADMIN — SODIUM CHLORIDE, PRESERVATIVE FREE 10 ML: 5 INJECTION INTRAVENOUS at 12:13

## 2020-01-20 RX ADMIN — ACETAMINOPHEN 650 MG: 325 TABLET ORAL at 18:17

## 2020-01-20 RX ADMIN — ALBUTEROL SULFATE 2.5 MG: 2.5 SOLUTION RESPIRATORY (INHALATION) at 21:15

## 2020-01-20 RX ADMIN — DESMOPRESSIN ACETATE 40 MG: 0.2 TABLET ORAL at 20:57

## 2020-01-20 RX ADMIN — PREDNISONE 20 MG: 20 TABLET ORAL at 12:10

## 2020-01-20 RX ADMIN — LEVOTHYROXINE SODIUM 175 MCG: 175 TABLET ORAL at 12:12

## 2020-01-20 RX ADMIN — SODIUM CHLORIDE, PRESERVATIVE FREE 10 ML: 5 INJECTION INTRAVENOUS at 12:14

## 2020-01-20 RX ADMIN — PREDNISONE 20 MG: 20 TABLET ORAL at 20:57

## 2020-01-20 RX ADMIN — Medication 81 MG: at 12:11

## 2020-01-20 RX ADMIN — FAMOTIDINE 20 MG: 20 TABLET, FILM COATED ORAL at 12:10

## 2020-01-20 RX ADMIN — FLUTICASONE PROPIONATE 1 SPRAY: 50 SPRAY, METERED NASAL at 12:15

## 2020-01-20 RX ADMIN — PANTOPRAZOLE SODIUM 40 MG: 40 TABLET, DELAYED RELEASE ORAL at 18:13

## 2020-01-20 RX ADMIN — ALBUTEROL SULFATE 2.5 MG: 2.5 SOLUTION RESPIRATORY (INHALATION) at 16:19

## 2020-01-20 RX ADMIN — VITAMIN D, TAB 1000IU (100/BT) 2000 UNITS: 25 TAB at 12:11

## 2020-01-20 RX ADMIN — GUAIFENESIN 200 MG: 200 SOLUTION ORAL at 18:19

## 2020-01-20 ASSESSMENT — PAIN SCALES - GENERAL
PAINLEVEL_OUTOF10: 1
PAINLEVEL_OUTOF10: 2
PAINLEVEL_OUTOF10: 0

## 2020-01-20 NOTE — PLAN OF CARE
Problem: Falls - Risk of:  Goal: Will remain free from falls  Description  Will remain free from falls  1/19/2020 1904 by Samia Roman RN  Outcome: Ongoing  1/19/2020 1039 by Shannon Malone RN  Outcome: Ongoing  Goal: Absence of physical injury  Description  Absence of physical injury  1/19/2020 1904 by Samia Roman RN  Outcome: Ongoing  1/19/2020 1039 by Shannon Malone RN  Outcome: Ongoing     Problem: Activity:  Goal: Ability to tolerate increased activity will improve  Description  Ability to tolerate increased activity will improve  1/19/2020 1904 by Samia Roman RN  Outcome: Ongoing  1/19/2020 1039 by Shannon Malone RN  Outcome: Ongoing     Problem: Discharge Planning:  Goal: Discharged to appropriate level of care  Description  Discharged to appropriate level of care  Outcome: Ongoing     Problem:  Activity Intolerance:  Goal: Ability to perform activities of daily living will improve  Description  Ability to perform activities of daily living will improve  Outcome: Ongoing     Problem: Airway Clearance - Ineffective:  Goal: Ability to maintain a clear airway will improve  Description  Ability to maintain a clear airway will improve  Outcome: Ongoing     Problem: Gas Exchange - Impaired:  Goal: Levels of oxygenation will improve  Description  Levels of oxygenation will improve  Outcome: Ongoing

## 2020-01-20 NOTE — PROGRESS NOTES
[facility]    Progress Note    1/20/2020    10:34 AM    Name:   Yi Bolivar  MRN:     [id number]     Acct:      [de-identified]   Room:   2022/2022-01  IP Day:  4  Admit Date:  1/16/2020  2:23 PM    PCP:   Evy Hillman MD  Code Status:  Full Code    Subjective:     C/C: Aguilar for evaluation of elevated troponin    Interval History Status: not changed. Patient was seen and examined at bedside. Patient is getting prepared for cardiac cath today. Acute overnight event. No new complaint. Patient is little more short of breath this morning and is while waiting for respiratory therapy to give her nebulizer treatment. Denies fever chills. No chest pain or palpitation. Brief History: This is a 68-year-old female who was transferred from another facility for evaluation of elevated troponin.  Patient has history of hiatal hernia with severe reflux disease. Patient was found to have significantly decreased ejection fraction on echocardiogram.  Getting cardiac cath on January 20, 2020    Review of Systems:     Constitutional:  negative for chills, fevers, sweats  Respiratory:  negative for cough, dyspnea on exertion, hemoptysis, shortness of breath, wheezing  Cardiovascular:  negative for chest pain, chest pressure/discomfort, lower extremity edema, palpitations  Gastrointestinal:  negative for abdominal pain, constipation, diarrhea, nausea, vomiting  Neurological:  negative for dizziness, headache    Medications: Allergies:     Allergies   Allergen Reactions    Codeine Nausea And Vomiting and Other (See Comments)     Chest tightness    Darvon [Propoxyphene] Nausea And Vomiting and Other (See Comments)     Chest tightness    Lisinopril Other (See Comments)     COUGH    Percocet [Oxycodone-Acetaminophen] Nausea And Vomiting and Other (See Comments)     Chest tightness       Current Meds:   Scheduled Meds:    furosemide  40 mg Intravenous Once    metoprolol succinate  50 mg Oral Daily    sodium chloride flush  10 mL Intravenous 2 times per day    furosemide  40 mg Oral Daily    [START ON 1/21/2020] potassium chloride  20 mEq Oral Daily with breakfast    [START ON 1/21/2020] enoxaparin  40 mg Subcutaneous Daily    albuterol  2.5 mg Nebulization 4x daily    aspirin  81 mg Oral Daily    insulin lispro  0-18 Units Subcutaneous TID WC    insulin lispro  0-9 Units Subcutaneous Nightly    insulin glargine  10 Units Subcutaneous Nightly    pantoprazole  40 mg Oral BID AC    mometasone-formoterol  2 puff Inhalation BID    Vitamin D  2,000 Units Oral Daily    fluticasone  1 spray Each Nostril Daily    losartan  25 mg Oral Daily    predniSONE  20 mg Oral BID    levothyroxine  175 mcg Oral Daily    sodium chloride flush  10 mL Intravenous 2 times per day    atorvastatin  40 mg Oral Nightly    famotidine  20 mg Oral Daily     Continuous Infusions:    dextrose       PRN Meds: sodium chloride flush, albuterol, phenol, guaiFENesin, acetaminophen, metoprolol, diphenhydrAMINE, sodium chloride flush, potassium chloride **OR** potassium alternative oral replacement **OR** potassium chloride, potassium chloride, magnesium sulfate, magnesium hydroxide, ondansetron, nitroGLYCERIN, glucose, dextrose, glucagon (rDNA), dextrose    Data:     Past Medical History:   has a past medical history of Asthma, Bilateral corneal scars, Disease of blood and blood forming organ, Diverticulitis, DJD (degenerative joint disease), lumbar, Dry eye syndrome, DVT (deep venous thrombosis) ([facility]), Gastro - esophageal reflux disease, History of blood transfusion, Hyperlipidemia, Hyperplastic colon polyp, Hypertension, Hypothyroidism, LGSIL (low grade squamous intraepithelial dysplasia), Non-celiac gluten sensitivity, NSTEMI (non-ST elevated myocardial infarction) ([facility] [address] Osteoarthritis, Other disorders of kidney and ureter in diseases classified elsewhere, Sciatica of right side, Severe persistent asthma, Splenic artery aneurysm (HCC), Tinnitus, and Type 2 diabetes mellitus, controlled ([address] Social History:   reports that she has never smoked. She has never used smokeless tobacco. She reports that she does not drink alcohol or use drugs. Family History:   Family History   Problem Relation Age of Onset    Coronary Art Dis Mother     High Blood Pressure Mother     High Cholesterol Mother     Allergies Mother     Arthritis Mother     Diabetes Daughter         pre diabetes    Other Maternal Grandfather         PUD GI problems       Vitals:  /78   Pulse 116   Temp 98.4 °F (36.9 °C) (Oral)   Resp 16   Ht 5' 7\" (1.702 m)   Wt 195 lb (88.5 kg)   LMP 2009 (Approximate)   SpO2 92%   BMI 30.54 kg/m²   Temp (24hrs), Av.3 °F (36.8 °C), Min:97.3 °F (36.3 °C), Max:98.8 °F (37.1 °C)    Recent Labs     20  1211 20  17220  0646   POCGLU 305* 339* 265* 275*       I/O (24Hr):     Intake/Output Summary (Last 24 hours) at 2020 1034  Last data filed at 2020 0535  Gross per 24 hour   Intake 360 ml   Output 400 ml   Net -40 ml       Labs:  Hematology:  Recent Labs     20  0927   WBC 11.4*   RBC 4.62   HGB 13.3   HCT 41.0   MCV 88.7   MCH 28.8   MCHC 32.4   RDW 14.3      MPV 10.1     Chemistry:  Recent Labs     20  0927      K 3.4*      CO2 20   GLUCOSE 400*   BUN 13   CREATININE 0.56   ANIONGAP 18*   LABGLOM >60   GFRAA >60   CALCIUM 9.3     Recent Labs     20  2306 20  0802 20  1211 20  1722 20  22020  0646   POCGLU 313* 256* 305* 339* 265* 275*     ABG:No results found for: POCPH, PHART, PH, POCPCO2, SNX4KLS, PCO2, POCPO2, PO2ART, PO2, POCHCO3, ZCT2KAG, HCO3, NBEA, PBEA, BEART, BE, THGBART, THB, IWA4HCY, XAKQ3ZWB, F0FYMCJP, O2SAT, FIO2  Lab Results   Component Value Date/Time    SPECIAL NOT REPORTED 12/15/2017 02:00 PM     Lab Results   Component Value Date/Time    CULTURE ENTERIC NARCISO PRESENT HEAVY GROWTH (A) 12/15/2017 02:00 PM    CULTURE NORMAL URO-GENITAL NARCISO 12/15/2017 02:00 PM    CULTURE NEGATIVE FOR NEISSERIA GONORRHOEAE 12/15/2017 02:00 PM    CULTURE NEGATIVE FOR GROUP B STREPTOCOCCI 12/15/2017 02:00 PM    CULTURE  12/15/2017 02:00 PM     Performed at [facility] [id number] [facility], [address] [place] [address] [phone]       Radiology:  Ct Chest Pulmonary Embolism W Contrast    Result Date: 1/16/2020  Suboptimal evaluation of the pulmonary arteries due to respiratory motion artifact. No convincing evidence of pulmonary embolism or right heart strain. Scattered ground-glass opacities throughout the lungs bilaterally, most predominant in the left lower lobe. Findings are nonspecific however may reflect multiple fractures or inflammatory process. Follow-up CT in 6-12 months to be considered to confirm resolution. Vl Dup Lower Extremity Venous Right    Result Date: 1/16/2020  No evidence of DVT in the right lower extremity. Superficial thrombosis mid right greater saphenous vein.        Physical Examination:        General appearance:  alert, cooperative and no distress  Mental Status:  oriented to person, place and time and normal affect  Lungs:  clear to auscultation bilaterally, normal effort  Heart:  regular rate and rhythm, no murmur  Abdomen:  soft, nontender, nondistended, normal bowel sounds, no masses, hepatomegaly, splenomegaly  Extremities:  no edema, redness, tenderness in the calves  Skin:  no gross lesions, rashes, induration    Assessment:        Hospital Problems           Last Modified POA    * (Principal) Respiratory distress 1/16/2020 Yes    Gastroesophageal reflux disease 1/16/2020 Yes    Hypothyroidism 1/16/2020 Yes    Essential hypertension 1/16/2020 Yes    DJD (degenerative joint disease), lumbar 1/16/2020 Yes    Non-celiac gluten sensitivity 1/16/2020 Yes    Overview Signed 1/21/2016 10:43 AM by Jennifer Castellanos DO

## 2020-01-20 NOTE — PROGRESS NOTES
Patient admitted, consent signed and questions answered. Patient ready for procedure. Call light to reach with side rails up 2 of 2. Bilat groin hairs clipped. Family at bedside with patient. History and physical complete.

## 2020-01-20 NOTE — OP NOTE
[] Currently smoker. [] Cardiac Arrest outside of healthcare facility. [] Yes    [x] No        Witnessed     [] Yes   [] No     Arrest after arrival of EMS  [] Yes   [] No     [] Cardiac Arrest at other Facility. [] Yes   [x] No    Pre-Procedure Information. Heart Failure       [x] Yes    [] No        Class  [] I      [x] II  [] III    [] IV. New Diagnosis    [x] Yes  [] No    HF Type      [x] Systolic   [] Diastolic          [] Unknown. Diagnostic Test:   EKG       [] Normal   [x] Abnormal    New antiarrhythmia medications:    [] Yes   [x] No   New onset atrial fibrillation / Flutter     [] Yes   [x] No   ECG Abnormalities:      [] V. Fib   [] Shasta V. Tach           [] NS V. T   [] New LBBB           [] T. Inv  []  ST dev > 0.5 mm   LVH, SR, PACs      [] PVC's freq  [] PVC's infrequent    Stress Test Performed:   [] Yes    [x] No          Cardiac CTA Performed:  [] Yes    [x] No         Pre Procedure Medications: [x] Yes    [] No         [x] ASA  [x] Beta Blockers      [] Nitrate  [] Ca Channel Blockers      [] Ranolazine  [x] Statin       [] Plavix/Others antiplatelets    Estimated blood loss is less than 10 ml    Electronically signed by Jere Pedraza MD on 1/20/2020 at 10:21 AM  Fellow Cardiology    I have reviewed the case / procedure with resident / fellow  I have examined the patient personally  Patient agree with treatment plan, correction innotes was made as appropriate, and discussed final arrangement based on  my evaluation and exam.    Risk and benefit of procedure if planned were explained in details. Procedure was performed by me, with all aspect of the procedure being done using standard protocol. Note was modified based on my own assessment and treatment.     Weston Martinez MD  [facility]

## 2020-01-20 NOTE — PROGRESS NOTES
[facility]   Progress Note                   Date:   1/20/2020  Patient name: Olivia Raza  Date of admission:  1/16/2020  2:23 PM  MRN:   [id number]  YOB: 1960  PCP: Soham Tejeda MD    Reason for Admission: Respiratory distress [R06.03]    Subjective:       Clinical Changes / Abnormalities: Pt seen and examined in the room with patient. Post cath. Family in room. Radial site stable, TR band on.  + harsh cough      Medications:   Scheduled Meds:   furosemide  40 mg Intravenous Once    metoprolol succinate  50 mg Oral Daily    sodium chloride flush  10 mL Intravenous 2 times per day    furosemide  40 mg Oral Daily    [START ON 1/21/2020] potassium chloride  20 mEq Oral Daily with breakfast    [START ON 1/21/2020] enoxaparin  40 mg Subcutaneous Daily    albuterol  2.5 mg Nebulization 4x daily    aspirin  81 mg Oral Daily    insulin lispro  0-18 Units Subcutaneous TID WC    insulin lispro  0-9 Units Subcutaneous Nightly    insulin glargine  10 Units Subcutaneous Nightly    pantoprazole  40 mg Oral BID AC    mometasone-formoterol  2 puff Inhalation BID    Vitamin D  2,000 Units Oral Daily    fluticasone  1 spray Each Nostril Daily    losartan  25 mg Oral Daily    predniSONE  20 mg Oral BID    levothyroxine  175 mcg Oral Daily    sodium chloride flush  10 mL Intravenous 2 times per day    atorvastatin  40 mg Oral Nightly    famotidine  20 mg Oral Daily     Continuous Infusions:   dextrose       CBC:   Recent Labs     01/19/20  0927   WBC 11.4*   HGB 13.3        BMP:    Recent Labs     01/19/20  0927      K 3.4*      CO2 20   BUN 13   CREATININE 0.56   GLUCOSE 400*     Hepatic: No results for input(s): AST, ALT, ALB, BILITOT, ALKPHOS in the last 72 hours. Troponin: No results for input(s): TROPHS in the last 72 hours. BNP: No results for input(s): BNP in the last 72 hours.   Lipids: No results for input(s): CHOL, HDL in the last 72

## 2020-01-21 VITALS
SYSTOLIC BLOOD PRESSURE: 115 MMHG | WEIGHT: 194 LBS | BODY MASS INDEX: 30.45 KG/M2 | HEART RATE: 115 BPM | DIASTOLIC BLOOD PRESSURE: 72 MMHG | RESPIRATION RATE: 16 BRPM | OXYGEN SATURATION: 100 % | TEMPERATURE: 98.8 F | HEIGHT: 67 IN

## 2020-01-21 PROBLEM — I50.23 ACUTE ON CHRONIC SYSTOLIC CONGESTIVE HEART FAILURE (HCC): Status: ACTIVE | Noted: 2020-01-21

## 2020-01-21 PROBLEM — I42.8 NONISCHEMIC CARDIOMYOPATHY (HCC): Status: ACTIVE | Noted: 2020-01-21

## 2020-01-21 LAB
ANION GAP SERPL CALCULATED.3IONS-SCNC: 16 MMOL/L (ref 9–17)
BUN BLDV-MCNC: 17 MG/DL (ref 8–23)
BUN/CREAT BLD: ABNORMAL (ref 9–20)
CALCIUM SERPL-MCNC: 9.4 MG/DL (ref 8.6–10.4)
CHLORIDE BLD-SCNC: 96 MMOL/L (ref 98–107)
CO2: 25 MMOL/L (ref 20–31)
CREAT SERPL-MCNC: 0.48 MG/DL (ref 0.5–0.9)
GFR AFRICAN AMERICAN: >60 ML/MIN
GFR NON-AFRICAN AMERICAN: >60 ML/MIN
GFR SERPL CREATININE-BSD FRML MDRD: ABNORMAL ML/MIN/{1.73_M2}
GFR SERPL CREATININE-BSD FRML MDRD: ABNORMAL ML/MIN/{1.73_M2}
GLUCOSE BLD-MCNC: 258 MG/DL (ref 65–105)
GLUCOSE BLD-MCNC: 277 MG/DL (ref 70–99)
GLUCOSE BLD-MCNC: 375 MG/DL (ref 65–105)
HCT VFR BLD CALC: 44.7 % (ref 36.3–47.1)
HEMOGLOBIN: 14.6 G/DL (ref 11.9–15.1)
MCH RBC QN AUTO: 28.9 PG (ref 25.2–33.5)
MCHC RBC AUTO-ENTMCNC: 32.7 G/DL (ref 28.4–34.8)
MCV RBC AUTO: 88.5 FL (ref 82.6–102.9)
NRBC AUTOMATED: 0 PER 100 WBC
PDW BLD-RTO: 14.4 % (ref 11.8–14.4)
PLATELET # BLD: 278 K/UL (ref 138–453)
PMV BLD AUTO: 9.2 FL (ref 8.1–13.5)
POTASSIUM SERPL-SCNC: 5 MMOL/L (ref 3.7–5.3)
RBC # BLD: 5.05 M/UL (ref 3.95–5.11)
SODIUM BLD-SCNC: 137 MMOL/L (ref 135–144)
WBC # BLD: 12 K/UL (ref 3.5–11.3)

## 2020-01-21 PROCEDURE — 80048 BASIC METABOLIC PNL TOTAL CA: CPT

## 2020-01-21 PROCEDURE — 36415 COLL VENOUS BLD VENIPUNCTURE: CPT

## 2020-01-21 PROCEDURE — 2580000003 HC RX 258: Performed by: STUDENT IN AN ORGANIZED HEALTH CARE EDUCATION/TRAINING PROGRAM

## 2020-01-21 PROCEDURE — 6370000000 HC RX 637 (ALT 250 FOR IP): Performed by: STUDENT IN AN ORGANIZED HEALTH CARE EDUCATION/TRAINING PROGRAM

## 2020-01-21 PROCEDURE — 99239 HOSP IP/OBS DSCHRG MGMT >30: CPT | Performed by: HOSPITALIST

## 2020-01-21 PROCEDURE — 6360000002 HC RX W HCPCS: Performed by: STUDENT IN AN ORGANIZED HEALTH CARE EDUCATION/TRAINING PROGRAM

## 2020-01-21 PROCEDURE — 82947 ASSAY GLUCOSE BLOOD QUANT: CPT

## 2020-01-21 PROCEDURE — 94640 AIRWAY INHALATION TREATMENT: CPT

## 2020-01-21 PROCEDURE — 85027 COMPLETE CBC AUTOMATED: CPT

## 2020-01-21 RX ORDER — METOPROLOL SUCCINATE 50 MG/1
50 TABLET, EXTENDED RELEASE ORAL DAILY
Qty: 30 TABLET | Refills: 3 | Status: SHIPPED | OUTPATIENT
Start: 2020-01-22 | End: 2020-07-13 | Stop reason: SDUPTHER

## 2020-01-21 RX ORDER — ATORVASTATIN CALCIUM 40 MG/1
40 TABLET, FILM COATED ORAL NIGHTLY
Qty: 30 TABLET | Refills: 3 | Status: SHIPPED | OUTPATIENT
Start: 2020-01-21 | End: 2020-05-01 | Stop reason: SINTOL

## 2020-01-21 RX ORDER — ASPIRIN 81 MG/1
81 TABLET ORAL DAILY
Qty: 30 TABLET | Refills: 3 | Status: ON HOLD | OUTPATIENT
Start: 2020-01-22 | End: 2022-03-15

## 2020-01-21 RX ORDER — FUROSEMIDE 40 MG/1
40 TABLET ORAL DAILY
Qty: 60 TABLET | Refills: 3 | Status: SHIPPED | OUTPATIENT
Start: 2020-01-22 | End: 2020-05-01

## 2020-01-21 RX ADMIN — POTASSIUM CHLORIDE 20 MEQ: 1500 TABLET, EXTENDED RELEASE ORAL at 08:46

## 2020-01-21 RX ADMIN — PANTOPRAZOLE SODIUM 40 MG: 40 TABLET, DELAYED RELEASE ORAL at 08:00

## 2020-01-21 RX ADMIN — MOMETASONE FUROATE AND FORMOTEROL FUMARATE DIHYDRATE 2 PUFF: 200; 5 AEROSOL RESPIRATORY (INHALATION) at 08:44

## 2020-01-21 RX ADMIN — VITAMIN D, TAB 1000IU (100/BT) 2000 UNITS: 25 TAB at 08:46

## 2020-01-21 RX ADMIN — LEVOTHYROXINE SODIUM 175 MCG: 175 TABLET ORAL at 08:46

## 2020-01-21 RX ADMIN — ALBUTEROL SULFATE 2.5 MG: 2.5 SOLUTION RESPIRATORY (INHALATION) at 12:09

## 2020-01-21 RX ADMIN — ALBUTEROL SULFATE 2.5 MG: 2.5 SOLUTION RESPIRATORY (INHALATION) at 08:44

## 2020-01-21 RX ADMIN — Medication 81 MG: at 08:46

## 2020-01-21 RX ADMIN — METOPROLOL SUCCINATE 50 MG: 50 TABLET, FILM COATED, EXTENDED RELEASE ORAL at 08:46

## 2020-01-21 RX ADMIN — PREDNISONE 20 MG: 20 TABLET ORAL at 08:46

## 2020-01-21 RX ADMIN — LOSARTAN POTASSIUM 25 MG: 25 TABLET, FILM COATED ORAL at 08:46

## 2020-01-21 RX ADMIN — INSULIN LISPRO 9 UNITS: 100 INJECTION, SOLUTION INTRAVENOUS; SUBCUTANEOUS at 08:54

## 2020-01-21 RX ADMIN — FAMOTIDINE 20 MG: 20 TABLET, FILM COATED ORAL at 08:46

## 2020-01-21 RX ADMIN — INSULIN LISPRO 15 UNITS: 100 INJECTION, SOLUTION INTRAVENOUS; SUBCUTANEOUS at 11:48

## 2020-01-21 RX ADMIN — SODIUM CHLORIDE, PRESERVATIVE FREE 10 ML: 5 INJECTION INTRAVENOUS at 08:59

## 2020-01-21 RX ADMIN — FUROSEMIDE 40 MG: 40 TABLET ORAL at 08:46

## 2020-01-21 NOTE — DISCHARGE SUMMARY
Juliet Arana [address]    Discharge Summary     Patient ID: Janee Boogie  :  1960   MRN: [id number]     ACCOUNT:  [de-identified]   Patient's PCP: Farzana Maravilla MD  Admit Date: 2020   Discharge Date: 2020     Length of Stay: 5  Code Status:  Full Code  Admitting Physician: Jenaro Young DO  Discharge Physician: Jenaro Young DO     Active Discharge Diagnoses:     Hospital Problem Lists:  Principal Problem:    Respiratory distress  Active Problems:    Gastroesophageal reflux disease    Hypothyroidism    Essential hypertension    DJD (degenerative joint disease), lumbar    Non-celiac gluten sensitivity    Diabetes type 2, no ocular involvement (HCC)    Elevated troponin    Exertional dyspnea    Thrombophlebitis of superficial veins of right lower extremity    Nonischemic cardiomyopathy ([address]    Acute on chronic systolic congestive heart failure ([address]  Resolved Problems:    * No resolved hospital problems. *      Admission Condition:  poor     Discharged Condition: stable    Hospital Stay:     Hospital Course: Janee Boogie is a 60 y.o. female who was admitted for the management of   Respiratory distress , was transferred from [facility] for evaluation of elevated troponin. This is a 69-year-old female with past medical history listed above presenting with mildly elevated troponin. She was initially admitted to [facility] for evaluation of respiratory distress. Patient rather had a long hospitalization over at [facility] and was getting evaluated for hiatal hernia repair surgery. Upon evaluation in [facility], patient's elevated troponin are thought to be at baseline elevation secondary to her congestive heart failure. Patient had a new diagnosis of systolic congestive heart failure with ejection fraction between 25 to 30%. Was consulted and cardiac catheterization was done.   Patient had grossly patent coronary arteries suggesting nonischemic cardiomyopathy. Patient's medications were adjusted by cardiology. Patient is now safe for discharge to home. No complications noted. Patient feels improved with medication adjustment. Respiratory distress is due to chronic aspiration from hiatal hernia and newly diagnosed systolic CHF.     Significant therapeutic interventions: Cardiogram, cardiac catheterization with no intervention    Significant Diagnostic Studies:   Labs / Micro:  CBC:   Lab Results   Component Value Date    WBC 12.0 01/21/2020    RBC 5.05 01/21/2020    HGB 14.6 01/21/2020    HCT 44.7 01/21/2020    MCV 88.5 01/21/2020    MCH 28.9 01/21/2020    MCHC 32.7 01/21/2020    RDW 14.4 01/21/2020     01/21/2020     BMP:    Lab Results   Component Value Date    GLUCOSE 277 01/21/2020     01/21/2020    K 5.0 01/21/2020    CL 96 01/21/2020    CO2 25 01/21/2020    ANIONGAP 16 01/21/2020    BUN 17 01/21/2020    CREATININE 0.48 01/21/2020    BUNCRER NOT REPORTED 01/21/2020    CALCIUM 9.4 01/21/2020    LABGLOM >60 01/21/2020    GFRAA >60 01/21/2020    GFR      01/21/2020    GFR NOT REPORTED 01/21/2020     HFP:    Lab Results   Component Value Date    PROT 6.0 01/17/2020     CMP:    Lab Results   Component Value Date    GLUCOSE 277 01/21/2020     01/21/2020    K 5.0 01/21/2020    CL 96 01/21/2020    CO2 25 01/21/2020    BUN 17 01/21/2020    CREATININE 0.48 01/21/2020    ANIONGAP 16 01/21/2020    ALKPHOS 46 01/17/2020    ALT 61 01/17/2020    AST 29 01/17/2020    BILITOT 0.34 01/17/2020    LABALBU 3.5 01/17/2020    ALBUMIN 1.4 01/17/2020    LABGLOM >60 01/21/2020    GFRAA >60 01/21/2020    GFR      01/21/2020    GFR NOT REPORTED 01/21/2020    PROT 6.0 01/17/2020    CALCIUM 9.4 01/21/2020     PT/INR:    Lab Results   Component Value Date    PROTIME 9.4 01/16/2020    INR 0.9 01/16/2020     PTT:   Lab Results   Component Value Date    APTT 22.4 01/16/2020     FLP:    Lab Results   Component Value Date    CHOL 239 01/17/2020    CHOL 257 02/26/2018    CHOL 257 02/26/2018    TRIG 343 01/17/2020    HDL 58 01/17/2020     U/A:    Lab Results   Component Value Date    COLORU NOT REPORTED 10/13/2017    TURBIDITY NOT REPORTED 10/13/2017    SPECGRAV 1.005 10/13/2017    HGBUR NEGATIVE 10/13/2017    PHUR 5.5 10/13/2017    PROTEINU NEGATIVE 10/13/2017    GLUCOSEU NEGATIVE 10/13/2017    KETUA NEGATIVE 10/13/2017    BILIRUBINUR NEGATIVE 10/13/2017    UROBILINOGEN Normal 10/13/2017    NITRU NEGATIVE 10/13/2017    LEUKOCYTESUR NEGATIVE 10/13/2017     TSH:    Lab Results   Component Value Date    TSH 1.33 01/17/2020        Radiology:  Ct Chest Pulmonary Embolism W Contrast    Result Date: 1/16/2020  Suboptimal evaluation of the pulmonary arteries due to respiratory motion artifact. No convincing evidence of pulmonary embolism or right heart strain. Scattered ground-glass opacities throughout the lungs bilaterally, most predominant in the left lower lobe. Findings are nonspecific however may reflect multiple fractures or inflammatory process. Follow-up CT in 6-12 months to be considered to confirm resolution. Vl Dup Lower Extremity Venous Right    Result Date: 1/16/2020  No evidence of DVT in the right lower extremity. Superficial thrombosis mid right greater saphenous vein. Consultations:    Consults:     Final Specialist Recommendations/Findings:   IP CONSULT TO CARDIOLOGY      The patient was seen and examined on day of discharge and this discharge summary is in conjunction with any daily progress note from day of discharge. Discharge plan:     Disposition: Home    Physician Follow Up: Kira Daniel MD  [id number] [facility] [address]  [phone]    On 1/31/2020  at 2:45pm     Sylvia Wahl MD  [address] [facility] [phone]    Go on 1/22/2020  at 1:30 pm       Requiring Further Evaluation/Follow Up POST HOSPITALIZATION/Incidental Findings:  Follow-up with primary care

## 2020-01-21 NOTE — PROGRESS NOTES
CLINICAL PHARMACY NOTE: MEDS TO [address] Select Patient?: No  Total # of Prescriptions Filled: 3   The following medications were delivered to the patient:  · Metoprolol succinate  · Furosemide  · Atorvastatin  Total # of Interventions Completed: 0  Time Spent (min): 0    Additional Documentation:  Medications delivered to patient.

## 2020-01-21 NOTE — PLAN OF CARE
Problem: Falls - Risk of:  Goal: Will remain free from falls  Description  Will remain free from falls  Outcome: Ongoing  Goal: Absence of physical injury  Description  Absence of physical injury  Outcome: Ongoing     Problem: Activity:  Goal: Ability to tolerate increased activity will improve  Description  Ability to tolerate increased activity will improve  Outcome: Ongoing     Problem: Discharge Planning:  Goal: Discharged to appropriate level of care  Description  Discharged to appropriate level of care  Outcome: Ongoing     Problem:  Activity Intolerance:  Goal: Ability to perform activities of daily living will improve  Description  Ability to perform activities of daily living will improve  Outcome: Ongoing     Problem: Airway Clearance - Ineffective:  Goal: Ability to maintain a clear airway will improve  Description  Ability to maintain a clear airway will improve  Outcome: Ongoing     Problem: Gas Exchange - Impaired:  Goal: Levels of oxygenation will improve  Description  Levels of oxygenation will improve  Outcome: Ongoing

## 2020-01-22 ASSESSMENT — ENCOUNTER SYMPTOMS
HEMOPTYSIS: 0
SHORTNESS OF BREATH: 1
WHEEZING: 1
HEARTBURN: 0
COUGH: 1
DIFFICULTY BREATHING: 1
SPUTUM PRODUCTION: 1
SORE THROAT: 0
CHEST TIGHTNESS: 1
FREQUENT THROAT CLEARING: 1
TROUBLE SWALLOWING: 0
RHINORRHEA: 0
HOARSE VOICE: 1

## 2020-01-22 NOTE — PROGRESS NOTES
Subjective:      Patient ID: Yomaira Marmolejo is a 63 y.o. female. Asthma   She complains of chest tightness, cough, difficulty breathing, frequent throat clearing, hoarse voice, shortness of breath, sputum production and wheezing. There is no hemoptysis. This is a recurrent problem. The current episode started more than 1 month ago. The problem occurs constantly. The problem has been gradually improving. The cough is productive of sputum. Associated symptoms include dyspnea on exertion, malaise/fatigue, myalgias, nasal congestion and postnasal drip. Pertinent negatives include no appetite change, chest pain, ear congestion, ear pain, fever, headaches, heartburn, orthopnea, PND, rhinorrhea, sneezing, sore throat, sweats, trouble swallowing or weight loss. Her symptoms are aggravated by any activity. Her symptoms are alleviated by beta-agonist, oral steroids and steroid inhaler. She reports minimal improvement on treatment. Her past medical history is significant for asthma. There is no history of bronchiectasis, bronchitis, COPD, emphysema or pneumonia. Review of Systems   Constitutional: Positive for malaise/fatigue. Negative for appetite change, fever and weight loss. HENT: Positive for hoarse voice and postnasal drip. Negative for ear pain, rhinorrhea, sneezing, sore throat and trouble swallowing. Respiratory: Positive for cough, sputum production, shortness of breath and wheezing. Negative for hemoptysis. Cardiovascular: Positive for dyspnea on exertion. Negative for chest pain and PND. Gastrointestinal: Negative for heartburn. Musculoskeletal: Positive for myalgias. Neurological: Negative for headaches. All other systems reviewed and are negative. Objective:   Physical Exam     Constitutional  Appears stated age. Head normocephalic and atraumatic. Psych  Alert and oriented. Pleasant and cooperative.       Chest  Rises and falls symmetrically with no evidence of respiratory
problems affect the patient?   0=No Problem; 5=Severe Problem    Hoarseness or a problem with your voice 5  Clearing your throat 0  Excess throat mucus or postnasal drip 5  Difficulty swallowing food, liquids, pills 3  Coughing after you ate or after lying down 5  Breathing difficulties or choking episodes 5  Troublesome or annoying cough 5  Sensations of something sticking in your throat or a lump in your throat 2  Heartburn, chest pain, indigestion, or stomach acid coming up 3    Total: 33

## 2020-01-23 ENCOUNTER — PATIENT MESSAGE (OUTPATIENT)
Dept: FAMILY MEDICINE CLINIC | Age: 60
End: 2020-01-23

## 2020-01-23 RX ORDER — RANITIDINE 300 MG/1
TABLET ORAL
Qty: 90 TABLET | Refills: 3 | Status: SHIPPED | OUTPATIENT
Start: 2020-01-23 | End: 2020-05-11

## 2020-01-29 ENCOUNTER — OFFICE VISIT (OUTPATIENT)
Dept: FAMILY MEDICINE CLINIC | Age: 60
End: 2020-01-29
Payer: COMMERCIAL

## 2020-01-29 VITALS
DIASTOLIC BLOOD PRESSURE: 70 MMHG | WEIGHT: 186 LBS | SYSTOLIC BLOOD PRESSURE: 110 MMHG | BODY MASS INDEX: 29.19 KG/M2 | HEIGHT: 67 IN | HEART RATE: 96 BPM

## 2020-01-29 PROCEDURE — 1036F TOBACCO NON-USER: CPT | Performed by: NURSE PRACTITIONER

## 2020-01-29 PROCEDURE — 99214 OFFICE O/P EST MOD 30 MIN: CPT | Performed by: NURSE PRACTITIONER

## 2020-01-29 PROCEDURE — G8427 DOCREV CUR MEDS BY ELIG CLIN: HCPCS | Performed by: NURSE PRACTITIONER

## 2020-01-29 PROCEDURE — 1111F DSCHRG MED/CURRENT MED MERGE: CPT | Performed by: NURSE PRACTITIONER

## 2020-01-29 PROCEDURE — G8417 CALC BMI ABV UP PARAM F/U: HCPCS | Performed by: NURSE PRACTITIONER

## 2020-01-29 PROCEDURE — 3017F COLORECTAL CA SCREEN DOC REV: CPT | Performed by: NURSE PRACTITIONER

## 2020-01-29 PROCEDURE — G8484 FLU IMMUNIZE NO ADMIN: HCPCS | Performed by: NURSE PRACTITIONER

## 2020-01-29 NOTE — PROGRESS NOTES
Symptoms experienced by patient  Runny nose  Yes Circles under eyes Yes Post nasal drip Yes Difficulty breathing No   Stuffy nose No Grumpiness No Hoarseness Yes Short of breath No   Sniffling  No Fatigue Yes Face pain/pressure No Tight/heavy chest No   Sneezing No Nosebleeds No Sore throat yes cough Yes   Blowing nose No Nasal/sinus surgery Yes Headache  No Cough up mucus Yes   Itchy/teary eyes No Nasal polyps No Upper teeth hurt No Cough up blood No   Itchy ears No Hearing loss No Night cough Yes Chest pain No   Itchy nose No Ear problems No Throat clearing No Asthma No   Itchy throat No Tubes in ears No Bad breath No Wheezing No   Itchy roof of mouth No Snoring No Bad taste in mouth No Pneumonia No   Nose rubbing No Mouth breathing No  Ankle swelling No    Overbite No  Difficulty breathing on exertion No    Drooling (toddler) No         How many times per week do you use your rescue inhaler? 1    How many nights per month do you wake up due to asthma 0    How many mornings per week do you wake up with asthma symptoms? 0      Does patient experience? Heartburn and indigestion Yes  Vomiting easily No  Use of antacids (Rolaids, Tums, Maalox) Yes  Regurgitation of stomach contents No  Chronic cough worse after meals Yes  Chronic cough cough worse after laying down Yes  Chronic hoarseness or voice change Yes  Chest pain No  Stomach pain No  Neck pain No  Sore throat-frequent No  Feeling of throat closing or something stuck in throat No  Frequent burps or hiccups No  Adult onset asthma No  Asthma not relieved with usual treatment No  Anemia No  Asthma worse after meals, alcohol, lying down, bending to tie shoes, or after heartburn No  Chronic sinus disease Yes    Within the last month, how did the following problems affect the patient?   0=No Problem; 5=Severe Problem    Hoarseness or a problem with your voice 5  Clearing your throat 3  Excess throat mucus or postnasal drip 2  Difficulty swallowing food, liquids,

## 2020-01-31 ENCOUNTER — HOSPITAL ENCOUNTER (OUTPATIENT)
Dept: NUCLEAR MEDICINE | Age: 60
Discharge: HOME | End: 2020-02-02
Payer: COMMERCIAL

## 2020-01-31 ENCOUNTER — OFFICE VISIT (OUTPATIENT)
Dept: CARDIOLOGY | Age: 60
End: 2020-01-31
Payer: COMMERCIAL

## 2020-01-31 VITALS
HEIGHT: 67 IN | HEART RATE: 104 BPM | BODY MASS INDEX: 29.19 KG/M2 | WEIGHT: 186 LBS | DIASTOLIC BLOOD PRESSURE: 60 MMHG | SYSTOLIC BLOOD PRESSURE: 100 MMHG

## 2020-01-31 PROCEDURE — A9541 TC99M SULFUR COLLOID: HCPCS | Performed by: SURGERY

## 2020-01-31 PROCEDURE — 78264 GASTRIC EMPTYING IMG STUDY: CPT

## 2020-01-31 PROCEDURE — G8484 FLU IMMUNIZE NO ADMIN: HCPCS | Performed by: INTERNAL MEDICINE

## 2020-01-31 PROCEDURE — 3017F COLORECTAL CA SCREEN DOC REV: CPT | Performed by: INTERNAL MEDICINE

## 2020-01-31 PROCEDURE — 3430000000 HC RX DIAGNOSTIC RADIOPHARMACEUTICAL: Performed by: SURGERY

## 2020-01-31 PROCEDURE — G8427 DOCREV CUR MEDS BY ELIG CLIN: HCPCS | Performed by: INTERNAL MEDICINE

## 2020-01-31 PROCEDURE — 1036F TOBACCO NON-USER: CPT | Performed by: INTERNAL MEDICINE

## 2020-01-31 PROCEDURE — 1111F DSCHRG MED/CURRENT MED MERGE: CPT | Performed by: INTERNAL MEDICINE

## 2020-01-31 PROCEDURE — G8417 CALC BMI ABV UP PARAM F/U: HCPCS | Performed by: INTERNAL MEDICINE

## 2020-01-31 PROCEDURE — 99214 OFFICE O/P EST MOD 30 MIN: CPT | Performed by: INTERNAL MEDICINE

## 2020-01-31 RX ADMIN — Medication 500 MICRO CURIE: at 10:14

## 2020-01-31 ASSESSMENT — ENCOUNTER SYMPTOMS
SORE THROAT: 1
HOARSE VOICE: 1
WHEEZING: 1
COUGH: 1
SINUS COMPLAINT: 1

## 2020-01-31 NOTE — PROGRESS NOTES
Subjective:      Patient ID: Jose Miguel Forrest is a 63 y.o. female. Asthma   She complains of cough, hoarse voice and wheezing. This is a chronic problem. The current episode started more than 1 year ago. The problem occurs daily. The problem has been gradually improving. Cough characteristics: high pitched. Associated symptoms include dyspnea on exertion, postnasal drip and a sore throat. Her symptoms are aggravated by any activity. Her symptoms are alleviated by beta-agonist, steroid inhaler and oral steroids. She reports moderate improvement on treatment. Her past medical history is significant for asthma. Sinus Problem   This is a chronic problem. The current episode started more than 1 year ago. The problem has been gradually improving since onset. There has been no fever. Her pain is at a severity of 0/10. She is experiencing no pain. Associated symptoms include coughing, a hoarse voice and a sore throat. Past treatments include antibiotics. The treatment provided moderate relief. Review of Systems   HENT: Positive for hoarse voice, postnasal drip and sore throat. Respiratory: Positive for cough and wheezing. Cardiovascular: Positive for dyspnea on exertion. All other systems reviewed and are negative. Objective:   Physical Exam     Constitutional  Appears stated age. Head normocephalic and atraumatic. Psych  Alert and oriented. Pleasant and cooperative. Chest  Rises and falls symmetrically with no evidence of respiratory distress. Lungs  0/40 - no wheeze or other adventitious breath sounds    Nose  Pallor: 2-Healthy/Pink    Bogginess: 2-turbinate occupies less than or equal to 50% of the diameter of naris    Wetness: 2-scant secretions    Redness: 2-healthy pink    Mouth  Pharynx clear, uvula midline, dentition WDL. Ears  Ear canals WDL, TM's dereck grey with visible light reflex. Eyes  Pupils equal and reactive. EOM's WDL. Heart  Heart rate regular. Rhythm without murmur, click, rub or gallop. Skin  Warm, dry and intact. Neck  Supple. ROM WDL for age. No lymphadenopathy or thyromegaly. Musculoskeletal  ROM WDL for stated age. Extremities without clubbing, cyanosis or edema.        Assessment:      Chronic sinusitis - improving  Asthma exacerbation - improving  GERD  LPR      Plan:      Decrease prednisone to 20 mg once daily at 8 am  Continue other medications as previous  Continue sinus rinses  RV 3 weeks        Geoff Pennington, APRN - CNP

## 2020-01-31 NOTE — PROGRESS NOTES
release tablet Take 1 tablet by mouth daily 30 tablet 3    furosemide (LASIX) 40 MG tablet Take 1 tablet by mouth daily 60 tablet 3    cefdinir (OMNICEF) 300 MG capsule Take one capsule twice daily for 30 days. 60 capsule 0    benzonatate (TESSALON) 100 MG capsule Take 200 mg by mouth 3 times daily as needed for Cough      predniSONE (DELTASONE) 20 MG tablet Take 1 tablet by mouth 2 times daily 100 tablet 0    albuterol (PROVENTIL) (5 MG/ML) 0.5% nebulizer solution Take 1 mL by nebulization 4 times daily as needed for Wheezing 120 each 3    budesonide-formoterol (SYMBICORT) 160-4.5 MCG/ACT AERO 2 puffs inhaled twice daily. Rinse mouth well after use. 3 Inhaler 3    pantoprazole (PROTONIX) 40 MG tablet Take one tablet twice daily 30-60 minutes prior to breakfast and evening meal 180 tablet 3    SYNTHROID 175 MCG tablet Take 175 mcg by mouth      MOLYBDENUM PO Take 500 mcg by mouth daily      ipratropium-albuterol (DUONEB) 0.5-2.5 (3) MG/3ML SOLN nebulizer solution Inhale 3 mLs into the lungs every 4 hours as needed for Shortness of Breath 360 mL 2    fluticasone (FLONASE) 50 MCG/ACT nasal spray Two sprays to each nostril once daily 3 Bottle 3    diclofenac (VOLTAREN) 75 MG EC tablet Take 1 tablet by mouth 2 times daily as needed for Pain 90 tablet 2    Probiotic Product (SOLUBLE FIBER/PROBIOTICS PO) Take by mouth 2 times daily      metFORMIN (GLUCOPHAGE) 500 MG tablet TAKE 2 TABLETS BY MOUTH TWO TIMES A DAY WITH MEALS 120 tablet 3    losartan (COZAAR) 25 MG tablet Take 1 tablet by mouth daily 90 tablet 2    Cholecalciferol (VITAMIN D3) 5000 UNITS CAPS Take 2 capsules by mouth daily Takes 2000 units daily       No current facility-administered medications for this visit.         Physical Exam:   Vitals: /60   Pulse 104   Ht 5' 7\" (1.702 m)   Wt 186 lb (84.4 kg)   LMP 04/29/2009 (Approximate)   BMI 29.13 kg/m²   General appearance: alert and cooperative with exam  HEENT: Head: Normocephalic, no lesions, without obvious abnormality. Neck: no carotid bruit, no JVD  Lungs: clear to auscultation bilaterally  Heart: regular rate and rhythm, S1, S2 normal, no murmur, click, rub or gallop  Abdomen: soft, non-tender; bowel sounds normal; no masses,  no organomegaly  Extremities: extremities normal, atraumatic, no cyanosis or edema  Neurologic: Mental status: Alert, oriented, thought content appropriate    Labs:  Lab Results   Component Value Date    CHOL 239 (H) 01/17/2020    TRIG 343 (H) 01/17/2020    HDL 58 01/17/2020    LDLCHOLESTEROL 112 01/17/2020    LDLCALC 164 (A) 02/26/2018    LDLCALC 164 (A) 02/26/2018    VLDL NOT REPORTED (H) 01/17/2020    CHOLHDLRATIO 4.1 01/17/2020       Lab Results   Component Value Date     01/21/2020    K 5.0 01/21/2020    CL 96 (L) 01/21/2020    CO2 25 01/21/2020    BUN 17 01/21/2020    CREATININE 0.48 (L) 01/21/2020    GLUCOSE 277 (H) 01/21/2020    CALCIUM 9.4 01/21/2020    PROT 6.0 (L) 01/17/2020    LABALBU 3.5 01/17/2020    BILITOT 0.34 01/17/2020    ALKPHOS 46 01/17/2020    AST 29 01/17/2020    ALT 61 (H) 01/17/2020    LABGLOM >60 01/21/2020    GFRAA >60 01/21/2020    GLOB 3.0 06/30/2017       EKG:   Results for orders placed or performed during the hospital encounter of 01/16/20   EKG 12 lead  Sinus rhythm with Premature atrial complexes  Possible Left atrial enlargement  Left ventricular hypertrophy  Cannot rule out Septal infarct , age undetermined  Abnormal ECG  No previous ECGs available       LAST ECHO:   Results for orders placed or performed during the hospital encounter of 01/16/20   Echocardiogram complete 2D with doppler with color  Summary  Moderately dilated LV size , normal wall thickness. Severe global hypokinesis. Severely reduced LV systolic function with LVEF 25-30%. Normal RV size and function. Normal size LA and RA. No obvious significant structural valvular abnormality noted. Mild AR noted . Normal aortic root dimension.   No significant

## 2020-02-04 ENCOUNTER — HOSPITAL ENCOUNTER (OUTPATIENT)
Dept: CARDIAC REHAB | Age: 60
Setting detail: THERAPIES SERIES
Discharge: HOME | End: 2020-02-04
Payer: COMMERCIAL

## 2020-02-04 PROCEDURE — 93798 PHYS/QHP OP CAR RHAB W/ECG: CPT

## 2020-02-05 ENCOUNTER — HOSPITAL ENCOUNTER (OUTPATIENT)
Dept: PREOP | Age: 60
Discharge: HOME | End: 2020-02-05
Payer: COMMERCIAL

## 2020-02-05 VITALS
OXYGEN SATURATION: 96 % | DIASTOLIC BLOOD PRESSURE: 59 MMHG | HEART RATE: 90 BPM | TEMPERATURE: 97.3 F | RESPIRATION RATE: 16 BRPM | SYSTOLIC BLOOD PRESSURE: 124 MMHG

## 2020-02-05 PROCEDURE — 6370000000 HC RX 637 (ALT 250 FOR IP): Performed by: SURGERY

## 2020-02-05 PROCEDURE — 3609015500 HC GASTRIC/DUODENAL MOTILITY &/OR MANOMETRY STUDY

## 2020-02-05 RX ORDER — LIDOCAINE HYDROCHLORIDE 20 MG/ML
15 SOLUTION OROPHARYNGEAL ONCE
Status: COMPLETED | OUTPATIENT
Start: 2020-02-05 | End: 2020-02-05

## 2020-02-05 RX ORDER — LIDOCAINE HYDROCHLORIDE 20 MG/ML
15 SOLUTION OROPHARYNGEAL
Status: DISCONTINUED | OUTPATIENT
Start: 2020-02-05 | End: 2020-02-05

## 2020-02-05 RX ADMIN — LIDOCAINE HYDROCHLORIDE 15 ML: 20 SOLUTION ORAL; TOPICAL at 09:00

## 2020-02-05 ASSESSMENT — PAIN - FUNCTIONAL ASSESSMENT: PAIN_FUNCTIONAL_ASSESSMENT: 0-10

## 2020-02-05 NOTE — PROGRESS NOTES
Manometry probe inserted via right nare to 53 cm. Study completed, paobe removed. Patient tolerated procedure well. Swallowing water without difficulty. Discharged ambulatory with spouse.

## 2020-02-06 ENCOUNTER — OFFICE VISIT (OUTPATIENT)
Dept: SURGERY | Age: 60
End: 2020-02-06
Payer: COMMERCIAL

## 2020-02-06 ENCOUNTER — HOSPITAL ENCOUNTER (OUTPATIENT)
Dept: CARDIAC REHAB | Age: 60
Setting detail: THERAPIES SERIES
Discharge: HOME | End: 2020-02-06
Payer: COMMERCIAL

## 2020-02-06 VITALS
TEMPERATURE: 97.1 F | WEIGHT: 195 LBS | HEART RATE: 96 BPM | SYSTOLIC BLOOD PRESSURE: 130 MMHG | DIASTOLIC BLOOD PRESSURE: 80 MMHG | HEIGHT: 67 IN | BODY MASS INDEX: 30.61 KG/M2

## 2020-02-06 PROCEDURE — G8427 DOCREV CUR MEDS BY ELIG CLIN: HCPCS | Performed by: SURGERY

## 2020-02-06 PROCEDURE — 1036F TOBACCO NON-USER: CPT | Performed by: SURGERY

## 2020-02-06 PROCEDURE — 3017F COLORECTAL CA SCREEN DOC REV: CPT | Performed by: SURGERY

## 2020-02-06 PROCEDURE — 93798 PHYS/QHP OP CAR RHAB W/ECG: CPT

## 2020-02-06 PROCEDURE — 1111F DSCHRG MED/CURRENT MED MERGE: CPT | Performed by: SURGERY

## 2020-02-06 PROCEDURE — 99214 OFFICE O/P EST MOD 30 MIN: CPT | Performed by: SURGERY

## 2020-02-06 PROCEDURE — G8484 FLU IMMUNIZE NO ADMIN: HCPCS | Performed by: SURGERY

## 2020-02-06 PROCEDURE — G8417 CALC BMI ABV UP PARAM F/U: HCPCS | Performed by: SURGERY

## 2020-02-06 RX ORDER — ACETAMINOPHEN 160 MG
1 TABLET,DISINTEGRATING ORAL DAILY
COMMUNITY

## 2020-02-10 RX ORDER — PREDNISONE 20 MG/1
20 TABLET ORAL DAILY
Qty: 60 TABLET | Refills: 1 | Status: SHIPPED | OUTPATIENT
Start: 2020-02-10 | End: 2020-04-15 | Stop reason: SDUPTHER

## 2020-02-11 ENCOUNTER — HOSPITAL ENCOUNTER (OUTPATIENT)
Dept: CARDIAC REHAB | Age: 60
Setting detail: THERAPIES SERIES
Discharge: HOME | End: 2020-02-11
Payer: COMMERCIAL

## 2020-02-11 PROCEDURE — 93798 PHYS/QHP OP CAR RHAB W/ECG: CPT

## 2020-02-13 ENCOUNTER — HOSPITAL ENCOUNTER (OUTPATIENT)
Dept: CARDIAC REHAB | Age: 60
Setting detail: THERAPIES SERIES
Discharge: HOME | End: 2020-02-13
Payer: COMMERCIAL

## 2020-02-13 PROCEDURE — 93798 PHYS/QHP OP CAR RHAB W/ECG: CPT

## 2020-02-16 PROBLEM — R77.8 ELEVATED TROPONIN: Status: RESOLVED | Noted: 2020-01-17 | Resolved: 2020-02-16

## 2020-02-16 PROBLEM — R79.89 ELEVATED TROPONIN: Status: RESOLVED | Noted: 2020-01-17 | Resolved: 2020-02-16

## 2020-02-19 ENCOUNTER — OFFICE VISIT (OUTPATIENT)
Dept: FAMILY MEDICINE CLINIC | Age: 60
End: 2020-02-19
Payer: COMMERCIAL

## 2020-02-19 ENCOUNTER — HOSPITAL ENCOUNTER (OUTPATIENT)
Dept: CARDIAC REHAB | Age: 60
Setting detail: THERAPIES SERIES
Discharge: HOME | End: 2020-02-19
Payer: COMMERCIAL

## 2020-02-19 VITALS
HEIGHT: 67 IN | SYSTOLIC BLOOD PRESSURE: 128 MMHG | HEART RATE: 90 BPM | DIASTOLIC BLOOD PRESSURE: 68 MMHG | WEIGHT: 195 LBS | BODY MASS INDEX: 30.61 KG/M2

## 2020-02-19 PROCEDURE — 93798 PHYS/QHP OP CAR RHAB W/ECG: CPT

## 2020-02-19 PROCEDURE — G8484 FLU IMMUNIZE NO ADMIN: HCPCS | Performed by: NURSE PRACTITIONER

## 2020-02-19 PROCEDURE — 99214 OFFICE O/P EST MOD 30 MIN: CPT | Performed by: NURSE PRACTITIONER

## 2020-02-19 PROCEDURE — 1111F DSCHRG MED/CURRENT MED MERGE: CPT | Performed by: NURSE PRACTITIONER

## 2020-02-19 PROCEDURE — G8417 CALC BMI ABV UP PARAM F/U: HCPCS | Performed by: NURSE PRACTITIONER

## 2020-02-19 PROCEDURE — 1036F TOBACCO NON-USER: CPT | Performed by: NURSE PRACTITIONER

## 2020-02-19 PROCEDURE — G8427 DOCREV CUR MEDS BY ELIG CLIN: HCPCS | Performed by: NURSE PRACTITIONER

## 2020-02-19 PROCEDURE — 3017F COLORECTAL CA SCREEN DOC REV: CPT | Performed by: NURSE PRACTITIONER

## 2020-02-19 PROCEDURE — 94010 BREATHING CAPACITY TEST: CPT | Performed by: NURSE PRACTITIONER

## 2020-02-19 NOTE — PROGRESS NOTES
Subjective:      Patient ID: Eris Romano is a 63 y.o. female. Asthma   She complains of cough, hoarse voice and sputum production. There is no chest tightness, difficulty breathing, frequent throat clearing, hemoptysis, shortness of breath or wheezing. This is a chronic problem. The current episode started more than 1 year ago. The problem occurs intermittently. The problem has been gradually improving. The cough is productive of sputum. Associated symptoms include dyspnea on exertion, postnasal drip and a sore throat. Pertinent negatives include no appetite change, chest pain, ear congestion, ear pain, fever, headaches, heartburn, malaise/fatigue, myalgias, nasal congestion, orthopnea, PND, rhinorrhea, sneezing, sweats, trouble swallowing or weight loss. Her symptoms are aggravated by URI (sinusitis). Her symptoms are alleviated by beta-agonist, steroid inhaler and oral steroids. She reports significant improvement on treatment. Her past medical history is significant for asthma. Gastroesophageal Reflux   She complains of coughing, globus sensation, a hoarse voice and a sore throat. She reports no abdominal pain, no belching, no chest pain, no choking, no dysphagia, no early satiety, no heartburn, no nausea, no stridor, no tooth decay, no water brash or no wheezing. This is a chronic problem. The current episode started more than 1 year ago. The problem occurs frequently. The problem has been waxing and waning. The symptoms are aggravated by certain foods. Associated symptoms include fatigue. Pertinent negatives include no anemia, melena, muscle weakness, orthopnea or weight loss. Risk factors include hiatal hernia. She has tried a diet change, a PPI and a histamine-2 antagonist for the symptoms. The treatment provided mild relief. Past procedures include an EGD, esophageal manometry, H. pylori antibody titer and a UGI. Past procedures do not include an abdominal ultrasound or esophageal pH monitoring. with visible light reflex. Eyes  Pupils equal and reactive. EOM's WDL. Heart  Heart rate regular. Rhythm without murmur, click, rub or gallop. Skin  Warm, dry and intact. Neck  Supple. ROM WDL for age. No lymphadenopathy or thyromegaly. Musculoskeletal  ROM WDL for stated age. Extremities without clubbing, cyanosis or edema.        Assessment:      Asthma - controlled  LPR/GERD  Chronic, recurrent sinusitis      Plan:       Continue present medications  Continue prednisone once daily until 3/4/2020  Then start 20 mg every other day  RV 4/1/2020        Ovidio Granda, DEREK - CNP

## 2020-02-20 ENCOUNTER — HOSPITAL ENCOUNTER (OUTPATIENT)
Dept: CARDIAC REHAB | Age: 60
Setting detail: THERAPIES SERIES
Discharge: HOME | End: 2020-02-20
Payer: COMMERCIAL

## 2020-02-20 PROCEDURE — 93798 PHYS/QHP OP CAR RHAB W/ECG: CPT

## 2020-02-21 ENCOUNTER — HOSPITAL ENCOUNTER (OUTPATIENT)
Dept: CARDIAC REHAB | Age: 60
Setting detail: THERAPIES SERIES
Discharge: HOME | End: 2020-02-21
Payer: COMMERCIAL

## 2020-02-21 PROCEDURE — 93798 PHYS/QHP OP CAR RHAB W/ECG: CPT

## 2020-02-24 ENCOUNTER — PATIENT MESSAGE (OUTPATIENT)
Dept: FAMILY MEDICINE CLINIC | Age: 60
End: 2020-02-24

## 2020-02-24 ENCOUNTER — HOSPITAL ENCOUNTER (OUTPATIENT)
Dept: CARDIAC REHAB | Age: 60
Setting detail: THERAPIES SERIES
Discharge: HOME | End: 2020-02-24
Payer: COMMERCIAL

## 2020-02-24 PROCEDURE — 93798 PHYS/QHP OP CAR RHAB W/ECG: CPT

## 2020-02-24 RX ORDER — FLUCONAZOLE 150 MG/1
TABLET ORAL
Qty: 4 TABLET | Refills: 0 | Status: SHIPPED | OUTPATIENT
Start: 2020-02-24 | End: 2020-05-27

## 2020-02-26 ENCOUNTER — HOSPITAL ENCOUNTER (OUTPATIENT)
Dept: CARDIAC REHAB | Age: 60
Setting detail: THERAPIES SERIES
Discharge: HOME | End: 2020-02-26
Payer: COMMERCIAL

## 2020-02-26 PROCEDURE — 93798 PHYS/QHP OP CAR RHAB W/ECG: CPT

## 2020-02-28 ENCOUNTER — HOSPITAL ENCOUNTER (OUTPATIENT)
Dept: CARDIAC REHAB | Age: 60
Setting detail: THERAPIES SERIES
Discharge: HOME | End: 2020-02-28
Payer: COMMERCIAL

## 2020-02-28 PROCEDURE — 93798 PHYS/QHP OP CAR RHAB W/ECG: CPT

## 2020-03-02 ENCOUNTER — HOSPITAL ENCOUNTER (OUTPATIENT)
Dept: CARDIAC REHAB | Age: 60
Setting detail: THERAPIES SERIES
Discharge: HOME | End: 2020-03-02
Payer: COMMERCIAL

## 2020-03-02 PROCEDURE — 93798 PHYS/QHP OP CAR RHAB W/ECG: CPT

## 2020-03-02 ASSESSMENT — ENCOUNTER SYMPTOMS
COUGH: 1
SHORTNESS OF BREATH: 0
TROUBLE SWALLOWING: 0
HEARTBURN: 0
BELCHING: 0
RHINORRHEA: 0
GLOBUS SENSATION: 1
CHEST TIGHTNESS: 0
SPUTUM PRODUCTION: 1
HOARSE VOICE: 1
ABDOMINAL PAIN: 0
STRIDOR: 0
FREQUENT THROAT CLEARING: 0
DIFFICULTY BREATHING: 0
WATER BRASH: 0
CHOKING: 0
SINUS PRESSURE: 0
NAUSEA: 0
WHEEZING: 0
SORE THROAT: 1
HEMOPTYSIS: 0
SWOLLEN GLANDS: 0

## 2020-03-03 ENCOUNTER — HOSPITAL ENCOUNTER (OUTPATIENT)
Dept: CARDIAC REHAB | Age: 60
Setting detail: THERAPIES SERIES
Discharge: HOME | End: 2020-03-03
Payer: COMMERCIAL

## 2020-03-03 PROCEDURE — 93798 PHYS/QHP OP CAR RHAB W/ECG: CPT

## 2020-03-04 ENCOUNTER — HOSPITAL ENCOUNTER (OUTPATIENT)
Dept: CARDIAC REHAB | Age: 60
Setting detail: THERAPIES SERIES
Discharge: HOME | End: 2020-03-04
Payer: COMMERCIAL

## 2020-03-04 PROCEDURE — 93798 PHYS/QHP OP CAR RHAB W/ECG: CPT

## 2020-03-09 ENCOUNTER — HOSPITAL ENCOUNTER (OUTPATIENT)
Dept: CARDIAC REHAB | Age: 60
Setting detail: THERAPIES SERIES
Discharge: HOME | End: 2020-03-09
Payer: COMMERCIAL

## 2020-03-09 PROCEDURE — 93798 PHYS/QHP OP CAR RHAB W/ECG: CPT

## 2020-03-10 ENCOUNTER — PATIENT MESSAGE (OUTPATIENT)
Dept: FAMILY MEDICINE CLINIC | Age: 60
End: 2020-03-10

## 2020-03-11 RX ORDER — FLUCONAZOLE 150 MG/1
150 TABLET ORAL ONCE
Qty: 4 TABLET | Refills: 0 | Status: SHIPPED | OUTPATIENT
Start: 2020-03-11 | End: 2021-02-18 | Stop reason: SDUPTHER

## 2020-03-11 RX ORDER — CEFDINIR 300 MG/1
CAPSULE ORAL
Qty: 60 CAPSULE | Refills: 0 | Status: SHIPPED | OUTPATIENT
Start: 2020-03-11 | End: 2020-05-28

## 2020-03-12 ENCOUNTER — HOSPITAL ENCOUNTER (OUTPATIENT)
Dept: CARDIAC REHAB | Age: 60
Setting detail: THERAPIES SERIES
Discharge: HOME | End: 2020-03-12
Payer: COMMERCIAL

## 2020-03-12 PROCEDURE — 93798 PHYS/QHP OP CAR RHAB W/ECG: CPT

## 2020-03-13 ENCOUNTER — HOSPITAL ENCOUNTER (OUTPATIENT)
Dept: CARDIAC REHAB | Age: 60
Setting detail: THERAPIES SERIES
Discharge: HOME | End: 2020-03-13
Payer: COMMERCIAL

## 2020-03-13 PROCEDURE — 93798 PHYS/QHP OP CAR RHAB W/ECG: CPT

## 2020-03-16 ENCOUNTER — HOSPITAL ENCOUNTER (OUTPATIENT)
Dept: CARDIAC REHAB | Age: 60
Setting detail: THERAPIES SERIES
Discharge: HOME | End: 2020-03-16
Payer: COMMERCIAL

## 2020-03-16 PROCEDURE — 93798 PHYS/QHP OP CAR RHAB W/ECG: CPT

## 2020-03-16 NOTE — PROGRESS NOTES
Janee Boogie is a 63 y.o. female          CC:    . Follow-up (set up for lap nissen and discuss it)      HISTORY OF PRESENT ILLNESS:    Pt is a 61 yo female with GERD. See note from 1-. Sx's include heartburn and reflux in back of throat and cough and congestion with sinusitis. She has been on max med tx with persistent symptoms. 48 hour bravo had demeester of 16-25. Esophagram showed significant reflux up to the middle and toop third of the esophagus. When she stop her meds her airway inflammation got worse. Her recent manometry was good for a candidate for anti reflux surgery. She would be candidate for lap assisted robotic nissen fundoplication.     However she is also have some heart issues and needs these worked up first.          Review of Systems:    General:  Fever: Negative  Weight Change:Negative  Night Sweats: Negative    Eye:  Blurry Vision:Negative  Double Vision: Negative    Ent:  Headaches: Negative  Sore throat: Negative    Allergy/Immunology:  Hives: Negative  Latex allergy: Negative    Hematology/Lymphatic:  Bleeding Problems: Negative  Blood Clots: Negative  Swollen Lymph Nodes: Negative    Lungs:  Cough: Negative  SOB: Negative  Wheezing:Negative    Cardiovascular:  Chest Pain: Negative  Palpitations:Negative    GI:   Decreased Appetite: Negative  Heartburn: Negative  Dysphagia: Negative  Nausea/Vomiting: Negative  Abdominal Pain: Negative  Change in Bowels:Negative  Constipation: Negative  Diarrhea: Negative  Rectal Bleeding: Negative    :   Dysuria: Negative  Increase Urinary Frequency/Urgency: Negative    Neuro:  Seizures: Negative  Confusion: Negative        PAST MEDICAL HISTORY:        Family History   Problem Relation Age of Onset    Coronary Art Dis Mother     High Blood Pressure Mother     High Cholesterol Mother     Allergies Mother     Arthritis Mother     Diabetes Daughter         pre diabetes    Other Maternal Grandfather         PUD GI problems     Social History     Socioeconomic History    Marital status:      Spouse name: Not on file    Number of children: Not on file    Years of education: Not on file    Highest education level: Not on file   Occupational History    Occupation: counselor     Employer: [employer]   Social Needs    Financial resource strain: Not on file    Food insecurity     Worry: Not on file     Inability: Not on file    Transportation needs     Medical: Not on file     Non-medical: Not on file   Tobacco Use    Smoking status: Never Smoker    Smokeless tobacco: Never Used    Tobacco comment: never smoked syant rrt 10/13/2017   Substance and Sexual Activity    Alcohol use: No     Alcohol/week: 0.0 standard drinks     Comment: Consume about 4 wine coolers a year    Drug use: No    Sexual activity: Yes     Partners: Male     Comment:    Lifestyle    Physical activity     Days per week: Not on file     Minutes per session: Not on file    Stress: Not on file   Relationships    Social connections     Talks on phone: Not on file     Gets together: Not on file     Attends Rastafarian service: Not on file     Active member of club or organization: Not on file     Attends meetings of clubs or organizations: Not on file     Relationship status: Not on file    Intimate partner violence     Fear of current or ex partner: Not on file     Emotionally abused: Not on file     Physically abused: Not on file     Forced sexual activity: Not on file   Other Topics Concern    Not on file   Social History Narrative    Not on file     Past Surgical History:   Procedure Laterality Date    ABDOMEN SURGERY      ARTERIAL ANEURYSM REPAIR      splenic artery coil repair    CARDIAC CATHETERIZATION  2020     SECTION  1986    COLONOSCOPY  2012    HYPERPLASTIC POLYP, DIVERTICULAR DISEASE    COLONOSCOPY  2017    moderate sigmoid diverticulosis.  Dr. Marya Forbes  2015    with BX LGSIL    CYSTOSCOPY Bilateral 8/30/2017    CYSTO insertion lighted stents performed by Emanuel Abbott MD at [address], ESOPHAGUS      ENDOSCOPY, COLON, DIAGNOSTIC      EYE SURGERY      JOINT REPLACEMENT      KNEE ARTHROSCOPY Right 2004    PARTIAL SYNOVECTOMY AND CHONDROPLASTY DUE TO MENSICUS TEAR    LAPAROSCOPY N/A 9/8/2017    EXPLORATORY LAPAROSCOPY converted to open exploration with drainage of pelvic abscess performed by Rosanna Kamara MD at [facility] 38  08/19/2014    clearing an infection done at [facility] 41  10/25/2011    L4/L5 epidural steroid injection    OTHER SURGICAL HISTORY Right 2006 and 2010    synvisc injections, two sets    [place] COLON CA SCRN NOT HI RSK IND N/A 2/27/2017    COLONOSCOPY performed by Rosanna Kamara MD at [address] IND N/A 9/8/2017    COLONOSCOPY performed by Rosanna Kamara MD at [address] EGD TRANSORAL BIOPSY SINGLE/MULTIPLE N/A 2/27/2017    EGD BIOPSY performed by Rosanna Kamara MD at [facility]  [facility] N/A 8/30/2017    ATTEMPTED LAPAROCOPY PROCEDURE CONVERTED TO OPEN Sigmoid Colectomy PERFORMED BY DR. VO resection colovaginal fistula PERFORMED BY. DR. Riaz Caputo  performed by Rosanna Kamara MD at [facility] Right 07/2011    TOTAL    TUBAL LIGATION  1991    UPPER GASTROINTESTINAL ENDOSCOPY N/A 1/2/2020    EGD BIOPSY W/ 50 HR BRAVO performed by Rosanna Kamara MD at [facility] 128 WISDOM TOOTH EXTRACTION       Past Medical History:   Diagnosis Date    Acute on chronic systolic congestive heart failure ([facility] [address] 1/21/2020    Asthma     Bilateral corneal scars     Disease of blood and blood forming organ     Diverticulitis     ON COLONSCOPY    DJD (degenerative joint disease), lumbar     Dry eye syndrome     DVT (deep venous thrombosis) (HCC)     after splenic artery repair in right calf    Gastro - esophageal reflux disease     History of blood transfusion     Hyperlipidemia     HIGH CHOLESTEROL/HIGH TRIGLYCERIDES    Hyperplastic colon polyp     Hypertension     Hypothyroidism     LGSIL (low grade squamous intraepithelial dysplasia) 11/2014    referred to GYN/S/P colpo with bx LGSIL, repeat pap in 6 months    Non-celiac gluten sensitivity     wheezing is directly proportionate to wheat intake    NSTEMI (non-ST elevated myocardial infarction) ([facility] [address] 01/16/2020    Osteoarthritis 2004    RT KNEE S/P INJURY    Other disorders of kidney and ureter in diseases classified elsewhere     Sciatica of right side     Severe persistent asthma     patient has seen that wheat consumption causes exacerbations    Splenic artery aneurysm (HCC)     s/p splenic artery repair    Tinnitus     Type 2 diabetes mellitus, controlled ([facility] [address] 5/2011     Current Outpatient Medications on File Prior to Visit   Medication Sig Dispense Refill    Cholecalciferol (VITAMIN D3) 50 MCG (2000 UT) CAPS Take 1 capsule by mouth daily      ranitidine (ZANTAC) 300 MG tablet TAKE 1 TABLET DAILY AT BEDTIME 90 tablet 3    aspirin 81 MG EC tablet Take 1 tablet by mouth daily 30 tablet 3    atorvastatin (LIPITOR) 40 MG tablet Take 1 tablet by mouth nightly 30 tablet 3    metoprolol succinate (TOPROL XL) 50 MG extended release tablet Take 1 tablet by mouth daily 30 tablet 3    furosemide (LASIX) 40 MG tablet Take 1 tablet by mouth daily 60 tablet 3    benzonatate (TESSALON) 100 MG capsule Take 200 mg by mouth 3 times daily as needed for Cough      albuterol (PROVENTIL) (5 MG/ML) 0.5% nebulizer solution Take 1 mL by nebulization 4 times daily as needed for Wheezing 120 each 3    budesonide-formoterol (SYMBICORT) 160-4.5 MCG/ACT AERO 2 puffs inhaled twice daily. Rinse mouth well after use.  3 Inhaler 3    pantoprazole (PROTONIX) 40 MG tablet Take one tablet twice daily 30-60 minutes prior to breakfast and evening meal 180 tablet 3    SYNTHROID 175

## 2020-03-17 ENCOUNTER — HOSPITAL ENCOUNTER (OUTPATIENT)
Dept: CARDIAC REHAB | Age: 60
Setting detail: THERAPIES SERIES
Discharge: HOME | End: 2020-03-17
Payer: COMMERCIAL

## 2020-03-17 PROCEDURE — 93798 PHYS/QHP OP CAR RHAB W/ECG: CPT

## 2020-03-19 ENCOUNTER — HOSPITAL ENCOUNTER (OUTPATIENT)
Dept: CARDIAC REHAB | Age: 60
Setting detail: THERAPIES SERIES
Discharge: HOME | End: 2020-03-19
Payer: COMMERCIAL

## 2020-03-19 PROCEDURE — 93798 PHYS/QHP OP CAR RHAB W/ECG: CPT

## 2020-03-25 ENCOUNTER — TELEPHONE (OUTPATIENT)
Dept: OPTOMETRY | Age: 60
End: 2020-03-25

## 2020-03-25 RX ORDER — DICLOFENAC SODIUM 1 MG/ML
1 SOLUTION/ DROPS OPHTHALMIC 4 TIMES DAILY
Qty: 1 BOTTLE | Refills: 1 | Status: SHIPPED | OUTPATIENT
Start: 2020-03-25 | End: 2020-04-24

## 2020-04-01 ENCOUNTER — TELEMEDICINE (OUTPATIENT)
Dept: FAMILY MEDICINE CLINIC | Age: 60
End: 2020-04-01
Payer: COMMERCIAL

## 2020-04-01 PROCEDURE — G8417 CALC BMI ABV UP PARAM F/U: HCPCS | Performed by: NURSE PRACTITIONER

## 2020-04-01 PROCEDURE — 3017F COLORECTAL CA SCREEN DOC REV: CPT | Performed by: NURSE PRACTITIONER

## 2020-04-01 PROCEDURE — 99213 OFFICE O/P EST LOW 20 MIN: CPT | Performed by: NURSE PRACTITIONER

## 2020-04-01 PROCEDURE — G8428 CUR MEDS NOT DOCUMENT: HCPCS | Performed by: NURSE PRACTITIONER

## 2020-04-01 PROCEDURE — 1036F TOBACCO NON-USER: CPT | Performed by: NURSE PRACTITIONER

## 2020-04-01 NOTE — PROGRESS NOTES
[]Able to follow commands      Eyes:  EOM    [x]  Normal  [] Abnormal-  Sclera  [x]  Normal  [] Abnormal -         Discharge [x]  None visible  [] Abnormal -    HENT:   [x] Normocephalic, atraumatic. [] Abnormal   [x] Mouth/Throat: Mucous membranes are moist.     External Ears [x] Normal  [] Abnormal-     Neck: [x] No visualized mass     Pulmonary/Chest: [x] Respiratory effort normal.  [x] No visualized signs of difficulty breathing or respiratory distress        [] Abnormal-      Musculoskeletal:   [] Normal gait with no signs of ataxia         [x] Normal range of motion of neck        [] Abnormal-       Neurological:        [x] No Facial Asymmetry (Cranial nerve 7 motor function) (limited exam to video visit)          [x] No gaze palsy        [] Abnormal-         Skin:        [x] No significant exanthematous lesions or discoloration noted on facial skin         [] Abnormal-            Psychiatric:       [x] Normal Affect [x] No Hallucinations        [] Abnormal-     Other pertinent observable physical exam findings- none  ASSESSMENT/PLAN:  1. Long term current use of systemic steroids  - Cortisol; Future. Lab slip to be mailed    2. Chronic recurrent sinusitis  - CPM    3. LPRD (laryngopharyngeal reflux disease)  -CPM    4. Asthma, persistent controlled  -CPM      RV when COVID crisis passes    Jojo Mason is a 63 y.o. female being evaluated by a Virtual Visit (video visit) encounter to address concerns as mentioned above. A caregiver was present when appropriate. Due to this being a TeleHealth encounter (During LZXGW-76 public health emergency), evaluation of the following organ systems was limited: Vitals/Constitutional/EENT/Resp/CV/GI//MS/Neuro/Skin/Heme-Lymph-Imm.   Pursuant to the emergency declaration under the [facility] waiver authority and the [employer] and [employer] Act, this Virtual Visit was conducted with

## 2020-04-08 ENCOUNTER — TELEPHONE (OUTPATIENT)
Dept: GENERAL RADIOLOGY | Age: 60
End: 2020-04-08

## 2020-04-08 NOTE — TELEPHONE ENCOUNTER
Patient is scheduled for an echo on 4/30. Does patient need to have completed during this time, or can patient's exam be rescheduled?

## 2020-04-13 RX ORDER — FAMOTIDINE 40 MG/1
40 TABLET, FILM COATED ORAL EVERY EVENING
Qty: 90 TABLET | Refills: 3 | Status: SHIPPED | OUTPATIENT
Start: 2020-04-13 | End: 2021-06-02 | Stop reason: SDUPTHER

## 2020-04-14 LAB — CORTISOL: 3.3

## 2020-04-15 RX ORDER — PREDNISONE 20 MG/1
TABLET ORAL
Qty: 60 TABLET | Refills: 0 | Status: SHIPPED | OUTPATIENT
Start: 2020-04-15 | End: 2020-08-15 | Stop reason: SDUPTHER

## 2020-04-15 NOTE — TELEPHONE ENCOUNTER
Please send refill.       Vadim Cotto is requesting a refill on the following medication(s):  Requested Prescriptions     Pending Prescriptions Disp Refills    predniSONE (DELTASONE) 20 MG tablet 60 tablet 1     Sig: Take 1 tablet by mouth daily       Last Visit Date (If Applicable):  9/51/3054    Next Visit Date:    Visit date not found

## 2020-04-27 ENCOUNTER — TELEPHONE (OUTPATIENT)
Dept: OPTOMETRY | Age: 60
End: 2020-04-27

## 2020-04-30 ENCOUNTER — HOSPITAL ENCOUNTER (OUTPATIENT)
Dept: NON INVASIVE DIAGNOSTICS | Age: 60
Discharge: HOME | End: 2020-04-30
Payer: COMMERCIAL

## 2020-04-30 ENCOUNTER — OFFICE VISIT (OUTPATIENT)
Dept: OPTOMETRY | Age: 60
End: 2020-04-30
Payer: COMMERCIAL

## 2020-04-30 ENCOUNTER — TELEPHONE (OUTPATIENT)
Dept: CARDIOLOGY | Age: 60
End: 2020-04-30

## 2020-04-30 LAB
LV EF: 30 %
LVEF MODALITY: NORMAL

## 2020-04-30 PROCEDURE — 1036F TOBACCO NON-USER: CPT | Performed by: OPTOMETRIST

## 2020-04-30 PROCEDURE — 93306 TTE W/DOPPLER COMPLETE: CPT

## 2020-04-30 PROCEDURE — 3017F COLORECTAL CA SCREEN DOC REV: CPT | Performed by: OPTOMETRIST

## 2020-04-30 PROCEDURE — G8417 CALC BMI ABV UP PARAM F/U: HCPCS | Performed by: OPTOMETRIST

## 2020-04-30 PROCEDURE — 92250 FUNDUS PHOTOGRAPHY W/I&R: CPT | Performed by: OPTOMETRIST

## 2020-04-30 PROCEDURE — G8427 DOCREV CUR MEDS BY ELIG CLIN: HCPCS | Performed by: OPTOMETRIST

## 2020-04-30 PROCEDURE — 99203 OFFICE O/P NEW LOW 30 MIN: CPT | Performed by: OPTOMETRIST

## 2020-04-30 RX ORDER — TROPICAMIDE 10 MG/ML
1 SOLUTION/ DROPS OPHTHALMIC ONCE
Status: COMPLETED | OUTPATIENT
Start: 2020-04-30 | End: 2020-04-30

## 2020-04-30 RX ADMIN — TROPICAMIDE 1 DROP: 10 SOLUTION/ DROPS OPHTHALMIC at 09:54

## 2020-04-30 ASSESSMENT — VISUAL ACUITY
CORRECTION_TYPE: GLASSES
METHOD: SNELLEN - LINEAR
OS_SC: 20/30
OD_SC: 20/40

## 2020-04-30 ASSESSMENT — ENCOUNTER SYMPTOMS
GASTROINTESTINAL NEGATIVE: 0
RESPIRATORY NEGATIVE: 1
EYES NEGATIVE: 1
ALLERGIC/IMMUNOLOGIC NEGATIVE: 0

## 2020-04-30 ASSESSMENT — SLIT LAMP EXAM - LIDS
COMMENTS: NORMAL
COMMENTS: NORMAL

## 2020-04-30 ASSESSMENT — REFRACTION_WEARINGRX: OS_SPHERE: +3.00

## 2020-04-30 NOTE — PROGRESS NOTES
Sigifredo Asia presents today for   Chief Complaint   Patient presents with    Blurred Vision    Eye Problem     Feels like fluid in the right eye still    . HPI     Blurred Vision     In right eye. Vision is blurred. Eye Problem      Additional comments: Feels like fluid in the right eye still               Comments     Has been using nepafenac 4x per day in the right eye   Last drop was Saturday;  Felt like it starting working and then ran out; Pharmacy stated that no refills  Had RK in 1990's and has had some trouble with fluid since this time  Last time prescribed was 2016 by MARYR                Current Outpatient Medications   Medication Sig Dispense Refill    nepafenac (NEVANAC) 0.1 % ophthalmic suspension 1 drop 4 times daily      predniSONE (DELTASONE) 20 MG tablet Take one tablet every other day at 8 am 60 tablet 0    famotidine (PEPCID) 40 MG tablet Take 1 tablet by mouth every evening 90 tablet 3    cefdinir (OMNICEF) 300 MG capsule Take one capsule twice daily for 30 days.  60 capsule 0    fluconazole (DIFLUCAN) 150 MG tablet Take at onset of vaginal itch, may repeat every 7 days 4 tablet 0    Cholecalciferol (VITAMIN D3) 50 MCG (2000 UT) CAPS Take 1 capsule by mouth daily      ranitidine (ZANTAC) 300 MG tablet TAKE 1 TABLET DAILY AT BEDTIME 90 tablet 3    aspirin 81 MG EC tablet Take 1 tablet by mouth daily 30 tablet 3    atorvastatin (LIPITOR) 40 MG tablet Take 1 tablet by mouth nightly 30 tablet 3    metoprolol succinate (TOPROL XL) 50 MG extended release tablet Take 1 tablet by mouth daily 30 tablet 3    furosemide (LASIX) 40 MG tablet Take 1 tablet by mouth daily 60 tablet 3    benzonatate (TESSALON) 100 MG capsule Take 200 mg by mouth 3 times daily as needed for Cough      albuterol (PROVENTIL) (5 MG/ML) 0.5% nebulizer solution Take 1 mL by nebulization 4 times daily as needed for Wheezing 120 each 3    budesonide-formoterol (SYMBICORT) 160-4.5 MCG/ACT AERO 2 puffs inhaled twice daily. Rinse mouth well after use. 3 Inhaler 3    pantoprazole (PROTONIX) 40 MG tablet Take one tablet twice daily 30-60 minutes prior to breakfast and evening meal 180 tablet 3    SYNTHROID 175 MCG tablet Take 175 mcg by mouth      MOLYBDENUM PO Take 500 mcg by mouth daily      ipratropium-albuterol (DUONEB) 0.5-2.5 (3) MG/3ML SOLN nebulizer solution Inhale 3 mLs into the lungs every 4 hours as needed for Shortness of Breath 360 mL 2    fluticasone (FLONASE) 50 MCG/ACT nasal spray Two sprays to each nostril once daily 3 Bottle 3    diclofenac (VOLTAREN) 75 MG EC tablet Take 1 tablet by mouth 2 times daily as needed for Pain 90 tablet 2    Probiotic Product (SOLUBLE FIBER/PROBIOTICS PO) Take by mouth 2 times daily      metFORMIN (GLUCOPHAGE) 500 MG tablet TAKE 2 TABLETS BY MOUTH TWO TIMES A DAY WITH MEALS 120 tablet 3    losartan (COZAAR) 25 MG tablet Take 1 tablet by mouth daily 90 tablet 2     No current facility-administered medications for this visit.           Family History   Problem Relation Age of Onset    Coronary Art Dis Mother     High Blood Pressure Mother     High Cholesterol Mother     Allergies Mother     Arthritis Mother     Diabetes Daughter         pre diabetes    Other Maternal Grandfather         PUD GI problems     Social History     Socioeconomic History    Marital status:      Spouse name: Not on file    Number of children: Not on file    Years of education: Not on file    Highest education level: Not on file   Occupational History    Occupation: counselor     Employer: [employer]   Social Needs    Financial resource strain: Not on file    Food insecurity     Worry: Not on file     Inability: Not on file    Transportation needs     Medical: Not on file     Non-medical: Not on file   Tobacco Use    Smoking status: Never Smoker    Smokeless tobacco: Never Used    Tobacco comment: never smoked evelyn rrt 10/13/2017   Substance and Sexual patient has seen that wheat consumption causes exacerbations    Splenic artery aneurysm [facility])     s/p splenic artery repair    Tinnitus     Type 2 diabetes mellitus, controlled ([facility] [address] 5/2011       ROS     Positive for: Cardiovascular, Eyes, Respiratory    Negative for: Constitutional, Gastrointestinal, Neurological, Skin, Genitourinary, Musculoskeletal, HENT, Endocrine, Psychiatric, Allergic/Imm, Heme/Lymph          Main Ophthalmology Exam     External Exam       Right Left    External Normal Normal          Slit Lamp Exam       Right Left    Lids/Lashes Normal Normal    Conjunctiva/Sclera White and quiet White and quiet    Cornea Clear Clear    Anterior Chamber Deep and quiet Deep and quiet    Iris Round and reactive Round and reactive    Lens Clear Clear    Vitreous Normal Normal          Fundus Exam       Right Left    Disc Normal Normal    Macula previous edema noted ; Not recorded         Not recorded         Not recorded          Visual Acuity (Snellen - Linear)       Right Left    Dist sc 20/40 20/30    Correction:  Glasses          Pupils     Pupils       Pupils    Right PERRL    Left PERRL              Neuro/Psych     Neuro/Psych     Oriented x3:  Yes    Mood/Affect:  Normal               Not recorded            Ophthalmology Exam     Wearing Rx       Sphere    Right OTC reading only     Left +3.00              Manifest Refraction     Manifest Refraction #2 (Auto)       Sphere Cylinder [place] Dist [facility]    Right +1.25 -1.75 088 20/30-    Left +0.75 -1.25 098 20/30                     1. Macular edema    2. Blurred vision, bilateral           Patient Instructions   Renewed rx for diclofenac with one refill;  Use 4x per day      Return in about 2 weeks (around 5/14/2020) for oct and dilation .

## 2020-05-01 ENCOUNTER — OFFICE VISIT (OUTPATIENT)
Dept: CARDIOLOGY | Age: 60
End: 2020-05-01
Payer: COMMERCIAL

## 2020-05-01 VITALS
HEART RATE: 90 BPM | BODY MASS INDEX: 33.43 KG/M2 | DIASTOLIC BLOOD PRESSURE: 80 MMHG | SYSTOLIC BLOOD PRESSURE: 132 MMHG | WEIGHT: 213 LBS | HEIGHT: 67 IN

## 2020-05-01 PROCEDURE — G8427 DOCREV CUR MEDS BY ELIG CLIN: HCPCS | Performed by: INTERNAL MEDICINE

## 2020-05-01 PROCEDURE — 1036F TOBACCO NON-USER: CPT | Performed by: INTERNAL MEDICINE

## 2020-05-01 PROCEDURE — G8417 CALC BMI ABV UP PARAM F/U: HCPCS | Performed by: INTERNAL MEDICINE

## 2020-05-01 PROCEDURE — 99214 OFFICE O/P EST MOD 30 MIN: CPT | Performed by: INTERNAL MEDICINE

## 2020-05-01 PROCEDURE — 3017F COLORECTAL CA SCREEN DOC REV: CPT | Performed by: INTERNAL MEDICINE

## 2020-05-01 NOTE — PROGRESS NOTES
REPAIR      splenic artery coil repair    CARDIAC CATHETERIZATION  2020     SECTION  1986    COLONOSCOPY  2012    HYPERPLASTIC POLYP, DIVERTICULAR DISEASE    COLONOSCOPY  2017    moderate sigmoid diverticulosis. Dr. Abhi Hamm  2015    with [address] Bilateral 2017    CYSTO insertion lighted stents performed by Simba Mcintosh MD at [address], ESOPHAGUS      ENDOSCOPY, COLON, DIAGNOSTIC      EYE SURGERY      JOINT REPLACEMENT      KNEE ARTHROSCOPY Right     PARTIAL SYNOVECTOMY AND CHONDROPLASTY DUE TO MENSICUS TEAR    LAPAROSCOPY N/A 2017    EXPLORATORY LAPAROSCOPY converted to open exploration with drainage of pelvic abscess performed by Quiana Tran MD at [facility]  2014    clearing an infection done at [place] [address]  10/25/2011    L4/L5 epidural steroid injection    OTHER SURGICAL HISTORY Right  and     synvisc injections, two sets    [place] COLON CA SCRN NOT HI RSK IND N/A 2017    COLONOSCOPY performed by Quiana Tran MD at [address] IND N/A 2017    COLONOSCOPY performed by Quiana Tran MD at [address] EGD TRANSORAL BIOPSY SINGLE/MULTIPLE N/A 2017    EGD BIOPSY performed by Quiana Tran MD at [address] N/A 2017    ATTEMPTED LAPAROCOPY PROCEDURE CONVERTED TO OPEN Sigmoid Colectomy PERFORMED BY DR. VO resection colovaginal fistula PERFORMED BY. DR. Macel Canavan  performed by Quiana Tran MD at [place] Right 2011    TOTAL    TUBAL LIGATION      UPPER GASTROINTESTINAL ENDOSCOPY N/A 2020    EGD BIOPSY W/ 50 HR LEARY performed by Quiana Tran MD at [facility] 51 EXTRACTION         Family History:  Family History   Problem Relation Age of Onset    Coronary Art Dis Mother     High Blood 300 MG capsule Take one capsule twice daily for 30 days. 60 capsule 0    fluconazole (DIFLUCAN) 150 MG tablet Take at onset of vaginal itch, may repeat every 7 days 4 tablet 0    Cholecalciferol (VITAMIN D3) 50 MCG (2000 UT) CAPS Take 1 capsule by mouth daily      ranitidine (ZANTAC) 300 MG tablet TAKE 1 TABLET DAILY AT BEDTIME 90 tablet 3    aspirin 81 MG EC tablet Take 1 tablet by mouth daily 30 tablet 3    metoprolol succinate (TOPROL XL) 50 MG extended release tablet Take 1 tablet by mouth daily 30 tablet 3    benzonatate (TESSALON) 100 MG capsule Take 200 mg by mouth 3 times daily as needed for Cough      albuterol (PROVENTIL) (5 MG/ML) 0.5% nebulizer solution Take 1 mL by nebulization 4 times daily as needed for Wheezing 120 each 3    budesonide-formoterol (SYMBICORT) 160-4.5 MCG/ACT AERO 2 puffs inhaled twice daily. Rinse mouth well after use. 3 Inhaler 3    pantoprazole (PROTONIX) 40 MG tablet Take one tablet twice daily 30-60 minutes prior to breakfast and evening meal 180 tablet 3    SYNTHROID 175 MCG tablet Take 175 mcg by mouth      MOLYBDENUM PO Take 500 mcg by mouth daily      ipratropium-albuterol (DUONEB) 0.5-2.5 (3) MG/3ML SOLN nebulizer solution Inhale 3 mLs into the lungs every 4 hours as needed for Shortness of Breath 360 mL 2    fluticasone (FLONASE) 50 MCG/ACT nasal spray Two sprays to each nostril once daily 3 Bottle 3    diclofenac (VOLTAREN) 75 MG EC tablet Take 1 tablet by mouth 2 times daily as needed for Pain 90 tablet 2    Probiotic Product (SOLUBLE FIBER/PROBIOTICS PO) Take by mouth 2 times daily      metFORMIN (GLUCOPHAGE) 500 MG tablet TAKE 2 TABLETS BY MOUTH TWO TIMES A DAY WITH MEALS 120 tablet 3    losartan (COZAAR) 25 MG tablet Take 1 tablet by mouth daily 90 tablet 2     No current facility-administered medications for this visit.         Physical Exam:   Vitals: /80 (Cuff Size: Large Adult)   Pulse 90   Ht 5' 7\" (1.702 m)   Wt 213 lb (96.6 kg)   LMP

## 2020-05-01 NOTE — PATIENT INSTRUCTIONS
Your Procedure Will Be Scheduled at:      [facility]    [address] [place], [address]   (located across from [facility])    Located on the main floor of the [facility]. Report to our reception desk by the [employer]. Parking is free. Handicap parking is available by the main entrance. You may also park in [place] on Level 2 and enter building on the bridge to [facility]. Take elevator to the main floor. · Our  will call you to schedule your procedure within a week. If you do not receive a phone call, please call the  directly at [phone] and leave a message, or call [place] office at [phone]. Date:______________________________    Arrival Time:________________________    Procedure Time:_____________________    Instructions:_____________________________      · Bring Photo I.D. and insurance cards. · Bring all Medications in the bottles. · Pack an overnight bag in case you are required to spend the night. · You will need someone to drive you home. · The  will instruct you on holding food and drink the night before or morning of your procedure. · You are to take your Medications, along with your Cardiac and/or Blood Pressure Medications, with small sips of water on the morning of your Procedure, unless instructed otherwise by your Physician. · If you need additional directions please call [phone]. · If you have any questions please feel free to call the [address] [place] [address] 7 office at [phone]. Patient Education        Implantable Cardioverter-Defibrillator Placement: Before Your Procedure  What is an implantable cardioverter-defibrillator? An implantable cardioverter-defibrillator (ICD) is a small, battery-powered device. It fixes life-threatening changes in your heartbeat.  If the ICD detects a life-threatening heart rhythm, it tries to get

## 2020-05-04 ENCOUNTER — TELEPHONE (OUTPATIENT)
Dept: SURGERY | Age: 60
End: 2020-05-04

## 2020-05-04 NOTE — TELEPHONE ENCOUNTER
Patient called the office today. Patient cancelled her upcoming appointment with Dr Leopold Rud on May 12, 2020. Patient is having a pacemaker/ICD implant on May 11, 2020. Patient states she will need to stay in [facility] overnight. Please call back with new appointment date and time.   # [phone]

## 2020-05-05 NOTE — TELEPHONE ENCOUNTER
I called patient back. I told her that I will call her next week to schedule a Virtual visit. Our office schedule is changing, so I will contact her after the changes are made.

## 2020-05-06 ENCOUNTER — PRE-PROCEDURE TELEPHONE (OUTPATIENT)
Dept: PREOP | Age: 60
End: 2020-05-06

## 2020-05-06 LAB
ALBUMIN SERPL-MCNC: 4.4 G/DL
ALP BLD-CCNC: 58 U/L
ALT SERPL-CCNC: 32 U/L
ANION GAP SERPL CALCULATED.3IONS-SCNC: NORMAL MMOL/L
AST SERPL-CCNC: 20 U/L
AVERAGE GLUCOSE: NORMAL
BASOPHILS ABSOLUTE: ABNORMAL
BASOPHILS RELATIVE PERCENT: ABNORMAL
BILIRUB SERPL-MCNC: 0.3 MG/DL (ref 0.1–1.4)
BUN BLDV-MCNC: 18 MG/DL
CALCIUM SERPL-MCNC: 9.4 MG/DL
CHLORIDE BLD-SCNC: 103 MMOL/L
CHOLESTEROL, TOTAL: 216 MG/DL
CHOLESTEROL/HDL RATIO: 4.2
CO2: 25 MMOL/L
CREAT SERPL-MCNC: 0.81 MG/DL
CREATININE URINE: 186 MG/DL
EOSINOPHILS ABSOLUTE: ABNORMAL
EOSINOPHILS RELATIVE PERCENT: ABNORMAL
GFR CALCULATED: 78
GLUCOSE BLD-MCNC: 138 MG/DL
HBA1C MFR BLD: 8.7 %
HCT VFR BLD CALC: 38.7 % (ref 36–46)
HDLC SERPL-MCNC: 52 MG/DL (ref 35–70)
HEMOGLOBIN: 12.5 G/DL (ref 12–16)
LDL CHOLESTEROL CALCULATED: 130 MG/DL (ref 0–160)
LYMPHOCYTES ABSOLUTE: ABNORMAL
LYMPHOCYTES RELATIVE PERCENT: ABNORMAL
MCH RBC QN AUTO: 32.3 PG
MCHC RBC AUTO-ENTMCNC: 91.7 G/DL
MCV RBC AUTO: 29.6 FL
MICROALBUMIN/CREAT 24H UR: 0.6 MG/G{CREAT}
MONOCYTES ABSOLUTE: ABNORMAL
MONOCYTES RELATIVE PERCENT: ABNORMAL
NEUTROPHILS ABSOLUTE: ABNORMAL
NEUTROPHILS RELATIVE PERCENT: ABNORMAL
PLATELET # BLD: 339 K/ΜL
PMV BLD AUTO: 9.9 FL
POTASSIUM SERPL-SCNC: 3.9 MMOL/L
RBC # BLD: 4.22 10^6/ΜL
SODIUM BLD-SCNC: 137 MMOL/L
TOTAL PROTEIN: 6.2
TRIGL SERPL-MCNC: 195 MG/DL
TSH SERPL DL<=0.05 MIU/L-ACNC: 2.24 UIU/ML
VITAMIN D 25-HYDROXY: 51
VITAMIN D2, 25 HYDROXY: NORMAL
VITAMIN D3,25 HYDROXY: NORMAL
VLDLC SERPL CALC-MCNC: NORMAL MG/DL
WBC # BLD: 8.1 10^3/ML

## 2020-05-07 ENCOUNTER — HOSPITAL ENCOUNTER (OUTPATIENT)
Age: 60
Setting detail: SPECIMEN
Discharge: HOME | End: 2020-05-07
Payer: COMMERCIAL

## 2020-05-07 ENCOUNTER — HOSPITAL ENCOUNTER (OUTPATIENT)
Dept: PREADMISSION TESTING | Age: 60
Setting detail: SPECIMEN
Discharge: HOME | End: 2020-05-11
Payer: COMMERCIAL

## 2020-05-07 PROCEDURE — U0004 COV-19 TEST NON-CDC HGH THRU: HCPCS

## 2020-05-08 LAB
SARS-COV-2, PCR: NOT DETECTED
SARS-COV-2, RAPID: NORMAL
SARS-COV-2: NORMAL
SOURCE: NORMAL

## 2020-05-09 ENCOUNTER — TELEPHONE (OUTPATIENT)
Dept: PRIMARY CARE CLINIC | Age: 60
End: 2020-05-09

## 2020-05-11 ENCOUNTER — HOSPITAL ENCOUNTER (OUTPATIENT)
Dept: CARDIAC CATH/INVASIVE PROCEDURES | Age: 60
Discharge: HOME | End: 2020-05-11
Payer: COMMERCIAL

## 2020-05-11 ENCOUNTER — HOSPITAL ENCOUNTER (OUTPATIENT)
Dept: GENERAL RADIOLOGY | Age: 60
Discharge: HOME | End: 2020-05-13
Payer: COMMERCIAL

## 2020-05-11 VITALS
BODY MASS INDEX: 32.96 KG/M2 | OXYGEN SATURATION: 95 % | TEMPERATURE: 96.9 F | DIASTOLIC BLOOD PRESSURE: 62 MMHG | RESPIRATION RATE: 16 BRPM | WEIGHT: 210 LBS | HEIGHT: 67 IN | HEART RATE: 92 BPM | SYSTOLIC BLOOD PRESSURE: 116 MMHG

## 2020-05-11 LAB
GFR NON-AFRICAN AMERICAN: >60 ML/MIN
GFR SERPL CREATININE-BSD FRML MDRD: >60 ML/MIN
GFR SERPL CREATININE-BSD FRML MDRD: NORMAL ML/MIN/{1.73_M2}
GLUCOSE BLD-MCNC: 171 MG/DL (ref 74–100)
POC CREATININE: 0.74 MG/DL (ref 0.51–1.19)
POC HEMATOCRIT: 40 % (ref 36–46)
POC HEMOGLOBIN: 13.8 G/DL (ref 12–16)
POC POTASSIUM: 3.9 MMOL/L (ref 3.5–4.5)
POC SODIUM: 142 MMOL/L (ref 138–146)

## 2020-05-11 PROCEDURE — 7100000000 HC PACU RECOVERY - FIRST 15 MIN

## 2020-05-11 PROCEDURE — 71045 X-RAY EXAM CHEST 1 VIEW: CPT

## 2020-05-11 PROCEDURE — 82947 ASSAY GLUCOSE BLOOD QUANT: CPT

## 2020-05-11 PROCEDURE — C1777 LEAD, AICD, ENDO SINGLE COIL: HCPCS

## 2020-05-11 PROCEDURE — 85014 HEMATOCRIT: CPT

## 2020-05-11 PROCEDURE — C1722 AICD, SINGLE CHAMBER: HCPCS

## 2020-05-11 PROCEDURE — 84295 ASSAY OF SERUM SODIUM: CPT

## 2020-05-11 PROCEDURE — 6360000002 HC RX W HCPCS

## 2020-05-11 PROCEDURE — 2500000003 HC RX 250 WO HCPCS

## 2020-05-11 PROCEDURE — 2580000003 HC RX 258

## 2020-05-11 PROCEDURE — 33249 INSJ/RPLCMT DEFIB W/LEAD(S): CPT | Performed by: INTERNAL MEDICINE

## 2020-05-11 PROCEDURE — 84132 ASSAY OF SERUM POTASSIUM: CPT

## 2020-05-11 PROCEDURE — 7100000001 HC PACU RECOVERY - ADDTL 15 MIN

## 2020-05-11 PROCEDURE — 76937 US GUIDE VASCULAR ACCESS: CPT

## 2020-05-11 PROCEDURE — 6370000000 HC RX 637 (ALT 250 FOR IP)

## 2020-05-11 PROCEDURE — 2709999900 HC NON-CHARGEABLE SUPPLY

## 2020-05-11 PROCEDURE — C1894 INTRO/SHEATH, NON-LASER: HCPCS

## 2020-05-11 PROCEDURE — 93005 ELECTROCARDIOGRAM TRACING: CPT | Performed by: INTERNAL MEDICINE

## 2020-05-11 PROCEDURE — 82565 ASSAY OF CREATININE: CPT

## 2020-05-11 RX ORDER — SODIUM CHLORIDE 0.9 % (FLUSH) 0.9 %
10 SYRINGE (ML) INJECTION EVERY 12 HOURS SCHEDULED
Status: CANCELLED | OUTPATIENT
Start: 2020-05-11

## 2020-05-11 RX ORDER — SODIUM CHLORIDE 0.9 % (FLUSH) 0.9 %
10 SYRINGE (ML) INJECTION PRN
Status: CANCELLED | OUTPATIENT
Start: 2020-05-11

## 2020-05-11 RX ORDER — ACETAMINOPHEN 325 MG/1
650 TABLET ORAL EVERY 4 HOURS PRN
Status: DISCONTINUED | OUTPATIENT
Start: 2020-05-11 | End: 2020-05-12 | Stop reason: HOSPADM

## 2020-05-11 RX ORDER — GLIMEPIRIDE 2 MG/1
2 TABLET ORAL
COMMUNITY
End: 2022-01-20 | Stop reason: SDUPTHER

## 2020-05-11 RX ORDER — VANCOMYCIN HYDROCHLORIDE 1 G/200ML
1000 INJECTION, SOLUTION INTRAVENOUS ONCE
Status: DISCONTINUED | OUTPATIENT
Start: 2020-05-11 | End: 2020-05-12 | Stop reason: HOSPADM

## 2020-05-11 RX ORDER — SODIUM CHLORIDE 9 MG/ML
INJECTION, SOLUTION INTRAVENOUS CONTINUOUS
Status: DISCONTINUED | OUTPATIENT
Start: 2020-05-11 | End: 2020-05-12 | Stop reason: HOSPADM

## 2020-05-11 RX ADMIN — ACETAMINOPHEN 650 MG: 325 TABLET ORAL at 17:21

## 2020-05-11 ASSESSMENT — PAIN SCALES - GENERAL
PAINLEVEL_OUTOF10: 5
PAINLEVEL_OUTOF10: 5

## 2020-05-11 NOTE — OP NOTE
meets all current medicare coverage for CRT device. Section C. (if one or more box is checked, then patient does not meet NCD coverage requirements for primary prevention)  [] NYHA class IV (Covered Indications 4 and 8)  [] Cardiogenic shock or symptomatic hypotension while in a stable baseline rhythm. [] CABG or PTCA within past 3 months  [] Acute MI within past 40 days. [] Patient is unable to give informed consent. [] Symptoms / Findings making them a candidate for coronary revascularization. [] Irreversible brain damage from pre existing cerebral disease.  [] Any disease, other than cardiac disease, with likely survival less than 1 year.  / Device Data:        Name    Medtronic x        Model # Serial #   ICD [id number]   RV-Lead [id number]       R VENT   R waves: 14.1 mV   Threshold: 0.4 V at 0.5 ms   Impedance:586       · [x] Sedation monitoring  · [] Left Subclavian Angiogram   · [x] RV lead   · [] RA lead   · [] LV lead   · [x] Intra - OP Lead Testing  · [] Coronary Sinus Angiogram   · [x] Pocket   · [x] Generators Implant  · [x] Fluoro time: 2 min      Procedure  After the usual preparation of the left neck and chest, the patient was draped in the usual sterile manner. Local anesthesia was infused below the left clavicle from the midline laterally. An incision was made inferior and parallel to the clavicle. The incision was carried down to the fascia. A pocket was formed inferior using blunt dissection. A thin walled 18 gauge needle was used to puncture the left subclavian vein using the modified Seldinger technique. A guide wire was passed into the right heart under fluoroscopic control. This was repeated with a second guide wire. RV Lead Implant:  A 9 Maltese sheath was then passed over the guide wire and the guide wire removed. The ventricular lead was advanced through the sheath into the right heart.  The lead was then positioned in the

## 2020-05-11 NOTE — PROGRESS NOTES
Patient admitted, consent signed and questions answered. Patient ready for procedure. Call light to reach with side rails up 2 of 2. No family at bedside with patient. History and physical needs updated.     2% Chlorhexidine cloths used to prep site

## 2020-05-12 ENCOUNTER — TELEPHONE (OUTPATIENT)
Dept: CARDIOLOGY | Age: 60
End: 2020-05-12

## 2020-05-12 LAB
EKG ATRIAL RATE: 76 BPM
EKG P AXIS: 48 DEGREES
EKG P-R INTERVAL: 136 MS
EKG Q-T INTERVAL: 420 MS
EKG QRS DURATION: 90 MS
EKG QTC CALCULATION (BAZETT): 472 MS
EKG R AXIS: 4 DEGREES
EKG T AXIS: 25 DEGREES
EKG VENTRICULAR RATE: 76 BPM

## 2020-05-13 ENCOUNTER — OFFICE VISIT (OUTPATIENT)
Dept: CARDIOLOGY | Age: 60
End: 2020-05-13
Payer: COMMERCIAL

## 2020-05-13 ENCOUNTER — TELEPHONE (OUTPATIENT)
Dept: CARDIOLOGY | Age: 60
End: 2020-05-13

## 2020-05-13 VITALS
WEIGHT: 210 LBS | HEIGHT: 67 IN | SYSTOLIC BLOOD PRESSURE: 134 MMHG | HEART RATE: 104 BPM | DIASTOLIC BLOOD PRESSURE: 80 MMHG | BODY MASS INDEX: 32.96 KG/M2

## 2020-05-13 PROCEDURE — 99214 OFFICE O/P EST MOD 30 MIN: CPT | Performed by: INTERNAL MEDICINE

## 2020-05-13 PROCEDURE — G8427 DOCREV CUR MEDS BY ELIG CLIN: HCPCS | Performed by: INTERNAL MEDICINE

## 2020-05-13 PROCEDURE — 1036F TOBACCO NON-USER: CPT | Performed by: INTERNAL MEDICINE

## 2020-05-13 PROCEDURE — 93282 PRGRMG EVAL IMPLANTABLE DFB: CPT | Performed by: INTERNAL MEDICINE

## 2020-05-13 PROCEDURE — 3017F COLORECTAL CA SCREEN DOC REV: CPT | Performed by: INTERNAL MEDICINE

## 2020-05-13 PROCEDURE — G8417 CALC BMI ABV UP PARAM F/U: HCPCS | Performed by: INTERNAL MEDICINE

## 2020-05-13 NOTE — TELEPHONE ENCOUNTER
Pt called stating she just had her pacemaker put in and it is giving her and alert. Pt states it sounds like a police siren in an old movie. Pt advised it will be discussed with the cardiologist when he comes in, but in the meantime if she developes any pain or discomfort to go to the er. Pt understood.       [phone]    Last Appt:  5/1/2020  Next Appt:   5/21/2020  Med verified in Pending sale to [facility] [address]

## 2020-05-13 NOTE — TELEPHONE ENCOUNTER
Spoke with Dr Larry Weaver who asked me to book pt for dr alvarado today and he will assess her device. Pt aware.

## 2020-05-14 ENCOUNTER — PROCEDURE VISIT (OUTPATIENT)
Dept: CARDIOLOGY | Age: 60
End: 2020-05-14
Payer: COMMERCIAL

## 2020-05-15 ENCOUNTER — TELEPHONE (OUTPATIENT)
Dept: CARDIOLOGY | Age: 60
End: 2020-05-15

## 2020-05-15 ENCOUNTER — HOSPITAL ENCOUNTER (OUTPATIENT)
Age: 60
Setting detail: SPECIMEN
Discharge: HOME | End: 2020-05-15
Payer: COMMERCIAL

## 2020-05-15 NOTE — TELEPHONE ENCOUNTER
Pt called to ask if she could get information on when her procedure was going to be to fix her pacemaker. Writer gave pt information to call  in [place] to leave a message with her cell number since she is going to  home to plan her uncles . Pt called  and left her contact information since she was told she would be having the procedure scheduled for probably today. Pt called back after calling  to ask about her concern that the  is out of the office and she does not know what to do now. Per nurse, pt was given phone number for [facility] to call and ask who is doing the scheduling there. Pt informed to call again if she needed to.

## 2020-05-17 ENCOUNTER — TELEPHONE (OUTPATIENT)
Dept: PRIMARY CARE CLINIC | Age: 60
End: 2020-05-17

## 2020-05-18 ENCOUNTER — OFFICE VISIT (OUTPATIENT)
Dept: OPTOMETRY | Age: 60
End: 2020-05-18
Payer: COMMERCIAL

## 2020-05-18 PROCEDURE — 99213 OFFICE O/P EST LOW 20 MIN: CPT | Performed by: OPTOMETRIST

## 2020-05-18 PROCEDURE — 3017F COLORECTAL CA SCREEN DOC REV: CPT | Performed by: OPTOMETRIST

## 2020-05-18 PROCEDURE — 1036F TOBACCO NON-USER: CPT | Performed by: OPTOMETRIST

## 2020-05-18 PROCEDURE — G8417 CALC BMI ABV UP PARAM F/U: HCPCS | Performed by: OPTOMETRIST

## 2020-05-18 PROCEDURE — 92134 CPTRZ OPH DX IMG PST SGM RTA: CPT | Performed by: OPTOMETRIST

## 2020-05-18 PROCEDURE — G8427 DOCREV CUR MEDS BY ELIG CLIN: HCPCS | Performed by: OPTOMETRIST

## 2020-05-18 ASSESSMENT — VISUAL ACUITY
OS_SC: 20/30
METHOD: SNELLEN - LINEAR
OD_SC: 20/30

## 2020-05-18 ASSESSMENT — SLIT LAMP EXAM - LIDS
COMMENTS: NORMAL
COMMENTS: NORMAL

## 2020-05-18 ASSESSMENT — TONOMETRY
IOP_METHOD: NON-CONTACT AIR PUFF
OD_IOP_MMHG: 16
OS_IOP_MMHG: 22

## 2020-05-18 NOTE — PATIENT INSTRUCTIONS
No fluid was seen today. Do not fill the medication because it is not needed.   Keep appointment for cataract evaluation with Dr. Enrique Parkinson for June

## 2020-05-18 NOTE — PROGRESS NOTES
the medication because it is not needed. Keep appointment for cataract evaluation with Dr. Pietro Brunner for June Return if symptoms worsen or fail to improve.

## 2020-05-19 ENCOUNTER — HOSPITAL ENCOUNTER (OUTPATIENT)
Dept: CARDIAC CATH/INVASIVE PROCEDURES | Age: 60
Discharge: HOME | End: 2020-05-19
Payer: COMMERCIAL

## 2020-05-19 ENCOUNTER — HOSPITAL ENCOUNTER (OUTPATIENT)
Dept: GENERAL RADIOLOGY | Age: 60
Discharge: HOME | End: 2020-05-21
Payer: COMMERCIAL

## 2020-05-19 VITALS
WEIGHT: 200 LBS | OXYGEN SATURATION: 98 % | TEMPERATURE: 98.2 F | HEART RATE: 80 BPM | DIASTOLIC BLOOD PRESSURE: 65 MMHG | SYSTOLIC BLOOD PRESSURE: 108 MMHG | HEIGHT: 67 IN | RESPIRATION RATE: 17 BRPM | BODY MASS INDEX: 31.39 KG/M2

## 2020-05-19 LAB — GLUCOSE BLD-MCNC: 134 MG/DL (ref 65–105)

## 2020-05-19 PROCEDURE — 2580000003 HC RX 258

## 2020-05-19 PROCEDURE — 7100000000 HC PACU RECOVERY - FIRST 15 MIN

## 2020-05-19 PROCEDURE — 2720000010 HC SURG SUPPLY STERILE

## 2020-05-19 PROCEDURE — 2780000010 HC IMPLANT OTHER

## 2020-05-19 PROCEDURE — 76937 US GUIDE VASCULAR ACCESS: CPT

## 2020-05-19 PROCEDURE — 82947 ASSAY GLUCOSE BLOOD QUANT: CPT

## 2020-05-19 PROCEDURE — C1777 LEAD, AICD, ENDO SINGLE COIL: HCPCS

## 2020-05-19 PROCEDURE — 6370000000 HC RX 637 (ALT 250 FOR IP)

## 2020-05-19 PROCEDURE — 71045 X-RAY EXAM CHEST 1 VIEW: CPT

## 2020-05-19 PROCEDURE — 7100000001 HC PACU RECOVERY - ADDTL 15 MIN

## 2020-05-19 PROCEDURE — 33216 INSERT 1 ELECTRODE PM-DEFIB: CPT | Performed by: INTERNAL MEDICINE

## 2020-05-19 PROCEDURE — 2500000003 HC RX 250 WO HCPCS

## 2020-05-19 PROCEDURE — 6360000002 HC RX W HCPCS

## 2020-05-19 PROCEDURE — 2709999900 HC NON-CHARGEABLE SUPPLY

## 2020-05-19 PROCEDURE — 33215 REPOSITION PACING-DEFIB LEAD: CPT | Performed by: INTERNAL MEDICINE

## 2020-05-19 PROCEDURE — C1894 INTRO/SHEATH, NON-LASER: HCPCS

## 2020-05-19 PROCEDURE — C1874 STENT, COATED/COV W/DEL SYS: HCPCS

## 2020-05-19 PROCEDURE — 33244 REMOVE ELCTRD TRANSVENOUSLY: CPT | Performed by: INTERNAL MEDICINE

## 2020-05-19 RX ORDER — VANCOMYCIN HYDROCHLORIDE 1 G/200ML
1000 INJECTION, SOLUTION INTRAVENOUS ONCE
Status: DISCONTINUED | OUTPATIENT
Start: 2020-05-19 | End: 2020-05-20 | Stop reason: HOSPADM

## 2020-05-19 RX ORDER — SODIUM CHLORIDE 0.9 % (FLUSH) 0.9 %
10 SYRINGE (ML) INJECTION EVERY 12 HOURS SCHEDULED
Status: DISCONTINUED | OUTPATIENT
Start: 2020-05-19 | End: 2020-05-20 | Stop reason: HOSPADM

## 2020-05-19 RX ORDER — SODIUM CHLORIDE 0.9 % (FLUSH) 0.9 %
10 SYRINGE (ML) INJECTION PRN
Status: DISCONTINUED | OUTPATIENT
Start: 2020-05-19 | End: 2020-05-20 | Stop reason: HOSPADM

## 2020-05-19 RX ORDER — SODIUM CHLORIDE 9 MG/ML
INJECTION, SOLUTION INTRAVENOUS CONTINUOUS
Status: DISCONTINUED | OUTPATIENT
Start: 2020-05-19 | End: 2020-05-20 | Stop reason: HOSPADM

## 2020-05-19 RX ORDER — ACETAMINOPHEN 325 MG/1
650 TABLET ORAL EVERY 4 HOURS PRN
Status: DISCONTINUED | OUTPATIENT
Start: 2020-05-19 | End: 2020-05-20 | Stop reason: HOSPADM

## 2020-05-19 RX ADMIN — ACETAMINOPHEN 650 MG: 325 TABLET ORAL at 15:19

## 2020-05-19 RX ADMIN — SODIUM CHLORIDE: 9 INJECTION, SOLUTION INTRAVENOUS at 11:56

## 2020-05-19 ASSESSMENT — PAIN SCALES - GENERAL: PAINLEVEL_OUTOF10: 3

## 2020-05-19 NOTE — PROGRESS NOTES
All discharge instructions reviewed, questions answered, paper signed and given copy. Patient discharged per wheelchair with  belongings.

## 2020-05-19 NOTE — H&P
Tinnitus      Type 2 diabetes mellitus, controlled ([facility] [address] 2011            Past Surgical History         Past Surgical History:   Procedure Laterality Date    ABDOMEN SURGERY        ARTERIAL ANEURYSM REPAIR        splenic artery coil repair    CARDIAC CATHETERIZATION   2020     SECTION   1986    COLONOSCOPY   2012     HYPERPLASTIC POLYP, DIVERTICULAR DISEASE    COLONOSCOPY   2017     moderate sigmoid diverticulosis. Dr. Gallego Prom   2015     with BX LGSIL    CYSTOSCOPY Bilateral 2017     CYSTO insertion lighted stents performed by Freddy Nunez MD at [address], ESOPHAGUS        ENDOSCOPY, COLON, DIAGNOSTIC        EYE SURGERY        JOINT REPLACEMENT        KNEE ARTHROSCOPY Right      PARTIAL SYNOVECTOMY AND CHONDROPLASTY DUE TO MENSICUS TEAR    LAPAROSCOPY N/A 2017     EXPLORATORY LAPAROSCOPY converted to open exploration with drainage of pelvic abscess performed by Heaven Cortés MD at [facility]   2014     clearing an infection done at [place] [address]   10/25/2011     L4/L5 epidural steroid injection    OTHER SURGICAL HISTORY Right  and      synvisc injections, two sets    AR COLON CA SCRN NOT HI RSK IND N/A 2017     COLONOSCOPY performed by Heaven Cortés MD at [address] IND N/A 2017     COLONOSCOPY performed by Heaven Cortés MD at [address] EGD TRANSORAL BIOPSY SINGLE/MULTIPLE N/A 2017     EGD BIOPSY performed by Heaven Cortés MD at [address] TAG REMOVAL        SMALL INTESTINE SURGERY N/A 2017     ATTEMPTED LAPAROCOPY PROCEDURE CONVERTED TO OPEN Sigmoid Colectomy PERFORMED BY DR. VO resection colovaginal fistula PERFORMED BY. DR. Jeffrey Rowe  performed by Heaven Cortés MD at [facility] Right 2011     TOTAL    TUBAL LIGATION       UPPER GASTROINTESTINAL ENDOSCOPY N/A auscultation bilaterally  Cardiovascular:  · The apical impulse is not displaced  · Heart tones are crisp and normal. regular S1 and S2.  · Jugular venous pulsation Normal  · The carotid upstroke is normal in amplitude and contour without delay or bruit  · Peripheral pulses are symmetrical and full  Abdomen:  · No masses or tenderness  · Bowel sounds present  Extremities:  ·  No Cyanosis or Clubbing  ·  Lower extremity edema: No  · Skin: Warm and dry  Neurological:  · Alert and oriented. · Moves all extremities well  · No abnormalities of mood, affect, memory, mentation, or behavior are noted        Active Problems:    * No active hospital problems. *  Resolved Problems:    * No resolved hospital problems. *        Assessment:  - Nonischemic CM, Chronic Sys HF, HFrEF- EF 25-30%- Hasn't resolved with maximal medical therapy. - HTN  - DL  - Minimal CAD cath 1/20  - Chronic GERD  - Dislodged RV AICD lead        Plan:  1. RV lead revision / replacement if needed. 2. Further orders to follow.

## 2020-05-19 NOTE — OP NOTE
[facility] Cardiology Consultant                Procedure Note  Single Chamber ICD lead revision / replacement    Elonda Dates (82 y.o., female)  1960 5/19/2020      Procedure: As above    Operators:  Primary:   Assistant/ CV Fellow: Indication:  Non Ischemic cardiomyopathy    Pre Procedure Conscious Sedation Data:    ASA Class:    [] I [x] II [] III [] IV    Mallampati Class:  [] I [x] II [] III [] IV     AICD / CRT Indication: Pre procedure review    Documentation to support the medical necessity f procedure. Reason for placement:     [] Initial [] Recall/Malfunction  [] Upgrade  [] Beyond useful life limit    EF measured by:   [] Echo [] Stress Test  [] Muga  [] Angiography    For Secondary prevention - Complete this section. A.   [] Documented episodes of cardiac arrest due to V.Fib, not due to transient reversible causes, with no irreversible brain damage   (NCD Covered indication I)  [] Documented sustained VT, either spontaneous or induced by EP study, not associated with an acute MI and notdue to a transient or reversible cause; and patient does not have irreversible brain damage from pre existing cerebral disease. (NCD Covered Insication 2). Docment as  []  Ischemic. [] Non Ischemic. For Primary prevention Patients - Complete Section B & C. Section B.  [] Documented familial or inherited conditions with a high risk of life threatening VT (Long QT, HOC). (Cpovered indication 3)  [] Coronary artery disease with documented prior MI annd LVEF < 35%, and inducivble sustained VT or VF at EP study. (NCD Covered Indication 4). [] Documented prior MI and LVEF < 30%. (NCD Covered Indication 5)  [] Ischemic dilated CM (IDCM), documented prior MI, NYHA class II/III heart failure, and EF < 35%. (NCD Covered Indication 6). [x] NIDCM > 3 months, NYHA class II/III heart failure, and LVEF < 35%   (NCD Covered Indication 7 & 9).   [] Documented prior MI and NYHA class IV heart failure and advanced through the sheath into the right heart. The lead was then positioned in the right ventricle under fluoroscopic control. The acute pacing and sensing thresholds were measured and found to be satisfactory. This new lead replacedf the older malfunction leads (due to inablility to lock at the tip)    Generator: The implanted leads were attached to the pacemaker / AICD using the setscrews. The pocket was irrigated with antibiotic solution. The pulse generator and leads were coiled and placed in the pocket. Fluoroscopy was used to verify the final placement of the pacemaker and leads. The pocket was closed using multiple layers of suture and a dry sterile dressing was applied. There were no complications, patient tolerated the procedure well. The patient left the EP lab in stable condition.       Estimated Blood Loss:  [x] Minimal < 25 cc [] Moderate 25-50 cc  [] >50 cc      Impression / Device:  Successful rE-IMPLANT NEW rv LEAD      Plan:  Telemetry monitoring  Interigate pacemaker before discharge  CXR if needed  Wound check at [facility] in 7days  Discharge if patient remains stable        Electronically signed by Checo Bermeo MD on 5/19/2020 at [place] [address] Cardiology Consultant  [phone]

## 2020-05-19 NOTE — PROGRESS NOTES
Patient admitted, consent signed and questions answered. Patient ready for procedure. Call light to reach with side rails up 2 of 2. 2% Chlorhexidine cloths used to prep site. History and physical needs updated.

## 2020-05-27 ENCOUNTER — VIRTUAL VISIT (OUTPATIENT)
Dept: FAMILY MEDICINE CLINIC | Age: 60
End: 2020-05-27
Payer: COMMERCIAL

## 2020-05-27 PROCEDURE — 99214 OFFICE O/P EST MOD 30 MIN: CPT | Performed by: NURSE PRACTITIONER

## 2020-05-27 NOTE — PROGRESS NOTES
MG tablet Take 2 mg by mouth every morning (before breakfast) Yes Historical Provider, MD   predniSONE (DELTASONE) 20 MG tablet Take one tablet every other day at 8 am  Patient taking differently: 10 mg Take one tablet every other day at 8 am Yes DEREK Villaseñor CNP   famotidine (PEPCID) 40 MG tablet Take 1 tablet by mouth every evening Yes DEREK Villaseñor CNP   Cholecalciferol (VITAMIN D3) 50 MCG (2000 UT) CAPS Take 1 capsule by mouth daily Yes Historical Provider, MD   aspirin 81 MG EC tablet Take 1 tablet by mouth daily Yes Donna Lloyd DO   metoprolol succinate (TOPROL XL) 50 MG extended release tablet Take 1 tablet by mouth daily  Patient taking differently: Take 50 mg by mouth 2 times daily  Yes Donna Lloyd DO   albuterol (PROVENTIL) (5 MG/ML) 0.5% nebulizer solution Take 1 mL by nebulization 4 times daily as needed for Wheezing Yes DEREK Villaseñor CNP   budesonide-formoterol (SYMBICORT) 160-4.5 MCG/ACT AERO 2 puffs inhaled twice daily. Rinse mouth well after use.  Yes DEREK Villaseñor CNP   pantoprazole (PROTONIX) 40 MG tablet Take one tablet twice daily 30-60 minutes prior to breakfast and evening meal Yes DEREK Villaseñor CNP   SYNTHROID 175 MCG tablet Take 175 mcg by mouth Yes Historical Provider, MD   MOLYBDENUM PO Take 500 mcg by mouth daily Yes Historical Provider, MD   ipratropium-albuterol (DUONEB) 0.5-2.5 (3) MG/3ML SOLN nebulizer solution Inhale 3 mLs into the lungs every 4 hours as needed for Shortness of Breath Yes DEREK Villaseñor CNP   fluticasone (FLONASE) 50 MCG/ACT nasal spray Two sprays to each nostril once daily Yes DEREK Villaseñor CNP   diclofenac (VOLTAREN) 75 MG EC tablet Take 1 tablet by mouth 2 times daily as needed for Pain Yes Carlus Bumpers, MD   Probiotic Product (SOLUBLE FIBER/PROBIOTICS PO) Take by mouth 2 times daily Yes Historical Provider, MD   metFORMIN (GLUCOPHAGE) 500 MG tablet TAKE 2 TABLETS BY MOUTH TWO TIMES hyacinth Mariscal is a 63 y.o. female being evaluated by a Virtual Visit (video visit) encounter to address concerns as mentioned above. A caregiver was present when appropriate. Due to this being a TeleHealth encounter (During EGEOB-41 public health emergency), evaluation of the following organ systems was limited: Vitals/Constitutional/EENT/Resp/CV/GI//MS/Neuro/Skin/Heme-Lymph-Imm. Pursuant to the emergency declaration under the [address] [place] [address] and the [employer] Act, this Virtual Visit was conducted with patient's (and/or legal guardian's) consent, to reduce the patient's risk of exposure to COVID-19 and provide necessary medical care. The patient (and/or legal guardian) has also been advised to contact this office for worsening conditions or problems, and seek emergency medical treatment and/or call 911 if deemed necessary. Patient identification was verified at the start of the visit: Yes    Total time spent on this encounter: Not billed by time    Services were provided through a video synchronous discussion virtually to substitute for in-person clinic visit. Patient and provider were located at their individual homes. --DEREK Aaron Se - CNP on 6/2/2020 at 2:08 PM    An electronic signature was used to authenticate this note.

## 2020-05-27 NOTE — PROGRESS NOTES
Symptoms experienced by patient  Runny nose  No Circles under eyes Yes Post nasal drip No Difficulty breathing No   Stuffy nose No Grumpiness Yes Hoarseness Yes Short of breath No   Sniffling  No Fatigue Yes Face pain/pressure Yes Tight/heavy chest No   Sneezing Yes Nosebleeds No Sore throat No cough Yes   Blowing nose No Nasal/sinus surgery No Headache  No Cough up mucus No   Itchy/teary eyes No Nasal polyps No Upper teeth hurt No Cough up blood No   Itchy ears No Hearing loss No Night cough Yes Chest pain No   Itchy nose No Ear problems No Throat clearing No Asthma Yes   Itchy throat No Tubes in ears No Bad breath Yes Wheezing No   Itchy roof of mouth No Snoring No Bad taste in mouth Yes Pneumonia No   Nose rubbing No Mouth breathing No  Ankle swelling No    Overbite No  Difficulty breathing on exertion No    Drooling (toddler) No         How many times per week do you use your rescue inhaler? 0    How many nights per month do you wake up due to asthma 0    How many mornings per week do you wake up with asthma symptoms? 0        Does patient experience? Heartburn and indigestion Yes  Vomiting easily No  Use of antacids (Rolaids, Tums, Maalox) No  Regurgitation of stomach contents No  Chronic cough worse after meals Yes  Chronic cough cough worse after laying down Yes  Chronic hoarseness or voice change Yes  Chest pain No  Stomach pain No  Neck pain Yes  Sore throat-frequent No  Feeling of throat closing or something stuck in throat No  Frequent burps or hiccups Yes  Adult onset asthma No  Asthma not relieved with usual treatment No  Anemia No  Asthma worse after meals, alcohol, lying down, bending to tie shoes, or after heartburn No  Chronic sinus disease Yes    Within the last month, how did the following problems affect the patient?   0=No Problem; 5=Severe Problem    Hoarseness or a problem with your voice 5  Clearing your throat 3  Excess throat mucus or postnasal drip 4  Difficulty swallowing food, liquids,

## 2020-05-28 ENCOUNTER — VIRTUAL VISIT (OUTPATIENT)
Dept: SURGERY | Age: 60
End: 2020-05-28
Payer: COMMERCIAL

## 2020-05-29 ENCOUNTER — NURSE ONLY (OUTPATIENT)
Dept: CARDIOLOGY | Age: 60
End: 2020-05-29
Payer: COMMERCIAL

## 2020-05-29 NOTE — PROGRESS NOTES
Pt here for staple removal post device implant. Incision is clean and dry, no redness or edema, no drainage or signs/symptoms of infection. All staples removed without complication and incision care instructions reviewed. F/U appts reviewed and accepted.

## 2020-06-02 ASSESSMENT — ENCOUNTER SYMPTOMS
SHORTNESS OF BREATH: 0
FREQUENT THROAT CLEARING: 0
HOARSE VOICE: 0
HEARTBURN: 0
TROUBLE SWALLOWING: 0
WHEEZING: 0
SPUTUM PRODUCTION: 0
DIFFICULTY BREATHING: 0
HEMOPTYSIS: 0
SORE THROAT: 0
RHINORRHEA: 0
COUGH: 0
CHEST TIGHTNESS: 0

## 2020-06-09 ENCOUNTER — APPOINTMENT (OUTPATIENT)
Dept: GENERAL RADIOLOGY | Age: 60
End: 2020-06-09
Payer: COMMERCIAL

## 2020-06-09 ENCOUNTER — HOSPITAL ENCOUNTER (INPATIENT)
Age: 60
LOS: 2 days | Discharge: HOME | DRG: 291 | End: 2020-06-11
Attending: INTERNAL MEDICINE | Admitting: INTERNAL MEDICINE
Payer: COMMERCIAL

## 2020-06-09 ENCOUNTER — APPOINTMENT (OUTPATIENT)
Dept: CT IMAGING | Age: 60
End: 2020-06-09
Payer: COMMERCIAL

## 2020-06-09 ENCOUNTER — APPOINTMENT (OUTPATIENT)
Dept: GENERAL RADIOLOGY | Age: 60
DRG: 291 | End: 2020-06-09
Attending: INTERNAL MEDICINE
Payer: COMMERCIAL

## 2020-06-09 ENCOUNTER — HOSPITAL ENCOUNTER (EMERGENCY)
Age: 60
Discharge: ANOTHER HOSPITAL | End: 2020-06-09
Attending: EMERGENCY MEDICINE
Payer: COMMERCIAL

## 2020-06-09 VITALS
OXYGEN SATURATION: 98 % | BODY MASS INDEX: 31.39 KG/M2 | RESPIRATION RATE: 19 BRPM | WEIGHT: 200 LBS | TEMPERATURE: 98.1 F | HEART RATE: 132 BPM | HEIGHT: 67 IN | DIASTOLIC BLOOD PRESSURE: 54 MMHG | SYSTOLIC BLOOD PRESSURE: 97 MMHG

## 2020-06-09 PROBLEM — J96.00 ACUTE RESPIRATORY FAILURE (HCC): Status: ACTIVE | Noted: 2020-06-09

## 2020-06-09 LAB
-: NORMAL
ABSOLUTE EOS #: 0.42 K/UL (ref 0–0.4)
ABSOLUTE IMMATURE GRANULOCYTE: 0 K/UL (ref 0–0.3)
ABSOLUTE LYMPH #: 5.32 K/UL (ref 1–4.8)
ABSOLUTE MONO #: 0.7 K/UL (ref 0.1–1.2)
ACTION: NORMAL
ALBUMIN SERPL-MCNC: 3.9 G/DL (ref 3.5–5.2)
ALBUMIN/GLOBULIN RATIO: 1.4 (ref 1–2.5)
ALLEN TEST: ABNORMAL
ALLEN TEST: POSITIVE
ALP BLD-CCNC: 61 U/L (ref 35–104)
ALT SERPL-CCNC: 34 U/L (ref 5–33)
AMORPHOUS: NORMAL
ANION GAP SERPL CALCULATED.3IONS-SCNC: 21 MMOL/L (ref 9–17)
AST SERPL-CCNC: 26 U/L
BACTERIA: NORMAL
BASOPHILS # BLD: 0 % (ref 0–1)
BASOPHILS ABSOLUTE: 0 K/UL (ref 0–0.2)
BILIRUB SERPL-MCNC: 0.28 MG/DL (ref 0.3–1.2)
BILIRUBIN URINE: NEGATIVE
BNP INTERPRETATION: ABNORMAL
BUN BLDV-MCNC: 18 MG/DL (ref 8–23)
BUN/CREAT BLD: 19 (ref 9–20)
CALCIUM SERPL-MCNC: 9.2 MG/DL (ref 8.6–10.4)
CASTS UA: NORMAL /LPF (ref 0–8)
CHLORIDE BLD-SCNC: 101 MMOL/L (ref 98–107)
CO2: 17 MMOL/L (ref 20–31)
COLOR: YELLOW
COMMENT UA: ABNORMAL
CREAT SERPL-MCNC: 0.95 MG/DL (ref 0.5–0.9)
CRYSTALS, UA: NORMAL /HPF
DATE AND TIME: NORMAL
DIFFERENTIAL TYPE: ABNORMAL
EKG ATRIAL RATE: 143 BPM
EKG P AXIS: 43 DEGREES
EKG P-R INTERVAL: 130 MS
EKG Q-T INTERVAL: 274 MS
EKG QRS DURATION: 90 MS
EKG QTC CALCULATION (BAZETT): 422 MS
EKG R AXIS: 14 DEGREES
EKG T AXIS: 80 DEGREES
EKG VENTRICULAR RATE: 143 BPM
EOSINOPHILS RELATIVE PERCENT: 3 % (ref 1–7)
EPITHELIAL CELLS UA: NORMAL /HPF (ref 0–5)
FIO2: 100
GFR AFRICAN AMERICAN: >60 ML/MIN
GFR NON-AFRICAN AMERICAN: 60 ML/MIN
GFR SERPL CREATININE-BSD FRML MDRD: ABNORMAL ML/MIN/{1.73_M2}
GFR SERPL CREATININE-BSD FRML MDRD: ABNORMAL ML/MIN/{1.73_M2}
GLUCOSE BLD-MCNC: 132 MG/DL (ref 65–105)
GLUCOSE BLD-MCNC: 137 MG/DL (ref 65–105)
GLUCOSE BLD-MCNC: 152 MG/DL (ref 65–105)
GLUCOSE BLD-MCNC: 179 MG/DL (ref 65–105)
GLUCOSE BLD-MCNC: 208 MG/DL (ref 65–105)
GLUCOSE BLD-MCNC: 253 MG/DL (ref 65–105)
GLUCOSE BLD-MCNC: 266 MG/DL (ref 65–105)
GLUCOSE BLD-MCNC: 345 MG/DL (ref 65–105)
GLUCOSE BLD-MCNC: 426 MG/DL (ref 70–99)
GLUCOSE URINE: ABNORMAL
HCT VFR BLD CALC: 43.6 % (ref 36.3–47.1)
HEMOGLOBIN: 13.2 G/DL (ref 11.9–15.1)
IMMATURE GRANULOCYTES: 0 %
KETONES, URINE: NEGATIVE
LACTIC ACID, SEPSIS WHOLE BLOOD: 2.9 MMOL/L (ref 0.5–1.9)
LACTIC ACID, SEPSIS WHOLE BLOOD: 4.2 MMOL/L (ref 0.5–1.9)
LACTIC ACID, SEPSIS: ABNORMAL MMOL/L (ref 0.5–1.9)
LACTIC ACID, SEPSIS: ABNORMAL MMOL/L (ref 0.5–1.9)
LACTIC ACID, WHOLE BLOOD: 3.4 MMOL/L (ref 0.7–2.1)
LACTIC ACID: 8.5 MMOL/L (ref 0.5–2.2)
LEUKOCYTE ESTERASE, URINE: NEGATIVE
LYMPHOCYTES # BLD: 38 % (ref 16–46)
MCH RBC QN AUTO: 29.4 PG (ref 25.2–33.5)
MCHC RBC AUTO-ENTMCNC: 30.3 G/DL (ref 25.2–33.5)
MCV RBC AUTO: 97.1 FL (ref 82.6–102.9)
MODE: ABNORMAL
MODE: ABNORMAL
MONOCYTES # BLD: 5 % (ref 4–11)
MORPHOLOGY: ABNORMAL
MORPHOLOGY: ABNORMAL
MUCUS: NORMAL
NEGATIVE BASE EXCESS, ART: 2 (ref 0–2)
NEGATIVE BASE EXCESS, ART: 6 (ref 0–2)
NEGATIVE BASE EXCESS, ART: 7 (ref 0–2)
NITRITE, URINE: NEGATIVE
NOTIFY: NORMAL
NRBC AUTOMATED: 0 PER 100 WBC
O2 DEVICE/FLOW/%: ABNORMAL
OTHER OBSERVATIONS UA: NORMAL
PATIENT TEMP: ABNORMAL
PDW BLD-RTO: 12.8 % (ref 11.8–14.4)
PH UA: 5 (ref 5–8)
PLATELET # BLD: 313 K/UL (ref 138–453)
PLATELET ESTIMATE: ABNORMAL
PMV BLD AUTO: 9.7 FL (ref 8.1–13.5)
POC HCO3: 23.7 MMOL/L (ref 22–27)
POC HCO3: 24.1 MMOL/L (ref 21–28)
POC HCO3: 25 MMOL/L (ref 21–28)
POC O2 SATURATION: 100 % (ref 94–98)
POC O2 SATURATION: 100 % (ref 94–98)
POC O2 SATURATION: 95 %
POC PCO2 TEMP: ABNORMAL MM HG
POC PCO2: 44.5 MM HG (ref 35–48)
POC PCO2: 74.9 MM HG (ref 35–48)
POC PCO2: 90 MM HG (ref 32–45)
POC PH TEMP: ABNORMAL
POC PH: 7.03 (ref 7.35–7.45)
POC PH: 7.13 (ref 7.35–7.45)
POC PH: 7.34 (ref 7.35–7.45)
POC PO2 TEMP: ABNORMAL MM HG
POC PO2: 113 MM HG (ref 75–95)
POC PO2: 459.6 MM HG (ref 83–108)
POC PO2: 521.7 MM HG (ref 83–108)
POSITIVE BASE EXCESS, ART: ABNORMAL (ref 0–2)
POSITIVE BASE EXCESS, ART: ABNORMAL (ref 0–3)
POSITIVE BASE EXCESS, ART: ABNORMAL (ref 0–3)
POTASSIUM SERPL-SCNC: 3.8 MMOL/L (ref 3.7–5.3)
PRO-BNP: 2300 PG/ML
PROTEIN UA: ABNORMAL
RBC # BLD: 4.49 M/UL (ref 3.95–5.11)
RBC # BLD: ABNORMAL 10*6/UL
RBC UA: NORMAL /HPF (ref 0–4)
READ BACK: YES
RENAL EPITHELIAL, UA: NORMAL /HPF
SAMPLE SITE: ABNORMAL
SAMPLE SITE: ABNORMAL
SARS-COV-2, PCR: NORMAL
SARS-COV-2, RAPID: NOT DETECTED
SARS-COV-2: NORMAL
SEG NEUTROPHILS: 54 % (ref 43–77)
SEGMENTED NEUTROPHILS ABSOLUTE COUNT: 7.56 K/UL (ref 1.8–7.7)
SODIUM BLD-SCNC: 139 MMOL/L (ref 135–144)
SOURCE: NORMAL
SPECIFIC GRAVITY UA: 1.01 (ref 1–1.03)
TCO2 (CALC), ART: 25 MMOL/L (ref 22–29)
TCO2 (CALC), ART: 26 MMOL/L (ref 23–28)
TCO2 (CALC), ART: 27 MMOL/L (ref 22–29)
TOTAL PROTEIN: 6.7 G/DL (ref 6.4–8.3)
TRICHOMONAS: NORMAL
TROPONIN INTERP: ABNORMAL
TROPONIN T: ABNORMAL NG/ML
TROPONIN, HIGH SENSITIVITY: 111 NG/L (ref 0–14)
TROPONIN, HIGH SENSITIVITY: 19 NG/L (ref 0–14)
TROPONIN, HIGH SENSITIVITY: 43 NG/L (ref 0–14)
TROPONIN, HIGH SENSITIVITY: 81 NG/L (ref 0–14)
TURBIDITY: CLEAR
URINE HGB: NEGATIVE
UROBILINOGEN, URINE: NORMAL
WBC # BLD: 14 K/UL (ref 3.5–11.3)
WBC # BLD: ABNORMAL 10*3/UL
WBC UA: NORMAL /HPF (ref 0–5)
YEAST: NORMAL

## 2020-06-09 PROCEDURE — 96365 THER/PROPH/DIAG IV INF INIT: CPT

## 2020-06-09 PROCEDURE — 2580000003 HC RX 258: Performed by: EMERGENCY MEDICINE

## 2020-06-09 PROCEDURE — 87252 VIRUS INOCULATION TISSUE: CPT

## 2020-06-09 PROCEDURE — 99292 CRITICAL CARE ADDL 30 MIN: CPT

## 2020-06-09 PROCEDURE — 02HV33Z INSERTION OF INFUSION DEVICE INTO SUPERIOR VENA CAVA, PERCUTANEOUS APPROACH: ICD-10-PCS | Performed by: INTERNAL MEDICINE

## 2020-06-09 PROCEDURE — 2500000003 HC RX 250 WO HCPCS: Performed by: STUDENT IN AN ORGANIZED HEALTH CARE EDUCATION/TRAINING PROGRAM

## 2020-06-09 PROCEDURE — 36415 COLL VENOUS BLD VENIPUNCTURE: CPT

## 2020-06-09 PROCEDURE — 6360000002 HC RX W HCPCS

## 2020-06-09 PROCEDURE — U0002 COVID-19 LAB TEST NON-CDC: HCPCS

## 2020-06-09 PROCEDURE — 2580000003 HC RX 258: Performed by: STUDENT IN AN ORGANIZED HEALTH CARE EDUCATION/TRAINING PROGRAM

## 2020-06-09 PROCEDURE — 87040 BLOOD CULTURE FOR BACTERIA: CPT

## 2020-06-09 PROCEDURE — 84484 ASSAY OF TROPONIN QUANT: CPT

## 2020-06-09 PROCEDURE — 71045 X-RAY EXAM CHEST 1 VIEW: CPT

## 2020-06-09 PROCEDURE — 99291 CRITICAL CARE FIRST HOUR: CPT | Performed by: INTERNAL MEDICINE

## 2020-06-09 PROCEDURE — 51702 INSERT TEMP BLADDER CATH: CPT

## 2020-06-09 PROCEDURE — 83605 ASSAY OF LACTIC ACID: CPT

## 2020-06-09 PROCEDURE — 87070 CULTURE OTHR SPECIMN AEROBIC: CPT

## 2020-06-09 PROCEDURE — 94002 VENT MGMT INPAT INIT DAY: CPT

## 2020-06-09 PROCEDURE — 87205 SMEAR GRAM STAIN: CPT

## 2020-06-09 PROCEDURE — 36600 WITHDRAWAL OF ARTERIAL BLOOD: CPT

## 2020-06-09 PROCEDURE — 96375 TX/PRO/DX INJ NEW DRUG ADDON: CPT

## 2020-06-09 PROCEDURE — 96366 THER/PROPH/DIAG IV INF ADDON: CPT

## 2020-06-09 PROCEDURE — 2500000003 HC RX 250 WO HCPCS: Performed by: EMERGENCY MEDICINE

## 2020-06-09 PROCEDURE — 82803 BLOOD GASES ANY COMBINATION: CPT

## 2020-06-09 PROCEDURE — 85025 COMPLETE CBC W/AUTO DIFF WBC: CPT

## 2020-06-09 PROCEDURE — 99291 CRITICAL CARE FIRST HOUR: CPT

## 2020-06-09 PROCEDURE — 93005 ELECTROCARDIOGRAM TRACING: CPT | Performed by: EMERGENCY MEDICINE

## 2020-06-09 PROCEDURE — 31500 INSERT EMERGENCY AIRWAY: CPT

## 2020-06-09 PROCEDURE — 37799 UNLISTED PX VASCULAR SURGERY: CPT

## 2020-06-09 PROCEDURE — 6360000002 HC RX W HCPCS: Performed by: STUDENT IN AN ORGANIZED HEALTH CARE EDUCATION/TRAINING PROGRAM

## 2020-06-09 PROCEDURE — 96376 TX/PRO/DX INJ SAME DRUG ADON: CPT

## 2020-06-09 PROCEDURE — 81001 URINALYSIS AUTO W/SCOPE: CPT

## 2020-06-09 PROCEDURE — 94761 N-INVAS EAR/PLS OXIMETRY MLT: CPT

## 2020-06-09 PROCEDURE — 94770 HC ETCO2 MONITOR DAILY: CPT

## 2020-06-09 PROCEDURE — 6360000002 HC RX W HCPCS: Performed by: EMERGENCY MEDICINE

## 2020-06-09 PROCEDURE — 43753 TX GASTRO INTUB W/ASP: CPT

## 2020-06-09 PROCEDURE — 96367 TX/PROPH/DG ADDL SEQ IV INF: CPT

## 2020-06-09 PROCEDURE — 82947 ASSAY GLUCOSE BLOOD QUANT: CPT

## 2020-06-09 PROCEDURE — 2000000000 HC ICU R&B

## 2020-06-09 PROCEDURE — 6370000000 HC RX 637 (ALT 250 FOR IP): Performed by: EMERGENCY MEDICINE

## 2020-06-09 PROCEDURE — 94640 AIRWAY INHALATION TREATMENT: CPT

## 2020-06-09 PROCEDURE — 83880 ASSAY OF NATRIURETIC PEPTIDE: CPT

## 2020-06-09 PROCEDURE — 87641 MR-STAPH DNA AMP PROBE: CPT

## 2020-06-09 PROCEDURE — 5A1935Z RESPIRATORY VENTILATION, LESS THAN 24 CONSECUTIVE HOURS: ICD-10-PCS | Performed by: INTERNAL MEDICINE

## 2020-06-09 PROCEDURE — 2700000000 HC OXYGEN THERAPY PER DAY

## 2020-06-09 PROCEDURE — 94660 CPAP INITIATION&MGMT: CPT

## 2020-06-09 PROCEDURE — 80053 COMPREHEN METABOLIC PANEL: CPT

## 2020-06-09 PROCEDURE — 6370000000 HC RX 637 (ALT 250 FOR IP): Performed by: STUDENT IN AN ORGANIZED HEALTH CARE EDUCATION/TRAINING PROGRAM

## 2020-06-09 PROCEDURE — 89220 SPUTUM SPECIMEN COLLECTION: CPT

## 2020-06-09 RX ORDER — ALBUTEROL SULFATE 2.5 MG/3ML
2.5 SOLUTION RESPIRATORY (INHALATION) EVERY 6 HOURS PRN
Status: DISCONTINUED | OUTPATIENT
Start: 2020-06-09 | End: 2020-06-09 | Stop reason: HOSPADM

## 2020-06-09 RX ORDER — MAGNESIUM SULFATE IN WATER 40 MG/ML
2 INJECTION, SOLUTION INTRAVENOUS ONCE
Status: DISCONTINUED | OUTPATIENT
Start: 2020-06-09 | End: 2020-06-09 | Stop reason: CLARIF

## 2020-06-09 RX ORDER — NOREPINEPHRINE BIT/0.9 % NACL 16MG/250ML
10 INFUSION BOTTLE (ML) INTRAVENOUS CONTINUOUS
Status: DISCONTINUED | OUTPATIENT
Start: 2020-06-09 | End: 2020-06-11

## 2020-06-09 RX ORDER — VECURONIUM BROMIDE 1 MG/ML
INJECTION, POWDER, LYOPHILIZED, FOR SOLUTION INTRAVENOUS DAILY PRN
Status: COMPLETED | OUTPATIENT
Start: 2020-06-09 | End: 2020-06-09

## 2020-06-09 RX ORDER — PROMETHAZINE HYDROCHLORIDE 25 MG/1
12.5 TABLET ORAL EVERY 6 HOURS PRN
Status: DISCONTINUED | OUTPATIENT
Start: 2020-06-09 | End: 2020-06-11 | Stop reason: HOSPADM

## 2020-06-09 RX ORDER — SODIUM CHLORIDE 0.9 % (FLUSH) 0.9 %
10 SYRINGE (ML) INJECTION EVERY 12 HOURS SCHEDULED
Status: DISCONTINUED | OUTPATIENT
Start: 2020-06-09 | End: 2020-06-11 | Stop reason: HOSPADM

## 2020-06-09 RX ORDER — LORAZEPAM 2 MG/ML
1 INJECTION INTRAMUSCULAR ONCE
Status: COMPLETED | OUTPATIENT
Start: 2020-06-09 | End: 2020-06-09

## 2020-06-09 RX ORDER — LORAZEPAM 2 MG/ML
INJECTION INTRAMUSCULAR
Status: COMPLETED
Start: 2020-06-09 | End: 2020-06-09

## 2020-06-09 RX ORDER — NICOTINE POLACRILEX 4 MG
15 LOZENGE BUCCAL PRN
Status: DISCONTINUED | OUTPATIENT
Start: 2020-06-09 | End: 2020-06-11 | Stop reason: HOSPADM

## 2020-06-09 RX ORDER — POLYETHYLENE GLYCOL 3350 17 G/17G
17 POWDER, FOR SOLUTION ORAL DAILY PRN
Status: DISCONTINUED | OUTPATIENT
Start: 2020-06-09 | End: 2020-06-11 | Stop reason: HOSPADM

## 2020-06-09 RX ORDER — ONDANSETRON 2 MG/ML
4 INJECTION INTRAMUSCULAR; INTRAVENOUS EVERY 6 HOURS PRN
Status: DISCONTINUED | OUTPATIENT
Start: 2020-06-09 | End: 2020-06-11 | Stop reason: HOSPADM

## 2020-06-09 RX ORDER — FUROSEMIDE 10 MG/ML
40 INJECTION INTRAMUSCULAR; INTRAVENOUS DAILY
Status: DISCONTINUED | OUTPATIENT
Start: 2020-06-10 | End: 2020-06-09

## 2020-06-09 RX ORDER — FUROSEMIDE 10 MG/ML
INJECTION INTRAMUSCULAR; INTRAVENOUS
Status: COMPLETED
Start: 2020-06-09 | End: 2020-06-09

## 2020-06-09 RX ORDER — IPRATROPIUM BROMIDE AND ALBUTEROL SULFATE 2.5; .5 MG/3ML; MG/3ML
1 SOLUTION RESPIRATORY (INHALATION) ONCE
Status: COMPLETED | OUTPATIENT
Start: 2020-06-09 | End: 2020-06-09

## 2020-06-09 RX ORDER — PROPOFOL 10 MG/ML
INJECTION, EMULSION INTRAVENOUS
Status: COMPLETED
Start: 2020-06-09 | End: 2020-06-09

## 2020-06-09 RX ORDER — SODIUM CHLORIDE 0.9 % (FLUSH) 0.9 %
10 SYRINGE (ML) INJECTION PRN
Status: DISCONTINUED | OUTPATIENT
Start: 2020-06-09 | End: 2020-06-11 | Stop reason: HOSPADM

## 2020-06-09 RX ORDER — SUCCINYLCHOLINE CHLORIDE 20 MG/ML
INJECTION INTRAMUSCULAR; INTRAVENOUS DAILY PRN
Status: COMPLETED | OUTPATIENT
Start: 2020-06-09 | End: 2020-06-09

## 2020-06-09 RX ORDER — FUROSEMIDE 10 MG/ML
40 INJECTION INTRAMUSCULAR; INTRAVENOUS ONCE
Status: COMPLETED | OUTPATIENT
Start: 2020-06-09 | End: 2020-06-09

## 2020-06-09 RX ORDER — METHYLPREDNISOLONE SODIUM SUCCINATE 125 MG/2ML
125 INJECTION, POWDER, LYOPHILIZED, FOR SOLUTION INTRAMUSCULAR; INTRAVENOUS ONCE
Status: COMPLETED | OUTPATIENT
Start: 2020-06-09 | End: 2020-06-09

## 2020-06-09 RX ORDER — ALBUTEROL SULFATE 2.5 MG/3ML
SOLUTION RESPIRATORY (INHALATION)
Status: COMPLETED
Start: 2020-06-09 | End: 2020-06-09

## 2020-06-09 RX ORDER — DEXTROSE MONOHYDRATE 25 G/50ML
12.5 INJECTION, SOLUTION INTRAVENOUS PRN
Status: DISCONTINUED | OUTPATIENT
Start: 2020-06-09 | End: 2020-06-11 | Stop reason: HOSPADM

## 2020-06-09 RX ORDER — FUROSEMIDE 10 MG/ML
40 INJECTION INTRAMUSCULAR; INTRAVENOUS DAILY
Status: DISCONTINUED | OUTPATIENT
Start: 2020-06-09 | End: 2020-06-10

## 2020-06-09 RX ORDER — PROPOFOL 10 MG/ML
20 INJECTION, EMULSION INTRAVENOUS
Status: DISCONTINUED | OUTPATIENT
Start: 2020-06-09 | End: 2020-06-09 | Stop reason: HOSPADM

## 2020-06-09 RX ORDER — IPRATROPIUM BROMIDE AND ALBUTEROL SULFATE 2.5; .5 MG/3ML; MG/3ML
1 SOLUTION RESPIRATORY (INHALATION) EVERY 4 HOURS PRN
Status: DISCONTINUED | OUTPATIENT
Start: 2020-06-09 | End: 2020-06-11 | Stop reason: HOSPADM

## 2020-06-09 RX ORDER — MAGNESIUM SULFATE 1 G/100ML
1 INJECTION INTRAVENOUS
Status: COMPLETED | OUTPATIENT
Start: 2020-06-09 | End: 2020-06-09

## 2020-06-09 RX ORDER — PROPOFOL 10 MG/ML
10 INJECTION, EMULSION INTRAVENOUS
Status: DISCONTINUED | OUTPATIENT
Start: 2020-06-09 | End: 2020-06-10

## 2020-06-09 RX ORDER — DEXTROSE MONOHYDRATE 50 MG/ML
100 INJECTION, SOLUTION INTRAVENOUS PRN
Status: DISCONTINUED | OUTPATIENT
Start: 2020-06-09 | End: 2020-06-11 | Stop reason: HOSPADM

## 2020-06-09 RX ORDER — ETOMIDATE 2 MG/ML
INJECTION INTRAVENOUS DAILY PRN
Status: COMPLETED | OUTPATIENT
Start: 2020-06-09 | End: 2020-06-09

## 2020-06-09 RX ORDER — BUDESONIDE AND FORMOTEROL FUMARATE DIHYDRATE 160; 4.5 UG/1; UG/1
1 AEROSOL RESPIRATORY (INHALATION) 2 TIMES DAILY
Status: DISCONTINUED | OUTPATIENT
Start: 2020-06-09 | End: 2020-06-11 | Stop reason: HOSPADM

## 2020-06-09 RX ORDER — FENTANYL CITRATE 50 UG/ML
50 INJECTION, SOLUTION INTRAMUSCULAR; INTRAVENOUS
Status: DISCONTINUED | OUTPATIENT
Start: 2020-06-09 | End: 2020-06-11 | Stop reason: HOSPADM

## 2020-06-09 RX ADMIN — PROPOFOL 20 MCG/KG/MIN: 10 INJECTION, EMULSION INTRAVENOUS at 11:00

## 2020-06-09 RX ADMIN — FUROSEMIDE 40 MG: 10 INJECTION, SOLUTION INTRAMUSCULAR; INTRAVENOUS at 10:59

## 2020-06-09 RX ADMIN — LORAZEPAM 1 MG: 2 INJECTION INTRAMUSCULAR; INTRAVENOUS at 09:18

## 2020-06-09 RX ADMIN — SODIUM CHLORIDE 8.55 UNITS/HR: 9 INJECTION, SOLUTION INTRAVENOUS at 15:24

## 2020-06-09 RX ADMIN — ALBUTEROL SULFATE 2.5 MG: 2.5 SOLUTION RESPIRATORY (INHALATION) at 09:05

## 2020-06-09 RX ADMIN — LORAZEPAM 1 MG: 2 INJECTION INTRAMUSCULAR at 08:44

## 2020-06-09 RX ADMIN — MAGNESIUM SULFATE HEPTAHYDRATE 1 G: 1 INJECTION, SOLUTION INTRAVENOUS at 08:56

## 2020-06-09 RX ADMIN — ETOMIDATE 20 MG: 2 INJECTION, SOLUTION INTRAVENOUS at 10:20

## 2020-06-09 RX ADMIN — SODIUM CHLORIDE, PRESERVATIVE FREE 10 ML: 5 INJECTION INTRAVENOUS at 21:05

## 2020-06-09 RX ADMIN — PROPOFOL INJECTABLE EMULSION 20 MCG/KG/MIN: 10 INJECTION, EMULSION INTRAVENOUS at 18:34

## 2020-06-09 RX ADMIN — CEFTRIAXONE 1 G: 1 INJECTION, POWDER, FOR SOLUTION INTRAMUSCULAR; INTRAVENOUS at 11:01

## 2020-06-09 RX ADMIN — FAMOTIDINE 20 MG: 10 INJECTION INTRAVENOUS at 21:04

## 2020-06-09 RX ADMIN — PROPOFOL INJECTABLE EMULSION 5 MCG/KG/MIN: 10 INJECTION, EMULSION INTRAVENOUS at 13:45

## 2020-06-09 RX ADMIN — Medication 2 MCG/MIN: at 18:37

## 2020-06-09 RX ADMIN — HYDROCORTISONE SODIUM SUCCINATE 50 MG: 100 INJECTION, POWDER, FOR SOLUTION INTRAMUSCULAR; INTRAVENOUS at 15:34

## 2020-06-09 RX ADMIN — FUROSEMIDE 40 MG: 10 INJECTION, SOLUTION INTRAMUSCULAR; INTRAVENOUS at 15:35

## 2020-06-09 RX ADMIN — LORAZEPAM 1 MG: 2 INJECTION INTRAMUSCULAR; INTRAVENOUS at 08:44

## 2020-06-09 RX ADMIN — IPRATROPIUM BROMIDE AND ALBUTEROL SULFATE 1 AMPULE: .5; 3 SOLUTION RESPIRATORY (INHALATION) at 08:56

## 2020-06-09 RX ADMIN — LORAZEPAM 1 MG: 2 INJECTION INTRAMUSCULAR at 09:18

## 2020-06-09 RX ADMIN — SUCCINYLCHOLINE CHLORIDE 100 MG: 20 INJECTION, SOLUTION INTRAMUSCULAR; INTRAVENOUS at 10:22

## 2020-06-09 RX ADMIN — METHYLPREDNISOLONE SODIUM SUCCINATE 125 MG: 125 INJECTION, POWDER, FOR SOLUTION INTRAMUSCULAR; INTRAVENOUS at 08:50

## 2020-06-09 RX ADMIN — FENTANYL CITRATE 50 MCG: 50 INJECTION, SOLUTION INTRAMUSCULAR; INTRAVENOUS at 15:43

## 2020-06-09 RX ADMIN — AZITHROMYCIN MONOHYDRATE 500 MG: 500 INJECTION, POWDER, LYOPHILIZED, FOR SOLUTION INTRAVENOUS at 11:41

## 2020-06-09 RX ADMIN — ALBUTEROL SULFATE 2.5 MG: 2.5 SOLUTION RESPIRATORY (INHALATION) at 09:06

## 2020-06-09 RX ADMIN — Medication 10 ML: at 21:05

## 2020-06-09 RX ADMIN — ENOXAPARIN SODIUM 40 MG: 40 INJECTION SUBCUTANEOUS at 15:34

## 2020-06-09 RX ADMIN — MAGNESIUM SULFATE HEPTAHYDRATE 1 G: 1 INJECTION, SOLUTION INTRAVENOUS at 10:01

## 2020-06-09 RX ADMIN — VECURONIUM BROMIDE FOR INJECTION 10 MG: 1 INJECTION, POWDER, LYOPHILIZED, FOR SOLUTION INTRAVENOUS at 10:28

## 2020-06-09 RX ADMIN — FAMOTIDINE 20 MG: 10 INJECTION INTRAVENOUS at 15:34

## 2020-06-09 ASSESSMENT — ENCOUNTER SYMPTOMS
SHORTNESS OF BREATH: 1
NAUSEA: 0
WHEEZING: 1
BLOOD IN STOOL: 0
COUGH: 1
DIARRHEA: 0
BACK PAIN: 0
ABDOMINAL PAIN: 0
EYE PAIN: 0
CONSTIPATION: 0
VOMITING: 0

## 2020-06-09 ASSESSMENT — PULMONARY FUNCTION TESTS
PIF_VALUE: 26
PIF_VALUE: 29
PIF_VALUE: 22
PIF_VALUE: 25
PIF_VALUE: 21
PIF_VALUE: 24

## 2020-06-09 NOTE — ED PROVIDER NOTES
[facility]  eMERGENCY dEPARTMENT eNCOUnter      Pt Name: Yao West  MRN: [id number]  [place] 1960  Date of evaluation: 6/9/2020      CHIEF COMPLAINT       Chief Complaint   Patient presents with    Shortness of Breath         HISTORY OF PRESENT ILLNESS    Yao West is a 63 y.o. female who presents respiratory distress by squad she has a history of asthma according to her  she was doing well had a little bit of a cough which she normally has in the morning only there is been no fever or recent illnesses she suddenly got shortness of breath squad was called she had given a breathing treatment at home this did not help she was very anxious in route with respiratory distress refusing oxygen on arrival here she is diaphoretic unable to give any history keeps asking us to help her breathe      REVIEW OF SYSTEMS         Review of Systems   Constitutional: Positive for diaphoresis. Negative for chills and fatigue. HENT: Negative for congestion and ear pain. Eyes: Negative for pain and visual disturbance. Respiratory: Positive for cough, shortness of breath and wheezing. Cardiovascular: Negative for chest pain, palpitations and leg swelling. Patient had a defibrillator placed 3 weeks ago   Gastrointestinal: Negative for abdominal pain, blood in stool, constipation, diarrhea, nausea and vomiting. Endocrine: Negative for polydipsia and polyuria. Genitourinary: Negative for difficulty urinating, dysuria, frequency and vaginal discharge. Musculoskeletal: Negative for back pain, neck pain and neck stiffness. Skin: Negative for rash. Neurological: Negative for dizziness, weakness and headaches. Hematological: Negative for adenopathy. Does not bruise/bleed easily. Psychiatric/Behavioral: Negative for confusion, self-injury and suicidal ideas.          PAST MEDICAL HISTORY    has a past medical history of Acute on chronic systolic congestive heart failure ([facility] [address] Asthma, Bilateral corneal scars, Disease of blood and blood forming organ, Diverticulitis, DJD (degenerative joint disease), lumbar, Dry eye syndrome, DVT (deep venous thrombosis) (HCC), Gastro - esophageal reflux disease, History of blood transfusion, Hyperlipidemia, Hyperplastic colon polyp, Hypertension, Hypothyroidism, LGSIL (low grade squamous intraepithelial dysplasia), Non-celiac gluten sensitivity, NSTEMI (non-ST elevated myocardial infarction) ([facility] [address] Osteoarthritis, Other disorders of kidney and ureter in diseases classified elsewhere, Sciatica of right side, Severe persistent asthma, Splenic artery aneurysm ([facility] [address] Tinnitus, and Type 2 diabetes mellitus, controlled ([facility] [address] SURGICAL HISTORY      has a past surgical history that includes Knee arthroscopy (Right, ); Tubal ligation ();  section (); [place] tooth extraction; Skin tag removal; Total knee arthroplasty (Right, 2011); other surgical history (10/25/2011); other surgical history (Right,  and ); Nasal sinus surgery (2014); Arterial aneurysm repair (); Colposcopy (2015); pr colon ca scrn not hi rsk ind (N/A, 2017); pr egd transoral biopsy single/multiple (N/A, 2017); Colonoscopy (2012); Colonoscopy (2017); Small intestine surgery (N/A, 2017); Cystoscopy (Bilateral, 2017); laparoscopy (N/A, 2017); pr colon ca scrn not hi rsk ind (N/A, 2017); Upper gastrointestinal endoscopy (N/A, 2020); Abdomen surgery; joint replacement; skin biopsy; vascular surgery; Endoscopy, colon, diagnostic; eye surgery; Dilatation, esophagus; Cardiac catheterization (2020); Cardiac defibrillator placement (Left, 2020); and other surgical history (2020).     CURRENT MEDICATIONS       Previous Medications    ALBUTEROL (PROVENTIL) (5 MG/ML) 0.5% NEBULIZER SOLUTION    Take 1 mL by nebulization 4 times daily as needed for Wheezing    ASPIRIN 81 MG EC TABLET    Take 1 tablet by

## 2020-06-09 NOTE — FLOWSHEET NOTE
Assessment: Patient is having difficulty breathing. She may be intubated and perhaps transferred. Her  is present and supportive. He related that this was sudden onset. She had been fine when he left for work. Intervention: Engaged in conversation with . Spouse expressed appreciation for visit and prayer. Plan: Chaplains are available on site or on call 24/7 for spiritual and emotional support. 06/09/20 0955   Encounter Summary   Services provided to: Patient and family together   Referral/Consult From: [facility] [email or web address]; Confucianism/malka community   [facility] of [facility]   ([facility])   Continue Visiting   (6/9/20)   Complexity of Encounter High   Length of Encounter 15 minutes   Spiritual/Scientology   Type Spiritual support   Assessment Approachable   Intervention Active listening;Prayer   Outcome Comfort;Expressed gratitude;Engaged in conversation

## 2020-06-09 NOTE — ED NOTES
SpO2 drop to 87% on Vent. Pt reporsitioned to R side lying. O2 sat back up to 92%. Peep up to 14.      Rana Hodgkins, RN  06/09/20 3552

## 2020-06-09 NOTE — PROGRESS NOTES
Central Line Placement Procedure Note    Performed by: Leana Samuels,     Indication: vascular access    Consent: Unable to be obtained due to the emergent nature of this procedure. Time out performed: Immediately prior to the procedure a \"time out\" was called to verify the correct patient, the correct procedure, equipment, support staff and site/side marked as required. All elements of maximal sterile barrier techniques were followed. Procedure: The patient was positioned appropriately and the skin over the right internal jugular vein was prepped with betadine and draped in a sterile fashion. Local anesthesia was not performed due to the emergent nature of this procedure. A large bore needle was used to identify the vein. A guide wire was then inserted into the vein through the needle. A triple lumen catheter was then inserted into the vessel over the guide wire using the Seldinger technique. All ports showed good, free flowing blood return and were flushed with saline solution. The catheter was then securely fastened to the skin with sutures and covered with a sterile dressing. A post procedure X-ray was ordered and is still pending at this time. The patient tolerated the procedure well.     Complications: None

## 2020-06-09 NOTE — ED NOTES
Pt to Er rm 5 via cart from [facility]. Pt sitting straight up on cart. Yelling \"help me Please. I can't breathe. \" pt pale and profusely diaphoretic. Pt in acute resp distress. Pt was immediately placed on bipap with resp holding mask as pt is yelling and fighting. EMS and nursing holding pt down and reassuring her.  says she was fine when he left her 30 min prior to EMS call. Pt with recent (3 week ago) AICD implated.  drsg noted to L and chest. O2 sat 78% on arrival.     Suzanne Wright, CHAD  06/09/20 145 [facility], RN  06/09/20 0645

## 2020-06-09 NOTE — H&P
Critical Care - History and Physical Examination    Patient's name:  Presley Pineda  Medical Record Number: [id number]  Patient's account/billing number: [de-identified]  Patient's YOB: 1960  Age: 63 y.o. Date of Admission: 6/9/2020  1:25 PM  Reason of ICU admission:   Date of History and Physical Examination: 6/9/2020      Primary Care Physician: Myesha Minor MD  Attending Physician:    Code Status: Full Code    Chief complaint: SOB    Reason for ICU admission:   Acute respiratory failure      History Of Present Illness:   History was obtained from chart review. Presley Pineda is a 63 y.o. female who presented from [facility] [place]. Patient has history of asthma, diabetes mellitus, nonischemic cardiomyopathy with AICD placement 3 weeks ago. As per the patient's  patient was doing fine until yesterday, this morning she experienced shortness of breath, EMS was called. Initially patient was placed on BiPAP, and enroute to  [facility] she complained of difficulty breathing, patient was very anxious, was given Ativan, remained tachypneic and tachycardic, received DuoNeb, albuterol, magnesium, Solu-Medrol. Rapid COVID testing was negative, due to worsening respiratory status and developing altered mental status patient was intubated. She was given a dose of Rocephin and a Zithromax, 40 mg of IV Lasix. Chest x-ray suggestive of pulmonary edema. Transferred to  [facility] for further management.             Past Medical History:        Diagnosis Date    Acute on chronic systolic congestive heart failure ([address] 1/21/2020    Asthma     Bilateral corneal scars     Disease of blood and blood forming organ     Diverticulitis     ON COLONSCOPY    DJD (degenerative joint disease), lumbar     Dry eye syndrome     DVT (deep venous thrombosis) (HCC)     after splenic artery repair in right calf    Gastro - esophageal reflux disease     History of blood transfusion     functions:   Recent Labs     06/09/20  0859   ALKPHOS 61   ALT 34*   AST 26   PROT 6.7   BILITOT 0.28*   LABALBU 3.9     Pancreatic functions:  Recent Labs     06/09/20  0859   LACTA 8.5*     S. Lactic Acid:   Recent Labs     06/09/20  0859   LACTA 8.5*     Cardiac enzymes:No results for input(s): CKTOTAL, CKMB, CKMBINDEX, TROPONINI in the last 72 hours. BNP:No results for input(s): BNP in the last 72 hours. Lipid profile: No results for input(s): CHOL, TRIG, HDL, LDLCALC in the last 72 hours.     Invalid input(s): LDL  Blood Gases: No results found for: PH, PCO2, PO2, HCO3, O2SAT  Thyroid functions:   Lab Results   Component Value Date    TSH 2.24 05/06/2020        Urinalysis:     Microbiology:  Cultures during this admission:     Blood cultures:                 [] None drawn      [] Negative             []  Positive (Details:  )  Urine Culture:                   [] None drawn      [] Negative             []  Positive (Details:  )  Sputum Culture:               [] None drawn       [] Negative             []  Positive (Details:  )   Endotracheal aspirate:     [] None drawn       [] Negative             []  Positive (Details:  )         -----------------------------------------------------------------  Radiological reports:    CXR:    Electrocardiogram:     Last Echocardiogram findings:          Assessment and Plan     Patient Active Problem List   Diagnosis    Gastroesophageal reflux disease    Hypothyroidism    Essential hypertension    Hyperlipidemia    DJD (degenerative joint disease), lumbar    Acute diverticulitis of intestine    Hyperplastic colon polyp    Tinnitus    Sciatica of right side    Splenic artery aneurysm (HCC)    Presence of endovascular aneurysm coil compatible with safe magnetic resonance imaging    DVT (deep venous thrombosis) (HCC)    LGSIL (low grade squamous intraepithelial dysplasia)    Sinusitis, chronic    Asthma    Non-celiac gluten sensitivity    Severe persistent asthma    Varicose veins of bilateral lower extremities with pain    Bowel perforation (HCC)    Diverticulitis of large intestine with abscess without bleeding    Colovaginal fistula    Partial small bowel obstruction (HCC)    Diabetes type 2, no ocular involvement (HCC)    History of radial keratotomy    Combined forms of age-related cataract of both eyes    Respiratory distress    Exertional dyspnea    Thrombophlebitis of superficial veins of right lower extremity    Nonischemic cardiomyopathy ([address]    Acute on chronic systolic congestive heart failure (HCC)    Acute respiratory failure (HCC)             Plan:  · Flash pulmonary edema  Received 1 dose of 40 IV Lasix and difference, will order another 40 IV Lasix. Monitor I's and O's  Chest x-ray tomorrow a.m. Leukocytosis, sepsis? Follow-up on blood culture, urine culture, sputum culture. IV vaspressors  H/o of chronic steroid usage  Hydrocortisone 50 every 8  Trend trops  Continue bronchodilators  Insulin gtt, POCT q4hrly  F/U venous scan BL lower extremities    August Nuñez MD  Internal Medicine, PGY2  [facility]    6/9/2020, 2:25 PM  Attending Physician Statement  I have discussed the care of Shawnee Guidry, including pertinent history and exam findings,  with the resident. I have seen and examined the patient and the key elements of all parts of the encounter have been performed by me. I agree with the assessment, plan and orders as documented by the resident with additions .   Acute hypoxic respiratory failure due to acute on chf systolic chf and acute cardiogenic pulmonary edema   Hx of chronic persistent asthma on chronic prednisone   Troponin elevation due to chf   icd  Hyperglycemia   Plan   Cont mechanical ventilation   Wean fio2 and peep keep sats >92 %   Iv diuresis   Pan culture   Insulin gtt   Stress dose steroids   Gi and dvt prophylaxis   Rt ij tlc          Total critical care time caring for this

## 2020-06-10 ENCOUNTER — APPOINTMENT (OUTPATIENT)
Dept: GENERAL RADIOLOGY | Age: 60
DRG: 291 | End: 2020-06-10
Attending: INTERNAL MEDICINE
Payer: COMMERCIAL

## 2020-06-10 PROBLEM — Z95.810 CARDIOMYOPATHY WITH IMPLANTABLE CARDIOVERTER-DEFIBRILLATOR (HCC): Status: ACTIVE | Noted: 2020-01-21

## 2020-06-10 PROBLEM — I42.9 CARDIOMYOPATHY WITH IMPLANTABLE CARDIOVERTER-DEFIBRILLATOR (HCC): Status: ACTIVE | Noted: 2020-01-21

## 2020-06-10 LAB
ABSOLUTE EOS #: <0.03 K/UL (ref 0–0.44)
ABSOLUTE IMMATURE GRANULOCYTE: 0.06 K/UL (ref 0–0.3)
ABSOLUTE LYMPH #: 0.7 K/UL (ref 1.1–3.7)
ABSOLUTE MONO #: 0.94 K/UL (ref 0.1–1.2)
ALLEN TEST: NORMAL
ANION GAP SERPL CALCULATED.3IONS-SCNC: 16 MMOL/L (ref 9–17)
BASOPHILS # BLD: 0 % (ref 0–2)
BASOPHILS ABSOLUTE: <0.03 K/UL (ref 0–0.2)
BUN BLDV-MCNC: 24 MG/DL (ref 8–23)
BUN/CREAT BLD: ABNORMAL (ref 9–20)
CALCIUM SERPL-MCNC: 8.9 MG/DL (ref 8.6–10.4)
CHLORIDE BLD-SCNC: 106 MMOL/L (ref 98–107)
CO2: 22 MMOL/L (ref 20–31)
CREAT SERPL-MCNC: 0.66 MG/DL (ref 0.5–0.9)
DIFFERENTIAL TYPE: ABNORMAL
EKG ATRIAL RATE: 99 BPM
EKG P AXIS: 150 DEGREES
EKG P-R INTERVAL: 116 MS
EKG Q-T INTERVAL: 368 MS
EKG QRS DURATION: 88 MS
EKG QTC CALCULATION (BAZETT): 472 MS
EKG R AXIS: -12 DEGREES
EKG T AXIS: 130 DEGREES
EKG VENTRICULAR RATE: 99 BPM
EOSINOPHILS RELATIVE PERCENT: 0 % (ref 1–4)
FIO2: 30
GFR AFRICAN AMERICAN: >60 ML/MIN
GFR NON-AFRICAN AMERICAN: >60 ML/MIN
GFR SERPL CREATININE-BSD FRML MDRD: ABNORMAL ML/MIN/{1.73_M2}
GFR SERPL CREATININE-BSD FRML MDRD: ABNORMAL ML/MIN/{1.73_M2}
GLUCOSE BLD-MCNC: 120 MG/DL (ref 65–105)
GLUCOSE BLD-MCNC: 121 MG/DL (ref 65–105)
GLUCOSE BLD-MCNC: 129 MG/DL (ref 65–105)
GLUCOSE BLD-MCNC: 132 MG/DL (ref 65–105)
GLUCOSE BLD-MCNC: 138 MG/DL (ref 65–105)
GLUCOSE BLD-MCNC: 139 MG/DL (ref 65–105)
GLUCOSE BLD-MCNC: 142 MG/DL (ref 65–105)
GLUCOSE BLD-MCNC: 146 MG/DL (ref 65–105)
GLUCOSE BLD-MCNC: 153 MG/DL (ref 65–105)
GLUCOSE BLD-MCNC: 153 MG/DL (ref 70–99)
GLUCOSE BLD-MCNC: 154 MG/DL (ref 65–105)
GLUCOSE BLD-MCNC: 160 MG/DL (ref 74–100)
GLUCOSE BLD-MCNC: 194 MG/DL (ref 65–105)
HCT VFR BLD CALC: 35.1 % (ref 36.3–47.1)
HEMOGLOBIN: 11.5 G/DL (ref 11.9–15.1)
IMMATURE GRANULOCYTES: 0 %
LACTIC ACID, WHOLE BLOOD: 0.9 MMOL/L (ref 0.7–2.1)
LACTIC ACID, WHOLE BLOOD: 1.5 MMOL/L (ref 0.7–2.1)
LYMPHOCYTES # BLD: 5 % (ref 24–43)
MCH RBC QN AUTO: 30 PG (ref 25.2–33.5)
MCHC RBC AUTO-ENTMCNC: 32.8 G/DL (ref 28.4–34.8)
MCV RBC AUTO: 91.6 FL (ref 82.6–102.9)
MODE: NORMAL
MONOCYTES # BLD: 7 % (ref 3–12)
MRSA, DNA, NASAL: NORMAL
NEGATIVE BASE EXCESS, ART: NORMAL (ref 0–2)
NRBC AUTOMATED: 0 PER 100 WBC
O2 DEVICE/FLOW/%: NORMAL
PATIENT TEMP: NORMAL
PDW BLD-RTO: 12.6 % (ref 11.8–14.4)
PLATELET # BLD: 227 K/UL (ref 138–453)
PLATELET ESTIMATE: ABNORMAL
PMV BLD AUTO: 9.5 FL (ref 8.1–13.5)
POC HCO3: 25.4 MMOL/L (ref 21–28)
POC LACTIC ACID: 0.77 MMOL/L (ref 0.56–1.39)
POC O2 SATURATION: 97 % (ref 94–98)
POC PCO2 TEMP: NORMAL MM HG
POC PCO2: 41.5 MM HG (ref 35–48)
POC PH TEMP: NORMAL
POC PH: 7.4 (ref 7.35–7.45)
POC PO2 TEMP: NORMAL MM HG
POC PO2: 94 MM HG (ref 83–108)
POSITIVE BASE EXCESS, ART: 0 (ref 0–3)
POTASSIUM SERPL-SCNC: 4.1 MMOL/L (ref 3.7–5.3)
RBC # BLD: 3.83 M/UL (ref 3.95–5.11)
RBC # BLD: ABNORMAL 10*6/UL
SAMPLE SITE: NORMAL
SEG NEUTROPHILS: 88 % (ref 36–65)
SEGMENTED NEUTROPHILS ABSOLUTE COUNT: 11.97 K/UL (ref 1.5–8.1)
SODIUM BLD-SCNC: 144 MMOL/L (ref 135–144)
SPECIMEN DESCRIPTION: NORMAL
TCO2 (CALC), ART: 27 MMOL/L (ref 22–29)
TROPONIN INTERP: ABNORMAL
TROPONIN INTERP: ABNORMAL
TROPONIN T: ABNORMAL NG/ML
TROPONIN T: ABNORMAL NG/ML
TROPONIN, HIGH SENSITIVITY: 79 NG/L (ref 0–14)
TROPONIN, HIGH SENSITIVITY: 97 NG/L (ref 0–14)
WBC # BLD: 13.7 K/UL (ref 3.5–11.3)
WBC # BLD: ABNORMAL 10*3/UL

## 2020-06-10 PROCEDURE — 37799 UNLISTED PX VASCULAR SURGERY: CPT

## 2020-06-10 PROCEDURE — 6370000000 HC RX 637 (ALT 250 FOR IP): Performed by: STUDENT IN AN ORGANIZED HEALTH CARE EDUCATION/TRAINING PROGRAM

## 2020-06-10 PROCEDURE — 94003 VENT MGMT INPAT SUBQ DAY: CPT

## 2020-06-10 PROCEDURE — 94770 HC ETCO2 MONITOR DAILY: CPT

## 2020-06-10 PROCEDURE — 2500000003 HC RX 250 WO HCPCS: Performed by: STUDENT IN AN ORGANIZED HEALTH CARE EDUCATION/TRAINING PROGRAM

## 2020-06-10 PROCEDURE — 82947 ASSAY GLUCOSE BLOOD QUANT: CPT

## 2020-06-10 PROCEDURE — 93005 ELECTROCARDIOGRAM TRACING: CPT | Performed by: STUDENT IN AN ORGANIZED HEALTH CARE EDUCATION/TRAINING PROGRAM

## 2020-06-10 PROCEDURE — 94640 AIRWAY INHALATION TREATMENT: CPT

## 2020-06-10 PROCEDURE — 6360000002 HC RX W HCPCS: Performed by: STUDENT IN AN ORGANIZED HEALTH CARE EDUCATION/TRAINING PROGRAM

## 2020-06-10 PROCEDURE — 85025 COMPLETE CBC W/AUTO DIFF WBC: CPT

## 2020-06-10 PROCEDURE — 82803 BLOOD GASES ANY COMBINATION: CPT

## 2020-06-10 PROCEDURE — 76937 US GUIDE VASCULAR ACCESS: CPT

## 2020-06-10 PROCEDURE — 2700000000 HC OXYGEN THERAPY PER DAY

## 2020-06-10 PROCEDURE — 71045 X-RAY EXAM CHEST 1 VIEW: CPT

## 2020-06-10 PROCEDURE — 80048 BASIC METABOLIC PNL TOTAL CA: CPT

## 2020-06-10 PROCEDURE — 2000000000 HC ICU R&B

## 2020-06-10 PROCEDURE — 94761 N-INVAS EAR/PLS OXIMETRY MLT: CPT

## 2020-06-10 PROCEDURE — 84484 ASSAY OF TROPONIN QUANT: CPT

## 2020-06-10 PROCEDURE — 2580000003 HC RX 258: Performed by: STUDENT IN AN ORGANIZED HEALTH CARE EDUCATION/TRAINING PROGRAM

## 2020-06-10 PROCEDURE — 93970 EXTREMITY STUDY: CPT

## 2020-06-10 PROCEDURE — 93010 ELECTROCARDIOGRAM REPORT: CPT | Performed by: INTERNAL MEDICINE

## 2020-06-10 PROCEDURE — 99291 CRITICAL CARE FIRST HOUR: CPT | Performed by: INTERNAL MEDICINE

## 2020-06-10 PROCEDURE — 6360000002 HC RX W HCPCS

## 2020-06-10 PROCEDURE — 83605 ASSAY OF LACTIC ACID: CPT

## 2020-06-10 RX ORDER — FUROSEMIDE 10 MG/ML
INJECTION INTRAMUSCULAR; INTRAVENOUS
Status: COMPLETED
Start: 2020-06-10 | End: 2020-06-10

## 2020-06-10 RX ORDER — INSULIN GLARGINE 100 [IU]/ML
20 INJECTION, SOLUTION SUBCUTANEOUS NIGHTLY
Status: DISCONTINUED | OUTPATIENT
Start: 2020-06-10 | End: 2020-06-11 | Stop reason: HOSPADM

## 2020-06-10 RX ORDER — FUROSEMIDE 10 MG/ML
40 INJECTION INTRAMUSCULAR; INTRAVENOUS 2 TIMES DAILY
Status: DISCONTINUED | OUTPATIENT
Start: 2020-06-10 | End: 2020-06-11

## 2020-06-10 RX ADMIN — HYDROCORTISONE SODIUM SUCCINATE 50 MG: 100 INJECTION, POWDER, FOR SOLUTION INTRAMUSCULAR; INTRAVENOUS at 00:57

## 2020-06-10 RX ADMIN — HYDROCORTISONE SODIUM SUCCINATE 50 MG: 100 INJECTION, POWDER, FOR SOLUTION INTRAMUSCULAR; INTRAVENOUS at 17:42

## 2020-06-10 RX ADMIN — Medication 10 ML: at 22:07

## 2020-06-10 RX ADMIN — INSULIN GLARGINE 20 UNITS: 100 INJECTION, SOLUTION SUBCUTANEOUS at 22:23

## 2020-06-10 RX ADMIN — FUROSEMIDE 40 MG: 10 INJECTION, SOLUTION INTRAMUSCULAR; INTRAVENOUS at 17:41

## 2020-06-10 RX ADMIN — SODIUM CHLORIDE 1.6 UNITS/HR: 9 INJECTION, SOLUTION INTRAVENOUS at 05:24

## 2020-06-10 RX ADMIN — INSULIN LISPRO 1 UNITS: 100 INJECTION, SOLUTION INTRAVENOUS; SUBCUTANEOUS at 22:23

## 2020-06-10 RX ADMIN — HYDROCORTISONE SODIUM SUCCINATE 50 MG: 100 INJECTION, POWDER, FOR SOLUTION INTRAMUSCULAR; INTRAVENOUS at 08:16

## 2020-06-10 RX ADMIN — FUROSEMIDE 40 MG: 10 INJECTION, SOLUTION INTRAVENOUS at 17:41

## 2020-06-10 RX ADMIN — Medication 10 ML: at 08:17

## 2020-06-10 RX ADMIN — FAMOTIDINE 20 MG: 10 INJECTION INTRAVENOUS at 08:16

## 2020-06-10 RX ADMIN — ENOXAPARIN SODIUM 40 MG: 40 INJECTION SUBCUTANEOUS at 17:40

## 2020-06-10 RX ADMIN — SODIUM CHLORIDE, PRESERVATIVE FREE 10 ML: 5 INJECTION INTRAVENOUS at 22:08

## 2020-06-10 RX ADMIN — PROPOFOL INJECTABLE EMULSION 30 MCG/KG/MIN: 10 INJECTION, EMULSION INTRAVENOUS at 00:02

## 2020-06-10 RX ADMIN — PROPOFOL INJECTABLE EMULSION 35 MCG/KG/MIN: 10 INJECTION, EMULSION INTRAVENOUS at 08:17

## 2020-06-10 RX ADMIN — METOPROLOL TARTRATE 12.5 MG: 25 TABLET ORAL at 22:07

## 2020-06-10 RX ADMIN — SODIUM CHLORIDE, PRESERVATIVE FREE 10 ML: 5 INJECTION INTRAVENOUS at 08:19

## 2020-06-10 RX ADMIN — FUROSEMIDE 40 MG: 10 INJECTION, SOLUTION INTRAMUSCULAR; INTRAVENOUS at 08:16

## 2020-06-10 RX ADMIN — PROPOFOL INJECTABLE EMULSION 50 MCG/KG/MIN: 10 INJECTION, EMULSION INTRAVENOUS at 04:18

## 2020-06-10 RX ADMIN — FUROSEMIDE 40 MG: 10 INJECTION INTRAMUSCULAR; INTRAVENOUS at 17:41

## 2020-06-10 RX ADMIN — BUDESONIDE AND FORMOTEROL FUMARATE DIHYDRATE 1 PUFF: 160; 4.5 AEROSOL RESPIRATORY (INHALATION) at 20:23

## 2020-06-10 RX ADMIN — FAMOTIDINE 20 MG: 10 INJECTION INTRAVENOUS at 22:06

## 2020-06-10 RX ADMIN — INSULIN LISPRO 1 UNITS: 100 INJECTION, SOLUTION INTRAVENOUS; SUBCUTANEOUS at 12:31

## 2020-06-10 ASSESSMENT — PULMONARY FUNCTION TESTS
PIF_VALUE: 18
PIF_VALUE: 11
PIF_VALUE: 13
PIF_VALUE: 22
PIF_VALUE: 12
PIF_VALUE: 22
PIF_VALUE: 14
PIF_VALUE: 18
PIF_VALUE: 21
PIF_VALUE: 16
PIF_VALUE: 17
PIF_VALUE: 17
PIF_VALUE: 15
PIF_VALUE: 12
PIF_VALUE: 19
PIF_VALUE: 17
PIF_VALUE: 21
PIF_VALUE: 18
PIF_VALUE: 19
PIF_VALUE: 18
PIF_VALUE: 19
PIF_VALUE: 17
PIF_VALUE: 14
PIF_VALUE: 16
PIF_VALUE: 15
PIF_VALUE: 15

## 2020-06-10 ASSESSMENT — PAIN SCALES - GENERAL
PAINLEVEL_OUTOF10: 0
PAINLEVEL_OUTOF10: 0

## 2020-06-10 NOTE — PROGRESS NOTES
This note also relates to the following rows which could not be included:  SpO2 - Cannot attach notes to unvalidated device data  Pulse - Cannot attach notes to unvalidated device data  Resp - Cannot attach notes to unvalidated device data  End Tidal CO2 - Cannot attach notes to unvalidated device data    Pt extubated without distress placed on 2 l nc

## 2020-06-10 NOTE — PROGRESS NOTES
entry  Heart - normal rate, regular rhythm, normal S1, S2, no murmurs, rubs, clicks or gallops  Abdomen - soft, nontender, nondistended, no masses or organomegaly  Extremities - peripheral pulses normal, no pedal edema, no clubbing or cyanosis      MEDICATIONS:    Scheduled Meds:   sodium chloride flush  10 mL Intravenous 2 times per day    enoxaparin  40 mg Subcutaneous Daily    famotidine (PEPCID) injection  20 mg Intravenous BID    hydrocortisone sodium succinate PF  50 mg Intravenous Q8H    furosemide  40 mg Intravenous Daily    sodium chloride flush  10 mL Intravenous 2 times per day    budesonide-formoterol  1 puff Inhalation BID     Continuous Infusions:   norepinephrine Stopped (06/10/20 0310)    insulin (HUMAN R) non-weight based infusion 0.61 Units/hr (06/10/20 0702)    dextrose      propofol 35 mcg/kg/min (06/10/20 0615)     PRN Meds:   sodium chloride flush, 10 mL, PRN  polyethylene glycol, 17 g, Daily PRN  promethazine, 12.5 mg, Q6H PRN    Or  ondansetron, 4 mg, Q6H PRN  glucose, 15 g, PRN  dextrose, 12.5 g, PRN  glucagon (rDNA), 1 mg, PRN  dextrose, 100 mL/hr, PRN  fentanNYL, 50 mcg, Q2H PRN  sodium chloride flush, 10 mL, PRN  albuterol, 5 mg, 4x Daily PRN  ipratropium-albuterol, 1 vial, Q4H PRN          VENT SETTINGS (Comprehensive) (if applicable):  Vent Information  $Ventilation: $Initial Day  Skin Assessment: Clean, dry, & intact  Vent Type: Servo i  Vent Mode: PRVC  Vt Ordered: 500 mL  Rate Set: 16 bmp  FiO2 : 30 %  SpO2: 97 %  SpO2/FiO2 ratio: 323.33  Sensitivity: 3  PEEP/CPAP: 8  I Time/ I Time %: 1 s  Humidification Source: E  Additional Respiratory  Assessments  Pulse: 99  Resp: 15  SpO2: 97 %  End Tidal CO2: 37 (%)  Humidification Source: [facility]  Oral Care Completed?: Yes  Oral Care: Mouthwash, Suction toothette, Mouth suctioned, Lip moisturizer applied  Subglottic Suction Done?: Yes      Laboratory findings:    Complete Blood Count:   Recent Labs     06/09/20  0859 06/10/20  0414 WBC 14.0* 13.7*   HGB 13.2 11.5*   HCT 43.6 35.1*    227        Last 3 Blood Glucose:   Recent Labs     06/09/20  0859 06/10/20  0419   GLUCOSE 426* 153*        PT/INR:    Lab Results   Component Value Date    PROTIME 9.4 01/16/2020    INR 0.9 01/16/2020     PTT:    Lab Results   Component Value Date    APTT 22.4 01/16/2020       Comprehensive Metabolic Profile:   Recent Labs     06/09/20  0859 06/10/20  0419    144   K 3.8 4.1    106   CO2 17* 22   BUN 18 24*   CREATININE 0.95* 0.66   GLUCOSE 426* 153*   CALCIUM 9.2 8.9   PROT 6.7  --    LABALBU 3.9  --    BILITOT 0.28*  --    ALKPHOS 61  --    AST 26  --    ALT 34*  --       Magnesium:   Lab Results   Component Value Date    MG 2.1 01/17/2020     Phosphorus: No results found for: PHOS  Ionized Calcium: No results found for: CAION     Troponin: No results for input(s): TROPONINI in the last 72 hours.     Microbiology:    Cultures during this admission:     Blood cultures:                 [] None drawn      [x] Negative             []  Positive (Details:  )  Urine Culture:                   [x] None drawn      [] Negative             []  Positive (Details:  )  Sputum Culture:               [x] None drawn       [] Negative             []  Positive (Details:  )   Endotracheal aspirate:     [x] None drawn       [] Negative             []  Positive (Details:  )         Radiology/Imaging:     Chest Xray (6/10/2020):   Narrative   EXAMINATION:   ONE XRAY VIEW OF THE CHEST       6/10/2020 3:59 am       COMPARISON:   Chest radiograph performed 06/09/2020.       HISTORY:   ORDERING SYSTEM PROVIDED HISTORY: central line placement   TECHNOLOGIST PROVIDED HISTORY:   central line placement   Reason for Exam: supine,line placement       FINDINGS:   There is no acute consolidation or effusion.  There is no pneumothorax.  The   mediastinal structures are unremarkable.  Is an endotracheal tube with the   tip in the midtrachea.  There is a gastric tube and the tip is not   visualized. Willim Edu is a right internal jugular line with the tip proximal   SVC.  The upper abdomen is unremarkable.  The extrathoracic soft tissues are   unremarkable. Willim Edu is no acute osseous abnormality.           Impression   No acute cardiopulmonary process.       Support tubes as described above.         Narrative   EXAMINATION:   ONE XRAY VIEW OF THE CHEST       6/9/2020 4:11 pm       COMPARISON:   June 9, 2020 1034 hours       HISTORY:   ORDERING SYSTEM PROVIDED HISTORY: central line placement RT IJ   TECHNOLOGIST PROVIDED HISTORY:   central line placement RT IJ       FINDINGS:   Interval placement of a right IJ central line is noted, tip overlying the   distal SVC.  No pneumothorax is noted.  Tip of the ET tube is approximately   4.3 cm above the ar.  Overall improved aeration is seen throughout the   lungs, consistent with resolving edema or infection.  ICD is in place.  NG   tube is in place, tip not included on the radiograph.           Impression   1. Interval placement of a right IJ central line, tip in the distal SVC.  No   evidence of a pneumothorax present   2. Overall improved aeration seen within the lungs, consistent with resolving   edema or infection. Narrative   EXAMINATION:   ONE XRAY VIEW OF THE CHEST       6/9/2020 9:16 am       COMPARISON:   May 19, 2020       HISTORY:   ORDERING SYSTEM PROVIDED HISTORY: shortness of breath   TECHNOLOGIST PROVIDED HISTORY:   shortness of breath   Reason for Exam: Trouble breathing.    Acuity: Acute   Type of Exam: Initial       FINDINGS:   There is no evidence of pneumothorax.  Appears to be some degree bilateral   pulmonary edema along with patchy bibasilar pulmonary infiltrates.  Heart   mediastinum are stable.  Stable appearance of left sided unipolar cardiac   device.  Skin staples have been removed over the left chest.  Visualized bony   thorax shows no acute abnormality.           Impression   There appears to be bilateral patchy lower lobe infiltrates superimposed on   diffuse pulmonary edema.  Atypical pneumonia must be considered.             ASSESSMENT:     Patient Active Problem List    Diagnosis Date Noted    Acute respiratory failure ([facility] 06/09/2020    Nonischemic cardiomyopathy ([address] 01/21/2020    Acute on chronic systolic congestive heart failure ([address] 01/21/2020    Exertional dyspnea 01/17/2020    Thrombophlebitis of superficial veins of right lower extremity 01/17/2020    Respiratory distress 01/16/2020    Diabetes type 2, no ocular involvement ([address] 06/11/2018    History of radial keratotomy 06/11/2018    Combined forms of age-related cataract of both eyes 06/11/2018    Partial small bowel obstruction ([address] 10/13/2017    Colovaginal fistula 08/30/2017    Diverticulitis of large intestine with abscess without bleeding     Bowel perforation ([address] 01/12/2017    Varicose veins of bilateral lower extremities with pain 11/10/2016    Non-celiac gluten sensitivity     Severe persistent asthma     Sinusitis, chronic 04/29/2015    Asthma 04/29/2015    DVT (deep venous thrombosis) (HCC)     Presence of endovascular aneurysm coil compatible with safe magnetic resonance imaging 01/23/2015    Splenic artery aneurysm ([facility] 12/18/2014    LGSIL (low grade squamous intraepithelial dysplasia) 11/01/2014    Sciatica of right side     Hypothyroidism     Essential hypertension     Hyperlipidemia     DJD (degenerative joint disease), lumbar     Acute diverticulitis of intestine     Hyperplastic colon polyp     Tinnitus     Gastroesophageal reflux disease        Additional assessment:    · Flash Pulmonary Edema      PLAN:     WEAN PER PROTOCOL:  [] No   [x] Yes  [] N/A    DISCONTINUE ANY LABS:   [x] No   [] Yes    ICU PROPHYLAXIS:  Stress ulcer:  [x] PPI Agent  [] [id number] [] Sucralfate  [] Other:  VTE:   [x] Enoxaparin  [] Unfract.  Heparin Subcut  [] EPC Cuffs    NUTRITION:  [x] NPO [] Tube Feeding (Specify: ) [] TPN  []

## 2020-06-10 NOTE — PROGRESS NOTES
[facility]  Documentation Note                Admission Dx: Acute respiratory failure ([facility] [J96.00]    Past Medical History:   has a past medical history of Acute on chronic systolic congestive heart failure ([facility] Asthma, Bilateral corneal scars, Disease of blood and blood forming organ, Diverticulitis, DJD (degenerative joint disease), lumbar, Dry eye syndrome, DVT (deep venous thrombosis) ([facility] Gastro - esophageal reflux disease, History of blood transfusion, Hyperlipidemia, Hyperplastic colon polyp, Hypertension, Hypothyroidism, LGSIL (low grade squamous intraepithelial dysplasia), Non-celiac gluten sensitivity, NSTEMI (non-ST elevated myocardial infarction) ([facility] Osteoarthritis, Other disorders of kidney and ureter in diseases classified elsewhere, Sciatica of right side, Severe persistent asthma, Splenic artery aneurysm ([facility] Tinnitus, and Type 2 diabetes mellitus, controlled ([facility] Previous Testing:     ICD 5/19/2020: [employer] device placed by Dr. Tod Cochran d/t nonischemic cardiomyopathy. ECHO 4/30/2020: EF 30%, grade I DD, mild AI, moderate MR. CATH 1/20/2020: Minimal nonobstructive CAD. EF 30%. Previous office/hospital visit:   Dr. Tod Cochran 5/19/2020:   - Nonischemic CM, Chronic Sys HF, HFrEF- EF 25-30%- Hasn't resolved with maximal medical therapy. - HTN  - DL  - Minimal CAD cath 1/20  - Chronic GERD  - Dislodged RV AICD lead    Home Medications:      Prior to Admission medications    Medication Sig Start Date End Date Taking?  Authorizing Provider   glimepiride (AMARYL) 2 MG tablet Take 2 mg by mouth every morning (before breakfast)    Historical Provider, MD   predniSONE (DELTASONE) 20 MG tablet Take one tablet every other day at 8 am  Patient taking differently: 10 mg Take one tablet every other day at 8 am 4/15/20   DEREK Curtis CNP   famotidine (PEPCID) 40 MG tablet Take 1 tablet by mouth every evening 4/13/20   DEREK Curtis CNP TROPHS 111* 97* 79*     FASTING LIPID PANEL:  Lab Results   Component Value Date    HDL 52 05/06/2020    LDLCALC 130 05/06/2020    TRIG 195 05/06/2020     LIVER PROFILE:  Recent Labs     06/09/20  0859   AST 26   ALT 34*   LABALBU 3.9       [facility]

## 2020-06-10 NOTE — PROGRESS NOTES
Messaged critical care resident concerning accurate placement of right IJ central line. Resident came to evaluate, xray ordered.

## 2020-06-10 NOTE — CARE COORDINATION
Case Management Initial Discharge Plan  Pita Anguiano,             Met with:patient or spouse/SO to discuss discharge plans. Information verified: address, contacts, phone number, , insurance Yes    Emergency Contact/Next of Kin name & number: spouse Diana Guillen [phone]    PCP: Sima Will MD  Date of last visit: past month    Insurance Provider: MMO    Discharge Planning    Living Arrangements:      Support Systems:       Home has 1 stories  1 stairs to climb to get into front door, stairs to climb to reach second floor  Location of bedroom/bathroom in home 1    Patient able to perform ADL's:Independent    Current Services (outpatient & in home)   DME equipment: nebulizer, glucometer  DME provider:     Receiving oral anticoagulation therapy? No    If indicated:   Physician managing anticoagulation treatment:   Where does patient obtain lab work for ATC treatment? Potential Assistance Needed:       Patient agreeable to home care: No  [place] of choice provided:  no    Prior SNF/Rehab Placement and Facility: no  Agreeable to SNF/Rehab: No  [place] of choice provided: no     Evaluation: no    Expected Discharge date:       Patient expects to be discharged to: Follow Up Appointment: Best Day/ Time:      Transportation provider: self  Transportation arrangements needed for discharge: No    Readmission Risk              Risk of Unplanned Readmission:        15             Does patient have a readmission risk score greater than 14?: Yes  If yes, follow-up appointment must be made within 7 days of discharge. Goals of Care:       Discharge Plan: home with spouse, independent.  No skilled needs          Electronically signed by Heavenly Mott RN on 6/10/20 at 12:29 PM EDT

## 2020-06-11 VITALS
HEIGHT: 67 IN | OXYGEN SATURATION: 87 % | RESPIRATION RATE: 23 BRPM | SYSTOLIC BLOOD PRESSURE: 124 MMHG | HEART RATE: 104 BPM | BODY MASS INDEX: 33.43 KG/M2 | TEMPERATURE: 98.9 F | WEIGHT: 213 LBS | DIASTOLIC BLOOD PRESSURE: 68 MMHG

## 2020-06-11 LAB
ABSOLUTE EOS #: 0.06 K/UL (ref 0–0.44)
ABSOLUTE IMMATURE GRANULOCYTE: 0.03 K/UL (ref 0–0.3)
ABSOLUTE LYMPH #: 2.53 K/UL (ref 1.1–3.7)
ABSOLUTE MONO #: 1.13 K/UL (ref 0.1–1.2)
ALLEN TEST: ABNORMAL
ANION GAP SERPL CALCULATED.3IONS-SCNC: 16 MMOL/L (ref 9–17)
BASOPHILS # BLD: 1 % (ref 0–2)
BASOPHILS ABSOLUTE: 0.06 K/UL (ref 0–0.2)
BUN BLDV-MCNC: 23 MG/DL (ref 8–23)
BUN/CREAT BLD: ABNORMAL (ref 9–20)
CALCIUM SERPL-MCNC: 9.4 MG/DL (ref 8.6–10.4)
CHLORIDE BLD-SCNC: 103 MMOL/L (ref 98–107)
CO2: 24 MMOL/L (ref 20–31)
CREAT SERPL-MCNC: 0.68 MG/DL (ref 0.5–0.9)
CULTURE: NO GROWTH
DIFFERENTIAL TYPE: ABNORMAL
DIRECT EXAM: NORMAL
EOSINOPHILS RELATIVE PERCENT: 1 % (ref 1–4)
FIO2: 2
GFR AFRICAN AMERICAN: >60 ML/MIN
GFR NON-AFRICAN AMERICAN: >60 ML/MIN
GFR SERPL CREATININE-BSD FRML MDRD: ABNORMAL ML/MIN/{1.73_M2}
GFR SERPL CREATININE-BSD FRML MDRD: ABNORMAL ML/MIN/{1.73_M2}
GLUCOSE BLD-MCNC: 129 MG/DL (ref 65–105)
GLUCOSE BLD-MCNC: 135 MG/DL (ref 70–99)
GLUCOSE BLD-MCNC: 142 MG/DL (ref 74–100)
HCT VFR BLD CALC: 40.5 % (ref 36.3–47.1)
HEMOGLOBIN: 13.2 G/DL (ref 11.9–15.1)
IMMATURE GRANULOCYTES: 0 %
LYMPHOCYTES # BLD: 20 % (ref 24–43)
Lab: NORMAL
MCH RBC QN AUTO: 29.5 PG (ref 25.2–33.5)
MCHC RBC AUTO-ENTMCNC: 32.6 G/DL (ref 28.4–34.8)
MCV RBC AUTO: 90.4 FL (ref 82.6–102.9)
MODE: ABNORMAL
MONOCYTES # BLD: 9 % (ref 3–12)
NEGATIVE BASE EXCESS, ART: ABNORMAL (ref 0–2)
NRBC AUTOMATED: 0 PER 100 WBC
O2 DEVICE/FLOW/%: ABNORMAL
PATIENT TEMP: ABNORMAL
PDW BLD-RTO: 12.7 % (ref 11.8–14.4)
PLATELET # BLD: 257 K/UL (ref 138–453)
PLATELET ESTIMATE: ABNORMAL
PMV BLD AUTO: 9.7 FL (ref 8.1–13.5)
POC HCO3: 28.3 MMOL/L (ref 21–28)
POC LACTIC ACID: 1.65 MMOL/L (ref 0.56–1.39)
POC O2 SATURATION: 97 % (ref 94–98)
POC PCO2 TEMP: ABNORMAL MM HG
POC PCO2: 34.6 MM HG (ref 35–48)
POC PH TEMP: ABNORMAL
POC PH: 7.52 (ref 7.35–7.45)
POC PO2 TEMP: ABNORMAL MM HG
POC PO2: 80.9 MM HG (ref 83–108)
POSITIVE BASE EXCESS, ART: 6 (ref 0–3)
POTASSIUM SERPL-SCNC: 3.6 MMOL/L (ref 3.7–5.3)
RBC # BLD: 4.48 M/UL (ref 3.95–5.11)
RBC # BLD: ABNORMAL 10*6/UL
SAMPLE SITE: ABNORMAL
SEG NEUTROPHILS: 70 % (ref 36–65)
SEGMENTED NEUTROPHILS ABSOLUTE COUNT: 8.96 K/UL (ref 1.5–8.1)
SODIUM BLD-SCNC: 143 MMOL/L (ref 135–144)
SPECIMEN DESCRIPTION: NORMAL
TCO2 (CALC), ART: 29 MMOL/L (ref 22–29)
WBC # BLD: 12.8 K/UL (ref 3.5–11.3)
WBC # BLD: ABNORMAL 10*3/UL

## 2020-06-11 PROCEDURE — 99233 SBSQ HOSP IP/OBS HIGH 50: CPT | Performed by: INTERNAL MEDICINE

## 2020-06-11 PROCEDURE — 85025 COMPLETE CBC W/AUTO DIFF WBC: CPT

## 2020-06-11 PROCEDURE — 82947 ASSAY GLUCOSE BLOOD QUANT: CPT

## 2020-06-11 PROCEDURE — 6370000000 HC RX 637 (ALT 250 FOR IP): Performed by: STUDENT IN AN ORGANIZED HEALTH CARE EDUCATION/TRAINING PROGRAM

## 2020-06-11 PROCEDURE — 2500000003 HC RX 250 WO HCPCS: Performed by: STUDENT IN AN ORGANIZED HEALTH CARE EDUCATION/TRAINING PROGRAM

## 2020-06-11 PROCEDURE — 2580000003 HC RX 258: Performed by: STUDENT IN AN ORGANIZED HEALTH CARE EDUCATION/TRAINING PROGRAM

## 2020-06-11 PROCEDURE — 6360000002 HC RX W HCPCS: Performed by: STUDENT IN AN ORGANIZED HEALTH CARE EDUCATION/TRAINING PROGRAM

## 2020-06-11 PROCEDURE — 97165 OT EVAL LOW COMPLEX 30 MIN: CPT

## 2020-06-11 PROCEDURE — 82803 BLOOD GASES ANY COMBINATION: CPT

## 2020-06-11 PROCEDURE — 83605 ASSAY OF LACTIC ACID: CPT

## 2020-06-11 PROCEDURE — 97162 PT EVAL MOD COMPLEX 30 MIN: CPT

## 2020-06-11 PROCEDURE — 37799 UNLISTED PX VASCULAR SURGERY: CPT

## 2020-06-11 PROCEDURE — 97530 THERAPEUTIC ACTIVITIES: CPT

## 2020-06-11 PROCEDURE — 97535 SELF CARE MNGMENT TRAINING: CPT

## 2020-06-11 PROCEDURE — 80048 BASIC METABOLIC PNL TOTAL CA: CPT

## 2020-06-11 RX ORDER — FAMOTIDINE 20 MG/1
20 TABLET, FILM COATED ORAL 2 TIMES DAILY
Status: DISCONTINUED | OUTPATIENT
Start: 2020-06-11 | End: 2020-06-11 | Stop reason: HOSPADM

## 2020-06-11 RX ORDER — PREDNISONE 1 MG/1
5 TABLET ORAL DAILY
Status: DISCONTINUED | OUTPATIENT
Start: 2020-06-20 | End: 2020-06-11 | Stop reason: HOSPADM

## 2020-06-11 RX ORDER — PREDNISONE 20 MG/1
40 TABLET ORAL DAILY
Status: DISCONTINUED | OUTPATIENT
Start: 2020-06-11 | End: 2020-06-11

## 2020-06-11 RX ORDER — FUROSEMIDE 40 MG/1
40 TABLET ORAL DAILY
Qty: 60 TABLET | Refills: 3 | Status: SHIPPED | OUTPATIENT
Start: 2020-06-12 | End: 2020-12-18 | Stop reason: SDUPTHER

## 2020-06-11 RX ORDER — FUROSEMIDE 40 MG/1
40 TABLET ORAL DAILY
Status: DISCONTINUED | OUTPATIENT
Start: 2020-06-11 | End: 2020-06-11 | Stop reason: HOSPADM

## 2020-06-11 RX ORDER — PREDNISONE 20 MG/1
40 TABLET ORAL DAILY
Qty: 6 TABLET | Refills: 0 | Status: SHIPPED | OUTPATIENT
Start: 2020-06-11 | End: 2020-06-14

## 2020-06-11 RX ORDER — PREDNISONE 10 MG/1
10 TABLET ORAL DAILY
Status: DISCONTINUED | OUTPATIENT
Start: 2020-06-17 | End: 2020-06-11 | Stop reason: HOSPADM

## 2020-06-11 RX ORDER — PREDNISONE 10 MG/1
10 TABLET ORAL DAILY
Qty: 3 TABLET | Refills: 0 | Status: SHIPPED | OUTPATIENT
Start: 2020-06-17 | End: 2020-06-20

## 2020-06-11 RX ORDER — PREDNISONE 20 MG/1
20 TABLET ORAL DAILY
Status: DISCONTINUED | OUTPATIENT
Start: 2020-06-11 | End: 2020-06-11 | Stop reason: HOSPADM

## 2020-06-11 RX ORDER — LOSARTAN POTASSIUM 25 MG/1
25 TABLET ORAL DAILY
Status: DISCONTINUED | OUTPATIENT
Start: 2020-06-11 | End: 2020-06-11 | Stop reason: HOSPADM

## 2020-06-11 RX ORDER — METOPROLOL TARTRATE 50 MG/1
50 TABLET, FILM COATED ORAL 2 TIMES DAILY
Status: DISCONTINUED | OUTPATIENT
Start: 2020-06-11 | End: 2020-06-11

## 2020-06-11 RX ORDER — PREDNISONE 10 MG/1
20 TABLET ORAL DAILY
Qty: 6 TABLET | Refills: 0 | Status: SHIPPED | OUTPATIENT
Start: 2020-06-14 | End: 2020-06-17

## 2020-06-11 RX ADMIN — SODIUM CHLORIDE, PRESERVATIVE FREE 10 ML: 5 INJECTION INTRAVENOUS at 08:06

## 2020-06-11 RX ADMIN — Medication 10 ML: at 08:06

## 2020-06-11 RX ADMIN — LOSARTAN POTASSIUM 25 MG: 25 TABLET ORAL at 11:08

## 2020-06-11 RX ADMIN — HYDROCORTISONE SODIUM SUCCINATE 50 MG: 100 INJECTION, POWDER, FOR SOLUTION INTRAMUSCULAR; INTRAVENOUS at 08:04

## 2020-06-11 RX ADMIN — HYDROCORTISONE SODIUM SUCCINATE 50 MG: 100 INJECTION, POWDER, FOR SOLUTION INTRAMUSCULAR; INTRAVENOUS at 00:18

## 2020-06-11 RX ADMIN — FAMOTIDINE 20 MG: 10 INJECTION INTRAVENOUS at 08:04

## 2020-06-11 RX ADMIN — METOPROLOL TARTRATE 25 MG: 25 TABLET ORAL at 08:04

## 2020-06-11 RX ADMIN — FUROSEMIDE 40 MG: 10 INJECTION, SOLUTION INTRAMUSCULAR; INTRAVENOUS at 08:04

## 2020-06-11 ASSESSMENT — PAIN SCALES - GENERAL
PAINLEVEL_OUTOF10: 0
PAINLEVEL_OUTOF10: 0

## 2020-06-11 NOTE — PROGRESS NOTES
Occupational Therapy   Occupational Therapy Initial Assessment  Date: 2020   Patient Name: Hiram Chauhan  MRN: [id number]     : 1960    Date of Service: 2020    Discharge Recommendations:    No therapy recommended at discharge. Assessment   Assessment: Pt agreeable to OT eval this PM. Pt completed functional transfers and functional mobility with supervision/SBA for safety. Pt with no LOB or unsteadiness throughout. Pt demo I with UB dressing with setup only. Pt demo good dynamic sitting throughout session. Pt states no concerns with independently completing ADLs/IADLs or functional mobility when returning home. Pt to be discharged from OT services d/t independent level of function. Pt educated on EC/WS and deep breathing tech with good return. Prognosis: Good  Decision Making: Low Complexity  Patient Education: Pt educated on OT role, OT POC, safety awareness, EC/WS; good return  REQUIRES OT FOLLOW UP: No  Activity Tolerance  Activity Tolerance: Patient Tolerated treatment well  Safety Devices  Safety Devices in place: Yes  Type of devices: Left in bed;Gait belt;Call light within reach  Restraints  Initially in place: No           Patient Diagnosis(es): There were no encounter diagnoses.      has a past medical history of Acute on chronic systolic congestive heart failure ([address] Asthma, Bilateral corneal scars, Disease of blood and blood forming organ, Diverticulitis, DJD (degenerative joint disease), lumbar, Dry eye syndrome, DVT (deep venous thrombosis) ([address] Gastro - esophageal reflux disease, History of blood transfusion, Hyperlipidemia, Hyperplastic colon polyp, Hypertension, Hypothyroidism, LGSIL (low grade squamous intraepithelial dysplasia), Non-celiac gluten sensitivity, NSTEMI (non-ST elevated myocardial infarction) ([address] Osteoarthritis, Other disorders of kidney and ureter in diseases classified elsewhere, Sciatica of right side, Severe persistent asthma, Splenic artery aneurysm ([facility] Tinnitus, and Type 2 diabetes mellitus, controlled ([facility] has a past surgical history that includes Knee arthroscopy (Right, ); Tubal ligation ();  section (); [place] tooth extraction; Skin tag removal; Total knee arthroplasty (Right, 2011); other surgical history (10/25/2011); other surgical history (Right,  and ); Nasal sinus surgery (2014); Arterial aneurysm repair (); Colposcopy (2015); pr colon ca scrn not hi rsk ind (N/A, 2017); pr egd transoral biopsy single/multiple (N/A, 2017); Colonoscopy (2012); Colonoscopy (2017); Small intestine surgery (N/A, 2017); Cystoscopy (Bilateral, 2017); laparoscopy (N/A, 2017); pr colon ca scrn not hi rsk ind (N/A, 2017); Upper gastrointestinal endoscopy (N/A, 2020); Abdomen surgery; joint replacement; skin biopsy; vascular surgery; Endoscopy, colon, diagnostic; eye surgery; Dilatation, esophagus; Cardiac catheterization (2020); Cardiac defibrillator placement (Left, 2020); and other surgical history (2020).            Restrictions  Restrictions/Precautions  Restrictions/Precautions: Up as Tolerated  Required Braces or Orthoses?: No    Subjective   General  Patient assessed for rehabilitation services?: Yes  Family / Caregiver Present: No  General Comment  Comments: RN ok'd pt for OT/PT eval this PM. Pt agreeable to session and pleasant/cooperative throughout  Patient Currently in Pain: Denies  Vital Signs  Patient Currently in Pain: Denies     Social/Functional History  Social/Functional History  Lives With: Spouse  Type of Home: House  Home Layout: One level  Home Access: Stairs to enter with rails  Entrance Stairs - Number of Steps: 3  Entrance Stairs - Rails: Both  Bathroom Shower/Tub: Walk-in shower  Bathroom Toilet: Handicap height  Bathroom Equipment: Grab bars in shower, Built-in shower seat, Grab bars around toilet  Home Equipment: Rolling walker(pt reports

## 2020-06-11 NOTE — DISCHARGE SUMMARY
solution Inhale 3 mLs into the lungs every 4 hours as needed for Shortness of Breath  Qty: 360 mL, Refills: 2    Associated Diagnoses: Asthma not dependent on systemic steroids with acute exacerbation      fluticasone (FLONASE) 50 MCG/ACT nasal spray Two sprays to each nostril once daily  Qty: 3 Bottle, Refills: 3    Associated Diagnoses: Chronic pansinusitis      diclofenac (VOLTAREN) 75 MG EC tablet Take 1 tablet by mouth 2 times daily as needed for Pain  Qty: 90 tablet, Refills: 2    Associated Diagnoses: Bilateral chronic knee pain      Probiotic Product (SOLUBLE FIBER/PROBIOTICS PO) Take by mouth 2 times daily      metFORMIN (GLUCOPHAGE) 500 MG tablet TAKE 2 TABLETS BY MOUTH TWO TIMES A DAY WITH MEALS  Qty: 120 tablet, Refills: 3    Associated Diagnoses: Type 2 diabetes mellitus without complication, without long-term current use of insulin (HCC)      losartan (COZAAR) 25 MG tablet Take 1 tablet by mouth daily  Qty: 90 tablet, Refills: 2    Associated Diagnoses: Essential hypertension           Activity: activity as tolerated    Diet: diabetic diet    Disposition: home    Follow-up: Aaliyah Calabrese MD in 1 week  [facility] in 2 weeks  CHF clinic in 1 week    Electronically signed by Pranav Shen DO on 6/11/2020 at 1:15 PM     Time Spent on discharge is more than 15 minutes in the examination, evaluation, counseling and review of medications and discharge plan.       Pranav Shen DO  Emergency Medicine Resident  Critical Care Service

## 2020-06-11 NOTE — PROGRESS NOTES
Critical Care Team - Daily Progress Note      Date and time: 6/11/2020 7:14 AM  Patient's name:  Presley Pineda  Medical Record Number: [id number]  Patient's account/billing number: [de-identified]  Patient's YOB: 1960  Age: 63 y.o. Date of Admission: 6/9/2020  1:25 PM  Length of stay during current admission: 2      Primary Care Physician: Myesha Minor MD  ICU Attending Physician: Dr. Janese Ahumada     Code Status: Full Code    Reason for ICU admission: Pulmonary Edema      SUBJECTIVE:     OVERNIGHT EVENTS:       Patient did well overnight with no events per nursing. Patient reports feeling well and is glad to have ET tube and central line out. BP well controlled on Lopressor 25 mg BID. O2 sats 99% on RA, patient reports no need for O2 overnight.      AWAKE & FOLLOWING COMMANDS:  [] No   [x] Yes    CURRENT VENTILATION STATUS:     [] Ventilator  [] BIPAP  [] Nasal Cannula [x] Room Air      IF INTUBATED, ET TUBE MARKING AT LOWER LIP:       cms    SECRETIONS Amount:  [] Small [] Moderate  [] Large  [] None  Color:     [] White [] Colored  [] Bloody    SEDATION:  RAAS Score:  [] Propofol gtt  [] Versed gtt  [] Ativan gtt   [] No Sedation    PARALYZED:  [x] No    [] Yes    DIARRHEA:                [x] No                [] Yes  (C. Difficile status: [] positive                                                                                                                       [] negative                                                                                                                     [] pending)    VASOPRESSORS:  [x] No    [] Yes    If yes -   [] Levophed       [] Dopamine     [] Vasopressin       [] Dobutamine  [] Phenylephrine         [] Epinephrine    CENTRAL LINES:     [x] No   [] Yes   (Date of Insertion:  )           If yes -     [] Right IJ     [] Left IJ [] Right Femoral [] Left Femoral                   [] Right Subclavian [] Left Subclavian       PEDROZA'S CATHETER:   [] No   [x] Yes Labs     06/09/20  0859 06/10/20  0419   GLUCOSE 426* 153*        PT/INR:    Lab Results   Component Value Date    PROTIME 9.4 01/16/2020    INR 0.9 01/16/2020     PTT:    Lab Results   Component Value Date    APTT 22.4 01/16/2020       Comprehensive Metabolic Profile:   Recent Labs     06/09/20  0859 06/10/20  0419    144   K 3.8 4.1    106   CO2 17* 22   BUN 18 24*   CREATININE 0.95* 0.66   GLUCOSE 426* 153*   CALCIUM 9.2 8.9   PROT 6.7  --    LABALBU 3.9  --    BILITOT 0.28*  --    ALKPHOS 61  --    AST 26  --    ALT 34*  --       Magnesium:   Lab Results   Component Value Date    MG 2.1 01/17/2020     Phosphorus: No results found for: PHOS  Ionized Calcium: No results found for: CAION     Urinalysis: NA    Troponin: No results for input(s): TROPONINI in the last 72 hours.     Microbiology:    Cultures during this admission:     Blood cultures:                 [] None drawn      [x] Negative             []  Positive (Details:  )  Urine Culture:                   [x] None drawn      [] Negative             []  Positive (Details:  )  Sputum Culture:               [x] None drawn       [] Negative             []  Positive (Details:  )   Endotracheal aspirate:     [] None drawn       [x] Negative             []  Positive (Details:  )         Radiology/Imaging:     Lower extremity doppler    Narrative       [facility]    Vascular Lower Extremities DVT Study Procedure        Patient Name [id number]     Date of Study           06/10/2020                [facility]        Date of      [birth year]  Gender                  Female    Birth        Age          60 year(s)  Race                            Room Number  0864        Corporate ID [id number]    #        Patient Acct [de-identified]    #        MR #         [id number]     Sonographer             Lori Eason RVT        Accession #  [id number]  Interpreting Physician Linda Norman        Referring                Referring Physician     Jennifer Nicole

## 2020-06-11 NOTE — CONSULTS
arthroscopy (Right, ); Tubal ligation ();  section (); [place] tooth extraction; Skin tag removal; Total knee arthroplasty (Right, 2011); other surgical history (10/25/2011); other surgical history (Right,  and ); Nasal sinus surgery (2014); Arterial aneurysm repair (); Colposcopy (2015); pr colon ca scrn not hi rsk ind (N/A, 2017); pr egd transoral biopsy single/multiple (N/A, 2017); Colonoscopy (2012); Colonoscopy (2017); Small intestine surgery (N/A, 2017); Cystoscopy (Bilateral, 2017); laparoscopy (N/A, 2017); pr colon ca scrn not hi rsk ind (N/A, 2017); Upper gastrointestinal endoscopy (N/A, 2020); Abdomen surgery; joint replacement; skin biopsy; vascular surgery; Endoscopy, colon, diagnostic; eye surgery; Dilatation, esophagus; Cardiac catheterization (2020); Cardiac defibrillator placement (Left, 2020); and other surgical history (2020). Home Medications:    Prior to Admission medications    Medication Sig Start Date End Date Taking?  Authorizing Provider   glimepiride (AMARYL) 2 MG tablet Take 2 mg by mouth every morning (before breakfast)    Historical Provider, MD   predniSONE (DELTASONE) 20 MG tablet Take one tablet every other day at 8 am  Patient taking differently: 10 mg Take one tablet every other day at 8 am 4/15/20   DEREK Moya CNP   famotidine (PEPCID) 40 MG tablet Take 1 tablet by mouth every evening 20   DEREK Moya CNP   Cholecalciferol (VITAMIN D3) 50 MCG (2000) CAPS Take 1 capsule by mouth daily    Historical Provider, MD   aspirin 81 MG EC tablet Take 1 tablet by mouth daily 20   Candace Torres DO   metoprolol succinate (TOPROL XL) 50 MG extended release tablet Take 1 tablet by mouth daily  Patient taking differently: Take 50 mg by mouth 2 times daily  20   Candace Endo, DO   albuterol (PROVENTIL) (5 MG/ML) 0.5% nebulizer solution Take 1 mL by nebulization 4 times daily as needed for Wheezing 11/26/19   DEREK Jiménez CNP   budesonide-formoterol (SYMBICORT) 160-4.5 MCG/ACT AERO 2 puffs inhaled twice daily. Rinse mouth well after use.  11/26/19   DEREK Jiménez CNP   pantoprazole (PROTONIX) 40 MG tablet Take one tablet twice daily 30-60 minutes prior to breakfast and evening meal 11/11/19   DEREK Jiménez CNP   SYNTHROID 175 MCG tablet Take 175 mcg by mouth 8/3/19   Historical Provider, MD   MOLYBDENUM PO Take 500 mcg by mouth daily    Historical Provider, MD   ipratropium-albuterol (DUONEB) 0.5-2.5 (3) MG/3ML SOLN nebulizer solution Inhale 3 mLs into the lungs every 4 hours as needed for Shortness of Breath 5/6/19   DEREK Jiménez CNP   fluticasone Iva Tia) 50 MCG/ACT nasal spray Two sprays to each nostril once daily 4/1/19   DEREK Jiménez CNP   diclofenac (VOLTAREN) 75 MG EC tablet Take 1 tablet by mouth 2 times daily as needed for Pain 2/22/18   Renetta Natarajan MD   Probiotic Product (SOLUBLE FIBER/PROBIOTICS PO) Take by mouth 2 times daily    Historical Provider, MD   metFORMIN (GLUCOPHAGE) 500 MG tablet TAKE 2 TABLETS BY MOUTH TWO TIMES A DAY WITH MEALS 9/18/17   Clementine Lawton MD   losartan (COZAAR) 25 MG tablet Take 1 tablet by mouth daily 12/7/16   CRISTINA Kumar      Current Facility-Administered Medications: metoprolol tartrate (LOPRESSOR) tablet 25 mg, 25 mg, Oral, BID  losartan (COZAAR) tablet 25 mg, 25 mg, Oral, Daily  predniSONE (DELTASONE) tablet 40 mg, 40 mg, Oral, Daily  insulin lispro (HUMALOG) injection vial 0-6 Units, 0-6 Units, Subcutaneous, TID WC  insulin lispro (HUMALOG) injection vial 0-3 Units, 0-3 Units, Subcutaneous, Nightly  insulin glargine (LANTUS) injection vial 20 Units, 20 Units, Subcutaneous, Nightly  furosemide (LASIX) injection 40 mg, 40 mg, Intravenous, BID  sodium chloride flush 0.9 % injection 10 mL, 10 mL, Intravenous, 2 times per day  sodium chloride last 72 hours. APTT:No results for input(s): APTT in the last 72 hours. CARDIAC ENZYMES:No results for input(s): CKTOTAL, CKMB, CKMBINDEX, TROPONINI in the last 72 hours. FASTING LIPID PANEL:  Lab Results   Component Value Date    HDL 52 05/06/2020    [address] 130 05/06/2020    TRIG 195 05/06/2020     LIVER PROFILE:  Recent Labs     06/09/20  0859   AST 26   ALT 34*   LABALBU 3.9       IMPRESSION:    Patient Active Problem List   Diagnosis    Gastroesophageal reflux disease    Hypothyroidism    Essential hypertension    Hyperlipidemia    DJD (degenerative joint disease), lumbar    Acute diverticulitis of intestine    Hyperplastic colon polyp    Tinnitus    Sciatica of right side    Splenic artery aneurysm (HCC)    Presence of endovascular aneurysm coil compatible with safe magnetic resonance imaging    DVT (deep venous thrombosis) (HCC)    LGSIL (low grade squamous intraepithelial dysplasia)    Sinusitis, chronic    Asthma    Non-celiac gluten sensitivity    Severe persistent asthma    Varicose veins of bilateral lower extremities with pain    Bowel perforation (HCC)    Diverticulitis of large intestine with abscess without bleeding    Colovaginal fistula    Partial small bowel obstruction (HCC)    Diabetes type 2, no ocular involvement (HCC)    History of radial keratotomy    Combined forms of age-related cataract of both eyes    Respiratory distress    Exertional dyspnea    Thrombophlebitis of superficial veins of right lower extremity    Cardiomyopathy with implantable cardioverter-defibrillator ([address]    Acute on chronic systolic congestive heart failure (HCC)    Acute respiratory failure (HCC)    Acute pulmonary edema (HCC)     1 Acute on chronic CHF  2 NICMP S/P ICD in situ with concern for RV lead dislodement  3 HTN  4 T 2 DM  5 Hypothyroidism  RECOMMENDATIONS:  1. Will recommend switching the IV lasix to PO lasix 40 mg daily. 2. Will do lopressor 25 mg BID.  Will start the

## 2020-06-15 ENCOUNTER — HOSPITAL ENCOUNTER (OUTPATIENT)
Dept: LAB | Age: 60
Discharge: HOME | End: 2020-06-15
Payer: COMMERCIAL

## 2020-06-15 ENCOUNTER — OFFICE VISIT (OUTPATIENT)
Dept: PRIMARY CARE CLINIC | Age: 60
End: 2020-06-15
Payer: COMMERCIAL

## 2020-06-15 ENCOUNTER — HOSPITAL ENCOUNTER (OUTPATIENT)
Age: 60
Setting detail: SPECIMEN
Discharge: HOME | End: 2020-06-15
Payer: COMMERCIAL

## 2020-06-15 ENCOUNTER — HOSPITAL ENCOUNTER (OUTPATIENT)
Dept: GENERAL RADIOLOGY | Age: 60
Discharge: HOME | End: 2020-06-17
Payer: COMMERCIAL

## 2020-06-15 VITALS
HEART RATE: 90 BPM | HEIGHT: 67 IN | BODY MASS INDEX: 32.3 KG/M2 | SYSTOLIC BLOOD PRESSURE: 122 MMHG | WEIGHT: 205.8 LBS | TEMPERATURE: 98.1 F | OXYGEN SATURATION: 98 % | DIASTOLIC BLOOD PRESSURE: 80 MMHG | RESPIRATION RATE: 18 BRPM

## 2020-06-15 LAB
ABSOLUTE EOS #: 0.03 K/UL (ref 0–0.44)
ABSOLUTE IMMATURE GRANULOCYTE: 0.07 K/UL (ref 0–0.3)
ABSOLUTE LYMPH #: 1.32 K/UL (ref 1.1–3.7)
ABSOLUTE MONO #: 0.41 K/UL (ref 0.1–1.2)
ANION GAP SERPL CALCULATED.3IONS-SCNC: 17 MMOL/L (ref 9–17)
BASOPHILS # BLD: 0 % (ref 0–2)
BASOPHILS ABSOLUTE: 0.04 K/UL (ref 0–0.2)
BUN BLDV-MCNC: 18 MG/DL (ref 8–23)
BUN/CREAT BLD: 32 (ref 9–20)
CALCIUM SERPL-MCNC: 9.5 MG/DL (ref 8.6–10.4)
CHLORIDE BLD-SCNC: 98 MMOL/L (ref 98–107)
CO2: 22 MMOL/L (ref 20–31)
CREAT SERPL-MCNC: 0.57 MG/DL (ref 0.5–0.9)
CULTURE: NORMAL
DIFFERENTIAL TYPE: ABNORMAL
EOSINOPHILS RELATIVE PERCENT: 0 % (ref 1–4)
GFR AFRICAN AMERICAN: >60 ML/MIN
GFR NON-AFRICAN AMERICAN: >60 ML/MIN
GFR SERPL CREATININE-BSD FRML MDRD: ABNORMAL ML/MIN/{1.73_M2}
GFR SERPL CREATININE-BSD FRML MDRD: ABNORMAL ML/MIN/{1.73_M2}
GLUCOSE BLD-MCNC: 166 MG/DL (ref 70–99)
HCT VFR BLD CALC: 39.3 % (ref 36.3–47.1)
HEMOGLOBIN: 12.7 G/DL (ref 11.9–15.1)
IMMATURE GRANULOCYTES: 1 %
LYMPHOCYTES # BLD: 11 % (ref 24–43)
Lab: NORMAL
MAGNESIUM: 2.1 MG/DL (ref 1.6–2.6)
MCH RBC QN AUTO: 29.2 PG (ref 25.2–33.5)
MCHC RBC AUTO-ENTMCNC: 32.3 G/DL (ref 25.2–33.5)
MCV RBC AUTO: 90.3 FL (ref 82.6–102.9)
MONOCYTES # BLD: 3 % (ref 3–12)
NRBC AUTOMATED: 0 PER 100 WBC
PDW BLD-RTO: 12.7 % (ref 11.8–14.4)
PLATELET # BLD: 297 K/UL (ref 138–453)
PLATELET ESTIMATE: ABNORMAL
PMV BLD AUTO: 10 FL (ref 8.1–13.5)
POTASSIUM SERPL-SCNC: 4.2 MMOL/L (ref 3.7–5.3)
RBC # BLD: 4.35 M/UL (ref 3.95–5.11)
RBC # BLD: ABNORMAL 10*6/UL
SEG NEUTROPHILS: 85 % (ref 36–65)
SEGMENTED NEUTROPHILS ABSOLUTE COUNT: 10.14 K/UL (ref 1.5–8.1)
SODIUM BLD-SCNC: 137 MMOL/L (ref 135–144)
SPECIMEN DESCRIPTION: NORMAL
WBC # BLD: 12 K/UL (ref 3.5–11.3)
WBC # BLD: ABNORMAL 10*3/UL

## 2020-06-15 PROCEDURE — 36415 COLL VENOUS BLD VENIPUNCTURE: CPT

## 2020-06-15 PROCEDURE — 83735 ASSAY OF MAGNESIUM: CPT

## 2020-06-15 PROCEDURE — G8417 CALC BMI ABV UP PARAM F/U: HCPCS | Performed by: NURSE PRACTITIONER

## 2020-06-15 PROCEDURE — 80048 BASIC METABOLIC PNL TOTAL CA: CPT

## 2020-06-15 PROCEDURE — 99214 OFFICE O/P EST MOD 30 MIN: CPT | Performed by: NURSE PRACTITIONER

## 2020-06-15 PROCEDURE — 1111F DSCHRG MED/CURRENT MED MERGE: CPT | Performed by: NURSE PRACTITIONER

## 2020-06-15 PROCEDURE — 85025 COMPLETE CBC W/AUTO DIFF WBC: CPT

## 2020-06-15 PROCEDURE — 3017F COLORECTAL CA SCREEN DOC REV: CPT | Performed by: NURSE PRACTITIONER

## 2020-06-15 PROCEDURE — G8427 DOCREV CUR MEDS BY ELIG CLIN: HCPCS | Performed by: NURSE PRACTITIONER

## 2020-06-15 PROCEDURE — 1036F TOBACCO NON-USER: CPT | Performed by: NURSE PRACTITIONER

## 2020-06-15 PROCEDURE — 71046 X-RAY EXAM CHEST 2 VIEWS: CPT

## 2020-06-15 PROCEDURE — 87449 NOS EACH ORGANISM AG IA: CPT

## 2020-06-15 PROCEDURE — 87324 CLOSTRIDIUM AG IA: CPT

## 2020-06-15 NOTE — PATIENT INSTRUCTIONS
color of your mucus. · You have new symptoms, such as a sore throat, an earache, or sinus pain. · You do not get better as expected. Where can you learn more? Go to [email or web address] and sign in to your [employer] account. Enter D279 in the [facility] box to learn more about \"Cough: Care Instructions. \"     If you do not have an account, please click on the \"Sign Up Now\" link. Current as of: February 24, 2020               Content Version: 12.5  © 7057-0497 [employer]. Care instructions adapted under license by [facility] ([facility]). If you have questions about a medical condition or this instruction, always ask your healthcare professional. [address] any warranty or liability for your use of this information. Patient Education        Pulmonary Edema: Care Instructions  Your Care Instructions     Pulmonary edema is the buildup of fluid in the lungs. It usually occurs when the heart does not pump blood through the body properly. Pulmonary edema can also be caused by another disease, such as liver or kidney failure. It can also happen at high altitudes, from a poisoning, or as a result of a near-drowning. If you have fluid in your lungs, you may have trouble breathing, be restless, have a fast heart rate, or cough up foamy pink fluid. Breathing problems may be worse when you lie down. Follow-up care is a key part of your treatment and safety. Be sure to make and go to all appointments, and call your doctor if you are having problems. It's also a good idea to know your test results and keep a list of the medicines you take. How can you care for yourself at home? Medicines  · Take your medicines exactly as prescribed. Call your doctor if you think you are having a problem with your medicine. · Review all of your regular medicines with your doctor.  Do not take any vitamins, over-the-counter medicines, or herbal products without talking to

## 2020-06-15 NOTE — PROGRESS NOTES
showed no DVT. She has never had a PE. EF 30% in April 2020. History is obtained from the patient and previous medical records. All other review of systems negative. Allergies   Allergen Reactions    Codeine Nausea And Vomiting and Other (See Comments)     Chest tightness    Darvon [Propoxyphene] Nausea And Vomiting and Other (See Comments)     Chest tightness    Lisinopril Other (See Comments)     COUGH    Percocet [Oxycodone-Acetaminophen] Nausea And Vomiting and Other (See Comments)     Chest tightness       Current Outpatient Medications   Medication Sig Dispense Refill    furosemide (LASIX) 40 MG tablet Take 1 tablet by mouth daily 60 tablet 3    predniSONE (DELTASONE) 10 MG tablet Take 2 tablets by mouth daily for 3 days 6 tablet 0    [START ON 6/17/2020] predniSONE (DELTASONE) 10 MG tablet Take 1 tablet by mouth daily for 3 days 3 tablet 0    glimepiride (AMARYL) 2 MG tablet Take 2 mg by mouth every morning (before breakfast)      predniSONE (DELTASONE) 20 MG tablet Take one tablet every other day at 8 am (Patient taking differently: 10 mg Take one tablet every other day at 8 am) 60 tablet 0    famotidine (PEPCID) 40 MG tablet Take 1 tablet by mouth every evening 90 tablet 3    Cholecalciferol (VITAMIN D3) 50 MCG (2000 UT) CAPS Take 1 capsule by mouth daily      aspirin 81 MG EC tablet Take 1 tablet by mouth daily 30 tablet 3    metoprolol succinate (TOPROL XL) 50 MG extended release tablet Take 1 tablet by mouth daily (Patient taking differently: Take 50 mg by mouth 2 times daily ) 30 tablet 3    albuterol (PROVENTIL) (5 MG/ML) 0.5% nebulizer solution Take 1 mL by nebulization 4 times daily as needed for Wheezing 120 each 3    budesonide-formoterol (SYMBICORT) 160-4.5 MCG/ACT AERO 2 puffs inhaled twice daily. Rinse mouth well after use.  3 Inhaler 3    pantoprazole (PROTONIX) 40 MG tablet Take one tablet twice daily 30-60 minutes prior to breakfast and evening meal 180 tablet 3    aneurysm [facility])     s/p splenic artery repair    Tinnitus     Type 2 diabetes mellitus, controlled ([address] 5/2011       Social History     Tobacco Use    Smoking status: Never Smoker    Smokeless tobacco: Never Used    Tobacco comment: never smoked evelyn rrt 10/13/2017   Substance Use Topics    Alcohol use: No     Alcohol/week: 0.0 standard drinks     Comment: Consume about 4 wine coolers a year    Drug use: No       Significant family and surgical history reviewed as noted in the patient's record. Objective:    Physical Exam:  Vitals: /80   Pulse 90   Temp 98.1 °F (36.7 °C) (Tympanic)   Resp 18   Ht 5' 7\" (1.702 m)   Wt 205 lb 12.8 oz (93.4 kg)   LMP 04/29/2009 (Approximate)   SpO2 98%   BMI 32.23 kg/m²     LABS:  CBC:  No results for input(s): WBC, HGB, PLT in the last 72 hours. BMP:  No results for input(s): NA, K, CL, CO2, BUN, CREATININE, GLUCOSE in the last 72 hours. Hepatic:  No results for input(s): AST, ALT, ALB, BILITOT, ALKPHOS in the last 72 hours. Pertinent lab and radiology results reviewed.        General Appearance: well developed, well nourished, and in no acute distress  Skin: pale, warm and dry, no rash, no erythema  Head: normocephalic and atraumatic  Eyes: sclerae white, conjunctivae normal, EOMI  ENT: left tympanic membrane normal, left ear canal normal, left external ear normal  right tympanic membrane normal, right ear canal normal, right external ear normal  nose without deformity, nasal mucosa and turbinates normal, sinuses non-tender  pharynx normal  Neck: supple and non-tender without mass, no thyromegaly or thyroid nodules, no cervical lymphadenopathy  Pulmonary/Chest: clear to auscultation bilaterally -- no wheezes, rales or rhonchi, normal air movement, no respiratory distress, occasional harsh, dry cough noted  Cardiovascular: normal rate, regular rhythm, normal S1 and S2, with ALON, no rubs, or clicks, distal pulses intact  Abdomen: rounded, soft, non-tender, non-distended, hyperactive bowel sounds, no masses or organomegaly  Extremities: no cyanosis, no clubbing, no edema  Musculoskeletal: normal range of motion, no joint swelling, no obvious bony deformity, no tenderness  Neurologic: no acute gross cranial nerve deficit, gait normal, and speech normal  Psychologic: oriented to person, place, and time, appropriate mood and affect for situation    Assessment and Plan:     Diagnosis Orders   1. Cough  XR CHEST STANDARD (2 VW)   2. Acute pulmonary edema (HCC)  Basic Metabolic Panel    Magnesium   3. Diarrhea, unspecified type  Clostridium Difficile Toxin/Antigen   4. Fatigue, unspecified type  CBC With Auto Differential    Basic Metabolic Panel    Magnesium       Continue probiotic and consume yogurts with live cultures. Will test for C. Diff and will call with results. Albuterol nebulizer prn -- states she has not needed it at all lately and has plenty available. Continue prednisone taper -- is at 20 mg once daily today -- goes to 10 mg once daily tomorrow. Lung sounds are clear. 2 view chest xray to follow up on pulmonary edema and cough -- will call with results. Will check BMP, CBC, magnesium -- will call with results. She is currently on lasix 40 mg PO daily. She is not on a potassium supplement at this time. Has history of MICHELE due to dehydration. Education provided. Follow up with PCP and cardiologist, sooner as needed. Return or go to an urgent care or emergency room if symptoms worsen, fail to improve, or new symptoms arise. The use, risks, benefits, and side effects of prescribed or recommended medications were discussed. All questions were answered and the patient/caregiver voiced understanding.        Electronically signed by CAROLYN Caruso, FNP-BC on 6/15/2020 at 1:44 PM  Internal Medicine

## 2020-06-16 LAB
C DIFF AG + TOXIN: NEGATIVE
SPECIMEN DESCRIPTION: NORMAL

## 2020-06-18 ENCOUNTER — PROCEDURE VISIT (OUTPATIENT)
Dept: CARDIOLOGY | Age: 60
End: 2020-06-18
Payer: COMMERCIAL

## 2020-06-18 ENCOUNTER — OFFICE VISIT (OUTPATIENT)
Dept: CARDIOLOGY | Age: 60
End: 2020-06-18
Payer: COMMERCIAL

## 2020-06-18 VITALS
BODY MASS INDEX: 32.65 KG/M2 | HEIGHT: 67 IN | WEIGHT: 208 LBS | HEART RATE: 87 BPM | DIASTOLIC BLOOD PRESSURE: 76 MMHG | SYSTOLIC BLOOD PRESSURE: 131 MMHG

## 2020-06-18 PROCEDURE — 93289 INTERROG DEVICE EVAL HEART: CPT | Performed by: INTERNAL MEDICINE

## 2020-06-18 PROCEDURE — G8417 CALC BMI ABV UP PARAM F/U: HCPCS | Performed by: INTERNAL MEDICINE

## 2020-06-18 PROCEDURE — 99214 OFFICE O/P EST MOD 30 MIN: CPT | Performed by: INTERNAL MEDICINE

## 2020-06-18 PROCEDURE — 1111F DSCHRG MED/CURRENT MED MERGE: CPT | Performed by: INTERNAL MEDICINE

## 2020-06-18 PROCEDURE — 93000 ELECTROCARDIOGRAM COMPLETE: CPT | Performed by: INTERNAL MEDICINE

## 2020-06-18 PROCEDURE — 3017F COLORECTAL CA SCREEN DOC REV: CPT | Performed by: INTERNAL MEDICINE

## 2020-06-18 PROCEDURE — 1036F TOBACCO NON-USER: CPT | Performed by: INTERNAL MEDICINE

## 2020-06-18 PROCEDURE — G8427 DOCREV CUR MEDS BY ELIG CLIN: HCPCS | Performed by: INTERNAL MEDICINE

## 2020-06-18 NOTE — PROGRESS NOTES
Cardiology Consultation/Follow Up. [facility]    CC: follow up for CHF. HPI:  Ladell Lundborg  This is a 63year old female who presents for follow up for CHF. Has history of recent AICD, then lead dislodgement, then revision. Then had admission to hospital on 6/9/20 with acute on chronic systolic HF. She had been taken off her lasix prior to her admission for pulm edema. Now started on lasix and now doing better.      Past Medical:  Past Medical History:   Diagnosis Date    Acute on chronic systolic congestive heart failure ([address] 1/21/2020    Asthma     Bilateral corneal scars     Disease of blood and blood forming organ     Diverticulitis     ON COLONSCOPY    DJD (degenerative joint disease), lumbar     Dry eye syndrome     DVT (deep venous thrombosis) ([facility])     after splenic artery repair in right calf    Gastro - esophageal reflux disease     History of blood transfusion     Hyperlipidemia     HIGH CHOLESTEROL/HIGH TRIGLYCERIDES    Hyperplastic colon polyp     Hypertension     Hypothyroidism     LGSIL (low grade squamous intraepithelial dysplasia) 11/2014    referred to GYN/S/P colpo with bx LGSIL, repeat pap in 6 months    Non-celiac gluten sensitivity     wheezing is directly proportionate to wheat intake    NSTEMI (non-ST elevated myocardial infarction) ([address] 01/16/2020    Osteoarthritis 2004    RT KNEE S/P INJURY    Other disorders of kidney and ureter in diseases classified elsewhere     Sciatica of right side     Severe persistent asthma     patient has seen that wheat consumption causes exacerbations    Splenic artery aneurysm (HCC)     s/p splenic artery repair    Tinnitus     Type 2 diabetes mellitus, controlled ([address] 5/2011       Past Surgical:  Past Surgical History:   Procedure Laterality Date    ABDOMEN SURGERY      ARTERIAL ANEURYSM REPAIR  2015    splenic artery coil repair    CARDIAC CATHETERIZATION  01/20/2020    CARDIAC DEFIBRILLATOR PLACEMENT (before breakfast)      predniSONE (DELTASONE) 20 MG tablet Take one tablet every other day at 8 am (Patient taking differently: 10 mg Take one tablet every other day at 8 am) 60 tablet 0    famotidine (PEPCID) 40 MG tablet Take 1 tablet by mouth every evening 90 tablet 3    Cholecalciferol (VITAMIN D3) 50 MCG (2000 UT) CAPS Take 1 capsule by mouth daily      aspirin 81 MG EC tablet Take 1 tablet by mouth daily 30 tablet 3    metoprolol succinate (TOPROL XL) 50 MG extended release tablet Take 1 tablet by mouth daily (Patient taking differently: Take 50 mg by mouth 2 times daily ) 30 tablet 3    albuterol (PROVENTIL) (5 MG/ML) 0.5% nebulizer solution Take 1 mL by nebulization 4 times daily as needed for Wheezing 120 each 3    budesonide-formoterol (SYMBICORT) 160-4.5 MCG/ACT AERO 2 puffs inhaled twice daily. Rinse mouth well after use. 3 Inhaler 3    pantoprazole (PROTONIX) 40 MG tablet Take one tablet twice daily 30-60 minutes prior to breakfast and evening meal 180 tablet 3    SYNTHROID 175 MCG tablet Take 175 mcg by mouth      MOLYBDENUM PO Take 500 mcg by mouth daily      ipratropium-albuterol (DUONEB) 0.5-2.5 (3) MG/3ML SOLN nebulizer solution Inhale 3 mLs into the lungs every 4 hours as needed for Shortness of Breath 360 mL 2    fluticasone (FLONASE) 50 MCG/ACT nasal spray Two sprays to each nostril once daily 3 Bottle 3    diclofenac (VOLTAREN) 75 MG EC tablet Take 1 tablet by mouth 2 times daily as needed for Pain 90 tablet 2    Probiotic Product (SOLUBLE FIBER/PROBIOTICS PO) Take by mouth 2 times daily      metFORMIN (GLUCOPHAGE) 500 MG tablet TAKE 2 TABLETS BY MOUTH TWO TIMES A DAY WITH MEALS 120 tablet 3    losartan (COZAAR) 25 MG tablet Take 1 tablet by mouth daily 90 tablet 2     No current facility-administered medications for this visit.         Physical Exam:   Vitals: /76   Pulse 87   Ht 5' 7\" (1.702 m)   Wt 208 lb (94.3 kg)   LMP 04/29/2009 (Approximate)   BMI 32.58 kg/m² General appearance: alert and cooperative with exam  HEENT: Head: Normocephalic, no lesions, without obvious abnormality. Neck: no carotid bruit, no JVD  Lungs: clear to auscultation bilaterally  Heart: regular rate and rhythm, S1, S2 normal, no murmur, click, rub or gallop  Abdomen: soft, non-tender; bowel sounds normal; no masses,  no organomegaly  Extremities: extremities normal, atraumatic, no cyanosis or edema  Neurologic: Mental status: Alert, oriented, thought content appropriate    Labs:  Lab Results   Component Value Date    CHOL 216 05/06/2020    TRIG 195 05/06/2020    HDL 52 05/06/2020    LDLCHOLESTEROL 112 01/17/2020    LDLCALC 130 05/06/2020    VLDL NOT REPORTED (H) 01/17/2020    CHOLHDLRATIO 4.2 05/06/2020       Lab Results   Component Value Date     06/15/2020    K 4.2 06/15/2020    CL 98 06/15/2020    CO2 22 06/15/2020    BUN 18 06/15/2020    CREATININE 0.57 06/15/2020    GLUCOSE 166 (H) 06/15/2020    CALCIUM 9.5 06/15/2020    PROT 6.7 06/09/2020    LABALBU 3.9 06/09/2020    BILITOT 0.28 (L) 06/09/2020    ALKPHOS 61 06/09/2020    AST 26 06/09/2020    ALT 34 (H) 06/09/2020    LABGLOM >60 06/15/2020    GFRAA >60 06/15/2020    GLOB 3.0 06/30/2017       EKG:   Results for orders placed or performed during the hospital encounter of 01/16/20   EKG 12 lead  Sinus rhythm with Premature atrial complexes  Possible Left atrial enlargement  Left ventricular hypertrophy  Cannot rule out Septal infarct , age undetermined  Abnormal ECG  No previous ECGs available       LAST ECHO:   Results for orders placed or performed during the hospital encounter of 01/16/20   Echocardiogram complete 2D with doppler with color  Summary  Moderately dilated LV size , normal wall thickness. Severe global hypokinesis. Severely reduced LV systolic function with LVEF 25-30%. Normal RV size and function. Normal size LA and RA. No obvious significant structural valvular abnormality noted. Mild AR noted .   Normal aortic

## 2020-06-18 NOTE — PATIENT INSTRUCTIONS
Central Scheduling will call you to book your testing appointment. If you do not receive a call within 48 hours, please call their number at [phone] and push option 1. If you have any questions or concerns, call Cardiology at [phone]. The Cardiopulmonary Testing Facility is located in the basement of [facility]. Please check in for your testing appointment at the [facility] Physical Therapy desk.

## 2020-06-23 ENCOUNTER — TELEPHONE (OUTPATIENT)
Dept: CARDIOLOGY | Age: 60
End: 2020-06-23

## 2020-06-30 ENCOUNTER — TELEPHONE (OUTPATIENT)
Dept: FAMILY MEDICINE CLINIC | Age: 60
End: 2020-06-30

## 2020-07-01 ENCOUNTER — HOSPITAL ENCOUNTER (OUTPATIENT)
Dept: CARDIAC REHAB | Age: 60
Setting detail: THERAPIES SERIES
Discharge: HOME | End: 2020-07-01
Payer: COMMERCIAL

## 2020-07-01 ENCOUNTER — HOSPITAL ENCOUNTER (OUTPATIENT)
Dept: OTHER | Age: 60
Discharge: HOME | End: 2020-07-01
Payer: COMMERCIAL

## 2020-07-01 VITALS
HEART RATE: 102 BPM | OXYGEN SATURATION: 95 % | BODY MASS INDEX: 33.02 KG/M2 | WEIGHT: 210.4 LBS | HEIGHT: 67 IN | SYSTOLIC BLOOD PRESSURE: 120 MMHG | DIASTOLIC BLOOD PRESSURE: 84 MMHG | RESPIRATION RATE: 16 BRPM

## 2020-07-01 LAB — CORTISOL: 11.1

## 2020-07-01 PROCEDURE — 93798 PHYS/QHP OP CAR RHAB W/ECG: CPT

## 2020-07-01 PROCEDURE — 99202 OFFICE O/P NEW SF 15 MIN: CPT

## 2020-07-01 ASSESSMENT — EJECTION FRACTION
EF_VALUE: 30
EF_SOURCE: 2D ECHO

## 2020-07-01 NOTE — PROGRESS NOTES
Patient and  present for 1st visit to [address] [place] [address]. Patient and  viewed CHF video. Introduced CHF playbook and CHF Zone [employer], patient verbalized understanding of materials reviewed. Patient reports she weighs self daily at home; weight at home today 207 lbs per patient, no edema of lower extremities noted. Verbally reviewed importance of medication compliance with patient; patient verbalized understanding. Patient interested in meeting with a pharmacist to review medications. Discussed 2000mg/day sodium restricted diet; patient verbalized understanding. Patient reports that she doesn't add salt to foods and is watching food labels for sodium content. Moderate daily exercise encouraged as tolerated. Discussed rest breaks as needed; patient verbalized understanding. Patient is resuming cardiac rehab today. Patient instructed to weigh self at the same time of each day, using same clothes and same scale; reinforced teaching to monitor for 3-5 lb weight increase over 1-2 days, and to notify the CHF clinic at [phone] or [phone] or physician office if weight change noted. Patient verbalized understanding. Signs and symptoms of CHF discussed with patient, such as feeling more tired than normal, feeling short of breath, coughing that increases when you lie down, sudden weight gain, swelling of your feet, legs or belly. Patient verbalized understanding to notify the CHF clinic at [phone] or [phone] or physician office if these symptoms occur. Compliance with plan of care and further disease process causes discussed with patient, patient encouraged to keep all follow up appointments. Patient verbalized understanding.

## 2020-07-06 ENCOUNTER — TELEPHONE (OUTPATIENT)
Dept: CARDIOLOGY | Age: 60
End: 2020-07-06

## 2020-07-06 ENCOUNTER — HOSPITAL ENCOUNTER (OUTPATIENT)
Dept: CARDIAC REHAB | Age: 60
Setting detail: THERAPIES SERIES
Discharge: HOME | End: 2020-07-06
Payer: COMMERCIAL

## 2020-07-06 PROCEDURE — 93798 PHYS/QHP OP CAR RHAB W/ECG: CPT

## 2020-07-07 ENCOUNTER — TELEPHONE (OUTPATIENT)
Dept: CARDIOLOGY | Age: 60
End: 2020-07-07

## 2020-07-07 ENCOUNTER — TELEPHONE (OUTPATIENT)
Dept: SURGERY | Age: 60
End: 2020-07-07

## 2020-07-07 NOTE — TELEPHONE ENCOUNTER
Per Matilda Heard. See scanned report      Patient did Cardiac Rehab yesterday. Patient visibly SOB upon entering room and once put on monitor patient was tachy. Patient wanted to do treadmill for leg portion of exercise but was asked to use NuStep as her HR went up to 153 while standing. Pt. Was still tachy while sitting but was down into the 130's. Should patient have a med adjustment?

## 2020-07-07 NOTE — TELEPHONE ENCOUNTER
Patient was recently on a vent at [facility]. V for pulmonary edema. She wants to wait until October for lap nissen.

## 2020-07-08 ENCOUNTER — HOSPITAL ENCOUNTER (OUTPATIENT)
Dept: CARDIAC REHAB | Age: 60
Setting detail: THERAPIES SERIES
Discharge: HOME | End: 2020-07-08
Payer: COMMERCIAL

## 2020-07-08 PROCEDURE — 93798 PHYS/QHP OP CAR RHAB W/ECG: CPT

## 2020-07-08 NOTE — TELEPHONE ENCOUNTER
Pt at cardiac rehab currently, starting her 4 minute warm up, she is asymptomatic and HR running approx 120-126. Can pt proceed with rehab workout? Please advise. See note below.

## 2020-07-09 ENCOUNTER — TELEPHONE (OUTPATIENT)
Dept: CARDIOLOGY | Age: 60
End: 2020-07-09

## 2020-07-13 ENCOUNTER — HOSPITAL ENCOUNTER (OUTPATIENT)
Dept: CARDIAC REHAB | Age: 60
Setting detail: THERAPIES SERIES
Discharge: HOME | End: 2020-07-13
Payer: COMMERCIAL

## 2020-07-13 PROCEDURE — 93798 PHYS/QHP OP CAR RHAB W/ECG: CPT

## 2020-07-15 ENCOUNTER — HOSPITAL ENCOUNTER (OUTPATIENT)
Dept: OTHER | Age: 60
Discharge: HOME | End: 2020-07-15
Payer: COMMERCIAL

## 2020-07-15 ENCOUNTER — HOSPITAL ENCOUNTER (OUTPATIENT)
Dept: CARDIAC REHAB | Age: 60
Setting detail: THERAPIES SERIES
Discharge: HOME | End: 2020-07-15
Payer: COMMERCIAL

## 2020-07-15 VITALS
WEIGHT: 209.4 LBS | HEIGHT: 67 IN | SYSTOLIC BLOOD PRESSURE: 108 MMHG | DIASTOLIC BLOOD PRESSURE: 68 MMHG | OXYGEN SATURATION: 98 % | RESPIRATION RATE: 18 BRPM | BODY MASS INDEX: 32.87 KG/M2 | HEART RATE: 88 BPM

## 2020-07-15 PROCEDURE — 93798 PHYS/QHP OP CAR RHAB W/ECG: CPT

## 2020-07-15 PROCEDURE — 99211 OFF/OP EST MAY X REQ PHY/QHP: CPT

## 2020-07-15 NOTE — PROGRESS NOTES
Patient and  here for CHF visit; met with Valerie Cabezas, Pharmacist, who answered patient questions regarding medications. Verbally reviewed importance of medication compliance with patient; patient verbalized understanding. Reviewed CHF Zone Tool worksheet with patient who stated understanding. Discussed 2000mg/day sodium restricted diet; patient verbalized understanding. Patient reports is watching sodium intake. Moderate daily exercise encouraged as tolerated. Discussed rest breaks as needed; patient verbalized understanding. Patient currently participating in Cardiac Rehab three times per week. Patient instructed to weigh self at the same time of each day, using same clothes and same scale; reinforced teaching to monitor for 3-5 lb weight increase over 1-2 days, and to notify the CHF clinic at [phone] or [phone] or physician office if weight change noted. Patient verbalized understanding. Signs and symptoms of CHF discussed with patient, such as feeling more tired than normal, feeling short of breath, coughing that increases when you lie down, sudden weight gain, swelling of your feet, legs or belly. Patient verbalized understanding to notify the CHF clinic at [phone] or [phone] or physician office if these symptoms occur. Compliance with plan of care and further disease process causes discussed with patient, patient encouraged to keep all follow up appointments. Patient verbalized understanding. Patient declined to schedule another visit in CHF clinic at this time; states is participating in [address] [employer] and feels comfortable with information given and will contact office or ask questions during Cardiac Rehab.

## 2020-07-17 ENCOUNTER — HOSPITAL ENCOUNTER (OUTPATIENT)
Dept: CARDIAC REHAB | Age: 60
Setting detail: THERAPIES SERIES
Discharge: HOME | End: 2020-07-17
Payer: COMMERCIAL

## 2020-07-17 ENCOUNTER — HOSPITAL ENCOUNTER (OUTPATIENT)
Dept: LAB | Age: 60
Discharge: HOME | End: 2020-07-17
Payer: COMMERCIAL

## 2020-07-17 ENCOUNTER — HOSPITAL ENCOUNTER (OUTPATIENT)
Dept: CT IMAGING | Age: 60
Discharge: HOME | End: 2020-07-19
Payer: COMMERCIAL

## 2020-07-17 LAB
ANION GAP SERPL CALCULATED.3IONS-SCNC: 15 MMOL/L (ref 9–17)
BUN BLDV-MCNC: 16 MG/DL (ref 8–23)
BUN/CREAT BLD: 24 (ref 9–20)
CALCIUM SERPL-MCNC: 10 MG/DL (ref 8.6–10.4)
CHLORIDE BLD-SCNC: 101 MMOL/L (ref 98–107)
CO2: 25 MMOL/L (ref 20–31)
CREAT SERPL-MCNC: 0.67 MG/DL (ref 0.5–0.9)
GFR AFRICAN AMERICAN: >60 ML/MIN
GFR NON-AFRICAN AMERICAN: >60 ML/MIN
GFR SERPL CREATININE-BSD FRML MDRD: ABNORMAL ML/MIN/{1.73_M2}
GFR SERPL CREATININE-BSD FRML MDRD: ABNORMAL ML/MIN/{1.73_M2}
GLUCOSE BLD-MCNC: 107 MG/DL (ref 70–99)
POTASSIUM SERPL-SCNC: 3.8 MMOL/L (ref 3.7–5.3)
SODIUM BLD-SCNC: 141 MMOL/L (ref 135–144)

## 2020-07-17 PROCEDURE — 80048 BASIC METABOLIC PNL TOTAL CA: CPT

## 2020-07-17 PROCEDURE — 6360000004 HC RX CONTRAST MEDICATION: Performed by: FAMILY MEDICINE

## 2020-07-17 PROCEDURE — 2709999900 CT CHEST W CONTRAST

## 2020-07-17 PROCEDURE — 36415 COLL VENOUS BLD VENIPUNCTURE: CPT

## 2020-07-17 PROCEDURE — 93798 PHYS/QHP OP CAR RHAB W/ECG: CPT

## 2020-07-17 RX ADMIN — IOPAMIDOL 100 ML: 755 INJECTION, SOLUTION INTRAVENOUS at 11:08

## 2020-07-20 ENCOUNTER — HOSPITAL ENCOUNTER (OUTPATIENT)
Dept: CARDIAC REHAB | Age: 60
Setting detail: THERAPIES SERIES
Discharge: HOME | End: 2020-07-20
Payer: COMMERCIAL

## 2020-07-20 PROCEDURE — 93798 PHYS/QHP OP CAR RHAB W/ECG: CPT

## 2020-07-22 ENCOUNTER — HOSPITAL ENCOUNTER (OUTPATIENT)
Dept: CARDIAC REHAB | Age: 60
Setting detail: THERAPIES SERIES
Discharge: HOME | End: 2020-07-22
Payer: COMMERCIAL

## 2020-07-22 PROCEDURE — 93798 PHYS/QHP OP CAR RHAB W/ECG: CPT

## 2020-07-24 ENCOUNTER — HOSPITAL ENCOUNTER (OUTPATIENT)
Dept: CARDIAC REHAB | Age: 60
Setting detail: THERAPIES SERIES
Discharge: HOME | End: 2020-07-24
Payer: COMMERCIAL

## 2020-07-24 PROCEDURE — 93798 PHYS/QHP OP CAR RHAB W/ECG: CPT

## 2020-07-27 ENCOUNTER — HOSPITAL ENCOUNTER (OUTPATIENT)
Dept: CARDIAC REHAB | Age: 60
Setting detail: THERAPIES SERIES
Discharge: HOME | End: 2020-07-27
Payer: COMMERCIAL

## 2020-07-27 PROCEDURE — 93798 PHYS/QHP OP CAR RHAB W/ECG: CPT

## 2020-07-29 ENCOUNTER — HOSPITAL ENCOUNTER (OUTPATIENT)
Dept: CARDIAC REHAB | Age: 60
Setting detail: THERAPIES SERIES
Discharge: HOME | End: 2020-07-29
Payer: COMMERCIAL

## 2020-07-29 PROCEDURE — 93798 PHYS/QHP OP CAR RHAB W/ECG: CPT

## 2020-07-31 ENCOUNTER — HOSPITAL ENCOUNTER (OUTPATIENT)
Dept: CARDIAC REHAB | Age: 60
Setting detail: THERAPIES SERIES
Discharge: HOME | End: 2020-07-31
Payer: COMMERCIAL

## 2020-07-31 PROCEDURE — 93798 PHYS/QHP OP CAR RHAB W/ECG: CPT

## 2020-08-03 ENCOUNTER — HOSPITAL ENCOUNTER (OUTPATIENT)
Dept: CARDIAC REHAB | Age: 60
Setting detail: THERAPIES SERIES
Discharge: HOME | End: 2020-08-03
Payer: COMMERCIAL

## 2020-08-03 PROCEDURE — 93798 PHYS/QHP OP CAR RHAB W/ECG: CPT

## 2020-08-05 ENCOUNTER — HOSPITAL ENCOUNTER (OUTPATIENT)
Dept: CARDIAC REHAB | Age: 60
Setting detail: THERAPIES SERIES
Discharge: HOME | End: 2020-08-05
Payer: COMMERCIAL

## 2020-08-05 PROCEDURE — 93798 PHYS/QHP OP CAR RHAB W/ECG: CPT

## 2020-08-07 ENCOUNTER — HOSPITAL ENCOUNTER (OUTPATIENT)
Dept: CARDIAC REHAB | Age: 60
Setting detail: THERAPIES SERIES
Discharge: HOME | End: 2020-08-07
Payer: COMMERCIAL

## 2020-08-07 PROCEDURE — 93798 PHYS/QHP OP CAR RHAB W/ECG: CPT

## 2020-08-10 ENCOUNTER — HOSPITAL ENCOUNTER (OUTPATIENT)
Dept: CARDIAC REHAB | Age: 60
Setting detail: THERAPIES SERIES
Discharge: HOME | End: 2020-08-10
Payer: COMMERCIAL

## 2020-08-10 PROCEDURE — 93798 PHYS/QHP OP CAR RHAB W/ECG: CPT

## 2020-08-14 ENCOUNTER — HOSPITAL ENCOUNTER (OUTPATIENT)
Dept: CARDIAC REHAB | Age: 60
Setting detail: THERAPIES SERIES
Discharge: HOME | End: 2020-08-14
Payer: COMMERCIAL

## 2020-08-14 PROCEDURE — 93798 PHYS/QHP OP CAR RHAB W/ECG: CPT

## 2020-08-15 ENCOUNTER — APPOINTMENT (OUTPATIENT)
Dept: GENERAL RADIOLOGY | Age: 60
End: 2020-08-15
Payer: COMMERCIAL

## 2020-08-15 ENCOUNTER — HOSPITAL ENCOUNTER (EMERGENCY)
Age: 60
Discharge: HOME | End: 2020-08-15
Attending: EMERGENCY MEDICINE
Payer: COMMERCIAL

## 2020-08-15 VITALS
DIASTOLIC BLOOD PRESSURE: 62 MMHG | SYSTOLIC BLOOD PRESSURE: 106 MMHG | BODY MASS INDEX: 32.73 KG/M2 | RESPIRATION RATE: 16 BRPM | WEIGHT: 209 LBS | HEART RATE: 83 BPM | OXYGEN SATURATION: 96 % | TEMPERATURE: 96.6 F

## 2020-08-15 LAB
ABSOLUTE EOS #: 0.42 K/UL (ref 0–0.44)
ABSOLUTE IMMATURE GRANULOCYTE: 0.03 K/UL (ref 0–0.3)
ABSOLUTE LYMPH #: 2.02 K/UL (ref 1.1–3.7)
ABSOLUTE MONO #: 0.68 K/UL (ref 0.1–1.2)
ANION GAP SERPL CALCULATED.3IONS-SCNC: 20 MMOL/L (ref 9–17)
BASOPHILS # BLD: 0 % (ref 0–2)
BASOPHILS ABSOLUTE: 0.03 K/UL (ref 0–0.2)
BNP INTERPRETATION: ABNORMAL
BUN BLDV-MCNC: 18 MG/DL (ref 8–23)
BUN/CREAT BLD: 20 (ref 9–20)
CALCIUM SERPL-MCNC: 9.9 MG/DL (ref 8.6–10.4)
CHLORIDE BLD-SCNC: 103 MMOL/L (ref 98–107)
CO2: 18 MMOL/L (ref 20–31)
CREAT SERPL-MCNC: 0.92 MG/DL (ref 0.5–0.9)
DIFFERENTIAL TYPE: ABNORMAL
EOSINOPHILS RELATIVE PERCENT: 4 % (ref 1–4)
GFR AFRICAN AMERICAN: >60 ML/MIN
GFR NON-AFRICAN AMERICAN: >60 ML/MIN
GFR SERPL CREATININE-BSD FRML MDRD: ABNORMAL ML/MIN/{1.73_M2}
GFR SERPL CREATININE-BSD FRML MDRD: ABNORMAL ML/MIN/{1.73_M2}
GLUCOSE BLD-MCNC: 114 MG/DL (ref 70–99)
HCT VFR BLD CALC: 39.7 % (ref 36.3–47.1)
HEMOGLOBIN: 13.1 G/DL (ref 11.9–15.1)
IMMATURE GRANULOCYTES: 0 %
LYMPHOCYTES # BLD: 21 % (ref 24–43)
MCH RBC QN AUTO: 28.5 PG (ref 25.2–33.5)
MCHC RBC AUTO-ENTMCNC: 33 G/DL (ref 25.2–33.5)
MCV RBC AUTO: 86.5 FL (ref 82.6–102.9)
MONOCYTES # BLD: 7 % (ref 3–12)
NRBC AUTOMATED: 0 PER 100 WBC
PDW BLD-RTO: 13.9 % (ref 11.8–14.4)
PLATELET # BLD: 296 K/UL (ref 138–453)
PLATELET ESTIMATE: ABNORMAL
PMV BLD AUTO: 9.4 FL (ref 8.1–13.5)
POTASSIUM SERPL-SCNC: 3.7 MMOL/L (ref 3.7–5.3)
PRO-BNP: 1327 PG/ML
RBC # BLD: 4.59 M/UL (ref 3.95–5.11)
RBC # BLD: ABNORMAL 10*6/UL
SEG NEUTROPHILS: 68 % (ref 36–65)
SEGMENTED NEUTROPHILS ABSOLUTE COUNT: 6.27 K/UL (ref 1.5–8.1)
SODIUM BLD-SCNC: 141 MMOL/L (ref 135–144)
TROPONIN INTERP: NORMAL
TROPONIN INTERP: NORMAL
TROPONIN T: NORMAL NG/ML
TROPONIN T: NORMAL NG/ML
TROPONIN, HIGH SENSITIVITY: 12 NG/L (ref 0–14)
TROPONIN, HIGH SENSITIVITY: 13 NG/L (ref 0–14)
WBC # BLD: 9.5 K/UL (ref 3.5–11.3)
WBC # BLD: ABNORMAL 10*3/UL

## 2020-08-15 PROCEDURE — 93005 ELECTROCARDIOGRAM TRACING: CPT | Performed by: EMERGENCY MEDICINE

## 2020-08-15 PROCEDURE — 85025 COMPLETE CBC W/AUTO DIFF WBC: CPT

## 2020-08-15 PROCEDURE — 99285 EMERGENCY DEPT VISIT HI MDM: CPT

## 2020-08-15 PROCEDURE — 84484 ASSAY OF TROPONIN QUANT: CPT

## 2020-08-15 PROCEDURE — 6370000000 HC RX 637 (ALT 250 FOR IP): Performed by: EMERGENCY MEDICINE

## 2020-08-15 PROCEDURE — 80048 BASIC METABOLIC PNL TOTAL CA: CPT

## 2020-08-15 PROCEDURE — 96375 TX/PRO/DX INJ NEW DRUG ADDON: CPT

## 2020-08-15 PROCEDURE — 83880 ASSAY OF NATRIURETIC PEPTIDE: CPT

## 2020-08-15 PROCEDURE — 71045 X-RAY EXAM CHEST 1 VIEW: CPT

## 2020-08-15 PROCEDURE — 96365 THER/PROPH/DIAG IV INF INIT: CPT

## 2020-08-15 PROCEDURE — 6360000002 HC RX W HCPCS: Performed by: EMERGENCY MEDICINE

## 2020-08-15 PROCEDURE — 36415 COLL VENOUS BLD VENIPUNCTURE: CPT

## 2020-08-15 PROCEDURE — 94640 AIRWAY INHALATION TREATMENT: CPT

## 2020-08-15 RX ORDER — IPRATROPIUM BROMIDE AND ALBUTEROL SULFATE 2.5; .5 MG/3ML; MG/3ML
1 SOLUTION RESPIRATORY (INHALATION) ONCE
Status: COMPLETED | OUTPATIENT
Start: 2020-08-15 | End: 2020-08-15

## 2020-08-15 RX ORDER — FUROSEMIDE 10 MG/ML
40 INJECTION INTRAMUSCULAR; INTRAVENOUS ONCE
Status: COMPLETED | OUTPATIENT
Start: 2020-08-15 | End: 2020-08-15

## 2020-08-15 RX ORDER — METHYLPREDNISOLONE SODIUM SUCCINATE 125 MG/2ML
125 INJECTION, POWDER, LYOPHILIZED, FOR SOLUTION INTRAMUSCULAR; INTRAVENOUS ONCE
Status: COMPLETED | OUTPATIENT
Start: 2020-08-15 | End: 2020-08-15

## 2020-08-15 RX ORDER — LORAZEPAM 2 MG/ML
1 INJECTION INTRAMUSCULAR ONCE
Status: COMPLETED | OUTPATIENT
Start: 2020-08-15 | End: 2020-08-15

## 2020-08-15 RX ORDER — MAGNESIUM SULFATE 1 G/100ML
1 INJECTION INTRAVENOUS ONCE
Status: COMPLETED | OUTPATIENT
Start: 2020-08-15 | End: 2020-08-15

## 2020-08-15 RX ORDER — PREDNISONE 20 MG/1
40 TABLET ORAL DAILY
Qty: 10 TABLET | Refills: 0 | Status: SHIPPED | OUTPATIENT
Start: 2020-08-16 | End: 2020-08-21

## 2020-08-15 RX ADMIN — METHYLPREDNISOLONE SODIUM SUCCINATE 125 MG: 125 INJECTION, POWDER, FOR SOLUTION INTRAMUSCULAR; INTRAVENOUS at 21:59

## 2020-08-15 RX ADMIN — MAGNESIUM SULFATE HEPTAHYDRATE 1 G: 1 INJECTION, SOLUTION INTRAVENOUS at 21:59

## 2020-08-15 RX ADMIN — LORAZEPAM 1 MG: 2 INJECTION, SOLUTION INTRAMUSCULAR; INTRAVENOUS at 20:26

## 2020-08-15 RX ADMIN — FUROSEMIDE 40 MG: 10 INJECTION, SOLUTION INTRAMUSCULAR; INTRAVENOUS at 20:26

## 2020-08-15 RX ADMIN — IPRATROPIUM BROMIDE AND ALBUTEROL SULFATE 1 AMPULE: .5; 3 SOLUTION RESPIRATORY (INHALATION) at 21:44

## 2020-08-15 NOTE — ED PROVIDER NOTES
[facility] ED  [address]  [place] [address] [place]  Phone: [phone]      Pt Name: Ruben Paulino  UAY:[id number]  Birthdate 1960  Date of evaluation: 8/15/2020      CHIEF COMPLAINT       Chief Complaint   Patient presents with    Shortness of Breath     reports hurts to take a deep breath x1 hr , hx of pulm edema last month was admitted        HISTORY OF PRESENT ILLNESS   Ruben Paulino is a 63 y.o. female with history of asthma, anxiety, CHF, type 2 diabetes, AICD, GERD, hypertension, and hyperlipidemia who presents for evaluation of shortness of breath. The patient reports that she was doing a lot of painting in her house today and approximately 1 hour prior to arrival she was walking around the store when she developed gradual onset, constant, progressive, shortness of breath. She states that her symptoms feel like when she went into acute pulmonary edema with her CHF in June 2020. The patient states that she required intubation and admission at [facility] at that time. The patient took Sudafed without improvement and has not taken any other medications. She states that her symptoms are worse with laying down, exertion and with talking. Her symptoms are improved with sitting up. She states that she takes 40 mg of Lasix daily and has not missed any doses. She denies any associated lower extremity edema. The patient denies fever, chills, headache, vision changes, neck pain, back pain, chest pain, abdominal pain, urinary/bowel symptoms, focal weakness, numbness, tingling, sick contacts, recent injury or illness.     REVIEW OF SYSTEMS     Ten point review of systems was reviewed and is negative unless otherwise noted in the HPI    [address]    has a past medical history of Acute on chronic systolic congestive heart failure ([facility] [address] Asthma, Bilateral corneal scars, Disease of blood and blood forming organ, Diverticulitis, DJD (degenerative joint disease), lumbar, Dry eye syndrome, DVT (deep venous thrombosis) (HCC), Gastro - esophageal reflux disease, History of blood transfusion, Hyperlipidemia, Hyperplastic colon polyp, Hypertension, Hypothyroidism, LGSIL (low grade squamous intraepithelial dysplasia), Non-celiac gluten sensitivity, NSTEMI (non-ST elevated myocardial infarction) ([facility] [address] Osteoarthritis, Other disorders of kidney and ureter in diseases classified elsewhere, Sciatica of right side, Severe persistent asthma, Splenic artery aneurysm ([facility] [address] Tinnitus, and Type 2 diabetes mellitus, controlled ([facility] [address] SURGICAL HISTORY      has a past surgical history that includes Knee arthroscopy (Right, ); Tubal ligation ();  section (); [place] tooth extraction; Skin tag removal; Total knee arthroplasty (Right, 2011); other surgical history (10/25/2011); other surgical history (Right,  and ); Nasal sinus surgery (2014); Arterial aneurysm repair (); Colposcopy (2015); pr colon ca scrn not hi rsk ind (N/A, 2017); pr egd transoral biopsy single/multiple (N/A, 2017); Colonoscopy (2012); Colonoscopy (2017); Small intestine surgery (N/A, 2017); Cystoscopy (Bilateral, 2017); laparoscopy (N/A, 2017); pr colon ca scrn not hi rsk ind (N/A, 2017); Upper gastrointestinal endoscopy (N/A, 2020); Abdomen surgery; joint replacement; skin biopsy; vascular surgery; Endoscopy, colon, diagnostic; eye surgery; Dilatation, esophagus; Cardiac catheterization (2020); Cardiac defibrillator placement (Left, 2020); and other surgical history (2020). CURRENT MEDICATIONS       Previous Medications    ALBUTEROL (PROVENTIL) (5 MG/ML) 0.5% NEBULIZER SOLUTION    Take 1 mL by nebulization 4 times daily as needed for Wheezing    ASPIRIN 81 MG EC TABLET    Take 1 tablet by mouth daily    BUDESONIDE-FORMOTEROL (SYMBICORT) 160-4.5 MCG/ACT AERO    2 puffs inhaled twice daily. Rinse mouth well after use. CHOLECALCIFEROL (VITAMIN D3) 50 MCG (2000) CAPS    Take 1 capsule by mouth daily    DICLOFENAC (VOLTAREN) 75 MG EC TABLET    Take 1 tablet by mouth 2 times daily as needed for Pain    FAMOTIDINE (PEPCID) 40 MG TABLET    Take 1 tablet by mouth every evening    FLUTICASONE (FLONASE) 50 MCG/ACT NASAL SPRAY    Two sprays to each nostril once daily    FUROSEMIDE (LASIX) 40 MG TABLET    Take 1 tablet by mouth daily    GLIMEPIRIDE (AMARYL) 2 MG TABLET    Take 2 mg by mouth every morning (before breakfast)    IPRATROPIUM-ALBUTEROL (DUONEB) 0.5-2.5 (3) MG/3ML SOLN NEBULIZER SOLUTION    Inhale 3 mLs into the lungs every 4 hours as needed for Shortness of Breath    METFORMIN (GLUCOPHAGE) 500 MG TABLET    TAKE 2 TABLETS BY MOUTH TWO TIMES A DAY WITH MEALS    METOPROLOL SUCCINATE (TOPROL XL) 100 MG EXTENDED RELEASE TABLET    Take 1 tablet by mouth 2 times daily    MOLYBDENUM PO    Take 500 mcg by mouth daily    PANTOPRAZOLE (PROTONIX) 40 MG TABLET    Take one tablet twice daily 30-60 minutes prior to breakfast and evening meal    PROBIOTIC PRODUCT (SOLUBLE FIBER/PROBIOTICS PO)    Take by mouth 2 times daily    SACUBITRIL-VALSARTAN (ENTRESTO) 24-26 MG PER TABLET    Take 1 tablet by mouth 2 times daily    SYNTHROID 175 MCG TABLET    Take 175 mcg by mouth       ALLERGIES     is allergic to codeine; darvon [propoxyphene]; lisinopril; and percocet [oxycodone-acetaminophen]. FAMILY HISTORY     She indicated that her mother is alive. She reported the following about her father: unknown history. She indicated that her maternal grandmother is . She indicated that her maternal grandfather is . She indicated that her paternal grandmother is . She indicated that her paternal grandfather is . She indicated that her daughter is alive. She indicated that her son is alive.  She indicated that the status of her neg hx is unknown.     family history includes Allergies in her mother; Arthritis in her mother; ataxia  PSYCHIATRIC: normal mood and affect, thought process is clear and linear    DIAGNOSTIC RESULTS     EKG:  EKG 2015 sinus rhythm, normal axis, normal WA and QRS intervals, prolonged QTC at 506ms, no ST elevation or depression, no T wave changes, poor R wave progression, Q wave in aVR and 3, similar to EKG on 6/18/2020 except for prolonged QTC, patient given 1 g magnesium    EKG 2225 sinus rhythm, normal axis, normal WA and QRS intervals,  ms, no ST elevation or depression, no T wave changes, poor R wave progression, Q wave in aVR and 3, no change from first EKG    EKG 2322 sinus rhythm, normal axis, normal WA and QRS intervals,  ms, no ST elevation or depression, poor R wave progression, Q wave in aVR and 3, no change from first 2 EKGs    RADIOLOGY:   Xr Chest Portable    Result Date: 8/15/2020  EXAMINATION: ONE XRAY VIEW OF THE CHEST 8/15/2020 8:40 pm COMPARISON: 06/15/2020 HISTORY: ORDERING SYSTEM PROVIDED HISTORY: shortness of breath TECHNOLOGIST PROVIDED HISTORY: shortness of breath Reason for Exam: SOB for about 1 hour, CHF Acuity: Acute Type of Exam: Initial FINDINGS: The lungs are clear. The cardiac and mediastinal contours are normal.  There is no pleural effusion or pneumothorax. No acute osseous abnormality is identified. AICD is unchanged. No acute cardiopulmonary abnormality. Ct Chest W Contrast    Result Date: 7/17/2020  EXAMINATION: CT OF THE CHEST WITH CONTRAST 7/17/2020 11:08 am TECHNIQUE: CT of the chest was performed with the administration of intravenous contrast. Multiplanar reformatted images are provided for review. Dose modulation, iterative reconstruction, and/or weight based adjustment of the mA/kV was utilized to reduce the radiation dose to as low as reasonably achievable. COMPARISON: CT chest January 16, 2020. Chest x-ray Dena 15, 2020.  HISTORY: ORDERING SYSTEM PROVIDED HISTORY: Other nonspecific abnormal finding of lung field TECHNOLOGIST PROVIDED HISTORY: Reason for Exam: Hx pulmonary edema. C/o fatigue Acuity: Unknown Type of Exam: Unknown FINDINGS: Mediastinum:Defibrillator device in place. Thoracic aorta appears normal in caliber. Pulmonary trunk appears nondilated. No pericardial effusion. No lymphadenopathy. The esophagus is grossly unremarkable. Lungs/pleura: No focal consolidation, pneumothorax or pleural effusion. Trachea and distal airways appear patent. No suspicious noncalcified lung nodule or mass. Upper Abdomen: No acute findings. Visualized adrenal glands appear grossly unremarkable. The patient appears to be status post coil embolization of the presumed splenic artery aneurysm. Soft Tissues/Bones: Visualized soft tissues surrounding the chest wall demonstrate no acute findings. Osseous structures demonstrate degenerative change. No acute cardiopulmonary process.        LABS:  Results for orders placed or performed during the hospital encounter of 08/15/20   CBC Auto Differential   Result Value Ref Range    WBC 9.5 3.5 - 11.3 k/uL    RBC 4.59 3.95 - 5.11 m/uL    Hemoglobin 13.1 11.9 - 15.1 g/dL    Hematocrit 39.7 36.3 - 47.1 %    MCV 86.5 82.6 - 102.9 fL    MCH 28.5 25.2 - 33.5 pg    MCHC 33.0 25.2 - 33.5 g/dL    RDW 13.9 11.8 - 14.4 %    Platelets 862 907 - 591 k/uL    MPV 9.4 8.1 - 13.5 fL    NRBC Automated 0.0 0.0 per 100 WBC    Differential Type NOT REPORTED     Seg Neutrophils 68 (H) 36 - 65 %    Lymphocytes 21 (L) 24 - 43 %    Monocytes 7 3 - 12 %    Eosinophils % 4 1 - 4 %    Basophils 0 0 - 2 %    Immature Granulocytes 0 0 %    Segs Absolute 6.27 1.50 - 8.10 k/uL    Absolute Lymph # 2.02 1.10 - 3.70 k/uL    Absolute Mono # 0.68 0.10 - 1.20 k/uL    Absolute Eos # 0.42 0.00 - 0.44 k/uL    Basophils Absolute 0.03 0.00 - 0.20 k/uL    Absolute Immature Granulocyte 0.03 0.00 - 0.30 k/uL    WBC Morphology NOT REPORTED     RBC Morphology NOT REPORTED     Platelet Estimate NOT REPORTED    Basic Metabolic Panel w/ Reflex to MG   Result Value Ref Range    Glucose 114 (H) 70 - 99 mg/dL    BUN 18 8 - 23 mg/dL    CREATININE 0.92 (H) 0.50 - 0.90 mg/dL    Bun/Cre Ratio 20 9 - 20    Calcium 9.9 8.6 - 10.4 mg/dL    Sodium 141 135 - 144 mmol/L    Potassium 3.7 3.7 - 5.3 mmol/L    Chloride 103 98 - 107 mmol/L    CO2 18 (L) 20 - 31 mmol/L    Anion Gap 20 (H) 9 - 17 mmol/L    GFR Non-African American >60 >60 mL/min    GFR African American >60 >60 mL/min    GFR Comment          GFR Staging NOT REPORTED    Troponin   Result Value Ref Range    Troponin, High Sensitivity 13 0 - 14 ng/L    Troponin T NOT REPORTED <0.03 ng/mL    Troponin Interp NOT REPORTED    Brain Natriuretic Peptide   Result Value Ref Range    Pro-BNP 1,327 (H) <300 pg/mL    BNP Interpretation Pro-BNP Reference Range:    Troponin   Result Value Ref Range    Troponin, High Sensitivity 12 0 - 14 ng/L    Troponin T NOT REPORTED <0.03 ng/mL    Troponin Interp NOT REPORTED    EKG 12 Lead   Result Value Ref Range    Ventricular Rate 89 BPM    Atrial Rate 89 BPM    P-R Interval 126 ms    QRS Duration 90 ms    Q-T Interval 416 ms    QTc Calculation (Bazett) 506 ms    P Axis 28 degrees    R Axis 20 degrees    T Axis 26 degrees       EMERGENCY DEPARTMENT COURSE:        The patient was given the following medications:  Orders Placed This Encounter   Medications    furosemide (LASIX) injection 40 mg    LORazepam (ATIVAN) injection 1 mg    ipratropium-albuterol (DUONEB) nebulizer solution 1 ampule    methylPREDNISolone sodium (SOLU-MEDROL) injection 125 mg    magnesium sulfate 1 g in dextrose 5% 100 mL IVPB    predniSONE (DELTASONE) 20 MG tablet     Sig: Take 2 tablets by mouth daily for 5 days     Dispense:  10 tablet     Refill:  0        Vitals:    Vitals:    08/15/20 2003 08/15/20 2203   BP: (!) 139/98 110/73   Pulse: 99 88   Resp: 24 16   Temp: 96.6 °F (35.9 °C)    TempSrc: Tympanic    SpO2: 98% 96%   Weight: 209 lb (94.8 kg)      -------------------------  BP: 110/73, Temp: 96.6 °F (35.9 °C), Pulse: 88, Resp: 16    CONSULTS:  None    CRITICAL CARE:   None    PROCEDURES:  None    DIAGNOSIS/ MDM:   Liang Conteh is a 63 y.o. female who presents with shortness of breath. Vital signs are stable. Heart regular rate and rhythm. Lungs clear to auscultation. Abdomen soft and nontender. Posterior oropharynx is clear. CBC, BMP and chest x-ray are unremarkable. BNP is elevated at 1,327 but this is improved from her baseline. No signs of peripheral edema. Troponin negative x3. EKG initially showed slightly prolonged QTC at 506 ms but she was given 1 g of magnesium. On repeat EKG she still had prolonged QTc of 508 but magnesium was still infusing. On 3rd EKG her QTc is unchanged at 509ms. On further questioning the patient states that she has had prolonged QTC in the past.  She was offered admission for further magnesium treatment but the patient declines. She states that she prefers to follow-up with her PCP outpatient for this and agrees to have a repeat EKG within the next few days. The patient does have history of asthma and I suspect her asthma may have been triggered today by the humidity and paint fumes. She was treated with Lasix 40mg IV, Solu-Medrol 125 mg IV, 1 g magnesium, and a DuoNeb treatment with improvement in her symptoms. She was also given 1 mg of Ativan for anxiety with significant improvement. I have low suspicion for ACS, dissection, esophageal rupture, cardiac tamponade, infection, or CHF exacerbation. She had a CTA of the chest 1 month ago which showed no evidence of PE and I have low suspicion for any blood clots at this time. I instructed the patient to follow-up with her PCP in 1 to 2 days and to use her breathing treatments and albuterol inhaler as needed. She was placed on a 5-day course of prednisone. I instructed her to return to the ED for worsening symptoms or any other concern.  The patient understands that at this time there is no evidence for a more malignant

## 2020-08-16 LAB
EKG ATRIAL RATE: 86 BPM
EKG ATRIAL RATE: 88 BPM
EKG ATRIAL RATE: 89 BPM
EKG P AXIS: 28 DEGREES
EKG P AXIS: 28 DEGREES
EKG P AXIS: 36 DEGREES
EKG P-R INTERVAL: 126 MS
EKG P-R INTERVAL: 140 MS
EKG P-R INTERVAL: 142 MS
EKG Q-T INTERVAL: 416 MS
EKG Q-T INTERVAL: 420 MS
EKG Q-T INTERVAL: 426 MS
EKG QRS DURATION: 86 MS
EKG QRS DURATION: 88 MS
EKG QRS DURATION: 90 MS
EKG QTC CALCULATION (BAZETT): 506 MS
EKG QTC CALCULATION (BAZETT): 508 MS
EKG QTC CALCULATION (BAZETT): 509 MS
EKG R AXIS: 20 DEGREES
EKG R AXIS: 3 DEGREES
EKG R AXIS: 8 DEGREES
EKG T AXIS: 25 DEGREES
EKG T AXIS: 26 DEGREES
EKG T AXIS: 33 DEGREES
EKG VENTRICULAR RATE: 86 BPM
EKG VENTRICULAR RATE: 88 BPM
EKG VENTRICULAR RATE: 89 BPM

## 2020-08-16 NOTE — ED NOTES
Resp easy, even, non labored, no distress noted. Reports feeling much better after breathing tx. Denies needs at this time,  cont at bedside, call light in reach.       Sesar Roque RN  08/15/20 [id number]

## 2020-08-17 ENCOUNTER — HOSPITAL ENCOUNTER (OUTPATIENT)
Dept: CARDIAC REHAB | Age: 60
Setting detail: THERAPIES SERIES
Discharge: HOME | End: 2020-08-17
Payer: COMMERCIAL

## 2020-08-17 ENCOUNTER — CARE COORDINATION (OUTPATIENT)
Dept: CARE COORDINATION | Age: 60
End: 2020-08-17

## 2020-08-17 PROCEDURE — 93798 PHYS/QHP OP CAR RHAB W/ECG: CPT

## 2020-08-17 NOTE — CARE COORDINATION
Trevor Ramirez was seen at [facility] 8/15/2020- SOB- given prescription for Prednisone at discharge and advised to continue albuterol. Spoke with Trevor Ramirez. She did start Prednisone. She has albuterol nebulizer to use as needed. She is scheduled with PCP 2020 and she does cardiac rehab. Reviewed Healthcare decision makers. She declined [facility] d/t [facility] calls her on a regular basis. Patient contacted regarding Delor[facility]. Discussed COVID-19 related testing which was not done at this time. Test results were not done. Patient informed of results, if available? N/A    Care Transition Nurse/ Ambulatory Care Manager contacted the patient by telephone to perform post discharge assessment. Verified name and  with patient as identifiers. Provided introduction to self, and explanation of the CTN/ACM role, and reason for call due to risk factors for infection and/or exposure to COVID-19. Symptoms reviewed with patient who verbalized the following symptoms: no new symptoms and no worsening symptoms. Due to no new or worsening symptoms encounter was not routed to provider for escalation. Discussed follow-up appointments. If no appointment was previously scheduled, appointment scheduling offered: She is scheduled tomorrow. [address] follow up appointment(s):   Future Appointments   Date Time Provider Becca Russell   2020 12:00 PM [facility] CARDIAC REHAB RM 1 [facility] CARD [place] HOD   2020 12:00 PM [facility] CARDIAC REHAB RM 1 [facility] CARD [place] HOD   2020 12:00 PM [facility] 91 REHAB RM 1 [facility] CARD [place] \[facility] [place] [facility]\""   9/3/2020  9:00 AM Tammi Christensen  [address] [place], [place] [address]DPP   2020 10:00 AM SCHEDULE, PACEMAKER [facility]   2020 10:15 AM [facility]     Non[facility] follow up appointment(s): Yes     Advance Care Planning:   Does patient have an Advance Directive:  Reviewed Healthcare decision makers.      Patient has following risk factors of: heart failure, asthma and diabetes. CTN/ACM reviewed discharge instructions, medical action plan and red flags such as increased shortness of breath, increasing fever and signs of decompensation with patient who verbalized understanding. Discussed exposure protocols and quarantine with CDC Guidelines What to do if you are sick with coronavirus disease 2019.  Patient was given an opportunity for questions and concerns. The patient agrees to contact the [facility] exposure line [phone], local [facility] department [place] [facility] Health: ([phone]) and PCP office for questions related to their healthcare. CTN/ACM provided contact information for future needs. Reviewed and educated patient on any new and changed medications related to discharge diagnosis     Patient/family/caregiver given information for [facility] Loop and agrees to enroll [facility] declines  Patient's preferred e-mail: N/A   Patient's preferred phone number: N/A  Based on Loop alert triggers, patient will be contacted by nurse care manager for worsening symptoms. Plan for follow-up call in 5-7 days based on severity of symptoms and risk factors.

## 2020-08-19 ENCOUNTER — HOSPITAL ENCOUNTER (OUTPATIENT)
Dept: CARDIAC REHAB | Age: 60
Setting detail: THERAPIES SERIES
Discharge: HOME | End: 2020-08-19
Payer: COMMERCIAL

## 2020-08-19 PROCEDURE — 93798 PHYS/QHP OP CAR RHAB W/ECG: CPT

## 2020-08-21 ENCOUNTER — HOSPITAL ENCOUNTER (OUTPATIENT)
Dept: CARDIAC REHAB | Age: 60
Setting detail: THERAPIES SERIES
Discharge: HOME | End: 2020-08-21
Payer: COMMERCIAL

## 2020-08-21 PROCEDURE — 93798 PHYS/QHP OP CAR RHAB W/ECG: CPT

## 2020-08-24 ENCOUNTER — HOSPITAL ENCOUNTER (OUTPATIENT)
Dept: CARDIAC REHAB | Age: 60
Setting detail: THERAPIES SERIES
Discharge: HOME | End: 2020-08-24
Payer: COMMERCIAL

## 2020-08-24 ENCOUNTER — CARE COORDINATION (OUTPATIENT)
Dept: CARE COORDINATION | Age: 60
End: 2020-08-24

## 2020-08-24 PROCEDURE — 93798 PHYS/QHP OP CAR RHAB W/ECG: CPT

## 2020-08-25 ENCOUNTER — CARE COORDINATION (OUTPATIENT)
Dept: CARE COORDINATION | Age: 60
End: 2020-08-25

## 2020-08-25 NOTE — CARE COORDINATION
Spoke with Julianne Talbot. She continues to improve. She is in cardiac rehab. Patient contacted regarding COVID-19 risk and screening. Discussed COVID-19 related testing which was not done at this time. Test results were not done. Patient informed of results, if available? N/A. Care Transition Nurse/ Ambulatory Care Manager contacted the patient by telephone to perform follow-up assessment. Verified name and  with patient as identifiers. Patient has following risk factors of: heart failure, asthma and diabetes. Symptoms reviewed with patient who verbalized the following symptoms: no new symptoms and no worsening symptoms. Due to no new or worsening symptoms encounter was not routed to provider for escalation. Education provided regarding infection prevention, and signs and symptoms of COVID-19 and when to seek medical attention with patient who verbalized understanding. Discussed exposure protocols and quarantine from [address] you at higher risk for severe illness  and given an opportunity for questions and concerns. The patient agrees to contact the COVID-19 hotline [phone] or PCP office for questions related to their healthcare. CTN/ACM provided contact information for future reference. From CDC: Are you at higher risk for severe illness?  Wash your hands often.  Avoid close contact (6 feet, which is about two arm lengths) with people who are sick.  Put distance between yourself and other people if COVID-19 is spreading in your community.  Clean and disinfect frequently touched surfaces.  Avoid all cruise travel and non-essential air travel.  Call your healthcare professional if you have concerns about COVID-19 and your underlying condition or if you are sick. For more information on steps you can take to protect yourself, see CDC's How to [address] [place] [address] for follow-up call in 5-7 days based on severity of symptoms and risk factors.

## 2020-08-27 ENCOUNTER — HOSPITAL ENCOUNTER (OUTPATIENT)
Dept: CARDIAC REHAB | Age: 60
Setting detail: THERAPIES SERIES
Discharge: HOME | End: 2020-08-27
Payer: COMMERCIAL

## 2020-08-27 PROCEDURE — 93798 PHYS/QHP OP CAR RHAB W/ECG: CPT

## 2020-08-28 ENCOUNTER — HOSPITAL ENCOUNTER (OUTPATIENT)
Dept: CARDIAC REHAB | Age: 60
Setting detail: THERAPIES SERIES
Discharge: HOME | End: 2020-08-28
Payer: COMMERCIAL

## 2020-08-28 PROCEDURE — 93798 PHYS/QHP OP CAR RHAB W/ECG: CPT

## 2020-08-31 ENCOUNTER — HOSPITAL ENCOUNTER (OUTPATIENT)
Dept: CARDIAC REHAB | Age: 60
Setting detail: THERAPIES SERIES
Discharge: HOME | End: 2020-08-31
Payer: COMMERCIAL

## 2020-08-31 PROCEDURE — 93798 PHYS/QHP OP CAR RHAB W/ECG: CPT

## 2020-09-01 ENCOUNTER — CARE COORDINATION (OUTPATIENT)
Dept: CARE COORDINATION | Age: 60
End: 2020-09-01

## 2020-09-03 ENCOUNTER — TELEPHONE (OUTPATIENT)
Dept: SURGERY | Age: 60
End: 2020-09-03

## 2020-09-03 ENCOUNTER — OFFICE VISIT (OUTPATIENT)
Dept: SURGERY | Age: 60
End: 2020-09-03
Payer: COMMERCIAL

## 2020-09-03 VITALS
HEIGHT: 67 IN | BODY MASS INDEX: 32.65 KG/M2 | WEIGHT: 208 LBS | DIASTOLIC BLOOD PRESSURE: 66 MMHG | HEART RATE: 80 BPM | SYSTOLIC BLOOD PRESSURE: 120 MMHG

## 2020-09-03 PROCEDURE — 3017F COLORECTAL CA SCREEN DOC REV: CPT | Performed by: SURGERY

## 2020-09-03 PROCEDURE — 1036F TOBACCO NON-USER: CPT | Performed by: SURGERY

## 2020-09-03 PROCEDURE — 99215 OFFICE O/P EST HI 40 MIN: CPT | Performed by: SURGERY

## 2020-09-03 PROCEDURE — G8417 CALC BMI ABV UP PARAM F/U: HCPCS | Performed by: SURGERY

## 2020-09-03 PROCEDURE — G8427 DOCREV CUR MEDS BY ELIG CLIN: HCPCS | Performed by: SURGERY

## 2020-09-03 RX ORDER — PILOCARPINE HYDROCHLORIDE 10 MG/ML
1 SOLUTION/ DROPS OPHTHALMIC 3 TIMES DAILY
Status: ON HOLD | COMMUNITY
Start: 2020-07-02 | End: 2021-02-09 | Stop reason: ALTCHOICE

## 2020-09-03 NOTE — PROGRESS NOTES
Instructions given for Lap robot assisted Nissen fundoplication and ventral hernia repair scheduled on 10/06/20. I will get cardiology clearance and ask about holding aspirin.  Will also send a note to anesthesia and hospitalist.

## 2020-09-03 NOTE — PROGRESS NOTES
Negative  Nausea/Vomiting: Negative  Abdominal Pain: Negative  Change in Bowels:Negative  Constipation: Negative  Diarrhea: Negative  Rectal Bleeding: Negative    :   Dysuria: Negative  Increase Urinary Frequency/Urgency: Negative    Neuro:  Seizures: Negative  Confusion: Negative        PAST MEDICAL HISTORY:        Family History   Problem Relation Age of Onset    Coronary Art Dis Mother     High Blood Pressure Mother     High Cholesterol Mother     Allergies Mother     Arthritis Mother     Diabetes Daughter         pre diabetes    Other Maternal Grandfather         PUD GI problems    Cataracts Neg Hx     Glaucoma Neg Hx      Social History     Socioeconomic History    Marital status:      Spouse name: Not on file    Number of children: Not on file    Years of education: Not on file    Highest education level: Not on file   Occupational History    Occupation: counselor     Employer: [employer]   Social Needs    Financial resource strain: Not on file    Food insecurity     Worry: Not on file     Inability: Not on file    Transportation needs     Medical: Not on file     Non-medical: Not on file   Tobacco Use    Smoking status: Never Smoker    Smokeless tobacco: Never Used    Tobacco comment: never smoked syant rrt 10/13/2017   Substance and Sexual Activity    Alcohol use: No     Alcohol/week: 0.0 standard drinks     Comment: Consume about 4 wine coolers a year    Drug use: No    Sexual activity: Yes     Partners: Male     Comment:    Lifestyle    Physical activity     Days per week: Not on file     Minutes per session: Not on file    Stress: Not on file   Relationships    Social connections     Talks on phone: Not on file     Gets together: Not on file     Attends Yazidism service: Not on file     Active member of club or organization: Not on file     Attends meetings of clubs or organizations: Not on file     Relationship status: Not on file    Intimate partner resection colovaginal fistula PERFORMED BY. DR. Jason Ladd  performed by Jennifer Desir MD at [address] [facility] Right 07/2011    TOTAL    TUBAL LIGATION  1991    UPPER GASTROINTESTINAL ENDOSCOPY N/A 1/2/2020    EGD BIOPSY W/ 50 HR LEARY performed by Jennifer Desir MD at [address] WISDOM TOOTH EXTRACTION       Past Medical History:   Diagnosis Date    Acute on chronic systolic congestive heart failure ([facility] 1/21/2020    Asthma     Bilateral corneal scars     Disease of blood and blood forming organ     Diverticulitis     ON COLONSCOPY    DJD (degenerative joint disease), lumbar     Dry eye syndrome     DVT (deep venous thrombosis) ([facility])     after splenic artery repair in right calf    Gastro - esophageal reflux disease     History of blood transfusion     Hyperlipidemia     HIGH CHOLESTEROL/HIGH TRIGLYCERIDES    Hyperplastic colon polyp     Hypertension     Hypothyroidism     LGSIL (low grade squamous intraepithelial dysplasia) 11/2014    referred to GYN/S/P colpo with bx LGSIL, repeat pap in 6 months    Non-celiac gluten sensitivity     wheezing is directly proportionate to wheat intake    NSTEMI (non-ST elevated myocardial infarction) [facility] 01/16/2020    Osteoarthritis 2004    RT KNEE S/P INJURY    Other disorders of kidney and ureter in diseases classified elsewhere     Sciatica of right side     Severe persistent asthma     patient has seen that wheat consumption causes exacerbations    Splenic artery aneurysm (HCC)     s/p splenic artery repair    Tinnitus     Type 2 diabetes mellitus, controlled ([facility] 5/2011     Current Outpatient Medications on File Prior to Visit   Medication Sig Dispense Refill    pilocarpine (PILOCAR) 1 % ophthalmic solution Apply 1 drop to eye 3 times daily      metoprolol succinate (TOPROL XL) 100 MG extended release tablet Take 1 tablet by mouth 2 times daily 180 tablet 1    sacubitril-valsartan (ENTRESTO) 24-26 MG per tablet Take 1 tablet by mouth 2 times daily 180 tablet 1    furosemide (LASIX) 40 MG tablet Take 1 tablet by mouth daily 60 tablet 3    glimepiride (AMARYL) 2 MG tablet Take 2 mg by mouth every morning (before breakfast)      famotidine (PEPCID) 40 MG tablet Take 1 tablet by mouth every evening 90 tablet 3    Cholecalciferol (VITAMIN D3) 50 MCG (2000 UT) CAPS Take 1 capsule by mouth daily      aspirin 81 MG EC tablet Take 1 tablet by mouth daily 30 tablet 3    albuterol (PROVENTIL) (5 MG/ML) 0.5% nebulizer solution Take 1 mL by nebulization 4 times daily as needed for Wheezing 120 each 3    budesonide-formoterol (SYMBICORT) 160-4.5 MCG/ACT AERO 2 puffs inhaled twice daily. Rinse mouth well after use. 3 Inhaler 3    pantoprazole (PROTONIX) 40 MG tablet Take one tablet twice daily 30-60 minutes prior to breakfast and evening meal 180 tablet 3    SYNTHROID 175 MCG tablet Take 175 mcg by mouth      MOLYBDENUM PO Take 500 mcg by mouth daily      ipratropium-albuterol (DUONEB) 0.5-2.5 (3) MG/3ML SOLN nebulizer solution Inhale 3 mLs into the lungs every 4 hours as needed for Shortness of Breath 360 mL 2    fluticasone (FLONASE) 50 MCG/ACT nasal spray Two sprays to each nostril once daily 3 Bottle 3    diclofenac (VOLTAREN) 75 MG EC tablet Take 1 tablet by mouth 2 times daily as needed for Pain 90 tablet 2    Probiotic Product (SOLUBLE FIBER/PROBIOTICS PO) Take by mouth 2 times daily      metFORMIN (GLUCOPHAGE) 500 MG tablet TAKE 2 TABLETS BY MOUTH TWO TIMES A DAY WITH MEALS 120 tablet 3     No current facility-administered medications on file prior to visit.       Allergies as of 09/03/2020 - Review Complete 08/15/2020   Allergen Reaction Noted    Morphine Nausea And Vomiting, Other (See Comments), and Shortness Of Breath 06/27/2013    Codeine Nausea And Vomiting and Other (See Comments) 06/27/2013    Darvon [propoxyphene] Nausea And Vomiting and Other (See Comments) 06/27/2013    Lisinopril Other (See Comments) 06/27/2013    Percocet [oxycodone-acetaminophen] Nausea And Vomiting and Other (See Comments) 10/17/2013         PHYSICAL EXAM:    Blood pressure 120/66, pulse 80, height 5' 7\" (1.702 m), weight 208 lb (94.3 kg), last menstrual period 04/29/2009, not currently breastfeeding. Gen:  A and O x 3, NAD, well nourished  Eyes:  Sclera non icterus, PERRL  Head:  Normocephalic, non-tender  Neck:  Supple, no adenopathy, thyroid non tender and no masses,no carotid bruits  Lungs:  CTA, symmetrical  Chest:  RRR, no murmurs  Abd:  Soft, NT, ND, no HSM, + inc ventral hernia 1-2 cm defect supraumbilical- reducible  Ext:  No edema, no cyanosis  Psych: reveals appropriate mood, memory and judgment,  Neuro:  Reveals no gross motor or sensory deficits,   Msk:  5/5 strength all 4 extremities, no joint tenderness        ASSESS MENT:    1. GERD refractory to medical treatment. 2.  A candidate for lap nissen  3. Ventral incisional hernia    PLAN:    1.  Robotic nissen fundoplication  2. Repair of ventral incisonal hernia with robot or primary technique same time  3. Get final cardiac clearence  4. Have hospitalist review case several weeks prior to OR date  5.   Have anesthesia review case several weeks prior to OR date         *  I discussed today with both hospitalist and anesthesioloist

## 2020-09-03 NOTE — TELEPHONE ENCOUNTER
[facility]         Patient:Michelle Patel           MLB:[id number]           Surgical/Procedure Planned: Laparoscopic robot assisted Nissen fundoplication and ventral hernia repair. Date & Location: [facility] on 10/6/20       Inpatient   Planned Length of OR: 1-3 hours    Sedation: general        Estimated Cardiac Risk for Non-Cardiac Surgery/Procedure     Low______ Moderate__X____ High______    Medication Instructions - Clarification needed by this date:         ASA 81 mg/325 mg Hold __5_ Days      Resume medications:     Lovenox indicated: _____Yes __X___NO    Provider:Dr. Uzma Rosenberg of Provider Giving Orders for Medication holds:    _____________________________________________  [facility] [facility]  [facility]  [phone]

## 2020-09-03 NOTE — TELEPHONE ENCOUNTER
[facility] SPECIALTY [facility]    Pre-Operative Evaluation/Consultation    Name:  Rosalinda Forde                                         Age:  63 y.o. MRN:  [id number]       :  1960   Date:  9/3/2020         Sex: female    There were no encounter diagnoses. Surgeon:  Dr. Pankaj Young  Procedure (Planned):  Lap robot Nissen fundoplication, ventral hernia repair  Date Scheduled surgery: 10/06/20    Attending : No att. providers found    Primary Physician: Vinny [facility]  Cardiologist: Cheril Aschoff    Type of Anesthesia Requested: Anesthesia Provider's Choice    Patient Medical history:   Allergies   Allergen Reactions    Morphine Nausea And Vomiting, Other (See Comments) and Shortness Of Breath     Chest tightness  Chest tightness    Codeine Nausea And Vomiting and Other (See Comments)     Chest tightness    Darvon [Propoxyphene] Nausea And Vomiting and Other (See Comments)     Chest tightness    Lisinopril Other (See Comments)     COUGH    Percocet [Oxycodone-Acetaminophen] Nausea And Vomiting and Other (See Comments)     Chest tightness     Patient Active Problem List   Diagnosis    Gastroesophageal reflux disease    Hypothyroidism    Essential hypertension    Hyperlipidemia    DJD (degenerative joint disease), lumbar    Acute diverticulitis of intestine    Hyperplastic colon polyp    Tinnitus    Sciatica of right side    Splenic artery aneurysm (HCC)    Presence of endovascular aneurysm coil compatible with safe magnetic resonance imaging    DVT (deep venous thrombosis) (HCC)    LGSIL (low grade squamous intraepithelial dysplasia)    Sinusitis, chronic    Asthma    Non-celiac gluten sensitivity    Severe persistent asthma    Varicose veins of bilateral lower extremities with pain    Bowel perforation (HCC)    Diverticulitis of large intestine with abscess without bleeding    Colovaginal fistula    Partial small bowel obstruction ([address]    Diabetes type 2, no ocular involvement ([address]    History of radial keratotomy    Combined forms of age-related cataract of both eyes    Respiratory distress    Exertional dyspnea    Thrombophlebitis of superficial veins of right lower extremity    Nonischemic cardiomyopathy ([address]    Acute on chronic systolic congestive heart failure (HCC)    Acute respiratory failure (HCC)    Acute pulmonary edema (HCC)     Past Medical History:   Diagnosis Date    Acute on chronic systolic congestive heart failure ([address] 2020    Asthma     Bilateral corneal scars     Disease of blood and blood forming organ     Diverticulitis     ON COLONSCOPY    DJD (degenerative joint disease), lumbar     Dry eye syndrome     DVT (deep venous thrombosis) (HCC)     after splenic artery repair in right calf    Gastro - esophageal reflux disease     History of blood transfusion     Hyperlipidemia     HIGH CHOLESTEROL/HIGH TRIGLYCERIDES    Hyperplastic colon polyp     Hypertension     Hypothyroidism     LGSIL (low grade squamous intraepithelial dysplasia) 2014    referred to GYN/S/P colpo with bx LGSIL, repeat pap in 6 months    Non-celiac gluten sensitivity     wheezing is directly proportionate to wheat intake    NSTEMI (non-ST elevated myocardial infarction) ([address] 2020    Osteoarthritis 2004    RT KNEE S/P INJURY    Other disorders of kidney and ureter in diseases classified elsewhere     Sciatica of right side     Severe persistent asthma     patient has seen that wheat consumption causes exacerbations    Splenic artery aneurysm (HCC)     s/p splenic artery repair    Tinnitus     Type 2 diabetes mellitus, controlled ([address] 2011     Past Surgical History:   Procedure Laterality Date    ABDOMEN SURGERY      ARTERIAL ANEURYSM REPAIR      splenic artery coil repair    CARDIAC CATHETERIZATION  2020    CARDIAC DEFIBRILLATOR PLACEMENT Left 2020     SECTION  1986    COLONOSCOPY  2012    HYPERPLASTIC POLYP, DIVERTICULAR DISEASE    COLONOSCOPY  02/27/2017    moderate sigmoid diverticulosis. Dr. Martha Murillo  11/2015    with [address] Bilateral 8/30/2017    CYSTO insertion lighted stents performed by Yo Arevalo MD at [address], ESOPHAGUS      ENDOSCOPY, COLON, DIAGNOSTIC      EYE SURGERY      JOINT REPLACEMENT      KNEE ARTHROSCOPY Right 2004    PARTIAL SYNOVECTOMY AND CHONDROPLASTY DUE TO MENSICUS TEAR    LAPAROSCOPY N/A 9/8/2017    EXPLORATORY LAPAROSCOPY converted to open exploration with drainage of pelvic abscess performed by Lenka Gentile MD at [facility]  08/19/2014    clearing an infection done at [place] [address]  10/25/2011    L4/L5 epidural steroid injection    OTHER SURGICAL HISTORY Right 2006 and 2010    synvisc injections, two sets    OTHER SURGICAL HISTORY  05/19/2020    Lead revision    MT COLON CA SCRN NOT HI RSK IND N/A 2/27/2017    COLONOSCOPY performed by Lenka Gentile MD at [address] IND N/A 9/8/2017    COLONOSCOPY performed by Lenka Gentile MD at [address] [facility] EGD TRANSORAL BIOPSY SINGLE/MULTIPLE N/A 2/27/2017    EGD BIOPSY performed by Lenka Gentile MD at [address] N/A 8/30/2017    ATTEMPTED LAPAROCOPY PROCEDURE CONVERTED TO OPEN Sigmoid Colectomy PERFORMED BY DR. VO resection colovaginal fistula PERFORMED BY. DR. Zuleima Anderson  performed by Lenka Gentile MD at [facility] Right 07/2011    TOTAL    TUBAL LIGATION  1991    UPPER GASTROINTESTINAL ENDOSCOPY N/A 1/2/2020    EGD BIOPSY W/ 48 HR LEARY performed by Lenka [facility]ile MD at [facility] EXTRACTION       Social History     Tobacco Use    Smoking status: Never Smoker    Smokeless tobacco: Never Used    Tobacco comment: never smoked syant rrt 10/13/2017   Substance Use Topics    Alcohol use: No     Alcohol/week: 0.0 standard drinks     Comment: Consume about 4 wine coolers a year    Drug use: No     Medications:  Current Outpatient Medications   Medication Sig Dispense Refill    pilocarpine (PILOCAR) 1 % ophthalmic solution Apply 1 drop to eye 3 times daily      metoprolol succinate (TOPROL XL) 100 MG extended release tablet Take 1 tablet by mouth 2 times daily 180 tablet 1    sacubitril-valsartan (ENTRESTO) 24-26 MG per tablet Take 1 tablet by mouth 2 times daily 180 tablet 1    furosemide (LASIX) 40 MG tablet Take 1 tablet by mouth daily 60 tablet 3    glimepiride (AMARYL) 2 MG tablet Take 2 mg by mouth every morning (before breakfast)      famotidine (PEPCID) 40 MG tablet Take 1 tablet by mouth every evening 90 tablet 3    Cholecalciferol (VITAMIN D3) 50 MCG (2000 UT) CAPS Take 1 capsule by mouth daily      aspirin 81 MG EC tablet Take 1 tablet by mouth daily 30 tablet 3    albuterol (PROVENTIL) (5 MG/ML) 0.5% nebulizer solution Take 1 mL by nebulization 4 times daily as needed for Wheezing 120 each 3    budesonide-formoterol (SYMBICORT) 160-4.5 MCG/ACT AERO 2 puffs inhaled twice daily. Rinse mouth well after use.  3 Inhaler 3    pantoprazole (PROTONIX) 40 MG tablet Take one tablet twice daily 30-60 minutes prior to breakfast and evening meal 180 tablet 3    SYNTHROID 175 MCG tablet Take 175 mcg by mouth      MOLYBDENUM PO Take 500 mcg by mouth daily      ipratropium-albuterol (DUONEB) 0.5-2.5 (3) MG/3ML SOLN nebulizer solution Inhale 3 mLs into the lungs every 4 hours as needed for Shortness of Breath 360 mL 2    fluticasone (FLONASE) 50 MCG/ACT nasal spray Two sprays to each nostril once daily 3 Bottle 3    diclofenac (VOLTAREN) 75 MG EC tablet Take 1 tablet by mouth 2 times daily as needed for Pain 90 tablet 2    Probiotic Product (SOLUBLE FIBER/PROBIOTICS PO) Take by mouth 2 times daily      metFORMIN (GLUCOPHAGE) 500 MG tablet TAKE 2 TABLETS BY MOUTH TWO TIMES A DAY WITH MEALS 120 tablet 3     No current facility-administered medications for this visit. Scheduled Meds:  Continuous Infusions:  PRN Meds:. Prior to Admission medications    Medication Sig Start Date End Date Taking? Authorizing Provider   pilocarpine (PILOCAR) 1 % ophthalmic solution Apply 1 drop to eye 3 times daily 7/2/20   Historical Provider, MD   metoprolol succinate (TOPROL XL) 100 MG extended release tablet Take 1 tablet by mouth 2 times daily 7/13/20   Arun Taylor DO   sacubitril-valsartan (ENTRESTO) 24-26 MG per tablet Take 1 tablet by mouth 2 times daily 6/18/20   Arun Taylor DO   furosemide (LASIX) 40 MG tablet Take 1 tablet by mouth daily 6/12/20   Sebastian Pedraza MD   glimepiride (AMARYL) 2 MG tablet Take 2 mg by mouth every morning (before breakfast)    Historical Provider, MD   famotidine (PEPCID) 40 MG tablet Take 1 tablet by mouth every evening 4/13/20   DEREK Figueroa CNP   Cholecalciferol (VITAMIN D3) 50 MCG (2000 UT) CAPS Take 1 capsule by mouth daily    Historical Provider, MD   aspirin 81 MG EC tablet Take 1 tablet by mouth daily 1/22/20   Kevin Rubio DO   albuterol (PROVENTIL) (5 MG/ML) 0.5% nebulizer solution Take 1 mL by nebulization 4 times daily as needed for Wheezing 11/26/19   DEREK Figueroa CNP   budesonide-formoterol (SYMBICORT) 160-4.5 MCG/ACT AERO 2 puffs inhaled twice daily. Rinse mouth well after use.  11/26/19   DEREK Figueroa CNP   pantoprazole (PROTONIX) 40 MG tablet Take one tablet twice daily 30-60 minutes prior to breakfast and evening meal 11/11/19   DEREK Figueroa CNP   SYNTHROID 175 MCG tablet Take 175 mcg by mouth 8/3/19   Historical Provider, MD   MOLYBDENUM PO Take 500 mcg by mouth daily    Historical Provider, MD   ipratropium-albuterol (DUONEB) 0.5-2.5 (3) MG/3ML SOLN nebulizer solution Inhale 3 mLs into the lungs every 4 hours as needed for Shortness of Breath 5/6/19   DEREK Figueroa CNP   fluticasone (FLONASE) 50 MCG/ACT nasal spray Two sprays to each nostril once daily 4/1/19   DEREK Diaz - CNP   diclofenac (VOLTAREN) 75 MG EC tablet Take 1 tablet by mouth 2 times daily as needed for Pain 2/22/18   Bill Maharaj MD   Probiotic Product (SOLUBLE FIBER/PROBIOTICS PO) Take by mouth 2 times daily    Historical Provider, MD   metFORMIN (GLUCOPHAGE) 500 MG tablet TAKE 2 TABLETS BY MOUTH TWO TIMES A DAY WITH MEALS 9/18/17   Carlos Maier MD     Vital Signs (Current) [unfilled]    Weight:   Wt Readings from Last 1 Encounters:   09/03/20 208 lb (94.3 kg)     Height:   Ht Readings from Last 1 Encounters:   09/03/20 5' 7\" (1.702 m)      BMI:  There is no height or weight on file to calculate BMI. Estimated body mass index is 32.58 kg/m² as calculated from the following:    Height as of an earlier encounter on 9/3/20: 5' 7\" (1.702 m). Weight as of an earlier encounter on 9/3/20: 208 lb (94.3 kg). body mass index is unknown because there is no height or weight on file. Cardiac Clearance: cleared by Ariela De Souza on 6/18/20   Medical Clearance:None   Appointment for surgery Clearance scheduled for:6/18/20     Preoperative Testing: These are the current and completed labs:  CBC:   Lab Results   Component Value Date    WBC 9.5 08/15/2020    RBC 4.59 08/15/2020    HGB 13.1 08/15/2020    HCT 39.7 08/15/2020    MCV 86.5 08/15/2020    RDW 13.9 08/15/2020     08/15/2020     CMP:   Lab Results   Component Value Date     08/15/2020    K 3.7 08/15/2020     08/15/2020    CO2 18 08/15/2020    BUN 18 08/15/2020    CREATININE 0.92 08/15/2020    GFRAA >60 08/15/2020    LABGLOM >60 08/15/2020    GLUCOSE 114 08/15/2020    PROT 6.7 06/09/2020    CALCIUM 9.9 08/15/2020    BILITOT 0.28 06/09/2020    ALKPHOS 61 06/09/2020    AST 26 06/09/2020    ALT 34 06/09/2020     POC Tests: No results for input(s): POCGLU, POCNA, POCK, POCCL, POCBUN, POCHEMO, POCHCT in the last 72 hours.   [facility]    Lab Results   Component Value Date    PROTIME 9.4 01/16/2020    INR 0.9 01/16/2020    APTT 22.4 14/72/1774     HCG (If Applicable) No results found for: PREGTESTUR, PREGSERUM, HCG, HCGQUANT   ABGs No results found for: PHART, PO2ART, QHH1HCS, FKL7IRU, BEART, F4DEKQDJ   Type & Screen (If Applicable)  No results found for: Emelyn Pinta    Additional ordered pre-operative testing:  [x]CBC 8/15/20   []ABG      [x] BMP 8/15/20   []URINALYSIS   []CMP    []HCG   []COAGS PT/INR  []T&C  []LFTs   []TYPE AND SCREEN    [x] EKG8/15/20 [x] Chest X-Ray8/15/20[] Other Radiology    [x] Sent to Hospitalist   [x] Sent to Anesthesia for your review:   [] Additional Orders:     Comments:None   Requests: None    Signed: Pauline Hinson LPN 1/1/9224 80:49 AM

## 2020-09-04 NOTE — TELEPHONE ENCOUNTER
This patient is an extremely challenging case with her newly diagnosed CHF in June/ July, ICD placement with lead displacement, and severe asthma. I did see cardiology made her moderate risk. It is my professional opinion that this patient is very high risk to be done here in [place] and should be done at a tertiary center with advanced ICU capabilities. Please tell Dr Harjeet Donis we(anesthesia) can discuss this in person whenever he has time.

## 2020-09-08 ENCOUNTER — PATIENT MESSAGE (OUTPATIENT)
Dept: FAMILY MEDICINE CLINIC | Age: 60
End: 2020-09-08

## 2020-09-08 ENCOUNTER — HOSPITAL ENCOUNTER (OUTPATIENT)
Dept: CARDIAC REHAB | Age: 60
Setting detail: THERAPIES SERIES
Discharge: HOME | End: 2020-09-08
Payer: COMMERCIAL

## 2020-09-08 ENCOUNTER — TELEPHONE (OUTPATIENT)
Dept: SURGERY | Age: 60
End: 2020-09-08

## 2020-09-08 PROCEDURE — 93798 PHYS/QHP OP CAR RHAB W/ECG: CPT

## 2020-09-08 NOTE — TELEPHONE ENCOUNTER
From: Karthik Schmidt  To: DEREK Garay - CNP  Sent: 9/8/2020 9:44 AM EDT  Subject: Non-Urgent Medical Question    Eri Parisi, I have had a few health issues since I saw you last. I have been following up with Dr. Jeremy Pierson of [facility] for some sinus infections. Dr. Tatyana Garcia has scheduled a fundiplication surgery for Oct. 6 at [address] [facility] in [place]. This surgery was originally scheduled for several months ago. At that time, you had mentioned you wanted Dr. Tatyana Garcia to make sure we dealt with breathing concerns. He was not able to find your notes in his files and asked if you could send over your recommendations to him again. I am sorry I do not remember the details of the notes but I think it had to do with breathing treatments. Please let me know you got this message. I miss you, hope all is well with you and idalia. Thanks.

## 2020-09-10 ENCOUNTER — HOSPITAL ENCOUNTER (OUTPATIENT)
Dept: CARDIAC REHAB | Age: 60
Setting detail: THERAPIES SERIES
Discharge: HOME | End: 2020-09-10
Payer: COMMERCIAL

## 2020-09-10 PROCEDURE — 93798 PHYS/QHP OP CAR RHAB W/ECG: CPT

## 2020-09-11 ENCOUNTER — HOSPITAL ENCOUNTER (OUTPATIENT)
Dept: CARDIAC REHAB | Age: 60
Setting detail: THERAPIES SERIES
Discharge: HOME | End: 2020-09-11
Payer: COMMERCIAL

## 2020-09-11 PROCEDURE — 93798 PHYS/QHP OP CAR RHAB W/ECG: CPT

## 2020-09-14 ENCOUNTER — HOSPITAL ENCOUNTER (OUTPATIENT)
Dept: CARDIAC REHAB | Age: 60
Setting detail: THERAPIES SERIES
Discharge: HOME | End: 2020-09-14
Payer: COMMERCIAL

## 2020-09-14 PROCEDURE — 93798 PHYS/QHP OP CAR RHAB W/ECG: CPT

## 2020-09-16 NOTE — TELEPHONE ENCOUNTER
I called and spoke with patient. Our anesthesia providers do not recommend that patient have her lap nissen fundoplication done here due to her risk factors. Patient notified that we will cancel her procedure that was scheduled on 10/06/20. I gave her some choices of physicians that we can refer her to. She would like to see Dr. Ron Barboza. I have called their office. They will call patient with appointment.

## 2020-09-17 ENCOUNTER — HOSPITAL ENCOUNTER (OUTPATIENT)
Dept: MAMMOGRAPHY | Age: 60
Discharge: HOME | End: 2020-09-19
Payer: COMMERCIAL

## 2020-09-17 ENCOUNTER — HOSPITAL ENCOUNTER (OUTPATIENT)
Dept: CARDIAC REHAB | Age: 60
Setting detail: THERAPIES SERIES
Discharge: HOME | End: 2020-09-17
Payer: COMMERCIAL

## 2020-09-17 PROCEDURE — 77063 BREAST TOMOSYNTHESIS BI: CPT

## 2020-09-17 PROCEDURE — 9900000065 HC CARDIAC REHAB PHASE 3 - 1 VISIT

## 2020-09-18 ENCOUNTER — HOSPITAL ENCOUNTER (OUTPATIENT)
Dept: CARDIAC REHAB | Age: 60
Setting detail: THERAPIES SERIES
Discharge: HOME | End: 2020-09-18
Payer: COMMERCIAL

## 2020-09-18 PROCEDURE — 93798 PHYS/QHP OP CAR RHAB W/ECG: CPT

## 2020-09-21 ENCOUNTER — HOSPITAL ENCOUNTER (OUTPATIENT)
Dept: CARDIAC REHAB | Age: 60
Setting detail: THERAPIES SERIES
Discharge: HOME | End: 2020-09-21
Payer: COMMERCIAL

## 2020-09-21 ENCOUNTER — TELEPHONE (OUTPATIENT)
Dept: PREADMISSION TESTING | Age: 60
End: 2020-09-21

## 2020-09-21 PROCEDURE — 93798 PHYS/QHP OP CAR RHAB W/ECG: CPT

## 2020-09-22 RX ORDER — FLUTICASONE PROPIONATE 50 MCG
SPRAY, SUSPENSION (ML) NASAL
Qty: 48 G | Refills: 3 | Status: SHIPPED | OUTPATIENT
Start: 2020-09-22 | End: 2022-10-27

## 2020-09-22 NOTE — TELEPHONE ENCOUNTER
Brittney Kemp is requesting a refill on the following medication(s):  Requested Prescriptions     Pending Prescriptions Disp Refills    fluticasone (FLONASE) 50 MCG/ACT nasal spray [Pharmacy Med Name: FLUTICASONE PROP NASAL SPRAY 16GM 50MCG] 48 g 3     Sig: USE 2 SPRAYS IN EACH NOSTRIL ONCE DAILY       Last Visit Date (If Applicable):  7/20/7571    Next Visit Date:    9/30/2020

## 2020-09-24 ENCOUNTER — TELEPHONE (OUTPATIENT)
Dept: CARDIOLOGY | Age: 60
End: 2020-09-24

## 2020-09-24 ENCOUNTER — HOSPITAL ENCOUNTER (OUTPATIENT)
Dept: CARDIAC REHAB | Age: 60
Setting detail: THERAPIES SERIES
Discharge: HOME | End: 2020-09-24
Payer: COMMERCIAL

## 2020-09-24 PROCEDURE — 93798 PHYS/QHP OP CAR RHAB W/ECG: CPT

## 2020-09-24 NOTE — TELEPHONE ENCOUNTER
Called and spoke with pt. States her metoprolol was raised in June 2020 to  100mg BID after on ventilator at [facility]'s for pulm edema. Also on Entresto. But states even on low dose Metoprol she has nausea and vomiting. States average pulse rate is 86 and BP runs low normal, 100/60 after cardiac rehab today. At night she has no symptoms but reports nausea and vomiting most mornings. Pt eats dinner around 6p and takes pills 20 minutes later, and reports eating breakfast and then taking pills 20 minutes later.

## 2020-09-24 NOTE — TELEPHONE ENCOUNTER
Pt started vomiting after taking her toprolol in the mornings. She is not feeling right and wanted to know if you could adjust the dosage or is there anything that can be done? Pt will be down stiars for the next hour otherwise her cell phone.

## 2020-09-25 ENCOUNTER — HOSPITAL ENCOUNTER (OUTPATIENT)
Dept: CARDIAC REHAB | Age: 60
Setting detail: THERAPIES SERIES
Discharge: HOME | End: 2020-09-25
Payer: COMMERCIAL

## 2020-09-25 PROCEDURE — 93798 PHYS/QHP OP CAR RHAB W/ECG: CPT

## 2020-09-25 NOTE — TELEPHONE ENCOUNTER
Patient notified to hold Metoprolol until next cardiology visit.  She will monitor BP and P and report

## 2020-09-29 ENCOUNTER — HOSPITAL ENCOUNTER (OUTPATIENT)
Dept: CARDIAC REHAB | Age: 60
Setting detail: THERAPIES SERIES
Discharge: HOME | End: 2020-09-29
Payer: COMMERCIAL

## 2020-09-29 PROCEDURE — 93798 PHYS/QHP OP CAR RHAB W/ECG: CPT

## 2020-09-30 ENCOUNTER — TELEMEDICINE (OUTPATIENT)
Dept: FAMILY MEDICINE CLINIC | Age: 60
End: 2020-09-30
Payer: COMMERCIAL

## 2020-09-30 PROCEDURE — 99214 OFFICE O/P EST MOD 30 MIN: CPT | Performed by: NURSE PRACTITIONER

## 2020-09-30 ASSESSMENT — ENCOUNTER SYMPTOMS
COUGH: 0
CHEST TIGHTNESS: 0
HEMOPTYSIS: 0
WHEEZING: 0
SPUTUM PRODUCTION: 0
SHORTNESS OF BREATH: 0
DIFFICULTY BREATHING: 0
FREQUENT THROAT CLEARING: 0
HOARSE VOICE: 1

## 2020-09-30 NOTE — TELEPHONE ENCOUNTER
She can schedule an appointment to re-evaluate or she can start taking 12.5 mg of lopressor daily and not bid.

## 2020-09-30 NOTE — TELEPHONE ENCOUNTER
Patient on Toprolol  mg BID. She does not want to decrease. It causes her heart rate to increase .  She would like to stay on her current dose and she will keep appointment on oct 15

## 2020-09-30 NOTE — PROGRESS NOTES
Symptoms experienced by patient  Runny nose  Yes Circles under eyes Yes Post nasal drip Yes Difficulty breathing No   Stuffy nose Yes Grumpiness Yes Hoarseness Yes Short of breath No   Sniffling  Yes Fatigue Yes Face pain/pressure Yes Tight/heavy chest No   Sneezing Yes Nosebleeds No Sore throat No cough Yes   Blowing nose Yes Nasal/sinus surgery No Headache  No Cough up mucus Yes   Itchy/teary eyes No Nasal polyps No Upper teeth hurt No Cough up blood No   Itchy ears No Hearing loss No Night cough Yes Chest pain No   Itchy nose No Ear problems No Throat clearing Yes Asthma Yes   Itchy throat No Tubes in ears No Bad breath No Wheezing No   Itchy roof of mouth No Snoring Yes Bad taste in mouth No Pneumonia No   Nose rubbing yes Mouth breathing Yes  Ankle swelling No    Overbite no  Difficulty breathing on exertion No    Drooling (toddler) No         How many times per week do you use your rescue inhaler? 0    How many nights per month do you wake up due to asthma 10    How many mornings per week do you wake up with asthma symptoms? 7    If you wake with asthma symptoms, do you:   Use your inhaler right away? No   Use your inhaler right after you've been up for a while? No   Your wheezing goes away without using your inhaler? Yes     Does patient experience?     Heartburn and indigestion Yes  Vomiting easily Yes  Use of antacids (Rolaids, Tums, Maalox) No  Regurgitation of stomach contents Yes  Chronic cough worse after meals Yes  Chronic cough cough worse after laying down Yes  Chronic hoarseness or voice change Yes  Chest pain No  Stomach pain No  Neck pain No  Sore throat-frequent No  Feeling of throat closing or something stuck in throat No  Frequent burps or hiccups No  Adult onset asthma No  Asthma not relieved with usual treatment No  Anemia No  Asthma worse after meals, alcohol, lying down, bending to tie shoes, or after heartburn Yes  Chronic sinus disease Yes    Within the last month, how did the following problems affect the patient?   0=No Problem; 5=Severe Problem    Hoarseness or a problem with your voice 5  Clearing your throat 3  Excess throat mucus or postnasal drip 4  Difficulty swallowing food, liquids, pills 0  Coughing after you ate or after lying down 5  Breathing difficulties or choking episodes 4  Troublesome or annoying cough 5  Sensations of something sticking in your throat or a lump in your throat 0  Heartburn, chest pain, indigestion, or stomach acid coming up 2    Total: 28

## 2020-09-30 NOTE — PROGRESS NOTES
2020    TELEHEALTH EVALUATION -- Audio/Visual (During WZEZV-25 public health emergency)    HPI:    Lj Tirado (:  1960) has requested an audio/video evaluation for the following concern(s):    Asthma   She complains of hoarse voice. There is no chest tightness, cough, difficulty breathing, frequent throat clearing, hemoptysis, shortness of breath, sputum production or wheezing. This is a chronic problem. The current episode started more than 1 year ago. The problem occurs intermittently. Progression since onset: controlled. Associated symptoms include dyspnea on exertion, nasal congestion and postnasal drip. Her symptoms are aggravated by strenuous activity (sinusitis, GERD). Her symptoms are alleviated by beta-agonist and steroid inhaler. She reports significant improvement on treatment. Risk factors: chronic GERD. Her past medical history is significant for asthma. Review of Systems   HENT: Positive for hoarse voice and postnasal drip. Respiratory: Negative for cough, hemoptysis, sputum production, shortness of breath and wheezing. Cardiovascular: Positive for dyspnea on exertion. All other systems reviewed and are negative. Prior to Visit Medications    Medication Sig Taking?  Authorizing Provider   fluticasone (FLONASE) 50 MCG/ACT nasal spray USE 2 SPRAYS IN EACH NOSTRIL ONCE DAILY Yes DEREK Jason - CNP   pilocarpine (PILOCAR) 1 % ophthalmic solution Apply 1 drop to eye 3 times daily Yes Historical Provider, MD   metoprolol succinate (TOPROL XL) 100 MG extended release tablet Take 1 tablet by mouth 2 times daily Yes Arun Taylor DO   sacubitril-valsartan (ENTRESTO) 24-26 MG per tablet Take 1 tablet by mouth 2 times daily Yes Arun Taylor DO   furosemide (LASIX) 40 MG tablet Take 1 tablet by mouth daily Yes Jaleel Zimmerman MD   glimepiride (AMARYL) 2 MG tablet Take 2 mg by mouth every morning (before breakfast) Yes Historical Provider, MD   famotidine (PEPCID) 40 MG tablet Take 1 tablet by mouth every evening Yes DEREK Johns CNP   Cholecalciferol (VITAMIN D3) 50 MCG (2000 UT) CAPS Take 1 capsule by mouth daily Yes Historical Provider, MD   aspirin 81 MG EC tablet Take 1 tablet by mouth daily Yes Mackenzie Blas DO   albuterol (PROVENTIL) (5 MG/ML) 0.5% nebulizer solution Take 1 mL by nebulization 4 times daily as needed for Wheezing Yes DEREK Johns CNP   budesonide-formoterol (SYMBICORT) 160-4.5 MCG/ACT AERO 2 puffs inhaled twice daily. Rinse mouth well after use.  Yes DEREK Johns CNP   pantoprazole (PROTONIX) 40 MG tablet Take one tablet twice daily 30-60 minutes prior to breakfast and evening meal Yes DEREK Johns CNP   SYNTHROID 175 MCG tablet Take 175 mcg by mouth Yes Historical Provider, MD   MOLYBDENUM PO Take 500 mcg by mouth daily Yes Historical Provider, MD   ipratropium-albuterol (DUONEB) 0.5-2.5 (3) MG/3ML SOLN nebulizer solution Inhale 3 mLs into the lungs every 4 hours as needed for Shortness of Breath Yes DEREK Johns CNP   diclofenac (VOLTAREN) 75 MG EC tablet Take 1 tablet by mouth 2 times daily as needed for Pain Yes Ivett Waller MD   Probiotic Product (SOLUBLE FIBER/PROBIOTICS PO) Take by mouth 2 times daily Yes Historical Provider, MD   metFORMIN (GLUCOPHAGE) 500 MG tablet TAKE 2 TABLETS BY MOUTH TWO TIMES A DAY WITH MEALS Yes Penelope Elizondo MD       Social History     Tobacco Use    Smoking status: Never Smoker    Smokeless tobacco: Never Used    Tobacco comment: never smoked evelyn rrt 10/13/2017   Substance Use Topics    Alcohol use: No     Alcohol/week: 0.0 standard drinks     Comment: Consume about 4 wine coolers a year    Drug use: No        Allergies   Allergen Reactions    Morphine Nausea And Vomiting, Other (See Comments) and Shortness Of Breath     Chest tightness  Chest tightness    Ativan [Lorazepam]     Codeine Nausea And Vomiting and Other (See Comments)     Chest tightness  Darvon [Propoxyphene] Nausea And Vomiting and Other (See Comments)     Chest tightness    Lisinopril Other (See Comments)     COUGH    Percocet [Oxycodone-Acetaminophen] Nausea And Vomiting and Other (See Comments)     Chest tightness     Past Medical History:   Diagnosis Date    Acute on chronic systolic congestive heart failure ([facility] [address] 1/21/2020    Asthma     Bilateral corneal scars     Disease of blood and blood forming organ     Diverticulitis     ON COLONSCOPY    DJD (degenerative joint disease), lumbar     Dry eye syndrome     D[facility]deep venous thrombosis) ([facility])     after splenic artery repair in right calf    Gastro - esophageal reflux disease     History of blood transfusion     Hyperlipidemia     HIGH CHOLESTEROL/HIGH TRIGLYCERIDES    Hyperplastic colon polyp     Hypertension     Hypothyroidism     LGSIL (low grade squamous intraepithelial dysplasia) 11/2014    referred to GYN/S/P colpo with bx LGSIL, repeat pap in 6 months    Non-celiac gluten sensitivity     wheezing is directly proportionate to wheat intake    NSTEMI (non-ST elevated myocardial infarction) ([facility] [address] 01/16/2020    Osteoarthritis 2004    RT KNEE S/P INJURY    Other disorders of kidney and ureter in diseases classified elsewhere     Sciatica of right side     Severe persistent asthma     patient has seen that wheat consumption causes exacerbations    Splenic artery aneurysm (HCC)     s/p splenic artery repair    Tinnitus     Type 2 diabetes mellitus, controlled ([facility] [address] 5/2011     Current Outpatient Medications   Medication Sig Dispense Refill    fluticasone (FLONASE) 50 MCG/ACT nasal spray USE 2 SPRAYS IN EACH NOSTRIL ONCE DAILY 48 g 3    pilocarpine (PILOCAR) 1 % ophthalmic solution Apply 1 drop to eye 3 times daily      metoprolol succinate (TOPROL XL) 100 MG extended release tablet Take 1 tablet by mouth 2 times daily 180 tablet 1    sacubitril-valsartan (ENTRESTO) 24-26 MG per tablet Take 1 tablet by mouth 2 times daily 180 tablet 1    furosemide (LASIX) 40 MG tablet Take 1 tablet by mouth daily 60 tablet 3    glimepiride (AMARYL) 2 MG tablet Take 2 mg by mouth every morning (before breakfast)      famotidine (PEPCID) 40 MG tablet Take 1 tablet by mouth every evening 90 tablet 3    Cholecalciferol (VITAMIN D3) 50 MCG (2000 UT) CAPS Take 1 capsule by mouth daily      aspirin 81 MG EC tablet Take 1 tablet by mouth daily 30 tablet 3    albuterol (PROVENTIL) (5 MG/ML) 0.5% nebulizer solution Take 1 mL by nebulization 4 times daily as needed for Wheezing 120 each 3    budesonide-formoterol (SYMBICORT) 160-4.5 MCG/ACT AERO 2 puffs inhaled twice daily. Rinse mouth well after use. 3 Inhaler 3    pantoprazole (PROTONIX) 40 MG tablet Take one tablet twice daily 30-60 minutes prior to breakfast and evening meal 180 tablet 3    SYNTHROID 175 MCG tablet Take 175 mcg by mouth      MOLYBDENUM PO Take 500 mcg by mouth daily      ipratropium-albuterol (DUONEB) 0.5-2.5 (3) MG/3ML SOLN nebulizer solution Inhale 3 mLs into the lungs every 4 hours as needed for Shortness of Breath 360 mL 2    diclofenac (VOLTAREN) 75 MG EC tablet Take 1 tablet by mouth 2 times daily as needed for Pain 90 tablet 2    Probiotic Product (SOLUBLE FIBER/PROBIOTICS PO) Take by mouth 2 times daily      metFORMIN (GLUCOPHAGE) 500 MG tablet TAKE 2 TABLETS BY MOUTH TWO TIMES A DAY WITH MEALS 120 tablet 3     No current facility-administered medications for this visit.           PHYSICAL EXAMINATION:  [ INSTRUCTIONS:  \"[x]\" Indicates a positive item  \"[]\" Indicates a negative item  -- DELETE ALL ITEMS NOT EXAMINED]  Vital Signs: (As obtained by patient/caregiver or practitioner observation)    Blood pressure-  Heart rate-    Respiratory rate-    Temperature-  Pulse oximetry-     Constitutional: [x] Appears well-developed and well-nourished [x] No apparent distress      [] Abnormal-   Mental status  [x] Alert and awake  [x] Oriented to person/place/time Vitals/Constitutional/EENT/Resp/CV/GI//MS/Neuro/Skin/Heme-Lymph-Imm. Pursuant to the emergency declaration under the [address] [place] [address], [address] [place] [address] and the [employer] Act, this Virtual Visit was conducted with patient's (and/or legal guardian's) consent, to reduce the patient's risk of exposure to COVID-19 and provide necessary medical care. The patient (and/or legal guardian) has also been advised to contact this office for worsening conditions or problems, and seek emergency medical treatment and/or call 911 if deemed necessary. Patient identification was verified at the start of the visit: Yes    Total time spent on this encounter: Not billed by time    Services were provided through a video synchronous discussion virtually to substitute for in-person clinic visit. Patient and provider were located at their individual homes. --DEREK Figueroa CNP on 9/30/2020 at 9:48 AM    An electronic signature was used to authenticate this note.

## 2020-10-01 ENCOUNTER — HOSPITAL ENCOUNTER (OUTPATIENT)
Dept: CARDIAC REHAB | Age: 60
Setting detail: THERAPIES SERIES
Discharge: HOME | End: 2020-10-01
Payer: COMMERCIAL

## 2020-10-01 PROCEDURE — 93798 PHYS/QHP OP CAR RHAB W/ECG: CPT

## 2020-10-07 ENCOUNTER — INITIAL CONSULT (OUTPATIENT)
Dept: BARIATRICS/WEIGHT MGMT | Age: 60
End: 2020-10-07
Payer: COMMERCIAL

## 2020-10-07 VITALS
WEIGHT: 200 LBS | TEMPERATURE: 97.2 F | HEIGHT: 67 IN | HEART RATE: 68 BPM | SYSTOLIC BLOOD PRESSURE: 102 MMHG | BODY MASS INDEX: 31.39 KG/M2 | DIASTOLIC BLOOD PRESSURE: 60 MMHG

## 2020-10-07 PROCEDURE — 99204 OFFICE O/P NEW MOD 45 MIN: CPT | Performed by: SURGERY

## 2020-10-07 PROCEDURE — 1036F TOBACCO NON-USER: CPT | Performed by: SURGERY

## 2020-10-07 PROCEDURE — G8417 CALC BMI ABV UP PARAM F/U: HCPCS | Performed by: SURGERY

## 2020-10-07 PROCEDURE — G8484 FLU IMMUNIZE NO ADMIN: HCPCS | Performed by: SURGERY

## 2020-10-07 PROCEDURE — 3017F COLORECTAL CA SCREEN DOC REV: CPT | Performed by: SURGERY

## 2020-10-07 PROCEDURE — G8427 DOCREV CUR MEDS BY ELIG CLIN: HCPCS | Performed by: SURGERY

## 2020-10-09 ENCOUNTER — HOSPITAL ENCOUNTER (OUTPATIENT)
Dept: CARDIAC REHAB | Age: 60
Setting detail: THERAPIES SERIES
Discharge: HOME | End: 2020-10-09
Payer: COMMERCIAL

## 2020-10-09 PROCEDURE — 93798 PHYS/QHP OP CAR RHAB W/ECG: CPT

## 2020-10-13 ENCOUNTER — HOSPITAL ENCOUNTER (OUTPATIENT)
Dept: CARDIAC REHAB | Age: 60
Setting detail: THERAPIES SERIES
Discharge: HOME | End: 2020-10-13
Payer: COMMERCIAL

## 2020-10-13 PROCEDURE — 93798 PHYS/QHP OP CAR RHAB W/ECG: CPT

## 2020-10-15 ENCOUNTER — TELEPHONE (OUTPATIENT)
Dept: BARIATRICS/WEIGHT MGMT | Age: 60
End: 2020-10-15

## 2020-10-15 ENCOUNTER — HOSPITAL ENCOUNTER (OUTPATIENT)
Dept: CARDIAC REHAB | Age: 60
Discharge: HOME | End: 2020-10-15

## 2020-10-15 ENCOUNTER — OFFICE VISIT (OUTPATIENT)
Dept: CARDIOLOGY | Age: 60
End: 2020-10-15
Payer: COMMERCIAL

## 2020-10-15 ENCOUNTER — TELEPHONE (OUTPATIENT)
Dept: CARDIOLOGY | Age: 60
End: 2020-10-15

## 2020-10-15 VITALS
HEIGHT: 67 IN | WEIGHT: 204 LBS | HEART RATE: 95 BPM | SYSTOLIC BLOOD PRESSURE: 103 MMHG | DIASTOLIC BLOOD PRESSURE: 71 MMHG | BODY MASS INDEX: 32.02 KG/M2

## 2020-10-15 PROCEDURE — 99214 OFFICE O/P EST MOD 30 MIN: CPT | Performed by: INTERNAL MEDICINE

## 2020-10-15 PROCEDURE — 1036F TOBACCO NON-USER: CPT | Performed by: INTERNAL MEDICINE

## 2020-10-15 PROCEDURE — 93000 ELECTROCARDIOGRAM COMPLETE: CPT | Performed by: INTERNAL MEDICINE

## 2020-10-15 PROCEDURE — G8484 FLU IMMUNIZE NO ADMIN: HCPCS | Performed by: INTERNAL MEDICINE

## 2020-10-15 PROCEDURE — G8417 CALC BMI ABV UP PARAM F/U: HCPCS | Performed by: INTERNAL MEDICINE

## 2020-10-15 PROCEDURE — 3017F COLORECTAL CA SCREEN DOC REV: CPT | Performed by: INTERNAL MEDICINE

## 2020-10-15 PROCEDURE — G8427 DOCREV CUR MEDS BY ELIG CLIN: HCPCS | Performed by: INTERNAL MEDICINE

## 2020-10-15 PROCEDURE — 9900000065 HC CARDIAC REHAB PHASE 3 - 1 VISIT

## 2020-10-15 NOTE — PROGRESS NOTES
Cardiology Consultation/Follow Up. [facility]    CC: follow up for CHF. HPI:  Linda Garcia  This is a 63year old female who presents for follow up for CHF. From cardiovascular standpoint she is fairly stable. She denies any recent worsening of her shortness of breath on exertion. She has baseline New York Heart Association functional class II dyspnea. No chest pain or pressure. No lower extremity swelling or edema. No alarms or shocks from the ICD. Patient does report excessive nausea and vomiting on daily basis. Her Toprol-XL was held as she believed it got worse after starting the medication. She could not tolerate going off Toprol-XL due to tachycardia. She is back on 100 mg twice daily. She reports that her body has started to get used to it. She also has upcoming fundoplication surgery for hiatal hernia/uncontrolled GERD which I believe is the major culprit for her emesis and nausea.       Past Medical:  Past Medical History:   Diagnosis Date    Acute on chronic systolic congestive heart failure ([facility] [address] 1/21/2020    Asthma     Bilateral corneal scars     Disease of blood and blood forming organ     Diverticulitis     ON COLONSCOPY    DJD (degenerative joint disease), lumbar     Dry eye syndrome     DVT (deep venous thrombosis) (HCC)     after splenic artery repair in right calf    Gastro - esophageal reflux disease     History of blood transfusion     Hyperlipidemia     HIGH CHOLESTEROL/HIGH TRIGLYCERIDES    Hyperplastic colon polyp     Hypertension     Hypothyroidism     LGSIL (low grade squamous intraepithelial dysplasia) 11/2014    referred to GYN/S/P colpo with bx LGSIL, repeat pap in 6 months    Non-celiac gluten sensitivity     wheezing is directly proportionate to wheat intake    NSTEMI (non-ST elevated myocardial infarction) ([facility] [address] 01/16/2020    Osteoarthritis 2004    RT KNEE S/P INJURY    Other disorders of kidney and ureter in diseases classified elsewhere     Sciatica of right side     Severe persistent asthma     patient has seen that wheat consumption causes exacerbations    Splenic artery aneurysm [facility])     s/p splenic artery repair    Tinnitus     Type 2 diabetes mellitus, controlled ([address] 2011       Past Surgical:  Past Surgical History:   Procedure Laterality Date    ABDOMEN SURGERY      ARTERIAL ANEURYSM REPAIR      splenic artery coil repair    CARDIAC CATHETERIZATION  2020    CARDIAC DEFIBRILLATOR PLACEMENT Left 2020     SECTION  1986    COLONOSCOPY  2012    HYPERPLASTIC POLYP, DIVERTICULAR DISEASE    COLONOSCOPY  2017    moderate sigmoid diverticulosis.  Dr. Brittani Pickard  2015    with [address] Bilateral 2017    CYSTO insertion lighted stents performed by Carie Lozano MD at [address], ESOPHAGUS      ENDOSCOPY, COLON, DIAGNOSTIC      EYE SURGERY      JOINT REPLACEMENT      KNEE ARTHROSCOPY Right     PARTIAL SYNOVECTOMY AND CHONDROPLASTY DUE TO MENSICUS TEAR    LAPAROSCOPY N/A 2017    EXPLORATORY LAPAROSCOPY converted to open exploration with drainage of pelvic abscess performed by Frannie Anders MD at [place] [address]  2014    clearing an infection done at [place] [address]  10/25/2011    L4/L5 epidural steroid injection    OTHER SURGICAL HISTORY Right  and     synvisc injections, two sets    OTHER SURGICAL HISTORY  2020    Lead revision    MT COLON CA SCRN NOT HI RSK IND N/A 2017    COLONOSCOPY performed by Frannie Anders MD at [address]  [address] IND N/A 2017    COLONOSCOPY performed by Frannie Anders MD at [address] EGD TRANSORAL BIOPSY SINGLE/MULTIPLE N/A 2017    EGD BIOPSY performed by Frannie Anders MD at [address] N/A 2017    ATTEMPTED LAPAROCOPY PROCEDURE CONVERTED TO fluid intake, or urination. No tremor. · Hematologic/Lymphatic: No abnormal bruising or bleeding, blood clots or swollen lymph nodes. · Allergic/Immunologic: No nasal congestion or hives. Medications:  Current Outpatient Medications   Medication Sig Dispense Refill    fluticasone (FLONASE) 50 MCG/ACT nasal spray USE 2 SPRAYS IN EACH NOSTRIL ONCE DAILY 48 g 3    pilocarpine (PILOCAR) 1 % ophthalmic solution Apply 1 drop to eye 3 times daily      metoprolol succinate (TOPROL XL) 100 MG extended release tablet Take 1 tablet by mouth 2 times daily 180 tablet 1    sacubitril-valsartan (ENTRESTO) 24-26 MG per tablet Take 1 tablet by mouth 2 times daily 180 tablet 1    furosemide (LASIX) 40 MG tablet Take 1 tablet by mouth daily 60 tablet 3    glimepiride (AMARYL) 2 MG tablet Take 2 mg by mouth every morning (before breakfast)      famotidine (PEPCID) 40 MG tablet Take 1 tablet by mouth every evening 90 tablet 3    Cholecalciferol (VITAMIN D3) 50 MCG (2000 UT) CAPS Take 1 capsule by mouth daily      aspirin 81 MG EC tablet Take 1 tablet by mouth daily 30 tablet 3    albuterol (PROVENTIL) (5 MG/ML) 0.5% nebulizer solution Take 1 mL by nebulization 4 times daily as needed for Wheezing 120 each 3    budesonide-formoterol (SYMBICORT) 160-4.5 MCG/ACT AERO 2 puffs inhaled twice daily. Rinse mouth well after use.  3 Inhaler 3    pantoprazole (PROTONIX) 40 MG tablet Take one tablet twice daily 30-60 minutes prior to breakfast and evening meal 180 tablet 3    SYNTHROID 175 MCG tablet Take 175 mcg by mouth      MOLYBDENUM PO Take 500 mcg by mouth daily      ipratropium-albuterol (DUONEB) 0.5-2.5 (3) MG/3ML SOLN nebulizer solution Inhale 3 mLs into the lungs every 4 hours as needed for Shortness of Breath 360 mL 2    diclofenac (VOLTAREN) 75 MG EC tablet Take 1 tablet by mouth 2 times daily as needed for Pain 90 tablet 2    Probiotic Product (SOLUBLE FIBER/PROBIOTICS PO) Take by mouth 2 times daily      metFORMIN (GLUCOPHAGE) 500 MG tablet TAKE 2 TABLETS BY MOUTH TWO TIMES A DAY WITH MEALS 120 tablet 3     No current facility-administered medications for this visit. Physical Exam:   Vitals: /71   Ht 5' 7\" (1.702 m)   Wt 204 lb (92.5 kg)   LMP 04/29/2009 (Approximate)   BMI 31.95 kg/m²   General appearance: alert and cooperative with exam  HEENT: Head: Normocephalic, no lesions, without obvious abnormality.   Neck: no carotid bruit, no JVD  Lungs: clear to auscultation bilaterally  Heart: regular rate and rhythm, S1, S2 normal, no murmur, click, rub or gallop  Abdomen: soft, non-tender; bowel sounds normal; no masses,  no organomegaly  Extremities: extremities normal, atraumatic, no cyanosis or edema  Neurologic: Mental status: Alert, oriented, thought content appropriate    Labs:  Lab Results   Component Value Date    CHOL 216 05/06/2020    TRIG 195 05/06/2020    HDL 52 05/06/2020    LDLCHOLESTEROL 112 01/17/2020    LDLCALC 130 05/06/2020    VLDL NOT REPORTED (H) 01/17/2020    CHOLHDLRATIO 4.2 05/06/2020       Lab Results   Component Value Date     08/15/2020    K 3.7 08/15/2020     08/15/2020    CO2 18 (L) 08/15/2020    BUN 18 08/15/2020    CREATININE 0.92 (H) 08/15/2020    GLUCOSE 114 (H) 08/15/2020    CALCIUM 9.9 08/15/2020    PROT 6.7 06/09/2020    LABALBU 3.9 06/09/2020    BILITOT 0.28 (L) 06/09/2020    ALKPHOS 61 06/09/2020    AST 26 06/09/2020    ALT 34 (H) 06/09/2020    LABGLOM >60 08/15/2020    GFRAA >60 08/15/2020    GLOB 3.0 06/30/2017       EKG:   Results for orders placed or performed during the hospital encounter of 01/16/20   EKG 12 lead  Sinus rhythm with Premature atrial complexes  Possible Left atrial enlargement  Left ventricular hypertrophy  Cannot rule out Septal infarct , age undetermined  Abnormal ECG  No previous ECGs available       LAST ECHO:   Results for orders placed or performed during the hospital encounter of 01/16/20   Echocardiogram complete 2D with is scheduled to have surgery. As previously discussed she is intermediate risk for noncardiac surgery. Okay to hold aspirin 7 days prior and resume after. Will need device interrogation pre and postop. 2. S/p AICD on 5/11/20 followed by RV lead revision 5/19/20. Routine interrogation scheduled in December 2020    3. DL    5. Minimal CAD on in cath 1/20    6. Has chronic long standing GERD/Esophagitis/Hiatal Hernia/Hoarsenss of voice. New follow-up with surgery. The patient is to continue heart healthy diet, weight loss and exercise as tolerated. Patient's medications and side effects were discussed. Medication refills were provided if needed. Follow up appointment timing was discussed. All questions and concerns were addressed to patient's satisfaction. The patient is to follow up in 6 months or sooner if necessary. Bhupinder Rebollar MD   [facility]  [facility]. [facility]  [phone]

## 2020-10-15 NOTE — PROGRESS NOTES
Cardiology Consultation  [facility] [address], [address]  [phone]      [unfilled]      Regarding: Vivek Valente  1960      To Whom It May Concern,      Patient is  Intermediate Cardiac Risk for planned Fundoplication surgery. Special instructions:  Patient is taking ASA 81 mg qd and can be held for 5-7 days prior to procedure and resumed as soon as surgical bleeding risk has resolved. Please continue other meds.       Thank you,  Amber Arguelles MD     [facility]

## 2020-10-15 NOTE — TELEPHONE ENCOUNTER
Calling to follow up on scheduling for fundoplication procedure. Requesting diet instructions be faxed to [phone].

## 2020-10-15 NOTE — TELEPHONE ENCOUNTER
Pt called stating that she seen Dr Figueroa Chand today but is wondering if she needs to get an echo scheduled for December since she was told by Dr Flavio Gayle in April that she would need one by December to recheck.      Last Appt:  10/15/2020  Next Appt:   12/17/2020  Med verified in Epic

## 2020-10-16 ENCOUNTER — HOSPITAL ENCOUNTER (OUTPATIENT)
Dept: CARDIAC REHAB | Age: 60
Discharge: HOME | End: 2020-10-16

## 2020-10-16 PROCEDURE — 9900000065 HC CARDIAC REHAB PHASE 3 - 1 VISIT

## 2020-10-18 NOTE — PROGRESS NOTES
[facility] PHYSICIANS  [facility] INVASIVE [facility]IATRIC SURG  [address] [place] [address] [address]  [address]  Dept: [phone]    ROBOTIC & MINIMALLY INVASIVE SURGERY  PROGRESS NOTE INITIAL EVALUATION     Patient: Rosana Mata        Service Date: 10/7/2020      HPI:     Chief Complaint   Patient presents with    Hernia       The patient is a pleasant 63y.o. year old female with long standing reflux, who stands Height: 5' 7\" (170.2 cm) tall with a weight of Weight: 200 lb (90.7 kg) , resulting in a BMI of Body mass index is 31.32 kg/m². She complains of cough, congesting and reflux in the back of her throat. She had a Demeester score of 16-25. She reflux and Upper GI. She cannot stop acid suppression medications without a flair in symptoms. She presents today for evaluation for hernia repair and fundoplication. She has a long cardiac history. Manometry completed, she does not complain of but occasional dysphagia. She would like to move forward with surgery. The patient denies  a history of deep vein thrombosis, pulmonary embolism, renal failure, hepatic failure and stroke.       Medical History:  Past Medical History:   Diagnosis Date    Acute on chronic systolic congestive heart failure ([address] 1/21/2020    Asthma     Bilateral corneal scars     Disease of blood and blood forming organ     Diverticulitis     ON COLONSCOPY    DJD (degenerative joint disease), lumbar     Dry eye syndrome     DVT (deep venous thrombosis) (HCC)     after splenic artery repair in right calf    Gastro - esophageal reflux disease     History of blood transfusion     Hyperlipidemia     HIGH CHOLESTEROL/HIGH TRIGLYCERIDES    Hyperplastic colon polyp     Hypertension     Hypothyroidism     LGSIL (low grade squamous intraepithelial dysplasia) 11/2014    referred to GYN/S/P colpo with bx LGSIL, repeat pap in 6 months    Non-celiac gluten sensitivity     wheezing is directly proportionate to wheat intake    NSTEMI (non-ST elevated myocardial infarction) ([facility] [address] 2020    Osteoarthritis 2004    RT KNEE S/P INJURY    Other disorders of kidney and ureter in diseases classified elsewhere     Sciatica of right side     Severe persistent asthma     patient has seen that wheat consumption causes exacerbations    Splenic artery aneurysm [facility])     s/p splenic artery repair    Tinnitus     Type 2 diabetes mellitus, controlled ([facility] [address] 2011       Surgical History:  Past Surgical History:   Procedure Laterality Date    ABDOMEN SURGERY      ARTERIAL ANEURYSM REPAIR      splenic artery coil repair    CARDIAC CATHETERIZATION  2020    CARDIAC DEFIBRILLATOR PLACEMENT Left 2020     SECTION  1986    COLONOSCOPY  2012    HYPERPLASTIC POLYP, DIVERTICULAR DISEASE    COLONOSCOPY  2017    moderate sigmoid diverticulosis.  Dr. Carlo Cole  2015    with [address] Bilateral 2017    CYSTO insertion lighted stents performed by Rory Ansari MD at [address], ESOPHAGUS      ENDOSCOPY, COLON, DIAGNOSTIC      EYE SURGERY      JOINT REPLACEMENT      KNEE ARTHROSCOPY Right     PARTIAL SYNOVECTOMY AND CHONDROPLASTY DUE TO MENSICUS TEAR    LAPAROSCOPY N/A 2017    EXPLORATORY LAPAROSCOPY converted to open exploration with drainage of pelvic abscess performed by Cruz Orantes MD at [facility] 38  2014    clearing an infection done at [facility]. 41  10/25/2011    L4/L5 epidural steroid injection    OTHER SURGICAL HISTORY Right  and     synvisc injections, two sets    OTHER SURGICAL HISTORY  2020    Lead revision    MN COLON CA SCRN NOT HI RSK IND N/A 2017    COLONOSCOPY performed by Cruz Orantes MD at [address] IND N/A 2017    COLONOSCOPY performed by Cruz Orantes MD at [address] EGD TRANSORAL BIOPSY SINGLE/MULTIPLE N/A 2017    EGD BIOPSY performed by Devin Pleitez MD at [facility]  SKIN TAG REMOVAL      SMALL INTESTINE SURGERY N/A 8/30/2017    ATTEMPTED LAPAROCOPY PROCEDURE CONVERTED TO OPEN Sigmoid Colectomy PERFORMED BY DR. VO resection colovaginal fistula PERFORMED BY. DR. Demetrius Mariscal  performed by Devin Pleitez MD at [facility] 07/2011    TOTAL    TUBAL LIGATION  1991    UPPER GASTROINTESTINAL ENDOSCOPY N/A 1/2/2020    EGD BIOPSY W/ 50 HR LEARY performed by Devin Pleitez MD at [facility] 51 EXTRACTION         Family History:      Problem Relation Age of Onset    Coronary Art Dis Mother     High Blood Pressure Mother     High Cholesterol Mother     Allergies Mother     Arthritis Mother     Diabetes Daughter         pre diabetes    Other Maternal Grandfather         PUD GI problems    Cataracts Neg Hx     Glaucoma Neg Hx        Social History:   Social History     Tobacco Use    Smoking status: Never Smoker    Smokeless tobacco: Never Used    Tobacco comment: never smoked syant rrt 10/13/2017   Substance Use Topics    Alcohol use: No     Alcohol/week: 0.0 standard drinks     Comment: Consume about 4 wine coolers a year    Drug use: No       Current Med List:  Current Outpatient Medications   Medication Sig Dispense Refill    fluticasone (FLONASE) 50 MCG/ACT nasal spray USE 2 SPRAYS IN EACH NOSTRIL ONCE DAILY 48 g 3    pilocarpine (PILOCAR) 1 % ophthalmic solution Apply 1 drop to eye 3 times daily      metoprolol succinate (TOPROL XL) 100 MG extended release tablet Take 1 tablet by mouth 2 times daily 180 tablet 1    sacubitril-valsartan (ENTRESTO) 24-26 MG per tablet Take 1 tablet by mouth 2 times daily 180 tablet 1    furosemide (LASIX) 40 MG tablet Take 1 tablet by mouth daily 60 tablet 3    glimepiride (AMARYL) 2 MG tablet Take 2 mg by mouth every morning (before breakfast)      famotidine (PEPCID) 40 MG tablet Take 1 tablet by mouth every evening 90 tablet 3    Cholecalciferol (VITAMIN D3) 50 MCG (2000 UT) CAPS Take 1 capsule by mouth daily      aspirin 81 MG EC tablet Take 1 tablet by mouth daily 30 tablet 3    albuterol (PROVENTIL) (5 MG/ML) 0.5% nebulizer solution Take 1 mL by nebulization 4 times daily as needed for Wheezing 120 each 3    budesonide-formoterol (SYMBICORT) 160-4.5 MCG/ACT AERO 2 puffs inhaled twice daily. Rinse mouth well after use. 3 Inhaler 3    pantoprazole (PROTONIX) 40 MG tablet Take one tablet twice daily 30-60 minutes prior to breakfast and evening meal 180 tablet 3    SYNTHROID 175 MCG tablet Take 175 mcg by mouth      MOLYBDENUM PO Take 500 mcg by mouth daily      ipratropium-albuterol (DUONEB) 0.5-2.5 (3) MG/3ML SOLN nebulizer solution Inhale 3 mLs into the lungs every 4 hours as needed for Shortness of Breath 360 mL 2    diclofenac (VOLTAREN) 75 MG EC tablet Take 1 tablet by mouth 2 times daily as needed for Pain 90 tablet 2    Probiotic Product (SOLUBLE FIBER/PROBIOTICS PO) Take by mouth 2 times daily      metFORMIN (GLUCOPHAGE) 500 MG tablet TAKE 2 TABLETS BY MOUTH TWO TIMES A DAY WITH MEALS 120 tablet 3     No current facility-administered medications for this visit. Allergies   Allergen Reactions    Morphine Nausea And Vomiting, Other (See Comments) and Shortness Of Breath     Chest tightness  Chest tightness    Ativan [Lorazepam]     Codeine Nausea And Vomiting and Other (See Comments)     Chest tightness    Darvon [Propoxyphene] Nausea And Vomiting and Other (See Comments)     Chest tightness    Lisinopril Other (See Comments)     COUGH    Percocet [Oxycodone-Acetaminophen] Nausea And Vomiting and Other (See Comments)     Chest tightness       SOCIAL:      This patient is with spouse for the evaluation today.       [] HIV Risk Factors (i.e.) intravenous drug abuser; at risk sexual behavior; received blood products    [] TB Risk Factors (i.e.) Medically underserved, institutional care, foreign born, endemic area; exposure to active case    [] Hepatitis B&C Risk Factors (i.e.) Received blood transfusion prior to 1992; recreational drug use; high risk sexual behaviors; tattoos or body piercings; contact with blood or needle sticks in the workplace    Comprehension    Ability to grasp concepts and respond to questions:   [x] High   [] Medium   [] Low    Motivation    [x] Asks Questions; eager to learn   [] Needs education   [] Extreme anxiety    [] uncooperative   [] Denies need for education    English Speaking Ability    [x] Speaks English well   [x] Reads English well   [x] Understands spoken Dana Brady    [] Understands written English   [] No need for interpretive support      [] Might benefit from interpretive support   []  required for all services     REVIEW OF SYSTEMS: (Negative unless marked otherwise)       Do you or have you had any of the following?   Cardiovascular YES NO Respiratory YES NO   High Blood Pressure   [x]   [] COPD   []   []   Heart Attack   [x]   [] TB/Positive skin Test   []   []   Congestive Heart Failure   []   [] Obstructive Sleep Apnea   []   []   Coronary Artery Disease   []   [] Asthma   [x]   [x]   Circulation Problems   []   []      Activity Intolerance   []   [] Gastrointestinal YES NO   Peripheral Vascular Disease   []   [] Gastric Problems   [x]   []        Colorectal problems   [x]   []   Hematological YES NO Ulcer disease   []   []   Bleeding Tendencies   []   [] Liver disease   []   []   Blood Transfusion last 30d   []   [] Gallstones   []   []   Anemia   []   [] Refulx or Heartburn   [x]   []   Blood Clots   [x]   []      High Cholesterol   []   [] Muscoloskeletal YES NO   High Triglycerides   []   [] Joint Limitations   []   []      Muscle Weakness   []   []   Eyes, Ears, Nose, Throat YES NO Multiple Sclerosis   []   []   Cataracts   []   [] Arthritis   [x]   []   Glasses   []   []      Blurred Vision   []   [] Cancer   []   []   Hearing Aids   []   [] Type:     Ringing in Ears   []   []      Difficulty Swallowing   []   [] Encodrine YES NO      Diabetes   [x]   []   Neurological YES NO Thyroid   [x]   []   Stroke   []   []      Seizure   []   [] Psychiatric Disorder YES NO   Dizziness/Blackouts/Fainting   []   [] Depression   []   []   Memory Impairement   []   [] Bipolar   []   []   Parkinson's   []   [] Anxiety disorder   []   []           Genitourinary/Gyn YES NO Skin Intact   [x]   []   Urinary Infection   []   [] Rash  x   Stones   []   []      Kidney Disease   []   []      Incontinent   []   []      Irregular menstrual cycles   []   []      Possibly Pregnant? []     []      Date of LMP:               PRESENT ILLNESS:     Weight Parameters  Weight 200 lb (90.7 kg)   Height 5' 7\" (1.702 m)   BMI Body mass index is 31.32 kg/m². IBW     EBW               IMMUNIZATION STATUS  Immunization History   Administered Date(s) Administered    DTaP 08/30/2009    Influenza Virus Vaccine 10/01/2012, 12/30/2014    Influenza Whole 12/18/2015    Influenza, Quadv, IM, (6 mo and older Fluzone, Flulaval, Fluarix and 3 yrs and older Afluria) 10/07/2016    Pneumococcal Conjugate 13-valent ([id number]) 11/03/2014    Pneumococcal Polysaccharide ([id number]) 07/17/2011, 06/17/2015    Zoster Recombinant (Shingrix) 02/16/2019, 04/20/2019       FALLS ASSESSMENT    [x] LOW RISK FOR FALLS    [] MODERATE RISK FOR FALLS    [] Difficulty walking/selfcare    [] Falls in the past 2 months    [] Suspicion of Clinician    [] Other:      SMOKING CESSATION     [x] Not needed     [] Instructed to stop smoking    [] Pamphlet community resources given     VTE SCREEN    [] Family hx DVT/PE  /   [] Personal hx of DVT/PE    [x] Denies any family or personal hx of DVT/PE    Physician Review    [x] Past medical, family, & social history reviewed and discussed with patient.        PHYSICAL EXAMINATION:      /60   Pulse 68   Temp 97.2 °F (36.2 °C)   Ht 5' 7\" (1.702 m)   Wt 200 lb (90.7 kg)   LMP 04/29/2009 (Approximate)   BMI 31.32 kg/m²     Constitutional:  Vital signs are normal. The patient appears well-developed   HEENT:      Head: Normocephalic. Atraumatic     Eyes: pupils are equal and reactive. No scleral icterus is present. Neck: No mass and no thyromegaly present. Cardiovascular: Normal rate, regular rhythm, S1 normal and S2 normal.  Bilateral pulses present. Pulmonary/Chest: Effort normal and breath sounds normal. No retractions. Abdominal: Soft. Normal appearance. There is no organomegaly. No tenderness. There is no rigidity, no rebound, no guarding and no Tai's sign. Musculoskeletal:      Right lower leg: Normal. No tenderness and no edema. Left lower leg: Normal. No tenderness and no edema. Lymphadenopathy:     No cervical adenopathy, No Exrtemity Adenopathy. Neurological: The patient is alert and oriented. Moving all four extremities equally, sensation grossly intact bilateral.  Skin: Skin is warm, dry and intact. Psychiatric: The patient has a normal mood and affect. Speech is normal and behavior is normal. Judgment and thought content normal. Cognition and memory are normal.     RECOMMENDATIONS:     We spent a great deal of time discussing the risks and benefits of robotic/laparoscopic paraesophageal hernia repair with fundoplication, mesh placement, possible open including but not limited to injury to intra-abdominal organs, breakdown of the gastric or esophageal wall, esophageal perforation, the need for re-operative therapy, prolonged hospitalization,  mechanical ventilation, pneumothorax and death. We discussed the possibility of bleeding, the need for blood transfusions, blood clots, hospital-acquired and intra-abdominal infection, dysphagia stricture, and worsening GERD or recurrence risks. We discussed the need for post-operative visit compliance, behavior modifications and diet changes. PLAN:       Diagnosis Orders   1. Hiatal hernia with GERD     2. Nonischemic cardiomyopathy ([facility] [address]              Manometry reviewed with patient  EGD reviewed with patient  UGI reviewed with patient  She would like to move forward with paraesophageal hernia repair  Will need Cardiac clearance  PAT testing  All questions answered in office today    Electronically signed by Ino Billings DO on 10/18/2020 at 3:16 PM

## 2020-10-19 ENCOUNTER — HOSPITAL ENCOUNTER (OUTPATIENT)
Dept: CARDIAC REHAB | Age: 60
Setting detail: THERAPIES SERIES
Discharge: HOME | End: 2020-10-19
Payer: COMMERCIAL

## 2020-10-19 PROCEDURE — 9900000065 HC CARDIAC REHAB PHASE 3 - 1 VISIT

## 2020-10-20 ENCOUNTER — HOSPITAL ENCOUNTER (OUTPATIENT)
Dept: CARDIAC REHAB | Age: 60
Setting detail: THERAPIES SERIES
Discharge: HOME | End: 2020-10-20
Payer: COMMERCIAL

## 2020-10-20 PROCEDURE — 9900000065 HC CARDIAC REHAB PHASE 3 - 1 VISIT

## 2020-10-22 ENCOUNTER — HOSPITAL ENCOUNTER (OUTPATIENT)
Dept: CARDIAC REHAB | Age: 60
Discharge: HOME | End: 2020-10-22

## 2020-10-22 ENCOUNTER — TELEPHONE (OUTPATIENT)
Dept: CARDIOLOGY | Age: 60
End: 2020-10-22

## 2020-10-22 PROCEDURE — 9900000065 HC CARDIAC REHAB PHASE 3 - 1 VISIT

## 2020-10-22 NOTE — TELEPHONE ENCOUNTER
Left message for patient to call office. Need to see if she is symptomatic. SOB, Edema. . See scanned report from Sherice Sterling below.

## 2020-10-23 NOTE — TELEPHONE ENCOUNTER
Writer called and spoke to patient. Pt reported feeling increased fatigue lately and some SOB when walking up and down stairs but also stated that she is going to phase 3 cardiac rehab right now and hopes that it helps give her more energy. Pt denied any edema.

## 2020-10-26 ENCOUNTER — HOSPITAL ENCOUNTER (OUTPATIENT)
Dept: CARDIAC REHAB | Age: 60
Setting detail: THERAPIES SERIES
Discharge: HOME | End: 2020-10-26
Payer: COMMERCIAL

## 2020-10-26 PROCEDURE — 9900000065 HC CARDIAC REHAB PHASE 3 - 1 VISIT

## 2020-10-27 NOTE — TELEPHONE ENCOUNTER
Pt notified to take Lasix 20mg daily. Pt verbalized understanding and had no further questions at the time.

## 2020-10-29 ENCOUNTER — HOSPITAL ENCOUNTER (OUTPATIENT)
Dept: CARDIAC REHAB | Age: 60
Discharge: HOME | End: 2020-10-29

## 2020-10-29 PROCEDURE — 9900000065 HC CARDIAC REHAB PHASE 3 - 1 VISIT

## 2020-10-30 ENCOUNTER — HOSPITAL ENCOUNTER (OUTPATIENT)
Dept: CARDIAC REHAB | Age: 60
Setting detail: THERAPIES SERIES
Discharge: HOME | End: 2020-10-30
Payer: COMMERCIAL

## 2020-10-30 PROCEDURE — 9900000065 HC CARDIAC REHAB PHASE 3 - 1 VISIT

## 2020-11-03 ENCOUNTER — HOSPITAL ENCOUNTER (OUTPATIENT)
Dept: CARDIAC REHAB | Age: 60
Discharge: HOME | End: 2020-11-03

## 2020-11-03 PROCEDURE — 9900000065 HC CARDIAC REHAB PHASE 3 - 1 VISIT

## 2020-11-05 ENCOUNTER — HOSPITAL ENCOUNTER (OUTPATIENT)
Dept: CARDIAC REHAB | Age: 60
Discharge: HOME | End: 2020-11-05

## 2020-11-05 PROCEDURE — 9900000065 HC CARDIAC REHAB PHASE 3 - 1 VISIT

## 2020-11-06 ENCOUNTER — HOSPITAL ENCOUNTER (OUTPATIENT)
Dept: CARDIAC REHAB | Age: 60
Discharge: HOME | End: 2020-11-06

## 2020-11-06 PROCEDURE — 9900000065 HC CARDIAC REHAB PHASE 3 - 1 VISIT

## 2020-11-10 ENCOUNTER — OFFICE VISIT (OUTPATIENT)
Dept: PRIMARY CARE CLINIC | Age: 60
End: 2020-11-10
Payer: COMMERCIAL

## 2020-11-10 ENCOUNTER — HOSPITAL ENCOUNTER (OUTPATIENT)
Age: 60
Setting detail: SPECIMEN
Discharge: HOME | End: 2020-11-10
Payer: COMMERCIAL

## 2020-11-10 VITALS
RESPIRATION RATE: 18 BRPM | SYSTOLIC BLOOD PRESSURE: 112 MMHG | OXYGEN SATURATION: 98 % | HEIGHT: 67 IN | WEIGHT: 200.8 LBS | DIASTOLIC BLOOD PRESSURE: 78 MMHG | TEMPERATURE: 98.6 F | BODY MASS INDEX: 31.52 KG/M2 | HEART RATE: 106 BPM

## 2020-11-10 LAB
ABSOLUTE EOS #: 0.04 K/UL (ref 0–0.44)
ABSOLUTE IMMATURE GRANULOCYTE: <0.03 K/UL (ref 0–0.3)
ABSOLUTE LYMPH #: 0.68 K/UL (ref 1.1–3.7)
ABSOLUTE MONO #: 0.7 K/UL (ref 0.1–1.2)
ANION GAP SERPL CALCULATED.3IONS-SCNC: 14 MMOL/L (ref 9–17)
BASOPHILS # BLD: 1 % (ref 0–2)
BASOPHILS ABSOLUTE: 0.03 K/UL (ref 0–0.2)
BUN BLDV-MCNC: 7 MG/DL (ref 8–23)
BUN/CREAT BLD: 12 (ref 9–20)
CALCIUM SERPL-MCNC: 10 MG/DL (ref 8.6–10.4)
CHLORIDE BLD-SCNC: 100 MMOL/L (ref 98–107)
CO2: 23 MMOL/L (ref 20–31)
CREAT SERPL-MCNC: 0.59 MG/DL (ref 0.5–0.9)
DIFFERENTIAL TYPE: ABNORMAL
EOSINOPHILS RELATIVE PERCENT: 1 % (ref 1–4)
GFR AFRICAN AMERICAN: >60 ML/MIN
GFR NON-AFRICAN AMERICAN: >60 ML/MIN
GFR SERPL CREATININE-BSD FRML MDRD: ABNORMAL ML/MIN/{1.73_M2}
GFR SERPL CREATININE-BSD FRML MDRD: ABNORMAL ML/MIN/{1.73_M2}
GLUCOSE BLD-MCNC: 122 MG/DL (ref 70–99)
HCT VFR BLD CALC: 40.6 % (ref 36.3–47.1)
HEMOGLOBIN: 13.1 G/DL (ref 11.9–15.1)
IMMATURE GRANULOCYTES: 0 %
LYMPHOCYTES # BLD: 16 % (ref 24–43)
MCH RBC QN AUTO: 28.7 PG (ref 25.2–33.5)
MCHC RBC AUTO-ENTMCNC: 32.3 G/DL (ref 25.2–33.5)
MCV RBC AUTO: 89 FL (ref 82.6–102.9)
MONOCYTES # BLD: 16 % (ref 3–12)
NRBC AUTOMATED: 0 PER 100 WBC
PDW BLD-RTO: 13.9 % (ref 11.8–14.4)
PLATELET # BLD: 237 K/UL (ref 138–453)
PLATELET ESTIMATE: ABNORMAL
PMV BLD AUTO: 9.1 FL (ref 8.1–13.5)
POTASSIUM SERPL-SCNC: 4.1 MMOL/L (ref 3.7–5.3)
RBC # BLD: 4.56 M/UL (ref 3.95–5.11)
RBC # BLD: ABNORMAL 10*6/UL
SEG NEUTROPHILS: 66 % (ref 36–65)
SEGMENTED NEUTROPHILS ABSOLUTE COUNT: 2.85 K/UL (ref 1.5–8.1)
SODIUM BLD-SCNC: 137 MMOL/L (ref 135–144)
WBC # BLD: 4.3 K/UL (ref 3.5–11.3)
WBC # BLD: ABNORMAL 10*3/UL

## 2020-11-10 PROCEDURE — 85025 COMPLETE CBC W/AUTO DIFF WBC: CPT

## 2020-11-10 PROCEDURE — 1036F TOBACCO NON-USER: CPT | Performed by: FAMILY MEDICINE

## 2020-11-10 PROCEDURE — 36415 COLL VENOUS BLD VENIPUNCTURE: CPT

## 2020-11-10 PROCEDURE — U0003 INFECTIOUS AGENT DETECTION BY NUCLEIC ACID (DNA OR RNA); SEVERE ACUTE RESPIRATORY SYNDROME CORONAVIRUS 2 (SARS-COV-2) (CORONAVIRUS DISEASE [COVID-19]), AMPLIFIED PROBE TECHNIQUE, MAKING USE OF HIGH THROUGHPUT TECHNOLOGIES AS DESCRIBED BY CMS-2020-01-R: HCPCS

## 2020-11-10 PROCEDURE — 3017F COLORECTAL CA SCREEN DOC REV: CPT | Performed by: FAMILY MEDICINE

## 2020-11-10 PROCEDURE — G8484 FLU IMMUNIZE NO ADMIN: HCPCS | Performed by: FAMILY MEDICINE

## 2020-11-10 PROCEDURE — G8427 DOCREV CUR MEDS BY ELIG CLIN: HCPCS | Performed by: FAMILY MEDICINE

## 2020-11-10 PROCEDURE — G8417 CALC BMI ABV UP PARAM F/U: HCPCS | Performed by: FAMILY MEDICINE

## 2020-11-10 PROCEDURE — 80048 BASIC METABOLIC PNL TOTAL CA: CPT

## 2020-11-10 PROCEDURE — 99214 OFFICE O/P EST MOD 30 MIN: CPT | Performed by: FAMILY MEDICINE

## 2020-11-10 ASSESSMENT — ENCOUNTER SYMPTOMS
CONSTIPATION: 0
NAUSEA: 0
CHOKING: 0
DIARRHEA: 1
VOMITING: 1
ABDOMINAL PAIN: 1
SHORTNESS OF BREATH: 0
SORE THROAT: 0
CHEST TIGHTNESS: 0
COUGH: 1
TROUBLE SWALLOWING: 0
WHEEZING: 0
SINUS PRESSURE: 1

## 2020-11-10 ASSESSMENT — PATIENT HEALTH QUESTIONNAIRE - PHQ9: DEPRESSION UNABLE TO ASSESS: PT REFUSES

## 2020-11-11 ENCOUNTER — HOSPITAL ENCOUNTER (OUTPATIENT)
Age: 60
Setting detail: SPECIMEN
Discharge: HOME | End: 2020-11-11
Payer: COMMERCIAL

## 2020-11-11 ENCOUNTER — TELEPHONE (OUTPATIENT)
Dept: BARIATRICS/WEIGHT MGMT | Age: 60
End: 2020-11-11

## 2020-11-11 LAB
C DIFF AG + TOXIN: NEGATIVE
SPECIMEN DESCRIPTION: NORMAL

## 2020-11-11 PROCEDURE — 87324 CLOSTRIDIUM AG IA: CPT

## 2020-11-11 PROCEDURE — 87449 NOS EACH ORGANISM AG IA: CPT

## 2020-11-11 NOTE — PROGRESS NOTES
[facility] [address] [facility] [place] [facility]  [facility] [place] [address]  Dept: [phone]  Dept Fax: [phone]  Loc: [phone]    Louise Rodney is a 63 y.o. female who presents today for her medical conditions/complaints as noted below. Louise Rodney is c/o of   Chief Complaint   Patient presents with    Diarrhea      stool smells very bad, vomiting, headache,couch, high HR (started last tuesday)        HPI:     Here today for GI symptoms and cough. Diarrhea    This is a new problem. The current episode started in the past 7 days (1 week). The problem occurs 5 to 10 times per day. The problem has been unchanged (diarrhea is slightly better, but now with more symptoms). The stool consistency is described as watery. The patient states that diarrhea awakens her from sleep. Associated symptoms include abdominal pain, chills, coughing (productive), a fever (99.7), headaches, myalgias, sweats, a URI and vomiting. Associated symptoms comments: Just got sense of smell back 2 weeks ago. Tachycardia. Risk factors include ill contacts. She has tried analgesics for the symptoms. The treatment provided mild relief.          Past Medical History:   Diagnosis Date    Acute on chronic systolic congestive heart failure ([facility] [address] 1/21/2020    Asthma     Bilateral corneal scars     Disease of blood and blood forming organ     Diverticulitis     ON COLONSCOPY    DJD (degenerative joint disease), lumbar     Dry eye syndrome     DVT (deep venous thrombosis) (HCC)     after splenic artery repair in right calf    Gastro - esophageal reflux disease     History of blood transfusion     Hyperlipidemia     HIGH CHOLESTEROL/HIGH TRIGLYCERIDES    Hyperplastic colon polyp     Hypertension     Hypothyroidism     LGSIL (low grade squamous intraepithelial dysplasia) 11/2014    referred to GYN/S/P colpo with bx LGSIL, repeat pap in 6 months    Non-celiac gluten sensitivity wheezing is directly proportionate to wheat intake    NSTEMI (non-ST elevated myocardial infarction) ([facility] [address] 01/16/2020    Osteoarthritis 2004    RT KNEE S/P INJURY    Other disorders of kidney and ureter in diseases classified elsewhere     Sciatica of right side     Severe persistent asthma     patient has seen that wheat consumption causes exacerbations    Splenic artery aneurysm [facility])     s/p splenic artery repair    Tinnitus     Type 2 diabetes mellitus, controlled ([facility] [address] 5/2011          Social History     Tobacco Use    Smoking status: Never Smoker    Smokeless tobacco: Never Used    Tobacco comment: never smoked evelyn rrt 10/13/2017   Substance Use Topics    Alcohol use: No     Alcohol/week: 0.0 standard drinks     Comment: Consume about 4 wine coolers a year     Current Outpatient Medications   Medication Sig Dispense Refill    fluticasone (FLONASE) 50 MCG/ACT nasal spray USE 2 SPRAYS IN EACH NOSTRIL ONCE DAILY 48 g 3    metoprolol succinate (TOPROL XL) 100 MG extended release tablet Take 1 tablet by mouth 2 times daily 180 tablet 1    sacubitril-valsartan (ENTRESTO) 24-26 MG per tablet Take 1 tablet by mouth 2 times daily 180 tablet 1    furosemide (LASIX) 40 MG tablet Take 1 tablet by mouth daily 60 tablet 3    glimepiride (AMARYL) 2 MG tablet Take 2 mg by mouth every morning (before breakfast)      famotidine (PEPCID) 40 MG tablet Take 1 tablet by mouth every evening 90 tablet 3    Cholecalciferol (VITAMIN D3) 50 MCG (2000 UT) CAPS Take 1 capsule by mouth daily      aspirin 81 MG EC tablet Take 1 tablet by mouth daily 30 tablet 3    budesonide-formoterol (SYMBICORT) 160-4.5 MCG/ACT AERO 2 puffs inhaled twice daily. Rinse mouth well after use.  3 Inhaler 3    pantoprazole (PROTONIX) 40 MG tablet Take one tablet twice daily 30-60 minutes prior to breakfast and evening meal 180 tablet 3    SYNTHROID 175 MCG tablet Take 175 mcg by mouth      MOLYBDENUM PO Take 500 mcg by mouth daily      Probiotic Product (SOLUBLE FIBER/PROBIOTICS PO) Take by mouth 2 times daily      metFORMIN (GLUCOPHAGE) 500 MG tablet TAKE 2 TABLETS BY MOUTH TWO TIMES A DAY WITH MEALS 120 tablet 3    pilocarpine (PILOCAR) 1 % ophthalmic solution Apply 1 drop to eye 3 times daily      albuterol (PROVENTIL) (5 MG/ML) 0.5% nebulizer solution Take 1 mL by nebulization 4 times daily as needed for Wheezing (Patient not taking: Reported on 11/10/2020) 120 each 3    ipratropium-albuterol (DUONEB) 0.5-2.5 (3) MG/3ML SOLN nebulizer solution Inhale 3 mLs into the lungs every 4 hours as needed for Shortness of Breath (Patient not taking: Reported on 11/10/2020) 360 mL 2    diclofenac (VOLTAREN) 75 MG EC tablet Take 1 tablet by mouth 2 times daily as needed for Pain (Patient not taking: Reported on 11/10/2020) 90 tablet 2     No current facility-administered medications for this visit. Allergies   Allergen Reactions    Morphine Nausea And Vomiting, Other (See Comments) and Shortness Of Breath     Chest tightness  Chest tightness    Ativan [Lorazepam]     Codeine Nausea And Vomiting and Other (See Comments)     Chest tightness    Darvon [Propoxyphene] Nausea And Vomiting and Other (See Comments)     Chest tightness    Lisinopril Other (See Comments)     COUGH    Percocet [Oxycodone-Acetaminophen] Nausea And Vomiting and Other (See Comments)     Chest tightness       Subjective:     Review of Systems   Constitutional: Positive for chills, fatigue and fever (99.7). Negative for activity change and appetite change. HENT: Positive for congestion, postnasal drip and sinus pressure. Negative for ear pain, sneezing, sore throat and trouble swallowing. Eyes: Negative for visual disturbance. Respiratory: Positive for cough (productive). Negative for choking, chest tightness, shortness of breath and wheezing. Cardiovascular: Negative for chest pain, palpitations and leg swelling.    Gastrointestinal: Positive for abdominal pain, diarrhea and vomiting. Negative for constipation and nausea. Musculoskeletal: Positive for myalgias. Skin: Negative for rash. Allergic/Immunologic: Negative for environmental allergies. Neurological: Positive for headaches. Objective:      Physical Exam  Vitals signs and nursing note reviewed. Constitutional:       General: She is not in acute distress. Appearance: She is well-developed. She is ill-appearing and diaphoretic. HENT:      Right Ear: Tympanic membrane, ear canal and external ear normal.      Left Ear: Tympanic membrane, ear canal and external ear normal.      Nose: Mucosal edema present. Right Sinus: No maxillary sinus tenderness or frontal sinus tenderness. Left Sinus: No maxillary sinus tenderness or frontal sinus tenderness. Mouth/Throat:      Pharynx: No oropharyngeal exudate. Eyes:      Conjunctiva/sclera: Conjunctivae normal.   Neck:      Musculoskeletal: Normal range of motion and neck supple. Cardiovascular:      Rate and Rhythm: Regular rhythm. Tachycardia present. Heart sounds: Normal heart sounds. No murmur. Pulmonary:      Effort: Pulmonary effort is normal. No respiratory distress. Breath sounds: Normal breath sounds. No wheezing or rales. Lymphadenopathy:      Cervical: No cervical adenopathy. Skin:     General: Skin is warm. Findings: No rash. Neurological:      Mental Status: She is alert and oriented to person, place, and time. Psychiatric:         Behavior: Behavior normal.         Judgment: Judgment normal.       /78   Pulse 106   Temp 98.6 °F (37 °C) (Temporal)   Resp 18   Ht 5' 7\" (1.702 m)   Wt 200 lb 12.8 oz (91.1 kg)   LMP 04/29/2009 (Approximate)   SpO2 98%   BMI 31.45 kg/m²     Assessment:       Diagnosis Orders   1. Viral syndrome  COVID-19 Ambulatory    Basic Metabolic Panel    CBC Auto Differential   2.  Diarrhea of presumed infectious origin  Clostridium Difficile Toxin/Antigen Plan:        Viral syndrome: new; due to her symptoms I cannot rule out covid so I ordered a covid test and I told her to self quarantine until the test results come back I also advised her to use tylenol pain and fever and to avoid nsaids. I recommended that she use mucinex to help with congestion and cough. I also recommended Flonase and an antihistamine for sinus symptoms. she was instructed to return if there is no improvement or symptoms worsen. Because of her history I am also concerned about MICHELE from dehydration so I ordered a bmp and cbc. Diarrhea: if she has covid, the diarrhea is likely from covid however I will also check for c.diff        Return if symptoms worsen or fail to improve. Orders Placed This Encounter   Procedures    Clostridium Difficile Toxin/Antigen     Standing Status:   Future     Number of Occurrences:   1     Standing Expiration Date:   11/10/2021    COVID-19 Ambulatory     Standing Status:   Future     Number of Occurrences:   1     Standing Expiration Date:   11/10/2021     Scheduling Instructions:      Saline media preferred given current shortage of viral transport media but both acceptable     Order Specific Question:   Is this test for diagnosis or screening? Answer:   Diagnosis of ill patient     Order Specific Question:   Symptomatic for COVID-19 as defined by CDC? Answer:   Yes     Order Specific Question:   Date of Symptom Onset     Answer:   11/3/2020     Order Specific Question:   Hospitalized for COVID-19? Answer:   No     Order Specific Question:   Admitted to ICU for COVID-19? Answer:   No     Order Specific Question:   Employed in healthcare setting? Answer:   No     Order Specific Question:   Resident in a congregate (group) care setting? Answer:   No     Order Specific Question:   Pregnant? Answer:   No     Order Specific Question:   Previously tested for COVID-19? Answer:    Yes    Basic Metabolic Panel     Standing Status: Future     Number of Occurrences:   1     Standing Expiration Date:   11/10/2021    CBC Auto Differential     Standing Status:   Future     Number of Occurrences:   1     Standing Expiration Date:   11/10/2021         Patientgiven educational materials - see patient instructions. Discussed use, benefit,and side effects of prescribed medications. All patient questions answered. Ptvoiced understanding. Reviewed health maintenance. Instructed to continue currentmedications, diet and exercise. Patient agreed with treatment plan. Follow up asdirected.      Electronically signed by Dahiana Whelan MD on 11/11/2020 at 1:39 PM

## 2020-11-13 ENCOUNTER — TELEPHONE (OUTPATIENT)
Dept: CARDIOLOGY | Age: 60
End: 2020-11-13

## 2020-11-13 ENCOUNTER — TELEPHONE (OUTPATIENT)
Dept: FAMILY MEDICINE CLINIC | Age: 60
End: 2020-11-13

## 2020-11-13 ENCOUNTER — TELEPHONE (OUTPATIENT)
Dept: BARIATRICS/WEIGHT MGMT | Age: 60
End: 2020-11-13

## 2020-11-13 RX ORDER — CHOLESTYRAMINE 4 G/9G
1 POWDER, FOR SUSPENSION ORAL DAILY
Qty: 30 PACKET | Refills: 1 | Status: ON HOLD | OUTPATIENT
Start: 2020-11-13 | End: 2021-02-09

## 2020-11-13 NOTE — TELEPHONE ENCOUNTER
I can send in some questran for her to help with the diarrhea. It is safe and won't cause any blockages if she would like to try that.  As far as the sinuses I'd still like to wait on the covid results before treating

## 2020-11-13 NOTE — TELEPHONE ENCOUNTER
Reviewed care link, shows increased optivol  How is patient feeling- any SOB, weight gain, or LE edema.

## 2020-11-15 LAB — SARS-COV-2, NAA: DETECTED

## 2020-11-16 ENCOUNTER — TELEPHONE (OUTPATIENT)
Dept: ADMINISTRATIVE | Age: 60
End: 2020-11-16

## 2020-11-17 ENCOUNTER — TELEPHONE (OUTPATIENT)
Dept: PREADMISSION TESTING | Age: 60
End: 2020-11-17

## 2020-12-07 ENCOUNTER — HOSPITAL ENCOUNTER (OUTPATIENT)
Dept: CARDIAC REHAB | Age: 60
Discharge: HOME | End: 2020-12-07

## 2020-12-07 PROCEDURE — 9900000065 HC CARDIAC REHAB PHASE 3 - 1 VISIT

## 2020-12-09 ENCOUNTER — HOSPITAL ENCOUNTER (OUTPATIENT)
Dept: CARDIAC REHAB | Age: 60
Discharge: HOME | End: 2020-12-09

## 2020-12-09 PROCEDURE — 9900000065 HC CARDIAC REHAB PHASE 3 - 1 VISIT

## 2020-12-14 ENCOUNTER — HOSPITAL ENCOUNTER (OUTPATIENT)
Dept: CARDIAC REHAB | Age: 60
Discharge: HOME | End: 2020-12-14

## 2020-12-14 PROCEDURE — 9900000065 HC CARDIAC REHAB PHASE 3 - 1 VISIT

## 2020-12-14 RX ORDER — METOPROLOL SUCCINATE 100 MG/1
100 TABLET, EXTENDED RELEASE ORAL 2 TIMES DAILY
Qty: 180 TABLET | Refills: 3 | Status: SHIPPED | OUTPATIENT
Start: 2020-12-14 | End: 2022-01-03 | Stop reason: SDUPTHER

## 2020-12-16 ENCOUNTER — HOSPITAL ENCOUNTER (OUTPATIENT)
Dept: CARDIAC REHAB | Age: 60
Discharge: HOME | End: 2020-12-16

## 2020-12-16 PROCEDURE — 9900000065 HC CARDIAC REHAB PHASE 3 - 1 VISIT

## 2020-12-17 ENCOUNTER — PROCEDURE VISIT (OUTPATIENT)
Dept: CARDIOLOGY | Age: 60
End: 2020-12-17
Payer: COMMERCIAL

## 2020-12-17 ENCOUNTER — TELEPHONE (OUTPATIENT)
Dept: CARDIOLOGY | Age: 60
End: 2020-12-17

## 2020-12-17 PROCEDURE — 93289 INTERROG DEVICE EVAL HEART: CPT | Performed by: INTERNAL MEDICINE

## 2020-12-17 NOTE — TELEPHONE ENCOUNTER
Spoke with pt and she states she feels fine and has no edema or sob. She will report back as needed.

## 2020-12-18 RX ORDER — FUROSEMIDE 40 MG/1
40 TABLET ORAL DAILY
Qty: 90 TABLET | Refills: 3 | Status: SHIPPED | OUTPATIENT
Start: 2020-12-18 | End: 2022-02-04

## 2021-01-06 ENCOUNTER — HOSPITAL ENCOUNTER (OUTPATIENT)
Dept: CARDIAC REHAB | Age: 61
Discharge: HOME | End: 2021-01-06

## 2021-01-06 DIAGNOSIS — J45.901 ASTHMA NOT DEPENDENT ON SYSTEMIC STEROIDS WITH ACUTE EXACERBATION: ICD-10-CM

## 2021-01-06 PROCEDURE — 9900000065 HC CARDIAC REHAB PHASE 3 - 1 VISIT

## 2021-01-07 ENCOUNTER — PREP FOR PROCEDURE (OUTPATIENT)
Dept: CARDIOLOGY | Age: 61
End: 2021-01-07

## 2021-01-07 DIAGNOSIS — J32.9 CHRONIC RECURRENT SINUSITIS: Primary | ICD-10-CM

## 2021-01-07 RX ORDER — CEFDINIR 300 MG/1
CAPSULE ORAL
Qty: 60 CAPSULE | Refills: 0 | Status: SHIPPED | OUTPATIENT
Start: 2021-01-07 | End: 2021-01-26 | Stop reason: ALTCHOICE

## 2021-01-07 RX ORDER — PREDNISONE 20 MG/1
20 TABLET ORAL 2 TIMES DAILY
Qty: 10 TABLET | Refills: 0 | Status: SHIPPED | OUTPATIENT
Start: 2021-01-07 | End: 2021-03-23 | Stop reason: SDUPTHER

## 2021-01-07 RX ORDER — BUDESONIDE AND FORMOTEROL FUMARATE DIHYDRATE 160; 4.5 UG/1; UG/1
AEROSOL RESPIRATORY (INHALATION)
Qty: 3 INHALER | Refills: 3 | Status: SHIPPED | OUTPATIENT
Start: 2021-01-07

## 2021-01-07 NOTE — TELEPHONE ENCOUNTER
Maksim Denson is requesting a refill on the following medication(s):  Requested Prescriptions     Pending Prescriptions Disp Refills    budesonide-formoterol (SYMBICORT) 160-4.5 MCG/ACT AERO [Pharmacy Med Name: SYMBICORT INH AEROSOL 160/4.5MCG] 30.6 g 3     Sig: USE 2 INHALATIONS TWICE A DAY (RINSE MOUTH WELL AFTER USE)       Last Visit Date (If Applicable):  2/91/5087    Next Visit Date:    Visit date not found

## 2021-01-08 ENCOUNTER — HOSPITAL ENCOUNTER (OUTPATIENT)
Dept: CARDIAC REHAB | Age: 61
Discharge: HOME | End: 2021-01-08

## 2021-01-08 PROCEDURE — 9900000065 HC CARDIAC REHAB PHASE 3 - 1 VISIT

## 2021-01-11 ENCOUNTER — HOSPITAL ENCOUNTER (OUTPATIENT)
Dept: CARDIAC REHAB | Age: 61
Discharge: HOME | End: 2021-01-11

## 2021-01-11 PROCEDURE — 9900000065 HC CARDIAC REHAB PHASE 3 - 1 VISIT

## 2021-01-14 ENCOUNTER — TELEPHONE (OUTPATIENT)
Dept: CARDIOLOGY | Age: 61
End: 2021-01-14

## 2021-01-15 ENCOUNTER — HOSPITAL ENCOUNTER (OUTPATIENT)
Dept: CARDIAC REHAB | Age: 61
Discharge: HOME | End: 2021-01-15

## 2021-01-15 PROCEDURE — 9900000065 HC CARDIAC REHAB PHASE 3 - 1 VISIT

## 2021-01-18 ENCOUNTER — HOSPITAL ENCOUNTER (OUTPATIENT)
Dept: CARDIAC REHAB | Age: 61
Discharge: HOME | End: 2021-01-18

## 2021-01-18 PROCEDURE — 9900000065 HC CARDIAC REHAB PHASE 3 - 1 VISIT

## 2021-01-20 ENCOUNTER — HOSPITAL ENCOUNTER (OUTPATIENT)
Dept: CARDIAC REHAB | Age: 61
Discharge: HOME | End: 2021-01-20

## 2021-01-20 PROCEDURE — 9900000065 HC CARDIAC REHAB PHASE 3 - 1 VISIT

## 2021-01-22 ENCOUNTER — HOSPITAL ENCOUNTER (OUTPATIENT)
Dept: CARDIAC REHAB | Age: 61
Discharge: HOME | End: 2021-01-22

## 2021-01-22 PROCEDURE — 9900000065 HC CARDIAC REHAB PHASE 3 - 1 VISIT

## 2021-01-25 ENCOUNTER — HOSPITAL ENCOUNTER (OUTPATIENT)
Dept: CARDIAC REHAB | Age: 61
Discharge: HOME | End: 2021-01-25

## 2021-01-25 ENCOUNTER — TELEPHONE (OUTPATIENT)
Dept: BARIATRICS/WEIGHT MGMT | Age: 61
End: 2021-01-25

## 2021-01-25 PROCEDURE — 9900000065 HC CARDIAC REHAB PHASE 3 - 1 VISIT

## 2021-01-25 RX ORDER — SODIUM CHLORIDE, SODIUM LACTATE, POTASSIUM CHLORIDE, CALCIUM CHLORIDE 600; 310; 30; 20 MG/100ML; MG/100ML; MG/100ML; MG/100ML
1000 INJECTION, SOLUTION INTRAVENOUS CONTINUOUS
Status: CANCELLED | OUTPATIENT
Start: 2021-01-25
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
## 2021-01-26 ENCOUNTER — HOSPITAL ENCOUNTER (OUTPATIENT)
Dept: PREADMISSION TESTING | Age: 61
Discharge: HOME | End: 2021-01-30
Payer: COMMERCIAL

## 2021-01-26 ENCOUNTER — TELEPHONE (OUTPATIENT)
Dept: CARDIOLOGY | Age: 61
End: 2021-01-26

## 2021-01-26 VITALS
DIASTOLIC BLOOD PRESSURE: 72 MMHG | WEIGHT: 200.4 LBS | OXYGEN SATURATION: 97 % | BODY MASS INDEX: 31.45 KG/M2 | SYSTOLIC BLOOD PRESSURE: 113 MMHG | HEART RATE: 92 BPM | RESPIRATION RATE: 16 BRPM | TEMPERATURE: 97.2 F | HEIGHT: 67 IN

## 2021-01-26 LAB
ANION GAP SERPL CALCULATED.3IONS-SCNC: 14 MMOL/L (ref 9–17)
BUN BLDV-MCNC: 14 MG/DL (ref 8–23)
BUN/CREAT BLD: NORMAL (ref 9–20)
CALCIUM SERPL-MCNC: 10.3 MG/DL (ref 8.6–10.4)
CHLORIDE BLD-SCNC: 104 MMOL/L (ref 98–107)
CO2: 20 MMOL/L (ref 20–31)
CREAT SERPL-MCNC: 0.62 MG/DL (ref 0.5–0.9)
GFR AFRICAN AMERICAN: >60 ML/MIN
GFR NON-AFRICAN AMERICAN: >60 ML/MIN
GFR SERPL CREATININE-BSD FRML MDRD: NORMAL ML/MIN/{1.73_M2}
GFR SERPL CREATININE-BSD FRML MDRD: NORMAL ML/MIN/{1.73_M2}
GLUCOSE BLD-MCNC: 88 MG/DL (ref 70–99)
HCT VFR BLD CALC: 46.2 % (ref 36.3–47.1)
HEMOGLOBIN: 14.7 G/DL (ref 11.9–15.1)
INR BLD: 0.9
MCH RBC QN AUTO: 29.2 PG (ref 25.2–33.5)
MCHC RBC AUTO-ENTMCNC: 31.8 G/DL (ref 28.4–34.8)
MCV RBC AUTO: 91.7 FL (ref 82.6–102.9)
NRBC AUTOMATED: 0 PER 100 WBC
PDW BLD-RTO: 14.4 % (ref 11.8–14.4)
PLATELET # BLD: 287 K/UL (ref 138–453)
PMV BLD AUTO: 9.1 FL (ref 8.1–13.5)
POTASSIUM SERPL-SCNC: 4.3 MMOL/L (ref 3.7–5.3)
PROTHROMBIN TIME: 9.3 SEC (ref 9–12)
RBC # BLD: 5.04 M/UL (ref 3.95–5.11)
SODIUM BLD-SCNC: 138 MMOL/L (ref 135–144)
WBC # BLD: 9.7 K/UL (ref 3.5–11.3)

## 2021-01-26 PROCEDURE — 85027 COMPLETE CBC AUTOMATED: CPT

## 2021-01-26 PROCEDURE — 85610 PROTHROMBIN TIME: CPT

## 2021-01-26 PROCEDURE — 36415 COLL VENOUS BLD VENIPUNCTURE: CPT

## 2021-01-26 PROCEDURE — 93005 ELECTROCARDIOGRAM TRACING: CPT | Performed by: ORTHOPAEDIC SURGERY

## 2021-01-26 PROCEDURE — 80048 BASIC METABOLIC PNL TOTAL CA: CPT

## 2021-01-26 RX ORDER — HEPARIN SODIUM 5000 [USP'U]/ML
5000 INJECTION, SOLUTION INTRAVENOUS; SUBCUTANEOUS ONCE
Status: CANCELLED | OUTPATIENT
Start: 2021-01-26 | End: 2021-01-26

## 2021-01-26 NOTE — H&P (VIEW-ONLY)
History and Physical    Pt Name: Janell Kingston  MRN: [id number]  YOB: 1960  Date of evaluation: 1/26/2021    SUBJECTIVE:   History of Chief Complaint:    Patient presents for PAT visit, complains of hiatal hernia, GERD. She has had symptoms for quite some time now, even despite conservative/medication treatment. She has been scheduled for hiatal hernia repair, Nissen fundoplication, incisional hernia repair. She was rescheduled from a few months ago, as she was COVID positive in November. Past Medical History    has a past medical history of Acute on chronic systolic congestive heart failure ([address] AICD (automatic cardioverter/defibrillator) present, Asthma, Bilateral corneal scars, COVID-19, Disease of blood and blood forming organ, Diverticulitis, DJD (degenerative joint disease), lumbar, Dry eye syndrome, DVT (deep venous thrombosis) ([address] Gastro - esophageal reflux disease, Hiatal hernia, History of blood transfusion, History of echocardiogram, Hyperlipidemia, Hyperplastic colon polyp, Hypertension, Hypothyroidism, LGSIL (low grade squamous intraepithelial dysplasia), Non-celiac gluten sensitivity, NSTEMI (non-ST elevated myocardial infarction) ([address] Osteoarthritis, Other disorders of kidney and ureter in diseases classified elsewhere, Sciatica of right side, Severe persistent asthma, Splenic artery aneurysm ([address] Tinnitus, and Type 2 diabetes mellitus, controlled ([address]   Past Surgical History has a past surgical history that includes Knee arthroscopy (Right, ); Tubal ligation ();  section (); Pearson tooth extraction; Skin tag removal; Total knee arthroplasty (Right, 2011); other surgical history (10/25/2011); other surgical history (Right,  and ); Nasal sinus surgery (2014); Arterial aneurysm repair (); Colposcopy (2015); pr colon ca scrn not hi rsk ind (N/A, 2017); pr egd transoral biopsy single/multiple (N/A, 2017); Colonoscopy (2012); Colonoscopy (2017); Small intestine surgery (N/A, 2017); Cystoscopy (Bilateral, 2017); laparoscopy (N/A, 2017); pr colon ca scrn not hi rsk ind (N/A, 2017); Upper gastrointestinal endoscopy (N/A, 2020); Abdomen surgery; skin biopsy; vascular surgery; Endoscopy, colon, diagnostic; eye surgery; Dilatation, esophagus; Cardiac catheterization (2020); Cardiac defibrillator placement (Left, 2020); and other surgical history (2020). Medications  Prior to Admission medications    Medication Sig Start Date End Date Taking?  Authorizing Provider   budesonide-formoterol (SYMBICORT) 160-4.5 MCG/ACT AERO USE 2 INHALATIONS TWICE A DAY (RINSE MOUTH WELL AFTER USE) 21  Yes DEREK Ortega - CNP   furosemide (LASIX) 40 MG tablet Take 1 tablet by mouth daily  Patient taking differently: Take 20 mg by mouth daily  20  Yes Misti Macario MD   metoprolol succinate (TOPROL XL) 100 MG extended release tablet Take 1 tablet by mouth 2 times daily 20  Yes Jose Enrique Palomares MD   sacubitril-valsartan (ENTRESTO) 24-26 MG per tablet Take 1 tablet by mouth 2 times daily 20  Yes Jose Enrique Palomares MD   cholestyramine (QUESTRAN) 4 g packet Take 1 packet by mouth daily 20  Yes Carly Diallo MD   fluticasone [facility]) 50 MCG/ACT nasal spray USE 2 SPRAYS IN EACH NOSTRIL ONCE DAILY 20  Yes Imani Garcias, APRN - CNP family history includes Allergies in her mother; Arthritis in her mother; Coronary Art Dis in her mother; Diabetes in her daughter; High Blood Pressure in her mother; High Cholesterol in her mother; Other in her maternal grandfather. Social History   reports that she has never smoked. She has never used smokeless tobacco.   reports no history of alcohol use. reports no history of drug use. Marital Status   Occupation works for [employer]:   VITALS:  height is 5' 7\" (1.702 m) and weight is 200 lb 6.4 oz (90.9 kg). Her temporal temperature is 97.2 °F (36.2 °C). Her blood pressure is 113/72 and her pulse is 92. Her respiration is 16 and oxygen saturation is 97%. CONSTITUTIONAL:alert & oriented x 3, no acute distress. Very pleasant and talkative. SKIN:  Warm and dry, no rashes on exposed areas of skin. HEAD:  Normocephalic, atraumatic. EYES: PERRL. EOMs intact. EARS:  Hearing grossly WNL. NOSE:  Nares patent. No rhinorrhea   MOUTH/THROAT:  benign  NECK:supple, no lymphadenopathy  LUNGS: Clear to auscultation bilaterally, no wheezes. CARDIOVASCULAR: Heart sounds are normal.  Regular rate and rhythm without murmur. ABDOMEN: soft, non tender, non distended. Rotund. EXTREMITIES: no edema bilateral lower extremities. Testing:   EK21  Labs pending: drawn 2021   Chest XRay:  21    IMPRESSIONS:   1.  Hiatal hernia, GERD 2.  has a past medical history of Acute on chronic systolic congestive heart failure ([facility] [address] (01/21/2020), AICD (automatic cardioverter/defibrillator) present, Asthma, Bilateral corneal scars, COVID-19 (11/10/2020), Disease of blood and blood forming organ, Diverticulitis, DJD (degenerative joint disease), lumbar, Dry eye syndrome, DVT (deep venous thrombosis) ([facility] [address] Gastro - esophageal reflux disease, Hiatal hernia, History of blood transfusion (1982), History of echocardiogram (04/2020), Hyperlipidemia, Hyperplastic colon polyp, Hypertension, Hypothyroidism, LGSIL (low grade squamous intraepithelial dysplasia) (11/2014), Non-celiac gluten sensitivity, NSTEMI (non-ST elevated myocardial infarction) ([facility] [address] (01/16/2020), Osteoarthritis (2004), Other disorders of kidney and ureter in diseases classified elsewhere, Sciatica of right side, Severe persistent asthma, Splenic artery aneurysm ([facility] [address] Tinnitus, and Type 2 diabetes mellitus, [facility] ([facility] (05/2011). PLANS:   1.  Hiatal hernia repair, Nissen fundoplication, incisional hernia repair    DOLORES Tse January SAHIL  Electronically signed 1/26/2021 at 1:18 PM

## 2021-01-26 NOTE — PROGRESS NOTES
Anesthesia Focused Assessment    Has patient ever tested positive for COVID? Yes  SUSPECTED (PER PATIENT) COVID January 2020. POSITIVE COVID 11/10/2020 (WITH MOSTLY GI COMPLAINTS, FEVER AND FATIGUE). STOP-BANG Sleep Apnea Questionnaire    SNORE loudly (heard through closed doors)? No  TIRED, fatigued, sleepy during daytime? No  OBSERVED stopping breathing during sleep? No  High blood PRESSURE being treated? Yes    BMI over 35? No  AGE over 48? Yes  NECK circumference over 16\"? No  GENDER (male)? 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 Hypertension:  yes    Active smoker:  no  Drinks Alcohol:  no    Dentition: multiple molar crowns    Defib / AICD / Pacemaker: yes      Renal Failure/dialysis:  no    Patient was evaluated in PAT & anesthesia guidelines were applied. NPO guidelines, medication instructions and scheduled arrival time were reviewed with patient. Hx of anesthesia complications:  no  Family hx of anesthesia complications:  Mom-prolonged emergence from general anesthesia. Anesthesia contacted:   Yes, case discussed with Dr. Maia Green. Medical or cardiac clearance ordered: see below. Patient states that she had \"viral heart failure\" in January 2020, and states that she feels she had COVID at that time (not tested). She ended up with an AICD in May 2020, no echo done since due to \"insurance not paying for one\" according to the patient. She has been in cardiac rehab since then, says that she is feeling better from that standpoint. She then tested positive for COVID in November 2020, this time with GI illness primarily. She has been cleared by cardiology with intermediate risk. Case discussed with Dr. Beverly Head. Per Dr. Beverly Head, no need for anesthesia visit today, positive COVID test was 11/10/2020. No CXR needed today per Dr. Beverly Head. Updated clearance to be requested from cardiology with EKG faxed to cardiology from today's PAT visit, see if echo needs updated prior to surgery as patient said it was insurance not paying for another echo is the reason she did not have one since AICD placement, echo was done 4/2020. Patient's MRN was given to surgery booking, as she will need a COVID test prior to surgery (positive test was 11/10/2020). We were told that [facility] will contact the patient for appointment.     Chrissy Bradley PA-C  1/26/21  12:10 PM

## 2021-01-26 NOTE — TELEPHONE ENCOUNTER
To Whom It May Concern,        Patient is  Intermediate Cardiac Risk for planned hiatal hernai surgery.         Special instructions:  Patient is taking ASA 81 mg qd and can be held for 5-7 days prior to procedure and resumed as soon as surgical bleeding risk has resolved.      Avoid excessive IV hydration.      Please continue other meds.        Thank you,  Desi Funk MD   [address] [facility]

## 2021-01-26 NOTE — H&P
History and Physical    Pt Name: John Etienne  MRN: [id number]  YOB: 1960  Date of evaluation: 1/26/2021    SUBJECTIVE:   History of Chief Complaint:    Patient presents for PAT visit, complains of hiatal hernia, GERD. She has had symptoms for quite some time now, even despite conservative/medication treatment. She has been scheduled for hiatal hernia repair, Nissen fundoplication, incisional hernia repair. She was rescheduled from a few months ago, as she was COVID positive in November. Past Medical History    has a past medical history of Acute on chronic systolic congestive heart failure ([address] AICD (automatic cardioverter/defibrillator) present, Asthma, Bilateral corneal scars, COVID-19, Disease of blood and blood forming organ, Diverticulitis, DJD (degenerative joint disease), lumbar, Dry eye syndrome, DVT (deep venous thrombosis) ([address] Gastro - esophageal reflux disease, Hiatal hernia, History of blood transfusion, History of echocardiogram, Hyperlipidemia, Hyperplastic colon polyp, Hypertension, Hypothyroidism, LGSIL (low grade squamous intraepithelial dysplasia), Non-celiac gluten sensitivity, NSTEMI (non-ST elevated myocardial infarction) ([address] Osteoarthritis, Other disorders of kidney and ureter in diseases classified elsewhere, Sciatica of right side, Severe persistent asthma, Splenic artery aneurysm ([address] Tinnitus, and Type 2 diabetes mellitus, controlled ([address]   Past Surgical History has a past surgical history that includes Knee arthroscopy (Right, ); Tubal ligation ();  section (); Peck tooth extraction; Skin tag removal; Total knee arthroplasty (Right, 2011); other surgical history (10/25/2011); other surgical history (Right,  and ); Nasal sinus surgery (2014); Arterial aneurysm repair (); Colposcopy (2015); pr colon ca scrn not hi rsk ind (N/A, 2017); pr egd transoral biopsy single/multiple (N/A, 2017); Colonoscopy (2012); Colonoscopy (2017); Small intestine surgery (N/A, 2017); Cystoscopy (Bilateral, 2017); laparoscopy (N/A, 2017); pr colon ca scrn not hi rsk ind (N/A, 2017); Upper gastrointestinal endoscopy (N/A, 2020); Abdomen surgery; skin biopsy; vascular surgery; Endoscopy, colon, diagnostic; eye surgery; Dilatation, esophagus; Cardiac catheterization (2020); Cardiac defibrillator placement (Left, 2020); and other surgical history (2020). Medications  Prior to Admission medications    Medication Sig Start Date End Date Taking?  Authorizing Provider   budesonide-formoterol (SYMBICORT) 160-4.5 MCG/ACT AERO USE 2 INHALATIONS TWICE A DAY (RINSE MOUTH WELL AFTER USE) 21  Yes DEREK Maher - CNP   furosemide (LASIX) 40 MG tablet Take 1 tablet by mouth daily  Patient taking differently: Take 20 mg by mouth daily  20  Yes Carrie Martinez MD   metoprolol succinate (TOPROL XL) 100 MG extended release tablet Take 1 tablet by mouth 2 times daily 20  Yes Nicolasa Choi MD   sacubitril-valsartan (ENTRESTO) 24-26 MG per tablet Take 1 tablet by mouth 2 times daily 20  Yes Nicolasa Choi MD   cholestyramine (QUESTRAN) 4 g packet Take 1 packet by mouth daily 20  Yes Shashank Hays MD   fluticasone Ashlee Sartorius) 50 MCG/ACT nasal spray USE 2 SPRAYS IN EACH NOSTRIL ONCE DAILY 20  Yes Sony Delacruz, APRN - CNP pilocarpine (PILOCAR) 1 % ophthalmic solution Apply 1 drop to eye 3 times daily 7/2/20  Yes Historical Provider, MD   glimepiride (AMARYL) 2 MG tablet Take 2 mg by mouth every morning (before breakfast)   Yes Historical Provider, MD   famotidine (PEPCID) 40 MG tablet Take 1 tablet by mouth every evening 4/13/20  Yes DEREK López CNP   Cholecalciferol (VITAMIN D3) 50 MCG (2000 UT) CAPS Take 1 capsule by mouth daily   Yes Historical Provider, MD   aspirin 81 MG EC tablet Take 1 tablet by mouth daily 1/22/20  Yes Aly Dhillon DO   pantoprazole (PROTONIX) 40 MG tablet Take one tablet twice daily 30-60 minutes prior to breakfast and evening meal 11/11/19  Yes DEREK López CNP   SYNTHROID 175 MCG tablet Take 175 mcg by mouth Daily  8/3/19  Yes Historical Provider, MD   MOLYBDENUM PO Take 500 mcg by mouth daily   Yes Historical Provider, MD   diclofenac (VOLTAREN) 75 MG EC tablet Take 1 tablet by mouth 2 times daily as needed for Pain 2/22/18  Yes Landon Draper MD   Probiotic Product (SOLUBLE FIBER/PROBIOTICS PO) Take by mouth 2 times daily   Yes Historical Provider, MD   metFORMIN (GLUCOPHAGE) 500 MG tablet TAKE 2 TABLETS BY MOUTH TWO TIMES A DAY WITH MEALS 9/18/17  Yes Angeli Contreras MD   albuterol (PROVENTIL) (5 MG/ML) 0.5% nebulizer solution Take 1 mL by nebulization 4 times daily as needed for Wheezing 11/26/19   DEREK López CNP   ipratropium-albuterol (DUONEB) 0.5-2.5 (3) MG/3ML SOLN nebulizer solution Inhale 3 mLs into the lungs every 4 hours as needed for Shortness of Breath 5/6/19   DEREK López CNP     Allergies  is allergic to morphine; ativan [lorazepam]; codeine; darvon [propoxyphene]; lisinopril; and percocet [oxycodone-acetaminophen].   Family History family history includes Allergies in her mother; Arthritis in her mother; Coronary Art Dis in her mother; Diabetes in her daughter; High Blood Pressure in her mother; High Cholesterol in her mother; Other in her maternal grandfather. Social History   reports that she has never smoked. She has never used smokeless tobacco.   reports no history of alcohol use. reports no history of drug use. Marital Status   Occupation works for [employer]:   VITALS:  height is 5' 7\" (1.702 m) and weight is 200 lb 6.4 oz (90.9 kg). Her temporal temperature is 97.2 °F (36.2 °C). Her blood pressure is 113/72 and her pulse is 92. Her respiration is 16 and oxygen saturation is 97%. CONSTITUTIONAL:alert & oriented x 3, no acute distress. Very pleasant and talkative. SKIN:  Warm and dry, no rashes on exposed areas of skin. HEAD:  Normocephalic, atraumatic. EYES: PERRL. EOMs intact. EARS:  Hearing grossly WNL. NOSE:  Nares patent. No rhinorrhea   MOUTH/THROAT:  benign  NECK:supple, no lymphadenopathy  LUNGS: Clear to auscultation bilaterally, no wheezes. CARDIOVASCULAR: Heart sounds are normal.  Regular rate and rhythm without murmur. ABDOMEN: soft, non tender, non distended. Rotund. EXTREMITIES: no edema bilateral lower extremities. Testing:   EK21  Labs pending: drawn 2021   Chest XRay:  21    IMPRESSIONS:   1.  Hiatal hernia, GERD 2.  has a past medical history of Acute on chronic systolic congestive heart failure ([facility] (01/21/2020), AICD (automatic cardioverter/defibrillator) present, Asthma, Bilateral corneal scars, COVID-19 (11/10/2020), Disease of blood and blood forming organ, Diverticulitis, DJD (degenerative joint disease), lumbar, Dry eye syndrome, DVT (deep venous thrombosis) ([facility] Gastro - esophageal reflux disease, Hiatal hernia, History of blood transfusion (1982), History of echocardiogram (04/2020), Hyperlipidemia, Hyperplastic colon polyp, Hypertension, Hypothyroidism, LGSIL (low grade squamous intraepithelial dysplasia) (11/2014), Non-celiac gluten sensitivity, NSTEMI (non-ST elevated myocardial infarction) ([facility] (01/16/2020), Osteoarthritis (2004), Other disorders of kidney and ureter in diseases classified elsewhere, Sciatica of right side, Severe persistent asthma, Splenic artery aneurysm ([facility] Tinnitus, and Type 2 diabetes mellitus, controlled ([facility] (05/2011). PLANS:   1.  Hiatal hernia repair, Nissen fundoplication, incisional hernia repair    DOLORES Mabry PA-C  Electronically signed 1/26/2021 at 1:18 PM

## 2021-01-26 NOTE — PROGRESS NOTES
Per Corey/ [employer] Rep, ok to use magnet DOS PRN,Corey informed Dr. Kaleb Lopez wants AICD Interogation pre and post op 2-9-21. Corey said he doesn't need to be here and he will speak to Dr. Kaleb Lopez office.  [phone]

## 2021-01-27 LAB
EKG ATRIAL RATE: 82 BPM
EKG P AXIS: 46 DEGREES
EKG P-R INTERVAL: 144 MS
EKG Q-T INTERVAL: 410 MS
EKG QRS DURATION: 86 MS
EKG QTC CALCULATION (BAZETT): 479 MS
EKG R AXIS: 9 DEGREES
EKG T AXIS: 35 DEGREES
EKG VENTRICULAR RATE: 82 BPM

## 2021-01-27 NOTE — TELEPHONE ENCOUNTER
Spoke to Fernando Arroyo. Ty from [employer] was going to contact this office regarding pre and post interrogation prior to surgery.

## 2021-01-27 NOTE — TELEPHONE ENCOUNTER
Nurse, Radha called regarding the cardiac clearance requested. She stated that she had discussed with the [employer] rep that he does not believe that he needs to see the patient again because he already completed recent testing for the patient. Please call her to discuss.      [phone]

## 2021-01-29 ENCOUNTER — HOSPITAL ENCOUNTER (OUTPATIENT)
Dept: CARDIAC REHAB | Age: 61
Discharge: HOME | End: 2021-01-29

## 2021-01-29 ENCOUNTER — PRE-PROCEDURE TELEPHONE (OUTPATIENT)
Dept: PREADMISSION TESTING | Age: 61
End: 2021-01-29

## 2021-01-29 PROCEDURE — 9900000065 HC CARDIAC REHAB PHASE 3 - 1 VISIT

## 2021-01-29 NOTE — TELEPHONE ENCOUNTER
Spoke with patient and confirmed a covid swab appt for Feb 4th at 0900. Instructions provided, verbalizes understanding.

## 2021-02-01 ENCOUNTER — HOSPITAL ENCOUNTER (OUTPATIENT)
Dept: CARDIAC REHAB | Age: 61
Discharge: HOME | End: 2021-02-01

## 2021-02-01 PROCEDURE — 9900000065 HC CARDIAC REHAB PHASE 3 - 1 VISIT

## 2021-02-03 ENCOUNTER — HOSPITAL ENCOUNTER (OUTPATIENT)
Dept: CARDIAC REHAB | Age: 61
Discharge: HOME | End: 2021-02-03

## 2021-02-03 PROCEDURE — 9900000065 HC CARDIAC REHAB PHASE 3 - 1 VISIT

## 2021-02-04 ENCOUNTER — HOSPITAL ENCOUNTER (OUTPATIENT)
Dept: PREADMISSION TESTING | Age: 61
Setting detail: SPECIMEN
Discharge: HOME | End: 2021-02-08
Payer: COMMERCIAL

## 2021-02-04 DIAGNOSIS — Z11.59 ENCOUNTER FOR SCREENING FOR OTHER VIRAL DISEASES: Primary | ICD-10-CM

## 2021-02-04 PROCEDURE — U0005 INFEC AGEN DETEC AMPLI PROBE: HCPCS

## 2021-02-04 PROCEDURE — U0003 INFECTIOUS AGENT DETECTION BY NUCLEIC ACID (DNA OR RNA); SEVERE ACUTE RESPIRATORY SYNDROME CORONAVIRUS 2 (SARS-COV-2) (CORONAVIRUS DISEASE [COVID-19]), AMPLIFIED PROBE TECHNIQUE, MAKING USE OF HIGH THROUGHPUT TECHNOLOGIES AS DESCRIBED BY CMS-2020-01-R: HCPCS

## 2021-02-05 ENCOUNTER — HOSPITAL ENCOUNTER (OUTPATIENT)
Dept: CARDIAC REHAB | Age: 61
Discharge: HOME | End: 2021-02-05

## 2021-02-05 LAB
SARS-COV-2, RAPID: NORMAL
SARS-COV-2: NORMAL
SARS-COV-2: NOT DETECTED
SOURCE: NORMAL

## 2021-02-05 PROCEDURE — 9900000065 HC CARDIAC REHAB PHASE 3 - 1 VISIT

## 2021-02-06 ENCOUNTER — TELEPHONE (OUTPATIENT)
Dept: PRIMARY CARE CLINIC | Age: 61
End: 2021-02-06

## 2021-02-08 ENCOUNTER — ANESTHESIA EVENT (OUTPATIENT)
Dept: OPERATING ROOM | Age: 61
DRG: 328 | End: 2021-02-08
Payer: COMMERCIAL

## 2021-02-09 ENCOUNTER — HOSPITAL ENCOUNTER (INPATIENT)
Age: 61
LOS: 1 days | Discharge: HOME | DRG: 328 | End: 2021-02-10
Attending: SURGERY | Admitting: SURGERY
Payer: COMMERCIAL

## 2021-02-09 ENCOUNTER — ANESTHESIA (OUTPATIENT)
Dept: OPERATING ROOM | Age: 61
DRG: 328 | End: 2021-02-09
Payer: COMMERCIAL

## 2021-02-09 VITALS — OXYGEN SATURATION: 99 % | TEMPERATURE: 98.8 F | DIASTOLIC BLOOD PRESSURE: 101 MMHG | SYSTOLIC BLOOD PRESSURE: 152 MMHG

## 2021-02-09 DIAGNOSIS — Z98.890 S/P REPAIR OF PARAESOPHAGEAL HERNIA: Primary | ICD-10-CM

## 2021-02-09 DIAGNOSIS — Z87.19 S/P REPAIR OF PARAESOPHAGEAL HERNIA: Primary | ICD-10-CM

## 2021-02-09 LAB
ANION GAP SERPL CALCULATED.3IONS-SCNC: 12 MMOL/L (ref 9–17)
BUN BLDV-MCNC: 14 MG/DL (ref 8–23)
BUN/CREAT BLD: ABNORMAL (ref 9–20)
CALCIUM SERPL-MCNC: 9.3 MG/DL (ref 8.6–10.4)
CHLORIDE BLD-SCNC: 107 MMOL/L (ref 98–107)
CO2: 21 MMOL/L (ref 20–31)
CREAT SERPL-MCNC: 0.65 MG/DL (ref 0.5–0.9)
GFR AFRICAN AMERICAN: >60 ML/MIN
GFR NON-AFRICAN AMERICAN: >60 ML/MIN
GFR SERPL CREATININE-BSD FRML MDRD: ABNORMAL ML/MIN/{1.73_M2}
GFR SERPL CREATININE-BSD FRML MDRD: ABNORMAL ML/MIN/{1.73_M2}
GLUCOSE BLD-MCNC: 120 MG/DL (ref 74–100)
GLUCOSE BLD-MCNC: 216 MG/DL (ref 70–99)
HCT VFR BLD CALC: 36.8 % (ref 36.3–47.1)
HCT VFR BLD CALC: 37.9 % (ref 36.3–47.1)
HEMOGLOBIN: 11.9 G/DL (ref 11.9–15.1)
HEMOGLOBIN: 12 G/DL (ref 11.9–15.1)
MCH RBC QN AUTO: 28.7 PG (ref 25.2–33.5)
MCH RBC QN AUTO: 29.1 PG (ref 25.2–33.5)
MCHC RBC AUTO-ENTMCNC: 31.4 G/DL (ref 28.4–34.8)
MCHC RBC AUTO-ENTMCNC: 32.6 G/DL (ref 28.4–34.8)
MCV RBC AUTO: 89.3 FL (ref 82.6–102.9)
MCV RBC AUTO: 91.3 FL (ref 82.6–102.9)
NRBC AUTOMATED: 0 PER 100 WBC
NRBC AUTOMATED: 0 PER 100 WBC
PDW BLD-RTO: 14.4 % (ref 11.8–14.4)
PDW BLD-RTO: 14.4 % (ref 11.8–14.4)
PLATELET # BLD: 233 K/UL (ref 138–453)
PLATELET # BLD: 245 K/UL (ref 138–453)
PMV BLD AUTO: 8.9 FL (ref 8.1–13.5)
PMV BLD AUTO: 9.1 FL (ref 8.1–13.5)
POC POTASSIUM: 4 MMOL/L (ref 3.5–4.5)
POTASSIUM SERPL-SCNC: 4.6 MMOL/L (ref 3.7–5.3)
RBC # BLD: 4.12 M/UL (ref 3.95–5.11)
RBC # BLD: 4.15 M/UL (ref 3.95–5.11)
SODIUM BLD-SCNC: 140 MMOL/L (ref 135–144)
WBC # BLD: 11 K/UL (ref 3.5–11.3)
WBC # BLD: 12.3 K/UL (ref 3.5–11.3)

## 2021-02-09 PROCEDURE — 6370000000 HC RX 637 (ALT 250 FOR IP): Performed by: NURSE PRACTITIONER

## 2021-02-09 PROCEDURE — 6360000002 HC RX W HCPCS: Performed by: SURGERY

## 2021-02-09 PROCEDURE — 7100000000 HC PACU RECOVERY - FIRST 15 MIN: Performed by: SURGERY

## 2021-02-09 PROCEDURE — 6360000002 HC RX W HCPCS: Performed by: NURSE ANESTHETIST, CERTIFIED REGISTERED

## 2021-02-09 PROCEDURE — 80048 BASIC METABOLIC PNL TOTAL CA: CPT

## 2021-02-09 PROCEDURE — 2580000003 HC RX 258: Performed by: NURSE ANESTHETIST, CERTIFIED REGISTERED

## 2021-02-09 PROCEDURE — 2709999900 HC NON-CHARGEABLE SUPPLY: Performed by: SURGERY

## 2021-02-09 PROCEDURE — 1200000000 HC SEMI PRIVATE

## 2021-02-09 PROCEDURE — 6370000000 HC RX 637 (ALT 250 FOR IP): Performed by: SURGERY

## 2021-02-09 PROCEDURE — 2720000010 HC SURG SUPPLY STERILE: Performed by: SURGERY

## 2021-02-09 PROCEDURE — 2580000003 HC RX 258: Performed by: SURGERY

## 2021-02-09 PROCEDURE — 6360000002 HC RX W HCPCS: Performed by: NURSE PRACTITIONER

## 2021-02-09 PROCEDURE — 8E0W4CZ ROBOTIC ASSISTED PROCEDURE OF TRUNK REGION, PERCUTANEOUS ENDOSCOPIC APPROACH: ICD-10-PCS | Performed by: SURGERY

## 2021-02-09 PROCEDURE — 2500000003 HC RX 250 WO HCPCS: Performed by: NURSE ANESTHETIST, CERTIFIED REGISTERED

## 2021-02-09 PROCEDURE — 3700000000 HC ANESTHESIA ATTENDED CARE: Performed by: SURGERY

## 2021-02-09 PROCEDURE — 0DV44ZZ RESTRICTION OF ESOPHAGOGASTRIC JUNCTION, PERCUTANEOUS ENDOSCOPIC APPROACH: ICD-10-PCS | Performed by: SURGERY

## 2021-02-09 PROCEDURE — S2900 ROBOTIC SURGICAL SYSTEM: HCPCS | Performed by: SURGERY

## 2021-02-09 PROCEDURE — 2500000003 HC RX 250 WO HCPCS: Performed by: SURGERY

## 2021-02-09 PROCEDURE — 2580000003 HC RX 258: Performed by: ANESTHESIOLOGY

## 2021-02-09 PROCEDURE — 84132 ASSAY OF SERUM POTASSIUM: CPT

## 2021-02-09 PROCEDURE — 36415 COLL VENOUS BLD VENIPUNCTURE: CPT

## 2021-02-09 PROCEDURE — C1760 CLOSURE DEV, VASC: HCPCS | Performed by: SURGERY

## 2021-02-09 PROCEDURE — 3600000009 HC SURGERY ROBOT BASE: Performed by: SURGERY

## 2021-02-09 PROCEDURE — 43282 LAP PARAESOPH HER RPR W/MESH: CPT | Performed by: SURGERY

## 2021-02-09 PROCEDURE — 3600000019 HC SURGERY ROBOT ADDTL 15MIN: Performed by: SURGERY

## 2021-02-09 PROCEDURE — 0BUT4JZ SUPPLEMENT DIAPHRAGM WITH SYNTHETIC SUBSTITUTE, PERCUTANEOUS ENDOSCOPIC APPROACH: ICD-10-PCS | Performed by: SURGERY

## 2021-02-09 PROCEDURE — 85027 COMPLETE CBC AUTOMATED: CPT

## 2021-02-09 PROCEDURE — 0DJ08ZZ INSPECTION OF UPPER INTESTINAL TRACT, VIA NATURAL OR ARTIFICIAL OPENING ENDOSCOPIC: ICD-10-PCS | Performed by: SURGERY

## 2021-02-09 PROCEDURE — 6360000002 HC RX W HCPCS: Performed by: ANESTHESIOLOGY

## 2021-02-09 PROCEDURE — 82947 ASSAY GLUCOSE BLOOD QUANT: CPT

## 2021-02-09 PROCEDURE — 3700000001 HC ADD 15 MINUTES (ANESTHESIA): Performed by: SURGERY

## 2021-02-09 PROCEDURE — 94640 AIRWAY INHALATION TREATMENT: CPT

## 2021-02-09 PROCEDURE — C1781 MESH (IMPLANTABLE): HCPCS | Performed by: SURGERY

## 2021-02-09 PROCEDURE — 7100000001 HC PACU RECOVERY - ADDTL 15 MIN: Performed by: SURGERY

## 2021-02-09 DEVICE — MESH HERN W7XL10CM SYN TISS REINF BIOABSRB DISP BIO-A: Type: IMPLANTABLE DEVICE | Site: ESOPHAGUS | Status: FUNCTIONAL

## 2021-02-09 RX ORDER — FENTANYL CITRATE 50 UG/ML
25 INJECTION, SOLUTION INTRAMUSCULAR; INTRAVENOUS EVERY 5 MIN PRN
Status: DISCONTINUED | OUTPATIENT
Start: 2021-02-09 | End: 2021-02-09 | Stop reason: HOSPADM

## 2021-02-09 RX ORDER — ONDANSETRON 2 MG/ML
4 INJECTION INTRAMUSCULAR; INTRAVENOUS EVERY 6 HOURS PRN
Status: DISCONTINUED | OUTPATIENT
Start: 2021-02-09 | End: 2021-02-10 | Stop reason: HOSPADM

## 2021-02-09 RX ORDER — DEXAMETHASONE SODIUM PHOSPHATE 4 MG/ML
INJECTION, SOLUTION INTRA-ARTICULAR; INTRALESIONAL; INTRAMUSCULAR; INTRAVENOUS; SOFT TISSUE PRN
Status: DISCONTINUED | OUTPATIENT
Start: 2021-02-09 | End: 2021-02-09 | Stop reason: SDUPTHER

## 2021-02-09 RX ORDER — LIDOCAINE HYDROCHLORIDE 10 MG/ML
1 INJECTION, SOLUTION EPIDURAL; INFILTRATION; INTRACAUDAL; PERINEURAL
Status: ACTIVE | OUTPATIENT
Start: 2021-02-09 | End: 2021-02-09

## 2021-02-09 RX ORDER — FENTANYL CITRATE 50 UG/ML
50 INJECTION, SOLUTION INTRAMUSCULAR; INTRAVENOUS EVERY 5 MIN PRN
Status: DISCONTINUED | OUTPATIENT
Start: 2021-02-09 | End: 2021-02-09 | Stop reason: HOSPADM

## 2021-02-09 RX ORDER — SODIUM CHLORIDE 0.9 % (FLUSH) 0.9 %
10 SYRINGE (ML) INJECTION PRN
Status: DISCONTINUED | OUTPATIENT
Start: 2021-02-09 | End: 2021-02-10 | Stop reason: HOSPADM

## 2021-02-09 RX ORDER — MEPERIDINE HYDROCHLORIDE 50 MG/ML
12.5 INJECTION INTRAMUSCULAR; INTRAVENOUS; SUBCUTANEOUS EVERY 5 MIN PRN
Status: DISCONTINUED | OUTPATIENT
Start: 2021-02-09 | End: 2021-02-09 | Stop reason: HOSPADM

## 2021-02-09 RX ORDER — NEOSTIGMINE METHYLSULFATE 5 MG/5 ML
SYRINGE (ML) INTRAVENOUS PRN
Status: DISCONTINUED | OUTPATIENT
Start: 2021-02-09 | End: 2021-02-09 | Stop reason: SDUPTHER

## 2021-02-09 RX ORDER — FENTANYL CITRATE 50 UG/ML
25 INJECTION, SOLUTION INTRAMUSCULAR; INTRAVENOUS EVERY 5 MIN PRN
Status: DISCONTINUED | OUTPATIENT
Start: 2021-02-09 | End: 2021-02-10 | Stop reason: HOSPADM

## 2021-02-09 RX ORDER — HEPARIN SODIUM 5000 [USP'U]/ML
5000 INJECTION, SOLUTION INTRAVENOUS; SUBCUTANEOUS EVERY 8 HOURS SCHEDULED
Status: DISCONTINUED | OUTPATIENT
Start: 2021-02-09 | End: 2021-02-10 | Stop reason: HOSPADM

## 2021-02-09 RX ORDER — LIDOCAINE HYDROCHLORIDE 10 MG/ML
INJECTION, SOLUTION EPIDURAL; INFILTRATION; INTRACAUDAL; PERINEURAL PRN
Status: DISCONTINUED | OUTPATIENT
Start: 2021-02-09 | End: 2021-02-09 | Stop reason: SDUPTHER

## 2021-02-09 RX ORDER — SODIUM CHLORIDE, SODIUM LACTATE, POTASSIUM CHLORIDE, CALCIUM CHLORIDE 600; 310; 30; 20 MG/100ML; MG/100ML; MG/100ML; MG/100ML
1000 INJECTION, SOLUTION INTRAVENOUS CONTINUOUS
Status: DISCONTINUED | OUTPATIENT
Start: 2021-02-09 | End: 2021-02-09

## 2021-02-09 RX ORDER — PROPOFOL 10 MG/ML
INJECTION, EMULSION INTRAVENOUS PRN
Status: DISCONTINUED | OUTPATIENT
Start: 2021-02-09 | End: 2021-02-09 | Stop reason: SDUPTHER

## 2021-02-09 RX ORDER — SODIUM CHLORIDE, SODIUM LACTATE, POTASSIUM CHLORIDE, CALCIUM CHLORIDE 600; 310; 30; 20 MG/100ML; MG/100ML; MG/100ML; MG/100ML
INJECTION, SOLUTION INTRAVENOUS CONTINUOUS
Status: DISCONTINUED | OUTPATIENT
Start: 2021-02-09 | End: 2021-02-09

## 2021-02-09 RX ORDER — BUPIVACAINE HYDROCHLORIDE 5 MG/ML
INJECTION, SOLUTION PERINEURAL PRN
Status: DISCONTINUED | OUTPATIENT
Start: 2021-02-09 | End: 2021-02-09 | Stop reason: ALTCHOICE

## 2021-02-09 RX ORDER — IPRATROPIUM BROMIDE AND ALBUTEROL SULFATE 2.5; .5 MG/3ML; MG/3ML
1 SOLUTION RESPIRATORY (INHALATION) ONCE
Status: COMPLETED | OUTPATIENT
Start: 2021-02-09 | End: 2021-02-09

## 2021-02-09 RX ORDER — BUPIVACAINE HYDROCHLORIDE 5 MG/ML
40 INJECTION, SOLUTION EPIDURAL; INTRACAUDAL ONCE
Status: DISCONTINUED | OUTPATIENT
Start: 2021-02-09 | End: 2021-02-09 | Stop reason: HOSPADM

## 2021-02-09 RX ORDER — ONDANSETRON 2 MG/ML
INJECTION INTRAMUSCULAR; INTRAVENOUS PRN
Status: DISCONTINUED | OUTPATIENT
Start: 2021-02-09 | End: 2021-02-09 | Stop reason: SDUPTHER

## 2021-02-09 RX ORDER — SCOLOPAMINE TRANSDERMAL SYSTEM 1 MG/1
1 PATCH, EXTENDED RELEASE TRANSDERMAL
Status: DISCONTINUED | OUTPATIENT
Start: 2021-02-09 | End: 2021-02-10 | Stop reason: HOSPADM

## 2021-02-09 RX ORDER — ETOMIDATE 2 MG/ML
INJECTION INTRAVENOUS PRN
Status: DISCONTINUED | OUTPATIENT
Start: 2021-02-09 | End: 2021-02-09 | Stop reason: SDUPTHER

## 2021-02-09 RX ORDER — METHYLPREDNISOLONE SODIUM SUCCINATE 125 MG/2ML
40 INJECTION, POWDER, LYOPHILIZED, FOR SOLUTION INTRAMUSCULAR; INTRAVENOUS ONCE
Status: COMPLETED | OUTPATIENT
Start: 2021-02-09 | End: 2021-02-09

## 2021-02-09 RX ORDER — PHENYLEPHRINE HYDROCHLORIDE 10 MG/ML
INJECTION INTRAVENOUS PRN
Status: DISCONTINUED | OUTPATIENT
Start: 2021-02-09 | End: 2021-02-09 | Stop reason: SDUPTHER

## 2021-02-09 RX ORDER — FENTANYL CITRATE 50 UG/ML
50 INJECTION, SOLUTION INTRAMUSCULAR; INTRAVENOUS ONCE
Status: DISCONTINUED | OUTPATIENT
Start: 2021-02-09 | End: 2021-02-10 | Stop reason: HOSPADM

## 2021-02-09 RX ORDER — ALBUTEROL SULFATE 2.5 MG/3ML
2.5 SOLUTION RESPIRATORY (INHALATION) EVERY 6 HOURS PRN
Status: DISCONTINUED | OUTPATIENT
Start: 2021-02-09 | End: 2021-02-10 | Stop reason: HOSPADM

## 2021-02-09 RX ORDER — SODIUM CHLORIDE, SODIUM LACTATE, POTASSIUM CHLORIDE, CALCIUM CHLORIDE 600; 310; 30; 20 MG/100ML; MG/100ML; MG/100ML; MG/100ML
INJECTION, SOLUTION INTRAVENOUS CONTINUOUS
Status: DISCONTINUED | OUTPATIENT
Start: 2021-02-09 | End: 2021-02-10

## 2021-02-09 RX ORDER — PROMETHAZINE HYDROCHLORIDE 25 MG/ML
12.5 INJECTION, SOLUTION INTRAMUSCULAR; INTRAVENOUS EVERY 6 HOURS PRN
Status: DISCONTINUED | OUTPATIENT
Start: 2021-02-09 | End: 2021-02-10 | Stop reason: HOSPADM

## 2021-02-09 RX ORDER — FENTANYL CITRATE 50 UG/ML
INJECTION, SOLUTION INTRAMUSCULAR; INTRAVENOUS PRN
Status: DISCONTINUED | OUTPATIENT
Start: 2021-02-09 | End: 2021-02-09 | Stop reason: SDUPTHER

## 2021-02-09 RX ORDER — SODIUM CHLORIDE 0.9 % (FLUSH) 0.9 %
10 SYRINGE (ML) INJECTION EVERY 12 HOURS SCHEDULED
Status: DISCONTINUED | OUTPATIENT
Start: 2021-02-09 | End: 2021-02-10 | Stop reason: HOSPADM

## 2021-02-09 RX ORDER — GLYCOPYRROLATE 1 MG/5 ML
SYRINGE (ML) INTRAVENOUS PRN
Status: DISCONTINUED | OUTPATIENT
Start: 2021-02-09 | End: 2021-02-09 | Stop reason: SDUPTHER

## 2021-02-09 RX ORDER — ROCURONIUM BROMIDE 10 MG/ML
INJECTION, SOLUTION INTRAVENOUS PRN
Status: DISCONTINUED | OUTPATIENT
Start: 2021-02-09 | End: 2021-02-09 | Stop reason: SDUPTHER

## 2021-02-09 RX ORDER — IPRATROPIUM BROMIDE AND ALBUTEROL SULFATE 2.5; .5 MG/3ML; MG/3ML
1 SOLUTION RESPIRATORY (INHALATION)
Status: DISCONTINUED | OUTPATIENT
Start: 2021-02-09 | End: 2021-02-10 | Stop reason: HOSPADM

## 2021-02-09 RX ORDER — HEPARIN SODIUM 5000 [USP'U]/ML
5000 INJECTION, SOLUTION INTRAVENOUS; SUBCUTANEOUS ONCE
Status: COMPLETED | OUTPATIENT
Start: 2021-02-09 | End: 2021-02-09

## 2021-02-09 RX ORDER — METOPROLOL SUCCINATE 100 MG/1
100 TABLET, EXTENDED RELEASE ORAL 2 TIMES DAILY
Status: DISCONTINUED | OUTPATIENT
Start: 2021-02-09 | End: 2021-02-10 | Stop reason: HOSPADM

## 2021-02-09 RX ORDER — MAGNESIUM HYDROXIDE 1200 MG/15ML
LIQUID ORAL CONTINUOUS PRN
Status: COMPLETED | OUTPATIENT
Start: 2021-02-09 | End: 2021-02-09

## 2021-02-09 RX ADMIN — PHENYLEPHRINE HYDROCHLORIDE 200 MCG: 10 INJECTION INTRAVENOUS at 07:43

## 2021-02-09 RX ADMIN — METHYLPREDNISOLONE SODIUM SUCCINATE 40 MG: 125 INJECTION, POWDER, FOR SOLUTION INTRAMUSCULAR; INTRAVENOUS at 06:42

## 2021-02-09 RX ADMIN — IPRATROPIUM BROMIDE AND ALBUTEROL SULFATE 1 AMPULE: .5; 3 SOLUTION RESPIRATORY (INHALATION) at 15:43

## 2021-02-09 RX ADMIN — PHENYLEPHRINE HYDROCHLORIDE 100 MCG: 10 INJECTION INTRAVENOUS at 08:39

## 2021-02-09 RX ADMIN — ONDANSETRON 4 MG: 2 INJECTION INTRAMUSCULAR; INTRAVENOUS at 18:09

## 2021-02-09 RX ADMIN — HEPARIN SODIUM 5000 UNITS: 5000 INJECTION INTRAVENOUS; SUBCUTANEOUS at 06:39

## 2021-02-09 RX ADMIN — ROCURONIUM BROMIDE 10 MG: 10 INJECTION INTRAVENOUS at 07:43

## 2021-02-09 RX ADMIN — ROCURONIUM BROMIDE 40 MG: 10 INJECTION INTRAVENOUS at 07:20

## 2021-02-09 RX ADMIN — ROCURONIUM BROMIDE 10 MG: 10 INJECTION INTRAVENOUS at 08:34

## 2021-02-09 RX ADMIN — ONDANSETRON 4 MG: 2 INJECTION INTRAMUSCULAR; INTRAVENOUS at 12:45

## 2021-02-09 RX ADMIN — IPRATROPIUM BROMIDE AND ALBUTEROL SULFATE 1 AMPULE: .5; 3 SOLUTION RESPIRATORY (INHALATION) at 06:41

## 2021-02-09 RX ADMIN — ONDANSETRON 4 MG: 2 INJECTION INTRAMUSCULAR; INTRAVENOUS at 10:09

## 2021-02-09 RX ADMIN — FENTANYL CITRATE 50 MCG: 50 INJECTION, SOLUTION INTRAMUSCULAR; INTRAVENOUS at 10:59

## 2021-02-09 RX ADMIN — PHENYLEPHRINE HYDROCHLORIDE 10 MCG/MIN: 10 INJECTION INTRAVENOUS at 08:59

## 2021-02-09 RX ADMIN — FENTANYL CITRATE 50 MCG: 50 INJECTION, SOLUTION INTRAMUSCULAR; INTRAVENOUS at 11:44

## 2021-02-09 RX ADMIN — Medication 5 MG: at 10:16

## 2021-02-09 RX ADMIN — PHENYLEPHRINE HYDROCHLORIDE 100 MCG: 10 INJECTION INTRAVENOUS at 08:01

## 2021-02-09 RX ADMIN — SODIUM CHLORIDE, POTASSIUM CHLORIDE, SODIUM LACTATE AND CALCIUM CHLORIDE: 600; 310; 30; 20 INJECTION, SOLUTION INTRAVENOUS at 10:16

## 2021-02-09 RX ADMIN — SODIUM CHLORIDE, POTASSIUM CHLORIDE, SODIUM LACTATE AND CALCIUM CHLORIDE: 600; 310; 30; 20 INJECTION, SOLUTION INTRAVENOUS at 12:46

## 2021-02-09 RX ADMIN — ROCURONIUM BROMIDE 10 MG: 10 INJECTION INTRAVENOUS at 08:49

## 2021-02-09 RX ADMIN — FENTANYL CITRATE 50 MCG: 50 INJECTION, SOLUTION INTRAMUSCULAR; INTRAVENOUS at 07:47

## 2021-02-09 RX ADMIN — CEFAZOLIN 2 G: 10 INJECTION, POWDER, FOR SOLUTION INTRAVENOUS at 07:40

## 2021-02-09 RX ADMIN — HYDROMORPHONE HYDROCHLORIDE 1 MG: 1 INJECTION, SOLUTION INTRAMUSCULAR; INTRAVENOUS; SUBCUTANEOUS at 21:12

## 2021-02-09 RX ADMIN — DEXAMETHASONE SODIUM PHOSPHATE 4 MG: 4 INJECTION, SOLUTION INTRAMUSCULAR; INTRAVENOUS at 07:57

## 2021-02-09 RX ADMIN — HEPARIN SODIUM 5000 UNITS: 5000 INJECTION INTRAVENOUS; SUBCUTANEOUS at 21:13

## 2021-02-09 RX ADMIN — FENTANYL CITRATE 50 MCG: 50 INJECTION, SOLUTION INTRAMUSCULAR; INTRAVENOUS at 10:18

## 2021-02-09 RX ADMIN — PHENYLEPHRINE HYDROCHLORIDE 100 MCG: 10 INJECTION INTRAVENOUS at 08:03

## 2021-02-09 RX ADMIN — ROCURONIUM BROMIDE 10 MG: 10 INJECTION INTRAVENOUS at 09:55

## 2021-02-09 RX ADMIN — ROCURONIUM BROMIDE 10 MG: 10 INJECTION INTRAVENOUS at 08:05

## 2021-02-09 RX ADMIN — SODIUM CHLORIDE, POTASSIUM CHLORIDE, SODIUM LACTATE AND CALCIUM CHLORIDE: 600; 310; 30; 20 INJECTION, SOLUTION INTRAVENOUS at 06:16

## 2021-02-09 RX ADMIN — LIDOCAINE HYDROCHLORIDE 50 MG: 10 INJECTION, SOLUTION EPIDURAL; INFILTRATION; INTRACAUDAL; PERINEURAL at 07:19

## 2021-02-09 RX ADMIN — PROPOFOL 50 MG: 10 INJECTION, EMULSION INTRAVENOUS at 07:19

## 2021-02-09 RX ADMIN — Medication 0.8 MG: at 10:16

## 2021-02-09 RX ADMIN — METOPROLOL SUCCINATE 100 MG: 100 TABLET, FILM COATED, EXTENDED RELEASE ORAL at 21:05

## 2021-02-09 RX ADMIN — PROMETHAZINE HYDROCHLORIDE 12.5 MG: 25 INJECTION INTRAMUSCULAR; INTRAVENOUS at 14:06

## 2021-02-09 RX ADMIN — FAMOTIDINE 20 MG: 10 INJECTION INTRAVENOUS at 21:05

## 2021-02-09 RX ADMIN — ROCURONIUM BROMIDE 10 MG: 10 INJECTION INTRAVENOUS at 09:36

## 2021-02-09 RX ADMIN — PHENYLEPHRINE HYDROCHLORIDE 100 MCG: 10 INJECTION INTRAVENOUS at 07:35

## 2021-02-09 RX ADMIN — PHENYLEPHRINE HYDROCHLORIDE 100 MCG: 10 INJECTION INTRAVENOUS at 08:30

## 2021-02-09 RX ADMIN — FENTANYL CITRATE 100 MCG: 50 INJECTION, SOLUTION INTRAMUSCULAR; INTRAVENOUS at 07:19

## 2021-02-09 RX ADMIN — HYDROMORPHONE HYDROCHLORIDE 1 MG: 1 INJECTION, SOLUTION INTRAMUSCULAR; INTRAVENOUS; SUBCUTANEOUS at 12:45

## 2021-02-09 RX ADMIN — ETOMIDATE 20 MG: 2 INJECTION, SOLUTION INTRAVENOUS at 07:19

## 2021-02-09 RX ADMIN — ALBUTEROL SULFATE 2.5 MG: 2.5 SOLUTION RESPIRATORY (INHALATION) at 23:46

## 2021-02-09 RX ADMIN — ROCURONIUM BROMIDE 10 MG: 10 INJECTION INTRAVENOUS at 09:19

## 2021-02-09 RX ADMIN — PHENYLEPHRINE HYDROCHLORIDE 100 MCG: 10 INJECTION INTRAVENOUS at 08:54

## 2021-02-09 RX ADMIN — ROCURONIUM BROMIDE 10 MG: 10 INJECTION INTRAVENOUS at 09:04

## 2021-02-09 RX ADMIN — HYDROMORPHONE HYDROCHLORIDE 1 MG: 1 INJECTION, SOLUTION INTRAMUSCULAR; INTRAVENOUS; SUBCUTANEOUS at 18:09

## 2021-02-09 RX ADMIN — PHENYLEPHRINE HYDROCHLORIDE 200 MCG: 10 INJECTION INTRAVENOUS at 07:39

## 2021-02-09 ASSESSMENT — PULMONARY FUNCTION TESTS
PIF_VALUE: 16
PIF_VALUE: 27
PIF_VALUE: 27
PIF_VALUE: 25
PIF_VALUE: 16
PIF_VALUE: 0
PIF_VALUE: 26
PIF_VALUE: 18
PIF_VALUE: 27
PIF_VALUE: 22
PIF_VALUE: 29
PIF_VALUE: 26
PIF_VALUE: 25
PIF_VALUE: 25
PIF_VALUE: 27
PIF_VALUE: 27
PIF_VALUE: 0
PIF_VALUE: 27
PIF_VALUE: 18
PIF_VALUE: 28
PIF_VALUE: 27
PIF_VALUE: 11
PIF_VALUE: 29
PIF_VALUE: 15
PIF_VALUE: 17
PIF_VALUE: 27
PIF_VALUE: 29
PIF_VALUE: 22
PIF_VALUE: 26
PIF_VALUE: 27
PIF_VALUE: 22
PIF_VALUE: 27
PIF_VALUE: 26
PIF_VALUE: 27
PIF_VALUE: 25
PIF_VALUE: 21
PIF_VALUE: 26
PIF_VALUE: 27
PIF_VALUE: 28
PIF_VALUE: 25
PIF_VALUE: 25
PIF_VALUE: 28
PIF_VALUE: 27
PIF_VALUE: 27
PIF_VALUE: 28
PIF_VALUE: 27
PIF_VALUE: 24
PIF_VALUE: 16
PIF_VALUE: 26
PIF_VALUE: 22
PIF_VALUE: 4
PIF_VALUE: 29
PIF_VALUE: 10
PIF_VALUE: 17
PIF_VALUE: 27
PIF_VALUE: 29
PIF_VALUE: 29
PIF_VALUE: 18
PIF_VALUE: 1
PIF_VALUE: 27
PIF_VALUE: 1
PIF_VALUE: 18
PIF_VALUE: 28
PIF_VALUE: 27
PIF_VALUE: 27
PIF_VALUE: 17
PIF_VALUE: 27
PIF_VALUE: 30
PIF_VALUE: 27
PIF_VALUE: 28
PIF_VALUE: 16
PIF_VALUE: 27
PIF_VALUE: 25
PIF_VALUE: 26
PIF_VALUE: 29
PIF_VALUE: 26
PIF_VALUE: 16
PIF_VALUE: 27
PIF_VALUE: 1
PIF_VALUE: 27
PIF_VALUE: 17
PIF_VALUE: 27
PIF_VALUE: 17
PIF_VALUE: 30
PIF_VALUE: 21
PIF_VALUE: 17
PIF_VALUE: 28
PIF_VALUE: 16
PIF_VALUE: 16
PIF_VALUE: 5
PIF_VALUE: 27
PIF_VALUE: 0
PIF_VALUE: 29
PIF_VALUE: 22
PIF_VALUE: 17
PIF_VALUE: 1

## 2021-02-09 ASSESSMENT — PAIN SCALES - GENERAL
PAINLEVEL_OUTOF10: 4
PAINLEVEL_OUTOF10: 0
PAINLEVEL_OUTOF10: 3
PAINLEVEL_OUTOF10: 3
PAINLEVEL_OUTOF10: 5
PAINLEVEL_OUTOF10: 4
PAINLEVEL_OUTOF10: 8
PAINLEVEL_OUTOF10: 7
PAINLEVEL_OUTOF10: 7

## 2021-02-09 ASSESSMENT — PAIN DESCRIPTION - DESCRIPTORS
DESCRIPTORS: ACHING;DISCOMFORT
DESCRIPTORS: ACHING;DISCOMFORT

## 2021-02-09 ASSESSMENT — ENCOUNTER SYMPTOMS: SHORTNESS OF BREATH: 1

## 2021-02-09 ASSESSMENT — PAIN DESCRIPTION - PAIN TYPE
TYPE: ACUTE PAIN;SURGICAL PAIN
TYPE: SURGICAL PAIN

## 2021-02-09 ASSESSMENT — PAIN DESCRIPTION - ORIENTATION
ORIENTATION: UPPER
ORIENTATION: UPPER
ORIENTATION: RIGHT;LEFT;UPPER

## 2021-02-09 ASSESSMENT — PAIN DESCRIPTION - LOCATION
LOCATION: ABDOMEN
LOCATION: ABDOMEN

## 2021-02-09 ASSESSMENT — PAIN DESCRIPTION - ONSET: ONSET: ON-GOING

## 2021-02-09 ASSESSMENT — PAIN DESCRIPTION - FREQUENCY
FREQUENCY: CONTINUOUS
FREQUENCY: CONTINUOUS

## 2021-02-09 ASSESSMENT — PAIN - FUNCTIONAL ASSESSMENT: PAIN_FUNCTIONAL_ASSESSMENT: 0-10

## 2021-02-09 NOTE — ANESTHESIA PRE PROCEDURE
Department of Anesthesiology  Preprocedure Note       Name:  Gloria Tomlinson   Age:  57 y.o.  :  1960                                          MRN:  [id number]         Date:  2021      Surgeon: Jeremy Garcia):  Nelly Cardozo DO    Procedure: Procedure(s):  XI LAPAROSCOPIC ROBOTIC HIATAL HERNIA REPAIR WITH MESH  NISSEN FUNDOPLICATION, INCISIONAL HERNIA REPAIR    Medications prior to admission:   Prior to Admission medications    Medication Sig Start Date End Date Taking?  Authorizing Provider   budesonide-formoterol (SYMBICORT) 160-4.5 MCG/ACT AERO USE 2 INHALATIONS TWICE A DAY (RINSE MOUTH WELL AFTER USE) 21  Yes DEREK Spencer CNP   furosemide (LASIX) 40 MG tablet Take 1 tablet by mouth daily  Patient taking differently: Take 20 mg by mouth daily  20  Yes Jose Agrawal MD   metoprolol succinate (TOPROL XL) 100 MG extended release tablet Take 1 tablet by mouth 2 times daily 20  Yes Kaylee Hampton MD   sacubitril-valsartan (ENTRESTO) 24-26 MG per tablet Take 1 tablet by mouth 2 times daily 20  Yes Kaylee Hampton MD   fluticasone (FLONASE) 50 MCG/ACT nasal spray USE 2 SPRAYS IN EACH NOSTRIL ONCE DAILY 20  Yes DEREK Spencer CNP   glimepiride (AMARYL) 2 MG tablet Take 2 mg by mouth every morning (before breakfast)   Yes Historical Provider, MD   famotidine (PEPCID) 40 MG tablet Take 1 tablet by mouth every evening 20  Yes DEREK Spencer CNP   Cholecalciferol (VITAMIN D3) 50 MCG ( UT) CAPS Take 1 capsule by mouth daily   Yes Historical Provider, MD   pantoprazole (PROTONIX) 40 MG tablet Take one tablet twice daily 30-60 minutes prior to breakfast and evening meal 19  Yes DEREK Spencer CNP   SYNTHROID 175 MCG tablet Take 175 mcg by mouth Daily  8/3/19  Yes Historical Provider, MD   MOLYBDENUM PO Take 500 mcg by mouth daily   Yes Historical Provider, MD Probiotic Product (SOLUBLE FIBER/PROBIOTICS PO) Take by mouth 2 times daily   Yes Historical Provider, MD   metFORMIN (GLUCOPHAGE) 500 MG tablet TAKE 2 TABLETS BY MOUTH TWO TIMES A DAY WITH MEALS 9/18/17  Yes Giovani Soler MD   aspirin 81 MG EC tablet Take 1 tablet by mouth daily 1/22/20   Osman Beard DO   albuterol (PROVENTIL) (5 MG/ML) 0.5% nebulizer solution Take 1 mL by nebulization 4 times daily as needed for Wheezing 11/26/19   Elliott BuenaYASIR earlN - RED   ipratropium-albuterol (DUONEB) 0.5-2.5 (3) MG/3ML SOLN nebulizer solution Inhale 3 mLs into the lungs every 4 hours as needed for Shortness of Breath 5/6/19   Edon Buena, APRN - CNP   diclofenac (VOLTAREN) 75 MG EC tablet Take 1 tablet by mouth 2 times daily as needed for Pain 2/22/18   Peter Russell MD       Current medications:    Current Facility-Administered Medications   Medication Dose Route Frequency Provider Last Rate Last Admin    ipratropium-albuterol (DUONEB) nebulizer solution 1 ampule  1 ampule Inhalation Once Edon Buena, APRN - CNP        methylPREDNISolone sodium (SOLU-MEDROL) injection 40 mg  40 mg Intravenous Once Edon Buena, APRN - CNP        lactated ringers infusion   Intravenous Continuous Mega Andrews  mL/hr at 02/09/21 0616 New Bag at 02/09/21 0616    ceFAZolin (ANCEF) 2000 mg in dextrose 5 % 50 mL IVPB  2,000 mg Intravenous Once Kennieth Flavors, DO        heparin (porcine) injection 5,000 Units  5,000 Units Subcutaneous Once Kennieth Flavors, DO        lactated ringers infusion 1,000 mL  1,000 mL Intravenous Continuous Sulma Quezada MD           Allergies:     Allergies   Allergen Reactions    Morphine Shortness Of Breath, Nausea And Vomiting and Other (See Comments)     Chest tightness    Ativan [Lorazepam] Other (See Comments)     agitation    Codeine Nausea And Vomiting and Other (See Comments)     Chest tightness  Darvon [Propoxyphene] Nausea And Vomiting and Other (See Comments)     Chest tightness    Lisinopril Other (See Comments)     COUGH    Percocet [Oxycodone-Acetaminophen] Nausea And Vomiting and Other (See Comments)     Chest tightness       Problem List:    Patient Active Problem List   Diagnosis Code    Gastroesophageal reflux disease K21.9    Hypothyroidism E03.9    Essential hypertension I10    Hyperlipidemia E78.5    DJD (degenerative joint disease), lumbar M47.816    Acute diverticulitis of intestine K57.92    Hyperplastic colon polyp K63.5    Tinnitus H93.19    Sciatica of right side M54.31    Splenic artery aneurysm (HCC) I72.8    Presence of endovascular aneurysm coil compatible with safe magnetic resonance imaging M28.574    DVT (deep venous thrombosis) ([facility]) I82.409    LGSIL (low grade squamous intraepithelial dysplasia) [id number]    Sinusitis, chronic J32.9    Asthma J45.909    Non-celiac gluten sensitivity K90.41    Severe persistent asthma J45.50    Varicose veins of bilateral lower extremities with pain I83.813    Bowel perforation ([facility]) K63.1    Diverticulitis of large intestine with abscess without bleeding K57.20    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    Partial small bowel obstruction ([facility]) K56.600    Diabetes type 2, no ocular involvement ([facility] E11.9    History of radial keratotomy Z98.890    Combined forms of age-related cataract of both eyes H25.813    Respiratory distress R06.03    Exertional dyspnea R06.00    Thrombophlebitis of superficial veins of right lower extremity I80.01    Nonischemic cardiomyopathy (HCC) I42.8    Acute on chronic systolic congestive heart failure (HCC) I50.23    Acute respiratory failure (HCC) J96.00    Acute pulmonary edema ([facility]) J81.0       Past Medical History:        Diagnosis Date    Acute on chronic systolic congestive heart failure ([facility] 01/21/2020    \"viral heart failure\" per patient  AICD (automatic cardioverter/defibrillator) present     Asthma     CNP [facility]    Bilateral corneal scars     COVID-19 11/10/2020    Disease of blood and blood forming organ     Diverticulitis     ON COLONSCOPY    DJD (degenerative joint disease), lumbar     Dry eye syndrome     DVT (deep venous thrombosis) (HCC)     after splenic artery repair in right calf    Gastro - esophageal reflux disease     Hiatal hernia     History of blood transfusion 1982    History of echocardiogram 2020    Hyperlipidemia     HIGH CHOLESTEROL/HIGH TRIGLYCERIDES    Hyperplastic colon polyp     Hypertension     PCP DR Fran AGUIRRE, [facility]    Hypothyroidism     LGSIL (low grade squamous intraepithelial dysplasia) 2014    referred to GYN/S/P colpo with bx LGSIL, repeat pap in 6 months    Non-celiac gluten sensitivity     wheezing is directly proportionate to wheat intake    NSTEMI (non-ST elevated myocardial infarction) ([address] 2020    DR Arambula Dural    Osteoarthritis 2004    RT KNEE S/P INJURY    Other disorders of kidney and ureter in diseases classified elsewhere     Sciatica of right side     Severe persistent asthma     patient has seen that wheat consumption causes exacerbations    Splenic artery aneurysm (HCC)     s/p splenic artery repair    Tinnitus     Type 2 diabetes mellitus, controlled ([address] 2011    PCP DR Kayce Osorio       Past Surgical History:        Procedure Laterality Date    ABDOMEN SURGERY      ARTERIAL ANEURYSM REPAIR      splenic artery coil repair    CARDIAC CATHETERIZATION  2020    CARDIAC DEFIBRILLATOR PLACEMENT Left 2020    [employer]     SECTION  1986    COLONOSCOPY  2012    HYPERPLASTIC POLYP, DIVERTICULAR DISEASE    COLONOSCOPY  2017    moderate sigmoid diverticulosis.  Dr. Ramesh Lab  2015    with BX LGSIL    CYSTOSCOPY Bilateral 2017 CYSTO insertion lighted stents performed by Jazmyne Murcia MD at [address], ESOPHAGUS      ENDOSCOPY, COLON, DIAGNOSTIC      EYE SURGERY      KNEE ARTHROSCOPY Right 2004    PARTIAL SYNOVECTOMY AND CHONDROPLASTY DUE TO MENSICUS TEAR    LAPAROSCOPY N/A 09/08/2017    EXPLORATORY LAPAROSCOPY converted to open exploration with drainage of pelvic abscess performed by Leelee Ritter MD at [facility]  08/19/2014    clearing an infection done at [facility]  10/25/2011    L4/L5 epidural steroid injection    OTHER SURGICAL HISTORY Right 2006 and 2010    synvisc injections, two sets    OTHER SURGICAL HISTORY  05/19/2020    Lead revision    OR COLON CA SCRN NOT HI RSK IND N/A 02/27/2017    COLONOSCOPY performed by Leelee Ritter MD at [address] [facility] [address] IND N/A 09/08/2017    COLONOSCOPY performed by Leelee Ritter MD at [address] [facility] EGD TRANSORAL BIOPSY SINGLE/MULTIPLE N/A 02/27/2017    EGD BIOPSY performed by Leelee Ritter MD at [address] N/A 08/30/2017    ATTEMPTED LAPAROCOPY PROCEDURE CONVERTED TO OPEN Sigmoid Colectomy PERFORMED BY DR. VO resection colovaginal fistula PERFORMED BY. DR. Yvonne Martinez  performed by Leelee Ritter MD at [address] [facility] Right 07/2011    TOTAL    TUBAL LIGATION  1991    UPPER GASTROINTESTINAL ENDOSCOPY N/A 01/02/2020    EGD BIOPSY W/ 48 HR LEARY performed by Leelee Ritter MD at [facility] EXTRACTION         Social History:    Social History     Tobacco Use    Smoking status: Never Smoker    Smokeless tobacco: Never Used    Tobacco comment: never smoked syant rrt 10/13/2017   Substance Use Topics    Alcohol use: No     Alcohol/week: 0.0 standard drinks     Comment: Consume about 4 wine coolers a year                                Counseling given: Not Answered Comment: never smoked evelyn rrt 10/13/2017      Vital Signs (Current):   Vitals:    02/09/21 0559   Weight: 201 lb 8 oz (91.4 kg)   Height: 5' 7\" (1.702 m)                                              BP Readings from Last 3 Encounters:   01/26/21 113/72   11/10/20 112/78   10/15/20 103/71       NPO Status: Time of last liquid consumption: 2000                        Time of last solid consumption: 2000                        Date of last liquid consumption: 02/08/21                        Date of last solid food consumption: 02/08/21    BMI:   Wt Readings from Last 3 Encounters:   02/09/21 201 lb 8 oz (91.4 kg)   01/26/21 200 lb 6.4 oz (90.9 kg)   11/10/20 200 lb 12.8 oz (91.1 kg)     Body mass index is 31.56 kg/m². CBC:   Lab Results   Component Value Date    WBC 9.7 01/26/2021    RBC 5.04 01/26/2021    HGB 14.7 01/26/2021    HCT 46.2 01/26/2021    MCV 91.7 01/26/2021    RDW 14.4 01/26/2021     01/26/2021       CMP:   Lab Results   Component Value Date     01/26/2021    K 4.3 01/26/2021     01/26/2021    CO2 20 01/26/2021    BUN 14 01/26/2021    CREATININE 0.62 01/26/2021    GFRAA >60 01/26/2021    LABGLOM >60 01/26/2021    GLUCOSE 88 01/26/2021    PROT 6.7 06/09/2020    CALCIUM 10.3 01/26/2021    BILITOT 0.28 06/09/2020    ALKPHOS 61 06/09/2020    AST 26 06/09/2020    ALT 34 06/09/2020       POC Tests: No results for input(s): POCGLU, POCNA, POCK, POCCL, POCBUN, POCHEMO, POCHCT in the last 72 hours.     Coags:   Lab Results   Component Value Date    PROTIME 9.3 01/26/2021    INR 0.9 01/26/2021    APTT 22.4 01/16/2020       HCG (If Applicable): No results found for: PREGTESTUR, PREGSERUM, HCG, HCGQUANT     ABGs: No results found for: PHART, PO2ART, PBP5QYU, QBF0XMP, BEART, U6LIGBJC     Type & Screen (If Applicable):  No results found for: LABABO, LABRH    Drug/Infectious Status (If Applicable):  No results found for: HIV, HEPCAB    COVID-19 Screening (If Applicable):   Lab Results Component Value Date    COVID19 Not Detected 02/04/2021    COVID19 Detected 11/10/2020    COVID19 Not Detected 05/07/2020         Anesthesia Evaluation  Patient summary reviewed no history of anesthetic complications:   Airway: Mallampati: II  TM distance: >3 FB   Neck ROM: full  Mouth opening: > = 3 FB Dental:          Pulmonary:normal exam    (+) shortness of breath: no interval change,  asthma: seasonal asthma,                            Cardiovascular:    (+) hypertension: no interval change, past MI: no interval change, CHF: no interval change, MIGUEL:,       ECG reviewed  Rhythm: regular  Rate: normal  Echocardiogram reviewed                  Neuro/Psych:   (+) neuromuscular disease:,             GI/Hepatic/Renal:   (+) hiatal hernia, GERD: no interval change,           Endo/Other:    (+) DiabetesType II DM, no interval change, , hypothyroidism::., .                 Abdominal:           Vascular: negative vascular ROS. Anesthesia Plan      general and regional     ASA 4       Induction: intravenous. Anesthetic plan and risks discussed with patient. Plan discussed with CRNA.                 Afia Acosta MD   2/9/2021

## 2021-02-09 NOTE — INTERVAL H&P NOTE
Pt Name: Jana Neri  MRN: [id number]  YOB: 1960  Date of evaluation: 2/9/2021    I have reviewed the patient's history and physical examination completed in pre-admission testing on 1/26/21    No changes to history or on examination today, unless noted below. Negative covid-19 screening on 2/5/21.     MARNI FABIAN APRN-CNP  2/9/21  6:41 AM

## 2021-02-09 NOTE — ANESTHESIA POSTPROCEDURE EVALUATION
Department of Anesthesiology  Postprocedure Note    Patient: Kathie Weeks  MRN: [id number]  YOB: 1960  Date of evaluation: 2/9/2021  Time:  11:39 AM     Procedure Summary     Date: 02/09/21 Room / Location: [facility] / [address]    Anesthesia Start: 9368 Anesthesia Stop: 1033    Procedure: XI LAPAROSCOPIC ROBOTIC HIATAL HERNIA REPAIR WITH MESH  NISSEN FUNDOPLICATION. EGD (N/A ) Diagnosis: (HIATAL HERNIA, INCISIONAL HERNIA)    Surgeons: Aleksandra Cheney DO Responsible Provider: Franki Cui MD    Anesthesia Type: general, regional ASA Status: 3          Anesthesia Type: general, regional    Radha Phase I: Radha Score: 8    Radha Phase II:      Last vitals: Reviewed and per EMR flowsheets.        Anesthesia Post Evaluation    Patient location during evaluation: PACU  Patient participation: complete - patient participated  Level of consciousness: awake and alert  Pain score: 3  Airway patency: patent  Nausea & Vomiting: no vomiting and no nausea  Complications: no  Cardiovascular status: hemodynamically stable  Respiratory status: acceptable  Hydration status: stable

## 2021-02-09 NOTE — BRIEF OP NOTE
Brief Postoperative Note      Patient: Janna Ponce  YOB: 1960  MRN: [id number]    Date of Procedure: 2/9/2021    Pre-Op Diagnosis: HIATAL HERNIA, INCISIONAL HERNIA    Post-Op Diagnosis: Same       Procedure(s):  XI LAPAROSCOPIC ROBOTIC HIATAL HERNIA REPAIR WITH MESH  NISSEN FUNDOPLICATION. EGD    Surgeon(s):  Jose Antonio Tran DO    Assistant:  First Assistant: Stephany Clemons RN    Anesthesia: General    Estimated Blood Loss (mL): Minimal    Complications: None    Specimens:   * No specimens in log *    Implants:  Implant Name Type Inv.  Item Serial No.  Lot No. LRB No. Used Action   MESH CHARLINE [id number] SYN TISS REINF BIOABSRB DISP BIO-A - [id number]  [address] SYN TISS REINF BIOABSRB DISP BIO-A [id number]  [employer]-  N/A 1 Implanted         Drains: * No LDAs found *    Findings: see note    Electronically signed by Jose Antonio Tran DO on 2/9/2021 at 12:34 PM

## 2021-02-10 ENCOUNTER — APPOINTMENT (OUTPATIENT)
Dept: GENERAL RADIOLOGY | Age: 61
DRG: 328 | End: 2021-02-10
Attending: SURGERY
Payer: COMMERCIAL

## 2021-02-10 VITALS
RESPIRATION RATE: 16 BRPM | HEIGHT: 67 IN | WEIGHT: 201.5 LBS | SYSTOLIC BLOOD PRESSURE: 111 MMHG | BODY MASS INDEX: 31.63 KG/M2 | HEART RATE: 80 BPM | DIASTOLIC BLOOD PRESSURE: 75 MMHG | TEMPERATURE: 97.9 F | OXYGEN SATURATION: 97 %

## 2021-02-10 LAB
ANION GAP SERPL CALCULATED.3IONS-SCNC: 14 MMOL/L (ref 9–17)
BUN BLDV-MCNC: 11 MG/DL (ref 8–23)
BUN/CREAT BLD: ABNORMAL (ref 9–20)
CALCIUM SERPL-MCNC: 9.2 MG/DL (ref 8.6–10.4)
CHLORIDE BLD-SCNC: 108 MMOL/L (ref 98–107)
CO2: 23 MMOL/L (ref 20–31)
CREAT SERPL-MCNC: 0.56 MG/DL (ref 0.5–0.9)
GFR AFRICAN AMERICAN: >60 ML/MIN
GFR NON-AFRICAN AMERICAN: >60 ML/MIN
GFR SERPL CREATININE-BSD FRML MDRD: ABNORMAL ML/MIN/{1.73_M2}
GFR SERPL CREATININE-BSD FRML MDRD: ABNORMAL ML/MIN/{1.73_M2}
GLUCOSE BLD-MCNC: 105 MG/DL (ref 70–99)
POTASSIUM SERPL-SCNC: 4 MMOL/L (ref 3.7–5.3)
SODIUM BLD-SCNC: 145 MMOL/L (ref 135–144)

## 2021-02-10 PROCEDURE — 2500000003 HC RX 250 WO HCPCS: Performed by: SURGERY

## 2021-02-10 PROCEDURE — 6360000004 HC RX CONTRAST MEDICATION: Performed by: SURGERY

## 2021-02-10 PROCEDURE — 6360000002 HC RX W HCPCS: Performed by: SURGERY

## 2021-02-10 PROCEDURE — 74220 X-RAY XM ESOPHAGUS 1CNTRST: CPT

## 2021-02-10 PROCEDURE — 6370000000 HC RX 637 (ALT 250 FOR IP): Performed by: SURGERY

## 2021-02-10 PROCEDURE — 36415 COLL VENOUS BLD VENIPUNCTURE: CPT

## 2021-02-10 PROCEDURE — 80048 BASIC METABOLIC PNL TOTAL CA: CPT

## 2021-02-10 PROCEDURE — 6370000000 HC RX 637 (ALT 250 FOR IP): Performed by: STUDENT IN AN ORGANIZED HEALTH CARE EDUCATION/TRAINING PROGRAM

## 2021-02-10 PROCEDURE — 2580000003 HC RX 258: Performed by: SURGERY

## 2021-02-10 RX ORDER — ONDANSETRON 4 MG/1
4 TABLET, FILM COATED ORAL EVERY 8 HOURS PRN
Qty: 30 TABLET | Refills: 0 | Status: SHIPPED | OUTPATIENT
Start: 2021-02-10 | End: 2021-06-02

## 2021-02-10 RX ORDER — LEVOTHYROXINE SODIUM 175 UG/1
175 TABLET ORAL DAILY
Status: DISCONTINUED | OUTPATIENT
Start: 2021-02-10 | End: 2021-02-10 | Stop reason: HOSPADM

## 2021-02-10 RX ORDER — OXYCODONE HYDROCHLORIDE AND ACETAMINOPHEN 5; 325 MG/1; MG/1
2 TABLET ORAL EVERY 6 HOURS PRN
Status: DISCONTINUED | OUTPATIENT
Start: 2021-02-10 | End: 2021-02-10 | Stop reason: HOSPADM

## 2021-02-10 RX ORDER — OXYCODONE HYDROCHLORIDE AND ACETAMINOPHEN 5; 325 MG/1; MG/1
1 TABLET ORAL EVERY 6 HOURS PRN
Status: DISCONTINUED | OUTPATIENT
Start: 2021-02-10 | End: 2021-02-10 | Stop reason: HOSPADM

## 2021-02-10 RX ORDER — OXYCODONE HYDROCHLORIDE AND ACETAMINOPHEN 5; 325 MG/1; MG/1
1 TABLET ORAL EVERY 6 HOURS PRN
Qty: 28 TABLET | Refills: 0 | Status: SHIPPED | OUTPATIENT
Start: 2021-02-10 | End: 2021-02-17

## 2021-02-10 RX ADMIN — HEPARIN SODIUM 5000 UNITS: 5000 INJECTION INTRAVENOUS; SUBCUTANEOUS at 05:55

## 2021-02-10 RX ADMIN — SODIUM CHLORIDE, POTASSIUM CHLORIDE, SODIUM LACTATE AND CALCIUM CHLORIDE: 600; 310; 30; 20 INJECTION, SOLUTION INTRAVENOUS at 01:33

## 2021-02-10 RX ADMIN — HYDROMORPHONE HYDROCHLORIDE 1 MG: 1 INJECTION, SOLUTION INTRAMUSCULAR; INTRAVENOUS; SUBCUTANEOUS at 01:33

## 2021-02-10 RX ADMIN — OXYCODONE HYDROCHLORIDE AND ACETAMINOPHEN 1 TABLET: 5; 325 TABLET ORAL at 10:17

## 2021-02-10 RX ADMIN — LEVOTHYROXINE SODIUM 175 MCG: 175 TABLET ORAL at 08:23

## 2021-02-10 RX ADMIN — FAMOTIDINE 20 MG: 10 INJECTION INTRAVENOUS at 08:23

## 2021-02-10 RX ADMIN — OXYCODONE HYDROCHLORIDE AND ACETAMINOPHEN 1 TABLET: 5; 325 TABLET ORAL at 05:51

## 2021-02-10 RX ADMIN — METOPROLOL SUCCINATE 100 MG: 100 TABLET, FILM COATED, EXTENDED RELEASE ORAL at 08:23

## 2021-02-10 RX ADMIN — IOHEXOL 160 ML: 240 INJECTION, SOLUTION INTRATHECAL; INTRAVASCULAR; INTRAVENOUS; ORAL at 09:14

## 2021-02-10 ASSESSMENT — PAIN SCALES - GENERAL
PAINLEVEL_OUTOF10: 4
PAINLEVEL_OUTOF10: 7
PAINLEVEL_OUTOF10: 4

## 2021-02-10 NOTE — PROGRESS NOTES
At 1100 Dr. Gama Conklin notified and he stated that cardiac pacer could be interrogated inpatient bed. Dr. Nataliia Elliott called and stated that patient needs to have cardiac pacer interrogated prior to discharge from PACU setting.
CLINICAL PHARMACY NOTE: MEDS TO [address] [place], [place] [address] Drive Select Patient?: No  Total # of Prescriptions Filled: 2   The following medications were delivered to the patient:  · Oxycodone- acetaminophen 5/325 mg tab  · Ondansetron 4 mg tab  Total # of Interventions Completed: 1  Time Spent (min): 60    Additional Documentation:Medications delivered to the patient in her room (244).
Cardiac Clearance obtained for OR 2-9-21
[facility]  at bedside at 1200 noon interrogated cardiac pacemaker. Interrogation paper record paced in chart.
all belongings collected and placed in bed and transporter took patient to inpatient room.
 S/P repair of paraesophageal hernia [P86.590, Z87.19] 02/09/2021       1. 57 y.o. female with paraesophageal hernia POD# 1 s/p robotic assisted laparoscopic Nissen      Plan:  1. Analgesia: percocet and dilaudid  2. Diet: continue CLD  3. D/c IVF  4. F/u esophagram this AM  5. Monitor I/Os  6. F/u AM labs  7. DVT prophylaxis: heparin TID  8. Activity:  Continue to encourage ambulation/activity w/ PT/OT  9. Continue to encourage incentive spirometry  10.  Restart home meds today    Electronically signed by Beto Larsen DO  on 2/10/2021 at 6:21 AM   \

## 2021-02-10 NOTE — PLAN OF CARE
Problem: Pain:  Goal: Pain level will decrease  Description: Pain level will decrease  2/10/2021 1317 by Markham Jeans, RN  Outcome: Met This Shift  2/10/2021 1207 by Markham Jeans, RN  Outcome: Met This Shift  Goal: Control of acute pain  Description: Control of acute pain  2/10/2021 1317 by Markham Jeans, RN  Outcome: Met This Shift  2/10/2021 1207 by Markham Jeans, RN  Outcome: Met This Shift  Goal: Control of chronic pain  Description: Control of chronic pain  2/10/2021 1317 by Markham Jeans, RN  Outcome: Met This Shift  2/10/2021 1207 by Markham Jeans, RN  Outcome: Met This Shift

## 2021-02-10 NOTE — OP NOTE
Operative Note      Patient: Kin Favre  YOB: 1960  MRN: [id number]    Date of Procedure: 2/9/2021    Pre-Op Diagnosis: HIATAL HERNIA    Post-Op Diagnosis: Same and GERD       Procedure(s):  XI LAPAROSCOPIC ROBOTIC HIATAL HERNIA REPAIR WITH MESH  NISSEN FUNDOPLICATION. EGD    Surgeon(s):  Vika Bragg DO    Assistant:   First Assistant: Maykel Godfrey RN    Anesthesia: General    Estimated Blood Loss (mL): Minimal    Complications: None    Specimens:   * No specimens in log *    Implants:  Implant Name Type Inv. Item Serial No.  Lot No. LRB No. Used Action   MESH CHARLINE [id number] SYN TISS REINF BIOABSRB DISP BIO-A - [id number]  MESH CHARLINE [id number] SYN TISS REINF BIOABSRB DISP BIO-A [id number]  [employer]-  N/A 1 Implanted         Drains: * No LDAs found *    Findings: Adhesions at midline lysed down. EGD performed with satisfactory wrap. No evidence of esophageal or gastric injury. Consent:  The risks, options and benefits of the procedure were explained in detail to the patient.  The risk of bleeding, infection, need for further surgery, recurrence or slipped wrap, gastric or esophageal perforation, stroke or cardiovascular event need for feeding tube, stricture, need for ventilator support, recurrence, dysphagia were all explained.  She consented to the procedure.        Medications:   Ancef, SCD's, Heparin SQ      Operative Narrative:      The patient was taken to the operative suite and placed in supine position.  Anesthesia administered.  SCD's and cuevas placed and all bony prominences padded.  The abdomen prepared and draped in normal sterile fashion.  Time out called and patient and procedure confirmed.  An orogastric tube placed for decompression     Incision made at Galvan's point and using a 12 mm optical trocar access to the peritoneal cavity gained under direct laparoscopic visualization.  Pneumoperitoneum established without complication.  We then placed four 8 mm ports for robotic assisted repair of the paraesophageal hernia under direct visualization. I lysed adhesions between the colon and abdominal wall for about 20 minutes to free up enough room for robot port placement. The [facility] Liver retractor placed through a small 5 mm sub xyphoid incision.  The robot docked.        The orogastric tube used to decompress the stomach.  The phrenoesophageal ligament was divided using hook electrocautery exposing the left yisel of the diaphragm.  We continued our dissection along the anterior crural arch from left to right.  This allowed entry into the avascular plane and further dissection facilitated with the pneumoperitoneum.      We mobilized the pars flaccida until the right yisel was noted and we could begin our dissection.  The vena cava identified.  Using a combination of blunt and sharp dissection as well as electrocautery a window was created along the right yisel along the esophagus.  The avascular plane was entered again and this facilitated our dissection.  Careful dissection circumferentially around the esophagus to expose the left yisel.  The right and left vagus nerves were identified and preserved.  Eventually this allowed us to reduce the hernia and stomach.        The esophagus was reduced and we continued to work into the chest  all the attachments until we had high circumferential mobilization 5 cm into the thorax and reduced approximately 5 cm of intra-abdominal esophageal length.  We then mobilized the fundus by taking down the short gastrics with the vessel sealer.  Hemostasis noted     Upon completion of full dissection around the esophagus we then used 0 ethibond suture to reapproximate the anterior and posterior yisel.  A 55 Citizen of the [place] bougie was then passed by anesthesia under direct visualization until we could see this within the lumen of the stomach and this was across the crura in satisfactory position.  Using the 0 ethibond we then closed the the hiatal hernia defect around the bougie.  A blunt probe could easily be passed between the crural defect closure and the esophagus while the bougie was in place.  Satisfactory repair noted.  I then placed a [employer] A mesh in the abdomen.  It was positioned around the repair site so that no edges touched the esophagus.  The mesh then secured to the diaphragm with 3-0 vicryl sutures.      The fundus pulled posterior around the esophagus in standard fashion for a floppy Nissen fundoplication.  Shoe shine maneuver used to confirm undue tension and correct position of the fundus.  At this point, a 3-stitch 0 Ethibond floppy Nissen fundoplication was performed over the 56 [place] bougie with the most middle suture secured to the underlying muscle.  Hemostasis noted.  A probe could be passed between the fundoplication and underlying tissue.        We then performed EGD.  The bogie removed under visualization.  The endoscope advanced under visualization into the esophagus.  The scope passed down through the GE junction and into the stomach.  The scope passed into the first portion of the duodenum.  The scope withdrawn into the stomach and retroflexed.  The gastric wall appeared intact and appropriate wrap noted.  We then insufflated the stomach and simultaneously irrigated the upper abdomen.  No evidence of leak on bubble test.       The stomach decompressed.  The scope withdrawn from the stomach and the esophageal mucosa appeared without injury.  The scope slowly withdrawn while visualizing the esophagus.  The scope withdrawn in entirety.       The liver retractor removed.  Counts reported to me as correct.  Pneumoperitoneum released.      The incisions closed with 4-0 interrupted monocryl sutures.  Steri-strips applied.      The patient tolerated the procedure well.  She was stable to PACU.      Family updated to all intra-operative findings.     Electronically signed by Charlesetta Cushing, DO on 2/9/2021 at 9:55 PM

## 2021-02-14 NOTE — DISCHARGE SUMMARY
BARIATRIC SURGERY DISCHARGE SUMMARY    Disposition: DISCHARGE TO HOME    PATIENT NAME: Diane Boxer    YOB: 1960  MEDICAL RECORD NO. [id number]  DATE: 2/13/2021  ADMITTING PHYSICIAN: DR. Fely Conroy  PRIMARY CARE PHYSICIAN: Maxime Guzman MD   ADMIT DATE: 2/9/2021  DISCHARGE DATE:  2/10/2021  1:29 PM  DISPOSITION: to HOME  ADMITTING DIAGNOSIS:   1.  S/P repair of paraesophageal hernia      DISCHARGE DIAGNOSIS:   Patient Active Problem List   Diagnosis Code    Gastroesophageal reflux disease K21.9    Hypothyroidism E03.9    Essential hypertension I10    Hyperlipidemia E78.5    DJD (degenerative joint disease), lumbar M47.816    Acute diverticulitis of intestine K57.92    Hyperplastic colon polyp K63.5    Tinnitus H93.19    Sciatica of right side M54.31    Splenic artery aneurysm (HCC) I72.8    Presence of endovascular aneurysm coil compatible with safe magnetic resonance imaging L93.764    DVT (deep venous thrombosis) ([facility]) I82.409    LGSIL (low grade squamous intraepithelial dysplasia) [id number]    Sinusitis, chronic J32.9    Asthma J45.909    Non-celiac gluten sensitivity K90.41    Severe persistent asthma J45.50    Varicose veins of bilateral lower extremities with pain I83.813    Bowel perforation (HCC) K63.1    Diverticulitis of large intestine with abscess without bleeding K57.20    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    Partial small bowel obstruction (HCC) K56.600    Diabetes type 2, 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 Robotic assisted laparoscopic Nissen fundoplication    [address] is a 56 yo F w/ longstanding h/o paraesophageal hernia who underwent robotic assisted laparoscopic Nissen fundoplication on 0/8/18. Pt was admitted post-operatively for further evaluation and mgmt. Labs and images were followed daily throughout admission. Post-operatively pt's pain was controlled w/ IV pain medications and transitioned to PO as tolerated. She was started on CLD. Esophogram completed POD#1 and was w/o evidence of gastric leak. Pt was ambulatory w/o assistance. At time of discharge, Sahara Talley was tolerating a bariatric liquid diet, ambulating on her own accord, had adequate analgesia on oral pain medications, and had no signs of symptoms of complications. Patient was deemed medically stable and was DC to home on 2/10/2021  1:29 PM w/ instructions to f/u in office in 1 week. Pt expressed understanding of and agreement w/ DC instructions. DISCHARGE INSTRUCTIONS     Discharge Medications:        Medication List      START taking these medications    ondansetron 4 MG tablet  Commonly known as: Zofran  Take 1 tablet by mouth every 8 hours as needed for Nausea or Vomiting     oxyCODONE-acetaminophen 5-325 MG per tablet  Commonly known as: Percocet  Take 1 tablet by mouth every 6 hours as needed for Pain for up to 7 days. Intended supply: 7 days.  Take lowest dose possible to manage pain        CHANGE how you take these medications    furosemide 40 MG tablet  Commonly known as: LASIX  Take 1 tablet by mouth daily  What changed: how much to take        CONTINUE taking these medications    albuterol (5 MG/ML) 0.5% nebulizer solution  Commonly known as: PROVENTIL  Take 1 mL by nebulization 4 times daily as needed for Wheezing     aspirin 81 MG EC tablet  Take 1 tablet by mouth daily budesonide-formoterol 160-4.5 MCG/ACT Aero  Commonly known as: Symbicort  USE 2 INHALATIONS TWICE A DAY (RINSE MOUTH WELL AFTER USE)     diclofenac 75 MG EC tablet  Commonly known as: VOLTAREN  Take 1 tablet by mouth 2 times daily as needed for Pain     famotidine 40 MG tablet  Commonly known as: PEPCID  Take 1 tablet by mouth every evening     fluticasone 50 MCG/ACT nasal spray  Commonly known as: FLONASE  USE 2 SPRAYS IN EACH NOSTRIL ONCE DAILY     glimepiride 2 MG tablet  Commonly known as: AMARYL     ipratropium-albuterol 0.5-2.5 (3) MG/3ML Soln nebulizer solution  Commonly known as: DUONEB  Inhale 3 mLs into the lungs every 4 hours as needed for Shortness of Breath     metFORMIN 500 MG tablet  Commonly known as: GLUCOPHAGE  TAKE 2 TABLETS BY MOUTH TWO TIMES A DAY WITH MEALS     metoprolol succinate 100 MG extended release tablet  Commonly known as: TOPROL XL  Take 1 tablet by mouth 2 times daily     MOLYBDENUM PO     pantoprazole 40 MG tablet  Commonly known as: PROTONIX  Take one tablet twice daily 30-60 minutes prior to breakfast and evening meal     sacubitril-valsartan 24-26 MG per tablet  Commonly known as: ENTRESTO  Take 1 tablet by mouth 2 times daily     SOLUBLE FIBER/PROBIOTICS PO     Synthroid 175 MCG tablet  Generic drug: levothyroxine     Vitamin D3 50 MCG (2000 UT) Caps        STOP taking these medications    cholestyramine 4 g packet  Commonly known as: Questran     pilocarpine 1 % ophthalmic solution  Commonly known as: PILOCAR           Where to Get Your Medications      You can get these medications from any pharmacy    Bring a paper prescription for each of these medications  · ondansetron 4 MG tablet  · oxyCODONE-acetaminophen 5-325 MG per tablet       Diet: Clear liquid diet as tolerated  Activity: - Avoid strenuous activity or exercise until cleared during follow-up appointment  - No driving or operating heavy machinery while taking narcotics   Wound Care: Daily and as needed  Follow-up:

## 2021-02-18 ENCOUNTER — PATIENT MESSAGE (OUTPATIENT)
Dept: FAMILY MEDICINE CLINIC | Age: 61
End: 2021-02-18

## 2021-02-18 DIAGNOSIS — B37.31 VAGINAL YEAST INFECTION: ICD-10-CM

## 2021-02-18 RX ORDER — FLUCONAZOLE 150 MG/1
150 TABLET ORAL ONCE
Qty: 4 TABLET | Refills: 0 | Status: SHIPPED | OUTPATIENT
Start: 2021-02-18 | End: 2021-02-18

## 2021-02-18 NOTE — TELEPHONE ENCOUNTER
From: Juliana Lo  To: Juanita Lloyd, APRN - CNP  Sent: 2/18/2021 9:24 AM EST  Subject: Prescription Question    I need a prescription for vaginal yeast infection. Samuel Cazares. I had started the antibiotics before my surgery on Feb 9, then started them back up after surgery. I have about 3 days left. Darn yeast stuff! Things going well after surgery but have to deal with quite a bit of nausea. Dr. Presley Hurst did give me meds for it and it does help. Hope you and Tillman July are both staying well and safe on the roads. Daphne Sevilla.

## 2021-02-19 ENCOUNTER — TELEPHONE (OUTPATIENT)
Dept: BARIATRICS/WEIGHT MGMT | Age: 61
End: 2021-02-19

## 2021-02-19 ENCOUNTER — OFFICE VISIT (OUTPATIENT)
Dept: BARIATRICS/WEIGHT MGMT | Age: 61
End: 2021-02-19

## 2021-02-19 VITALS
SYSTOLIC BLOOD PRESSURE: 110 MMHG | WEIGHT: 188 LBS | DIASTOLIC BLOOD PRESSURE: 62 MMHG | BODY MASS INDEX: 29.51 KG/M2 | HEIGHT: 67 IN | TEMPERATURE: 97.1 F | HEART RATE: 80 BPM

## 2021-02-19 DIAGNOSIS — Z87.19 HISTORY OF REPAIR OF HIATAL HERNIA: Primary | ICD-10-CM

## 2021-02-19 DIAGNOSIS — Z98.890 HISTORY OF REPAIR OF HIATAL HERNIA: Primary | ICD-10-CM

## 2021-02-19 PROCEDURE — 99024 POSTOP FOLLOW-UP VISIT: CPT | Performed by: SURGERY

## 2021-02-19 NOTE — TELEPHONE ENCOUNTER
Patient seen today, forgot to ask dr Vadim Casiano, she is in voluntary cardiac rehab, would like to know when she can return to cardiac rehab .  Please advise

## 2021-02-28 NOTE — PROGRESS NOTES
[facility]  [address]  [address]  [facility] [address]  Dept: [phone]     2/19/2021     CC: Post Hernia repair     History:  57year old female 1 week post hiatalal hernia repair. No nausea, vomiting, fevers/chills. No chest pain or shortness of breath. Pain controlled. Voiding and having stools. Tolerating diet. No dysphagia     Review of Systems:     General  Negative for: []? Weight Change   [x]? Fatigue   []? Fevers & Chills    []? Appetite change []? Other:     Positive for: []? Weight Change   []? Fatigue   []? Fevers & Chills    []? Appetite change []? Other:   Cardiac  Negative for: [x]? Chest Pain   []? Difficulty Breathing   []? Leg Cramps [x]? Edema of Lower Extremeties    []? Left   []? Right      Positive for: []? Chest Pain   []? Difficulty Breathing   []? Leg Cramps []? Edema of Lower Extremeties    []? Left   []? Right   Pulmonary  Negative for: [x]? Shortness of Breath []? Wheeze []? Cough  [x]? Calf Pain      Positive for: []? Shortness of Breath []? Wheeze []? Cough  []? Calf Pain   Gastro-Intestinal Negative for: []? Heartburn   []? Reflux   []? Dysphagia   []? Melena   []? BRBPR  [x]? Vomiting   [x]? Abdominal Pain   [x]? Diarrhea  []? Hernia    [x]? Constipation  []? Other:      Positive for: []? Heartburn   []? Reflux   []? Dysphagia   []? Melena   []? BRBPR  []? Vomiting   []? Abdominal Pain   []? Diarrhea  []? Hernia    []? Constipation  []? Other:    Muskuloskeletal Negative for: [x]? Joint pain   []? Back pain   []? Knee Pain   []? Muscle weakness   [x]? Edema []? Other:      Positive for: []? Joint pain   []? Back pain   []? Knee Pain   []? Muscle weakness  []? Edema []? Other:    Neurologic Negative for: [x]? Syncope   [x]? Insomnia   []? Being treated for depression  []? Other:      Positive for: []? Syncope   []? Insomnia   []? Being treated for depression  []? Other:    Skin Negative for: [x]? Wound:   []? Open   []? Draining   []? Incisional     [x]? Rash   []? Hair Loss  []? Other:      Positive for: []? Wound:   []? Open   []? Draining    []? Incisional  []? Rash   []? Hair Loss  []? Other:       Physical Exam:  /60   Pulse 76   Temp 97.1 °F (36.2 °C)   Ht 6' 3\" (1.905 m)   Wt 245 lb (111.1 kg)   BMI 30.62 kg/m²   Constitutional:  Vital signs are normal. The patient appears well-developed and well-nourished. HEENT:   Head: Normocephalic. Atraumatic  Eyes: pupils are equal and reactive. No scleral icterus is present. Neck: No mass and no thyromegaly present. Cardiovascular: Normal rate, regular rhythm, S1 normal and S2 normal.  Radial pulses present   Pulmonary/Chest: Effort normal and breath sounds normal. No retractions  Abdominal: Soft. Normal appearance. There is no organomegaly. No tenderness. There is no rigidity, no rebound, no guarding and no Tai's sign. Musculoskeletal:        Right lower leg: Normal. No tenderness and no edema. Left lower leg: Normal. No tenderness and no edema. Incisions CDI  Neurological: The patient is alert and oriented. Moving all 4 extremities, sensation grossly intact bilateral  Skin: Skin is warm, dry and intact. Psychiatric: The patient has a normal mood and affect. Speech is normal and behavior is normal. Judgment and thought content normal. Cognition and memory are normal.      Assessment:  1 week post Robotic Hiatal Hernia repair     Plan:  No lifting over 20 lbs  No submerging wounds  Pureed diet for 1 week, then soft for 1 week, diets reviewed  Overall Recovering well.     Continue PPI for now, ok to wean as tolerated  Overall doing well

## 2021-03-09 ENCOUNTER — HOSPITAL ENCOUNTER (OUTPATIENT)
Dept: CARDIAC REHAB | Age: 61
Discharge: HOME | End: 2021-03-09

## 2021-03-09 PROCEDURE — 9900000065 HC CARDIAC REHAB PHASE 3 - 1 VISIT

## 2021-03-15 DIAGNOSIS — K21.9 GERD WITHOUT ESOPHAGITIS: ICD-10-CM

## 2021-03-15 DIAGNOSIS — K21.9 LPRD (LARYNGOPHARYNGEAL REFLUX DISEASE): ICD-10-CM

## 2021-03-15 RX ORDER — PANTOPRAZOLE SODIUM 40 MG/1
TABLET, DELAYED RELEASE ORAL
Qty: 90 TABLET | Refills: 3 | Status: SHIPPED | OUTPATIENT
Start: 2021-03-15

## 2021-03-16 ENCOUNTER — HOSPITAL ENCOUNTER (OUTPATIENT)
Dept: CARDIAC REHAB | Age: 61
Discharge: HOME | End: 2021-03-16

## 2021-03-16 PROCEDURE — 9900000065 HC CARDIAC REHAB PHASE 3 - 1 VISIT

## 2021-03-22 ENCOUNTER — PATIENT MESSAGE (OUTPATIENT)
Dept: FAMILY MEDICINE CLINIC | Age: 61
End: 2021-03-22

## 2021-03-22 DIAGNOSIS — J32.9 CHRONIC RECURRENT SINUSITIS: ICD-10-CM

## 2021-03-22 NOTE — TELEPHONE ENCOUNTER
From: Wing Hess  To: DEREK Thorpe CNP  Sent: 3/22/2021 3:23 PM EDT  Subject: Non-Urgent Medical Question    I think I just wrapped up with a sinus infection and it seems to have aggravated my bronchial area. I have a slight wheeze when I lay down. Chest feels a little tight and I tenderness in my right ribcage. All symptoms are very light at this point.  I have been using my Symbocort every AM and PM.

## 2021-03-23 RX ORDER — PREDNISONE 20 MG/1
20 TABLET ORAL 2 TIMES DAILY
Qty: 10 TABLET | Refills: 2 | Status: SHIPPED | OUTPATIENT
Start: 2021-03-23 | End: 2021-05-19 | Stop reason: SDUPTHER

## 2021-03-25 ENCOUNTER — HOSPITAL ENCOUNTER (OUTPATIENT)
Dept: CARDIAC REHAB | Age: 61
Discharge: HOME | End: 2021-03-25

## 2021-03-25 PROCEDURE — 9900000065 HC CARDIAC REHAB PHASE 3 - 1 VISIT

## 2021-03-26 ENCOUNTER — HOSPITAL ENCOUNTER (OUTPATIENT)
Dept: CARDIAC REHAB | Age: 61
Discharge: HOME | End: 2021-03-26

## 2021-03-26 PROCEDURE — 9900000065 HC CARDIAC REHAB PHASE 3 - 1 VISIT

## 2021-03-29 ENCOUNTER — HOSPITAL ENCOUNTER (OUTPATIENT)
Dept: CARDIAC REHAB | Age: 61
Discharge: HOME | End: 2021-03-29

## 2021-03-29 PROCEDURE — 9900000065 HC CARDIAC REHAB PHASE 3 - 1 VISIT

## 2021-03-30 ENCOUNTER — HOSPITAL ENCOUNTER (OUTPATIENT)
Dept: CARDIAC REHAB | Age: 61
Discharge: HOME | End: 2021-03-30

## 2021-03-30 PROCEDURE — 9900000065 HC CARDIAC REHAB PHASE 3 - 1 VISIT

## 2021-04-01 ENCOUNTER — HOSPITAL ENCOUNTER (OUTPATIENT)
Dept: CARDIAC REHAB | Age: 61
Discharge: HOME | End: 2021-04-01

## 2021-04-01 PROCEDURE — 9900000065 HC CARDIAC REHAB PHASE 3 - 1 VISIT

## 2021-04-05 ENCOUNTER — HOSPITAL ENCOUNTER (OUTPATIENT)
Dept: CARDIAC REHAB | Age: 61
Discharge: HOME | End: 2021-04-05

## 2021-04-05 PROCEDURE — 9900000065 HC CARDIAC REHAB PHASE 3 - 1 VISIT

## 2021-04-06 ENCOUNTER — HOSPITAL ENCOUNTER (OUTPATIENT)
Dept: CARDIAC REHAB | Age: 61
Discharge: HOME | End: 2021-04-06

## 2021-04-06 PROCEDURE — 9900000065 HC CARDIAC REHAB PHASE 3 - 1 VISIT

## 2021-04-08 ENCOUNTER — HOSPITAL ENCOUNTER (OUTPATIENT)
Dept: CARDIAC REHAB | Age: 61
Discharge: HOME | End: 2021-04-08

## 2021-04-08 PROCEDURE — 9900000065 HC CARDIAC REHAB PHASE 3 - 1 VISIT

## 2021-04-12 ENCOUNTER — HOSPITAL ENCOUNTER (OUTPATIENT)
Dept: CARDIAC REHAB | Age: 61
Discharge: HOME | End: 2021-04-12

## 2021-04-12 PROCEDURE — 9900000065 HC CARDIAC REHAB PHASE 3 - 1 VISIT

## 2021-04-15 ENCOUNTER — HOSPITAL ENCOUNTER (OUTPATIENT)
Dept: CARDIAC REHAB | Age: 61
Discharge: HOME | End: 2021-04-15

## 2021-04-15 ENCOUNTER — OFFICE VISIT (OUTPATIENT)
Dept: CARDIOLOGY | Age: 61
End: 2021-04-15
Payer: COMMERCIAL

## 2021-04-15 VITALS
DIASTOLIC BLOOD PRESSURE: 89 MMHG | SYSTOLIC BLOOD PRESSURE: 148 MMHG | WEIGHT: 188 LBS | BODY MASS INDEX: 29.51 KG/M2 | HEART RATE: 83 BPM | HEIGHT: 67 IN

## 2021-04-15 DIAGNOSIS — I10 HYPERTENSION, ESSENTIAL: Primary | ICD-10-CM

## 2021-04-15 PROCEDURE — 3017F COLORECTAL CA SCREEN DOC REV: CPT | Performed by: INTERNAL MEDICINE

## 2021-04-15 PROCEDURE — 1036F TOBACCO NON-USER: CPT | Performed by: INTERNAL MEDICINE

## 2021-04-15 PROCEDURE — G8427 DOCREV CUR MEDS BY ELIG CLIN: HCPCS | Performed by: INTERNAL MEDICINE

## 2021-04-15 PROCEDURE — G8417 CALC BMI ABV UP PARAM F/U: HCPCS | Performed by: INTERNAL MEDICINE

## 2021-04-15 PROCEDURE — 99214 OFFICE O/P EST MOD 30 MIN: CPT | Performed by: INTERNAL MEDICINE

## 2021-04-15 PROCEDURE — 9900000065 HC CARDIAC REHAB PHASE 3 - 1 VISIT

## 2021-04-15 PROCEDURE — 93000 ELECTROCARDIOGRAM COMPLETE: CPT | Performed by: INTERNAL MEDICINE

## 2021-04-15 NOTE — PROGRESS NOTES
Cardiology Consultation/Follow Up. [facility]    CC: follow up for CHF. HPI:  Ceferino Rutledge  This is a 63year old female who presents for follow up for CHF. She is doing well from a cardiac standpoint and denies having any chest pain, dyspnea on routine activity, orthopnea, lower extremity edema. She is maintained on low-dose Lasix every day 20 mg daily. Currently participating in phase 3 cardiac rehab. Also going to start summer yoga.'    No alarms or shocks from the ICD. Last interrogation in December 2020 reviewed and within normal limits. She underwent fundoplication surgery, laparoscopically, for hiatal hernia and uncontrolled GERD and her symptoms are significantly improved after that.     Past Medical:  Past Medical History:   Diagnosis Date    Acute on chronic systolic congestive heart failure ([facility] [address] 01/21/2020    \"viral heart failure\" per patient    AICD (automatic cardioverter/defibrillator) present     Asthma     CNP CHELSEA [facility] [address], [facility]    Bilateral corneal scars     COVID-19 11/10/2020    Disease of blood and blood forming organ     Diverticulitis     ON COLONSCOPY    DJD (degenerative joint disease), lumbar     Dry eye syndrome     DVT (deep venous thrombosis) (HCC)     after splenic artery repair in right calf    Gastro - esophageal reflux disease     Hiatal hernia     History of blood transfusion 1982    History of echocardiogram 04/2020    Hyperlipidemia     HIGH CHOLESTEROL/HIGH TRIGLYCERIDES    Hyperplastic colon polyp     Hypertension     PCP DR Jude AGUIRRE, [place]    Hypothyroidism     LGSIL (low grade squamous intraepithelial dysplasia) 11/2014    referred to GYN/S/P colpo with bx LGSIL, repeat pap in 6 months    Non-celiac gluten sensitivity     wheezing is directly proportionate to wheat intake    NSTEMI (non-ST elevated myocardial infarction) ([facility] [address] 01/16/2020    DR Felecia Ibrahim    Osteoarthritis 2004    RT KNEE S/P INJURY    Other disorders of kidney and ureter in diseases classified elsewhere     Sciatica of right side     Severe persistent asthma     patient has seen that wheat consumption causes exacerbations    Splenic artery aneurysm [facility])     s/p splenic artery repair    Tinnitus     Type 2 diabetes mellitus, controlled ([facility] [address] 2011    PCP DR Aleah Baptiste       Past Surgical:  Past Surgical History:   Procedure Laterality Date    ABDOMEN SURGERY      ARTERIAL ANEURYSM REPAIR      splenic artery coil repair    CARDIAC CATHETERIZATION  2020    CARDIAC DEFIBRILLATOR PLACEMENT Left 2020    [employer]     SECTION  1986    COLONOSCOPY  2012    HYPERPLASTIC POLYP, DIVERTICULAR DISEASE    COLONOSCOPY  2017    moderate sigmoid diverticulosis. Dr. Parmjit Herrera  2015    with BX LGSIL    CYSTOSCOPY Bilateral 2017    CYSTO insertion lighted stents performed by Chago Hartmann MD at [address], ESOPHAGUS      ENDOSCOPY, COLON, DIAGNOSTIC      EYE SURGERY      GASTRIC FUNDOPLICATION N/A 8/3/5676    XI LAPAROSCOPIC ROBOTIC HIATAL HERNIA REPAIR WITH MESH  NISSEN FUNDOPLICATION. EGD performed by Juan R Jessica DO at [address] [place] [address]  2021    LAPAROSCOPIC ROBOTIC HIATAL HERNIA REPAIR WITH MESH  NISSEN FUNDOPLICATION.  EGD    KNEE ARTHROSCOPY Right     PARTIAL SYNOVECTOMY AND CHONDROPLASTY DUE TO MENSICUS TEAR    LAPAROSCOPY N/A 2017    EXPLORATORY LAPAROSCOPY converted to open exploration with drainage of pelvic abscess performed by Mabel Swan MD at [facility]  2014    clearing an infection done at [facility]  10/25/2011    L4/L5 epidural steroid injection    OTHER SURGICAL HISTORY Right  and     synvisc injections, two sets    OTHER SURGICAL HISTORY  2020    Lead revision    IN COLON CA SCRN NOT HI RSK IND N/A 2017    COLONOSCOPY performed by Mabel Swan MD Musculoskeletal:  No radial site hematoma  · Integumentary: No rash or pruritis. · Neurological: No headache, diplopia, change in muscle strength, numbness or tingling. No change in gait, balance, coordination, mood, affect, memory, mentation, behavior. · Psychiatric: No new anxiety or depression. · Endocrine: No temperature intolerance. No excessive thirst, fluid intake, or urination. No tremor. · Hematologic/Lymphatic: No abnormal bruising or bleeding, blood clots or swollen lymph nodes. · Allergic/Immunologic: No nasal congestion or hives.     Medications:  Current Outpatient Medications   Medication Sig Dispense Refill    pantoprazole (PROTONIX) 40 MG tablet Take one tablet once daily 30-60 minutes prior to a meal 90 tablet 3    ondansetron (ZOFRAN) 4 MG tablet Take 1 tablet by mouth every 8 hours as needed for Nausea or Vomiting 30 tablet 0    budesonide-formoterol (SYMBICORT) 160-4.5 MCG/ACT AERO USE 2 INHALATIONS TWICE A DAY (RINSE MOUTH WELL AFTER USE) 3 Inhaler 3    furosemide (LASIX) 40 MG tablet Take 1 tablet by mouth daily (Patient taking differently: Take 20 mg by mouth daily ) 90 tablet 3    metoprolol succinate (TOPROL XL) 100 MG extended release tablet Take 1 tablet by mouth 2 times daily 180 tablet 3    sacubitril-valsartan (ENTRESTO) 24-26 MG per tablet Take 1 tablet by mouth 2 times daily 180 tablet 3    fluticasone (FLONASE) 50 MCG/ACT nasal spray USE 2 SPRAYS IN EACH NOSTRIL ONCE DAILY 48 g 3    glimepiride (AMARYL) 2 MG tablet Take 2 mg by mouth every morning (before breakfast)      famotidine (PEPCID) 40 MG tablet Take 1 tablet by mouth every evening 90 tablet 3    Cholecalciferol (VITAMIN D3) 50 MCG (2000 UT) CAPS Take 1 capsule by mouth daily      aspirin 81 MG EC tablet Take 1 tablet by mouth daily 30 tablet 3    albuterol (PROVENTIL) (5 MG/ML) 0.5% nebulizer solution Take 1 mL by nebulization 4 times daily as needed for Wheezing 120 each 3    SYNTHROID 175 MCG tablet Take 175 mcg by mouth Daily       MOLYBDENUM PO Take 500 mcg by mouth daily      ipratropium-albuterol (DUONEB) 0.5-2.5 (3) MG/3ML SOLN nebulizer solution Inhale 3 mLs into the lungs every 4 hours as needed for Shortness of Breath 360 mL 2    diclofenac (VOLTAREN) 75 MG EC tablet Take 1 tablet by mouth 2 times daily as needed for Pain 90 tablet 2    Probiotic Product (SOLUBLE FIBER/PROBIOTICS PO) Take by mouth 2 times daily      metFORMIN (GLUCOPHAGE) 500 MG tablet TAKE 2 TABLETS BY MOUTH TWO TIMES A DAY WITH MEALS 120 tablet 3     No current facility-administered medications for this visit. Physical Exam:   Vitals: BP (!) 148/89   Pulse 83   Ht 5' 7\" (1.702 m)   Wt 188 lb (85.3 kg)   LMP 04/29/2009 (Approximate)   BMI 29.44 kg/m²   General appearance: alert and cooperative with exam  HEENT: Head: Normocephalic, no lesions, without obvious abnormality.   Neck: no carotid bruit, no JVD  Lungs: clear to auscultation bilaterally  Heart: regular rate and rhythm, S1, S2 normal, no murmur, click, rub or gallop  Abdomen: soft, non-tender; bowel sounds normal; no masses,  no organomegaly  Extremities: extremities normal, atraumatic, no cyanosis or edema  Neurologic: Mental status: Alert, oriented, thought content appropriate    Labs:  Lab Results   Component Value Date    CHOL 216 05/06/2020    TRIG 195 05/06/2020    HDL 52 05/06/2020    LDLCHOLESTEROL 112 01/17/2020    LDLCALC 130 05/06/2020    VLDL NOT REPORTED (H) 01/17/2020    CHOLHDLRATIO 4.2 05/06/2020       Lab Results   Component Value Date     (H) 02/10/2021    K 4.0 02/10/2021     (H) 02/10/2021    CO2 23 02/10/2021    BUN 11 02/10/2021    CREATININE 0.56 02/10/2021    GLUCOSE 105 (H) 02/10/2021    CALCIUM 9.2 02/10/2021    PROT 6.7 06/09/2020    LABALBU 3.9 06/09/2020    BILITOT 0.28 (L) 06/09/2020    ALKPHOS 61 06/09/2020    AST 26 06/09/2020    ALT 34 (H) 06/09/2020    LABGLOM >60 02/10/2021    GFRAA >60 02/10/2021    GLOB 3.0 06/30/2017 EKG 4/15/2021: Normal sinus rhythm, normal ECG    LAST ECHO:   Results for orders placed or performed during the hospital encounter of 01/16/20   Echocardiogram complete 2D with doppler with color  Summary  Moderately dilated LV size , normal wall thickness. Severe global hypokinesis. Severely reduced LV systolic function with LVEF 25-30%. Normal RV size and function. Normal size LA and RA. No obvious significant structural valvular abnormality noted. Mild AR noted . Normal aortic root dimension. No significant pericardial effusion. No obvious intra-cardiac mass or shunt noted. LAST CATH:  Results for orders placed or performed during the hospital encounter of 01/16/20   Cardiac Catheterization   Procedure Summary   Minimal non obstructive CAD   LV systolic dysfunction- EF 99%         ECHO: 4/30/2020  Left ventricle is in the upper limits of normal in size. Left ventricular systolic function is severely reduced. Global hypokinesis. Left ventricular ejection fraction 30 %. Grade I (mild) left ventricular diastolic dysfunction. Normal aortic valve structure. Mild aortic insufficiency. Mild mitral valve thickening with annular calcification. Moderate mitral regurgitation. Normal function of other valves. No pericardial effusion. AICD placed: 5/11/20    RV lead revised 5/19/20. Past Medical and Surgical History, Problem List, Allergies, Medications, Labs, Imaging, all reviewed extensively in EMR and with the patient. Assessment & Plan:    1. 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 Currently compensated with no signs of volume overload on examination. Maintained on Lasix 20 mg daily, Toprol- mg twice daily and Entresto 24/26. At this point of time we are going to continue the same medication regimen. In future, we will try to switch Entresto to losartan.   Continue phase 3 cardiac rehab.    2. S/p AICD on 5/11/20 followed by RV lead revision 5/19/20. Routine interrogation in December 2020 reviewed and within normal limits. Patient does not have any volume overload although the OptiVol index has shown otherwise. We will continue monitoring every 6 months    3. DL    5. Minimal CAD on in cath 1/20    6. Has chronic long standing GERD/Esophagitis/Hiatal Hernia status post fundoplication in February 2021    The patient is to continue heart healthy diet, weight loss and exercise as tolerated. Patient's medications and side effects were discussed. Medication refills were provided if needed. Follow up appointment timing was discussed. All questions and concerns were addressed to patient's satisfaction. The patient is to follow up in 6-9 months or sooner if necessary. Sasha Gibson MD   [facility]  [facility]. [facility]  [phone]

## 2021-04-26 ENCOUNTER — HOSPITAL ENCOUNTER (OUTPATIENT)
Dept: CARDIAC REHAB | Age: 61
Discharge: HOME | End: 2021-04-26

## 2021-04-26 PROCEDURE — 9900000065 HC CARDIAC REHAB PHASE 3 - 1 VISIT

## 2021-04-29 ENCOUNTER — OFFICE VISIT (OUTPATIENT)
Dept: BARIATRICS/WEIGHT MGMT | Age: 61
End: 2021-04-29
Payer: COMMERCIAL

## 2021-04-29 VITALS
WEIGHT: 187 LBS | HEART RATE: 89 BPM | SYSTOLIC BLOOD PRESSURE: 120 MMHG | BODY MASS INDEX: 29.35 KG/M2 | DIASTOLIC BLOOD PRESSURE: 72 MMHG | HEIGHT: 67 IN

## 2021-04-29 DIAGNOSIS — K21.9 GASTROESOPHAGEAL REFLUX DISEASE WITHOUT ESOPHAGITIS: Primary | ICD-10-CM

## 2021-04-29 PROCEDURE — G8417 CALC BMI ABV UP PARAM F/U: HCPCS | Performed by: SURGERY

## 2021-04-29 PROCEDURE — 99212 OFFICE O/P EST SF 10 MIN: CPT | Performed by: SURGERY

## 2021-04-29 PROCEDURE — G8427 DOCREV CUR MEDS BY ELIG CLIN: HCPCS | Performed by: SURGERY

## 2021-04-29 PROCEDURE — 3017F COLORECTAL CA SCREEN DOC REV: CPT | Performed by: SURGERY

## 2021-04-29 PROCEDURE — 1036F TOBACCO NON-USER: CPT | Performed by: SURGERY

## 2021-04-29 RX ORDER — OMEPRAZOLE 20 MG/1
20 CAPSULE, DELAYED RELEASE ORAL DAILY
COMMUNITY
End: 2021-06-02

## 2021-04-30 ENCOUNTER — HOSPITAL ENCOUNTER (OUTPATIENT)
Dept: CARDIAC REHAB | Age: 61
Discharge: HOME | End: 2021-04-30

## 2021-04-30 PROCEDURE — 9900000065 HC CARDIAC REHAB PHASE 3 - 1 VISIT

## 2021-05-02 NOTE — PROGRESS NOTES
[facility]  [facility] INVASIVE BARIATRIC SURG  [address]  [address]  [place] [address]  Dept: [phone]     4/29/2021     CC: Post Hernia repair     History:  57year old female 4 month post hiatalal hernia repair. No nausea, vomiting, fevers/chills. No chest pain or shortness of breath. Pain controlled. Voiding and having stools. Tolerating diet. GERD is improved, but has some dysphagia     Review of Systems:     General  Negative for: []? Weight Change   [x]? Fatigue   [x]? Fevers & Chills    []? Appetite change []? Other:     Positive for: [x]? Weight Change   []? Fatigue   []? Fevers & Chills    []? Appetite change []? Other:   Cardiac  Negative for: [x]? Chest Pain   []? Difficulty Breathing   []? Leg Cramps [x]? Edema of Lower Extremeties    []? Left   []? Right      Positive for: []? Chest Pain   []? Difficulty Breathing   []? Leg Cramps []? Edema of Lower Extremeties    []? Left   []? Right   Pulmonary  Negative for: [x]? Shortness of Breath []? Wheeze [x]? Cough  [x]? Calf Pain      Positive for: []? Shortness of Breath []? Wheeze []? Cough  []? Calf Pain   Gastro-Intestinal Negative for: []? Heartburn   [x]? Reflux   []? Dysphagia   []? Melena   []? BRBPR  [x]? Vomiting   []? Abdominal Pain   [x]? Diarrhea  []? Hernia    [x]? Constipation  []? Other:      Positive for: []? Heartburn   []? Reflux   [x]? Dysphagia   []? Melena   []? BRBPR  []? Vomiting   []? Abdominal Pain   []? Diarrhea  []? Hernia    []? Constipation  []? Other:    Muskuloskeletal Negative for: [x]? Joint pain   []? Back pain   []? Knee Pain   []? Muscle weakness   [x]? Edema []? Other:      Positive for: []? Joint pain   []? Back pain   []? Knee Pain   []? Muscle weakness  []? Edema []? Other:    Neurologic Negative for: [x]? Syncope   [x]? Insomnia   []? Being treated for depression  []? Other:      Positive for: []? Syncope   []? Insomnia   []? Being treated for depression  []?  Other:    Skin Negative for: [x]? Wound:   []? Open   []? Draining   []? Incisional     [x]? Rash   []? Hair Loss  []? Other:      Positive for: []? Wound:   []? Open   []? Draining    []? Incisional  []? Rash   []? Hair Loss  []? Other:       Physical Exam:  /60   Pulse 76   Temp 97.1 °F (36.2 °C)   Ht 6' 3\" (1.905 m)   Wt 245 lb (111.1 kg)   BMI 30.62 kg/m²   Constitutional:  Vital signs are normal. The patient appears well-developed and well-nourished. HEENT:   Head: Normocephalic. Atraumatic  Eyes: pupils are equal and reactive. No scleral icterus is present. Neck: No mass and no thyromegaly present. Cardiovascular: Normal rate, regular rhythm, S1 normal and S2 normal.  Radial pulses present   Pulmonary/Chest: Effort normal and breath sounds normal. No retractions  Abdominal: Soft. Normal appearance. There is no organomegaly. No tenderness. There is no rigidity, no rebound, no guarding and no Tai's sign. Musculoskeletal:        Right lower leg: Normal. No tenderness and no edema. Left lower leg: Normal. No tenderness and no edema. Neurological: The patient is alert and oriented. Moving all 4 extremities, sensation grossly intact bilateral  Skin: Skin is warm, dry and intact. Psychiatric: The patient has a normal mood and affect. Speech is normal and behavior is normal. Judgment and thought content normal. Cognition and memory are normal.        Assessment:  3 month post Robotic Hiatal Hernia repair  GERD improved     Plan:  Regular activity  Try to wean off PPI  Advance diet as tolerated.  Dysphagia with breads  Gas x for gas bloat if needed  Follow up in 6 months   Call back if dysphagia not improving

## 2021-05-07 ENCOUNTER — HOSPITAL ENCOUNTER (OUTPATIENT)
Dept: CARDIAC REHAB | Age: 61
Discharge: HOME | End: 2021-05-07

## 2021-05-07 PROCEDURE — 9900000065 HC CARDIAC REHAB PHASE 3 - 1 VISIT

## 2021-05-10 ENCOUNTER — HOSPITAL ENCOUNTER (OUTPATIENT)
Dept: CARDIAC REHAB | Age: 61
Discharge: HOME | End: 2021-05-10

## 2021-05-10 PROCEDURE — 9900000065 HC CARDIAC REHAB PHASE 3 - 1 VISIT

## 2021-05-14 ENCOUNTER — HOSPITAL ENCOUNTER (OUTPATIENT)
Dept: CARDIAC REHAB | Age: 61
Discharge: HOME | End: 2021-05-14

## 2021-05-14 PROCEDURE — 9900000065 HC CARDIAC REHAB PHASE 3 - 1 VISIT

## 2021-05-18 ENCOUNTER — PATIENT MESSAGE (OUTPATIENT)
Dept: FAMILY MEDICINE CLINIC | Age: 61
End: 2021-05-18

## 2021-05-18 DIAGNOSIS — J32.9 CHRONIC RECURRENT SINUSITIS: ICD-10-CM

## 2021-05-19 RX ORDER — PREDNISONE 20 MG/1
20 TABLET ORAL 2 TIMES DAILY
Qty: 10 TABLET | Refills: 2 | Status: SHIPPED | OUTPATIENT
Start: 2021-05-19 | End: 2021-05-24

## 2021-05-19 NOTE — TELEPHONE ENCOUNTER
From: Ceferino Rutledge  To: DEREK James - CNP  Sent: 5/18/2021 6:42 PM EDT  Subject: Non-Urgent Medical Question    Have developed some wheezles and chest congestion, coughing. Sinus seems normal, a bit drippy but not painful and no deep yellow mucus. Just light yellow. Did take an albuterol treatment with my nebbie but it made heart race to 124 beats and it lasted for 8 hours before getting heart down to under 100. So a bit concerned about using nebbie too much. I want to get this under control before it flares to total asthma like stuff. What would you recommend?

## 2021-06-02 ENCOUNTER — HOSPITAL ENCOUNTER (OUTPATIENT)
Dept: CARDIAC REHAB | Age: 61
Discharge: HOME | End: 2021-06-02

## 2021-06-02 ENCOUNTER — OFFICE VISIT (OUTPATIENT)
Dept: FAMILY MEDICINE CLINIC | Age: 61
End: 2021-06-02
Payer: COMMERCIAL

## 2021-06-02 VITALS
DIASTOLIC BLOOD PRESSURE: 68 MMHG | SYSTOLIC BLOOD PRESSURE: 120 MMHG | BODY MASS INDEX: 30.29 KG/M2 | TEMPERATURE: 98.6 F | HEIGHT: 67 IN | WEIGHT: 193 LBS

## 2021-06-02 DIAGNOSIS — K21.9 LPRD (LARYNGOPHARYNGEAL REFLUX DISEASE): ICD-10-CM

## 2021-06-02 DIAGNOSIS — J45.998 ASTHMA, PERSISTENT CONTROLLED: Primary | ICD-10-CM

## 2021-06-02 DIAGNOSIS — R06.2 WHEEZING: ICD-10-CM

## 2021-06-02 PROCEDURE — 99213 OFFICE O/P EST LOW 20 MIN: CPT | Performed by: NURSE PRACTITIONER

## 2021-06-02 PROCEDURE — G8417 CALC BMI ABV UP PARAM F/U: HCPCS | Performed by: NURSE PRACTITIONER

## 2021-06-02 PROCEDURE — 3017F COLORECTAL CA SCREEN DOC REV: CPT | Performed by: NURSE PRACTITIONER

## 2021-06-02 PROCEDURE — G8427 DOCREV CUR MEDS BY ELIG CLIN: HCPCS | Performed by: NURSE PRACTITIONER

## 2021-06-02 PROCEDURE — 9900000065 HC CARDIAC REHAB PHASE 3 - 1 VISIT

## 2021-06-02 PROCEDURE — 94010 BREATHING CAPACITY TEST: CPT | Performed by: NURSE PRACTITIONER

## 2021-06-02 PROCEDURE — 1036F TOBACCO NON-USER: CPT | Performed by: NURSE PRACTITIONER

## 2021-06-02 RX ORDER — PREDNISONE 20 MG/1
20 TABLET ORAL 2 TIMES DAILY
Qty: 10 TABLET | Refills: 2 | Status: SHIPPED | OUTPATIENT
Start: 2021-06-02 | End: 2021-06-07

## 2021-06-02 RX ORDER — FAMOTIDINE 40 MG/1
40 TABLET, FILM COATED ORAL EVERY EVENING
Qty: 90 TABLET | Refills: 3 | Status: SHIPPED | OUTPATIENT
Start: 2021-06-02

## 2021-06-02 SDOH — ECONOMIC STABILITY: FOOD INSECURITY: WITHIN THE PAST 12 MONTHS, THE FOOD YOU BOUGHT JUST DIDN'T LAST AND YOU DIDN'T HAVE MONEY TO GET MORE.: NEVER TRUE

## 2021-06-02 SDOH — ECONOMIC STABILITY: FOOD INSECURITY: WITHIN THE PAST 12 MONTHS, YOU WORRIED THAT YOUR FOOD WOULD RUN OUT BEFORE YOU GOT MONEY TO BUY MORE.: NEVER TRUE

## 2021-06-02 ASSESSMENT — ENCOUNTER SYMPTOMS: VOICE CHANGE: 1

## 2021-06-02 ASSESSMENT — SOCIAL DETERMINANTS OF HEALTH (SDOH): HOW HARD IS IT FOR YOU TO PAY FOR THE VERY BASICS LIKE FOOD, HOUSING, MEDICAL CARE, AND HEATING?: NOT HARD AT ALL

## 2021-06-02 ASSESSMENT — PATIENT HEALTH QUESTIONNAIRE - PHQ9
SUM OF ALL RESPONSES TO PHQ QUESTIONS 1-9: 0
SUM OF ALL RESPONSES TO PHQ QUESTIONS 1-9: 0
SUM OF ALL RESPONSES TO PHQ9 QUESTIONS 1 & 2: 0
SUM OF ALL RESPONSES TO PHQ QUESTIONS 1-9: 0
1. LITTLE INTEREST OR PLEASURE IN DOING THINGS: 0
2. FEELING DOWN, DEPRESSED OR HOPELESS: 0

## 2021-06-02 NOTE — PROGRESS NOTES
2021     Arnoldo Rodriguez (:  1960) is a 57 y.o. female, here for evaluation of the following medical concerns:    HPI  Asthma:  Current treatment includes beta agonist inhalers, combination beta agonists/steroid inhalers, which has been effective. Using preventive medication(s) consistently: yes. Residual symptoms: none. Patient denies any other symptoms. She requires her rescue inhaler 2-3 time(s) per week. GI Disorder:  Patient presents for follow-up of GERD- chronic. Current symptoms include hoarseness. Symptoms are better since last visit. Patient denies any other symptoms. Current treatment includes: H2 blocker- famotidine, proton pump inhibitor- pantoprazole, which has been effective. Medication side effects:  none. Recent diagnostic testing: none. Review of Systems   HENT: Positive for voice change. All other systems reviewed and are negative. Prior to Visit Medications    Medication Sig Taking?  Authorizing Provider   famotidine (PEPCID) 40 MG tablet Take 1 tablet by mouth every evening Yes DEREK Zaidi CNP   predniSONE (DELTASONE) 20 MG tablet Take 1 tablet by mouth 2 times daily for 5 days Take with food Yes DEREK Zaidi CNP   pantoprazole (PROTONIX) 40 MG tablet Take one tablet once daily 30-60 minutes prior to a meal Yes DEREK Zaidi CNP   budesonide-formoterol (SYMBICORT) 160-4.5 MCG/ACT AERO USE 2 INHALATIONS TWICE A DAY (RINSE MOUTH WELL AFTER USE) Yes DEREK Zaidi CNP   furosemide (LASIX) 40 MG tablet Take 1 tablet by mouth daily  Patient taking differently: Take 20 mg by mouth daily  Yes Keagan Youngblood MD   metoprolol succinate (TOPROL XL) 100 MG extended release tablet Take 1 tablet by mouth 2 times daily Yes Erasto Bennett MD   sacubitril-valsartan (ENTRESTO) 24-26 MG per tablet Take 1 tablet by mouth 2 times daily Yes Erasto Bennett MD   fluticasone (FLONASE) 50 MCG/ACT nasal spray USE 2 SPRAYS IN EACH NOSTRIL ONCE DAILY Yes DEREK Iglesias CNP   glimepiride (AMARYL) 2 MG tablet Take 2 mg by mouth every morning (before breakfast) Yes Historical Provider, MD   Cholecalciferol (VITAMIN D3) 50 MCG (2000 UT) CAPS Take 1 capsule by mouth daily Yes Historical Provider, MD   aspirin 81 MG EC tablet Take 1 tablet by mouth daily Yes Edis Ward DO   SYNTHROID 175 MCG tablet Take 175 mcg by mouth Daily  Yes Historical Provider, MD   diclofenac (VOLTAREN) 75 MG EC tablet Take 1 tablet by mouth 2 times daily as needed for Pain Yes Erin Hernandez MD   Probiotic Product (SOLUBLE FIBER/PROBIOTICS PO) Take by mouth 2 times daily Yes Historical Provider, MD   metFORMIN (GLUCOPHAGE) 500 MG tablet TAKE 2 TABLETS BY MOUTH TWO TIMES A DAY WITH MEALS Yes Akhil Viramontes MD   albuterol (PROVENTIL) (5 MG/ML) 0.5% nebulizer solution Take 1 mL by nebulization 4 times daily as needed for Wheezing  Patient not taking: Reported on 4/29/2021  DEREK Iglesias CNP   ipratropium-albuterol (Timmothy Ear) 0.5-2.5 (3) MG/3ML SOLN nebulizer solution Inhale 3 mLs into the lungs every 4 hours as needed for Shortness of Breath  Patient not taking: Reported on 4/29/2021  DEREK Iglesias CNP        Social History     Tobacco Use    Smoking status: Never Smoker    Smokeless tobacco: Never Used    Tobacco comment: never smoked syant RUST 10/13/2017   Substance Use Topics    Alcohol use: No     Alcohol/week: 0.0 standard drinks     Comment: Consume about 4 wine coolers a year        Vitals:    06/02/21 1349   BP: 120/68   Site: Right Upper Arm   Position: Sitting   Cuff Size: Large Adult   Temp: 98.6 °F (37 °C)   TempSrc: Tympanic   Weight: 193 lb (87.5 kg)   Height: 5' 7\" (1.702 m)     Estimated body mass index is 30.23 kg/m² as calculated from the following:    Height as of this encounter: 5' 7\" (1.702 m). Weight as of this encounter: 193 lb (87.5 kg). Physical Exam   Constitutional  Appears stated age.   Head

## 2021-06-02 NOTE — PROGRESS NOTES
Symptoms experienced by patient  Runny nose  Yes Circles under eyes Yes Post nasal drip Yes Difficulty breathing No   Stuffy nose No Grumpiness No Hoarseness Yes Short of breath No   Sniffling  Yes Fatigue Yes Face pain/pressure No Tight/heavy chest No   Sneezing No Nosebleeds No Sore throat No cough Yes   Blowing nose Yes Nasal/sinus surgery Yes Headache  Yes Cough up mucus Yes   Itchy/teary eyes No Nasal polyps No Upper teeth hurt No Cough up blood No   Itchy ears No Hearing loss Yes Night cough No Chest pain No   Itchy nose No Ear problems No Throat clearing Yes Asthma No   Itchy throat No Tubes in ears No Bad breath No Wheezing Yes   Itchy roof of mouth No Snoring No Bad taste in mouth No Pneumonia No   Nose rubbing No Mouth breathing No  Ankle swelling No    Overbite No  Difficulty breathing on exertion No    Drooling (toddler) No         How many times per week do you use your rescue inhaler? 0    How many nights per month do you wake up due to asthma 0    How many mornings per week do you wake up with asthma symptoms? 1    If you wake with asthma symptoms, do you:   Use your inhaler right away? No   Use your inhaler right after you've been up for a while? Yes   Your wheezing goes away without using your inhaler? Yes     Does patient experience?     Heartburn and indigestion No  Vomiting easily No  Use of antacids (Rolaids, Tums, Maalox) Yes  Regurgitation of stomach contents No  Chronic cough worse after meals Yes  Chronic cough cough worse after laying down Yes  Chronic hoarseness or voice change Yes  Chest pain No  Stomach pain No  Neck pain No  Sore throat-frequent No  Feeling of throat closing or something stuck in throat No  Frequent burps or hiccups Yes  Adult onset asthma Yes  Asthma not relieved with usual treatment No  Anemia No  Asthma worse after meals, alcohol, lying down, bending to tie shoes, or after heartburn No  Chronic sinus disease Yes    Within the last month, how did the following problems affect the patient?   0=No Problem; 5=Severe Problem    Hoarseness or a problem with your voice 4  Clearing your throat 1  Excess throat mucus or postnasal drip 3  Difficulty swallowing food, liquids, pills 3  Coughing after you ate or after lying down 2  Breathing difficulties or choking episodes 1  Troublesome or annoying cough 1  Sensations of something sticking in your throat or a lump in your throat 0  Heartburn, chest pain, indigestion, or stomach acid coming up 3    Total: 15

## 2021-06-04 ENCOUNTER — HOSPITAL ENCOUNTER (OUTPATIENT)
Dept: CARDIAC REHAB | Age: 61
Discharge: HOME | End: 2021-06-04

## 2021-06-04 PROCEDURE — 9900000065 HC CARDIAC REHAB PHASE 3 - 1 VISIT

## 2021-06-07 ENCOUNTER — HOSPITAL ENCOUNTER (OUTPATIENT)
Dept: CARDIAC REHAB | Age: 61
Discharge: HOME | End: 2021-06-07

## 2021-06-07 PROCEDURE — 9900000065 HC CARDIAC REHAB PHASE 3 - 1 VISIT

## 2021-06-11 ENCOUNTER — HOSPITAL ENCOUNTER (OUTPATIENT)
Dept: CARDIAC REHAB | Age: 61
Discharge: HOME | End: 2021-06-11

## 2021-06-11 PROCEDURE — 9900000065 HC CARDIAC REHAB PHASE 3 - 1 VISIT

## 2021-06-14 ENCOUNTER — HOSPITAL ENCOUNTER (OUTPATIENT)
Dept: CARDIAC REHAB | Age: 61
Discharge: HOME | End: 2021-06-14

## 2021-06-14 PROCEDURE — 9900000065 HC CARDIAC REHAB PHASE 3 - 1 VISIT

## 2021-06-15 ENCOUNTER — PATIENT MESSAGE (OUTPATIENT)
Dept: FAMILY MEDICINE CLINIC | Age: 61
End: 2021-06-15

## 2021-06-15 NOTE — TELEPHONE ENCOUNTER
From: Zeke Arevalo  To: DEREK Combs CNP  Sent: 6/15/2021 10:15 AM EDT  Subject: Non-Urgent Medical Question    Got the message that Dec 8 appointment is cancelled. Called [phone] and ended up in a corporate call center. After a lengthily intake questionnaire and conversation with admin person; they said I could not resked with Gwendel Snellen because she is no longer associated with [employer]. Is she doing any work in any other area office?

## 2021-06-18 ENCOUNTER — HOSPITAL ENCOUNTER (OUTPATIENT)
Dept: CARDIAC REHAB | Age: 61
Discharge: HOME | End: 2021-06-18

## 2021-06-18 PROCEDURE — 9900000065 HC CARDIAC REHAB PHASE 3 - 1 VISIT

## 2021-06-21 ENCOUNTER — HOSPITAL ENCOUNTER (OUTPATIENT)
Dept: CARDIAC REHAB | Age: 61
Discharge: HOME | End: 2021-06-21

## 2021-06-21 DIAGNOSIS — R06.2 WHEEZING: ICD-10-CM

## 2021-06-21 PROCEDURE — 9900000065 HC CARDIAC REHAB PHASE 3 - 1 VISIT

## 2021-06-23 ENCOUNTER — HOSPITAL ENCOUNTER (OUTPATIENT)
Dept: GENERAL RADIOLOGY | Age: 61
Discharge: HOME | End: 2021-06-25
Payer: COMMERCIAL

## 2021-06-23 DIAGNOSIS — M25.562 LEFT KNEE PAIN, UNSPECIFIED CHRONICITY: ICD-10-CM

## 2021-06-23 PROCEDURE — 73562 X-RAY EXAM OF KNEE 3: CPT

## 2021-06-25 ENCOUNTER — PROCEDURE VISIT (OUTPATIENT)
Dept: CARDIOLOGY | Age: 61
End: 2021-06-25
Payer: COMMERCIAL

## 2021-06-25 DIAGNOSIS — Z95.810 AICD (AUTOMATIC CARDIOVERTER/DEFIBRILLATOR) PRESENT: ICD-10-CM

## 2021-06-25 DIAGNOSIS — I50.23 ACUTE ON CHRONIC SYSTOLIC CONGESTIVE HEART FAILURE (HCC): ICD-10-CM

## 2021-06-25 DIAGNOSIS — I42.8 NONISCHEMIC CARDIOMYOPATHY (HCC): ICD-10-CM

## 2021-06-25 PROCEDURE — 93289 INTERROG DEVICE EVAL HEART: CPT | Performed by: INTERNAL MEDICINE

## 2021-06-28 ENCOUNTER — HOSPITAL ENCOUNTER (OUTPATIENT)
Dept: CARDIAC REHAB | Age: 61
Discharge: HOME | End: 2021-06-28

## 2021-06-28 PROCEDURE — 9900000065 HC CARDIAC REHAB PHASE 3 - 1 VISIT

## 2021-07-01 ENCOUNTER — HOSPITAL ENCOUNTER (OUTPATIENT)
Dept: CARDIAC REHAB | Age: 61
Discharge: HOME | End: 2021-07-01

## 2021-07-01 PROCEDURE — 9900000065 HC CARDIAC REHAB PHASE 3 - 1 VISIT

## 2021-07-08 ENCOUNTER — HOSPITAL ENCOUNTER (OUTPATIENT)
Dept: CARDIAC REHAB | Age: 61
Discharge: HOME | End: 2021-07-08

## 2021-07-08 PROCEDURE — 9900000065 HC CARDIAC REHAB PHASE 3 - 1 VISIT

## 2021-07-09 ENCOUNTER — HOSPITAL ENCOUNTER (OUTPATIENT)
Dept: CARDIAC REHAB | Age: 61
Discharge: HOME | End: 2021-07-09

## 2021-07-09 PROCEDURE — 9900000065 HC CARDIAC REHAB PHASE 3 - 1 VISIT

## 2021-07-10 ENCOUNTER — OFFICE VISIT (OUTPATIENT)
Dept: PRIMARY CARE CLINIC | Age: 61
End: 2021-07-10
Payer: COMMERCIAL

## 2021-07-10 VITALS
SYSTOLIC BLOOD PRESSURE: 116 MMHG | OXYGEN SATURATION: 98 % | HEIGHT: 67 IN | TEMPERATURE: 97.2 F | WEIGHT: 192.6 LBS | RESPIRATION RATE: 18 BRPM | HEART RATE: 95 BPM | BODY MASS INDEX: 30.23 KG/M2 | DIASTOLIC BLOOD PRESSURE: 80 MMHG

## 2021-07-10 DIAGNOSIS — J32.4 CHRONIC PANSINUSITIS: Primary | ICD-10-CM

## 2021-07-10 DIAGNOSIS — J32.9 CHRONIC RECURRENT SINUSITIS: ICD-10-CM

## 2021-07-10 PROCEDURE — 3017F COLORECTAL CA SCREEN DOC REV: CPT | Performed by: NURSE PRACTITIONER

## 2021-07-10 PROCEDURE — 99213 OFFICE O/P EST LOW 20 MIN: CPT | Performed by: NURSE PRACTITIONER

## 2021-07-10 PROCEDURE — G8417 CALC BMI ABV UP PARAM F/U: HCPCS | Performed by: NURSE PRACTITIONER

## 2021-07-10 PROCEDURE — G8427 DOCREV CUR MEDS BY ELIG CLIN: HCPCS | Performed by: NURSE PRACTITIONER

## 2021-07-10 PROCEDURE — 1036F TOBACCO NON-USER: CPT | Performed by: NURSE PRACTITIONER

## 2021-07-10 RX ORDER — PREDNISONE 20 MG/1
20 TABLET ORAL 2 TIMES DAILY
Qty: 10 TABLET | Refills: 0 | Status: SHIPPED | OUTPATIENT
Start: 2021-07-10 | End: 2021-07-15

## 2021-07-10 RX ORDER — AMOXICILLIN AND CLAVULANATE POTASSIUM 875; 125 MG/1; MG/1
1 TABLET, FILM COATED ORAL 2 TIMES DAILY
Qty: 60 TABLET | Refills: 0 | Status: SHIPPED | OUTPATIENT
Start: 2021-07-10 | End: 2022-01-06 | Stop reason: SDUPTHER

## 2021-07-10 ASSESSMENT — ENCOUNTER SYMPTOMS
SINUS COMPLAINT: 1
SHORTNESS OF BREATH: 0
COUGH: 1
SINUS PRESSURE: 1
RHINORRHEA: 1
WHEEZING: 0

## 2021-07-10 NOTE — PROGRESS NOTES
[address] [place] [address]. [address] [place] [address], [id number]  [phone]      HPI:     Sinus Problem  This is a recurrent problem. The current episode started in the past 7 days. The problem is unchanged. There has been no fever. Associated symptoms include congestion, coughing, headaches and sinus pressure. Pertinent negatives include no shortness of breath. (Ears plugged) Past treatments include oral decongestants. The treatment provided mild relief.        Current Outpatient Medications   Medication Sig Dispense Refill    amoxicillin-clavulanate (AUGMENTIN) 875-125 MG per tablet Take 1 tablet by mouth 2 times daily 60 tablet 0    predniSONE (DELTASONE) 20 MG tablet Take 1 tablet by mouth 2 times daily for 5 days Take with food 10 tablet 0    famotidine (PEPCID) 40 MG tablet Take 1 tablet by mouth every evening 90 tablet 3    pantoprazole (PROTONIX) 40 MG tablet Take one tablet once daily 30-60 minutes prior to a meal 90 tablet 3    budesonide-formoterol (SYMBICORT) 160-4.5 MCG/ACT AERO USE 2 INHALATIONS TWICE A DAY (RINSE MOUTH WELL AFTER USE) 3 Inhaler 3    furosemide (LASIX) 40 MG tablet Take 1 tablet by mouth daily (Patient taking differently: Take 20 mg by mouth daily ) 90 tablet 3    metoprolol succinate (TOPROL XL) 100 MG extended release tablet Take 1 tablet by mouth 2 times daily 180 tablet 3    sacubitril-valsartan (ENTRESTO) 24-26 MG per tablet Take 1 tablet by mouth 2 times daily 180 tablet 3    fluticasone (FLONASE) 50 MCG/ACT nasal spray USE 2 SPRAYS IN EACH NOSTRIL ONCE DAILY 48 g 3    glimepiride (AMARYL) 2 MG tablet Take 2 mg by mouth every morning (before breakfast)      Cholecalciferol (VITAMIN D3) 50 MCG (2000 UT) CAPS Take 1 capsule by mouth daily      aspirin 81 MG EC tablet Take 1 tablet by mouth daily 30 tablet 3    albuterol (PROVENTIL) (5 MG/ML) 0.5% nebulizer solution Take 1 mL by nebulization 4 times daily as needed for Wheezing 120 each 3    SYNTHROID 175 MCG tablet Take 175 mcg by mouth Daily       ipratropium-albuterol (DUONEB) 0.5-2.5 (3) MG/3ML SOLN nebulizer solution Inhale 3 mLs into the lungs every 4 hours as needed for Shortness of Breath 360 mL 2    diclofenac (VOLTAREN) 75 MG EC tablet Take 1 tablet by mouth 2 times daily as needed for Pain 90 tablet 2    Probiotic Product (SOLUBLE FIBER/PROBIOTICS PO) Take by mouth 2 times daily      metFORMIN (GLUCOPHAGE) 500 MG tablet TAKE 2 TABLETS BY MOUTH TWO TIMES A DAY WITH MEALS 120 tablet 3     No current facility-administered medications for this visit. Allergies   Allergen Reactions    Morphine Shortness Of Breath, Nausea And Vomiting and Other (See Comments)     Chest tightness    Ativan [Lorazepam] Other (See Comments)     agitation    Ibuprofen     Codeine Nausea And Vomiting and Other (See Comments)     Chest tightness    Darvon [Propoxyphene] Nausea And Vomiting and Other (See Comments)     Chest tightness    Lisinopril Other (See Comments)     COUGH    Percocet [Oxycodone-Acetaminophen] Nausea And Vomiting and Other (See Comments)     Chest tightness       All patients pastmedical, surgical, social and family history has been reviewed. Subjective:      Review of Systems   Constitutional: Negative for activity change, appetite change, fatigue and fever. HENT: Positive for congestion, postnasal drip, rhinorrhea and sinus pressure. Respiratory: Positive for cough. Negative for shortness of breath and wheezing. Cardiovascular: Negative for chest pain and palpitations. Neurological: Positive for headaches. Negative for dizziness and light-headedness. Objective:      Physical Exam  Vitals reviewed. Constitutional:       Appearance: Normal appearance. She is obese. HENT:      Head: Normocephalic and atraumatic.       Right Ear: Tympanic membrane, ear canal and external ear normal.      Left Ear: Tympanic membrane, ear canal and external ear normal.      Nose: Congestion present. Right Sinus: Maxillary sinus tenderness present. Left Sinus: Maxillary sinus tenderness present. Mouth/Throat:      Lips: Pink. Mouth: Mucous membranes are moist.      Pharynx: Oropharynx is clear. Uvula midline. Tonsils: 2+ on the right. 2+ on the left. Cardiovascular:      Rate and Rhythm: Normal rate and regular rhythm. Heart sounds: Normal heart sounds. Pulmonary:      Effort: Pulmonary effort is normal.      Breath sounds: Normal breath sounds. Skin:     Capillary Refill: Capillary refill takes less than 2 seconds. Neurological:      General: No focal deficit present. Mental Status: She is alert and oriented to person, place, and time. Assessment:       Diagnosis Orders   1. Chronic pansinusitis     2. Chronic recurrent sinusitis  amoxicillin-clavulanate (AUGMENTIN) 875-125 MG per tablet   3. Asthma not dependent on systemic steroids with acute exacerbation  predniSONE (DELTASONE) 20 MG tablet       Plan:      augmentin 875mg 1 tablet BID for 10 days  Prednisone 20mg BID for 5 days  Supportive care  Push fluids  Return if symptoms do not improve or worsen   Return PRN   No follow-ups on file. No orders of the defined types were placed in this encounter. Orders Placed This Encounter   Medications    amoxicillin-clavulanate (AUGMENTIN) 875-125 MG per tablet     Sig: Take 1 tablet by mouth 2 times daily     Dispense:  60 tablet     Refill:  0    predniSONE (DELTASONE) 20 MG tablet     Sig: Take 1 tablet by mouth 2 times daily for 5 days Take with food     Dispense:  10 tablet     Refill:  0       Patient given educational materials - see patient instructions. All patient questionsanswered. Pt voiced understanding. Reviewed health maintenance.      Electronically signed by DEREK Rosario CNP, CNP on 7/10/2021 at 9:56 AM

## 2021-07-12 ENCOUNTER — HOSPITAL ENCOUNTER (OUTPATIENT)
Dept: CARDIAC REHAB | Age: 61
Discharge: HOME | End: 2021-07-12

## 2021-07-12 PROCEDURE — 9900000065 HC CARDIAC REHAB PHASE 3 - 1 VISIT

## 2021-07-29 ENCOUNTER — HOSPITAL ENCOUNTER (OUTPATIENT)
Dept: CARDIAC REHAB | Age: 61
Discharge: HOME | End: 2021-07-29

## 2021-07-29 PROCEDURE — 9900000065 HC CARDIAC REHAB PHASE 3 - 1 VISIT

## 2021-07-30 ENCOUNTER — TELEPHONE (OUTPATIENT)
Dept: FAMILY MEDICINE CLINIC | Age: 61
End: 2021-07-30

## 2021-07-30 ENCOUNTER — HOSPITAL ENCOUNTER (OUTPATIENT)
Dept: CARDIAC REHAB | Age: 61
Discharge: HOME | End: 2021-07-30

## 2021-07-30 DIAGNOSIS — B37.31 VAGINAL YEAST INFECTION: Primary | ICD-10-CM

## 2021-07-30 PROCEDURE — 9900000065 HC CARDIAC REHAB PHASE 3 - 1 VISIT

## 2021-07-30 RX ORDER — FLUCONAZOLE 150 MG/1
TABLET ORAL
Qty: 2 TABLET | Refills: 0 | Status: SHIPPED | OUTPATIENT
Start: 2021-07-30 | End: 2022-01-07 | Stop reason: SDUPTHER

## 2021-07-30 NOTE — TELEPHONE ENCOUNTER
Patient seen you in UC on 07/10 and was given an antibiotic and now has a yeast infection.  Patient has a history of this issue with antibiotics, patient is requesting diflucan sent to [facility]

## 2021-08-23 ENCOUNTER — HOSPITAL ENCOUNTER (OUTPATIENT)
Dept: CARDIAC REHAB | Age: 61
Discharge: HOME | End: 2021-08-23

## 2021-08-23 PROCEDURE — 9900000065 HC CARDIAC REHAB PHASE 3 - 1 VISIT

## 2021-08-26 ENCOUNTER — HOSPITAL ENCOUNTER (OUTPATIENT)
Dept: CARDIAC REHAB | Age: 61
Discharge: HOME | End: 2021-08-26

## 2021-08-26 PROCEDURE — 9900000065 HC CARDIAC REHAB PHASE 3 - 1 VISIT

## 2021-08-31 ENCOUNTER — HOSPITAL ENCOUNTER (OUTPATIENT)
Dept: CARDIAC REHAB | Age: 61
Discharge: HOME | End: 2021-08-31

## 2021-08-31 PROCEDURE — 9900000065 HC CARDIAC REHAB PHASE 3 - 1 VISIT

## 2021-09-02 ENCOUNTER — HOSPITAL ENCOUNTER (OUTPATIENT)
Dept: CARDIAC REHAB | Age: 61
Discharge: HOME | End: 2021-09-02

## 2021-09-02 PROCEDURE — 9900000065 HC CARDIAC REHAB PHASE 3 - 1 VISIT

## 2021-09-03 ENCOUNTER — HOSPITAL ENCOUNTER (OUTPATIENT)
Dept: CARDIAC REHAB | Age: 61
Discharge: HOME | End: 2021-09-03

## 2021-09-03 PROCEDURE — 9900000065 HC CARDIAC REHAB PHASE 3 - 1 VISIT

## 2021-09-09 ENCOUNTER — HOSPITAL ENCOUNTER (OUTPATIENT)
Dept: CARDIAC REHAB | Age: 61
Discharge: HOME | End: 2021-09-09

## 2021-09-09 PROCEDURE — 9900000065 HC CARDIAC REHAB PHASE 3 - 1 VISIT

## 2021-09-21 ENCOUNTER — HOSPITAL ENCOUNTER (OUTPATIENT)
Dept: MAMMOGRAPHY | Age: 61
Discharge: HOME | End: 2021-09-23
Payer: COMMERCIAL

## 2021-09-21 DIAGNOSIS — Z12.31 ENCOUNTER FOR SCREENING MAMMOGRAM FOR BREAST CANCER: ICD-10-CM

## 2021-09-21 PROCEDURE — 77063 BREAST TOMOSYNTHESIS BI: CPT

## 2021-09-29 ENCOUNTER — HOSPITAL ENCOUNTER (OUTPATIENT)
Dept: CARDIAC REHAB | Age: 61
Discharge: HOME | End: 2021-09-29

## 2021-09-29 PROCEDURE — 9900000065 HC CARDIAC REHAB PHASE 3 - 1 VISIT

## 2021-10-01 ENCOUNTER — HOSPITAL ENCOUNTER (OUTPATIENT)
Dept: CARDIAC REHAB | Age: 61
Discharge: HOME | End: 2021-10-01

## 2021-10-01 PROCEDURE — 9900000065 HC CARDIAC REHAB PHASE 3 - 1 VISIT

## 2021-10-07 ENCOUNTER — HOSPITAL ENCOUNTER (OUTPATIENT)
Dept: CARDIAC REHAB | Age: 61
Discharge: HOME | End: 2021-10-07

## 2021-10-07 PROCEDURE — 9900000065 HC CARDIAC REHAB PHASE 3 - 1 VISIT

## 2021-10-08 ENCOUNTER — HOSPITAL ENCOUNTER (OUTPATIENT)
Dept: CARDIAC REHAB | Age: 61
Discharge: HOME | End: 2021-10-08

## 2021-10-08 PROCEDURE — 9900000065 HC CARDIAC REHAB PHASE 3 - 1 VISIT

## 2021-10-15 ENCOUNTER — HOSPITAL ENCOUNTER (OUTPATIENT)
Dept: CARDIAC REHAB | Age: 61
Discharge: HOME | End: 2021-10-15

## 2021-10-15 PROCEDURE — 9900000065 HC CARDIAC REHAB PHASE 3 - 1 VISIT

## 2021-10-25 ENCOUNTER — HOSPITAL ENCOUNTER (OUTPATIENT)
Dept: CARDIAC REHAB | Age: 61
Discharge: HOME | End: 2021-10-25

## 2021-10-25 PROCEDURE — 9900000065 HC CARDIAC REHAB PHASE 3 - 1 VISIT

## 2021-10-26 ENCOUNTER — HOSPITAL ENCOUNTER (OUTPATIENT)
Dept: CARDIAC REHAB | Age: 61
Discharge: HOME | End: 2021-10-26

## 2021-10-26 PROCEDURE — 9900000065 HC CARDIAC REHAB PHASE 3 - 1 VISIT

## 2021-10-29 ENCOUNTER — HOSPITAL ENCOUNTER (OUTPATIENT)
Dept: CARDIAC REHAB | Age: 61
Discharge: HOME | End: 2021-10-29

## 2021-10-29 PROCEDURE — 9900000065 HC CARDIAC REHAB PHASE 3 - 1 VISIT

## 2021-11-11 NOTE — TELEPHONE ENCOUNTER
Pt presents w/ nose ring stuck to her R nostril. Pt states she had to take it out for work but now its broken off and stuck on the inside. No bleeding noted.    Rehab notified.

## 2021-11-19 ENCOUNTER — HOSPITAL ENCOUNTER (OUTPATIENT)
Dept: CARDIAC REHAB | Age: 61
Discharge: HOME | End: 2021-11-19

## 2021-11-19 PROCEDURE — 9900000065 HC CARDIAC REHAB PHASE 3 - 1 VISIT

## 2021-11-22 ENCOUNTER — HOSPITAL ENCOUNTER (OUTPATIENT)
Dept: CARDIAC REHAB | Age: 61
Discharge: HOME | End: 2021-11-22

## 2021-11-22 PROCEDURE — 9900000065 HC CARDIAC REHAB PHASE 3 - 1 VISIT

## 2021-11-29 ENCOUNTER — HOSPITAL ENCOUNTER (OUTPATIENT)
Dept: CARDIAC REHAB | Age: 61
Discharge: HOME | End: 2021-11-29

## 2021-11-29 PROCEDURE — 9900000065 HC CARDIAC REHAB PHASE 3 - 1 VISIT

## 2021-12-02 ENCOUNTER — HOSPITAL ENCOUNTER (OUTPATIENT)
Dept: CARDIAC REHAB | Age: 61
Discharge: HOME | End: 2021-12-02

## 2021-12-02 PROCEDURE — 9900000065 HC CARDIAC REHAB PHASE 3 - 1 VISIT

## 2021-12-03 ENCOUNTER — HOSPITAL ENCOUNTER (OUTPATIENT)
Dept: CARDIAC REHAB | Age: 61
Discharge: HOME | End: 2021-12-03

## 2021-12-03 ENCOUNTER — TELEPHONE (OUTPATIENT)
Dept: SURGERY | Age: 61
End: 2021-12-03

## 2021-12-03 PROCEDURE — 9900000065 HC CARDIAC REHAB PHASE 3 - 1 VISIT

## 2021-12-07 ENCOUNTER — HOSPITAL ENCOUNTER (OUTPATIENT)
Dept: CARDIAC REHAB | Age: 61
Discharge: HOME | End: 2021-12-07

## 2021-12-07 PROCEDURE — 9900000065 HC CARDIAC REHAB PHASE 3 - 1 VISIT

## 2021-12-09 ENCOUNTER — HOSPITAL ENCOUNTER (OUTPATIENT)
Dept: CARDIAC REHAB | Age: 61
Discharge: HOME | End: 2021-12-09

## 2021-12-09 PROCEDURE — 9900000065 HC CARDIAC REHAB PHASE 3 - 1 VISIT

## 2021-12-13 ENCOUNTER — HOSPITAL ENCOUNTER (OUTPATIENT)
Dept: CARDIAC REHAB | Age: 61
Discharge: HOME | End: 2021-12-13

## 2021-12-13 PROCEDURE — 9900000065 HC CARDIAC REHAB PHASE 3 - 1 VISIT

## 2021-12-15 ENCOUNTER — HOSPITAL ENCOUNTER (OUTPATIENT)
Dept: CARDIAC REHAB | Age: 61
Discharge: HOME | End: 2021-12-15

## 2021-12-15 PROCEDURE — 9900000065 HC CARDIAC REHAB PHASE 3 - 1 VISIT

## 2021-12-17 ENCOUNTER — PROCEDURE VISIT (OUTPATIENT)
Dept: CARDIOLOGY | Age: 61
End: 2021-12-17
Payer: COMMERCIAL

## 2021-12-17 ENCOUNTER — HOSPITAL ENCOUNTER (OUTPATIENT)
Dept: CARDIAC REHAB | Age: 61
Discharge: HOME | End: 2021-12-17

## 2021-12-17 DIAGNOSIS — Z95.810 AICD (AUTOMATIC CARDIOVERTER/DEFIBRILLATOR) PRESENT: ICD-10-CM

## 2021-12-17 DIAGNOSIS — I50.23 ACUTE ON CHRONIC SYSTOLIC CONGESTIVE HEART FAILURE (HCC): ICD-10-CM

## 2021-12-17 PROCEDURE — 9900000065 HC CARDIAC REHAB PHASE 3 - 1 VISIT

## 2021-12-17 PROCEDURE — 93289 INTERROG DEVICE EVAL HEART: CPT | Performed by: INTERNAL MEDICINE

## 2022-01-03 RX ORDER — METOPROLOL SUCCINATE 100 MG/1
100 TABLET, EXTENDED RELEASE ORAL 2 TIMES DAILY
Qty: 180 TABLET | Refills: 3 | Status: SHIPPED | OUTPATIENT
Start: 2022-01-03

## 2022-01-03 NOTE — TELEPHONE ENCOUNTER
Pt called [facility]    Last Appt:  Visit date not found  Next Appt:   1/20/2022  Med verified in [facility]

## 2022-01-06 ENCOUNTER — HOSPITAL ENCOUNTER (OUTPATIENT)
Age: 62
Setting detail: SPECIMEN
Discharge: HOME | End: 2022-01-06
Payer: COMMERCIAL

## 2022-01-06 ENCOUNTER — HOSPITAL ENCOUNTER (OUTPATIENT)
Dept: CARDIAC REHAB | Age: 62
Discharge: HOME | End: 2022-01-06

## 2022-01-06 ENCOUNTER — OFFICE VISIT (OUTPATIENT)
Dept: PRIMARY CARE CLINIC | Age: 62
End: 2022-01-06
Payer: COMMERCIAL

## 2022-01-06 VITALS
WEIGHT: 199 LBS | SYSTOLIC BLOOD PRESSURE: 110 MMHG | DIASTOLIC BLOOD PRESSURE: 80 MMHG | HEIGHT: 67 IN | HEART RATE: 92 BPM | BODY MASS INDEX: 31.23 KG/M2 | TEMPERATURE: 96.2 F | OXYGEN SATURATION: 98 %

## 2022-01-06 DIAGNOSIS — J01.90 ACUTE BACTERIAL SINUSITIS: Primary | ICD-10-CM

## 2022-01-06 DIAGNOSIS — R05.9 COUGH: ICD-10-CM

## 2022-01-06 DIAGNOSIS — B96.89 ACUTE BACTERIAL SINUSITIS: Primary | ICD-10-CM

## 2022-01-06 PROCEDURE — 9900000065 HC CARDIAC REHAB PHASE 3 - 1 VISIT

## 2022-01-06 PROCEDURE — 99213 OFFICE O/P EST LOW 20 MIN: CPT | Performed by: FAMILY MEDICINE

## 2022-01-06 PROCEDURE — G8427 DOCREV CUR MEDS BY ELIG CLIN: HCPCS | Performed by: FAMILY MEDICINE

## 2022-01-06 PROCEDURE — 3017F COLORECTAL CA SCREEN DOC REV: CPT | Performed by: FAMILY MEDICINE

## 2022-01-06 PROCEDURE — 1036F TOBACCO NON-USER: CPT | Performed by: FAMILY MEDICINE

## 2022-01-06 PROCEDURE — G8484 FLU IMMUNIZE NO ADMIN: HCPCS | Performed by: FAMILY MEDICINE

## 2022-01-06 PROCEDURE — G8417 CALC BMI ABV UP PARAM F/U: HCPCS | Performed by: FAMILY MEDICINE

## 2022-01-06 PROCEDURE — U0005 INFEC AGEN DETEC AMPLI PROBE: HCPCS

## 2022-01-06 PROCEDURE — U0003 INFECTIOUS AGENT DETECTION BY NUCLEIC ACID (DNA OR RNA); SEVERE ACUTE RESPIRATORY SYNDROME CORONAVIRUS 2 (SARS-COV-2) (CORONAVIRUS DISEASE [COVID-19]), AMPLIFIED PROBE TECHNIQUE, MAKING USE OF HIGH THROUGHPUT TECHNOLOGIES AS DESCRIBED BY CMS-2020-01-R: HCPCS

## 2022-01-06 RX ORDER — AMOXICILLIN AND CLAVULANATE POTASSIUM 875; 125 MG/1; MG/1
1 TABLET, FILM COATED ORAL 2 TIMES DAILY
Qty: 20 TABLET | Refills: 0 | Status: SHIPPED | OUTPATIENT
Start: 2022-01-06 | End: 2022-01-16

## 2022-01-06 ASSESSMENT — ENCOUNTER SYMPTOMS
SORE THROAT: 1
DIARRHEA: 0
CHOKING: 0
CONSTIPATION: 0
CHEST TIGHTNESS: 0
SINUS COMPLAINT: 1
SHORTNESS OF BREATH: 0
COUGH: 1
TROUBLE SWALLOWING: 0
SINUS PRESSURE: 1
WHEEZING: 0
NAUSEA: 0

## 2022-01-06 ASSESSMENT — PATIENT HEALTH QUESTIONNAIRE - PHQ9
SUM OF ALL RESPONSES TO PHQ9 QUESTIONS 1 & 2: 0
SUM OF ALL RESPONSES TO PHQ QUESTIONS 1-9: 0
1. LITTLE INTEREST OR PLEASURE IN DOING THINGS: 0
2. FEELING DOWN, DEPRESSED OR HOPELESS: 0
SUM OF ALL RESPONSES TO PHQ QUESTIONS 1-9: 0

## 2022-01-06 NOTE — PROGRESS NOTES
[facility] [address]  [address] [place] [address] URGENT CARE A DEPARTMENT OF [place] [address]  [address] [address]  Dept: [phone]  Dept Fax: [phone]    Arnoldo Rodriguez is a 57 y.o. female who presents today for her medical conditions/complaints as noted below. Arnoldo Rodriguez is c/o of   Chief Complaint   Patient presents with    Sinus Problem     sinus pain and pressure, nasal drainage,headache,cough sx began 5 days ago        HPI:     Here today for sinus congestion. Sinus Problem  This is a new problem. The current episode started in the past 7 days (1/1/22). The problem has been gradually worsening since onset. There has been no fever. Her pain is at a severity of 3/10. The pain is mild. Associated symptoms include congestion, coughing, headaches (mild), sinus pressure and a sore throat. Pertinent negatives include no chills, ear pain, shortness of breath or sneezing. Past treatments include acetaminophen, oral decongestants and saline sprays. The treatment provided mild relief. No known covid exposure.  She was vaccinated with J&J in March and Ina Morris     Past Medical History:   Diagnosis Date    Acute on chronic systolic congestive heart failure ([facility] [address] 01/21/2020    \"viral heart failure\" per patient    AICD (automatic cardioverter/defibrillator) present     Asthma     CNP [facility]    Bilateral corneal scars     COVID-19 11/10/2020    Disease of blood and blood forming organ     Diverticulitis     ON COLONSCOPY    DJD (degenerative joint disease), lumbar     Dry eye syndrome     DVT (deep venous thrombosis) (HCC)     after splenic artery repair in right calf    Gastro - esophageal reflux disease     Hiatal hernia     History of blood transfusion 1982    History of echocardiogram 04/2020    Hyperlipidemia     HIGH CHOLESTEROL/HIGH TRIGLYCERIDES    Hyperplastic colon polyp     Hypertension MCG/ACT nasal spray USE 2 SPRAYS IN EACH NOSTRIL ONCE DAILY 48 g 3    glimepiride (AMARYL) 2 MG tablet Take 2 mg by mouth every morning (before breakfast)      Cholecalciferol (VITAMIN D3) 50 MCG (2000 UT) CAPS Take 1 capsule by mouth daily      SYNTHROID 175 MCG tablet Take 175 mcg by mouth Daily       diclofenac (VOLTAREN) 75 MG EC tablet Take 1 tablet by mouth 2 times daily as needed for Pain 90 tablet 2    Probiotic Product (SOLUBLE FIBER/PROBIOTICS PO) Take by mouth 2 times daily      metFORMIN (GLUCOPHAGE) 500 MG tablet TAKE 2 TABLETS BY MOUTH TWO TIMES A DAY WITH MEALS 120 tablet 3    fluconazole (DIFLUCAN) 150 MG tablet Take one daily now and repeat dose in 48 hrs. (Patient not taking: Reported on 1/6/2022) 2 tablet 0    aspirin 81 MG EC tablet Take 1 tablet by mouth daily (Patient not taking: Reported on 1/6/2022) 30 tablet 3    albuterol (PROVENTIL) (5 MG/ML) 0.5% nebulizer solution Take 1 mL by nebulization 4 times daily as needed for Wheezing (Patient not taking: Reported on 1/6/2022) 120 each 3    ipratropium-albuterol (DUONEB) 0.5-2.5 (3) MG/3ML SOLN nebulizer solution Inhale 3 mLs into the lungs every 4 hours as needed for Shortness of Breath (Patient not taking: Reported on 1/6/2022) 360 mL 2     No current facility-administered medications for this visit. Allergies   Allergen Reactions    Morphine Shortness Of Breath, Nausea And Vomiting and Other (See Comments)     Chest tightness    Ativan [Lorazepam] Other (See Comments)     agitation    Ibuprofen     Codeine Nausea And Vomiting and Other (See Comments)     Chest tightness    Darvon [Propoxyphene] Nausea And Vomiting and Other (See Comments)     Chest tightness    Lisinopril Other (See Comments)     COUGH    Percocet [Oxycodone-Acetaminophen] Nausea And Vomiting and Other (See Comments)     Chest tightness       Subjective:     Review of Systems   Constitutional: Positive for fatigue.  Negative for activity change, appetite change, chills and fever. HENT: Positive for congestion, postnasal drip, sinus pressure and sore throat. Negative for ear pain, sneezing and trouble swallowing. Eyes: Negative for visual disturbance. Respiratory: Positive for cough. Negative for choking, chest tightness, shortness of breath and wheezing. Cardiovascular: Negative for chest pain, palpitations and leg swelling. Gastrointestinal: Negative for constipation, diarrhea and nausea. Skin: Negative for rash. Allergic/Immunologic: Negative for environmental allergies. Neurological: Positive for headaches (mild). Objective:      Physical Exam  Vitals and nursing note reviewed. Constitutional:       General: She is not in acute distress. Appearance: She is well-developed. HENT:      Right Ear: Tympanic membrane, ear canal and external ear normal.      Left Ear: Tympanic membrane, ear canal and external ear normal.      Nose: Mucosal edema present. Right Sinus: Maxillary sinus tenderness and frontal sinus tenderness present. Left Sinus: Maxillary sinus tenderness and frontal sinus tenderness present. Mouth/Throat:      Pharynx: No oropharyngeal exudate. Eyes:      Conjunctiva/sclera: Conjunctivae normal.   Cardiovascular:      Rate and Rhythm: Normal rate and regular rhythm. Heart sounds: Normal heart sounds. No murmur heard. Pulmonary:      Effort: Pulmonary effort is normal. No respiratory distress. Breath sounds: Normal breath sounds. No wheezing or rales. Musculoskeletal:      Cervical back: Normal range of motion and neck supple. Lymphadenopathy:      Cervical: No cervical adenopathy. Skin:     General: Skin is warm and dry. Findings: No rash. Neurological:      Mental Status: She is alert and oriented to person, place, and time.    Psychiatric:         Behavior: Behavior normal.         Judgment: Judgment normal.       /80 (Site: Left Upper Arm, Position: Sitting, Cuff Size: Medium Adult)   Pulse 92   Temp 96.2 °F (35.7 °C) (Tympanic)   Ht 5' 7\" (1.702 m)   Wt 199 lb (90.3 kg)   LMP 04/29/2009 (Approximate)   SpO2 98%   BMI 31.17 kg/m²     Assessment:       Diagnosis Orders   1. Acute bacterial sinusitis     2. Cough  COVID-19             Plan:        Sinusitis: New; I will treat with augmentin. she was also advised to use flonase, nasal saline and mucinex for her congestion. Suspected covid: new; due to her symptoms I cannot rule out covid so I ordered a covid test and I told her to self quarantine until the test results come back I also advised her to use tylenol and nsaids for pain. I recommended that she use mucinex to help with congestion and cough. I also recommended Flonase and an antihistamine for sinus symptoms. she was instructed to return if there is no improvement or symptoms worsen. Return if symptoms worsen or fail to improve. Orders Placed This Encounter   Procedures    COVID-19     Standing Status:   Future     Standing Expiration Date:   1/6/2023     Scheduling Instructions:      1) Due to current limited availability of the COVID-19 test, tests will be prioritized based on responses to questions above. Testing may be delayed due to volume. 2) Print and instruct patient to adhere to CDC home isolation program. (Link Above)              3) Set up or refer patient for a monitoring program.              4) Have patient sign up for and leverage MyChart (if not previously done). Order Specific Question:   Is this test for diagnosis or screening? Answer:   Diagnosis of ill patient     Order Specific Question:   Symptomatic for COVID-19 as defined by CDC? Answer:   Yes     Order Specific Question:   Date of Symptom Onset     Answer:   1/1/2022     Order Specific Question:   Hospitalized for COVID-19? Answer:   No     Order Specific Question:   Admitted to ICU for COVID-19?      Answer:   No     Order Specific Question: Employed in healthcare setting? Answer:   No     Order Specific Question:   Resident in a congregate (group) care setting? Answer:   No     Order Specific Question:   Pregnant? Answer:   No     Order Specific Question:   Previously tested for COVID-19? Answer:   Yes     Orders Placed This Encounter   Medications    amoxicillin-clavulanate (AUGMENTIN) 875-125 MG per tablet     Sig: Take 1 tablet by mouth 2 times daily for 10 days     Dispense:  20 tablet     Refill:  0       Patientgiven educational materials - see patient instructions. Discussed use, benefit,and side effects of prescribed medications. All patient questions answered. Ptvoiced understanding. Reviewed health maintenance. Instructed to continue currentmedications, diet and exercise. Patient agreed with treatment plan. Follow up asdirected.      Electronically signed by Sinan Vallejo MD on 1/6/2022 at 9:01 AM

## 2022-01-07 ENCOUNTER — PATIENT MESSAGE (OUTPATIENT)
Dept: PRIMARY CARE CLINIC | Age: 62
End: 2022-01-07

## 2022-01-07 DIAGNOSIS — B37.31 VAGINAL YEAST INFECTION: ICD-10-CM

## 2022-01-07 LAB
SARS-COV-2: NORMAL
SARS-COV-2: NOT DETECTED
SOURCE: NORMAL

## 2022-01-07 RX ORDER — FLUCONAZOLE 150 MG/1
TABLET ORAL
Qty: 2 TABLET | Refills: 0 | Status: ON HOLD | OUTPATIENT
Start: 2022-01-07 | End: 2022-03-15

## 2022-01-07 NOTE — TELEPHONE ENCOUNTER
From: Renetta Cartagena  To: Dr. Joshi Kingdom: 1/7/2022 3:55 PM EST  Subject: Prescription    Dr. Hira Neri, thanks again for seeing me in urgent care for sinus infection. I do not have Covid (thank goodness). May I have a scrip for Prednisone for my slight wheeze? I will be very careful with it. I also would like a prescription for Diflucan? I have a real track record for getting those after a few days on an antibiotic. My [facility] is [facility]. I can be reached at [phone] if you have any questions. Thanks!

## 2022-01-11 ENCOUNTER — HOSPITAL ENCOUNTER (OUTPATIENT)
Dept: GENERAL RADIOLOGY | Age: 62
Discharge: HOME | End: 2022-01-13
Payer: COMMERCIAL

## 2022-01-11 DIAGNOSIS — E11.9 DIABETES MELLITUS WITHOUT COMPLICATION (HCC): ICD-10-CM

## 2022-01-11 PROCEDURE — 73610 X-RAY EXAM OF ANKLE: CPT

## 2022-01-11 PROCEDURE — 73630 X-RAY EXAM OF FOOT: CPT

## 2022-01-13 ENCOUNTER — OFFICE VISIT (OUTPATIENT)
Dept: PODIATRY | Age: 62
End: 2022-01-13
Payer: COMMERCIAL

## 2022-01-13 VITALS
BODY MASS INDEX: 29.19 KG/M2 | DIASTOLIC BLOOD PRESSURE: 74 MMHG | WEIGHT: 186 LBS | HEART RATE: 68 BPM | SYSTOLIC BLOOD PRESSURE: 128 MMHG | HEIGHT: 67 IN

## 2022-01-13 DIAGNOSIS — M79.671 FOOT PAIN, RIGHT: ICD-10-CM

## 2022-01-13 DIAGNOSIS — M76.821 POSTERIOR TIBIAL TENDINITIS, RIGHT: Primary | ICD-10-CM

## 2022-01-13 PROCEDURE — 3017F COLORECTAL CA SCREEN DOC REV: CPT | Performed by: PODIATRIST

## 2022-01-13 PROCEDURE — G8427 DOCREV CUR MEDS BY ELIG CLIN: HCPCS | Performed by: PODIATRIST

## 2022-01-13 PROCEDURE — 1036F TOBACCO NON-USER: CPT | Performed by: PODIATRIST

## 2022-01-13 PROCEDURE — 99203 OFFICE O/P NEW LOW 30 MIN: CPT | Performed by: PODIATRIST

## 2022-01-13 PROCEDURE — G8484 FLU IMMUNIZE NO ADMIN: HCPCS | Performed by: PODIATRIST

## 2022-01-13 PROCEDURE — G8417 CALC BMI ABV UP PARAM F/U: HCPCS | Performed by: PODIATRIST

## 2022-01-13 NOTE — PROGRESS NOTES
Subjective: Eben Parmar is a 57 y.o. female who presents to the office today complaining of pain on the Right foot. Symptoms began 4 day(s) ago. History of injury:, fall. Lorrene Custard 3 weeks ago not sure if this is related. Patient relates pain is Present. Pain is rated 6 out of 10 and is described as moderate. Treatments prior to today's visit include: X-rays and cam walker. Currently denies F/C/N/V.      Allergies   Allergen Reactions    Morphine Shortness Of Breath, Nausea And Vomiting and Other (See Comments)     Chest tightness    Ativan [Lorazepam] Other (See Comments)     agitation    Ibuprofen     Codeine Nausea And Vomiting and Other (See Comments)     Chest tightness    Darvon [Propoxyphene] Nausea And Vomiting and Other (See Comments)     Chest tightness    Lisinopril Other (See Comments)     COUGH    Percocet [Oxycodone-Acetaminophen] Nausea And Vomiting and Other (See Comments)     Chest tightness       Past Medical History:   Diagnosis Date    Acute on chronic systolic congestive heart failure ([facility] [address] 01/21/2020    \"viral heart failure\" per patient    AICD (automatic cardioverter/defibrillator) present     Asthma     CNP CHELSEA GOTTI, [facility]    Bilateral corneal scars     COVID-19 11/10/2020    Disease of blood and blood forming organ     Diverticulitis     ON COLONSCOPY    DJD (degenerative joint disease), lumbar     Dry eye syndrome     DVT (deep venous thrombosis) (HCC)     after splenic artery repair in right calf    Gastro - esophageal reflux disease     Hiatal hernia     History of blood transfusion 1982    History of echocardiogram 04/2020    Hyperlipidemia     HIGH CHOLESTEROL/HIGH TRIGLYCERIDES    Hyperplastic colon polyp     Hypertension     PCP DR Estevan AGUIRRE, [place]    Hypothyroidism     LGSIL (low grade squamous intraepithelial dysplasia) 11/2014    referred to GYN/S/P colpo with bx LGSIL, repeat pap in 6 months    Non-celiac gluten sensitivity wheezing is directly proportionate to wheat intake    NSTEMI (non-ST elevated myocardial infarction) ([facility] [address] 01/16/2020    DR Giulia Graham    Osteoarthritis 2004    RT KNEE S/P INJURY    Other disorders of kidney and ureter in diseases classified elsewhere     Sciatica of right side     Severe persistent asthma     patient has seen that wheat consumption causes exacerbations    Splenic artery aneurysm [facility])     s/p splenic artery repair    Tinnitus     Type 2 diabetes mellitus, controlled ([facility] [address] 05/2011    PCP DR Johnny Nesbitt       Prior to Admission medications    Medication Sig Start Date End Date Taking? Authorizing Provider   fluconazole (DIFLUCAN) 150 MG tablet Take one daily now and repeat dose in 48 hrs.  1/7/22  Yes Amaury Dobbins MD   amoxicillin-clavulanate (AUGMENTIN) 875-125 MG per tablet Take 1 tablet by mouth 2 times daily for 10 days 1/6/22 1/16/22 Yes Amaury Dobbins MD   metoprolol succinate (TOPROL XL) 100 MG extended release tablet Take 1 tablet by mouth 2 times daily 1/3/22  Yes Arun Taylor DO   sacubitril-valsartan (ENTRESTO) 24-26 MG per tablet Take 1 tablet by mouth 2 times daily 1/3/22  Yes Arun Taylor DO   famotidine (PEPCID) 40 MG tablet Take 1 tablet by mouth every evening 6/2/21  Yes DEREK You CNP   pantoprazole (PROTONIX) 40 MG tablet Take one tablet once daily 30-60 minutes prior to a meal 3/15/21  Yes DEREK You CNP   budesonide-formoterol (SYMBICORT) 160-4.5 MCG/ACT AERO USE 2 INHALATIONS TWICE A DAY (RINSE MOUTH WELL AFTER USE) 1/7/21  Yes DEREK You CNP   furosemide (LASIX) 40 MG tablet Take 1 tablet by mouth daily  Patient taking differently: Take 20 mg by mouth daily  12/18/20  Yes Reilly Ace MD   fluticasone (FLONASE) 50 MCG/ACT nasal spray USE 2 SPRAYS IN EACH NOSTRIL ONCE DAILY 9/22/20  Yes DEREK You CNP   glimepiride (AMARYL) 2 MG tablet Take 2 mg by mouth every morning (before breakfast)   Yes Historical Provider, MD   Cholecalciferol (VITAMIN D3) 50 MCG ( UT) CAPS Take 1 capsule by mouth daily   Yes Historical Provider, MD   aspirin 81 MG EC tablet Take 1 tablet by mouth daily 20  Yes Chuy Ramos DO   albuterol (PROVENTIL) (5 MG/ML) 0.5% nebulizer solution Take 1 mL by nebulization 4 times daily as needed for Wheezing 19  Yes Elizabeth Arango, APRN - CNP   SYNTHROID 175 MCG tablet Take 175 mcg by mouth Daily  8/3/19  Yes Historical Provider, MD   ipratropium-albuterol (DUONEB) 0.5-2.5 (3) MG/3ML SOLN nebulizer solution Inhale 3 mLs into the lungs every 4 hours as needed for Shortness of Breath 19  Yes Elizabeth Arango, APRN - CNP   diclofenac (VOLTAREN) 75 MG EC tablet Take 1 tablet by mouth 2 times daily as needed for Pain 18  Yes Lilian Payne MD   Probiotic Product (SOLUBLE FIBER/PROBIOTICS PO) Take by mouth 2 times daily   Yes Historical Provider, MD   metFORMIN (GLUCOPHAGE) 500 MG tablet TAKE 2 TABLETS BY MOUTH TWO TIMES A DAY WITH MEALS 17  Yes Unique Waldron MD       Past Surgical History:   Procedure Laterality Date    ABDOMEN SURGERY      ARTERIAL ANEURYSM REPAIR      splenic artery coil repair    CARDIAC CATHETERIZATION  2020    CARDIAC DEFIBRILLATOR PLACEMENT Left 2020    [employer]     SECTION  1986    COLONOSCOPY  2012    HYPERPLASTIC POLYP, DIVERTICULAR DISEASE    COLONOSCOPY  2017    moderate sigmoid diverticulosis. Dr. Yasmin Judd  2015    with BX LGSIL    CYSTOSCOPY Bilateral 2017    CYSTO insertion lighted stents performed by Zoran Clarke MD at [address], ESOPHAGUS      ENDOSCOPY, COLON, DIAGNOSTIC      EYE SURGERY      GASTRIC FUNDOPLICATION N/A     XI LAPAROSCOPIC ROBOTIC HIATAL HERNIA REPAIR WITH MESH  NISSEN FUNDOPLICATION.  EGD performed by Jair Castellanos DO at [address]  2021    LAPAROSCOPIC ROBOTIC HIATAL HERNIA REPAIR WITH MESH  NISSEN FUNDOPLICATION. EGD    KNEE ARTHROSCOPY Right 2004    PARTIAL SYNOVECTOMY AND CHONDROPLASTY DUE TO MENSICUS TEAR    LAPAROSCOPY N/A 09/08/2017    EXPLORATORY LAPAROSCOPY converted to open exploration with drainage of pelvic abscess performed by Jeff Villegas MD at [facility] 38  08/19/2014    clearing an infection done at [facility] 41  10/25/2011    L4/L5 epidural steroid injection    OTHER SURGICAL HISTORY Right 2006 and 2010    synvisc injections, two sets    OTHER SURGICAL HISTORY  05/19/2020    Lead revision    NC COLON CA SCRN NOT HI RSK IND N/A 02/27/2017    COLONOSCOPY performed by Jeff Villegas MD at [address] IND N/A 09/08/2017    COLONOSCOPY performed by Jeff Villegas MD at [address] [facility] EGD TRANSORAL BIOPSY SINGLE/MULTIPLE N/A 02/27/2017    EGD BIOPSY performed by Jeff Villegas MD at [address] N/A 08/30/2017    ATTEMPTED LAPAROCOPY PROCEDURE CONVERTED TO OPEN Sigmoid Colectomy PERFORMED BY DR. VO resection colovaginal fistula PERFORMED BY. DR. Morales Marking  performed by Jeff Villegas MD at [address] [facility] Right 07/2011    TOTAL    TUBAL LIGATION  1991    UPPER GASTROINTESTINAL ENDOSCOPY N/A 01/02/2020    EGD BIOPSY W/ 50 HR LEARY performed by Jeff Villegas MD at [facility] WISDOM TOOTH EXTRACTION         Family History   Problem Relation Age of Onset    High Blood Pressure Mother     High Cholesterol Mother     Allergies Mother     Arthritis Mother     Coronary Art Dis Mother     Diabetes Daughter         pre diabetes    Other Maternal Grandfather         PUD GI problems    Breast Cancer Maternal Aunt     No Known Problems Maternal Aunt     No Known Problems Maternal Aunt     No Known Problems Maternal Aunt     Cataracts Neg Hx     Glaucoma Neg Hx        Social History     Tobacco Use    Smoking status: Never Smoker  Smokeless tobacco: Never Used    Tobacco comment: never smoked evelyn rrt 10/13/2017   Substance Use Topics    Alcohol use: No     Alcohol/week: 0.0 standard drinks     Comment: Consume about 4 wine coolers a year       ROS: All 14 ROS systems reviewed and pertinent positives noted above, all others negative. Lower Extremity Physical Examination:     Vitals:   Vitals:    01/13/22 0900   BP: 128/74   Pulse: 68     General: AAO x 3 in NAD. Vascular: DP and PT pulses palpable 2/4, bilateral.  CFT <3 seconds, bilateral.  Hair growth present to the level of the digits, bilateral.  Edema absent, bilateral.  Varicosities absent, bilateral. Erythema absent, bilateral. Distal Rubor absent bilateral.  Temperature within normal limits bilateral. Hyperpigmentation absent bilateral. No atrophic skin. Neurological: Sensation intact to light touch to level of digits, bilateral.  Protective sensation intact 10/10 sites via 5.07/10g [place] Monofilament, bilateral.  negative Tinel's, bilateral.  negative Valleix sign, bilateral.  Vibratory intact bilateral.  Reflexes Decreased bilateral.  Paresthesias negative. Dysthesias negative. Sharp/dull intact bilateral.    Musculoskeletal: Muscle strength 5/5, Bilateral.  Pain with strength testing is present. Pain present upon palpation of PT tendon insertion medial navicular area, Right.  within normal limits medial longitudinal arch, Bilateral.  Ankle ROM within normal limits,Bilateral.  1st MPJ ROM within normal limits, Bilateral.  Dorsally contracted digits absent digits none, Bilateral. Other foot deformities pronation with wb. Integument: Warm, dry, supple, bilateral.  Open lesion absent, bilateral.  Interdigital maceration absent to web spaces bilateral.  Nails within normal limits. Fissures absent, bilateral. Hyperkeratotic tissue is absent. Asessment: Patient is a 57 y.o. female with:    Diagnosis Orders   1.  Posterior tibial tendinitis, right Plan: Patient examined and evaluated. Current condition and treatment options discussed in detail. RICE therapy to tendon  Due to level of pain patient should continue offload with a cam walker for 2 more weeks. At that point were back in a regular shoe gear. Patient has prescription Voltaren at home and she will take that  X rays reviewed with the pt in detail. All questions answered. Patient is low level medical decision making. Patient is acute uncomplicated condition. There was one test reviewed from another provider appears overall lower extremity at this time. Contact office with any questions/problems/concerns. RTC in 3week(s).

## 2022-01-20 ENCOUNTER — OFFICE VISIT (OUTPATIENT)
Dept: CARDIOLOGY | Age: 62
End: 2022-01-20
Payer: COMMERCIAL

## 2022-01-20 VITALS
SYSTOLIC BLOOD PRESSURE: 121 MMHG | DIASTOLIC BLOOD PRESSURE: 69 MMHG | HEART RATE: 92 BPM | WEIGHT: 186 LBS | BODY MASS INDEX: 29.19 KG/M2 | HEIGHT: 67 IN

## 2022-01-20 DIAGNOSIS — I10 HYPERTENSION, ESSENTIAL: Primary | ICD-10-CM

## 2022-01-20 PROCEDURE — G8427 DOCREV CUR MEDS BY ELIG CLIN: HCPCS | Performed by: INTERNAL MEDICINE

## 2022-01-20 PROCEDURE — 1036F TOBACCO NON-USER: CPT | Performed by: INTERNAL MEDICINE

## 2022-01-20 PROCEDURE — 3017F COLORECTAL CA SCREEN DOC REV: CPT | Performed by: INTERNAL MEDICINE

## 2022-01-20 PROCEDURE — 99214 OFFICE O/P EST MOD 30 MIN: CPT | Performed by: INTERNAL MEDICINE

## 2022-01-20 PROCEDURE — 93000 ELECTROCARDIOGRAM COMPLETE: CPT | Performed by: INTERNAL MEDICINE

## 2022-01-20 PROCEDURE — G8484 FLU IMMUNIZE NO ADMIN: HCPCS | Performed by: INTERNAL MEDICINE

## 2022-01-20 PROCEDURE — G8417 CALC BMI ABV UP PARAM F/U: HCPCS | Performed by: INTERNAL MEDICINE

## 2022-01-20 RX ORDER — GLIMEPIRIDE 1 MG/1
1 TABLET ORAL
Qty: 30 TABLET | Refills: 3 | Status: SHIPPED | OUTPATIENT
Start: 2022-01-20

## 2022-01-20 NOTE — PROGRESS NOTES
Cardiology Consultation/Follow Up. [facility]    CC: follow up for CHF. HPI:  eKiko Hawthorne presents for routine follow up for CHF. She is doing well from cardiac standpoint and denies having any chest pain, dyspnea, orthopnea, lower extremity edema. She is maintained on low-dose Lasix every day 20 mg daily. Overall her functional capacity is very good and she is working fulltime. Currently participating in phase 3 cardiac rehab. Also going to start summer yoga. No alarms or shocks from the ICD. Last interrogation in December 2021 reviewed and within normal limits.       Past Medical:  Past Medical History:   Diagnosis Date    Acute on chronic systolic congestive heart failure ([facility] [address] 01/21/2020    \"viral heart failure\" per patient    AICD (automatic cardioverter/defibrillator) present     Asthma     CNP CHELSEA Douglass [address], [facility]    Bilateral corneal scars     COVID-19 11/10/2020    Disease of blood and blood forming organ     Diverticulitis     ON COLONSCOPY    DJD (degenerative joint disease), lumbar     Dry eye syndrome     DVT (deep venous thrombosis) (HCC)     after splenic artery repair in right calf    Gastro - esophageal reflux disease     Hiatal hernia     History of blood transfusion 1982    History of echocardiogram 04/2020    Hyperlipidemia     HIGH CHOLESTEROL/HIGH TRIGLYCERIDES    Hyperplastic colon polyp     Hypertension     PCP DR Priscila AGUIRRE, [place]    Hypothyroidism     LGSIL (low grade squamous intraepithelial dysplasia) 11/2014    referred to GYN/S/P colpo with bx LGSIL, repeat pap in 6 months    Non-celiac gluten sensitivity     wheezing is directly proportionate to wheat intake    NSTEMI (non-ST elevated myocardial infarction) ([facility] [address] 01/16/2020    DR Riya Hernandez    Osteoarthritis 2004    RT KNEE S/P INJURY    Other disorders of kidney and ureter in diseases classified elsewhere     Sciatica of right side     Severe persistent asthma patient has seen that wheat consumption causes exacerbations    Splenic artery aneurysm [facility])     s/p splenic artery repair    Tinnitus     Type 2 diabetes mellitus, controlled ([address] 2011    PCP DR Augie Zuleta       Past Surgical:  Past Surgical History:   Procedure Laterality Date    ABDOMEN SURGERY      ARTERIAL ANEURYSM REPAIR      splenic artery coil repair    CARDIAC CATHETERIZATION  2020    CARDIAC DEFIBRILLATOR PLACEMENT Left 2020    [employer]     SECTION  1986    COLONOSCOPY  2012    HYPERPLASTIC POLYP, DIVERTICULAR DISEASE    COLONOSCOPY  2017    moderate sigmoid diverticulosis. Dr. Sunshine Garcia  2015    with BX LGSIL    CYSTOSCOPY Bilateral 2017    CYSTO insertion lighted stents performed by Bernard Prather MD at [address], ESOPHAGUS      ENDOSCOPY, COLON, DIAGNOSTIC      EYE SURGERY      GASTRIC FUNDOPLICATION N/A 3/4/1230    XI LAPAROSCOPIC ROBOTIC HIATAL HERNIA REPAIR WITH MESH  NISSEN FUNDOPLICATION. EGD performed by Fabi Devi DO at [address]  2021    LAPAROSCOPIC ROBOTIC HIATAL HERNIA REPAIR WITH MESH  NISSEN FUNDOPLICATION.  EGD    KNEE ARTHROSCOPY Right     PARTIAL SYNOVECTOMY AND CHONDROPLASTY DUE TO MENSICUS TEAR    LAPAROSCOPY N/A 2017    EXPLORATORY LAPAROSCOPY converted to open exploration with drainage of pelvic abscess performed by Muna Leiva MD at [facility] 38  2014    clearing an infection done at [facility]. 41  10/25/2011    L4/L5 epidural steroid injection    OTHER SURGICAL HISTORY Right  and     synvisc injections, two sets    OTHER SURGICAL HISTORY  2020    Lead revision    PA COLON CA SCRN NOT HI RSK IND N/A 2017    COLONOSCOPY performed by Muna Leiva MD at [address] IND N/A 2017    COLONOSCOPY performed by Muna Leiva MD at [address] EGD TRANSORAL BIOPSY SINGLE/MULTIPLE N/A 02/27/2017    EGD BIOPSY performed by Galileo Khan MD at [address] SKIN TAG REMOVAL      SMALL INTESTINE SURGERY N/A 08/30/2017    ATTEMPTED LAPAROCOPY PROCEDURE CONVERTED TO OPEN Sigmoid Colectomy PERFORMED BY DR. VO resection colovaginal fistula PERFORMED BY. DR. Author Ordonez  performed by Galileo Khan MD at [facility] 07/2011    TOTAL    TUBAL LIGATION  1991    UPPER GASTROINTESTINAL ENDOSCOPY N/A 01/02/2020    EGD BIOPSY W/ 50 HR LEARY performed by Galileo Khan MD at [facility] 51 EXTRACTION         Family History:  Family History   Problem Relation Age of Onset    High Blood Pressure Mother     High Cholesterol Mother     Allergies Mother     Arthritis Mother     Coronary Art Dis Mother     Diabetes Daughter         pre diabetes    Other Maternal Grandfather         PUD GI problems    Breast Cancer Maternal Aunt     No Known Problems Maternal Aunt     No Known Problems Maternal Aunt     No Known Problems Maternal Aunt     Cataracts Neg Hx     Glaucoma Neg Hx        Social History:  Social History     Tobacco Use    Smoking status: Never Smoker    Smokeless tobacco: Never Used    Tobacco comment: never smoked syant rrt 10/13/2017   Vaping Use    Vaping Use: Never used   Substance Use Topics    Alcohol use: No     Alcohol/week: 0.0 standard drinks     Comment: Consume about 4 wine coolers a year    Drug use: No        REVIEW OF SYSTEMS:    · Constitutional: there has been no unanticipated weight loss. There's been No change in energy level, No change in activity level. · Eyes: No visual changes or diplopia. No scleral icterus. · ENT: No Headaches, hearing loss or vertigo. No mouth sores or sore throat. · Cardiovascular: AS HPI  · Respiratory: AS HPI  · Gastrointestinal: No abdominal pain, appetite loss, blood in stools.  No change in bowel or bladder habits. · Genitourinary: No dysuria, trouble voiding, or hematuria. · Musculoskeletal:  No radial site hematoma  · Integumentary: No rash or pruritis. · Neurological: No headache, diplopia, change in muscle strength, numbness or tingling. No change in gait, balance, coordination, mood, affect, memory, mentation, behavior. · Psychiatric: No new anxiety or depression. · Endocrine: No temperature intolerance. No excessive thirst, fluid intake, or urination. No tremor. · Hematologic/Lymphatic: No abnormal bruising or bleeding, blood clots or swollen lymph nodes. · Allergic/Immunologic: No nasal congestion or hives. Medications:  Current Outpatient Medications   Medication Sig Dispense Refill    glimepiride (AMARYL) 1 MG tablet Take 1 tablet by mouth every morning (before breakfast) 30 tablet 3    dapagliflozin (FARXIGA) 10 MG tablet Take 1 tablet by mouth every morning 30 tablet 5    fluconazole (DIFLUCAN) 150 MG tablet Take one daily now and repeat dose in 48 hrs.  2 tablet 0    metoprolol succinate (TOPROL XL) 100 MG extended release tablet Take 1 tablet by mouth 2 times daily 180 tablet 3    sacubitril-valsartan (ENTRESTO) 24-26 MG per tablet Take 1 tablet by mouth 2 times daily 180 tablet 3    famotidine (PEPCID) 40 MG tablet Take 1 tablet by mouth every evening 90 tablet 3    pantoprazole (PROTONIX) 40 MG tablet Take one tablet once daily 30-60 minutes prior to a meal 90 tablet 3    budesonide-formoterol (SYMBICORT) 160-4.5 MCG/ACT AERO USE 2 INHALATIONS TWICE A DAY (RINSE MOUTH WELL AFTER USE) 3 Inhaler 3    furosemide (LASIX) 40 MG tablet Take 1 tablet by mouth daily (Patient taking differently: Take 20 mg by mouth daily ) 90 tablet 3    fluticasone (FLONASE) 50 MCG/ACT nasal spray USE 2 SPRAYS IN EACH NOSTRIL ONCE DAILY 48 g 3    Cholecalciferol (VITAMIN D3) 50 MCG (2000 UT) CAPS Take 1 capsule by mouth daily      aspirin 81 MG EC tablet Take 1 tablet by mouth daily 30 tablet 3    albuterol (PROVENTIL) (5 MG/ML) 0.5% nebulizer solution Take 1 mL by nebulization 4 times daily as needed for Wheezing 120 each 3    SYNTHROID 175 MCG tablet Take 175 mcg by mouth Daily       ipratropium-albuterol (DUONEB) 0.5-2.5 (3) MG/3ML SOLN nebulizer solution Inhale 3 mLs into the lungs every 4 hours as needed for Shortness of Breath 360 mL 2    diclofenac (VOLTAREN) 75 MG EC tablet Take 1 tablet by mouth 2 times daily as needed for Pain 90 tablet 2    Probiotic Product (SOLUBLE FIBER/PROBIOTICS PO) Take by mouth 2 times daily      metFORMIN (GLUCOPHAGE) 500 MG tablet TAKE 2 TABLETS BY MOUTH TWO TIMES A DAY WITH MEALS 120 tablet 3     No current facility-administered medications for this visit. Physical Exam:   Vitals: /69   Pulse 92   Ht 5' 7\" (1.702 m)   Wt 186 lb (84.4 kg)   LMP 04/29/2009 (Approximate)   BMI 29.13 kg/m²   General appearance: alert and cooperative with exam  HEENT: Head: Normocephalic, no lesions, without obvious abnormality.   Neck: no carotid bruit, no JVD  Lungs: clear to auscultation bilaterally  Heart: regular rate and rhythm, S1, S2 normal, no murmur, click, rub or gallop  Abdomen: soft, non-tender; bowel sounds normal; no masses,  no organomegaly  Extremities: extremities normal, atraumatic, no cyanosis or edema  Neurologic: Mental status: Alert, oriented, thought content appropriate    Labs:  Lab Results   Component Value Date    CHOL 216 05/06/2020    TRIG 195 05/06/2020    HDL 52 05/06/2020    LDLCHOLESTEROL 112 01/17/2020    LDLCALC 130 05/06/2020    VLDL NOT REPORTED (H) 01/17/2020    CHOLHDLRATIO 4.2 05/06/2020       Lab Results   Component Value Date     (H) 02/10/2021    K 4.0 02/10/2021     (H) 02/10/2021    CO2 23 02/10/2021    BUN 11 02/10/2021    CREATININE 0.56 02/10/2021    GLUCOSE 105 (H) 02/10/2021    CALCIUM 9.2 02/10/2021    PROT 6.7 06/09/2020    LABALBU 3.9 06/09/2020    BILITOT 0.28 (L) 06/09/2020    ALKPHOS 61 06/09/2020    AST 26 06/09/2020    ALT 34 (H) 06/09/2020    LABGLOM >60 02/10/2021    GFRAA >60 02/10/2021    GLOB 3.0 06/30/2017       EKG: Normal sinus rhythm, normal ECG    LAST ECHO:   Results for orders placed or performed during the hospital encounter of 01/16/20   Echocardiogram complete 2D with doppler with color  Summary  Moderately dilated LV size , normal wall thickness. Severe global hypokinesis. Severely reduced LV systolic function with LVEF 25-30%. Normal RV size and function. Normal size LA and RA. No obvious significant structural valvular abnormality noted. Mild AR noted . Normal aortic root dimension. No significant pericardial effusion. No obvious intra-cardiac mass or shunt noted. LAST CATH:  Results for orders placed or performed during the hospital encounter of 01/16/20   Cardiac Catheterization   Procedure Summary   Minimal non obstructive CAD   LV systolic dysfunction- EF 44%         ECHO: 4/30/2020  Left ventricle is in the upper limits of normal in size. Left ventricular systolic function is severely reduced. Global hypokinesis. Left ventricular ejection fraction 30 %. Grade I (mild) left ventricular diastolic dysfunction. Normal aortic valve structure. Mild aortic insufficiency. Mild mitral valve thickening with annular calcification. Moderate mitral regurgitation. Normal function of other valves. No pericardial effusion. AICD placed: 5/11/20    RV lead revised 5/19/20. Past Medical and Surgical History, Problem List, Allergies, Medications, Labs, Imaging, all reviewed extensively in EMR and with the patient. Assessment & Plan:    1. 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 Currently compensated with no signs of volume overload on examination. Maintained on Lasix 20 mg daily, Toprol- mg twice daily and Entresto 24/26. Start Farxiga 10 mg daily. Decrease glimepiride to 1 mg daily.   Patient to monitor her blood glucose at home. Continue phase 3 cardiac rehab.    2. S/p AICD on 5/11/20 followed by RV lead revision 5/19/20. Routine interrogation in December 2021 reviewed and within normal limits. Patient does not have any volume overload although the OptiVol index has shown otherwise. We will continue monitoring every 6 months    3. DL    5. Minimal CAD on in cath 1/20    6. Has chronic long standing GERD/Esophagitis/Hiatal Hernia status post fundoplication in February 2021    7. DM. Controlled, per PCP, plan for stating Lennox Florence as above and this was already discussed by patient with Dr Maddie Clinton      The patient is to continue heart healthy diet, weight loss and exercise as tolerated. Patient's medications and side effects were discussed. Medication refills were provided if needed. Follow up appointment timing was discussed. All questions and concerns were addressed to patient's satisfaction. The patient is to follow up in 6 months or sooner if necessary. Kemi Lyons MD   [facility]  [facility]. [facility]  [phone]

## 2022-02-07 RX ORDER — FUROSEMIDE 40 MG/1
TABLET ORAL
Qty: 90 TABLET | Refills: 3 | Status: SHIPPED | OUTPATIENT
Start: 2022-02-07 | End: 2022-09-14

## 2022-02-09 ENCOUNTER — TELEPHONE (OUTPATIENT)
Dept: SURGERY | Age: 62
End: 2022-02-09

## 2022-02-09 NOTE — TELEPHONE ENCOUNTER
Received paperwork in the mail for patient to schedule 5 year repeat EGD and colonoscopy. Will sent cardiac risk note to cardiologist, then mail instructions to patient for procedure scheduled on 3/15/22.

## 2022-02-09 NOTE — TELEPHONE ENCOUNTER
H &P EGD and Colonoscopy  Patient:Michelle Perrin                 :1960  (yes) patient has seen Dr. Vandana Guzman prior to procedure  PCP: Imelda Bustillo MD    CC:GERD         History of colon polyps      HPI:        Colonoscopy  Abd pain: no  Anemia: no  Bloating:no  Diarrhea: no  Constipation: no  Melena: no  Hematochezia:no  Rectal Bleeding:no  Rectal/Anal Pain:no  Pruritus: no  Family history colon Cancer: no  Previous colon cancer: no  Previous Colon Polyp: yes  Change in bowels: no  Decrease caliber of stool: no  Change in color of stool: no  Previous work up date: 2017-colonoscopy=moderate sigmoid diverticulosis. Dr Vandana Guzman at [facility]    EGD  Nausea: no  Vomiting: no  Heartburn:no  Dysphagia:no  Hematemesis:no  Epigastric pain:no  Anemia: no  Previous work up date:2017-EGD=mild esophagitis.  Dr. Vandana Guzman at [facility]  Current Treatment:Protonix daily, Pepcid at night    Family History   Problem Relation Age of Onset    High Blood Pressure Mother     High Cholesterol Mother     Allergies Mother     Arthritis Mother     Coronary Art Dis Mother     Diabetes Daughter         pre diabetes    Other Maternal Grandfather         PUD GI problems    Breast Cancer Maternal Aunt     No Known Problems Maternal Aunt     No Known Problems Maternal Aunt     No Known Problems Maternal Aunt     Cataracts Neg Hx     Glaucoma Neg Hx      Social History     Socioeconomic History    Marital status:      Spouse name: Not on file    Number of children: Not on file    Years of education: Not on file    Highest education level: Not on file   Occupational History    Occupation: counselor     Employer: [employer]   Tobacco Use    Smoking status: Never Smoker    Smokeless tobacco: Never Used    Tobacco comment: never smoked syant [facility] 10/13/2017   Vaping Use    Vaping Use: Never used   Substance and Sexual Activity    Alcohol use: No     Alcohol/week: 0.0 standard drinks     Comment: Consume about 4 wine coolers a year    Drug use: No    Sexual activity: Yes     Partners: Male     Comment:    Other Topics Concern    Not on file   Social History Narrative    Not on file     Social Determinants of Health     Financial Resource Strain: Low Risk     Difficulty of Paying Living Expenses: Not hard at all   Food Insecurity: No Food Insecurity    Worried About Running Out of Food in the Last Year: Never true    [address] in the Last Year: Never true   Transportation Needs:     Lack of Transportation (Medical): Not on file    Lack of Transportation (Non-Medical): Not on file   Physical Activity:     Days of Exercise per Week: Not on file    Minutes of Exercise per Session: Not on file   Stress:     Feeling of Stress : Not on file   Social Connections:     Frequency of Communication with Friends and Family: Not on file    Frequency of Social Gatherings with Friends and Family: Not on file    Attends Faith Services: Not on file    Active Member of [address] [place] [address] or Organizations: Not on file    Attends Club or Organization Meetings: Not on file    Marital Status: Not on file   Intimate Partner Violence:     Fear of Current or Ex-Partner: Not on file    Emotionally Abused: Not on file    Physically Abused: Not on file    Sexually Abused: Not on file   Housing Stability:     Unable to Pay for Housing in the Last Year: Not on file    Number of [place] in the Last Year: Not on file    Unstable Housing in the Last Year: Not on file     Past Surgical History:   Procedure Laterality Date   [address]  2015    splenic artery coil repair    CARDIAC CATHETERIZATION  01/20/2020    CARDIAC DEFIBRILLATOR PLACEMENT Left 05/11/2020    [employer]   [address] [place] [address]  01/30/2012    HYPERPLASTIC POLYP, DIVERTICULAR DISEASE    COLONOSCOPY  02/27/2017    moderate sigmoid diverticulosis.  Dr. Tati Schaffer  11/2015    with BX LGSIL    CYSTOSCOPY Bilateral 08/30/2017    CYSTO insertion lighted stents performed by Griselda Shadow, MD at [address], ESOPHAGUS      ENDOSCOPY, COLON, DIAGNOSTIC      EYE SURGERY      GASTRIC FUNDOPLICATION N/A 4/0/1914    XI LAPAROSCOPIC ROBOTIC HIATAL HERNIA REPAIR WITH MESH  NISSEN FUNDOPLICATION. EGD performed by Henna Troy DO at [address]  02/09/2021    LAPAROSCOPIC ROBOTIC HIATAL HERNIA REPAIR WITH MESH  NISSEN FUNDOPLICATION. EGD    KNEE ARTHROSCOPY Right 2004    PARTIAL SYNOVECTOMY AND CHONDROPLASTY DUE TO MENSICUS TEAR    LAPAROSCOPY N/A 09/08/2017    EXPLORATORY LAPAROSCOPY converted to open exploration with drainage of pelvic abscess performed by Georgina Flynn MD at [facility] 38  08/19/2014    clearing an infection done at [facility]  10/25/2011    L4/L5 epidural steroid injection    OTHER SURGICAL HISTORY Right 2006 and 2010    synvisc injections, two sets    OTHER SURGICAL HISTORY  05/19/2020    Lead revision    MI COLON CA SCRN NOT HI RSK IND N/A 02/27/2017    COLONOSCOPY performed by Georgina Flynn MD at [address] [facility] [address] IND N/A 09/08/2017    COLONOSCOPY performed by Georgina Flynn MD at [address] EGD TRANSORAL BIOPSY SINGLE/MULTIPLE N/A 02/27/2017    EGD BIOPSY performed by Georgina Flynn MD at [address] N/A 08/30/2017    ATTEMPTED LAPAROCOPY PROCEDURE CONVERTED TO OPEN Sigmoid Colectomy PERFORMED BY DR. VO resection colovaginal fistula PERFORMED BY. DR. Liudmila Mcpherson  performed by Georgina Flynn MD at [facility] Right 07/2011    TOTAL    TUBAL LIGATION  1991    UPPER GASTROINTESTINAL ENDOSCOPY N/A 01/02/2020    EGD BIOPSY W/ 48 HR BRAVO performed by Georgina Flynn MD at [address]      WISDOM TOOTH EXTRACTION       Past Medical History:   Diagnosis Date    Acute on chronic systolic congestive heart failure ([facility] 01/21/2020    \"viral heart failure\" per patient    AICD (automatic cardioverter/defibrillator) present     Asthma     CNP CHELSEA [address] [facility], [facility]    Bilateral corneal scars     COVID-19 11/10/2020    Disease of blood and blood forming organ     Diverticulitis     ON COLONSCOPY    DJD (degenerative joint disease), lumbar     Dry eye syndrome     DVT (deep venous thrombosis) (HCC)     after splenic artery repair in right calf    Gastro - esophageal reflux disease     Hiatal hernia     History of blood transfusion 1982    History of echocardiogram 04/2020    Hyperlipidemia     HIGH CHOLESTEROL/HIGH TRIGLYCERIDES    Hyperplastic colon polyp     Hypertension     PCP DR Wong AGUIRRE, [place]    Hypothyroidism     LGSIL (low grade squamous intraepithelial dysplasia) 11/2014    referred to GYN/S/P colpo with bx LGSIL, repeat pap in 6 months    Non-celiac gluten sensitivity     wheezing is directly proportionate to wheat intake    NSTEMI (non-ST elevated myocardial infarction) ([facility] 01/16/2020    DR Michelle Montgomery    Osteoarthritis 2004    RT KNEE S/P INJURY    Other disorders of kidney and ureter in diseases classified elsewhere     Sciatica of right side     Severe persistent asthma     patient has seen that wheat consumption causes exacerbations    Splenic artery aneurysm (HCC)     s/p splenic artery repair    Tinnitus     Type 2 diabetes mellitus, controlled ([facility] 05/2011    PCP DR Jignesh Hathaway     Current Outpatient Medications on File Prior to Visit   Medication Sig Dispense Refill    furosemide (LASIX) 40 MG tablet TAKE 1 TABLET DAILY 90 tablet 3    glimepiride (AMARYL) 1 MG tablet Take 1 tablet by mouth every morning (before breakfast) 30 tablet 3    dapagliflozin (FARXIGA) 10 MG tablet Take 1 tablet by mouth every morning 30 tablet 5    fluconazole (DIFLUCAN) 150 MG tablet Take one daily now and repeat dose in 48 hrs.  2 tablet 0    metoprolol succinate (TOPROL XL) 100 MG extended release tablet Take 1 tablet by mouth 2 times daily 180 tablet 3    sacubitril-valsartan (ENTRESTO) 24-26 MG per tablet Take 1 tablet by mouth 2 times daily 180 tablet 3    famotidine (PEPCID) 40 MG tablet Take 1 tablet by mouth every evening 90 tablet 3    pantoprazole (PROTONIX) 40 MG tablet Take one tablet once daily 30-60 minutes prior to a meal 90 tablet 3    budesonide-formoterol (SYMBICORT) 160-4.5 MCG/ACT AERO USE 2 INHALATIONS TWICE A DAY (RINSE MOUTH WELL AFTER USE) 3 Inhaler 3    fluticasone (FLONASE) 50 MCG/ACT nasal spray USE 2 SPRAYS IN EACH NOSTRIL ONCE DAILY 48 g 3    Cholecalciferol (VITAMIN D3) 50 MCG (2000 UT) CAPS Take 1 capsule by mouth daily      aspirin 81 MG EC tablet Take 1 tablet by mouth daily 30 tablet 3    albuterol (PROVENTIL) (5 MG/ML) 0.5% nebulizer solution Take 1 mL by nebulization 4 times daily as needed for Wheezing 120 each 3    SYNTHROID 175 MCG tablet Take 175 mcg by mouth Daily       ipratropium-albuterol (DUONEB) 0.5-2.5 (3) MG/3ML SOLN nebulizer solution Inhale 3 mLs into the lungs every 4 hours as needed for Shortness of Breath 360 mL 2    diclofenac (VOLTAREN) 75 MG EC tablet Take 1 tablet by mouth 2 times daily as needed for Pain 90 tablet 2    Probiotic Product (SOLUBLE FIBER/PROBIOTICS PO) Take by mouth 2 times daily      metFORMIN (GLUCOPHAGE) 500 MG tablet TAKE 2 TABLETS BY MOUTH TWO TIMES A DAY WITH MEALS 120 tablet 3     No current facility-administered medications on file prior to visit.      Allergies as of 02/09/2022 - Fully Reviewed 01/20/2022   Allergen Reaction Noted    Morphine Shortness Of Breath, Nausea And Vomiting, and Other (See Comments) 06/27/2013    Ativan [lorazepam] Other (See Comments) 09/08/2020    Ibuprofen  06/02/2021    Codeine Nausea And Vomiting and Other (See Comments) 06/27/2013    Darvon [propoxyphene] Nausea And Vomiting and Other (See Comments) 06/27/2013    Lisinopril Other (See Comments) 06/27/2013    Percocet [oxycodone-acetaminophen] Nausea And Vomiting and Other (See Comments) 10/17/2013           PEx:                ASSESSMENT:        PLAN:EGD and colonoscopy

## 2022-02-11 ENCOUNTER — TELEPHONE (OUTPATIENT)
Dept: SURGERY | Age: 62
End: 2022-02-11

## 2022-02-11 NOTE — TELEPHONE ENCOUNTER
[address]           CWB:0[id number]           Surgical/Procedure Planned: EGD and colonoscopy    Date & Location: [facility] on 3/15/22       Outpatient   Planned Length of OR: 45 min. Sedation: intravenous sedation        Estimated Cardiac Risk for Non-Cardiac Surgery/Procedure     Low______ Moderate__x____ High______    Any special instructions with AICD?   No       ASA 81 mg/325 mg Hold _5__ Days    Resume medications:     Lovenox indicated: _____Yes __x___NO    Provider:Dr. Landry Rice of Provider Giving Orders for Medication holds:  Celio Man MD    _____________________________________________

## 2022-02-21 ENCOUNTER — HOSPITAL ENCOUNTER (OUTPATIENT)
Dept: CARDIAC REHAB | Age: 62
Discharge: HOME | End: 2022-02-21

## 2022-02-21 PROCEDURE — 9900000065 HC CARDIAC REHAB PHASE 3 - 1 VISIT

## 2022-02-25 ENCOUNTER — HOSPITAL ENCOUNTER (OUTPATIENT)
Dept: CARDIAC REHAB | Age: 62
Discharge: HOME | End: 2022-02-25

## 2022-02-25 PROCEDURE — 9900000065 HC CARDIAC REHAB PHASE 3 - 1 VISIT

## 2022-02-28 ENCOUNTER — HOSPITAL ENCOUNTER (OUTPATIENT)
Dept: CARDIAC REHAB | Age: 62
Discharge: HOME | End: 2022-02-28

## 2022-02-28 PROCEDURE — 9900000065 HC CARDIAC REHAB PHASE 3 - 1 VISIT

## 2022-03-09 ENCOUNTER — HOSPITAL ENCOUNTER (OUTPATIENT)
Dept: CARDIAC REHAB | Age: 62
Discharge: HOME | End: 2022-03-09

## 2022-03-09 PROCEDURE — 9900000065 HC CARDIAC REHAB PHASE 3 - 1 VISIT

## 2022-03-15 ENCOUNTER — ANESTHESIA EVENT (OUTPATIENT)
Dept: OPERATING ROOM | Age: 62
End: 2022-03-15
Payer: COMMERCIAL

## 2022-03-15 ENCOUNTER — HOSPITAL ENCOUNTER (OUTPATIENT)
Age: 62
Setting detail: OUTPATIENT SURGERY
Discharge: HOME | End: 2022-03-15
Attending: SURGERY | Admitting: SURGERY
Payer: COMMERCIAL

## 2022-03-15 ENCOUNTER — ANESTHESIA (OUTPATIENT)
Dept: OPERATING ROOM | Age: 62
End: 2022-03-15
Payer: COMMERCIAL

## 2022-03-15 VITALS
HEART RATE: 102 BPM | DIASTOLIC BLOOD PRESSURE: 61 MMHG | OXYGEN SATURATION: 98 % | RESPIRATION RATE: 16 BRPM | TEMPERATURE: 97 F | BODY MASS INDEX: 30.67 KG/M2 | WEIGHT: 190.8 LBS | HEIGHT: 66 IN | SYSTOLIC BLOOD PRESSURE: 114 MMHG

## 2022-03-15 VITALS — SYSTOLIC BLOOD PRESSURE: 111 MMHG | DIASTOLIC BLOOD PRESSURE: 63 MMHG | OXYGEN SATURATION: 99 %

## 2022-03-15 PROCEDURE — 2709999900 HC NON-CHARGEABLE SUPPLY: Performed by: SURGERY

## 2022-03-15 PROCEDURE — 2500000003 HC RX 250 WO HCPCS: Performed by: NURSE ANESTHETIST, CERTIFIED REGISTERED

## 2022-03-15 PROCEDURE — 7100000011 HC PHASE II RECOVERY - ADDTL 15 MIN: Performed by: SURGERY

## 2022-03-15 PROCEDURE — 3609012400 HC EGD TRANSORAL BIOPSY SINGLE/MULTIPLE: Performed by: SURGERY

## 2022-03-15 PROCEDURE — G0105 COLORECTAL SCRN; HI RISK IND: HCPCS | Performed by: SURGERY

## 2022-03-15 PROCEDURE — 43239 EGD BIOPSY SINGLE/MULTIPLE: CPT | Performed by: SURGERY

## 2022-03-15 PROCEDURE — 88305 TISSUE EXAM BY PATHOLOGIST: CPT

## 2022-03-15 PROCEDURE — 3700000001 HC ADD 15 MINUTES (ANESTHESIA): Performed by: SURGERY

## 2022-03-15 PROCEDURE — 6360000002 HC RX W HCPCS: Performed by: NURSE ANESTHETIST, CERTIFIED REGISTERED

## 2022-03-15 PROCEDURE — 3700000000 HC ANESTHESIA ATTENDED CARE: Performed by: SURGERY

## 2022-03-15 PROCEDURE — 3609027000 HC COLONOSCOPY: Performed by: SURGERY

## 2022-03-15 PROCEDURE — 7100000010 HC PHASE II RECOVERY - FIRST 15 MIN: Performed by: SURGERY

## 2022-03-15 PROCEDURE — 2580000003 HC RX 258: Performed by: NURSE ANESTHETIST, CERTIFIED REGISTERED

## 2022-03-15 RX ORDER — SODIUM CHLORIDE, SODIUM LACTATE, POTASSIUM CHLORIDE, CALCIUM CHLORIDE 600; 310; 30; 20 MG/100ML; MG/100ML; MG/100ML; MG/100ML
INJECTION, SOLUTION INTRAVENOUS CONTINUOUS
Status: DISCONTINUED | OUTPATIENT
Start: 2022-03-15 | End: 2022-03-15 | Stop reason: HOSPADM

## 2022-03-15 RX ORDER — SODIUM CHLORIDE, SODIUM LACTATE, POTASSIUM CHLORIDE, CALCIUM CHLORIDE 600; 310; 30; 20 MG/100ML; MG/100ML; MG/100ML; MG/100ML
INJECTION, SOLUTION INTRAVENOUS CONTINUOUS PRN
Status: DISCONTINUED | OUTPATIENT
Start: 2022-03-15 | End: 2022-03-15 | Stop reason: SDUPTHER

## 2022-03-15 RX ORDER — PROPOFOL 10 MG/ML
INJECTION, EMULSION INTRAVENOUS PRN
Status: DISCONTINUED | OUTPATIENT
Start: 2022-03-15 | End: 2022-03-15 | Stop reason: SDUPTHER

## 2022-03-15 RX ORDER — LIDOCAINE HYDROCHLORIDE 40 MG/ML
INJECTION, SOLUTION RETROBULBAR; TOPICAL PRN
Status: DISCONTINUED | OUTPATIENT
Start: 2022-03-15 | End: 2022-03-15 | Stop reason: SDUPTHER

## 2022-03-15 RX ORDER — GLYCOPYRROLATE 1 MG/5 ML
SYRINGE (ML) INTRAVENOUS PRN
Status: DISCONTINUED | OUTPATIENT
Start: 2022-03-15 | End: 2022-03-15 | Stop reason: SDUPTHER

## 2022-03-15 RX ADMIN — PROPOFOL 100 MG: 10 INJECTION, EMULSION INTRAVENOUS at 07:57

## 2022-03-15 RX ADMIN — PROPOFOL 100 MG: 10 INJECTION, EMULSION INTRAVENOUS at 07:50

## 2022-03-15 RX ADMIN — LIDOCAINE HYDROCHLORIDE 100 MG: 40 INJECTION, SOLUTION RETROBULBAR; TOPICAL at 07:50

## 2022-03-15 RX ADMIN — Medication 0.2 MG: at 07:39

## 2022-03-15 RX ADMIN — SODIUM CHLORIDE, POTASSIUM CHLORIDE, SODIUM LACTATE AND CALCIUM CHLORIDE: 600; 310; 30; 20 INJECTION, SOLUTION INTRAVENOUS at 07:39

## 2022-03-15 ASSESSMENT — ENCOUNTER SYMPTOMS: SHORTNESS OF BREATH: 1

## 2022-03-15 ASSESSMENT — PAIN SCALES - GENERAL
PAINLEVEL_OUTOF10: 0
PAINLEVEL_OUTOF10: 0

## 2022-03-15 ASSESSMENT — PAIN - FUNCTIONAL ASSESSMENT: PAIN_FUNCTIONAL_ASSESSMENT: 0-10

## 2022-03-15 NOTE — ANESTHESIA PRE PROCEDURE
Department of Anesthesiology  Preprocedure Note       Name:  Adebayo Perez   Age:  60 y.o.  :  1960                                          MRN:  [id number]         Date:  3/15/2022      Surgeon: Tiarra Diop):  Opal Baig MD    Procedure: Procedure(s):  EGD  Colonoscopy    Medications prior to admission:   Prior to Admission medications    Medication Sig Start Date End Date Taking?  Authorizing Provider   furosemide (LASIX) 40 MG tablet TAKE 1 TABLET DAILY 22  Yes Arun Taylor DO   glimepiride (AMARYL) 1 MG tablet Take 1 tablet by mouth every morning (before breakfast) 22  Yes Rhonad Swan MD   metoprolol succinate (TOPROL XL) 100 MG extended release tablet Take 1 tablet by mouth 2 times daily 1/3/22  Yes Arun Taylor DO   sacubitril-valsartan (ENTRESTO) 24-26 MG per tablet Take 1 tablet by mouth 2 times daily 1/3/22  Yes Arun Taylor DO   famotidine (PEPCID) 40 MG tablet Take 1 tablet by mouth every evening 21  Yes Delfin Spatz, APRN - CNP   pantoprazole (PROTONIX) 40 MG tablet Take one tablet once daily 30-60 minutes prior to a meal 3/15/21  Yes Delfin Spatz, APRN - CNP   budesonide-formoterol (SYMBICORT) 160-4.5 MCG/ACT AERO USE 2 INHALATIONS TWICE A DAY (RINSE MOUTH WELL AFTER USE) 21  Yes Delfin Spatz, APRN - CNP   fluticasone (FLONASE) 50 MCG/ACT nasal spray USE 2 SPRAYS IN EACH NOSTRIL ONCE DAILY 20  Yes Delfin Spatz, APRN - CNP   Cholecalciferol (VITAMIN D3) 50 MCG ( UT) CAPS Take 1 capsule by mouth daily   Yes Historical Provider, MD   albuterol (PROVENTIL) (5 MG/ML) 0.5% nebulizer solution Take 1 mL by nebulization 4 times daily as needed for Wheezing 19  Yes Delfin Spatz, APRN - CNP   SYNTHROID 175 MCG tablet Take 175 mcg by mouth Daily  8/3/19  Yes Historical Provider, MD   diclofenac (VOLTAREN) 75 MG EC tablet Take 1 tablet by mouth 2 times daily as needed for Pain 18  Yes Loki Tolbert MD   Probiotic Product (SOLUBLE FIBER/PROBIOTICS PO) Take by mouth 2 times daily   Yes Historical Provider, MD   metFORMIN (GLUCOPHAGE) 500 MG tablet TAKE 2 TABLETS BY MOUTH TWO TIMES A DAY WITH MEALS 9/18/17  Yes Justo Fortune MD   ipratropium-albuterol (Rodolfo Fail) 0.5-2.5 (3) MG/3ML SOLN nebulizer solution Inhale 3 mLs into the lungs every 4 hours as needed for Shortness of Breath 5/6/19   Raffi Niño, APRN - CNP       Current medications:    No current facility-administered medications for this encounter. Allergies:     Allergies   Allergen Reactions    Morphine Shortness Of Breath, Nausea And Vomiting and Other (See Comments)     Chest tightness    Ativan [Lorazepam] Other (See Comments)     agitation    Codeine Nausea And Vomiting and Other (See Comments)     Chest tightness    Darvon [Propoxyphene] Nausea And Vomiting and Other (See Comments)     Chest tightness    Lisinopril Other (See Comments)     COUGH    Percocet [Oxycodone-Acetaminophen] Nausea And Vomiting and Other (See Comments)     Chest tightness       Problem List:    Patient Active Problem List   Diagnosis Code    Hiatal hernia with GERD K21.9, K44.9    Hypothyroidism E03.9    Essential hypertension I10    Hyperlipidemia E78.5    DJD (degenerative joint disease), lumbar M47.816    Acute diverticulitis of intestine K57.92    Hyperplastic colon polyp K63.5    Tinnitus H93.19    Sciatica of right side M54.31    Splenic artery aneurysm (HCC) I72.8    Presence of endovascular aneurysm coil compatible with safe magnetic resonance imaging L19.222    DVT (deep venous thrombosis) (HCC) I82.409    LGSIL (low grade squamous intraepithelial dysplasia) [id number]    Sinusitis, chronic J32.9    Asthma J45.909    Non-celiac gluten sensitivity K90.41    Severe persistent asthma J45.50    Varicose veins of bilateral lower extremities with pain I83.813    Bowel perforation (HCC) K63.1    Diverticulitis of large intestine with abscess without bleeding K57.20    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    Partial small bowel obstruction ([facility] K56.600    Diabetes type 2, no ocular involvement ([facility] E11.9    History of radial keratotomy Z98.890    Combined forms of age-related cataract of both eyes H25.813    Respiratory distress R06.03    Exertional dyspnea R06.00    Thrombophlebitis of superficial veins of right lower extremity I80.01    Nonischemic cardiomyopathy (HCC) I42.8    Acute on chronic systolic congestive heart failure (HCC) I50.23    Acute respiratory failure (HCC) J96.00    Acute pulmonary edema (HCC) J81.0    S/P repair of paraesophageal hernia Z98.890, Z87.19       Past Medical History:        Diagnosis Date    Acute on chronic systolic congestive heart failure ([facility] 01/21/2020    \"viral heart failure\" per patient    AICD (automatic cardioverter/defibrillator) present     Asthma     CNP CHELSEA GOTTI[facility]    Bilateral corneal scars     COVID-19 11/10/2020    Disease of blood and blood forming organ     Diverticulitis     ON COLONSCOPY    DJD (degenerative joint disease), lumbar     Dry eye syndrome     [facility] thrombosis) ([facility]     after splenic artery repair in right calf    Gastro - esophageal reflux disease     Hiatal hernia     History of blood transfusion 1982    History of echocardiogram 04/2020    Hyperlipidemia     HIGH CHOLESTEROL/HIGH TRIGLYCERIDES    Hyperplastic colon polyp     Hypertension     PCP DR Kojo AGUIRRE, [place]    Hypothyroidism     LGSIL (low grade squamous intraepithelial dysplasia) 11/2014    referred to GYN/S/P colpo with bx LGSIL, repeat pap in 6 months    Non-celiac gluten sensitivity     wheezing is directly proportionate to wheat intake    NSTEMI (non-ST elevated myocardial infarction) ([facility] 01/16/2020    DR Giuliano Drake    Osteoarthritis 2004    RT KNEE S/P INJURY    Other disorders of kidney and ureter in diseases classified elsewhere     Sciatica of right side     Severe persistent asthma     patient has seen that wheat consumption causes exacerbations    Splenic artery aneurysm [facility])     s/p splenic artery repair    Tinnitus     Type 2 diabetes mellitus, controlled ([facility] 2011    PCP DR Dai العلي       Past Surgical History:        Procedure Laterality Date    ABDOMEN SURGERY      ARTERIAL ANEURYSM REPAIR      splenic artery coil repair    CARDIAC CATHETERIZATION  2020    CARDIAC DEFIBRILLATOR PLACEMENT Left 2020    [employer]     SECTION  1986    COLONOSCOPY  2012    HYPERPLASTIC POLYP, DIVERTICULAR DISEASE    COLONOSCOPY  2017    moderate sigmoid diverticulosis. Dr. Edward Backers  2015    with BX LGSIL    CYSTOSCOPY Bilateral 2017    CYSTO insertion lighted stents performed by Ana Lainez MD at [address], ESOPHAGUS      ENDOSCOPY, COLON, DIAGNOSTIC      EYE SURGERY      GASTRIC FUNDOPLICATION N/A 8622    XI LAPAROSCOPIC ROBOTIC HIATAL HERNIA REPAIR WITH MESH  NISSEN FUNDOPLICATION. EGD performed by Yohannes Rivero DO at [address]  2021    LAPAROSCOPIC ROBOTIC HIATAL HERNIA REPAIR WITH MESH  NISSEN FUNDOPLICATION.  EGD    KNEE ARTHROSCOPY Right     PARTIAL SYNOVECTOMY AND CHONDROPLASTY DUE TO MENSICUS TEAR    LAPAROSCOPY N/A 2017    EXPLORATORY LAPAROSCOPY converted to open exploration with drainage of pelvic abscess performed by Beverley Allen MD at [facility]  2014    clearing an infection done at [place] [address]  10/25/2011    L4/L5 epidural steroid injection    OTHER SURGICAL HISTORY Right  and     synvisc injections, two sets    OTHER SURGICAL HISTORY  2020    Lead revision    IA COLON CA SCRN NOT HI RSK IND N/A 2017    COLONOSCOPY performed by Beverley Allen MD at [address] IND N/A 2017    COLONOSCOPY performed by Beverley Allen MD at [address] EGD TRANSORAL BIOPSY SINGLE/MULTIPLE N/A 02/27/2017    EGD [facility]SY [facility]  SKIN TAG REMOVAL      SMALL INTESTINE SURGERY N/A 08/30/2017    ATTEMPTED LAPAROCOPY PROCEDURE CONVERTED TO OPEN Sigmoid Colectomy PERFORMED BY DR. VO resection colovaginal fistula PERFORMED BY. DR. Kajal James  performed by Darrion Sharp MD at [address] Right 07/2011    TOTAL    TUBAL LIGATION  1991    UPPER GASTROINTESTINAL ENDOSCOPY N/A 01/02/2020    EGD BIOPSY W/ 50 HR LEARY performed by Darrion Sharp MD at [facility] EXTRACTION         Social History:    Social History     Tobacco Use    Smoking status: Never Smoker    Smokeless tobacco: Never Used    Tobacco comment: never smoked syant rrt 10/13/2017   Substance Use Topics    Alcohol use: No     Alcohol/week: 0.0 standard drinks     Comment: Consume about 4 wine coolers a year                                Counseling given: Not Answered  Comment: never smoked syant rrt 10/13/2017      Vital Signs (Current):   Vitals:    03/15/22 0656   BP: 120/65   Pulse: 99   Resp: 16   Temp: 36.6 °C (97.8 °F)   TempSrc: Oral   SpO2: 97%   Weight: 190 lb 12.8 oz (86.5 kg)   Height: 5' 6\" (1.676 m)                                              BP Readings from Last 3 Encounters:   03/15/22 120/65   01/20/22 121/69   01/13/22 128/74       NPO Status: Time of last liquid consumption: 1900                        Time of last solid consumption: 1800                        Date of last liquid consumption: 03/14/22                        Date of last solid food consumption: 03/13/22    BMI:   Wt Readings from Last 3 Encounters:   03/15/22 190 lb 12.8 oz (86.5 kg)   01/20/22 186 lb (84.4 kg)   01/13/22 186 lb (84.4 kg)     Body mass index is 30.8 kg/m².     CBC:   Lab Results   Component Value Date    WBC 12.3 02/09/2021    RBC 4.15 02/09/2021    HGB 11.9 02/09/2021    HCT 37.9 02/09/2021    MCV 91.3 02/09/2021 RDW 14.4 02/09/2021     02/09/2021       CMP:   Lab Results   Component Value Date     02/10/2021    K 4.0 02/10/2021     02/10/2021    CO2 23 02/10/2021    BUN 11 02/10/2021    CREATININE 0.56 02/10/2021    GFRAA >60 02/10/2021    LABGLOM >60 02/10/2021    GLUCOSE 105 02/10/2021    PROT 6.7 06/09/2020    CALCIUM 9.2 02/10/2021    BILITOT 0.28 06/09/2020    ALKPHOS 61 06/09/2020    AST 26 06/09/2020    ALT 34 06/09/2020       POC Tests: No results for input(s): POCGLU, POCNA, POCK, POCCL, POCBUN, POCHEMO, POCHCT in the last 72 hours. Coags:   Lab Results   Component Value Date    PROTIME 9.3 01/26/2021    INR 0.9 01/26/2021    APTT 22.4 01/16/2020       HCG (If Applicable): No results found for: PREGTESTUR, PREGSERUM, HCG, HCGQUANT     ABGs: No results found for: PHART, PO2ART, DYI3QZM, GDZ6UTA, BEART, J5LSPPZK     Type & Screen (If Applicable):  No results found for: LABABO, LABRH    Drug/Infectious Status (If Applicable):  No results found for: HIV, HEPCAB    COVID-19 Screening (If Applicable):   Lab Results   Component Value Date    COVID19 Not Detected 01/06/2022    COVID19 Detected 11/10/2020    COVID19 Not Detected 05/07/2020           Anesthesia Evaluation  Patient summary reviewed and Nursing notes reviewed no history of anesthetic complications:   Airway: Mallampati: II  TM distance: >3 FB   Neck ROM: full  Mouth opening: > = 3 FB Dental: normal exam         Pulmonary:normal exam  breath sounds clear to auscultation  (+) shortness of breath: chronic,  asthma:           Patient did not smoke on day of surgery.                  Cardiovascular:    (+) hypertension:, past MI: > 6 months, CHF:, MIGUEL:, hyperlipidemia        Rhythm: regular  Rate: normal           Beta Blocker:  Dose within 24 Hrs         Neuro/Psych:   (+) neuromuscular disease:,             GI/Hepatic/Renal:   (+) hiatal hernia, GERD:, bowel prep,           Endo/Other:    (+) DiabetesType II DM, , hypothyroidism::., .          Pt had no PAT visit       Abdominal:       Abdomen: soft. Vascular: negative vascular ROS. Other Findings:             Anesthesia Plan      MAC     ASA 3       Induction: intravenous. MIPS: Postoperative opioids intended and Prophylactic antiemetics administered. Anesthetic plan and risks discussed with patient.       Plan discussed with surgical team.                  DEREK Garrett - KAT   3/15/2022

## 2022-03-15 NOTE — OP NOTE
PROCEDURE NOTE    DATE OF PROCEDURE: 3/15/2022     SURGEON: Dr. Zaid Anderson    ASSISTANT: None    PREOPERATIVE DIAGNOSIS:      1. Long Hx of GERD, long term PPI(protonix and pepcid)  2. Last EGD 2017  3. Robotic nissen 2/9/2021  4. Hx of polyps , last CS 2017       OPERATION: 1. Upper GI endoscopy with cold biopsy    2. Colonoscopy     ANESTHESIA: IV sedation per CRNA.     ESTIMATED BLOOD LOSS: Less than 10 ml    COMPLICATIONS: None. PROCEDURE # 1:  EGD    PROCEDURE: The patient was given IV conscious sedation per anesthesia. The patient was given 4 L of O2 /minute by nasal cannula. The patient's SPO2 remained above 90% throughout the procedure. The gastroscope was inserted orally and advanced under direct vision through the esophagus, through the stomach, through the pylorus, and into the descending duodenum. Random biopsies were taken in the antrum,body and distal esophagus. The scope was removed and the patient tolerated the procedure well. Findings:    1.  GE junction at 38 cm  2. Wrap intact   3. Otherwise , normal      PROCEDURE # 2:  COLONOSCOPY      PROCEDURE: The patient was given IV conscious sedation per anesthesia. The patient was given O2 by nasal cannula. The patient's SPO2 remained above 90% throughout the procedure. The colonoscope was inserted per rectum and advanced under direct vision to the cecum without difficulty. The prep was good so exam was adequate. The colon was decompressed and the scope was removed. The patient tolerated the procedure well. Findings:    1. Moderate sigmoid diverticulosis  2. Otherwise normal Colon      PLAN:    1. Await bx  2.   Continue current tx  3/  Repeat CS in 5 years due to hx polyps            Electronically signed by Kashif Youssef MD  on 3/15/2022 at 7:45 AM

## 2022-03-15 NOTE — ANESTHESIA POSTPROCEDURE EVALUATION
Department of Anesthesiology  Postprocedure Note    Patient: Hailee Real  MRN: [id number]  YOB: 1960  Date of evaluation: 3/15/2022  Time:  8:11 AM     Procedure Summary     Date: 03/15/22 Room / Location: [address]    Anesthesia Start: 0335 Anesthesia Stop: 1916    Procedures:       EGD BIOPSY (N/A )      Colonoscopy (N/A ) Diagnosis: (GERD, colon screening)    Surgeons: Alvin Miranda MD Responsible Provider: DEREK Mayberry CRNA    Anesthesia Type: MAC ASA Status: 3          Anesthesia Type: MAC    Radha Phase I: Radha Score: 10    Radha Phase II: Radha Score: 10    Last vitals: Reviewed and per EMR flowsheets.        Anesthesia Post Evaluation    Patient location during evaluation: PACU  Patient participation: complete - patient participated  Level of consciousness: awake, awake and alert and responsive to light touch  Pain score: 0  Airway patency: patent  Nausea & Vomiting: no nausea and no vomiting  Complications: no  Cardiovascular status: blood pressure returned to baseline and hemodynamically stable  Respiratory status: acceptable  Hydration status: euvolemic

## 2022-03-15 NOTE — H&P
H &P EGD and Colonoscopy  Patient:Michelle Vera                 :1960  (yes) patient has seen Dr. Jennifer Del Rosario prior to procedure  PCP: Abdiel Rodarte MD     CC:GERD         History of colon polyps        HPI:           Colonoscopy  Abd pain: no  Anemia: no  Bloating:no  Diarrhea: no  Constipation: no  Melena: no  Hematochezia:no  Rectal Bleeding:no  Rectal/Anal Pain:no  Pruritus: no  Family history colon Cancer: no  Previous colon cancer: no  Previous Colon Polyp: yes  Change in bowels: no  Decrease caliber of stool: no  Change in color of stool: no  Previous work up date: 2017-colonoscopy=moderate sigmoid diverticulosis. Dr Jennifer Del Rosario at [facility]     EGD  Nausea: no  Vomiting: no  Heartburn:no  Dysphagia:no  Hematemesis:no  Epigastric pain:no  Anemia: no  Previous work up date:2017-EGD=mild esophagitis. Dr. Jennifer Del Rosario at [facility]  Current Treatment:Protonix daily, Pepcid at night     Family History         Family History   Problem Relation Age of Onset    High Blood Pressure Mother      High Cholesterol Mother      Allergies Mother      Arthritis Mother      Coronary Art Dis Mother      Diabetes Daughter           pre diabetes    Other Maternal Grandfather           PUD GI problems    Breast Cancer Maternal Aunt      No Known Problems Maternal Aunt      No Known Problems Maternal Aunt      No Known Problems Maternal Aunt      Cataracts Neg Hx      Glaucoma Neg Hx           Social History               Socioeconomic History    Marital status:        Spouse name: Not on file    Number of children: Not on file    Years of education: Not on file    Highest education level: Not on file   Occupational History    Occupation: counselor       Employer: [employer]   Tobacco Use    Smoking status: Never Smoker    Smokeless tobacco: Never Used    Tobacco comment: never smoked syant rrt 10/13/2017   Vaping Use    Vaping Use: Never used   Substance and Sexual Activity    Alcohol use:  No       Alcohol/week: 0.0 standard drinks       Comment: Consume about 4 wine coolers a year    Drug use: No    Sexual activity: Yes       Partners: Male       Comment:    Other Topics Concern    Not on file   Social History Narrative    Not on file      Social Determinants of Health          Financial Resource Strain: Low Risk     Difficulty of Paying Living Expenses: Not hard at all   Food Insecurity: No Food Insecurity    Worried About Running Out of Food in the Last Year: Never true    [address] in the Last Year: Never true   Transportation Needs:     Lack of Transportation (Medical): Not on file    Lack of Transportation (Non-Medical):  Not on file   Physical Activity:     Days of Exercise per Week: Not on file    Minutes of Exercise per Session: Not on file   Stress:     Feeling of Stress : Not on file   Social Connections:     Frequency of Communication with Friends and Family: Not on file    Frequency of Social Gatherings with Friends and Family: Not on file    Attends Oriental orthodox Services: Not on file    Active Member of [address] [place] [address] or Organizations: Not on file    Attends Club or Organization Meetings: Not on file    Marital Status: Not on file   Intimate Partner Violence:     Fear of Current or Ex-Partner: Not on file    Emotionally Abused: Not on file    Physically Abused: Not on file    Sexually Abused: Not on file   Housing Stability:     Unable to Pay for Housing in the Last Year: Not on file    Number of [place] in the Last Year: Not on file    Unstable Housing in the Last Year: Not on file         Past Surgical History         Past Surgical History:   Procedure Laterality Date    ABDOMEN SURGERY        ARTERIAL ANEURYSM REPAIR   2015     splenic artery coil repair    CARDIAC CATHETERIZATION   01/20/2020   [address] Left 05/11/2020     [employer]   [address]   01/30/2012     HYPERPLASTIC POLYP, DIVERTICULAR DISEASE  COLONOSCOPY   02/27/2017     moderate sigmoid diverticulosis. Dr. Ailyn Patel   11/2015     with BX LGSIL    CYSTOSCOPY Bilateral 08/30/2017     CYSTO insertion lighted stents performed by Queenie Canada MD at [address], ESOPHAGUS        ENDOSCOPY, COLON, DIAGNOSTIC        EYE SURGERY        GASTRIC FUNDOPLICATION N/A 3/9/6900     XI LAPAROSCOPIC ROBOTIC HIATAL HERNIA REPAIR WITH MESH  NISSEN FUNDOPLICATION. EGD performed by Pia Jaffe DO at [address]   02/09/2021     LAPAROSCOPIC ROBOTIC HIATAL HERNIA REPAIR WITH MESH  NISSEN FUNDOPLICATION. EGD    KNEE ARTHROSCOPY Right 2004     PARTIAL SYNOVECTOMY AND CHONDROPLASTY DUE TO MENSICUS TEAR    LAPAROSCOPY N/A 09/08/2017     EXPLORATORY LAPAROSCOPY converted to open exploration with drainage of pelvic abscess performed by Ilir Madrid MD at [facility]   08/19/2014     clearing an infection done at [facility].    10/25/2011     L4/L5 epidural steroid injection    OTHER SURGICAL HISTORY Right 2006 and 2010     synvisc injections, two sets    OTHER SURGICAL HISTORY   05/19/2020     Lead revision    VT COLON CA SCRN NOT HI RSK IND N/A 02/27/2017     COLONOSCOPY performed by Ilir Madrid MD at [address] IND N/A 09/08/2017     COLONOSCOPY performed by Ilir Madrid MD at [address] EGD TRANSORAL BIOPSY SINGLE/MULTIPLE N/A 02/27/2017     EGD BIOPSY performed by Ilir Madrid MD at [address] TAG REMOVAL        SMALL INTESTINE SURGERY N/A 08/30/2017     ATTEMPTED LAPAROCOPY PROCEDURE CONVERTED TO OPEN Sigmoid Colectomy PERFORMED BY DR. VO resection colovaginal fistula PERFORMED BY. DR. Eb Bianchi  performed by Ilir Madrid MD at [facility] Right 07/2011     TOTAL    TUBAL LIGATION   1991    UPPER GASTROINTESTINAL ENDOSCOPY N/A 01/02/2020     EGD BIOPSY W/ 48 HR LEARY performed by Bhanu Oconnor Samara Butcher MD at [facility] EXTRACTION             Past Medical History        Past Medical History:   Diagnosis Date    Acute on chronic systolic congestive heart failure ([facility] [address] 01/21/2020     \"viral heart failure\" per patient    AICD (automatic cardioverter/defibrillator) present      Asthma       CNP [facility]EN[facility]    Bilateral corneal scars      COVID-19 11/10/2020    Disease of blood and blood forming organ      Diverticulitis       ON COLONSCOPY    DJD (degenerative joint disease), lumbar      Dry eye syndrome      DVT (deep venous thrombosis) (HCC)       after splenic artery repair in right calf    Gastro - esophageal reflux disease      Hiatal hernia      History of blood transfusion 1982    History of echocardiogram 04/2020    Hyperlipidemia       HIGH CHOLESTEROL/HIGH TRIGLYCERIDES    Hyperplastic colon polyp      Hypertension       PCP DR Aleah Baptiste, [facility]    Hypothyroidism      LGSIL (low grade squamous intraepithelial dysplasia) 11/2014     referred to GYN/S/P colpo with bx LGSIL, repeat pap in 6 months    Non-celiac gluten sensitivity       wheezing is directly proportionate to wheat intake    NSTEMI (non-ST elevated myocardial infarction) ([facility] [address] 01/16/2020     DR Paul Arevalo    Osteoarthritis 2004     RT KNEE S/P INJURY    Other disorders of kidney and ureter in diseases classified elsewhere      Sciatica of right side      Severe persistent asthma       patient has seen that wheat consumption causes exacerbations    Splenic artery aneurysm (HCC)       s/p splenic artery repair    Tinnitus      Type 2 diabetes mellitus, controlled ([facility] [address] 05/2011     PCP DR Aleah Baptiste                Current Outpatient Medications on File Prior to Visit   Medication Sig Dispense Refill    furosemide (LASIX) 40 MG tablet TAKE 1 TABLET DAILY 90 tablet 3    glimepiride (AMARYL) 1 MG tablet Take 1 tablet by mouth every morning (before breakfast) 30 tablet 3    dapagliflozin (FARXIGA) 10 MG tablet Take 1 tablet by mouth every morning 30 tablet 5    fluconazole (DIFLUCAN) 150 MG tablet Take one daily now and repeat dose in 48 hrs.  2 tablet 0    metoprolol succinate (TOPROL XL) 100 MG extended release tablet Take 1 tablet by mouth 2 times daily 180 tablet 3    sacubitril-valsartan (ENTRESTO) 24-26 MG per tablet Take 1 tablet by mouth 2 times daily 180 tablet 3    famotidine (PEPCID) 40 MG tablet Take 1 tablet by mouth every evening 90 tablet 3    pantoprazole (PROTONIX) 40 MG tablet Take one tablet once daily 30-60 minutes prior to a meal 90 tablet 3    budesonide-formoterol (SYMBICORT) 160-4.5 MCG/ACT AERO USE 2 INHALATIONS TWICE A DAY (RINSE MOUTH WELL AFTER USE) 3 Inhaler 3    fluticasone (FLONASE) 50 MCG/ACT nasal spray USE 2 SPRAYS IN EACH NOSTRIL ONCE DAILY 48 g 3    Cholecalciferol (VITAMIN D3) 50 MCG (2000 UT) CAPS Take 1 capsule by mouth daily        aspirin 81 MG EC tablet Take 1 tablet by mouth daily 30 tablet 3    albuterol (PROVENTIL) (5 MG/ML) 0.5% nebulizer solution Take 1 mL by nebulization 4 times daily as needed for Wheezing 120 each 3    SYNTHROID 175 MCG tablet Take 175 mcg by mouth Daily         ipratropium-albuterol (DUONEB) 0.5-2.5 (3) MG/3ML SOLN nebulizer solution Inhale 3 mLs into the lungs every 4 hours as needed for Shortness of Breath 360 mL 2    diclofenac (VOLTAREN) 75 MG EC tablet Take 1 tablet by mouth 2 times daily as needed for Pain 90 tablet 2    Probiotic Product (SOLUBLE FIBER/PROBIOTICS PO) Take by mouth 2 times daily        metFORMIN (GLUCOPHAGE) 500 MG tablet TAKE 2 TABLETS BY MOUTH TWO TIMES A DAY WITH MEALS 120 tablet 3      No current facility-administered medications on file prior to visit.            Allergies as of 02/09/2022 - Fully Reviewed 01/20/2022   Allergen Reaction Noted    Morphine Shortness Of Breath, Nausea And Vomiting, and Other (See Comments) 06/27/2013    Ativan [lorazepam] Other (See Comments) 09/08/2020    Ibuprofen   06/02/2021    Codeine Nausea And Vomiting and Other (See Comments) 06/27/2013    Darvon [propoxyphene] Nausea And Vomiting and Other (See Comments) 06/27/2013    Lisinopril Other (See Comments) 06/27/2013    Percocet [oxycodone-acetaminophen] Nausea And Vomiting and Other (See Comments) 10/17/2013             PHYSICAL EXAM:    Blood pressure 120/65, pulse 99, temperature 97.8 °F (36.6 °C), temperature source Oral, resp. rate 16, height 5' 6\" (1.676 m), weight 190 lb 12.8 oz (86.5 kg), last menstrual period 04/29/2009, SpO2 97 %, not currently breastfeeding. Gen:  A and O x 3, NAD, well nourished  Eyes:  Sclera non icterus, PERRL  Lungs:  CTA, symmetrical  Chest:  RRR, no murmurs  Abd:  Soft, NT, ND, no HSM, no bruits           ASSESSMENT:  1.  Long Hx of GERD, long term PPI(protonix and pepcid)  2. Last EGD 2017  3. Robotic nissen 2/9/2021  4.   Hx of polyps , last CS 2017        PLAN:EGD and colonoscopy

## 2022-03-17 LAB — SURGICAL PATHOLOGY REPORT: NORMAL

## 2022-03-18 ENCOUNTER — HOSPITAL ENCOUNTER (OUTPATIENT)
Dept: CARDIAC REHAB | Age: 62
Discharge: HOME | End: 2022-03-18

## 2022-03-18 PROCEDURE — 9900000065 HC CARDIAC REHAB PHASE 3 - 1 VISIT

## 2022-03-22 ENCOUNTER — HOSPITAL ENCOUNTER (OUTPATIENT)
Dept: CARDIAC REHAB | Age: 62
Discharge: HOME | End: 2022-03-22

## 2022-03-22 PROCEDURE — 9900000065 HC CARDIAC REHAB PHASE 3 - 1 VISIT

## 2022-03-24 ENCOUNTER — HOSPITAL ENCOUNTER (OUTPATIENT)
Dept: CARDIAC REHAB | Age: 62
Discharge: HOME | End: 2022-03-24

## 2022-03-24 PROCEDURE — 9900000065 HC CARDIAC REHAB PHASE 3 - 1 VISIT

## 2022-03-29 ENCOUNTER — HOSPITAL ENCOUNTER (OUTPATIENT)
Dept: CARDIAC REHAB | Age: 62
Discharge: HOME | End: 2022-03-29

## 2022-03-29 PROCEDURE — 9900000065 HC CARDIAC REHAB PHASE 3 - 1 VISIT

## 2022-04-12 ENCOUNTER — OFFICE VISIT (OUTPATIENT)
Dept: OTOLARYNGOLOGY | Age: 62
End: 2022-04-12
Payer: COMMERCIAL

## 2022-04-12 ENCOUNTER — HOSPITAL ENCOUNTER (OUTPATIENT)
Dept: CARDIAC REHAB | Age: 62
Discharge: HOME | End: 2022-04-12

## 2022-04-12 ENCOUNTER — HOSPITAL ENCOUNTER (OUTPATIENT)
Dept: LAB | Age: 62
Discharge: HOME | End: 2022-04-12
Payer: COMMERCIAL

## 2022-04-12 VITALS
BODY MASS INDEX: 31.66 KG/M2 | WEIGHT: 197 LBS | DIASTOLIC BLOOD PRESSURE: 74 MMHG | SYSTOLIC BLOOD PRESSURE: 118 MMHG | HEIGHT: 66 IN | HEART RATE: 93 BPM | RESPIRATION RATE: 18 BRPM | OXYGEN SATURATION: 96 %

## 2022-04-12 DIAGNOSIS — R49.0 HOARSENESS: ICD-10-CM

## 2022-04-12 DIAGNOSIS — Z87.898 HISTORY OF AIRWAY ASPIRATION: ICD-10-CM

## 2022-04-12 DIAGNOSIS — J38.01 PARALYSIS OF RIGHT VOCAL CORD: Primary | ICD-10-CM

## 2022-04-12 DIAGNOSIS — J38.01 PARALYSIS OF RIGHT VOCAL CORD: ICD-10-CM

## 2022-04-12 LAB
CREAT SERPL-MCNC: 0.64 MG/DL (ref 0.5–0.9)
GFR AFRICAN AMERICAN: >60 ML/MIN
GFR NON-AFRICAN AMERICAN: >60 ML/MIN
GFR SERPL CREATININE-BSD FRML MDRD: NORMAL ML/MIN/{1.73_M2}

## 2022-04-12 PROCEDURE — 36415 COLL VENOUS BLD VENIPUNCTURE: CPT

## 2022-04-12 PROCEDURE — 99204 OFFICE O/P NEW MOD 45 MIN: CPT | Performed by: OTOLARYNGOLOGY

## 2022-04-12 PROCEDURE — G8427 DOCREV CUR MEDS BY ELIG CLIN: HCPCS | Performed by: OTOLARYNGOLOGY

## 2022-04-12 PROCEDURE — 31575 DIAGNOSTIC LARYNGOSCOPY: CPT | Performed by: OTOLARYNGOLOGY

## 2022-04-12 PROCEDURE — 1036F TOBACCO NON-USER: CPT | Performed by: OTOLARYNGOLOGY

## 2022-04-12 PROCEDURE — 3017F COLORECTAL CA SCREEN DOC REV: CPT | Performed by: OTOLARYNGOLOGY

## 2022-04-12 PROCEDURE — 9900000065 HC CARDIAC REHAB PHASE 3 - 1 VISIT

## 2022-04-12 PROCEDURE — G8417 CALC BMI ABV UP PARAM F/U: HCPCS | Performed by: OTOLARYNGOLOGY

## 2022-04-12 PROCEDURE — 82565 ASSAY OF CREATININE: CPT

## 2022-04-12 RX ORDER — ALBUTEROL SULFATE 90 UG/1
AEROSOL, METERED RESPIRATORY (INHALATION)
COMMUNITY
Start: 2022-03-02

## 2022-04-12 NOTE — PROGRESS NOTES
2022 10:29 AM EDT  Lexus Hess (:  1960) is a 60 y.o. female,New patient, here for evaluation of the following chief complaint(s): Other (nwo- hoarseness since Dec. 2020)      ASSESSMENT/PLAN:  1. Paralysis of right vocal cord    2. History of airway aspiration    3. Hoarseness      1. Paralysis of right vocal cord  -     CT SOFT TISSUE NECK W CONTRAST; Future  -     CT CHEST W CONTRAST; Future  -     CT HEAD W WO CONTRAST; Future  -     Creatinine; Future  -     FL MODIFIED BARIUM SWALLOW W VIDEO; Future  2. History of airway aspiration  3. Hoarseness    Right vulvar paralysis. May have a relationship to the barium swallow 2020. Strong history of aspiration. Modified barium swallow to determine safe diet and possible speech and/or swallow therapy  Cautioned her to be careful moving forward with liquids. Recommend a thickened diet. MRI brain contraindicated with defibrillator  CT head, CT neck, CT chest  Follow-up to review results  Discussed referral to laryngologist for possible vocal cord injections versus implant to improve aspiration vocal quality. At follow-up we will also discuss chronic sinusitis management  No follow-ups on file. SUBJECTIVE/OBJECTIVE:  HPI   79-year-old woman with hoarseness since a barium swallow 2020. This preceded a fundoplication procedure. At the time of the barium swallow she developed a deep cough and noticed a significant roughness to her voice. It never seemed to recover. She had an EGD performed 3/15. She has been on acid suppression medication for many years. She states that she is have a history of GERD for her whole life. She denies any throat pain but she does have problems swallowing liquids. She describes choking and aspiration with liquids. She was diagnosed with COVID in 2021.   In May 2021 she required a defibrillator and was on a ventilator and life flighted for some emergency issues related to the defibrillator. Secondarily she also has a history of chronic sinusitis. She had bilateral endoscopic sinus surgery in 2014 for nasal polyps and chronic sinusitis. She has been on Flonase every day for many years and continues to do that. She has a history of multiple allergy test all of which have been negative. She is on a paleo diet for several years without any improvement also. She has frequent thick yellow drainage from her nose. She has a NeilMed sinus rinse every morning. She takes Sudafed orally as needed. She is to work closely with the with Joselin AYALA allergist here and is hoping to reestablish that type of care.     Past Medical History:   Diagnosis Date    Acute on chronic systolic congestive heart failure ([facility] 01/21/2020    \"viral heart failure\" per patient    AICD (automatic cardioverter/defibrillator) present     Asthma     CNP [facility] [address]    Bilateral corneal scars     COVID-19 11/10/2020    Disease of blood and blood forming organ     Diverticulitis     ON COLONSCOPY    DJD (degenerative joint disease), lumbar     Dry eye syndrome     DVT (deep venous thrombosis) (HCC)     after splenic artery repair in right calf    Gastro - esophageal reflux disease     Hiatal hernia     History of blood transfusion 1982    History of echocardiogram 04/2020    Hyperlipidemia     HIGH CHOLESTEROL/HIGH TRIGLYCERIDES    Hyperplastic colon polyp     Hypertension     PCP DR Jennifer AGUIRRE, [place]    Hypothyroidism     LGSIL (low grade squamous intraepithelial dysplasia) 11/2014    referred to GYN/S/P colpo with bx LGSIL, repeat pap in 6 months    Non-celiac gluten sensitivity     wheezing is directly proportionate to wheat intake    NSTEMI (non-ST elevated myocardial infarction) ([facility] 01/16/2020    DR Arleth Cartagena    Osteoarthritis 2004    RT KNEE S/P INJURY    Other disorders of kidney and ureter in diseases classified elsewhere     Sciatica of right side     Severe persistent asthma     patient has seen that wheat consumption causes exacerbations    Splenic artery aneurysm [facility])     s/p splenic artery repair    Tinnitus     Type 2 diabetes mellitus, controlled ([address] 2011    PCP DR Juan Diego Franco     Past Surgical History:   Procedure Laterality Date    ABDOMEN SURGERY      ARTERIAL ANEURYSM REPAIR      splenic artery coil repair    CARDIAC CATHETERIZATION  2020    CARDIAC DEFIBRILLATOR PLACEMENT Left 2020    [employer]     SECTION  1986    COLONOSCOPY  2012    HYPERPLASTIC POLYP, DIVERTICULAR DISEASE    COLONOSCOPY  2017    moderate sigmoid diverticulosis. Dr. Lj Young N/A 3/15/2022    Colonoscopy performed by Senait Fowler MD at [address] [place] [address]  2015    with BX LGSIL    CYSTOSCOPY Bilateral 2017    CYSTO insertion lighted stents performed by Kirit Cuellra MD at [facility]      ENDOSCOPY, COLON, DIAGNOSTIC      EYE SURGERY      GASTRIC FUNDOPLICATION N/A 2867    XI LAPAROSCOPIC ROBOTIC HIATAL HERNIA REPAIR WITH MESH  NISSEN FUNDOPLICATION. EGD performed by Floridalma Combs DO at [address] [place] [address] Ave  2021    LAPAROSCOPIC ROBOTIC HIATAL HERNIA REPAIR WITH MESH  NISSEN FUNDOPLICATION.  EGD    KNEE ARTHROSCOPY Right     PARTIAL SYNOVECTOMY AND CHONDROPLASTY DUE TO MENSICUS TEAR    LAPAROSCOPY N/A 2017    EXPLORATORY LAPAROSCOPY converted to open exploration with drainage of pelvic abscess performed by Senait Fowler MD at [place] [address]  2014    clearing an infection done at [facility].   10/25/2011    L4/L5 epidural steroid injection    OTHER SURGICAL HISTORY Right  and 2010    synvisc injections, two sets    OTHER SURGICAL HISTORY  2020    Lead revision    [place] COLON CA SCRN NOT HI RSK IND N/A 2017    COLONOSCOPY performed by Senait Fowler MD at [address] [facility] [address] IND N/A 09/08/2017    COLONOSCOPY performed by Rosa Farrell MD at [address] EGD TRANSORAL BIOPSY SINGLE/MULTIPLE N/A 02/27/2017    EGD BIOPSY performed by Rosa Farrell MD at [address]  SKIN TAG REMOVAL      SMALL INTESTINE SURGERY N/A 08/30/2017    ATTEMPTED LAPAROCOPY PROCEDURE CONVERTED TO OPEN Sigmoid Colectomy PERFORMED BY DR. VO resection colovaginal fistula PERFORMED BY. DR. Cecil Sahni  performed by Rosa Farrell MD at [facility] 07/2011    TOTAL    TUBAL LIGATION  1991    UPPER GASTROINTESTINAL ENDOSCOPY N/A 01/02/2020    EGD BIOPSY W/ 50 HR LEARY performed by Rosa Farrell MD at [facility] N/A 3/15/2022    EGD BIOPSY performed by Rosa Farrell MD at [facility] 51 EXTRACTION       Social History     Tobacco History     Smoking Status  Never Smoker    Smokeless Tobacco Use  Never Used    Tobacco Comment  never smoked syant rrt 10/13/2017          Alcohol History     Alcohol Use Status  No Comment  Consume about 4 wine coolers a year          Drug Use     Drug Use Status  No          Sexual Activity     Sexually Active  Yes Partners  Male Comment                Family History   Problem Relation Age of Onset    High Blood Pressure Mother     High Cholesterol Mother     Allergies Mother     Arthritis Mother     Coronary Art Dis Mother     Diabetes Daughter         pre diabetes    Other Maternal Grandfather         PUD GI problems    Breast Cancer Maternal Aunt     No Known Problems Maternal Aunt     No Known Problems Maternal Aunt     No Known Problems Maternal Aunt     Cataracts Neg Hx     Glaucoma Neg Hx      Current Outpatient Medications   Medication Instructions    albuterol (PROVENTIL) 5 mg, Nebulization, 4 TIMES DAILY PRN    albuterol sulfate  (90 Base) MCG/ACT inhaler inhale 2 puff by inhalation route  every 4 - 6 hours as needed    budesonide-formoterol (SYMBICORT) 160-4.5 MCG/ACT AERO USE 2 INHALATIONS TWICE A DAY (RINSE MOUTH WELL AFTER USE)    Cholecalciferol (VITAMIN D3) 50 MCG (2000 UT) CAPS 1 capsule, Oral, DAILY    diclofenac (VOLTAREN) 75 mg, Oral, 2 TIMES DAILY PRN    famotidine (PEPCID) 40 mg, Oral, EVERY EVENING    fluticasone (FLONASE) 50 MCG/ACT nasal spray USE 2 SPRAYS IN EACH NOSTRIL ONCE DAILY    furosemide (LASIX) 40 MG tablet TAKE 1 TABLET DAILY    glimepiride (AMARYL) 1 mg, Oral, DAILY BEFORE BREAKFAST    ipratropium-albuterol (DUONEB) 0.5-2.5 (3) MG/3ML SOLN nebulizer solution 3 mLs, Inhalation, EVERY 4 HOURS PRN    metFORMIN (GLUCOPHAGE) 500 MG tablet TAKE 2 TABLETS BY MOUTH TWO TIMES A DAY WITH MEALS    metoprolol succinate (TOPROL XL) 100 mg, Oral, 2 TIMES DAILY    pantoprazole (PROTONIX) 40 MG tablet Take one tablet once daily 30-60 minutes prior to a meal    Probiotic Product (SOLUBLE FIBER/PROBIOTICS PO) Oral, 2 TIMES DAILY    sacubitril-valsartan (ENTRESTO) 24-26 MG per tablet 1 tablet, Oral, 2 TIMES DAILY    Synthroid 175 mcg, Oral, DAILY     Allergies   Allergen Reactions    Morphine Shortness Of Breath, Nausea And Vomiting and Other (See Comments)     Chest tightness    Ativan [Lorazepam] Other (See Comments)     agitation    Codeine Nausea And Vomiting and Other (See Comments)     Chest tightness    Darvon [Propoxyphene] Nausea And Vomiting and Other (See Comments)     Chest tightness    Lisinopril Other (See Comments)     COUGH    Percocet [Oxycodone-Acetaminophen] Nausea And Vomiting and Other (See Comments)     Chest tightness       ENT ROS: positive for - vocal changes    General: The patient is found to be alert and normally responsive female. Hearing: grossly normal   Voice: Hoarse  Skin: The skin has normal colour and turgor. Face: The facial contour is symmetric at rest and with movement. Ears: The pinnae have normal contours.     AD: EAC clear, TM intact, no effusion/erythema/retraction   AS: EAC clear, TM intact, no effusion/erythema/retraction   Eye: The ocular movements are full and symmetric, the conjunctiva is unremarkable; sclera are anicteric, pupillary response is symmetric. No nystagmus is found. Nose:   The external nasal contour is normal  The nasal mucosa is inflamed with some excess mucus  The nasal septum is straight. The turbinates have a normal appearance  The nares are patent without evidence of polyposis   Oral cavity:   The oral mucosa is without lesions;  the tongue is symmetric with full mobility and is without fasciculation. The soft palate is symmetric. The oropharynx is unremarkable. Neck: The neck has a normal contour; no masses are found on palpation    Fiberoptic examination of the upper airway using scope was conducted after first applying topical phenylephrine (1%) and lidocaine (4%) to the bilateral nasal cavity by spray. The endoscope was inserted and advanced through the bilateral side of the nose examining the mucosa and nasal structures. Advancement through the nasopharynx was continued into the hypopharynx and larynx. The tongue base, vallecula, pharyngeal walls, epiglottis aryepiglottic folds arytenoids, vocal cords, piriform sinuses and proximal trachea were examined. Findings include right vocal cord midline. Has intact abduction but poor abduction. Left true vocal cord with good movement but suspect incomplete compensation. Mobility of the structures was symmetric and full. An electronic signature was used to authenticate this note.     --Lencho Willoughby MD     4/12/2022 10:29 AM EDT

## 2022-04-19 ENCOUNTER — HOSPITAL ENCOUNTER (OUTPATIENT)
Dept: CARDIAC REHAB | Age: 62
Discharge: HOME | End: 2022-04-19

## 2022-04-19 PROCEDURE — 9900000065 HC CARDIAC REHAB PHASE 3 - 1 VISIT

## 2022-04-20 ENCOUNTER — TELEPHONE (OUTPATIENT)
Dept: SURGERY | Age: 62
End: 2022-04-20

## 2022-04-20 NOTE — TELEPHONE ENCOUNTER
Letter created and mailed to patient with results from recent EGD and CS at [facility] with Dr. Quirino Castleman on 3/15/2022. Updated history, health maintenance, and recall. Forwarded results to PCP.

## 2022-04-20 NOTE — LETTER
[facility]  [address], [address]  Phone [phone]  Fax [phone]      Dr. Nicole Rendon    4/20/2022    Dear Shai Jimenez,    Dr. Nicole Rendon reviewed the results of your recent EGD and Colonoscopy on 3/15/2022. The EGD showed mild inflammation in the stomach and the esophagus. The Colonoscopy showed moderate sigmoid diverticulosis otherwise normal colon. His recommendations are to repeat the Colonoscopy in 5 years, due to history of colon poylps. Dr. Nicole Rendon also recommends to continue the current treatment for your reflux. If you have any questions or concerns regarding your test results or our recommendations, please call the office at [phone].     Sincerely,      Ramiro Mcnamara RN

## 2022-04-21 ENCOUNTER — HOSPITAL ENCOUNTER (OUTPATIENT)
Dept: CARDIAC REHAB | Age: 62
Discharge: HOME | End: 2022-04-21

## 2022-04-21 PROCEDURE — 9900000065 HC CARDIAC REHAB PHASE 3 - 1 VISIT

## 2022-04-22 ENCOUNTER — HOSPITAL ENCOUNTER (OUTPATIENT)
Dept: CT IMAGING | Age: 62
Discharge: HOME | End: 2022-04-24
Payer: COMMERCIAL

## 2022-04-22 ENCOUNTER — PATIENT MESSAGE (OUTPATIENT)
Dept: OTOLARYNGOLOGY | Age: 62
End: 2022-04-22

## 2022-04-22 DIAGNOSIS — J38.01 PARALYSIS OF RIGHT VOCAL CORD: ICD-10-CM

## 2022-04-22 PROCEDURE — 6360000004 HC RX CONTRAST MEDICATION: Performed by: OTOLARYNGOLOGY

## 2022-04-22 PROCEDURE — 2709999900 CT HEAD W WO CONTRAST

## 2022-04-22 PROCEDURE — 70491 CT SOFT TISSUE NECK W/DYE: CPT

## 2022-04-22 PROCEDURE — 71260 CT THORAX DX C+: CPT

## 2022-04-22 RX ADMIN — IOPAMIDOL 100 ML: 755 INJECTION, SOLUTION INTRAVENOUS at 13:24

## 2022-04-25 NOTE — TELEPHONE ENCOUNTER
From: Carly Aguillon  To: Dr. Yola Boone  Sent: 4/22/2022 5:10 PM EDT  Subject: Question on Lab Test    Dr. Mee Alvarenga, I am nervous about the upcoming modified barium testing. I know we are seeking information about the paralyzed vocal cord, but it was a barium test that may have likely led to the cord injury. I am very concerned that any more testing done in that area could injure the remaining cord. Is this test really necessary? I will do it if you assure me that it must be done.

## 2022-04-25 NOTE — TELEPHONE ENCOUNTER
The modified barium swallow is different than a barium swallow testing in terms of positioning and what you eat/swallow. The test is extremely important to determine if you are aspirating and if so, how to recommend a safe diet.

## 2022-04-28 ENCOUNTER — HOSPITAL ENCOUNTER (OUTPATIENT)
Dept: CARDIAC REHAB | Age: 62
Discharge: HOME | End: 2022-04-28

## 2022-04-28 PROCEDURE — 9900000065 HC CARDIAC REHAB PHASE 3 - 1 VISIT

## 2022-05-07 NOTE — H&P
[facility] Medicine  History & Physical    Patient Name: Tianna Leon  MRN: [id number]  Patient Class: IP- Inpatient  Admission Date: 5/6/2022  Attending Physician: Aurelio Rangel, *   Primary Care Provider: Spenser Campa MD         Patient information was obtained from patient and ER records.     Subjective:     Principal Problem:<principal problem not specified>    Chief Complaint:   Chief Complaint   Patient presents with    Shortness of Breath     SOB and lower back pain x4 days.        HPI:   39 y/o. female  with a PMHx of asthma, COPD,Bipolar  , IVDU and HLD presented to the ER with a c/o  sob for the last weak which has gotten worse . The SOB is associated with fever , chills , productive cough , back pain  and generalized weakness . She report cough some blood this am .  She is active IVDU  ( heroine , cocaine  and amphetamine ) . She denies any  sick contact , chest pain  , GI/ sx , She also complaning of LE sweeling . Knee pain and B/L LE rash .   ER COURSE: CTA chest negative for pe . Multiple nodular and cavitary opacities concerning for septic emboli with larger opacities possibly necrotizing pneumonia. CT cervical  and thoracic did not show any acute finding , CT lumbar Possible progression of degenerative changes most notable at L4-5 with moderate to severe spinal canal stenosis at this level.  WBC  29 k , na 130 , k 3.4 , cl 89 , procal 2.71   ER VS:  BP Pulse Resp Temp SpO2   (!) 124/58 110 (!) 30 (!) 101.6 °F (38.7 °C) 96 %           Pt will be admitted to inpt with a dx of PNA and severe sepsis       Past Medical History:   Diagnosis Date    ADHD (attention deficit hyperactivity disorder)     Anxiety     Asthma     Bipolar disorder     Cancer     cervical    COPD (chronic obstructive pulmonary disease)     GERD (gastroesophageal reflux disease)     Hepatitis C antibody positive in blood     RNA UNDETECTED 5/18/2016    Hyperlipidemia     Intravenous drug  abuse     MDD (major depressive disorder)     Mood disorder     PTSD (post-traumatic stress disorder)     Suicidal ideation        Past Surgical History:   Procedure Laterality Date     SECTION  2001    x2    CHOLECYSTECTOMY      HYSTERECTOMY      LAPAROSCOPIC SALPINGECTOMY      ROBOT-ASSISTED LAPAROSCOPIC ABDOMINAL HYSTERECTOMY USING DA SEBASTIEN XI N/A 2019    Procedure: XI ROBOTIC HYSTERECTOMY;  Surgeon: Tabatha Quintanilla MD;  Location: [facility] OR;  Service: OB/GYN;  Laterality: N/A;    ROBOT-ASSISTED LAPAROSCOPIC LYSIS OF ADHESIONS USING DA SEBASTIEN XI N/A 2019    Procedure: XI ROBOTIC LYSIS, ADHESIONS;  Surgeon: Tabatha Quintanilla MD;  Location: [facility] OR;  Service: OB/GYN;  Laterality: N/A;    ROBOT-ASSISTED SURGICAL REMOVAL OF FALLOPIAN TUBE USING DA SEBASTIEN XI Right 2019    Procedure: XI ROBOTIC SALPINGECTOMY;  Surgeon: Tabatha Quintanilla MD;  Location: [facility] OR;  Service: OB/GYN;  Laterality: Right;    TONSILLECTOMY      TUBAL LIGATION         Review of patient's allergies indicates:   Allergen Reactions    Sulfamethoxazole-trimethoprim        No current facility-administered medications on file prior to encounter.     Current Outpatient Medications on File Prior to Encounter   Medication Sig    acetaminophen (TYLENOL) 500 MG tablet Take 500 mg by mouth every 6 (six) hours as needed for Pain.    albuterol (PROVENTIL/VENTOLIN HFA) 90 mcg/actuation inhaler Inhale 2 puffs into the lungs every 6 (six) hours as needed for Wheezing.    azithromycin (ZITHROMAX Z-YUAN) 250 MG tablet Take 2 tablets (500 mg) on  Day 1,  followed by 1 tablet (250 mg) once daily on Days 2 through 5.    dextromethorphan-guaiFENesin (MUCINEX DM) 60-1,200 mg per 12 hr tablet Take 1 tablet by mouth every 12 (twelve) hours.    ibuprofen (ADVIL,MOTRIN) 200 MG tablet Take 200 mg by mouth every 6 (six) hours as needed for Pain.    [DISCONTINUED] albuterol (PROVENTIL) 2.5 mg /3 mL (0.083 %) nebulizer  medications    Medication Sig Start Date End Date Taking? Authorizing Provider   nepafenac (NEVANAC) 0.1 % ophthalmic suspension 1 drop 4 times daily    Historical Provider, MD   predniSONE (DELTASONE) 20 MG tablet Take one tablet every other day at 8 am 4/15/20   DEREK Do CNP   famotidine (PEPCID) 40 MG tablet Take 1 tablet by mouth every evening 4/13/20   DEREK Do CNP   cefdinir (OMNICEF) 300 MG capsule Take one capsule twice daily for 30 days. 3/11/20   DEREK Do CNP   fluconazole (DIFLUCAN) 150 MG tablet Take at onset of vaginal itch, may repeat every 7 days 2/24/20   DEREK Do CNP   Cholecalciferol (VITAMIN D3) 50 MCG (2000 UT) CAPS Take 1 capsule by mouth daily    Historical Provider, MD   ranitidine (ZANTAC) 300 MG tablet TAKE 1 TABLET DAILY AT BEDTIME 1/23/20   DEREK Do CNP   aspirin 81 MG EC tablet Take 1 tablet by mouth daily 1/22/20   Aura Officer,    metoprolol succinate (TOPROL XL) 50 MG extended release tablet Take 1 tablet by mouth daily 1/22/20   Aura Officer, DO   benzonatate (TESSALON) 100 MG capsule Take 200 mg by mouth 3 times daily as needed for Cough    Historical Provider, MD   albuterol (PROVENTIL) (5 MG/ML) 0.5% nebulizer solution Take 1 mL by nebulization 4 times daily as needed for Wheezing 11/26/19   DEREK Do CNP   budesonide-formoterol (SYMBICORT) 160-4.5 MCG/ACT AERO 2 puffs inhaled twice daily. Rinse mouth well after use.  11/26/19   DEREK Do CNP   pantoprazole (PROTONIX) 40 MG tablet Take one tablet twice daily 30-60 minutes prior to breakfast and evening meal 11/11/19   DEREK Do CNP   SYNTHROID 175 MCG tablet Take 175 mcg by mouth 8/3/19   Historical Provider, MD   MOLYBDENUM PO Take 500 mcg by mouth daily    Historical Provider, MD   ipratropium-albuterol (DUONEB) 0.5-2.5 (3) MG/3ML SOLN nebulizer solution Inhale 3 mLs into the lungs every 4 hours as needed solution Take 3 mLs (2.5 mg total) by nebulization every 6 (six) hours as needed for Wheezing. Rescue    [DISCONTINUED] fluticasone-umeclidin-vilanter (TRELEGY ELLIPTA) 100-62.5-25 mcg DsDv Inhale 1 puff into the lungs once daily.    [DISCONTINUED] gabapentin (NEURONTIN) 300 MG capsule Take 1 capsule (300 mg total) by mouth 3 (three) times daily.    [DISCONTINUED] ipratropium (ATROVENT) 21 mcg (0.03 %) nasal spray 2 sprays by Nasal route 3 (three) times daily.    [DISCONTINUED] methylPREDNISolone (MEDROL DOSEPACK) 4 mg tablet Take as directed    [DISCONTINUED] omeprazole (PRILOSEC) 40 MG capsule Take 1 capsule (40 mg total) by mouth once daily.    [DISCONTINUED] oxybutynin (DITROPAN XL) 10 MG 24 hr tablet Take 1 tablet (10 mg total) by mouth once daily.    [DISCONTINUED] prazosin (MINIPRESS) 5 MG capsule Take 5 mg by mouth every evening.     [DISCONTINUED] propranolol (INDERAL LA) 60 MG 24 hr capsule Take 60 mg by mouth every morning.    [DISCONTINUED] VRAYLAR 3 mg Cap Take 1 capsule (3 mg total) by mouth every evening.     Family History       Problem Relation (Age of Onset)    COPD Mother          Tobacco Use    Smoking status: Current Every Day Smoker     Packs/day: 1.50     Types: Cigarettes    Smokeless tobacco: Never Used    Tobacco comment: no smoking after midnight prior to surgery   Substance and Sexual Activity    Alcohol use: Yes     Comment: Occassional     Drug use: Not Currently     Types: IV, Methamphetamines, Heroin     Comment: denies heroin use.  Last used meth 10/2018  No illegal drugs prior to sx    Sexual activity: Not Currently     Partners: Male     Birth control/protection: None     Review of Systems   Constitutional:  Positive for activity change, chills, fatigue and fever.   HENT: Negative.     Respiratory:  Positive for cough, chest tightness and shortness of breath.    Gastrointestinal: Negative.    Endocrine: Negative.    Genitourinary: Negative.    Musculoskeletal:   Positive for back pain.   Skin:  Positive for rash and wound.   Allergic/Immunologic: Negative.    Neurological: Negative.    Hematological: Negative.    Psychiatric/Behavioral: Negative.     Objective:     Vital Signs (Most Recent):  Temp: 98.7 °F (37.1 °C) (05/06/22 2141)  Pulse: 89 (05/06/22 2141)  Resp: (!) 26 (05/06/22 2141)  BP: (!) 103/58 (05/06/22 2141)  SpO2: 96 % (05/06/22 2141)   Vital Signs (24h Range):  Temp:  [98.7 °F (37.1 °C)-101.6 °F (38.7 °C)] 98.7 °F (37.1 °C)  Pulse:  [] 89  Resp:  [24-30] 26  SpO2:  [93 %-96 %] 96 %  BP: ()/(51-65) 103/58     Weight: 82.4 kg (181 lb 10.5 oz)  Body mass index is 30.23 kg/m².    Physical Exam  Vitals reviewed.   Constitutional:       Appearance: She is ill-appearing and toxic-appearing.   HENT:      Head: Normocephalic and atraumatic.      Nose: Nose normal.   Eyes:      Extraocular Movements: Extraocular movements intact.      Conjunctiva/sclera: Conjunctivae normal.      Pupils: Pupils are equal, round, and reactive to light.   Cardiovascular:      Rate and Rhythm: Normal rate and regular rhythm.      Pulses: Normal pulses.      Heart sounds: Murmur heard.   Pulmonary:      Effort: Pulmonary effort is normal.      Breath sounds: Rhonchi and rales present.   Abdominal:      General: Abdomen is flat. Bowel sounds are normal. There is no distension.      Palpations: Abdomen is soft. There is no mass.      Tenderness: There is no abdominal tenderness. There is no guarding or rebound.      Hernia: No hernia is present.   Musculoskeletal:         General: Swelling present. No tenderness, deformity or signs of injury.      Cervical back: Normal range of motion and neck supple.      Right lower leg: Edema present.      Left lower leg: Edema present.   Skin:     Capillary Refill: Capillary refill takes less than 2 seconds.      Findings: Lesion and rash present.   Neurological:      General: No focal deficit present.      Mental Status: She is alert and  "oriented to person, place, and time. Mental status is at baseline.      Cranial Nerves: No cranial nerve deficit.      Sensory: No sensory deficit.      Motor: No weakness.      Coordination: Coordination normal.   Psychiatric:         Mood and Affect: Mood normal.         CRANIAL NERVES     CN III, IV, VI   Pupils are equal, round, and reactive to light.     Significant Labs: All pertinent labs within the past 24 hours have been reviewed.      Significant Imaging: I have reviewed all pertinent imaging results/findings within the past 24 hours.      Assessment/Plan:     IVDU (intravenous drug user)   Will ordet TTE to r/o IE  Will order MRI lumbar wwo   Cont broad spectrum IVAB       Severe sepsis  This patient does have evidence of infective focus  My overall impression is sepsis. Vital signs were reviewed and noted in progress note.  Antibiotics given-   Antibiotics (From admission, onward)            Start     Stop Route Frequency Ordered    05/07/22 0200  metronidazole IVPB 500 mg         -- IV Every 8 hours (non-standard times) 05/06/22 1954 05/07/22 0100  cefepime in dextrose 5 % IVPB 2 g         -- IV Every 8 hours (non-standard times) 05/06/22 1954 05/06/22 2115  vancomycin (VANCOCIN) 2,250 mg in dextrose 5 % 500 mL IVPB         -- IV Once 05/06/22 2014 05/06/22 2052  vancomycin - pharmacy to dose  (vancomycin IVPB)        "And" Linked Group Details    -- IV pharmacy to manage frequency 05/06/22 1954        Cultures were taken-   Microbiology Results (last 7 days)     Procedure Component Value Units Date/Time    Blood culture x two cultures. Draw prior to antibiotics. [[id number]] Collected: 05/06/22 1617    Order Status: Sent Specimen: Blood from Peripheral, Antecubital, Right Updated: 05/06/22 1618    Blood culture x two cultures. Draw prior to antibiotics. [[id number]] Collected: 05/06/22 1616    Order Status: Sent Specimen: Blood from Peripheral, Antecubital, Left Updated: 05/06/22 1617    "     Latest lactate reviewed, they are-  Recent Labs   Lab 05/06/22  1616 05/06/22  1731   LACTATE 1.5 1.5       Organ dysfunction indicated by Acute kidney injury  Source-  lung    Source control Achieved by-   Cont Broad spectrum IVAB       PNA (pneumonia)  H/O IVDU   Cont broad spectrum IVAB   F/U blood and sputum cx   Will consult pulmonology for thoracentesis  To r/u empyema         COPD (chronic obstructive pulmonary disease)  Cont breathing tx       Lower back pain  Chronic in nature\  H/O IVDU  CT lumbar show spinal stenosis   No neurological deficit at this time   Will order a MRI lumbar wwo  To r/o spinal abscess         VTE Risk Mitigation (From admission, onward)    None             Aurelio Rangel MD  Department of Hospital Medicine   [facility] - Med Surg

## 2022-05-11 NOTE — TELEPHONE ENCOUNTER
I spoke to Park Pascal again today regarding her fear of the testing for the swallow study and she had already chose to cancel the swallow study and see what is next in her care. She has an appointment with Dr. Devora Marsh in about a week and she has been in touch with Dr. 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 Devora Marsh.

## 2022-05-13 ENCOUNTER — HOSPITAL ENCOUNTER (OUTPATIENT)
Dept: CARDIAC REHAB | Age: 62
Discharge: HOME | End: 2022-05-13

## 2022-05-13 ENCOUNTER — HOSPITAL ENCOUNTER (OUTPATIENT)
Dept: INTERVENTIONAL RADIOLOGY/VASCULAR | Age: 62
Discharge: HOME | End: 2022-05-15
Payer: COMMERCIAL

## 2022-05-13 DIAGNOSIS — I63.9 CEREBELLAR INFARCTION (HCC): ICD-10-CM

## 2022-05-13 PROCEDURE — 93880 EXTRACRANIAL BILAT STUDY: CPT

## 2022-05-13 PROCEDURE — 9900000065 HC CARDIAC REHAB PHASE 3 - 1 VISIT

## 2022-05-16 ENCOUNTER — OFFICE VISIT (OUTPATIENT)
Dept: OTOLARYNGOLOGY | Age: 62
End: 2022-05-16
Payer: COMMERCIAL

## 2022-05-16 VITALS
SYSTOLIC BLOOD PRESSURE: 110 MMHG | BODY MASS INDEX: 31.92 KG/M2 | WEIGHT: 198.6 LBS | RESPIRATION RATE: 18 BRPM | OXYGEN SATURATION: 98 % | HEIGHT: 66 IN | DIASTOLIC BLOOD PRESSURE: 70 MMHG | HEART RATE: 82 BPM

## 2022-05-16 DIAGNOSIS — R49.0 HOARSENESS: ICD-10-CM

## 2022-05-16 DIAGNOSIS — J32.1 CHRONIC FRONTAL SINUSITIS: Primary | ICD-10-CM

## 2022-05-16 DIAGNOSIS — J32.2 CHRONIC ETHMOIDAL SINUSITIS: ICD-10-CM

## 2022-05-16 DIAGNOSIS — J38.01 PARALYSIS OF RIGHT VOCAL CORD: ICD-10-CM

## 2022-05-16 PROCEDURE — 99214 OFFICE O/P EST MOD 30 MIN: CPT | Performed by: OTOLARYNGOLOGY

## 2022-05-16 PROCEDURE — G8417 CALC BMI ABV UP PARAM F/U: HCPCS | Performed by: OTOLARYNGOLOGY

## 2022-05-16 PROCEDURE — 3017F COLORECTAL CA SCREEN DOC REV: CPT | Performed by: OTOLARYNGOLOGY

## 2022-05-16 PROCEDURE — G8427 DOCREV CUR MEDS BY ELIG CLIN: HCPCS | Performed by: OTOLARYNGOLOGY

## 2022-05-16 PROCEDURE — 1036F TOBACCO NON-USER: CPT | Performed by: OTOLARYNGOLOGY

## 2022-05-16 RX ORDER — ROSUVASTATIN CALCIUM 5 MG/1
TABLET, COATED ORAL
COMMUNITY
Start: 2022-05-10 | End: 2022-10-27

## 2022-05-16 NOTE — PROGRESS NOTES
2022 10:29 AM EDBROWN Montenegro (:  1960) is a 60 y.o. female,New patient, here for evaluation of the following chief complaint(s): Other (fu CT)      ASSESSMENT/PLAN:  1. Chronic frontal sinusitis    2. Chronic ethmoidal sinusitis    3. Paralysis of right vocal cord    4. Hoarseness      1. Chronic frontal sinusitis  -     CT SINUS WO CONTRAST; Future  2. Chronic ethmoidal sinusitis  3. Paralysis of right vocal cord  4. Hoarseness    Right vocal cord paralysis. May have a relationship to the barium swallow 2020. Strong history of aspiration. We discussed that a modified barium swallow can determine safe diet and possibly determine if she is a candidate for swallow therapy. Because of her difficult experience with the barium swallow in , she does not want to pursue this test. Cautioned her to be careful moving forward with liquids. Recommend a thickened diet. She continues to have a bit of a cough and she is at risk for aspiration pneumonia. MRI brain contraindicated with defibrillator  Discussed referral to laryngologist for possible vocal cord injections versus implant to improve aspiration vocal quality. She will consider this in the future. Discussed chronic sinusitis noted on CT head. CT sinus  Referral to Dr. Tamra Crouch to discuss revision endoscopic sinus surgery  Fu in 3 months     No follow-ups on file. SUBJECTIVE/OBJECTIVE:  HPI   70-year-old woman with hoarseness since a barium swallow 2020. This preceded a fundoplication procedure. At the time of the barium swallow she developed a deep cough and noticed a significant roughness to her voice. It never seemed to recover. She had an EGD performed 3/15. She has been on acid suppression medication for many years. She states that she is have a history of GERD for her whole life. She denies any throat pain but she does have problems swallowing liquids. She describes choking and aspiration with liquids.   She was diagnosed with COVID in January 2021. In May 2021 she required a defibrillator and was on a ventilator and life flighted for some emergency issues related to the defibrillator. Secondarily she also has a history of chronic sinusitis. She had bilateral endoscopic sinus surgery in 2014 for nasal polyps and chronic sinusitis. She has been on Flonase every day for many years and continues to do that. She also takes Symbicort. She has a history of multiple allergy test all of which have been negative. She is on a paleo diet for several years without any improvement also. She has frequent thick yellow drainage from her nose. She has a NeilMed sinus rinse every morning. She takes Sudafed orally as needed. She uses to work closely with the elicia Foy allergist here and is hoping to reestablish that type of care. She describes fluctuating nasal congestion. This seems to stay settled with routine use of Flonase and nasal saline spray. She does endorse flareups that she attributes to sinus infections with almost complete nasal obstruction and worsening yellow drainage. On a daily basis she does endorse constant thick yellow nasal drainage. She states that she feels like she has a sinus infection all of the time. Antibiotics temporarily make some improvements but she returns to her baseline ongoing symptoms after the antibiotics. CT head 4/22/22:   Impression   A slightly less than 1 cm infarct was noted in the anterior limb of the right   internal capsule which could be recent.  Diffusion weighted MRI would be   confirmatory. CT chest 4/22/22:   Impression   No hilar, mediastinal or lung parenchymal mass.  No hilar, mediastinal or   axillary lymphadenopathy.  Specifically, no Pancoast tumor was noted.  No   parenchymal infiltration or pleural effusion was identified. CT neck 4/22/22:   Impression   No structural abnormality to explain right vocal cord paralysis.        7 mm ossified mass partially covering the left porus acusticus.  Differential   includes osteoma or meningioma.       RECOMMENDATIONS:   Correlate for symptoms of left facial or vestibulocochlear nerve dysfunction. Refused modified barium swallow. Today she follows up to review findings. Reviewed that there is no mass-effect etiology in the head neck or chest to explain the vocal cord paralysis. There were 2 incidental findings of suspected meningioma at the cerebellopontine angle and a small stroke in the anterior limb of the right internal capsule. Dr. Sally Gonzalez was made aware. We will follow-up with her office and ask them to manage any further work-up or imaging that may be required for these findings. She is scheduled for cataract surgery soon.     Past Medical History:   Diagnosis Date    Acute on chronic systolic congestive heart failure ([facility] [address] 01/21/2020    \"viral heart failure\" per patient    AICD (automatic cardioverter/defibrillator) present     Asthma     CNP CHELSEA Douglass [facility], [facility]    Bilateral corneal scars     COVID-19 11/10/2020    Disease of blood and blood forming organ     Diverticulitis     ON COLONSCOPY    DJD (degenerative joint disease), lumbar     Dry eye syndrome     DVT (deep venous thrombosis) (HCC)     after splenic artery repair in right calf    Gastro - esophageal reflux disease     Hiatal hernia     History of blood transfusion 1982    History of echocardiogram 04/2020    Hyperlipidemia     HIGH CHOLESTEROL/HIGH TRIGLYCERIDES    Hyperplastic colon polyp     Hypertension     PCP DR Edson AGUIRRE, [place]    Hypothyroidism     LGSIL (low grade squamous intraepithelial dysplasia) 11/2014    referred to GYN/S/P colpo with bx LGSIL, repeat pap in 6 months    Non-celiac gluten sensitivity     wheezing is directly proportionate to wheat intake    NSTEMI (non-ST elevated myocardial infarction) ([facility] [address] 01/16/2020    DR Ranjan Washington    Osteoarthritis 2004    RT KNEE S/P INJURY    Other disorders of kidney and ureter in diseases classified elsewhere     Sciatica of right side     Severe persistent asthma     patient has seen that wheat consumption causes exacerbations    Splenic artery aneurysm [facility])     s/p splenic artery repair    Tinnitus     Type 2 diabetes mellitus, controlled ([facility] [address] 2011    PCP DR Janis Plascencia     Past Surgical History:   Procedure Laterality Date    ABDOMEN SURGERY      ARTERIAL ANEURYSM REPAIR      splenic artery coil repair    CARDIAC CATHETERIZATION  2020    CARDIAC DEFIBRILLATOR PLACEMENT Left 2020    [employer]     SECTION  1986    COLONOSCOPY  2012    HYPERPLASTIC POLYP, DIVERTICULAR DISEASE    COLONOSCOPY  2017    moderate sigmoid diverticulosis. Dr. Douglas Franklin COLONOSCOPY N/A 03/15/2022    moderate sigmoid diverticulosis otherwise normal; Dr. Quirino Castleman at [address]  2015    with [address] Bilateral 2017    CYSTO insertion lighted stents performed by Theo Harper MD at [address], ESOPHAGUS      ENDOSCOPY, COLON, DIAGNOSTIC      EYE SURGERY      GASTRIC FUNDOPLICATION N/A     XI LAPAROSCOPIC ROBOTIC HIATAL HERNIA REPAIR WITH MESH  NISSEN FUNDOPLICATION. EGD performed by Ino Billings DO at [address] [facility]  2021    LAPAROSCOPIC ROBOTIC HIATAL HERNIA REPAIR WITH MESH  NISSEN FUNDOPLICATION.  EGD    KNEE ARTHROSCOPY Right     PARTIAL SYNOVECTOMY AND CHONDROPLASTY DUE TO MENSICUS TEAR    LAPAROSCOPY N/A 2017    EXPLORATORY LAPAROSCOPY converted to open exploration with drainage of pelvic abscess performed by Rosa Farrell MD at [facility]  2014    clearing an infection done at [facility].   10/25/2011    L4/L5 epidural steroid injection    OTHER SURGICAL HISTORY Right  and     synvisc injections, two sets    OTHER SURGICAL HISTORY  2020    Lead revision    PA COLON CA SCRN NOT HI RSK IND N/A 02/27/2017    COLONOSCOPY performed by Db De La Cruz MD at [address] IND N/A 09/08/2017    COLONOSCOPY performed by Db De La Cruz MD at [address] EGD TRANSORAL BIOPSY SINGLE/MULTIPLE N/A 02/27/2017    EGD BIOPSY performed by Db De La Cruz MD at [facility] TAG REMOVAL      SMALL INTESTINE SURGERY N/A 08/30/2017    ATTEMPTED LAPAROCOPY PROCEDURE CONVERTED TO OPEN Sigmoid Colectomy PERFORMED BY DR. VO resection colovaginal fistula PERFORMED BY. DR. Titus Rahman  performed by Db De La Cruz MD at [address] [facility] Right 07/2011    TOTAL    TUBAL LIGATION  1991    UPPER GASTROINTESTINAL ENDOSCOPY N/A 01/02/2020    EGD BIOPSY W/ 48 HR LEARY performed by Db De La Cruz MD at [address] [facility] [address] N/A 03/15/2022    mild gastritis and esophagitis; wrap intact; Dr. Vika Carbajal at [facility] beds at the [facility] EXTRACTION       Social History     Tobacco History     Smoking Status  Never Smoker    Smokeless Tobacco Use  Never Used    Tobacco Comment  never smoked syant rrt 10/13/2017          Alcohol History     Alcohol Use Status  No Comment  Consume about 4 wine coolers a year          Drug Use     Drug Use Status  No          Sexual Activity     Sexually Active  Yes Partners  Male Comment                Family History   Problem Relation Age of Onset    High Blood Pressure Mother     High Cholesterol Mother     Allergies Mother     Arthritis Mother     Coronary Art Dis Mother     Diabetes Daughter         pre diabetes    Other Maternal Grandfather         PUD GI problems    Breast Cancer Maternal Aunt     No Known Problems Maternal Aunt     No Known Problems Maternal Aunt     No Known Problems Maternal Aunt     Cataracts Neg Hx     Glaucoma Neg Hx      Current Outpatient Medications   Medication Instructions    albuterol (PROVENTIL) 5 mg, Nebulization, 4 TIMES DAILY PRN    albuterol sulfate  (90 Base) MCG/ACT inhaler inhale 2 puff by inhalation route  every 4 - 6 hours as needed    budesonide-formoterol (SYMBICORT) 160-4.5 MCG/ACT AERO USE 2 INHALATIONS TWICE A DAY (RINSE MOUTH WELL AFTER USE)    Cholecalciferol (VITAMIN D3) 50 MCG (2000 UT) CAPS 1 capsule, Oral, DAILY    diclofenac (VOLTAREN) 75 mg, Oral, 2 TIMES DAILY PRN    famotidine (PEPCID) 40 mg, Oral, EVERY EVENING    fluticasone (FLONASE) 50 MCG/ACT nasal spray USE 2 SPRAYS IN EACH NOSTRIL ONCE DAILY    furosemide (LASIX) 40 MG tablet TAKE 1 TABLET DAILY    glimepiride (AMARYL) 1 mg, Oral, DAILY BEFORE BREAKFAST    ipratropium-albuterol (DUONEB) 0.5-2.5 (3) MG/3ML SOLN nebulizer solution 3 mLs, Inhalation, EVERY 4 HOURS PRN    metFORMIN (GLUCOPHAGE) 500 MG tablet TAKE 2 TABLETS BY MOUTH TWO TIMES A DAY WITH MEALS    metoprolol succinate (TOPROL XL) 100 mg, Oral, 2 TIMES DAILY    pantoprazole (PROTONIX) 40 MG tablet Take one tablet once daily 30-60 minutes prior to a meal    Probiotic Product (SOLUBLE FIBER/PROBIOTICS PO) Oral, 2 TIMES DAILY    rosuvastatin (CRESTOR) 5 MG tablet No dose, route, or frequency recorded.  sacubitril-valsartan (ENTRESTO) 24-26 MG per tablet 1 tablet, Oral, 2 TIMES DAILY    Synthroid 175 mcg, Oral, DAILY     Allergies   Allergen Reactions    Morphine Shortness Of Breath, Nausea And Vomiting and Other (See Comments)     Chest tightness    Ativan [Lorazepam] Other (See Comments)     agitation    Codeine Nausea And Vomiting and Other (See Comments)     Chest tightness    Darvon [Propoxyphene] Nausea And Vomiting and Other (See Comments)     Chest tightness    Lisinopril Other (See Comments)     COUGH    Percocet [Oxycodone-Acetaminophen] Nausea And Vomiting and Other (See Comments)     Chest tightness       ENT ROS: positive for - vocal changes    General: The patient is found to be alert and normally responsive female.     Hearing: grossly normal   Voice: Hoarse  Skin: The skin has normal colour and turgor. Face: The facial contour is symmetric at rest and with movement. Ears: The pinnae have normal contours. AD: EAC clear, TM intact, no effusion/erythema/retraction   AS: EAC clear, TM intact, no effusion/erythema/retraction   Eye: The ocular movements are full and symmetric, the conjunctiva is unremarkable; sclera are anicteric, pupillary response is symmetric. No nystagmus is found. Nose:   The external nasal contour is normal  The nasal mucosa is inflamed with some excess mucus  The nasal septum is straight. The turbinates have a normal appearance  The nares are patent without evidence of polyposis   Oral cavity:   The oral mucosa is without lesions;  the tongue is symmetric with full mobility and is without fasciculation. The soft palate is symmetric. The oropharynx is unremarkable. Neck: The neck has a normal contour; no masses are found on palpation    Fiberoptic examination of the upper airway using scope was conducted after first applying topical phenylephrine (1%) and lidocaine (4%) to the bilateral nasal cavity by spray. The endoscope was inserted and advanced through the bilateral side of the nose examining the mucosa and nasal structures. Advancement through the nasopharynx was continued into the hypopharynx and larynx. The tongue base, vallecula, pharyngeal walls, epiglottis aryepiglottic folds arytenoids, vocal cords, piriform sinuses and proximal trachea were examined. Findings include right vocal cord midline. Has intact abduction but poor abduction. Left true vocal cord with good movement but suspect incomplete compensation. Mobility of the structures was symmetric and full. An electronic signature was used to authenticate this note.     --Chele Connolly MD     5/16/2022 10:29 AM EDT

## 2022-05-17 ENCOUNTER — HOSPITAL ENCOUNTER (OUTPATIENT)
Dept: CARDIAC REHAB | Age: 62
Discharge: HOME | End: 2022-05-17

## 2022-05-17 PROCEDURE — 9900000065 HC CARDIAC REHAB PHASE 3 - 1 VISIT

## 2022-05-19 ENCOUNTER — HOSPITAL ENCOUNTER (OUTPATIENT)
Dept: CARDIAC REHAB | Age: 62
Discharge: HOME | End: 2022-05-19

## 2022-05-19 PROCEDURE — 9900000065 HC CARDIAC REHAB PHASE 3 - 1 VISIT

## 2022-05-23 ENCOUNTER — HOSPITAL ENCOUNTER (OUTPATIENT)
Dept: CT IMAGING | Age: 62
Discharge: HOME | End: 2022-05-25
Payer: COMMERCIAL

## 2022-05-23 DIAGNOSIS — J32.1 CHRONIC FRONTAL SINUSITIS: ICD-10-CM

## 2022-05-23 PROCEDURE — 70486 CT MAXILLOFACIAL W/O DYE: CPT

## 2022-05-24 ENCOUNTER — HOSPITAL ENCOUNTER (OUTPATIENT)
Dept: CARDIAC REHAB | Age: 62
Discharge: HOME | End: 2022-05-24

## 2022-05-24 PROCEDURE — 9900000065 HC CARDIAC REHAB PHASE 3 - 1 VISIT

## 2022-05-26 ENCOUNTER — TELEPHONE (OUTPATIENT)
Dept: OTOLARYNGOLOGY | Age: 62
End: 2022-05-26

## 2022-05-26 ENCOUNTER — HOSPITAL ENCOUNTER (OUTPATIENT)
Dept: CARDIAC REHAB | Age: 62
Discharge: HOME | End: 2022-05-26

## 2022-05-26 PROCEDURE — 9900000065 HC CARDIAC REHAB PHASE 3 - 1 VISIT

## 2022-05-26 NOTE — TELEPHONE ENCOUNTER
Writer called and spoke with patient at this time. Informed her that Dr. Mee Alvarenga reviewed the results of the CT Sinus, and the results were faxed to Dr. Keshav Fournier office whom Dr. Mee Alvarenga referred her to. Patient has an appointment with Dr. Negrito Torres at the end of June. Writer also informed her that she will need to pickup up her disk of her CT Head and CT Sinus from Radiology to take to her appointment. Writer also  informed her that writer spoke with her insurance to appeal the denial of her CT Sinus. Writer informed we will speaking with them on June 02, 2022 again and there are some steps to take moving forward with the appeal. Writer told her please be patient and when Lorenzo Morrissey gets an update or approval, Lorenzo Morrissey will keep her updated. Patient voiced understanding and thanked writer.

## 2022-05-26 NOTE — TELEPHONE ENCOUNTER
Writer called down to Radiology Tech Room. Asked them to put the CT Head from 04-22-22 and CT Sinus from 05-23-22 on disk for patient. Writer informed them that patient will be calling them regards to  of the disk.

## 2022-05-31 ENCOUNTER — HOSPITAL ENCOUNTER (OUTPATIENT)
Dept: CARDIAC REHAB | Age: 62
Discharge: HOME | End: 2022-05-31

## 2022-05-31 PROCEDURE — 9900000065 HC CARDIAC REHAB PHASE 3 - 1 VISIT

## 2022-06-02 ENCOUNTER — TELEPHONE (OUTPATIENT)
Dept: OTOLARYNGOLOGY | Age: 62
End: 2022-06-02

## 2022-06-02 ENCOUNTER — HOSPITAL ENCOUNTER (OUTPATIENT)
Dept: CARDIAC REHAB | Age: 62
Discharge: HOME | End: 2022-06-02

## 2022-06-02 NOTE — TELEPHONE ENCOUNTER
Writer attempted to call patient and inform her that the Prior Auth for the CT Sinus was approved and to give her the confirmation number. Writer got voicemail. Will try back in a little while.

## 2022-06-02 NOTE — TELEPHONE ENCOUNTER
Writer called patient. Informed her that the peer to peer review with her insurance went well today. Informed her that it did get approved. Writer gave Afshan Barrera the approval confirmation which is [id number] for the CT she had. Afshan Barrera was so grateful and thanked writer for her time.

## 2022-06-14 ENCOUNTER — HOSPITAL ENCOUNTER (OUTPATIENT)
Dept: CARDIAC REHAB | Age: 62
Discharge: HOME | End: 2022-06-14

## 2022-06-14 PROCEDURE — 9900000065 HC CARDIAC REHAB PHASE 3 - 1 VISIT

## 2022-06-16 ENCOUNTER — HOSPITAL ENCOUNTER (OUTPATIENT)
Dept: CARDIAC REHAB | Age: 62
Discharge: HOME | End: 2022-06-16

## 2022-06-16 PROCEDURE — 9900000065 HC CARDIAC REHAB PHASE 3 - 1 VISIT

## 2022-06-24 ENCOUNTER — PROCEDURE VISIT (OUTPATIENT)
Dept: CARDIOLOGY | Age: 62
End: 2022-06-24
Payer: COMMERCIAL

## 2022-06-24 ENCOUNTER — HOSPITAL ENCOUNTER (OUTPATIENT)
Dept: CARDIAC REHAB | Age: 62
Discharge: HOME | End: 2022-06-24

## 2022-06-24 DIAGNOSIS — Z95.810 ICD (IMPLANTABLE CARDIOVERTER-DEFIBRILLATOR) IN PLACE: ICD-10-CM

## 2022-06-24 DIAGNOSIS — I42.8 NONISCHEMIC CARDIOMYOPATHY (HCC): ICD-10-CM

## 2022-06-24 PROCEDURE — 9900000065 HC CARDIAC REHAB PHASE 3 - 1 VISIT

## 2022-06-24 PROCEDURE — 93289 INTERROG DEVICE EVAL HEART: CPT | Performed by: INTERNAL MEDICINE

## 2022-06-24 NOTE — PROGRESS NOTES
See [employer] cardiac device interrogation report scanned. Thinning eyebrows Not tested In bed elevated Pt denies auditory or visual hallucinations. Does report that after she hears music, she hears it later. Of note: Her late  was a conductor and she states that they listened to music all the time. Reports of AMS x 1 month "Better" Sl elevated

## 2022-06-30 ENCOUNTER — OFFICE VISIT (OUTPATIENT)
Dept: CARDIOLOGY | Age: 62
End: 2022-06-30
Payer: COMMERCIAL

## 2022-06-30 ENCOUNTER — HOSPITAL ENCOUNTER (OUTPATIENT)
Dept: CARDIAC REHAB | Age: 62
Discharge: HOME | End: 2022-06-30

## 2022-06-30 VITALS
HEART RATE: 89 BPM | WEIGHT: 201.8 LBS | BODY MASS INDEX: 32.57 KG/M2 | DIASTOLIC BLOOD PRESSURE: 74 MMHG | SYSTOLIC BLOOD PRESSURE: 130 MMHG

## 2022-06-30 DIAGNOSIS — I42.8 NONISCHEMIC CARDIOMYOPATHY (HCC): Primary | ICD-10-CM

## 2022-06-30 DIAGNOSIS — I10 HYPERTENSION, ESSENTIAL: ICD-10-CM

## 2022-06-30 PROCEDURE — G8417 CALC BMI ABV UP PARAM F/U: HCPCS | Performed by: INTERNAL MEDICINE

## 2022-06-30 PROCEDURE — 93000 ELECTROCARDIOGRAM COMPLETE: CPT | Performed by: INTERNAL MEDICINE

## 2022-06-30 PROCEDURE — 3017F COLORECTAL CA SCREEN DOC REV: CPT | Performed by: INTERNAL MEDICINE

## 2022-06-30 PROCEDURE — G8427 DOCREV CUR MEDS BY ELIG CLIN: HCPCS | Performed by: INTERNAL MEDICINE

## 2022-06-30 PROCEDURE — 99214 OFFICE O/P EST MOD 30 MIN: CPT | Performed by: INTERNAL MEDICINE

## 2022-06-30 PROCEDURE — 1036F TOBACCO NON-USER: CPT | Performed by: INTERNAL MEDICINE

## 2022-06-30 RX ORDER — SPIRONOLACTONE 25 MG/1
12.5 TABLET ORAL DAILY
Qty: 45 TABLET | Refills: 1 | Status: SHIPPED | OUTPATIENT
Start: 2022-06-30 | End: 2022-10-31 | Stop reason: SDUPTHER

## 2022-06-30 NOTE — PROGRESS NOTES
Cardiology Consultation/Follow Up. [address] [facility],[place] [address]    CC: follow up for CHF. HPI:  Jake Downey presents for routine follow up for CHF. She is doing well from cardiac standpoint and denies having any chest pain, dyspnea, orthopnea, lower extremity edema. She is maintained on low-dose Lasix every day 20 mg daily. She could not farxiga due to multiple yeast infections. Overall her functional capacity is good and she is working fulltime. .  No alarms or shocks from the ICD. Last interrogation on 6/24/2022 reviewed and within normal limits.       Past Medical:  Past Medical History:   Diagnosis Date    Acute on chronic systolic congestive heart failure ([facility] 01/21/2020    \"viral heart failure\" per patient    AICD (automatic cardioverter/defibrillator) present     Asthma     CNP CHELSEA Douglass [facility], [facility]    Bilateral corneal scars     COVID-19 11/10/2020    Disease of blood and blood forming organ     Diverticulitis     ON COLONSCOPY    DJD (degenerative joint disease), lumbar     Dry eye syndrome     DVT (deep venous thrombosis) ([facility])     after splenic artery repair in right calf    Gastro - esophageal reflux disease     Hiatal hernia     History of blood transfusion 1982    History of echocardiogram 04/2020    Hyperlipidemia     HIGH CHOLESTEROL/HIGH TRIGLYCERIDES    Hyperplastic colon polyp     Hypertension     PCP DR Ella AGUIRRE, [place]    Hypothyroidism     LGSIL (low grade squamous intraepithelial dysplasia) 11/2014    referred to GYN/S/P colpo with bx LGSIL, repeat pap in 6 months    Non-celiac gluten sensitivity     wheezing is directly proportionate to wheat intake    NSTEMI (non-ST elevated myocardial infarction) ([facility] 01/16/2020    DR Kori Perez    Osteoarthritis 2004    RT KNEE S/P INJURY    Other disorders of kidney and ureter in diseases classified elsewhere     Sciatica of right side     Severe persistent asthma     patient has seen that wheat consumption causes exacerbations    Splenic artery aneurysm [facility])     s/p splenic artery repair    Tinnitus     Type 2 diabetes mellitus, controlled ([facility] 2011    PCP DR Victorina Arzola       Past Surgical:  Past Surgical History:   Procedure Laterality Date    ABDOMEN SURGERY      ARTERIAL ANEURYSM REPAIR      splenic artery coil repair    CARDIAC CATHETERIZATION  2020    CARDIAC DEFIBRILLATOR PLACEMENT Left 2020    [employer]     SECTION  1986    COLONOSCOPY  2012    HYPERPLASTIC POLYP, DIVERTICULAR DISEASE    COLONOSCOPY  2017    moderate sigmoid diverticulosis. Dr. April Worthington COLONOSCOPY N/A 03/15/2022    moderate sigmoid diverticulosis otherwise normal; Dr. Cline Dates at [address]  2015    with [address] Bilateral 2017    CYSTO insertion lighted stents performed by Lilli Hamm MD at [address], ESOPHAGUS      ENDOSCOPY, COLON, DIAGNOSTIC      EYE SURGERY      GASTRIC FUNDOPLICATION N/A     XI LAPAROSCOPIC ROBOTIC HIATAL HERNIA REPAIR WITH MESH  NISSEN FUNDOPLICATION. EGD performed by Ricki Bradshaw DO at [address] [facility]  2021    LAPAROSCOPIC ROBOTIC HIATAL HERNIA REPAIR WITH MESH  NISSEN FUNDOPLICATION.  EGD    KNEE ARTHROSCOPY Right     PARTIAL SYNOVECTOMY AND CHONDROPLASTY DUE TO MENSICUS TEAR    LAPAROSCOPY N/A 2017    EXPLORATORY LAPAROSCOPY converted to open exploration with drainage of pelvic abscess performed by Humberto Hussein MD at [facility]  2014    clearing an infection done at [facility].   10/25/2011    L4/L5 epidural steroid injection    OTHER SURGICAL HISTORY Right  and     synvisc injections, two sets    OTHER SURGICAL HISTORY  2020    Lead revision    MO COLON CA SCRN NOT HI RSK IND N/A 2017    COLONOSCOPY performed by Humberto Hussein MD at [address] [facility] CA SCRN NOT  [address] IND N/A 09/08/2017    COLONOSCOPY performed by Omid Keenan MD at [address] EGD TRANSORAL BIOPSY SINGLE/MULTIPLE N/A 02/27/2017    EGD BIOPSY performed by Omid Keenan MD at [address]  SKIN TAG REMOVAL      SMALL INTESTINE SURGERY N/A 08/30/2017    ATTEMPTED LAPAROCOPY PROCEDURE CONVERTED TO OPEN Sigmoid Colectomy PERFORMED BY DR. VO resection colovaginal fistula PERFORMED BY. DR. Mauricio Hodgkins  performed by Omid Keenan MD at [address] [facility] Right 07/2011    TOTAL    TUBAL LIGATION  1991    UPPER GASTROINTESTINAL ENDOSCOPY N/A 01/02/2020    EGD BIOPSY W/ 50 HR LEARY performed by Omid Keenan MD at [address] N/A 03/15/2022    mild gastritis and esophagitis; wrap intact; Dr. Yaneth Vila at [facility] WISDOM TOOTH EXTRACTION         Family History:  Family History   Problem Relation Age of Onset    High Blood Pressure Mother     High Cholesterol Mother     Allergies Mother     Arthritis Mother     Coronary Art Dis Mother     Diabetes Daughter         pre diabetes    Other Maternal Grandfather         PUD GI problems    Breast Cancer Maternal Aunt     No Known Problems Maternal Aunt     No Known Problems Maternal Aunt     No Known Problems Maternal Aunt     Cataracts Neg Hx     Glaucoma Neg Hx        Social History:  Social History     Tobacco Use    Smoking status: Never Smoker    Smokeless tobacco: Never Used    Tobacco comment: never smoked syant rrt 10/13/2017   Vaping Use    Vaping Use: Never used   Substance Use Topics    Alcohol use: No     Alcohol/week: 0.0 standard drinks     Comment: Consume about 4 wine coolers a year    Drug use: No        REVIEW OF SYSTEMS:    · Constitutional: there has been no unanticipated weight loss. There's been No change in energy level, No change in activity level. · Eyes: No visual changes or diplopia. No scleral icterus. · ENT: No Headaches, hearing loss or vertigo.  No mouth sores or sore throat. · Cardiovascular: AS HPI  · Respiratory: AS HPI  · Gastrointestinal: No abdominal pain, appetite loss, blood in stools. No change in bowel or bladder habits. · Genitourinary: No dysuria, trouble voiding, or hematuria. · Musculoskeletal:  No radial site hematoma  · Integumentary: No rash or pruritis. · Neurological: No headache, diplopia, change in muscle strength, numbness or tingling. No change in gait, balance, coordination, mood, affect, memory, mentation, behavior. · Psychiatric: No new anxiety or depression. · Endocrine: No temperature intolerance. No excessive thirst, fluid intake, or urination. No tremor. · Hematologic/Lymphatic: No abnormal bruising or bleeding, blood clots or swollen lymph nodes. · Allergic/Immunologic: No nasal congestion or hives.     Medications:  Current Outpatient Medications   Medication Sig Dispense Refill    spironolactone (ALDACTONE) 25 MG tablet Take 0.5 tablets by mouth daily 45 tablet 1    rosuvastatin (CRESTOR) 5 MG tablet       albuterol sulfate  (90 Base) MCG/ACT inhaler inhale 2 puff by inhalation route  every 4 - 6 hours as needed      furosemide (LASIX) 40 MG tablet TAKE 1 TABLET DAILY (Patient taking differently: Take 20 mg by mouth daily ) 90 tablet 3    glimepiride (AMARYL) 1 MG tablet Take 1 tablet by mouth every morning (before breakfast) 30 tablet 3    metoprolol succinate (TOPROL XL) 100 MG extended release tablet Take 1 tablet by mouth 2 times daily 180 tablet 3    sacubitril-valsartan (ENTRESTO) 24-26 MG per tablet Take 1 tablet by mouth 2 times daily 180 tablet 3    famotidine (PEPCID) 40 MG tablet Take 1 tablet by mouth every evening 90 tablet 3    pantoprazole (PROTONIX) 40 MG tablet Take one tablet once daily 30-60 minutes prior to a meal 90 tablet 3    budesonide-formoterol (SYMBICORT) 160-4.5 MCG/ACT AERO USE 2 INHALATIONS TWICE A DAY (RINSE MOUTH WELL AFTER USE) 3 Inhaler 3    fluticasone (FLONASE) 50 MCG/ACT nasal spray USE 2 SPRAYS IN EACH NOSTRIL ONCE DAILY 48 g 3    Cholecalciferol (VITAMIN D3) 50 MCG (2000 UT) CAPS Take 1 capsule by mouth daily      albuterol (PROVENTIL) (5 MG/ML) 0.5% nebulizer solution Take 1 mL by nebulization 4 times daily as needed for Wheezing 120 each 3    SYNTHROID 175 MCG tablet Take 175 mcg by mouth Daily       ipratropium-albuterol (DUONEB) 0.5-2.5 (3) MG/3ML SOLN nebulizer solution Inhale 3 mLs into the lungs every 4 hours as needed for Shortness of Breath 360 mL 2    diclofenac (VOLTAREN) 75 MG EC tablet Take 1 tablet by mouth 2 times daily as needed for Pain 90 tablet 2    Probiotic Product (SOLUBLE FIBER/PROBIOTICS PO) Take by mouth 2 times daily      metFORMIN (GLUCOPHAGE) 500 MG tablet TAKE 2 TABLETS BY MOUTH TWO TIMES A DAY WITH MEALS 120 tablet 3     No current facility-administered medications for this visit. Physical Exam:   Vitals: /74   Pulse 89   Wt 201 lb 12.8 oz (91.5 kg)   LMP 04/29/2009 (Approximate)   BMI 32.57 kg/m²   General appearance: alert and cooperative with exam  HEENT: Head: Normocephalic, no lesions, without obvious abnormality.   Neck: no carotid bruit, no JVD  Lungs: clear to auscultation bilaterally  Heart: regular rate and rhythm, S1, S2 normal, no murmur, click, rub or gallop  Abdomen: soft, non-tender; bowel sounds normal; no masses,  no organomegaly  Extremities: extremities normal, atraumatic, no cyanosis or edema  Neurologic: Mental status: Alert, oriented, thought content appropriate    Labs:  Lab Results   Component Value Date    CHOL 216 05/06/2020    TRIG 195 05/06/2020    HDL 52 05/06/2020    LDLCHOLESTEROL 112 01/17/2020    LDLCALC 130 05/06/2020    VLDL NOT REPORTED (H) 01/17/2020    CHOLHDLRATIO 4.2 05/06/2020       Lab Results   Component Value Date     (H) 02/10/2021    K 4.0 02/10/2021     (H) 02/10/2021    CO2 23 02/10/2021    BUN 11 02/10/2021    CREATININE 0.64 04/12/2022    GLUCOSE 105 (H) 02/10/2021 CALCIUM 9.2 02/10/2021    PROT 6.7 06/09/2020    LABALBU 3.9 06/09/2020    BILITOT 0.28 (L) 06/09/2020    ALKPHOS 61 06/09/2020    AST 26 06/09/2020    ALT 34 (H) 06/09/2020    LABGLOM >60 04/12/2022    GFRAA >60 04/12/2022    GLOB 3.0 06/30/2017       EKG: Normal sinus rhythm, normal ECG    LAST ECHO:   Results for orders placed or performed during the hospital encounter of 01/16/20   Echocardiogram complete 2D with doppler with color  Summary  Moderately dilated LV size , normal wall thickness. Severe global hypokinesis. Severely reduced LV systolic function with LVEF 25-30%. Normal RV size and function. Normal size LA and RA. No obvious significant structural valvular abnormality noted. Mild AR noted . Normal aortic root dimension. No significant pericardial effusion. No obvious intra-cardiac mass or shunt noted. LAST CATH:  Results for orders placed or performed during the hospital encounter of 01/16/20   Cardiac Catheterization   Procedure Summary   Minimal non obstructive CAD   LV systolic dysfunction- EF 74%         ECHO: 4/30/2020  Left ventricle is in the upper limits of normal in size. Left ventricular systolic function is severely reduced. Global hypokinesis. Left ventricular ejection fraction 30 %. Grade I (mild) left ventricular diastolic dysfunction. Normal aortic valve structure. Mild aortic insufficiency. Mild mitral valve thickening with annular calcification. Moderate mitral regurgitation. Normal function of other valves. No pericardial effusion. AICD placed: 5/11/20    RV lead revised 5/19/20. Past Medical and Surgical History, Problem List, Allergies, Medications, Labs, Imaging, all reviewed extensively in EMR and with the patient. Assessment & Plan:    1. 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 Currently compensated with no signs of volume overload on examination.   Maintained on Lasix, Toprol- mg twice daily and Entresto 24/26. Could not tolerate SGLT2 inhibitors due to recurrent yeast infections. We will start Aldactone 12.5 mg daily. 6 to be used on as needed basis. Repeat CMP 1 week after starting Aldactone. Continue phase 3 cardiac rehab.    2. S/p AICD on 5/11/20 followed by RV lead revision 5/19/20. Routine interrogation in 06/2022 reviewed and within normal limits. Patient does not have any volume overload although the OptiVol index has shown otherwise. We will continue monitoring every 6 months    3. DL    5. Minimal CAD on in cath 1/20    6. Has chronic long standing GERD/Esophagitis/Hiatal Hernia status post fundoplication in February 2021    7. DM. Controlled, per PCP, plan for stating Rosalia Lopez as above and this was already discussed by patient with Dr Brenden Young    The patient is to continue heart healthy diet, weight loss and exercise as tolerated. Patient's medications and side effects were discussed. Medication refills were provided if needed. Follow up appointment timing was discussed. All questions and concerns were addressed to patient's satisfaction. The patient is to follow up in 6 months or sooner if necessary. Gabriel Shin MD   [facility]  [facility]  [phone]

## 2022-07-05 ENCOUNTER — HOSPITAL ENCOUNTER (OUTPATIENT)
Dept: CARDIAC REHAB | Age: 62
Discharge: HOME | End: 2022-07-05

## 2022-07-12 ENCOUNTER — HOSPITAL ENCOUNTER (OUTPATIENT)
Dept: CARDIAC REHAB | Age: 62
Discharge: HOME | End: 2022-07-12

## 2022-07-12 PROCEDURE — 9900000065 HC CARDIAC REHAB PHASE 3 - 1 VISIT

## 2022-07-15 ENCOUNTER — HOSPITAL ENCOUNTER (OUTPATIENT)
Dept: LAB | Age: 62
Discharge: HOME | End: 2022-07-15
Payer: COMMERCIAL

## 2022-07-15 DIAGNOSIS — I10 HYPERTENSION, ESSENTIAL: ICD-10-CM

## 2022-07-15 DIAGNOSIS — I42.8 NONISCHEMIC CARDIOMYOPATHY (HCC): ICD-10-CM

## 2022-07-15 LAB
ALBUMIN SERPL-MCNC: 4.1 G/DL (ref 3.5–5.2)
ALBUMIN/GLOBULIN RATIO: 1.6 (ref 1–2.5)
ALP BLD-CCNC: 73 U/L (ref 35–104)
ALT SERPL-CCNC: 26 U/L (ref 5–33)
ANION GAP SERPL CALCULATED.3IONS-SCNC: 12 MMOL/L (ref 9–17)
AST SERPL-CCNC: 19 U/L
BILIRUB SERPL-MCNC: 0.46 MG/DL (ref 0.3–1.2)
BUN BLDV-MCNC: 11 MG/DL (ref 8–23)
BUN/CREAT BLD: 16 (ref 9–20)
CALCIUM SERPL-MCNC: 9.7 MG/DL (ref 8.6–10.4)
CHLORIDE BLD-SCNC: 101 MMOL/L (ref 98–107)
CO2: 25 MMOL/L (ref 20–31)
CREAT SERPL-MCNC: 0.69 MG/DL (ref 0.5–0.9)
GFR AFRICAN AMERICAN: >60 ML/MIN
GFR NON-AFRICAN AMERICAN: >60 ML/MIN
GFR SERPL CREATININE-BSD FRML MDRD: ABNORMAL ML/MIN/{1.73_M2}
GLUCOSE BLD-MCNC: 192 MG/DL (ref 70–99)
POTASSIUM SERPL-SCNC: 4.1 MMOL/L (ref 3.7–5.3)
SODIUM BLD-SCNC: 138 MMOL/L (ref 135–144)
TOTAL PROTEIN: 6.6 G/DL (ref 6.4–8.3)

## 2022-07-15 PROCEDURE — 36415 COLL VENOUS BLD VENIPUNCTURE: CPT

## 2022-07-15 PROCEDURE — 80053 COMPREHEN METABOLIC PANEL: CPT

## 2022-07-19 ENCOUNTER — HOSPITAL ENCOUNTER (OUTPATIENT)
Dept: CARDIAC REHAB | Age: 62
Discharge: HOME | End: 2022-07-19

## 2022-07-19 PROCEDURE — 9900000065 HC CARDIAC REHAB PHASE 3 - 1 VISIT

## 2022-07-28 ENCOUNTER — HOSPITAL ENCOUNTER (OUTPATIENT)
Dept: CARDIAC REHAB | Age: 62
Discharge: HOME | End: 2022-07-28

## 2022-07-28 PROCEDURE — 9900000065 HC CARDIAC REHAB PHASE 3 - 1 VISIT

## 2022-08-03 ENCOUNTER — HOSPITAL ENCOUNTER (OUTPATIENT)
Dept: CARDIAC REHAB | Age: 62
Discharge: HOME | End: 2022-08-03

## 2022-08-03 PROCEDURE — 9900000065 HC CARDIAC REHAB PHASE 3 - 1 VISIT

## 2022-08-05 ENCOUNTER — HOSPITAL ENCOUNTER (OUTPATIENT)
Dept: CARDIAC REHAB | Age: 62
Discharge: HOME | End: 2022-08-05

## 2022-08-05 PROCEDURE — 9900000065 HC CARDIAC REHAB PHASE 3 - 1 VISIT

## 2022-08-18 ENCOUNTER — OFFICE VISIT (OUTPATIENT)
Dept: OTOLARYNGOLOGY | Age: 62
End: 2022-08-18
Payer: COMMERCIAL

## 2022-08-18 VITALS
OXYGEN SATURATION: 97 % | HEART RATE: 81 BPM | RESPIRATION RATE: 16 BRPM | SYSTOLIC BLOOD PRESSURE: 118 MMHG | WEIGHT: 201.4 LBS | DIASTOLIC BLOOD PRESSURE: 76 MMHG | BODY MASS INDEX: 32.37 KG/M2 | HEIGHT: 66 IN

## 2022-08-18 DIAGNOSIS — J32.2 CHRONIC ETHMOIDAL SINUSITIS: Primary | ICD-10-CM

## 2022-08-18 DIAGNOSIS — R49.0 HOARSENESS: ICD-10-CM

## 2022-08-18 PROCEDURE — G8417 CALC BMI ABV UP PARAM F/U: HCPCS | Performed by: OTOLARYNGOLOGY

## 2022-08-18 PROCEDURE — 31575 DIAGNOSTIC LARYNGOSCOPY: CPT | Performed by: OTOLARYNGOLOGY

## 2022-08-18 PROCEDURE — 3017F COLORECTAL CA SCREEN DOC REV: CPT | Performed by: OTOLARYNGOLOGY

## 2022-08-18 PROCEDURE — 1036F TOBACCO NON-USER: CPT | Performed by: OTOLARYNGOLOGY

## 2022-08-18 PROCEDURE — G8427 DOCREV CUR MEDS BY ELIG CLIN: HCPCS | Performed by: OTOLARYNGOLOGY

## 2022-08-18 PROCEDURE — 99213 OFFICE O/P EST LOW 20 MIN: CPT | Performed by: OTOLARYNGOLOGY

## 2022-08-18 RX ORDER — BUDESONIDE 0.5 MG/2ML
INHALANT ORAL
COMMUNITY
Start: 2022-07-26 | End: 2022-09-14

## 2022-08-18 RX ORDER — PREDNISONE 20 MG/1
TABLET ORAL
COMMUNITY
Start: 2022-07-26 | End: 2022-09-14

## 2022-08-18 RX ORDER — ALBUTEROL SULFATE 90 UG/1
AEROSOL, METERED RESPIRATORY (INHALATION)
COMMUNITY
Start: 2022-03-02 | End: 2022-09-14

## 2022-08-18 RX ORDER — LORATADINE 10 MG/1
CAPSULE, LIQUID FILLED ORAL
COMMUNITY
Start: 2021-11-29 | End: 2022-09-14

## 2022-08-18 RX ORDER — IPRATROPIUM BROMIDE AND ALBUTEROL SULFATE 2.5; .5 MG/3ML; MG/3ML
SOLUTION RESPIRATORY (INHALATION)
COMMUNITY
Start: 2022-04-13

## 2022-08-18 ASSESSMENT — ENCOUNTER SYMPTOMS: SINUS COMPLAINT: 1

## 2022-08-18 NOTE — PROGRESS NOTES
2022 10:29 AM EDT  Abraham Mendez (:  1960) is a 60 y.o. female,New patient, here for evaluation of the following chief complaint(s):  Sinus Problem (3 mo ck sinus and vocal cords. Fu seeing Dr. Bethanie Kaufman)      ASSESSMENT/PLAN:  1. Chronic ethmoidal sinusitis    2. Hoarseness      1. Chronic ethmoidal sinusitis  2. Hoarseness    History of right vocal cord paralysis, improved movement today on examination. May have a relationship to the barium swallow 2020. Strong history of aspiration. We discussed that a modified barium swallow can determine safe diet and possibly determine if she is a candidate for swallow therapy. Because of her difficult experience with the barium swallow in , she does not want to pursue this test. Cautioned her to be careful moving forward with liquids. Recommend a thickened diet. She continues to have a bit of a cough and she is at risk for aspiration pneumonia. MRI brain contraindicated with defibrillator  Discussed referral to laryngologist for possible vocal cord injections versus implant to improve aspiration vocal quality. She will consider this in the future. Will monitor for now given improved movement. Fu in 6 months     Discussed chronic sinusitis noted on CT head. Planned for surgery w Dr. Bethanie Kaufman in October   Fu in 3 months     No follow-ups on file. SUBJECTIVE/OBJECTIVE:  Other    Sinus Problem     60year-old woman with hoarseness since a barium swallow 2020. This preceded a fundoplication procedure. At the time of the barium swallow she developed a deep cough and noticed a significant roughness to her voice. It never seemed to recover. She had an EGD performed 3/15. She has been on acid suppression medication for many years. She states that she is have a history of GERD for her whole life. She denies any throat pain but she does have problems swallowing liquids. She describes choking and aspiration with liquids.   She was diagnosed with COVID in January 2021. In May 2021 she required a defibrillator and was on a ventilator and life flighted for some emergency issues related to the defibrillator. Secondarily she also has a history of chronic sinusitis. She had bilateral endoscopic sinus surgery in 2014 for nasal polyps and chronic sinusitis. She has been on Flonase every day for many years and continues to do that. She also takes Symbicort. She has a history of multiple allergy test all of which have been negative. She is on a paleo diet for several years without any improvement also. She has frequent thick yellow drainage from her nose. She has a NeilMed sinus rinse every morning. She takes Sudafed orally as needed. She uses to work closely with the elicia Fernandez allergist here and is hoping to reestablish that type of care. She describes fluctuating nasal congestion. This seems to stay settled with routine use of Flonase and nasal saline spray. She does endorse flareups that she attributes to sinus infections with almost complete nasal obstruction and worsening yellow drainage. On a daily basis she does endorse constant thick yellow nasal drainage. She states that she feels like she has a sinus infection all of the time. Antibiotics temporarily make some improvements but she returns to her baseline ongoing symptoms after the antibiotics. CT head 4/22/22:   Impression   A slightly less than 1 cm infarct was noted in the anterior limb of the right   internal capsule which could be recent. Diffusion weighted MRI would be   confirmatory. CT chest 4/22/22:   Impression   No hilar, mediastinal or lung parenchymal mass. No hilar, mediastinal or   axillary lymphadenopathy. Specifically, no Pancoast tumor was noted. No   parenchymal infiltration or pleural effusion was identified. CT neck 4/22/22:   Impression   No structural abnormality to explain right vocal cord paralysis.        7 mm ossified mass partially covering the left porus acusticus. Differential   includes osteoma or meningioma. RECOMMENDATIONS:   Correlate for symptoms of left facial or vestibulocochlear nerve dysfunction. Refused modified barium swallow. She followed up to review findings. Reviewed that there is no mass-effect etiology in the head neck or chest to explain the vocal cord paralysis. There were 2 incidental findings of suspected meningioma at the cerebellopontine angle and a small stroke in the anterior limb of the right internal capsule. Dr. Moi Beaulieu was made aware. We will follow-up with her office and ask them to manage any further work-up or imaging that may be required for these findings. She is scheduled for cataract surgery soon. CT sinus 5/16/22:  Impression   Acute on chronic pansinusitis with complete opacification the frontal sinuses   and near complete opacification of the ethmoid air cells and right maxillary   sinus. The remaining sinuses are partially opacified. Occlusion of all the native sinus drainage pathways. Prior functional   endoscopic sinus surgery at the left ostiomeatal unit with narrowing but   patency of the surgical ostium. She was referred to DR. Valiente for revision ESS. Is booked for surgery in October.      Past Medical History:   Diagnosis Date    Acute on chronic systolic congestive heart failure ([address] 01/21/2020    \"viral heart failure\" per patient    AICD (automatic cardioverter/defibrillator) present     Asthma     [facility]    Bilateral corneal scars     COVID-19 11/10/2020    Disease of blood and blood forming organ     Diverticulitis     ON COLONSCOPY    DJD (degenerative joint disease), lumbar     Dry eye syndrome     DVT (deep venous thrombosis) ([address]     after splenic artery repair in right calf    Gastro - esophageal reflux disease     Hiatal hernia     History of blood transfusion 1982    History of echocardiogram 04/2020    Hyperlipidemia     HIGH CHOLESTEROL/HIGH TRIGLYCERIDES    Hyperplastic colon polyp     Hypertension     PCP DR Robby Katz, [place]    Hypothyroidism     LGSIL (low grade squamous intraepithelial dysplasia) 2014    referred to GYN/S/P colpo with bx LGSIL, repeat pap in 6 months    Non-celiac gluten sensitivity     wheezing is directly proportionate to wheat intake    NSTEMI (non-ST elevated myocardial infarction) ([facility] 2020    DR Marshall Mcgrath    Osteoarthritis 2004    RT KNEE S/P INJURY    Other disorders of kidney and ureter in diseases classified elsewhere     Sciatica of right side     Severe persistent asthma     patient has seen that wheat consumption causes exacerbations    Splenic artery aneurysm (HCC)     s/p splenic artery repair    Tinnitus     Type 2 diabetes mellitus, controlled ([facility] 2011    PCP DR Robby Katz     Past Surgical History:   Procedure Laterality Date    ABDOMEN SURGERY      ARTERIAL ANEURYSM REPAIR      splenic artery coil repair    CARDIAC CATHETERIZATION  2020    CARDIAC DEFIBRILLATOR PLACEMENT Left 2020    MEDTRONIC     SECTION  1986    COLONOSCOPY  2012    HYPERPLASTIC POLYP, DIVERTICULAR DISEASE    COLONOSCOPY  2017    moderate sigmoid diverticulosis. Dr. Tory Koch    COLONOSCOPY N/A 03/15/2022    moderate sigmoid diverticulosis otherwise normal; Dr. Tory Koch at [address] [place] [address],[address]  2015    with [address] LGSIL    CYSTOSCOPY Bilateral 2017    CYSTO insertion lighted stents performed by Citlali Recinos MD at [facility], ESOPHAGUS      ENDOSCOPY, COLON, DIAGNOSTIC      EYE SURGERY      GASTRIC FUNDOPLICATION N/A     XI LAPAROSCOPIC ROBOTIC HIATAL HERNIA REPAIR WITH MESH  NISSEN FUNDOPLICATION. EGD performed by Nelia Gardner DO at [id number]  2021    LAPAROSCOPIC ROBOTIC HIATAL HERNIA REPAIR WITH MESH  NISSEN FUNDOPLICATION.  EGD    KNEE ARTHROSCOPY Right     PARTIAL SYNOVECTOMY AND CHONDROPLASTY DUE TO MENSICUS TEAR LAPAROSCOPY N/A 09/08/2017    EXPLORATORY LAPAROSCOPY converted to open exploration with drainage of pelvic abscess performed by Aleksey Cobos MD at [address]  08/19/2014    clearing an infection done at [facility] 9  10/25/2011    L4/L5 epidural steroid injection    OTHER SURGICAL HISTORY Right 2006 and 2010    synvisc injections, two sets    OTHER SURGICAL HISTORY  05/19/2020    Lead revision    DC COLON CA SCRN NOT HI RSK IND N/A 02/27/2017    COLONOSCOPY performed by Aleksey Cobos MD at [address] IND N/A 09/08/2017    COLONOSCOPY performed by Aleksey Cobos MD at [address] EGD TRANSORAL BIOPSY SINGLE/MULTIPLE N/A 02/27/2017    EGD BIOPSY performed by Aleksey Cobos MD at [address] [facility] [address] TAG REMOVAL      SMALL INTESTINE SURGERY N/A 08/30/2017    ATTEMPTED LAPAROCOPY PROCEDURE CONVERTED TO OPEN Sigmoid Colectomy PERFORMED BY DR. VO resection colovaginal fistula PERFORMED BY. DR. Bambi Jefferson  performed by Aleksey Cobos MD at [facility] Right 07/2011    TOTAL    TUBAL LIGATION  1991    UPPER GASTROINTESTINAL ENDOSCOPY N/A 01/02/2020    EGD BIOPSY W/ 48 HR LEARY performed by Aleksey Cobos MD at [address] N/A 03/15/2022    mild gastritis and esophagitis; wrap intact; Dr. Tone Kamara at [address] EXTRACTION       Social History       Tobacco History       Smoking Status  Never Smoker      Smokeless Tobacco Use  Never Used      Tobacco Comment  never smoked syant rrt 10/13/2017              Alcohol History       Alcohol Use Status  No Comment  Consume about 4 wine coolers a year              Drug Use       Drug Use Status  No              Sexual Activity       Sexually Active  Yes Partners  Male Comment                    Family History   Problem Relation Age of Onset    High Blood Pressure Mother     High Cholesterol Mother     Allergies Mother Arthritis Mother     Coronary Art Dis Mother     Diabetes Daughter         pre diabetes    Other Maternal Grandfather         PUD GI problems    Breast Cancer Maternal Aunt     No Known Problems Maternal Aunt     No Known Problems Maternal Aunt     No Known Problems Maternal Aunt     Cataracts Neg Hx     Glaucoma Neg Hx      Current Outpatient Medications   Medication Instructions    albuterol (PROVENTIL) 5 mg, Nebulization, 4 TIMES DAILY PRN    albuterol sulfate HFA (PROVENTIL;VENTOLIN;PROAIR) 108 (90 Base) MCG/ACT inhaler inhale 2 puff by inhalation route  every 4 - 6 hours as needed    albuterol sulfate  (90 Base) MCG/ACT inhaler inhale 2 puff by inhalation route  every 4 - 6 hours as needed    budesonide (PULMICORT) 0.5 MG/2ML nebulizer suspension USE 1 VIAL TWICE DAILY IN SINUS RINSE BOTTLE    budesonide-formoterol (SYMBICORT) 160-4.5 MCG/ACT AERO USE 2 INHALATIONS TWICE A DAY (RINSE MOUTH WELL AFTER USE)    Cholecalciferol (VITAMIN D3) 50 MCG (2000 UT) CAPS 1 capsule, Oral, DAILY    diclofenac (VOLTAREN) 75 mg, Oral, 2 TIMES DAILY PRN    famotidine (PEPCID) 40 mg, Oral, EVERY EVENING    fluticasone (FLONASE) 50 MCG/ACT nasal spray USE 2 SPRAYS IN EACH NOSTRIL ONCE DAILY    furosemide (LASIX) 40 MG tablet TAKE 1 TABLET DAILY    glimepiride (AMARYL) 1 mg, Oral, DAILY BEFORE BREAKFAST    ipratropium-albuterol (DUONEB) 0.5-2.5 (3) MG/3ML SOLN nebulizer solution 3 mLs, Inhalation, EVERY 4 HOURS PRN    ipratropium-albuterol (DUONEB) 0.5-2.5 (3) MG/3ML SOLN nebulizer solution 3 ml as needed    loratadine (CLARITIN) 10 MG capsule 1 tablet    metFORMIN (GLUCOPHAGE) 500 MG tablet TAKE 2 TABLETS BY MOUTH TWO TIMES A DAY WITH MEALS    metoprolol succinate (TOPROL XL) 100 mg, Oral, 2 TIMES DAILY    pantoprazole (PROTONIX) 40 MG tablet Take one tablet once daily 30-60 minutes prior to a meal    predniSONE (DELTASONE) 20 MG tablet TAKE 1 TABLET BY MOUTH IN THE MORNING FOR FOUR WEEKS WITH TAPER TO FOLLOW.  START TWO WEEKS BEFORE SURGICAL DATE    Probiotic Product (SOLUBLE FIBER/PROBIOTICS PO) Oral, 2 TIMES DAILY    rosuvastatin (CRESTOR) 5 MG tablet No dose, route, or frequency recorded. sacubitril-valsartan (ENTRESTO) 24-26 MG per tablet 1 tablet, Oral, 2 TIMES DAILY    spironolactone (ALDACTONE) 12.5 mg, Oral, DAILY    Synthroid 175 mcg, Oral, DAILY    VITAMIN B COMPLEX-C PO No dose, route, or frequency recorded. Allergies   Allergen Reactions    Morphine Shortness Of Breath, Nausea And Vomiting and Other (See Comments)     Chest tightness    Dapagliflozin      Yeast infection     Ibuprofen      Other reaction(s): Unknown    Ativan [Lorazepam] Other (See Comments)     agitation    Codeine Nausea And Vomiting and Other (See Comments)     Chest tightness    Darvon [Propoxyphene] Nausea And Vomiting and Other (See Comments)     Chest tightness    Lisinopril Other (See Comments)     COUGH    Percocet [Oxycodone-Acetaminophen] Nausea And Vomiting and Other (See Comments)     Chest tightness       ENT ROS: positive for - vocal changes    General: The patient is found to be alert and normally responsive female. Hearing: grossly normal   Voice: mildly hoarse  Skin: The skin has normal colour and turgor. Face: The facial contour is symmetric at rest and with movement. Ears: The pinnae have normal contours. AD: EAC clear, TM intact, no effusion/erythema/retraction   AS: EAC clear, TM intact, no effusion/erythema/retraction   Eye: The ocular movements are full and symmetric, the conjunctiva is unremarkable; sclera are anicteric, pupillary response is symmetric. No nystagmus is found. Nose:   The external nasal contour is normal  The nasal mucosa is inflamed with some excess mucus  The nasal septum is straight.     The turbinates have a normal appearance  The nares are patent without evidence of polyposis   Oral cavity:   The oral mucosa is without lesions;  the tongue is symmetric with full mobility and is without fasciculation. The soft palate is symmetric. The oropharynx is unremarkable. Neck: The neck has a normal contour; no masses are found on palpation    Fiberoptic examination of the upper airway using scope was conducted after first applying topical phenylephrine (1%) and lidocaine (4%) to the bilateral nasal cavity by spray. The endoscope was inserted and advanced through the bilateral side of the nose examining the mucosa and nasal structures. Advancement through the nasopharynx was continued into the hypopharynx and larynx. The tongue base, vallecula, pharyngeal walls, epiglottis aryepiglottic folds arytenoids, vocal cords, piriform sinuses and proximal trachea were examined. Findings include right vocal cord with improved movement but just slightly sluggish. Good adduction and abduction. Left true vocal cord with good movement. Mobility of the structures was symmetric and full. An electronic signature was used to authenticate this note.     --Joceline Romeo MD     8/18/2022 10:29 AM EDT

## 2022-08-30 ENCOUNTER — HOSPITAL ENCOUNTER (OUTPATIENT)
Dept: CARDIAC REHAB | Age: 62
Discharge: HOME | End: 2022-08-30

## 2022-08-30 PROCEDURE — 9900000065 HC CARDIAC REHAB PHASE 3 - 1 VISIT

## 2022-09-01 ENCOUNTER — HOSPITAL ENCOUNTER (OUTPATIENT)
Dept: CARDIAC REHAB | Age: 62
Discharge: HOME | End: 2022-09-01

## 2022-09-01 PROCEDURE — 9900000065 HC CARDIAC REHAB PHASE 3 - 1 VISIT

## 2022-09-14 ENCOUNTER — OFFICE VISIT (OUTPATIENT)
Dept: PRIMARY CARE CLINIC | Age: 62
End: 2022-09-14
Payer: COMMERCIAL

## 2022-09-14 ENCOUNTER — HOSPITAL ENCOUNTER (OUTPATIENT)
Age: 62
Setting detail: SPECIMEN
Discharge: HOME | End: 2022-09-14
Payer: COMMERCIAL

## 2022-09-14 VITALS
WEIGHT: 203 LBS | OXYGEN SATURATION: 97 % | TEMPERATURE: 97.7 F | DIASTOLIC BLOOD PRESSURE: 80 MMHG | HEIGHT: 67 IN | BODY MASS INDEX: 31.86 KG/M2 | SYSTOLIC BLOOD PRESSURE: 106 MMHG | HEART RATE: 105 BPM

## 2022-09-14 DIAGNOSIS — J32.9 CHRONIC SINUSITIS, UNSPECIFIED LOCATION: Primary | ICD-10-CM

## 2022-09-14 DIAGNOSIS — R05.9 COUGH: ICD-10-CM

## 2022-09-14 LAB
Lab: NORMAL
QC PASS/FAIL: NORMAL
SARS-COV-2 RDRP RESP QL NAA+PROBE: NEGATIVE

## 2022-09-14 PROCEDURE — U0003 INFECTIOUS AGENT DETECTION BY NUCLEIC ACID (DNA OR RNA); SEVERE ACUTE RESPIRATORY SYNDROME CORONAVIRUS 2 (SARS-COV-2) (CORONAVIRUS DISEASE [COVID-19]), AMPLIFIED PROBE TECHNIQUE, MAKING USE OF HIGH THROUGHPUT TECHNOLOGIES AS DESCRIBED BY CMS-2020-01-R: HCPCS

## 2022-09-14 PROCEDURE — 1036F TOBACCO NON-USER: CPT | Performed by: FAMILY MEDICINE

## 2022-09-14 PROCEDURE — G8417 CALC BMI ABV UP PARAM F/U: HCPCS | Performed by: FAMILY MEDICINE

## 2022-09-14 PROCEDURE — G8427 DOCREV CUR MEDS BY ELIG CLIN: HCPCS | Performed by: FAMILY MEDICINE

## 2022-09-14 PROCEDURE — 87635 SARS-COV-2 COVID-19 AMP PRB: CPT | Performed by: FAMILY MEDICINE

## 2022-09-14 PROCEDURE — 99214 OFFICE O/P EST MOD 30 MIN: CPT | Performed by: FAMILY MEDICINE

## 2022-09-14 PROCEDURE — U0005 INFEC AGEN DETEC AMPLI PROBE: HCPCS

## 2022-09-14 PROCEDURE — 3017F COLORECTAL CA SCREEN DOC REV: CPT | Performed by: FAMILY MEDICINE

## 2022-09-14 RX ORDER — DOXYCYCLINE HYCLATE 100 MG
100 TABLET ORAL 2 TIMES DAILY
Qty: 20 TABLET | Refills: 0 | Status: SHIPPED | OUTPATIENT
Start: 2022-09-14 | End: 2022-09-24

## 2022-09-14 NOTE — PROGRESS NOTES
[facility]             [address] [facility], [place], [address]                        Telephone [phone]             Fax [phone]       Cortney Singh  :  1960  Age:  60 y.o. MRN:  [id number]  Date of visit:  2022       Assessment & Plan:    1. Chronic sinusitis, unspecified location  - doxycycline hyclate (VIBRA-TABS) 100 MG tablet; Take 1 tablet by mouth 2 times daily for 10 days  Dispense: 20 tablet; Refill: 0    2. Cough  Covid vs. other  The rapid Covid test done today was negative. - COVID-19; Future-- a nasopharyngeal swab was also sent for PCR testing. She will be contacted when the results are available. She was advised that the most cautious approach is to assume that she may have Covid-19, and to stay isolated at home. Printed information regarding Coronavirus (Covid-19) was provided to the patient with her after visit summary. Subjective: Cortney Singh is a 60 y.o. female who presents to [facility] today (2022) for evaluation of:  Sinus Problem (Pt has had sinus sx since 4 days ago. Pt is having sinus surgery end of October. )      She states that she has had sinus symptoms for approximately 4 days. She denies fever. She states that she has a history of sinus problems. She is scheduled for sinus surgery next month. She reports post-nasal drainage. She took a home Covid test this morning which was negative. She has had one dose of the [employer] Covid vaccine, and one dose of the Moderna Covid-19 vaccine. She has also had Covid twice.       Current medications are:  Current Outpatient Medications   Medication Sig Dispense Refill    VITAMIN B COMPLEX-C PO       spironolactone (ALDACTONE) 25 MG tablet Take 0.5 tablets by mouth daily 45 tablet 1    rosuvastatin (CRESTOR) 5 MG tablet       glimepiride (AMARYL) 1 MG tablet Take 1 tablet by mouth every morning (before breakfast) 30 tablet 3    metoprolol succinate (TOPROL XL) 100 MG extended release tablet Take 1 tablet by mouth 2 times daily 180 tablet 3    sacubitril-valsartan (ENTRESTO) 24-26 MG per tablet Take 1 tablet by mouth 2 times daily 180 tablet 3    famotidine (PEPCID) 40 MG tablet Take 1 tablet by mouth every evening 90 tablet 3    pantoprazole (PROTONIX) 40 MG tablet Take one tablet once daily 30-60 minutes prior to a meal 90 tablet 3    budesonide-formoterol (SYMBICORT) 160-4.5 MCG/ACT AERO USE 2 INHALATIONS TWICE A DAY (RINSE MOUTH WELL AFTER USE) 3 Inhaler 3    fluticasone (FLONASE) 50 MCG/ACT nasal spray USE 2 SPRAYS IN EACH NOSTRIL ONCE DAILY 48 g 3    Cholecalciferol (VITAMIN D3) 50 MCG (2000 UT) CAPS Take 1 capsule by mouth daily      SYNTHROID 175 MCG tablet Take 175 mcg by mouth Daily       diclofenac (VOLTAREN) 75 MG EC tablet Take 1 tablet by mouth 2 times daily as needed for Pain 90 tablet 2    Probiotic Product (SOLUBLE FIBER/PROBIOTICS PO) Take by mouth 2 times daily      metFORMIN (GLUCOPHAGE) 500 MG tablet TAKE 2 TABLETS BY MOUTH TWO TIMES A DAY WITH MEALS 120 tablet 3    ipratropium-albuterol (DUONEB) 0.5-2.5 (3) MG/3ML SOLN nebulizer solution 3 ml as needed (Patient not taking: Reported on 8/18/2022)      albuterol sulfate  (90 Base) MCG/ACT inhaler inhale 2 puff by inhalation route  every 4 - 6 hours as needed (Patient not taking: Reported on 8/18/2022)      ipratropium-albuterol (DUONEB) 0.5-2.5 (3) MG/3ML SOLN nebulizer solution Inhale 3 mLs into the lungs every 4 hours as needed for Shortness of Breath (Patient not taking: Reported on 8/18/2022) 360 mL 2     No current facility-administered medications for this visit. She is allergic to morphine, dapagliflozin, ibuprofen, ativan [lorazepam], codeine, darvon [propoxyphene], lisinopril, and percocet [oxycodone-acetaminophen].     She has the following problem list:  Patient Active Problem List   Diagnosis    Hiatal hernia with GERD    Hypothyroidism    Essential hypertension    Hyperlipidemia    DJD (degenerative joint disease), lumbar    Acute diverticulitis of intestine    Hyperplastic colon polyp    Tinnitus    Sciatica of right side    Splenic artery aneurysm (HCC)    Presence of endovascular aneurysm coil compatible with safe magnetic resonance imaging    DVT (deep venous thrombosis) (HCC)    LGSIL (low grade squamous intraepithelial dysplasia)    Sinusitis, chronic    Asthma    Non-celiac gluten sensitivity    Severe persistent asthma    Varicose veins of bilateral lower extremities with pain    Bowel perforation (HCC)    Diverticulitis of large intestine with abscess without bleeding    Colovaginal fistula    Partial small bowel obstruction (HCC)    Diabetes type 2, no ocular involvement (HCC)    History of radial keratotomy    Combined forms of age-related cataract of both eyes    Respiratory distress    Exertional dyspnea    Thrombophlebitis of superficial veins of right lower extremity    Nonischemic cardiomyopathy (HCC)    Acute on chronic systolic congestive heart failure (HCC)    Acute respiratory failure (HCC)    Acute pulmonary edema (HCC)    S/P repair of paraesophageal hernia        She  reports that she has never smoked. She has never used smokeless tobacco.      Objective:    Vitals:    09/14/22 1707   BP: 106/80   Pulse: (!) 105   Temp: 97.7 °F (36.5 °C)   TempSrc: Tympanic   SpO2: 97%   Weight: 203 lb (92.1 kg)   Height: 5' 7\" (1.702 m)      SpO2: 97 %       Body mass index is 31.79 kg/m². Obese female healthy-appearing, alert, and cooperative. The tympanic membranes are clear bilaterally. Oropharynx has no erythema. There is no exudate. There is moderate tenderness over the frontal and maxillary sinuses bilaterally. Neck supple. No adenopathy. Chest:  Normal expansion. Clear to auscultation. No rales, rhonchi, or wheezing. Respirations are not labored.    Heart sounds are normal.  Regular rate and rhythm without murmur, gallop or rub.     Results of testing done today were reviewed with the patient:  Office Visit on 09/14/2022   Component Date Value Ref Range Status    SARS-COV-2, RdRp gene 09/14/2022 Negative  Negative Final    Lot Number 09/14/2022 [id number]   Final    QC Pass/Fail 09/14/2022 pass   Final              (Please note that portions of this note were completed with a voice-recognition program. Efforts were made to edit the dictation but occasionally words are mis-transcribed.)

## 2022-09-16 LAB
SARS-COV-2: NORMAL
SARS-COV-2: NOT DETECTED
SOURCE: NORMAL

## 2022-10-13 ENCOUNTER — HOSPITAL ENCOUNTER (OUTPATIENT)
Dept: MAMMOGRAPHY | Age: 62
Discharge: HOME | End: 2022-10-15
Payer: COMMERCIAL

## 2022-10-13 ENCOUNTER — TELEPHONE (OUTPATIENT)
Dept: CARDIOLOGY | Age: 62
End: 2022-10-13

## 2022-10-13 ENCOUNTER — HOSPITAL ENCOUNTER (OUTPATIENT)
Dept: CARDIAC REHAB | Age: 62
Discharge: HOME | End: 2022-10-13

## 2022-10-13 DIAGNOSIS — Z12.31 BREAST CANCER SCREENING BY MAMMOGRAM: ICD-10-CM

## 2022-10-13 PROCEDURE — 77063 BREAST TOMOSYNTHESIS BI: CPT

## 2022-10-13 PROCEDURE — 9900000065 HC CARDIAC REHAB PHASE 3 - 1 VISIT

## 2022-10-13 NOTE — TELEPHONE ENCOUNTER
[facility] [address] faxed request for clearance for pt's surgery on 10/31/22 for Full Revision Sinus Surgery & Bilateral Turbinate Reduction. Clearance Request Attached for review.      Last Appt:  6/30/2022  Next Appt:   1/12/2023  Med verified in Epic

## 2022-10-17 RX ORDER — SPIRONOLACTONE 25 MG/1
12.5 TABLET ORAL DAILY
Qty: 45 TABLET | Refills: 1 | OUTPATIENT
Start: 2022-10-17

## 2022-10-18 ENCOUNTER — HOSPITAL ENCOUNTER (OUTPATIENT)
Dept: CARDIAC REHAB | Age: 62
Discharge: HOME | End: 2022-10-18

## 2022-10-18 PROCEDURE — 9900000065 HC CARDIAC REHAB PHASE 3 - 1 VISIT

## 2022-10-20 ENCOUNTER — HOSPITAL ENCOUNTER (OUTPATIENT)
Dept: CARDIAC REHAB | Age: 62
Discharge: HOME | End: 2022-10-20

## 2022-10-20 PROCEDURE — 9900000065 HC CARDIAC REHAB PHASE 3 - 1 VISIT

## 2022-10-24 ENCOUNTER — HOSPITAL ENCOUNTER (OUTPATIENT)
Dept: CARDIAC REHAB | Age: 62
Discharge: HOME | End: 2022-10-24

## 2022-10-24 PROCEDURE — 9900000065 HC CARDIAC REHAB PHASE 3 - 1 VISIT

## 2022-10-25 ENCOUNTER — HOSPITAL ENCOUNTER (OUTPATIENT)
Dept: CARDIAC REHAB | Age: 62
Discharge: HOME | End: 2022-10-25

## 2022-10-25 PROCEDURE — 9900000065 HC CARDIAC REHAB PHASE 3 - 1 VISIT

## 2022-10-27 ENCOUNTER — HOSPITAL ENCOUNTER (OUTPATIENT)
Dept: PREADMISSION TESTING | Age: 62
Discharge: HOME | End: 2022-10-31
Payer: COMMERCIAL

## 2022-10-27 VITALS
TEMPERATURE: 98.6 F | HEIGHT: 66 IN | HEART RATE: 97 BPM | OXYGEN SATURATION: 98 % | WEIGHT: 206 LBS | RESPIRATION RATE: 16 BRPM | BODY MASS INDEX: 33.11 KG/M2 | DIASTOLIC BLOOD PRESSURE: 73 MMHG | SYSTOLIC BLOOD PRESSURE: 135 MMHG

## 2022-10-27 LAB
ANION GAP SERPL CALCULATED.3IONS-SCNC: 16 MMOL/L (ref 9–17)
BUN BLDV-MCNC: 18 MG/DL (ref 8–23)
BUN/CREAT BLD: 29 (ref 9–20)
CALCIUM SERPL-MCNC: 9.6 MG/DL (ref 8.6–10.4)
CHLORIDE BLD-SCNC: 102 MMOL/L (ref 98–107)
CO2: 16 MMOL/L (ref 20–31)
CREAT SERPL-MCNC: 0.62 MG/DL (ref 0.5–0.9)
ESTIMATED AVERAGE GLUCOSE: 157 MG/DL
GFR SERPL CREATININE-BSD FRML MDRD: >60 ML/MIN/1.73M2
GLUCOSE BLD-MCNC: 357 MG/DL (ref 70–99)
HBA1C MFR BLD: 7.1 % (ref 4–6)
HCT VFR BLD CALC: 41.3 % (ref 36.3–47.1)
HEMOGLOBIN: 13.1 G/DL (ref 11.9–15.1)
MCH RBC QN AUTO: 28.6 PG (ref 25.2–33.5)
MCHC RBC AUTO-ENTMCNC: 31.7 G/DL (ref 28.4–34.8)
MCV RBC AUTO: 90.2 FL (ref 82.6–102.9)
NRBC AUTOMATED: 0 PER 100 WBC
PDW BLD-RTO: 13.1 % (ref 11.8–14.4)
PLATELET # BLD: 237 K/UL (ref 138–453)
PMV BLD AUTO: 9.5 FL (ref 8.1–13.5)
POTASSIUM SERPL-SCNC: 4.5 MMOL/L (ref 3.7–5.3)
RBC # BLD: 4.58 M/UL (ref 3.95–5.11)
SODIUM BLD-SCNC: 134 MMOL/L (ref 135–144)
WBC # BLD: 11 K/UL (ref 3.5–11.3)

## 2022-10-27 PROCEDURE — 83036 HEMOGLOBIN GLYCOSYLATED A1C: CPT

## 2022-10-27 PROCEDURE — 36415 COLL VENOUS BLD VENIPUNCTURE: CPT

## 2022-10-27 PROCEDURE — 85027 COMPLETE CBC AUTOMATED: CPT

## 2022-10-27 PROCEDURE — 80048 BASIC METABOLIC PNL TOTAL CA: CPT

## 2022-10-27 RX ORDER — LORATADINE 10 MG/1
10 TABLET ORAL DAILY
COMMUNITY

## 2022-10-27 RX ORDER — PREDNISONE 20 MG/1
20 TABLET ORAL DAILY
COMMUNITY

## 2022-10-27 ASSESSMENT — PAIN SCALES - GENERAL: PAINLEVEL_OUTOF10: 0

## 2022-10-27 NOTE — PRE-PROCEDURE INSTRUCTIONS
On the Day of Your Surgery, Monday, October 31, 2022. Please Arrive At 10:30 AM     Enter the hospital through the Main Entrance, take the lobby elevators to the second floor and check in at the Surgery Registration desk. Continue to take your home medications as you normally do up to and including the night before surgery with the exception of blood thinning medications. Blood Thinning Medications:  Please stop prescription blood thinning medications such as Apixaban (Eliquis); Clopidogrel (Plavix); Dabigatran (Pradaxa); Prasugrel (Effient); Rivaroxaban (Xarelto); Ticagrelor (Brilinta); Warfarin (Coumadin) only as directed by your surgeon and/or the prescribing physician    Some common examples of other medications that can thin your blood are: Aspirin, Ibuprofen (Advil, Motrin), Naproxen (Aleve), Meloxicam (Mobic), Celecoxib (Celebrex), Fish Oil, many Herbal Supplements. These medications should usually be stopped at least 7 days prior to surgery. Tylenol is OK to take for pain the week prior to surgery. Failure to stop certain medications may interfere with your scheduled surgery. If you receive instructions from your surgeon regarding what medications to stop prior to surgery, please follow those specific instructions. If You Have Diabetes:  Do not take any of your diabetic medications, (injectables or by mouth) the morning of surgery unless otherwise instructed by the doctor who manages your diabetes. If you are taking insulin, contact the doctor the manages your diabetes for instructions about any changes to your insulin dosages the day before surgery. Please take the following medication(s) the day of surgery with small sips of water:              Metoprolol, Synthroid, Protonix    Please use your inhaler(s) if needed and bring your inhaler(s) from home the day of surgery.     Showering Before Surgery:    Shower the night before or morning of surgery and wash all over with an antibacterial soap such as Dial.       Additional Instructions:      1. If you are having any type of anesthesia, you are to have NOTHING to eat or drink after midnight the night before surgery. This includes no gum, hard candy, mints or water. The only exception to this is small sips of water to take the medications listed above. No smoking or chewing tobacco after midnight. No alcoholic beverages for 24 hours prior to surgery. 2. Brush your teeth but do not swallow any water. 3. If you wear glasses bring a case for them if you have one. No contacts should be worn the day of surgery. You may also bring your hearing aids. If you have dentures, most surgical procedures involving anesthesia will require that you remove them prior to surgery. 4. If you sleep with a CPAP or BiPAP machine at home and plan on staying in the hospital overnight after surgery, please bring your machine with you. 5. Do not wear any jewelry or body piercings the day of surgery. No nail polish on the operative extremity (arm/hand or leg/foot surgeries)   6. If you are staying overnight with us, you may bring a small bag of necessary personal items. 7. Please wear loose, comfortable clothing. If you are potentially going to have a cast, sling, brace or bulky dressing, make sure to wear clothing that will fit over it. 8. In case of illness - If you have cold or flu like symptoms (high fever, runny nose, sore throat, cough, etc.) rash, nausea, vomiting, loose stools, and/or recent contact with someone who has a contagious disease (chicken pox, measles, COVID-19, etc.). Please call your surgeon before coming to the hospital.    Transportation After Your Surgery/Procedure: If you are going home the same day of surgery you need someone to drive you home. Your  must be at least 25years of age.   A taxi cab or other nonmedical public transportation is not acceptable unless you have someone to ride home in the vehicle with you.   For your safety, someone must remain with you for the first 24 hours after your surgery if you receive anesthesia or medication. If you do not have someone to stay with you, your procedure may be cancelled. As a patient at [address] you can expect quality medical and nursing care that is centered on you individual needs. Our goal is to make your surgical experience as comfortable as possible.     Any questions about preparing for your surgery please call [phone].      ____________________________   ____________________________  Signature (Patient)                                 Signature (Nurse)                     Date

## 2022-10-27 NOTE — H&P
[facility]   [facility]    HISTORY AND PHYSICAL EXAMINATION            Date of Evaluation: 10/27/2022  Patient name:  Ruby Alfaro  MRN:   [id number]  YOB: 1960  PCP:    Arslan Balbuena MD    History Obtained From:     Patient, medical records    History of Present Illness: This is Ruby Alfaro a 60 y.o. female who presents to [facility] for her upcoming scheduled FESS- WITH NAVIGATION BILATERAL ETHMOIDECTOMY, MAXILLARY ANTROSTOMY,FRONTAL SINUS EXPLORATION, SPHENOIDOTOMY (CT DONE [facility] [place] 5/23/22) BILATERAL TURBINATE REDUCTION by Max Lopez MD FOR CHRONIC ETHMOIDAL SINUSITIS [J32.2] CHRONIC FRONTAL SINUSITIS [J32.1] CHRONIC MAXILLARY SINUSITIS [J32.0] CHRONIC SPHENOIDAL SINUSITIS [J32.3]  **PT HAS DEFIBRILLATOR. Hx chronic rhino sinusitis with polyposis requiring treatment with multiple courses of antibiotics. She c/o congestion with loss of smell and taste. Patient additionally has a hoarse voice and \"partially paralyzed vocal cord that I believe may be related to my emergency intubation when I had COVID\". S/p sinus surgery 2014 and since that time polyps have recurred. Patient is currently following with ENT Specialist, Dr Ino Shannon and was last seen on 7/26/22. Per his notes a CT Scan showed \"complete opacification of most of her sinuses with  occluded ostiomeatal complexes. Nasal endoscopy confirmed presence of persistent polyps obstructing the middle meatus and superior meatus. \"  Dr Ino Shannon discussed both continual medical treatment or surgical intervention to which she elected to have surgery. Patient denies recent fever, chills, shortness of breath, wheezing, open sores or nonhealing wounds. NOTE: Patient was started on Prednisone 20mg QD preoperatively by Dr Ino Shannon until  surgery originally scheduled 10/24/22. It was delayed until 10/31/22 and she continued to take the prednisone daily.  The patient is a known diabetic controlled with oral medication metformin 1000mg bid and glimepiride 1mg QD. Todays nonfasting sugar was 357. 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 ECHO 4/30/20 EF 30% AICD placed 5/11/20 then required RV lead revision 5/19/20 Cardiac cath 1/20/20 had normal coronary arteries\" Patient continues to follow with Cardiology and was last seen 6/30/22 by Dr Clementine Lane at which time was reviewed and \"was within normal limits. Patient lacey not have any volume overload although the OptiVol index has shown otherwise. . will continuee to monitor. The current medical regimen is effective;  continue present plan and medications. To monitor Q6 months\". Cardiac clearance given Patient considered \"moderate risk of surgery forma cardiac standpoint    Functional Status per patient:  1) Patient is able to walk 2 blocks or more on level ground without stopping. 2) Patient is does experience some SOB when ascending stairs especially if carrying an object. 3) Patient is able to walk up a hill 1-2 blocks or more. Sleep apnea questionnaire  1) Do you snore loudly? Snores but not loudly   2) Do you often feel tired, fatigued, or sleepy? No  3) Has anyone observed you stop breathing or choking/gasping during your sleep? No  4) Do you have hypertension? Yes  5) BMI >35 kg/m2? No 33.25 kg/m2  6) Age > 48? Yes  7) Pt is a male?  No     Past Medical History:     Past Medical History:   Diagnosis Date    Acute on chronic systolic congestive heart failure ([facility] [address] 01/21/2020    \"viral heart failure\" per patient    AICD (automatic cardioverter/defibrillator) present     Asthma     Uses daily Symbicort, rarely uses rescue inhaler, managed by PCP (10/27/22)    Bilateral corneal scars     COVID-19 11/10/2020    Disease of blood and blood forming organ     Diverticulitis     ON COLONSCOPY    DJD (degenerative joint disease), lumbar     Dry eye syndrome     DVT (deep venous thrombosis) ([facility])     after splenic artery repair in right calf    Gastro - esophageal reflux disease     Hiatal hernia     History of blood transfusion 1982    History of echocardiogram 2020    Hyperlipidemia     HIGH CHOLESTEROL/HIGH TRIGLYCERIDES    Hyperplastic colon polyp     Hypertension     PCP DR Jhonathan Live, [place]    Hypothyroidism     LGSIL (low grade squamous intraepithelial dysplasia) 2014    referred to GYN/S/P colpo with bx LGSIL, repeat pap in 6 months    Meningioma ([facility] [address] 2022    Non-celiac gluten sensitivity     wheezing is directly proportionate to wheat intake    NSTEMI (non-ST elevated myocardial infarction) ([facility] [address] 2020    DR Kerry Johnston    Osteoarthritis     Rivas. knees    Other disorders of kidney and ureter in diseases classified elsewhere     Sciatica of right side     Seborrheic dermatitis     Splenic artery aneurysm (HCC)     s/p splenic artery repair    Tinnitus     Type 2 diabetes mellitus, controlled ([facility] [address] 2011    PCP DR Jhonathan Live    Vocal cord paralysis 2022    Right vocal cord        Past Surgical History:     Past Surgical History:   Procedure Laterality Date    ABDOMEN SURGERY      ARTERIAL ANEURYSM REPAIR      splenic artery coil repair ([facility], Dr. Timothy Nielsen)    CARDIAC CATHETERIZATION  2020    No stents    CARDIAC DEFIBRILLATOR PLACEMENT Left 2020    [employer]TR[employer]IC     SECTION  1986    COLONOSCOPY  2012    HYPERPLASTIC POLYP, DIVERTICULAR DISEASE    COLONOSCOPY  2017    moderate sigmoid diverticulosis.  Dr. Danitza Parmar    COLONOSCOPY N/A 03/15/2022    moderate sigmoid diverticulosis otherwise normal; Dr. Danitza Parmar at [address] [place] [address]  2015    with [address] LGSIL    CYSTOSCOPY Bilateral 2017    CYSTO insertion lighted stents performed by Macie Castillo MD at [facility] OR    DILATATION, ESOPHAGUS      ENDOSCOPY, COLON, DIAGNOSTIC      EYE SURGERY  1992    radial keratomy (Dr. Clover Du)    GASTRIC FUNDOPLICATION N/A 99/13/8409    XI LAPAROSCOPIC ROBOTIC HIATAL HERNIA REPAIR WITH MESH  NISSEN FUNDOPLICATION. EGD performed by Tonya Chawla DO at [address] ARTHROSCOPY Right 2004    PARTIAL SYNOVECTOMY AND CHONDROPLASTY DUE TO MENSICUS TEAR    LAPAROSCOPY N/A 09/08/2017    EXPLORATORY LAPAROSCOPY converted to open exploration with drainage of pelvic abscess performed by Juanito Waldron MD at [address]  08/19/2014    clearing an infection done at [facility] 9  10/25/2011    L4/L5 epidural steroid injection    OTHER SURGICAL HISTORY Right 2006 and 2010    synvisc injections, two sets    OTHER SURGICAL HISTORY  05/19/2020    Lead revision    TX COLON CA SCRN NOT HI RSK IND N/A 02/27/2017    COLONOSCOPY performed by Juanito Waldron MD at [address] IND N/A 09/08/2017    COLONOSCOPY performed by Juanito Waldron MD at [address] [facility] EGD TRANSORAL BIOPSY SINGLE/MULTIPLE N/A 02/27/2017    EGD BIOPSY performed by Juanito Waldron MD at [address]  2014    Dr. Cristina Hartmann, [address] TAG REMOVAL      SMALL INTESTINE SURGERY N/A 08/30/2017    ATTEMPTED LAPAROCOPY PROCEDURE CONVERTED TO OPEN Sigmoid Colectomy PERFORMED BY DR. VO resection colovaginal fistula PERFORMED BY. DR. Jada Edge  performed by Juanito Waldron MD at [address] Right 07/2011    TOTAL    TUBAL LIGATION  1989    UPPER GASTROINTESTINAL ENDOSCOPY N/A 01/02/2020    EGD BIOPSY W/ 48 HR LEARY performed by Juanito Waldron MD at [facility] N/A 03/15/2022    mild gastritis and esophagitis; wrap intact; Dr. Margot Hussein at [facility]        Medications Prior to Admission:     Prior to Admission medications    Medication Sig Start Date End Date Taking?  Authorizing Provider   loratadine (CLARITIN) 10 MG tablet Take 10 mg by mouth daily   Yes Historical Provider, MD   predniSONE (DELTASONE) 20 MG tablet Take 20 mg by mouth daily   Yes Historical Provider, MD   ipratropium-albuterol (Dane Lux) 0.5-2.5 (3) MG/3ML SOLN nebulizer solution  4/13/22   Historical Provider, MD   spironolactone (ALDACTONE) 25 MG tablet Take 0.5 tablets by mouth daily 6/30/22   Sparkle Wilson MD   albuterol sulfate  (90 Base) MCG/ACT inhaler  3/2/22   Historical Provider, MD   glimepiride (AMARYL) 1 MG tablet Take 1 tablet by mouth every morning (before breakfast) 1/20/22   Sparkle Wilson MD   metoprolol succinate (TOPROL XL) 100 MG extended release tablet Take 1 tablet by mouth 2 times daily 1/3/22   Arun Taylor DO   sacubitril-valsartan (ENTRESTO) 24-26 MG per tablet Take 1 tablet by mouth 2 times daily 1/3/22   Arun Taylor DO   famotidine (PEPCID) 40 MG tablet Take 1 tablet by mouth every evening 6/2/21   DEREK Hollis CNP   pantoprazole (PROTONIX) 40 MG tablet Take one tablet once daily 30-60 minutes prior to a meal 3/15/21   DEREK Hollis CNP   budesonide-formoterol (SYMBICORT) 160-4.5 MCG/ACT AERO USE 2 INHALATIONS TWICE A DAY (RINSE MOUTH WELL AFTER USE) 1/7/21   EDREK Hollis CNP   Cholecalciferol (VITAMIN D3) 50 MCG (2000 UT) CAPS Take 1 capsule by mouth daily    Historical Provider, MD   SYNTHROID 175 MCG tablet Take 175 mcg by mouth Daily  8/3/19   Historical Provider, MD   ipratropium-albuterol (Dane Lux) 0.5-2.5 (3) MG/3ML SOLN nebulizer solution Inhale 3 mLs into the lungs every 4 hours as needed for Shortness of Breath 5/6/19   DEREK Hollis CNP   Probiotic Product (SOLUBLE FIBER/PROBIOTICS PO) Take by mouth 2 times daily    Historical Provider, MD   metFORMIN (GLUCOPHAGE) 500 MG tablet TAKE 2 TABLETS BY MOUTH TWO TIMES A DAY WITH MEALS 9/18/17   Thuy Zeng MD        Allergies:     Dapagliflozin, Ibuprofen, Ativan [lorazepam], Codeine, Darvon [propoxyphene], and Lisinopril    Social History:     Tobacco:    reports that she has never smoked. She has never used smokeless tobacco.  Alcohol:      reports current alcohol use. Drug Use:  reports no history of drug use. Family History:     Family History   Problem Relation Age of Onset    High Blood Pressure Mother     High Cholesterol Mother     Allergies Mother     Arthritis Mother     Coronary Art Dis Mother     Diabetes Daughter         pre diabetes    Other Maternal Grandfather         PUD GI problems    Breast Cancer Maternal Aunt     No Known Problems Maternal Aunt     No Known Problems Maternal Aunt     No Known Problems Maternal Aunt     Cataracts Neg Hx     Glaucoma Neg Hx        Review of Systems:     Positive and Negative as described in HPI. CONSTITUTIONAL:  negative for fevers, chills, sweats, fatigue, weight loss  HEENT: hoarse raspy voice and partially paralyzed vocal cord  She believes may be related to her emergency intubation with COVID  Hx Asthma uses Symbicort daily Patient has  \"albuterol but it makes me cough worse\" Also has Duoneb nebulizers she can use when needed negative for vision, hearing changes, runny nose, throat pain  RESPIRATORY:  negative for shortness of breath, cough, wheezing. CARDIOVASCULAR: Extensive Cardiac Hx Refer to HPI  Hx DVT post splenic artery repair right calf   GASTROINTESTINAL: Acid reflux takes Pepcid and Protonix  Hx HHOccasional loose to formed stools.    negative for nausea, vomiting, diarrhea, constipation, change in bowel habits, abdominal pain   GENITOURINARY:  negative for difficulty of urination, burning with urination, frequency   INTEGUMENT:  negative for rash, skin lesions, easy bruising   HEMATOLOGIC/LYMPHATIC: negative for swelling/edema   ALLERGIC/IMMUNOLOGIC:  negative for urticaria , itching  ENDOCRINE: DM since the 90's  treated currently with metformin and glimepiride Currently on Prednisone 20 mg QD preoperatively by Dr Zoraida rFanco. Sugars elevated Patent advised to contact PCP and called Dr Patty Aj negative increase in drinking, increase in urination, hot or cold intolerance  MUSCULOSKELETAL: Hx chronic sciatica and has some neuropathy  negative joint pains, muscle aches, swelling of joints  NEUROLOGICAL:  negative for headaches, dizziness, lightheadedness, numbness, tingling extremities  BEHAVIOR/PSYCH:  negative for depression, anxiety    Physical Exam:   /73   Pulse 97   Temp 98.6 °F (37 °C) (Infrared)   Resp 16   Ht 5' 6\" (1.676 m)   Wt 206 lb (93.4 kg)   LMP 04/29/2009 (Approximate)   SpO2 98%   BMI 33.25 kg/m²   Patient's last menstrual period was 04/29/2009 (approximate). [id number]  No results for input(s): POCGLU in the last 72 hours. General Appearance: obese female, alert, pleasant, cooperative, well appearing, and in no acute distress  Mental status: oriented to person, place, and time with normal affect  Head:  normocephalic, atraumatic. Eye: no icterus, redness, pupils equal and reactive, extraocular eye movements intact, conjunctiva clear  Ear: normal external ear, no discharge, hearing intact  Nose:  no drainage noted  Mouth: hoarse raspy voice mucous membranes moist  Neck: supple, no carotid bruits, thyroid not palpable  Lungs: intermittent cough Bilateral equal air entry, unlabored at rest w/o use of accessory muscles, wheezing, rales or rhonchi, normal effort  Cardiovascular: AICD left upper chest HR 97 normal rate, regular rhythm, no murmur, gallop, rub. Abdomen: Soft, round, nontender, nondistended, normal bowel sounds  Neurologic: There are no new focal motor or sensory deficits, normal muscle tone and bulk, no abnormal sensation, normal speech, cranial nerves II through XII grossly intact  Skin: No gross lesions, rashes, bruising or bleeding on exposed skin area  Extremities: Quad strength right 5/5/ left 4/5 . Left knee mildly swollen and tender.  Scar right knee consistent with previous TKA  peripheral pulses palpable, no pedal edema or calf pain with palpation  Psych: normal affect     Investigations:      Laboratory Testing:  Recent Results (from the past 24 hour(s))   CBC    Collection Time: 10/27/22  2:13 PM   Result Value Ref Range    WBC 11.0 3.5 - 11.3 k/uL    RBC 4.58 3.95 - 5.11 m/uL    Hemoglobin 13.1 11.9 - 15.1 g/dL    Hematocrit 41.3 36.3 - 47.1 %    MCV 90.2 82.6 - 102.9 fL    MCH 28.6 25.2 - 33.5 pg    MCHC 31.7 28.4 - 34.8 g/dL    RDW 13.1 11.8 - 14.4 %    Platelets 289 657 - 240 k/uL    MPV 9.5 8.1 - 13.5 fL    NRBC Automated 0.0 0.0 per 100 WBC   Basic Metabolic Panel    Collection Time: 10/27/22  2:13 PM   Result Value Ref Range    Glucose 357 (H) 70 - 99 mg/dL    BUN 18 8 - 23 mg/dL    Creatinine 0.62 0.50 - 0.90 mg/dL    Est, Glom Filt Rate >60 >60 mL/min/1.73m2    Bun/Cre Ratio 29 (H) 9 - 20    Calcium 9.6 8.6 - 10.4 mg/dL    Sodium 134 (L) 135 - 144 mmol/L    Potassium 4.5 3.7 - 5.3 mmol/L    Chloride 102 98 - 107 mmol/L    CO2 16 (L) 20 - 31 mmol/L    Anion Gap 16 9 - 17 mmol/L       Recent Labs     10/27/22  1413   HGB 13.1   HCT 41.3   WBC 11.0   MCV 90.2   *   K 4.5      CO2 16*   BUN 18   CREATININE 0.62   GLUCOSE 357*       No results for input(s): COVID19 in the last 720 hours. Imaging/Diagnostics:    [facility] DIGITAL SCREEN BILATERAL    Result Date: 10/13/2022  EXAMINATION: SCREENING DIGITAL BILATERAL MAMMOGRAM WITH TOMOSYNTHESIS, 10/13/2022 TECHNIQUE: Screening mammography was performed with tomosynthesis including MLO and CC views of the bilateral breasts. Computer aided detection was used for the interpretation of this exam. COMPARISON: September 21, 2021 September 17, 2020 HISTORY: Screening. FINDINGS: Breasts are composed of scattered fibroglandular density.   There is no dominant mass, architectural distortion or concerning grouping of microcalcification in either breast.  Pacer left chest     No mammographic evidence of malignancy BIRADS: BIRADS - CATEGORY 1 Negative. Normal interval follow-up is recommended in 12 months. OVERALL ASSESSMENT - NEGATIVE A letter of notification will be sent to the patient regarding the results. The [employer] recommends annual mammograms for women 40 years and older. Diagnosis:      Chronic ethmoidal sinusitis [J32.2]  Chronic frontal sinusitis [J32.1]  Chronic maxillary sinusitis [J32.0]  Chronic sphenoidal sinusitis [J32.3]    Plans:     1.  FESS- WITH NAVIGATION BILATERAL ETHMOIDECTOMY,MAXILLARY ANTROSTOMY,FRONTAL SINUS EXPLORATION, SPHENOIDOTOMY (CT DONE [facility] 5/23/22)        *PT HAS DEFIBRILLATOR, BILATERAL TURBINATE REDUCTION      DEREK Edwards - CNP  10/27/2022  4:38 PM

## 2022-10-27 NOTE — H&P (VIEW-ONLY)
[facility] Physical Service   [facility]    HISTORY AND PHYSICAL EXAMINATION            Date of Evaluation: 10/27/2022  Patient name:  Mireille Lai  MRN:   [id number]  YOB: 1960  PCP:    Will Man MD    History Obtained From:     Patient, medical records    History of Present Illness: This is Mireille Lai a 60 y.o. female who presents to [facility] for her upcoming scheduled FESS- WITH NAVIGATION BILATERAL ETHMOIDECTOMY, MAXILLARY ANTROSTOMY,FRONTAL SINUS EXPLORATION, SPHENOIDOTOMY (CT DONE [facility] 5/23/22) BILATERAL TURBINATE REDUCTION by Janeth Pickard MD FOR CHRONIC ETHMOIDAL SINUSITIS [J32.2] CHRONIC FRONTAL SINUSITIS [J32.1] CHRONIC MAXILLARY SINUSITIS [J32.0] CHRONIC SPHENOIDAL SINUSITIS [J32.3]  **PT HAS DEFIBRILLATOR. Hx chronic rhino sinusitis with polyposis requiring treatment with multiple courses of antibiotics. She c/o congestion with loss of smell and taste. Patient additionally has a hoarse voice and \"partially paralyzed vocal cord that I believe may be related to my emergency intubation when I had COVID\". S/p sinus surgery 2014 and since that time polyps have recurred. Patient is currently following with ENT Specialist, Dr Lazaro Lee and was last seen on 7/26/22. Per his notes a CT Scan showed \"complete opacification of most of her sinuses with  occluded ostiomeatal complexes. Nasal endoscopy confirmed presence of persistent polyps obstructing the middle meatus and superior meatus. \"  Dr Lazaro Lee discussed both continual medical treatment or surgical intervention to which she elected to have surgery. Patient denies recent fever, chills, shortness of breath, wheezing, open sores or nonhealing wounds. NOTE: Patient was started on Prednisone 20mg QD preoperatively by Dr Lazaro Lee until  surgery originally scheduled 10/24/22. It was delayed until 10/31/22 and she continued to take the prednisone daily.  The patient is a known diabetic controlled with oral medication metformin 1000mg bid and glimepiride 1mg QD. Todays nonfasting sugar was 357. 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 ECHO 4/30/20 EF 30% AICD placed 5/11/20 then required RV lead revision 5/19/20 Cardiac cath 1/20/20 had normal coronary arteries\" Patient continues to follow with Cardiology and was last seen 6/30/22 by Dr Ross Whalen at which time was reviewed and \"was within normal limits. Patient lacey not have any volume overload although the OptiVol index has shown otherwise. . will continuee to monitor. The current medical regimen is effective;  continue present plan and medications. To monitor Q6 months\". Cardiac clearance given Patient considered \"moderate risk of surgery forma cardiac standpoint    Functional Status per patient:  1) Patient is able to walk 2 blocks or more on level ground without stopping. 2) Patient is does experience some SOB when ascending stairs especially if carrying an object. 3) Patient is able to walk up a hill 1-2 blocks or more. Sleep apnea questionnaire  1) Do you snore loudly? Snores but not loudly   2) Do you often feel tired, fatigued, or sleepy? No  3) Has anyone observed you stop breathing or choking/gasping during your sleep? No  4) Do you have hypertension? Yes  5) BMI >35 kg/m2? No 33.25 kg/m2  6) Age > 48? Yes  7) Pt is a male?  No     Past Medical History:     Past Medical History:   Diagnosis Date    Acute on chronic systolic congestive heart failure ([facility] [address] 01/21/2020    \"viral heart failure\" per patient    AICD (automatic cardioverter/defibrillator) present     Asthma     Uses daily Symbicort, rarely uses rescue inhaler, managed by PCP (10/27/22)    Bilateral corneal scars     COVID-19 OR    DILATATION, ESOPHAGUS      ENDOSCOPY, COLON, DIAGNOSTIC      EYE SURGERY  1992    radial keratomy (Dr. Edison Najjar)    GASTRIC FUNDOPLICATION N/A 37/77/2439    XI LAPAROSCOPIC ROBOTIC HIATAL HERNIA REPAIR WITH MESH  NISSEN FUNDOPLICATION. EGD performed by Landry Waters DO at [address] [facility] ARTHROSCOPY Right 2004    PARTIAL SYNOVECTOMY AND CHONDROPLASTY DUE TO MENSICUS TEAR    LAPAROSCOPY N/A 09/08/2017    EXPLORATORY LAPAROSCOPY converted to open exploration with drainage of pelvic abscess performed by Wiliam Reid MD at [address]  08/19/2014    clearing an infection done at [facility] 9  10/25/2011    L4/L5 epidural steroid injection    OTHER SURGICAL HISTORY Right 2006 and 2010    synvisc injections, two sets    OTHER SURGICAL HISTORY  05/19/2020    Lead revision    [place] COLON CA SCRN NOT HI RSK IND N/A 02/27/2017    COLONOSCOPY performed by Wiliam Reid MD at [address] IND N/A 09/08/2017    COLONOSCOPY performed by Wiliam Reid MD at [address] [facility] EGD TRANSORAL BIOPSY SINGLE/MULTIPLE N/A 02/27/2017    EGD BIOPSY performed by Wiliam Reid MD at [address]  2014    Dr. Levi Morocho, [facility] 34 TAG REMOVAL      SMALL INTESTINE SURGERY N/A 08/30/2017    ATTEMPTED LAPAROCOPY PROCEDURE CONVERTED TO OPEN Sigmoid Colectomy PERFORMED BY DR. VO resection colovaginal fistula PERFORMED BY. DR. Melody Connolly  performed by Wiliam Reid MD at [address] Right 07/2011    TOTAL    TUBAL LIGATION  1989    UPPER GASTROINTESTINAL ENDOSCOPY N/A 01/02/2020    EGD BIOPSY W/ 48 HR BRAVO performed by Wiliam Reid MD at [address] N/A 03/15/2022    mild gastritis and esophagitis; wrap intact; Dr. Suazo [facility] at [address] [facility]        Medications Prior to Admission:     Prior to Admission medications    Medication Sig Start Date End Date Taking?  Authorizing Provider   loratadine (CLARITIN) 10 MG tablet Take 10 mg by mouth daily   Yes Historical Provider, MD   predniSONE (DELTASONE) 20 MG tablet Take 20 mg by mouth daily   Yes Historical Provider, MD   ipratropium-albuterol (Jenet Dura) 0.5-2.5 (3) MG/3ML SOLN nebulizer solution  4/13/22   Historical Provider, MD   spironolactone (ALDACTONE) 25 MG tablet Take 0.5 tablets by mouth daily 6/30/22   Mina Moreno MD   albuterol sulfate  (90 Base) MCG/ACT inhaler  3/2/22   Historical Provider, MD   glimepiride (AMARYL) 1 MG tablet Take 1 tablet by mouth every morning (before breakfast) 1/20/22   Mina Moreno MD   metoprolol succinate (TOPROL XL) 100 MG extended release tablet Take 1 tablet by mouth 2 times daily 1/3/22   Arun Taylor DO   sacubitril-valsartan (ENTRESTO) 24-26 MG per tablet Take 1 tablet by mouth 2 times daily 1/3/22   Arun Taylor DO   famotidine (PEPCID) 40 MG tablet Take 1 tablet by mouth every evening 6/2/21   DEREK Kirkland CNP   pantoprazole (PROTONIX) 40 MG tablet Take one tablet once daily 30-60 minutes prior to a meal 3/15/21   DEREK Kirkland CNP   budesonide-formoterol (SYMBICORT) 160-4.5 MCG/ACT AERO USE 2 INHALATIONS TWICE A DAY (RINSE MOUTH WELL AFTER USE) 1/7/21   DEREK Kirkland CNP   Cholecalciferol (VITAMIN D3) 50 MCG (2000 UT) CAPS Take 1 capsule by mouth daily    Historical Provider, MD   SYNTHROID 175 MCG tablet Take 175 mcg by mouth Daily  8/3/19   Historical Provider, MD   ipratropium-albuterol (Valarieet Dura) 0.5-2.5 (3) MG/3ML SOLN nebulizer solution Inhale 3 mLs into the lungs every 4 hours as needed for Shortness of Breath 5/6/19   DEREK Kirkland CNP   Probiotic Product (SOLUBLE FIBER/PROBIOTICS PO) Take by mouth 2 times daily    Historical Provider, MD   metFORMIN (GLUCOPHAGE) 500 MG tablet TAKE 2 TABLETS BY MOUTH TWO TIMES A DAY WITH MEALS 9/18/17   Ar Don MD        Allergies:     Dapagliflozin, Ibuprofen, Ativan [lorazepam], Codeine, Darvon [propoxyphene], and Lisinopril    Social History:     Tobacco:    reports that she has never smoked. She has never used smokeless tobacco.  Alcohol:      reports current alcohol use. Drug Use:  reports no history of drug use. Family History:     Family History   Problem Relation Age of Onset    High Blood Pressure Mother     High Cholesterol Mother     Allergies Mother     Arthritis Mother     Coronary Art Dis Mother     Diabetes Daughter         pre diabetes    Other Maternal Grandfather         PUD GI problems    Breast Cancer Maternal Aunt     No Known Problems Maternal Aunt     No Known Problems Maternal Aunt     No Known Problems Maternal Aunt     Cataracts Neg Hx     Glaucoma Neg Hx        Review of Systems:     Positive and Negative as described in HPI. CONSTITUTIONAL:  negative for fevers, chills, sweats, fatigue, weight loss  HEENT: hoarse raspy voice and partially paralyzed vocal cord  She believes may be related to her emergency intubation with COVID  Hx Asthma uses Symbicort daily Patient has  \"albuterol but it makes me cough worse\" Also has Duoneb nebulizers she can use when needed negative for vision, hearing changes, runny nose, throat pain  RESPIRATORY:  negative for shortness of breath, cough, wheezing. CARDIOVASCULAR: Extensive Cardiac Hx Refer to HPI  Hx DVT post splenic artery repair right calf   GASTROINTESTINAL: Acid reflux takes Pepcid and Protonix  Hx HHOccasional loose to formed stools.    negative for nausea, vomiting, diarrhea, constipation, change in bowel habits, abdominal pain   GENITOURINARY:  negative for difficulty of urination, burning with urination, frequency   INTEGUMENT:  negative for rash, skin lesions, easy bruising   HEMATOLOGIC/LYMPHATIC: negative for swelling/edema   ALLERGIC/IMMUNOLOGIC:  negative for urticaria , itching  ENDOCRINE: DM since the 90's  treated currently with metformin and glimepiride Currently on Prednisone 20 mg QD preoperatively by Dr Omar Lyon. Sugars elevated Patent advised to contact PCP and called Dr Kathryn Joshi negative increase in drinking, increase in urination, hot or cold intolerance  MUSCULOSKELETAL: Hx chronic sciatica and has some neuropathy  negative joint pains, muscle aches, swelling of joints  NEUROLOGICAL:  negative for headaches, dizziness, lightheadedness, numbness, tingling extremities  BEHAVIOR/PSYCH:  negative for depression, anxiety    Physical Exam:   /73   Pulse 97   Temp 98.6 °F (37 °C) (Infrared)   Resp 16   Ht 5' 6\" (1.676 m)   Wt 206 lb (93.4 kg)   LMP 04/29/2009 (Approximate)   SpO2 98%   BMI 33.25 kg/m²   Patient's last menstrual period was 04/29/2009 (approximate). [id number]  No results for input(s): POCGLU in the last 72 hours. General Appearance: obese female, alert, pleasant, cooperative, well appearing, and in no acute distress  Mental status: oriented to person, place, and time with normal affect  Head:  normocephalic, atraumatic. Eye: no icterus, redness, pupils equal and reactive, extraocular eye movements intact, conjunctiva clear  Ear: normal external ear, no discharge, hearing intact  Nose:  no drainage noted  Mouth: hoarse raspy voice mucous membranes moist  Neck: supple, no carotid bruits, thyroid not palpable  Lungs: intermittent cough Bilateral equal air entry, unlabored at rest w/o use of accessory muscles, wheezing, rales or rhonchi, normal effort  Cardiovascular: AICD left upper chest HR 97 normal rate, regular rhythm, no murmur, gallop, rub. Abdomen: Soft, round, nontender, nondistended, normal bowel sounds  Neurologic: There are no new focal motor or sensory deficits, normal muscle tone and bulk, no abnormal sensation, normal speech, cranial nerves II through XII grossly intact  Skin: No gross lesions, rashes, bruising or bleeding on exposed skin area  Extremities: Quad strength right 5/5/ left 4/5 . Left knee mildly swollen and tender.  Scar right knee consistent with previous TKA  peripheral pulses palpable, no pedal edema or calf pain with palpation  Psych: normal affect     Investigations:      Laboratory Testing:  Recent Results (from the past 24 hour(s))   CBC    Collection Time: 10/27/22  2:13 PM   Result Value Ref Range    WBC 11.0 3.5 - 11.3 k/uL    RBC 4.58 3.95 - 5.11 m/uL    Hemoglobin 13.1 11.9 - 15.1 g/dL    Hematocrit 41.3 36.3 - 47.1 %    MCV 90.2 82.6 - 102.9 fL    MCH 28.6 25.2 - 33.5 pg    MCHC 31.7 28.4 - 34.8 g/dL    RDW 13.1 11.8 - 14.4 %    Platelets 954 966 - 140 k/uL    MPV 9.5 8.1 - 13.5 fL    NRBC Automated 0.0 0.0 per 100 WBC   Basic Metabolic Panel    Collection Time: 10/27/22  2:13 PM   Result Value Ref Range    Glucose 357 (H) 70 - 99 mg/dL    BUN 18 8 - 23 mg/dL    Creatinine 0.62 0.50 - 0.90 mg/dL    Est, Glom Filt Rate >60 >60 mL/min/1.73m2    Bun/Cre Ratio 29 (H) 9 - 20    Calcium 9.6 8.6 - 10.4 mg/dL    Sodium 134 (L) 135 - 144 mmol/L    Potassium 4.5 3.7 - 5.3 mmol/L    Chloride 102 98 - 107 mmol/L    CO2 16 (L) 20 - 31 mmol/L    Anion Gap 16 9 - 17 mmol/L       Recent Labs     10/27/22  1413   HGB 13.1   HCT 41.3   WBC 11.0   MCV 90.2   *   K 4.5      CO2 16*   BUN 18   CREATININE 0.62   GLUCOSE 357*       No results for input(s): COVID19 in the last 720 hours. Imaging/Diagnostics:    [facility] DIGITAL SCREEN BILATERAL    Result Date: 10/13/2022  EXAMINATION: SCREENING DIGITAL BILATERAL MAMMOGRAM WITH TOMOSYNTHESIS, 10/13/2022 TECHNIQUE: Screening mammography was performed with tomosynthesis including MLO and CC views of the bilateral breasts. Computer aided detection was used for the interpretation of this exam. COMPARISON: September 21, 2021 September 17, 2020 HISTORY: Screening. FINDINGS: Breasts are composed of scattered fibroglandular density.   There is no dominant mass, architectural distortion or concerning grouping of microcalcification in either breast.  Pacer left chest     No mammographic evidence of malignancy BIRADS: BIRADS - CATEGORY 1 Negative. Normal interval follow-up is recommended in 12 months. OVERALL ASSESSMENT - NEGATIVE A letter of notification will be sent to the patient regarding the results. [facility] recommends annual mammograms for women 40 years and older. Diagnosis:      Chronic ethmoidal sinusitis [J32.2]  Chronic frontal sinusitis [J32.1]  Chronic maxillary sinusitis [J32.0]  Chronic sphenoidal sinusitis [J32.3]    Plans:     1.  FESS- WITH NAVIGATION BILATERAL ETHMOIDECTOMY,MAXILLARY ANTROSTOMY,FRONTAL SINUS EXPLORATION, SPHENOIDOTOMY (CT DONE [facility] 5/23/22)        *PT HAS DEFIBRILLATOR, BILATERAL TURBINATE REDUCTION      DEREK Paulson - CNP  10/27/2022  4:38 PM

## 2022-10-28 ENCOUNTER — ANESTHESIA EVENT (OUTPATIENT)
Dept: OPERATING ROOM | Age: 62
End: 2022-10-28
Payer: COMMERCIAL

## 2022-10-31 ENCOUNTER — ANESTHESIA (OUTPATIENT)
Dept: OPERATING ROOM | Age: 62
End: 2022-10-31
Payer: COMMERCIAL

## 2022-10-31 ENCOUNTER — HOSPITAL ENCOUNTER (OUTPATIENT)
Age: 62
Setting detail: OUTPATIENT SURGERY
Discharge: HOME | End: 2022-10-31
Attending: OTOLARYNGOLOGY | Admitting: OTOLARYNGOLOGY
Payer: COMMERCIAL

## 2022-10-31 VITALS
SYSTOLIC BLOOD PRESSURE: 155 MMHG | HEIGHT: 66 IN | WEIGHT: 206 LBS | DIASTOLIC BLOOD PRESSURE: 73 MMHG | RESPIRATION RATE: 11 BRPM | BODY MASS INDEX: 33.11 KG/M2 | TEMPERATURE: 97.2 F | HEART RATE: 70 BPM | OXYGEN SATURATION: 99 %

## 2022-10-31 DIAGNOSIS — G89.18 POST-OPERATIVE PAIN: Primary | ICD-10-CM

## 2022-10-31 DIAGNOSIS — J32.3 CHRONIC SPHENOIDAL SINUSITIS: ICD-10-CM

## 2022-10-31 DIAGNOSIS — J32.1 CHRONIC FRONTAL SINUSITIS: ICD-10-CM

## 2022-10-31 DIAGNOSIS — J32.0 CHRONIC MAXILLARY SINUSITIS: ICD-10-CM

## 2022-10-31 DIAGNOSIS — J32.2 CHRONIC ETHMOIDAL SINUSITIS: ICD-10-CM

## 2022-10-31 LAB
CULTURE: NORMAL
CULTURE: NORMAL
GLUCOSE BLD-MCNC: 141 MG/DL (ref 65–105)
SPECIMEN DESCRIPTION: NORMAL
SPECIMEN DESCRIPTION: NORMAL

## 2022-10-31 PROCEDURE — 6370000000 HC RX 637 (ALT 250 FOR IP): Performed by: OTOLARYNGOLOGY

## 2022-10-31 PROCEDURE — 6360000002 HC RX W HCPCS: Performed by: STUDENT IN AN ORGANIZED HEALTH CARE EDUCATION/TRAINING PROGRAM

## 2022-10-31 PROCEDURE — 2580000003 HC RX 258: Performed by: ANESTHESIOLOGY

## 2022-10-31 PROCEDURE — 87070 CULTURE OTHR SPECIMN AEROBIC: CPT

## 2022-10-31 PROCEDURE — 3600000002 HC SURGERY LEVEL 2 BASE: Performed by: OTOLARYNGOLOGY

## 2022-10-31 PROCEDURE — 2720000010 HC SURG SUPPLY STERILE: Performed by: OTOLARYNGOLOGY

## 2022-10-31 PROCEDURE — 6370000000 HC RX 637 (ALT 250 FOR IP): Performed by: STUDENT IN AN ORGANIZED HEALTH CARE EDUCATION/TRAINING PROGRAM

## 2022-10-31 PROCEDURE — 82947 ASSAY GLUCOSE BLOOD QUANT: CPT

## 2022-10-31 PROCEDURE — 7100000000 HC PACU RECOVERY - FIRST 15 MIN: Performed by: OTOLARYNGOLOGY

## 2022-10-31 PROCEDURE — 87075 CULTR BACTERIA EXCEPT BLOOD: CPT

## 2022-10-31 PROCEDURE — 6360000002 HC RX W HCPCS: Performed by: NURSE ANESTHETIST, CERTIFIED REGISTERED

## 2022-10-31 PROCEDURE — 88305 TISSUE EXAM BY PATHOLOGIST: CPT

## 2022-10-31 PROCEDURE — 3600000012 HC SURGERY LEVEL 2 ADDTL 15MIN: Performed by: OTOLARYNGOLOGY

## 2022-10-31 PROCEDURE — 7100000010 HC PHASE II RECOVERY - FIRST 15 MIN: Performed by: OTOLARYNGOLOGY

## 2022-10-31 PROCEDURE — C1713 ANCHOR/SCREW BN/BN,TIS/BN: HCPCS | Performed by: OTOLARYNGOLOGY

## 2022-10-31 PROCEDURE — 7100000011 HC PHASE II RECOVERY - ADDTL 15 MIN: Performed by: OTOLARYNGOLOGY

## 2022-10-31 PROCEDURE — C2625 STENT, NON-COR, TEM W/DEL SY: HCPCS | Performed by: OTOLARYNGOLOGY

## 2022-10-31 PROCEDURE — 3700000001 HC ADD 15 MINUTES (ANESTHESIA): Performed by: OTOLARYNGOLOGY

## 2022-10-31 PROCEDURE — 3700000000 HC ANESTHESIA ATTENDED CARE: Performed by: OTOLARYNGOLOGY

## 2022-10-31 PROCEDURE — 88311 DECALCIFY TISSUE: CPT

## 2022-10-31 PROCEDURE — 6360000002 HC RX W HCPCS: Performed by: OTOLARYNGOLOGY

## 2022-10-31 PROCEDURE — 2500000003 HC RX 250 WO HCPCS: Performed by: OTOLARYNGOLOGY

## 2022-10-31 PROCEDURE — 7100000001 HC PACU RECOVERY - ADDTL 15 MIN: Performed by: OTOLARYNGOLOGY

## 2022-10-31 PROCEDURE — 2709999900 HC NON-CHARGEABLE SUPPLY: Performed by: OTOLARYNGOLOGY

## 2022-10-31 PROCEDURE — 87205 SMEAR GRAM STAIN: CPT

## 2022-10-31 PROCEDURE — 2500000003 HC RX 250 WO HCPCS: Performed by: NURSE ANESTHETIST, CERTIFIED REGISTERED

## 2022-10-31 DEVICE — PROPEL CONTOUR SINUS IMPLANT
Type: IMPLANTABLE DEVICE | Site: NOSE | Status: FUNCTIONAL
Brand: PROPEL CONTOUR

## 2022-10-31 RX ORDER — SODIUM CHLORIDE 9 MG/ML
INJECTION, SOLUTION INTRAVENOUS PRN
Status: DISCONTINUED | OUTPATIENT
Start: 2022-10-31 | End: 2022-11-01 | Stop reason: HOSPADM

## 2022-10-31 RX ORDER — DEXAMETHASONE SODIUM PHOSPHATE 10 MG/ML
INJECTION, SOLUTION INTRAMUSCULAR; INTRAVENOUS PRN
Status: DISCONTINUED | OUTPATIENT
Start: 2022-10-31 | End: 2022-10-31 | Stop reason: SDUPTHER

## 2022-10-31 RX ORDER — ROCURONIUM BROMIDE 10 MG/ML
INJECTION, SOLUTION INTRAVENOUS PRN
Status: DISCONTINUED | OUTPATIENT
Start: 2022-10-31 | End: 2022-10-31 | Stop reason: SDUPTHER

## 2022-10-31 RX ORDER — SPIRONOLACTONE 25 MG/1
12.5 TABLET ORAL DAILY
Qty: 45 TABLET | Refills: 2 | Status: SHIPPED | OUTPATIENT
Start: 2022-10-31

## 2022-10-31 RX ORDER — LABETALOL HYDROCHLORIDE 5 MG/ML
10 INJECTION, SOLUTION INTRAVENOUS
Status: DISCONTINUED | OUTPATIENT
Start: 2022-10-31 | End: 2022-11-01 | Stop reason: HOSPADM

## 2022-10-31 RX ORDER — SODIUM CHLORIDE 0.9 % (FLUSH) 0.9 %
5-40 SYRINGE (ML) INJECTION EVERY 12 HOURS SCHEDULED
Status: DISCONTINUED | OUTPATIENT
Start: 2022-10-31 | End: 2022-11-01 | Stop reason: HOSPADM

## 2022-10-31 RX ORDER — SODIUM CHLORIDE, SODIUM LACTATE, POTASSIUM CHLORIDE, CALCIUM CHLORIDE 600; 310; 30; 20 MG/100ML; MG/100ML; MG/100ML; MG/100ML
INJECTION, SOLUTION INTRAVENOUS CONTINUOUS
Status: DISCONTINUED | OUTPATIENT
Start: 2022-11-01 | End: 2022-11-01 | Stop reason: HOSPADM

## 2022-10-31 RX ORDER — OXYMETAZOLINE HYDROCHLORIDE 0.05 G/100ML
SPRAY NASAL PRN
Status: DISCONTINUED | OUTPATIENT
Start: 2022-10-31 | End: 2022-10-31 | Stop reason: ALTCHOICE

## 2022-10-31 RX ORDER — SODIUM CHLORIDE 9 MG/ML
INJECTION, SOLUTION INTRAVENOUS CONTINUOUS
Status: DISCONTINUED | OUTPATIENT
Start: 2022-11-01 | End: 2022-10-31

## 2022-10-31 RX ORDER — OXYCODONE HYDROCHLORIDE 5 MG/1
5 TABLET ORAL
Status: COMPLETED | OUTPATIENT
Start: 2022-10-31 | End: 2022-10-31

## 2022-10-31 RX ORDER — LIDOCAINE HYDROCHLORIDE 10 MG/ML
1 INJECTION, SOLUTION EPIDURAL; INFILTRATION; INTRACAUDAL; PERINEURAL
Status: DISCONTINUED | OUTPATIENT
Start: 2022-11-01 | End: 2022-11-01 | Stop reason: HOSPADM

## 2022-10-31 RX ORDER — LIDOCAINE HYDROCHLORIDE AND EPINEPHRINE 10; 10 MG/ML; UG/ML
INJECTION, SOLUTION INFILTRATION; PERINEURAL PRN
Status: DISCONTINUED | OUTPATIENT
Start: 2022-10-31 | End: 2022-10-31 | Stop reason: ALTCHOICE

## 2022-10-31 RX ORDER — ONDANSETRON 2 MG/ML
4 INJECTION INTRAMUSCULAR; INTRAVENOUS
Status: COMPLETED | OUTPATIENT
Start: 2022-10-31 | End: 2022-10-31

## 2022-10-31 RX ORDER — PROCHLORPERAZINE EDISYLATE 5 MG/ML
5 INJECTION INTRAMUSCULAR; INTRAVENOUS
Status: DISCONTINUED | OUTPATIENT
Start: 2022-10-31 | End: 2022-11-01 | Stop reason: HOSPADM

## 2022-10-31 RX ORDER — FENTANYL CITRATE 50 UG/ML
25 INJECTION, SOLUTION INTRAMUSCULAR; INTRAVENOUS EVERY 5 MIN PRN
Status: DISCONTINUED | OUTPATIENT
Start: 2022-10-31 | End: 2022-11-01 | Stop reason: HOSPADM

## 2022-10-31 RX ORDER — SODIUM CHLORIDE 0.9 % (FLUSH) 0.9 %
5-40 SYRINGE (ML) INJECTION PRN
Status: DISCONTINUED | OUTPATIENT
Start: 2022-10-31 | End: 2022-11-01 | Stop reason: HOSPADM

## 2022-10-31 RX ORDER — FENTANYL CITRATE 50 UG/ML
INJECTION, SOLUTION INTRAMUSCULAR; INTRAVENOUS PRN
Status: DISCONTINUED | OUTPATIENT
Start: 2022-10-31 | End: 2022-10-31 | Stop reason: SDUPTHER

## 2022-10-31 RX ORDER — LIDOCAINE HYDROCHLORIDE 20 MG/ML
INJECTION, SOLUTION EPIDURAL; INFILTRATION; INTRACAUDAL; PERINEURAL PRN
Status: DISCONTINUED | OUTPATIENT
Start: 2022-10-31 | End: 2022-10-31 | Stop reason: SDUPTHER

## 2022-10-31 RX ORDER — HYDROCODONE BITARTRATE AND ACETAMINOPHEN 5; 325 MG/1; MG/1
2 TABLET ORAL EVERY 6 HOURS PRN
Qty: 45 TABLET | Refills: 0 | Status: SHIPPED | OUTPATIENT
Start: 2022-10-31 | End: 2022-11-08

## 2022-10-31 RX ORDER — ONDANSETRON 2 MG/ML
INJECTION INTRAMUSCULAR; INTRAVENOUS PRN
Status: DISCONTINUED | OUTPATIENT
Start: 2022-10-31 | End: 2022-10-31 | Stop reason: SDUPTHER

## 2022-10-31 RX ORDER — OXYMETAZOLINE HYDROCHLORIDE 0.05 G/100ML
2 SPRAY NASAL ONCE
Status: COMPLETED | OUTPATIENT
Start: 2022-10-31 | End: 2022-10-31

## 2022-10-31 RX ORDER — HYDROMORPHONE HYDROCHLORIDE 1 MG/ML
0.5 INJECTION, SOLUTION INTRAMUSCULAR; INTRAVENOUS; SUBCUTANEOUS EVERY 5 MIN PRN
Status: DISCONTINUED | OUTPATIENT
Start: 2022-10-31 | End: 2022-11-01 | Stop reason: HOSPADM

## 2022-10-31 RX ORDER — PROPOFOL 10 MG/ML
INJECTION, EMULSION INTRAVENOUS PRN
Status: DISCONTINUED | OUTPATIENT
Start: 2022-10-31 | End: 2022-10-31 | Stop reason: SDUPTHER

## 2022-10-31 RX ORDER — CEPHALEXIN 500 MG/1
500 CAPSULE ORAL 4 TIMES DAILY
Qty: 21 CAPSULE | Refills: 0 | Status: SHIPPED | OUTPATIENT
Start: 2022-10-31 | End: 2022-11-07

## 2022-10-31 RX ADMIN — LIDOCAINE HYDROCHLORIDE 75 MG: 20 INJECTION, SOLUTION EPIDURAL; INFILTRATION; INTRACAUDAL; PERINEURAL at 12:34

## 2022-10-31 RX ADMIN — DEXAMETHASONE SODIUM PHOSPHATE 10 MG: 10 INJECTION, SOLUTION INTRAMUSCULAR; INTRAVENOUS at 12:43

## 2022-10-31 RX ADMIN — CEFAZOLIN 2000 MG: 10 INJECTION, POWDER, FOR SOLUTION INTRAVENOUS at 12:27

## 2022-10-31 RX ADMIN — ONDANSETRON 4 MG: 2 INJECTION INTRAMUSCULAR; INTRAVENOUS at 13:44

## 2022-10-31 RX ADMIN — Medication 100 MCG: at 12:34

## 2022-10-31 RX ADMIN — SODIUM CHLORIDE, POTASSIUM CHLORIDE, SODIUM LACTATE AND CALCIUM CHLORIDE: 600; 310; 30; 20 INJECTION, SOLUTION INTRAVENOUS at 13:50

## 2022-10-31 RX ADMIN — HYDROMORPHONE HYDROCHLORIDE 0.5 MG: 1 INJECTION, SOLUTION INTRAMUSCULAR; INTRAVENOUS; SUBCUTANEOUS at 15:08

## 2022-10-31 RX ADMIN — SODIUM CHLORIDE, POTASSIUM CHLORIDE, SODIUM LACTATE AND CALCIUM CHLORIDE: 600; 310; 30; 20 INJECTION, SOLUTION INTRAVENOUS at 11:22

## 2022-10-31 RX ADMIN — ROCURONIUM BROMIDE 50 MG: 10 INJECTION, SOLUTION INTRAVENOUS at 12:34

## 2022-10-31 RX ADMIN — SUGAMMADEX 200 MG: 100 INJECTION, SOLUTION INTRAVENOUS at 14:00

## 2022-10-31 RX ADMIN — PROPOFOL 175 MG: 10 INJECTION, EMULSION INTRAVENOUS at 12:34

## 2022-10-31 RX ADMIN — ONDANSETRON 4 MG: 2 INJECTION INTRAMUSCULAR; INTRAVENOUS at 17:02

## 2022-10-31 RX ADMIN — OXYCODONE 5 MG: 5 TABLET ORAL at 16:10

## 2022-10-31 RX ADMIN — OXYMETAZOLINE HYDROCHLORIDE 2 SPRAY: 0.05 SPRAY NASAL at 11:43

## 2022-10-31 ASSESSMENT — PAIN SCALES - GENERAL
PAINLEVEL_OUTOF10: 8
PAINLEVEL_OUTOF10: 0
PAINLEVEL_OUTOF10: 5

## 2022-10-31 ASSESSMENT — PAIN DESCRIPTION - ORIENTATION
ORIENTATION: ANTERIOR
ORIENTATION: ANTERIOR

## 2022-10-31 ASSESSMENT — PAIN DESCRIPTION - LOCATION
LOCATION: FACE;THROAT
LOCATION: THROAT

## 2022-10-31 ASSESSMENT — PAIN DESCRIPTION - DESCRIPTORS: DESCRIPTORS: ACHING;BURNING

## 2022-10-31 NOTE — ANESTHESIA POSTPROCEDURE EVALUATION
Department of Anesthesiology  Postprocedure Note    Patient: Senia Wu  MRN: [id number]  YOB: 1960  Date of evaluation: 10/31/2022      Procedure Summary     Date: 10/31/22 Room / Location: [address]    Anesthesia Start: 1227 Anesthesia Stop: 1417    Procedures:       FESS- WITH NAVIGATION BILATERAL ETHMOIDECTOMY,MAXILLARY ANTROSTOMY,FRONTAL SINUS EXPLORATION, S[facility]OIDOTOMY [facility]        *PT HAS DEFIBRILLATOR (Bilateral: Mouth)      BILATERAL TURBINATE REDUCTION (Bilateral: Mouth) Diagnosis:       Chronic ethmoidal sinusitis      Chronic frontal sinusitis      Chronic maxillary sinusitis      Chronic sphenoidal sinusitis      (Chronic ethmoidal sinusitis [J32.2])      (Chronic frontal sinusitis [J32.1])      (Chronic maxillary sinusitis [J32.0])      (Chronic sphenoidal sinusitis [J32.3])    Surgeons: Lulu Rojas MD Responsible Provider: Gabi Alarcon MD    Anesthesia Type: general ASA Status: 3          Anesthesia Type: No value filed.     Radha Phase I: Radha Score: 5    Radha Phase II:        Anesthesia Post Evaluation    Patient location during evaluation: PACU  Patient participation: complete - patient participated  Level of consciousness: awake  Airway patency: patent  Nausea & Vomiting: no nausea and no vomiting  Complications: no  Cardiovascular status: hemodynamically stable  Respiratory status: acceptable  Hydration status: stable  Multimodal analgesia pain management approach

## 2022-10-31 NOTE — ANESTHESIA PRE PROCEDURE
Department of Anesthesiology  Preprocedure Note       Name:  Bala Waggoner   Age:  60 y.o.  :  1960                                          MRN:  [id number]         Date:  10/31/2022      Surgeon: Asad Gallagher):  Sandeep Canada MD    Procedure: Procedure(s): FESS- WITH NAVIGATION BILATERAL ETHMOIDECTOMY,MAXILLARY ANTROSTOMY,FRONTAL SINUS EXPLORATION, SPHENOIDOTOMY (CT DONE [facility])        *PT HAS DEFIBRILLATOR  BILATERAL TURBINATE REDUCTION    Medications prior to admission:   Prior to Admission medications    Medication Sig Start Date End Date Taking?  Authorizing Provider   spironolactone (ALDACTONE) 25 MG tablet Take 0.5 tablets by mouth daily 10/31/22   Arun Taylor DO   loratadine (CLARITIN) 10 MG tablet Take 10 mg by mouth daily    Historical Provider, MD   predniSONE (DELTASONE) 20 MG tablet Take 20 mg by mouth daily    Historical Provider, MD   ipratropium-albuterol ([employer]) 0.5-2.5 (3) MG/3ML SOLN nebulizer solution  22   Historical Provider, MD   albuterol sulfate  (90 Base) MCG/ACT inhaler  3/2/22   Historical Provider, MD   glimepiride (AMARYL) 1 MG tablet Take 1 tablet by mouth every morning (before breakfast) 22   Kaitlyn Anglin MD   metoprolol succinate (TOPROL XL) 100 MG extended release tablet Take 1 tablet by mouth 2 times daily 1/3/22   Arun Taylor DO   sacubitril-valsartan (ENTRESTO) 24-26 MG per tablet Take 1 tablet by mouth 2 times daily 1/3/22   Arun Taylor DO   famotidine (PEPCID) 40 MG tablet Take 1 tablet by mouth every evening 21   DEREK Guzmán CNP   pantoprazole (PROTONIX) 40 MG tablet Take one tablet once daily 30-60 minutes prior to a meal 3/15/21   DEREK Guzmán CNP   budesonide-formoterol (SYMBICORT) 160-4.5 MCG/ACT AERO USE 2 INHALATIONS TWICE A DAY (RINSE MOUTH WELL AFTER USE) 21   DEREK Guzmán CNP   Cholecalciferol (VITAMIN D3) 50 MCG ( UT) CAPS Take 1 capsule by mouth daily    Historical Provider, MD   SYNTHROID 175 MCG tablet Take 175 mcg by mouth Daily  8/3/19   Historical Provider, MD   ipratropium-albuterol (DUONEB) 0.5-2.5 (3) MG/3ML SOLN nebulizer solution Inhale 3 mLs into the lungs every 4 hours as needed for Shortness of Breath 5/6/19   DEREK Emery - CNP   Probiotic Product (SOLUBLE FIBER/PROBIOTICS PO) Take by mouth 2 times daily    Historical Provider, MD   metFORMIN (GLUCOPHAGE) 500 MG tablet TAKE 2 TABLETS BY MOUTH TWO TIMES A DAY WITH MEALS 9/18/17   Roland Bond MD       Current medications:    Current Facility-Administered Medications   Medication Dose Route Frequency Provider Last Rate Last Admin    [START ON 11/1/2022] lidocaine PF 1 % injection 1 mL  1 mL IntraDERmal Once PRN Derral Square, DO        [START ON 11/1/2022] lactated ringers infusion   IntraVENous Continuous Derral Square,  mL/hr at 10/31/22 1122 New Bag at 10/31/22 1122    sodium chloride flush 0.9 % injection 5-40 mL  5-40 mL IntraVENous 2 times per day Carson Crenshaw,         sodium chloride flush 0.9 % injection 5-40 mL  5-40 mL IntraVENous PRN Derral Square, DO        0.9 % sodium chloride infusion   IntraVENous PRN Derral Square, DO        ceFAZolin (ANCEF) 2000 mg in dextrose 5 % 50 mL IVPB  2,000 mg IntraVENous Once Janeth Pickard MD           Allergies:     Allergies   Allergen Reactions    Dapagliflozin      Yeast infection     Ibuprofen      Other reaction(s): Unknown    Ativan [Lorazepam] Other (See Comments)     agitation    Codeine Nausea And Vomiting and Other (See Comments)     Chest tightness    Darvon [Propoxyphene] Nausea And Vomiting and Other (See Comments)     Chest tightness    Lisinopril Other (See Comments)     COUGH       Problem List:    Patient Active Problem List   Diagnosis Code    Hiatal hernia with GERD K44.9, K21.9    Hypothyroidism E03.9    Essential hypertension I10    Hyperlipidemia E78.5    DJD (degenerative joint disease), lumbar M47.816    Acute diverticulitis of intestine K57.92    Hyperplastic colon polyp K63.5    Tinnitus H93.19    Sciatica of right side M54.31    Splenic artery aneurysm (HCC) I72.8    Presence of endovascular aneurysm coil compatible with safe magnetic resonance imaging O02.400    DVT (deep venous thrombosis) (HCC) I82.409    LGSIL (low grade squamous intraepithelial dysplasia) [id number]    Sinusitis, chronic J32.9    Asthma J45.909    Non-celiac gluten sensitivity K90.41    Severe persistent asthma J45.50    Varicose veins of bilateral lower extremities with pain I83.813    Bowel perforation ([facility]) K63.1    Diverticulitis of large intestine with abscess without bleeding K57.20    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    Partial small bowel obstruction (HCC) K56.600    Diabetes type 2, no ocular involvement ([facility]) E11.9    History of radial keratotomy Z98.890    Combined forms of age-related cataract of both eyes H25.813    Respiratory distress R06.03    Exertional dyspnea R06.09    Thrombophlebitis of superficial veins of right lower extremity I80.01    Nonischemic cardiomyopathy (HCC) I42.8    Acute on chronic systolic congestive heart failure ([facility]) I50.23    Acute respiratory failure ([facility]) J96.00    Acute pulmonary edema ([facility]) J81.0    S/P repair of paraesophageal hernia Z98.890, Z87.19       Past Medical History:        Diagnosis Date    Acute on chronic systolic congestive heart failure ([facility] [address] 01/21/2020    \"viral heart failure\" per patient    AICD (automatic cardioverter/defibrillator) present     Asthma     Uses daily Symbicort, rarely uses rescue inhaler, managed by PCP (10/27/22)    Bilateral corneal scars     COVID-19 11/10/2020    Disease of blood and blood forming organ     Diverticulitis     ON COLONSCOPY    DJD (degenerative joint disease), lumbar     Dry eye syndrome     DVT (deep venous thrombosis) ([facility] [address]     after splenic artery repair in right calf    Gastro - esophageal reflux disease     Hiatal hernia     History of blood transfusion 1982    History of echocardiogram 2020    Hyperlipidemia     HIGH CHOLESTEROL/HIGH TRIGLYCERIDES    Hyperplastic colon polyp     Hypertension     PCP DR Michael Samaniego, [facility]    Hypothyroidism     LGSIL (low grade squamous intraepithelial dysplasia) 2014    referred to GYN/S/P colpo with bx LGSIL, repeat pap in 6 months    Meningioma ([facility] 2022    Non-celiac gluten sensitivity     wheezing is directly proportionate to wheat intake    NSTEMI (non-ST elevated myocardial infarction) ([facility] [address] [facility] 2020    DR Giovanna Wilde    Osteoarthritis     Rivas. knees    Other disorders of kidney and ureter in diseases classified elsewhere     Sciatica of right side     Seborrheic dermatitis     Splenic artery aneurysm (HCC)     s/p splenic artery repair    Tinnitus     Type 2 diabetes mellitus, controlled ([facility] [facility] 2011    PCP DR Michael Samaniego    Vocal cord paralysis 2022    Right vocal cord       Past Surgical History:        Procedure Laterality Date    ABDOMEN SURGERY      ARTERIAL ANEURYSM REPAIR      splenic artery coil repair ([facility], Dr. Tory Mariscal)   [address]  2020    No stents    CARDIAC DEFIBRILLATOR PLACEMENT Left 2020    [employer]     SECTION  1986    COLONOSCOPY  2012    HYPERPLASTIC POLYP, DIVERTICULAR DISEASE    COLONOSCOPY  2017    moderate sigmoid diverticulosis. Dr. Mariola Palmer COLONOSCOPY N/A 03/15/2022    moderate sigmoid diverticulosis otherwise normal; Dr[address] Azam Paige [address]  [address]    with [address] Bilateral 2017    CYSTO insertion lighted stents performed by Madolyn Sandhoff, MD at [address], ESOPHAGUS      ENDOSCOPY, COLON, DIAGNOSTIC      EYE SURGERY      radial keratomy (Dr. Bambi Cespedes)    GASTRIC FUNDOPLICATION N/A     XI LAPAROSCOPIC ROBOTIC HIATAL HERNIA REPAIR WITH MESH  NISSEN FUNDOPLICATION.  EGD performed by Sam Arceo DO at [address] [facility] ARTHROSCOPY Right 2004    PARTIAL SYNOVECTOMY AND CHONDROPLASTY DUE TO MENSICUS TEAR    LAPAROSCOPY N/A 09/08/2017    EXPLORATORY LAPAROSCOPY converted to open exploration with drainage of pelvic abscess performed by Bertram Mitchell MD at [facility]  08/19/2014    clearing an infection done at [facility]  10/25/2011    L4/L5 epidural steroid injection    OTHER SURGICAL HISTORY Right 2006 and 2010    synvisc injections, two sets    OTHER SURGICAL HISTORY  05/19/2020    Lead revision    MA COLON CA SCRN NOT HI RSK IND N/A 02/27/2017    COLONOSCOPY performed by Bertram Mitchell MD at [address] IND N/A 09/08/2017    COLONOSCOPY performed by Bertram Mitchell MD at [address] [facility] EGD TRANSORAL BIOPSY SINGLE/MULTIPLE N/A 02/27/2017    EGD BIOPSY performed by Bertram Mitchell MD at [place] [address]  2014    Dr. Jose Dhillon, [place] SKIN TAG REMOVAL      SMALL INTESTINE SURGERY N/A 08/30/2017    ATTEMPTED LAPAROCOPY PROCEDURE CONVERTED TO OPEN Sigmoid Colectomy PERFORMED BY DR. VO resection colovaginal fistula PERFORMED BY. DR. Marisa Talbot  performed by Bertram Mitchell MD at [place] [address] Right 07/2011    TOTAL    TUBAL LIGATION  1989    UPPER GASTROINTESTINAL ENDOSCOPY N/A 01/02/2020    EGD BIOPSY W/ 48 HR LEARY performed by Bertram Mitchell MD at [facility] N/A 03/15/2022    mild gastritis and esophagitis; wrap intact; Dr. Soham Rausch at [address]  1990       Social History:    Social History     Tobacco Use    Smoking status: Never    Smokeless tobacco: Never    Tobacco comments:     never smoked [facility] 10/13/2017   Substance Use Topics    Alcohol use: Yes     Comment: Consume about 4 wine coolers a year                                Counseling given: Not Answered  Tobacco comments: never smoked syant rrt 10/13/2017      Vital Signs (Current):   Vitals:    10/31/22 1034   BP: (!) 145/87   Pulse: 79   Resp: 20   Temp: 98 °F (36.7 °C)   SpO2: 99%   Weight: 206 lb (93.4 kg)   Height: 5' 6\" (1.676 m)                                              BP Readings from Last 3 Encounters:   10/31/22 (!) 145/87   10/27/22 135/73   09/14/22 106/80       NPO Status: Time of last liquid consumption: 2130                        Time of last solid consumption: 2130                        Date of last liquid consumption: 10/30/22                        Date of last solid food consumption: 10/30/22    BMI:   Wt Readings from Last 3 Encounters:   10/31/22 206 lb (93.4 kg)   10/27/22 206 lb (93.4 kg)   09/14/22 203 lb (92.1 kg)     Body mass index is 33.25 kg/m².     CBC:   Lab Results   Component Value Date/Time    WBC 11.0 10/27/2022 02:13 PM    RBC 4.58 10/27/2022 02:13 PM    HGB 13.1 10/27/2022 02:13 PM    HCT 41.3 10/27/2022 02:13 PM    MCV 90.2 10/27/2022 02:13 PM    RDW 13.1 10/27/2022 02:13 PM     10/27/2022 02:13 PM       CMP:   Lab Results   Component Value Date/Time     10/27/2022 02:13 PM    K 4.5 10/27/2022 02:13 PM     10/27/2022 02:13 PM    CO2 16 10/27/2022 02:13 PM    BUN 18 10/27/2022 02:13 PM    CREATININE 0.62 10/27/2022 02:13 PM    GFRAA >60 07/15/2022 07:59 AM    LABGLOM >60 10/27/2022 02:13 PM    GLUCOSE 357 10/27/2022 02:13 PM    PROT 6.6 07/15/2022 07:59 AM    CALCIUM 9.6 10/27/2022 02:13 PM    BILITOT 0.46 07/15/2022 07:59 AM    ALKPHOS 73 07/15/2022 07:59 AM    AST 19 07/15/2022 07:59 AM    ALT 26 07/15/2022 07:59 AM       POC Tests:   Recent Labs     10/31/22  1043   POCGLU 141*       Coags:   Lab Results   Component Value Date/Time    PROTIME 9.3 01/26/2021 12:54 PM    INR 0.9 01/26/2021 12:54 PM    APTT 22.4 01/16/2020 08:45 AM       HCG (If Applicable): No results found for: PREGTESTUR, PREGSERUM, HCG, HCGQUANT     ABGs: No results found for: PHART, PO2ART, IPG7XBU, TWK9DUN, BEART, F6MTYILA     Type & Screen (If Applicable):  No results found for: LABABO, LABRH    Drug/Infectious Status (If Applicable):  No results found for: HIV, HEPCAB    COVID-19 Screening (If Applicable):   Lab Results   Component Value Date/Time    COVID19 Not Detected 09/14/2022 06:03 PM    COVID19 Negative 09/14/2022 05:56 PM    COVID19 Detected 11/10/2020 07:45 PM    COVID19 Not Detected 05/07/2020 10:28 AM           Anesthesia Evaluation   history of anesthetic complications (vocal cord injury with prior ICU/emergent intubation): Airway: Mallampati: II  TM distance: >3 FB   Neck ROM: full  Mouth opening: > = 3 FB   Dental: normal exam         Pulmonary:   (+) asthma:     (-) COPD                           Cardiovascular:  Exercise tolerance: good (>4 METS),   (+) hypertension:, pacemaker ([employer] - use magnet): pacemaker and AICD, past MI:, CHF (Viral cardiomyopathy, 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                 Abdominal:             Vascular:   + DVT (associated with COVID), . Other Findings:           Anesthesia Plan      general     ASA 3       Induction: intravenous. MIPS: Postoperative opioids intended and Prophylactic antiemetics administered. Anesthetic plan and risks discussed with patient. Plan discussed with CRNA.     Attending anesthesiologist reviewed and agrees with Gregoria Caraballo MD   10/31/2022

## 2022-10-31 NOTE — DISCHARGE INSTRUCTIONS
You are being sent home with a prescription for opioid pain medication. This medication was prescribed by your physician. Be sure to follow the instructions below. Follow instructions as written on the bottle and information sheets provided by your pharmacy. It is recommended to wean off these pain medication as soon as possible. These medications can have side effects including, but not limited to the following: nausea, vomiting, confusion, sedation, constipation, and itching. Be sure to report these side effects to your physician. Taking medications with food may help with nausea. All medications should be stored in a safe place at home. Safety caps should remain in place, keep out of reach of children, do not share with family or friends. Unused opioid pain medications, should be disposed of in a safe place. There are multiple safe deposit sites, including the lobby at , where unused medications can be disposed of. Do not drive or operate equipment that requires judgement. Do not drink alcohol while taking these pain medications. In addition to these pain medications, your physician may have prescribed additional pain relieving therapy such as heat, cold, range of motion exercises, repositioning, elevation, splint, sling, or limited activity at home. Be sure to follow your physicians instructions. If your pain is not controlled with these medications, call your physician. D/c home per anesthesia  Follow up with me in 1 week  Change nasal drip pad as needed without occluding nostrils with it  Expect mucousy bloody drainage for a few days            SEPTOPLASTY/TURBINOPLASTY (FESS), POSTOP      Ocean nasal spray - two sprays in each nostril every 1 - 2 hours while awake for six weeks after surgery. Afrin nasal spray - two sprays two times a day for four days post-op and as need for a nosebleed    Humidifier to bedside    Take antibiotics and pain medications as directed.     May resume normal diet as tolerated. Change sniffer dressing as needed. No heavy lifting or straining for one week post-op. No Aspirin or Motrin for one week post-op. DO NOT blow your nose heavily for two weeks post-op. (light blowing is OK)    When sneezing keep your mouth open. Nasal congestion for 1 - 2 weeks is normal.    Bleeding or oozing from the nose should become less every day and should stop completely by post-op day #3. Mucus production increases for the first week, then the mucus and crusting will decrease over the next four weeks.                   I

## 2022-10-31 NOTE — OP NOTE
Operative Note      Patient: Ruby Alfaro  YOB: 1960  MRN: [id number]    Account Number: [de-identified]    Date of Procedure: 10/31/2022    PREOPERATIVE DIAGNOSIS:   1. Chronic rhinosinusitis involving the maxillary and ethmoid sinuses. 2.  Chronic Frontal Sinusitis  3. Chronic Sphenoid Sinusitis  4. Bilateral inferior turbinate hypertrophy  5. Nasal polyposis    POSTOPERATIVE DIAGNOSIS:   1. Chronic rhinosinusitis involving the maxillary and ethmoid sinuses. 2.  Chronic Frontal Sinusitis  3. Chronic Sphenoid Sinusitis  4. Bilateral inferior turbinate hypertrophy  5. Nasal polyposis    PROCEDURE:    1.  Bilateral endoscopic Frontal sinusotomies with frontal recess dissection and exploration  2. Bilateral endoscopic sphenoidotomies with removal of diseased tissue  3. Bilateral endoscopic maxillary antrostomies with removal of diseased tissue. 4.  Bilateral endoscopic anterior and posterior ethmoidectomy using microdebrider. 5.  Bilateral submucous resection of the inferior turbinates   6. Outfracture of inferior turbinates  7. Stereotactic navigation for skull-base surgery    ANESTHESIA:  General endotracheal anesthesia. SURGEON: Sofie Wheat M.D. Assistant:   * No surgical staff found *    Anesthesia: General    Estimated Blood Loss (mL): less than 50     Complications: None    OPERATIVE FINDINGS:  moderate polyps in every sinus, scar tissue with retained uncinate processes, left ethmoid pus, Propel contours placed bilaterally    INDICATIONS FOR STEREOTACTIC NAVIGATION:  This patient was noted to have sinus disease extending to the skull base. Because of the proximity of the disease to the skull base and because of severe chronic infection and inflammatory processes, stereotactic navigation was needed to perform a safe surgery because of the loss of surgical landmarks and to maximize accuracy and safety.     INDICATIONS: This patient has a long-term history of chronic rhinosinusitis, persistent despite maximum medical therapy. She has had previous sinus surgery for polyps and her polyps have since recurred. Since conservative measures have failed to control this patients sinus disease, surgery was recommended to relieve this. Alternatives to surgery were presented as well. The patient was also noted to have inferior turbinate hypertrophy causing significant nasal obstruction. Both of these issues persist despite maximal medical therapy. The risks and benefits of the surgery were explained in detail. The discussion included, but was not limited to bleeding, anosmia, infection, general anesthesia related risks, possible injury to eye or brain, blindness, cerebro-spinal fluid leak, as well as the need for further surgery. All questions were answered, informed consent was then obtained. SUMMARY OF PROCEDURE:   The patient was taken to the operating room, placed supine on the operating table. After adequate general anesthesia was achieved, the nose was then prepped using Afrin nasal spray in pre-op and then 1% Xylocaine in 1:100,000 epinephrine was used to inject the bilateral, middle and inferior turbinates, and uncinate processes. The nose was then packed with cotton pledgets soaked in afrin solution. The stereotactic navigation system was then brought into the field and the face mask was carefully and accurately applied. The calibration was then performed to register the facemask in to the system for more accurate navigation during surgery. Landmarks were then identified and verified. These were also re-verified throughout the procedure. After an adequate time was allowed for vasoconstriction and decongestion, the procedure was begun using the 0-degree rigid endoscope on the patient's left side. The middle turbinate was identified and a ball seeker was then used to find the posterior edge of the remnant uncinate process and then this was torn forward.    A microdebrider was then use to completely resect the left uncinate process and any obvious ethmoid polyps. After the uncinectomy was complete, the maxillary ostium was identified and widened posteriorly and inferiorly with the microdebrider and maxillary contents were aspirated and tissue specimen was sent. Next, the ethmoid bulla at was entered its inferior aspect and resected, as well as the remaining anterior ethmoid cells. A complete anterior ethmoidectomy was performed with a combination of through cutting Blakesly forceps and the microdebrider. Care was taken to stay within the boundaries of the middle turbinate medially, lamina paprycea laterally, and the ethmoid roof and fovea superiorly. Next the basal lamella of the middle turbinate was identified and entered inferiorly and medially providing access to the posterior ethmoid cells. Next, a posterior ethmoidectomy was performed with the microdebrider staying within the boundaries of the middle turbinate medially, lamina paprycea laterally, and the ethmoid roof and fovea superiorly and remaining anterior to the spheno-ethmoid recess. Attention was then brought to the anterior wall of the sphenoid sinus. The sphenoid sinus ostium was identified using direct visualization and the navigation system. Then using the microdebrider, the sphenoid ostium was enlarged medially and inferiorly. Extra care was taken not to dissect any tissue superiorly or laterally within the sinus, avoiding the optic nerve and carotid artery. The sinus contents were then suctioned out and a tissue specimen was sent. Hemostasis was achieved using Afrin-soaked pledgets. A 30 degree sinus scope was then used to examine the maxillary sinus and frontal recess area. Then using a combination of the curved giraffe forceps, angled microdebrider, and mushroom punch forceps, the frontal sinus ostium was opened up more anteriorly and laterally.   A curved suction was then placed into the sinus, using the navigation system, and the sinus contents were suctioned out. The left sinus cavity was then packed off with afrin soaked pledgets. Endoscope was then inserted in the right side. The middle turbinate was identified and a ball seeker was then used to find the posterior edge of the remnant uncinate process and then this was torn forward. A microdebrider was then use to completely resect the uncinate process and obvious ethmoid polyps. After the uncinectomy was complete, the maxillary ostium was identified and widened posteriorly and inferiorly with the microdebrider and maxillary contents were aspirated and tissue specimen was sent. Next, the ethmoid bulla at was entered its inferior aspect and resected, as well as the remaining anterior ethmoid cells. A complete anterior ethmoidectomy was performed with a combination of through cutting Blakesly forceps and the microdebrider. Care was taken to stay within the boundaries of the middle turbinate medially, lamina paprycea laterally, and the ethmoid roof and fovea superiorly. Next the basal lamella of the middle turbinate was identified and entered inferiorly and medially providing access to the posterior ethmoid cells. Next, a complete posterior ethmoidectomy was performed with the microdebrider staying within the boundaries of the middle turbinate medially, lamina paprycea laterally, and the ethmoid roof and fovea superiorly and remaining anterior to the spheno-ethmoid recess. Attention was then brought to the anterior wall of the sphenoid sinus. The sphenoid sinus ostium was identified using direct visualization and the navigation system. Then using the microdebrider, the sphenoid ostium was enlarged medially and inferiorly. Extra care was taken not to dissect any tissue superiorly or laterally within the sinus, avoiding the optic nerve and carotid artery.   The sinus contents were then suctioned out and a tissue specimen was sent.  Hemostasis was achieved using Afrin-soaked pledgets. A 30 degree sinus scope was then used to examine the maxillary sinus and frontal recess area. Then using a combination of the curved giraffe forceps, angled microdebrider, and mushroom punch forceps, the frontal sinus ostium was opened up more anteriorly and laterally. A curved suction was then placed into the sinus, using the navigation system, and the sinus contents were suctioned out. The right sinus cavity was then packed off with afrin soaked pledgets. Now, using the zero degree endoscope, after the packing was removed, both sinus cavities were re-examined to ensure hemostasis. Slight pressure was applied to the orbit to look for any protruding orbital fat, none was noted. Propel contour stents were placed into each frontal sinus ostium under direct visualization. An absorbable sinus dressing was then applied to both middle meatii to stent and for hemostasis. Next, an incision was made at the anterior aspect of the left inferior turbinate and a submucosal tunnel was created. Hypertrophic tissue was removed and the turbinate was treated with three passes of radiofrequency. The [facility] elevator was then used to outfracture the inferior turbinate. The same procedure was repeated on the right inferior turbinate. Now the nasal airway was noted to be widely patent bilaterally. A nasal dressing of 2 x 2 gauze was applied to the nose for drainage. The patient tolerated the procedure well and was taken to the recovery room in satisfactory condition where both written and verbal instructions were given with regard to post-op care and follow-up. The patients eyes were evaluated in recovery and no signs of retro-orbital hematoma were noted.       Specimens:   ID Type Source Tests Collected by Time Destination   1 : left ethmoid cultlure and sensitivity Tissue Nose CULTURE, TISSUE, CULTURE, ANAEROBIC AND AEROBIC Aliza Escobar MD 10/31/2022 1258    A : nasal contents Tissue Nose SURGICAL PATHOLOGY Adry Figueroa MD 10/31/2022 1253        Implants:  Implant Name Type Inv.  Item Serial No.  Lot No. LRB No. Used Action   STENT NSL 370UG MOMETASONE FUROATE BIOABSRB IMPL W/ DEL SYS ORDER BY [facility] [id number]  STENT NSL 370UG MOMETASONE FUROATE BIOABSRB IMPL W/ DEL SYS ORDER BY [facility] [id number]  2 Implanted         Drains: * No LDAs found *    Electronically signed by Adry Figueroa MD on 10/31/2022 at 2:25 PM

## 2022-10-31 NOTE — INTERVAL H&P NOTE
(Approximate)   SpO2 99%     Allergies:  Dapagliflozin, Ibuprofen, Ativan [lorazepam], Codeine, Darvon [propoxyphene], and Lisinopril    Medications:    Prior to Admission medications    Medication Sig Start Date End Date Taking?  Authorizing Provider   spironolactone (ALDACTONE) 25 MG tablet Take 0.5 tablets by mouth daily 10/31/22   Arun Taylor DO   loratadine (CLARITIN) 10 MG tablet Take 10 mg by mouth daily    Historical Provider, MD   predniSONE (DELTASONE) 20 MG tablet Take 20 mg by mouth daily    Historical Provider, MD   ipratropium-albuterol (Melinda Peeks) 0.5-2.5 (3) MG/3ML SOLN nebulizer solution  4/13/22   Historical Provider, MD   albuterol sulfate  (90 Base) MCG/ACT inhaler  3/2/22   Historical Provider, MD   glimepiride (AMARYL) 1 MG tablet Take 1 tablet by mouth every morning (before breakfast) 1/20/22   Martín Beebe MD   metoprolol succinate (TOPROL XL) 100 MG extended release tablet Take 1 tablet by mouth 2 times daily 1/3/22   Arun Taylor DO   sacubitril-valsartan (ENTRESTO) 24-26 MG per tablet Take 1 tablet by mouth 2 times daily 1/3/22   Arun Taylor DO   famotidine (PEPCID) 40 MG tablet Take 1 tablet by mouth every evening 6/2/21   [place] Prude, APRN - CNP   pantoprazole (PROTONIX) 40 MG tablet Take one tablet once daily 30-60 minutes prior to a meal 3/15/21   [place] Prude, APRN - CNP   budesonide-formoterol (SYMBICORT) 160-4.5 MCG/ACT AERO USE 2 INHALATIONS TWICE A DAY (RINSE MOUTH WELL AFTER USE) 1/7/21   [place] Prude, APRN - CNP   Cholecalciferol (VITAMIN D3) 50 MCG (2000 UT) CAPS Take 1 capsule by mouth daily    Historical Provider, MD   SYNTHROID 175 MCG tablet Take 175 mcg by mouth Daily  8/3/19   Historical Provider, MD   ipratropium-albuterol (DUONEB) 0.5-2.5 (3) MG/3ML SOLN nebulizer solution Inhale 3 mLs into the lungs every 4 hours as needed for Shortness of Breath 5/6/19   [place] Prude, APRN - CNP   Probiotic Product (SOLUBLE FIBER/PROBIOTICS PO) Take by mouth 2 times daily    Historical Provider, MD   metFORMIN (GLUCOPHAGE) 500 MG tablet TAKE 2 TABLETS BY MOUTH TWO TIMES A DAY WITH MEALS 9/18/17   Crissy Valdovinos MD          This is a 60 y.o. female who is pleasant, cooperative, alert and oriented x3, in no acute distress. Raspy voice     Heart: AICD left upper chest  Heart sounds are normal.  HR 79 regular rate and rhythm without murmur, gallop or rub. Lungs: SpO2 99% on room air. Intermittent nonproductive cough  Normal respiratory effort with equal expansion, unlabored at rest w/o use of accessory muscles, wheezes or rales bilaterally   Abdomen: soft, nontender, nondistended with bowel sounds. Labs:  Recent Labs     10/27/22  1413   HGB 13.1   HCT 41.3   WBC 11.0   MCV 90.2      *   K 4.5      CO2 16*   BUN 18   CREATININE 0.62   GLUCOSE 357*       No results for input(s): COVID19 in the last 720 hours.     DEREK Gunter CNP  Electronically signed 10/31/2022 at 10:55 AM

## 2022-10-31 NOTE — PROGRESS NOTES
Pt assisted to edge of cart. Tolerated fair.  at bedside. No nausea or lightheadedness noted. to korin Arteaga sniffer remains dry and intact.

## 2022-10-31 NOTE — PROGRESS NOTES
Helping pt to dress. Sitting on edge of bed and became nauseated. Zofran given,  at bedside. Bloody emesis noted. Returned to bed.

## 2022-11-01 LAB
CULTURE: NORMAL
DIRECT EXAM: NORMAL
SPECIMEN DESCRIPTION: NORMAL

## 2022-11-02 LAB — SURGICAL PATHOLOGY REPORT: NORMAL

## 2022-11-29 ENCOUNTER — HOSPITAL ENCOUNTER (OUTPATIENT)
Dept: CARDIAC REHAB | Age: 62
Discharge: HOME | End: 2022-11-29

## 2022-12-01 ENCOUNTER — HOSPITAL ENCOUNTER (OUTPATIENT)
Dept: CARDIAC REHAB | Age: 62
Discharge: HOME | End: 2022-12-01

## 2022-12-01 PROCEDURE — 9900000065 HC CARDIAC REHAB PHASE 3 - 1 VISIT

## 2022-12-02 ENCOUNTER — HOSPITAL ENCOUNTER (OUTPATIENT)
Dept: CARDIAC REHAB | Age: 62
Discharge: HOME | End: 2022-12-02

## 2022-12-02 PROCEDURE — 9900000065 HC CARDIAC REHAB PHASE 3 - 1 VISIT

## 2022-12-08 ENCOUNTER — HOSPITAL ENCOUNTER (OUTPATIENT)
Dept: CARDIAC REHAB | Age: 62
Discharge: HOME | End: 2022-12-08

## 2022-12-13 ENCOUNTER — HOSPITAL ENCOUNTER (OUTPATIENT)
Dept: CARDIAC REHAB | Age: 62
Discharge: HOME | End: 2022-12-13

## 2022-12-13 PROCEDURE — 9900000065 HC CARDIAC REHAB PHASE 3 - 1 VISIT

## 2022-12-15 ENCOUNTER — HOSPITAL ENCOUNTER (OUTPATIENT)
Dept: CARDIAC REHAB | Age: 62
Discharge: HOME | End: 2022-12-15

## 2022-12-15 PROCEDURE — 9900000065 HC CARDIAC REHAB PHASE 3 - 1 VISIT

## 2022-12-20 ENCOUNTER — HOSPITAL ENCOUNTER (OUTPATIENT)
Dept: CARDIAC REHAB | Age: 62
Discharge: HOME | End: 2022-12-20

## 2022-12-20 PROCEDURE — 9900000065 HC CARDIAC REHAB PHASE 3 - 1 VISIT

## 2022-12-24 ENCOUNTER — OFFICE VISIT (OUTPATIENT)
Dept: PRIMARY CARE CLINIC | Age: 62
End: 2022-12-24
Payer: COMMERCIAL

## 2022-12-24 VITALS
TEMPERATURE: 97 F | HEIGHT: 66 IN | WEIGHT: 206.5 LBS | RESPIRATION RATE: 20 BRPM | BODY MASS INDEX: 33.19 KG/M2 | OXYGEN SATURATION: 98 % | SYSTOLIC BLOOD PRESSURE: 130 MMHG | HEART RATE: 97 BPM | DIASTOLIC BLOOD PRESSURE: 86 MMHG

## 2022-12-24 DIAGNOSIS — U07.1 COVID-19: Primary | ICD-10-CM

## 2022-12-24 PROCEDURE — 99213 OFFICE O/P EST LOW 20 MIN: CPT | Performed by: NURSE PRACTITIONER

## 2022-12-24 PROCEDURE — 3078F DIAST BP <80 MM HG: CPT | Performed by: NURSE PRACTITIONER

## 2022-12-24 PROCEDURE — G8417 CALC BMI ABV UP PARAM F/U: HCPCS | Performed by: NURSE PRACTITIONER

## 2022-12-24 PROCEDURE — G8484 FLU IMMUNIZE NO ADMIN: HCPCS | Performed by: NURSE PRACTITIONER

## 2022-12-24 PROCEDURE — 3017F COLORECTAL CA SCREEN DOC REV: CPT | Performed by: NURSE PRACTITIONER

## 2022-12-24 PROCEDURE — 3074F SYST BP LT 130 MM HG: CPT | Performed by: NURSE PRACTITIONER

## 2022-12-24 PROCEDURE — 1036F TOBACCO NON-USER: CPT | Performed by: NURSE PRACTITIONER

## 2022-12-24 PROCEDURE — G8427 DOCREV CUR MEDS BY ELIG CLIN: HCPCS | Performed by: NURSE PRACTITIONER

## 2022-12-24 RX ORDER — DEXTROMETHORPHAN HYDROBROMIDE AND PROMETHAZINE HYDROCHLORIDE 15; 6.25 MG/5ML; MG/5ML
5 SYRUP ORAL 4 TIMES DAILY PRN
Qty: 180 ML | Refills: 0 | Status: SHIPPED | OUTPATIENT
Start: 2022-12-24 | End: 2022-12-31

## 2022-12-24 ASSESSMENT — ENCOUNTER SYMPTOMS
COUGH: 1
SHORTNESS OF BREATH: 0
RHINORRHEA: 1
NAUSEA: 1
VOMITING: 0

## 2022-12-24 NOTE — PROGRESS NOTES
Subjective:      Patient ID: Hira Alvarez is a 60 y.o. female coming in for   Chief Complaint   Patient presents with    Cough     Started Thursday evening, vomiting, nausea, body aches, chills, tested positive for covid on Friday. Would like paxlovid if able. HPI  Presents to  with positive covid at home and covid symptoms. Symptoms began 12/22/22 with n/v and body aches. Since she has developed nasal congestion, headaches, body aches, cough. Positive home covid test on Friday 12/23/22. Pt wanting antiviral if possible. She is taking otc cough/cold meds at home. Review of Systems   Constitutional:  Positive for chills and fever. HENT:  Positive for congestion and rhinorrhea. Respiratory:  Positive for cough. Negative for shortness of breath. Gastrointestinal:  Positive for nausea. Negative for vomiting (resolved). Musculoskeletal:  Positive for myalgias. Neurological:  Positive for headaches. All other systems reviewed and are negative. Objective:   Physical Exam  Vitals and nursing note reviewed. Constitutional:       General: She is not in acute distress. Appearance: Normal appearance. She is not ill-appearing. HENT:      Head: Normocephalic. Nose: Congestion and rhinorrhea present. Mouth/Throat:      Mouth: Mucous membranes are moist.      Pharynx: Oropharynx is clear. No oropharyngeal exudate or posterior oropharyngeal erythema. Cardiovascular:      Rate and Rhythm: Normal rate and regular rhythm. Heart sounds: Normal heart sounds. Pulmonary:      Effort: Pulmonary effort is normal. No respiratory distress. Breath sounds: Normal breath sounds. No stridor. No wheezing, rhonchi or rales. Musculoskeletal:      Cervical back: Normal range of motion and neck supple. Lymphadenopathy:      Cervical: No cervical adenopathy. Skin:     General: Skin is warm and dry. Findings: No rash. Neurological:      General: No focal deficit present. Mental Status: She is alert and oriented to person, place, and time. Assessment:      1. COVID-19           Plan:    -no need to retest for covid  -will treat with molnupiravir as prescribed  -cough meds as prescribed, may take other otc meds as needed.   -tylenol/motrin as needed for headache and fevers  -push fluids. -f/u with pcp as needed    No orders of the defined types were placed in this encounter. Outpatient Encounter Medications as of 12/24/2022   Medication Sig Dispense Refill    molnupiravir 200 MG capsule Take 4 capsules by mouth in the morning and 4 capsules in the evening. Do all this for 5 days.  40 capsule 0    promethazine-dextromethorphan (PROMETHAZINE-DM) 6.25-15 MG/5ML syrup Take 5 mLs by mouth 4 times daily as needed for Cough 180 mL 0    spironolactone (ALDACTONE) 25 MG tablet Take 0.5 tablets by mouth daily 45 tablet 2    loratadine (CLARITIN) 10 MG tablet Take 10 mg by mouth daily      albuterol sulfate  (90 Base) MCG/ACT inhaler       glimepiride (AMARYL) 1 MG tablet Take 1 tablet by mouth every morning (before breakfast) 30 tablet 3    metoprolol succinate (TOPROL XL) 100 MG extended release tablet Take 1 tablet by mouth 2 times daily 180 tablet 3    sacubitril-valsartan (ENTRESTO) 24-26 MG per tablet Take 1 tablet by mouth 2 times daily 180 tablet 3    famotidine (PEPCID) 40 MG tablet Take 1 tablet by mouth every evening 90 tablet 3    pantoprazole (PROTONIX) 40 MG tablet Take one tablet once daily 30-60 minutes prior to a meal 90 tablet 3    budesonide-formoterol (SYMBICORT) 160-4.5 MCG/ACT AERO USE 2 INHALATIONS TWICE A DAY (RINSE MOUTH WELL AFTER USE) 3 Inhaler 3    Cholecalciferol (VITAMIN D3) 50 MCG (2000 UT) CAPS Take 1 capsule by mouth daily      SYNTHROID 175 MCG tablet Take 175 mcg by mouth Daily       Probiotic Product (SOLUBLE FIBER/PROBIOTICS PO) Take by mouth 2 times daily      metFORMIN (GLUCOPHAGE) 500 MG tablet TAKE 2 TABLETS BY MOUTH TWO TIMES A DAY WITH MEALS 120 tablet 3    predniSONE (DELTASONE) 20 MG tablet Take 20 mg by mouth daily (Patient not taking: Reported on 12/24/2022)      ipratropium-albuterol (DUONEB) 0.5-2.5 (3) MG/3ML SOLN nebulizer solution       ipratropium-albuterol (DUONEB) 0.5-2.5 (3) MG/3ML SOLN nebulizer solution Inhale 3 mLs into the lungs every 4 hours as needed for Shortness of Breath 360 mL 2     No facility-administered encounter medications on file as of 12/24/2022.             Kim Lewis, APRN - CNP

## 2023-01-02 ENCOUNTER — OFFICE VISIT (OUTPATIENT)
Dept: PRIMARY CARE CLINIC | Age: 63
End: 2023-01-02
Payer: COMMERCIAL

## 2023-01-02 VITALS
SYSTOLIC BLOOD PRESSURE: 118 MMHG | HEART RATE: 104 BPM | BODY MASS INDEX: 33.75 KG/M2 | OXYGEN SATURATION: 95 % | HEIGHT: 66 IN | TEMPERATURE: 97.1 F | DIASTOLIC BLOOD PRESSURE: 80 MMHG | WEIGHT: 210 LBS

## 2023-01-02 DIAGNOSIS — R05.1 ACUTE COUGH: ICD-10-CM

## 2023-01-02 DIAGNOSIS — J45.41 MODERATE PERSISTENT ASTHMA WITH EXACERBATION: Primary | ICD-10-CM

## 2023-01-02 PROCEDURE — G8484 FLU IMMUNIZE NO ADMIN: HCPCS | Performed by: NURSE PRACTITIONER

## 2023-01-02 PROCEDURE — 1036F TOBACCO NON-USER: CPT | Performed by: NURSE PRACTITIONER

## 2023-01-02 PROCEDURE — G8417 CALC BMI ABV UP PARAM F/U: HCPCS | Performed by: NURSE PRACTITIONER

## 2023-01-02 PROCEDURE — 3074F SYST BP LT 130 MM HG: CPT | Performed by: NURSE PRACTITIONER

## 2023-01-02 PROCEDURE — 3079F DIAST BP 80-89 MM HG: CPT | Performed by: NURSE PRACTITIONER

## 2023-01-02 PROCEDURE — G8427 DOCREV CUR MEDS BY ELIG CLIN: HCPCS | Performed by: NURSE PRACTITIONER

## 2023-01-02 PROCEDURE — 99213 OFFICE O/P EST LOW 20 MIN: CPT | Performed by: NURSE PRACTITIONER

## 2023-01-02 PROCEDURE — 3017F COLORECTAL CA SCREEN DOC REV: CPT | Performed by: NURSE PRACTITIONER

## 2023-01-02 RX ORDER — PREDNISONE 20 MG/1
20 TABLET ORAL 2 TIMES DAILY
Qty: 10 TABLET | Refills: 0 | Status: SHIPPED | OUTPATIENT
Start: 2023-01-02 | End: 2023-01-07

## 2023-01-02 RX ORDER — DOXYCYCLINE HYCLATE 100 MG
100 TABLET ORAL 2 TIMES DAILY
Qty: 20 TABLET | Refills: 0 | Status: SHIPPED | OUTPATIENT
Start: 2023-01-02 | End: 2023-01-12

## 2023-01-02 ASSESSMENT — PATIENT HEALTH QUESTIONNAIRE - PHQ9
1. LITTLE INTEREST OR PLEASURE IN DOING THINGS: 0
SUM OF ALL RESPONSES TO PHQ QUESTIONS 1-9: 0
SUM OF ALL RESPONSES TO PHQ9 QUESTIONS 1 & 2: 0
2. FEELING DOWN, DEPRESSED OR HOPELESS: 0

## 2023-01-02 ASSESSMENT — ENCOUNTER SYMPTOMS
SORE THROAT: 1
RHINORRHEA: 0
SHORTNESS OF BREATH: 0
COUGH: 1
GASTROINTESTINAL NEGATIVE: 1
WHEEZING: 1
CHEST TIGHTNESS: 0

## 2023-01-02 NOTE — PROGRESS NOTES
[facility]             [address], [address]                        Telephone [phone]             Fax [phone]     Giovanni Bustamante  1960  MRN:[id number]   Date of visit:  1/2/2023    Subjective: Giovanni Bustamante is a 60 y.o.  female who presents to [facility] today (1/2/2023) for evaluation of:    Chief Complaint   Patient presents with    Cough     Pt has a hx of asthma and is coming off of covid from [place]. The URI sx are not going away. Cough  This is a new problem. The current episode started in the past 7 days (12/26/22). The problem has been gradually worsening. The problem occurs every few minutes. The cough is Non-productive. Associated symptoms include a sore throat and wheezing. Pertinent negatives include no chest pain, fever, headaches, nasal congestion, postnasal drip, rash, rhinorrhea or shortness of breath. Nothing aggravates the symptoms. She has tried OTC cough suppressant (symbicort, duoneb) for the symptoms. The treatment provided no relief. Her past medical history is significant for asthma.      She has the following problem list:  Patient Active Problem List   Diagnosis    Hiatal hernia with GERD    Hypothyroidism    Essential hypertension    Hyperlipidemia    DJD (degenerative joint disease), lumbar    Acute diverticulitis of intestine    Hyperplastic colon polyp    Tinnitus    Sciatica of right side    Splenic artery aneurysm ([facility]    Presence of endovascular aneurysm coil compatible with safe magnetic resonance imaging    DVT (deep venous thrombosis) (HCC)    LGSIL (low grade squamous intraepithelial dysplasia)    Sinusitis, chronic    Asthma    Non-celiac gluten sensitivity    Severe persistent asthma    Varicose veins of bilateral lower extremities with pain    Bowel perforation (HCC)    Diverticulitis of large intestine with abscess without bleeding Colovaginal fistula    Partial small bowel obstruction (HCC)    Diabetes type 2, no ocular involvement ([address]    History of radial keratotomy    Combined forms of age-related cataract of both eyes    Respiratory distress    Exertional dyspnea    Thrombophlebitis of superficial veins of right lower extremity    Nonischemic cardiomyopathy (HCC)    Acute on chronic systolic congestive heart failure (HCC)    Acute respiratory failure (HCC)    Acute pulmonary edema (HCC)    S/P repair of paraesophageal hernia        Current medications are:  Current Outpatient Medications   Medication Sig Dispense Refill    predniSONE (DELTASONE) 20 MG tablet Take 1 tablet by mouth 2 times daily for 5 days 10 tablet 0    doxycycline hyclate (VIBRA-TABS) 100 MG tablet Take 1 tablet by mouth 2 times daily for 10 days 20 tablet 0    spironolactone (ALDACTONE) 25 MG tablet Take 0.5 tablets by mouth daily 45 tablet 2    loratadine (CLARITIN) 10 MG tablet Take 10 mg by mouth daily      ipratropium-albuterol (DUONEB) 0.5-2.5 (3) MG/3ML SOLN nebulizer solution       albuterol sulfate  (90 Base) MCG/ACT inhaler       glimepiride (AMARYL) 1 MG tablet Take 1 tablet by mouth every morning (before breakfast) 30 tablet 3    metoprolol succinate (TOPROL XL) 100 MG extended release tablet Take 1 tablet by mouth 2 times daily 180 tablet 3    sacubitril-valsartan (ENTRESTO) 24-26 MG per tablet Take 1 tablet by mouth 2 times daily 180 tablet 3    famotidine (PEPCID) 40 MG tablet Take 1 tablet by mouth every evening 90 tablet 3    pantoprazole (PROTONIX) 40 MG tablet Take one tablet once daily 30-60 minutes prior to a meal 90 tablet 3    budesonide-formoterol (SYMBICORT) 160-4.5 MCG/ACT AERO USE 2 INHALATIONS TWICE A DAY (RINSE MOUTH WELL AFTER USE) 3 Inhaler 3    Cholecalciferol (VITAMIN D3) 50 MCG (2000 UT) CAPS Take 1 capsule by mouth daily      SYNTHROID 175 MCG tablet Take 175 mcg by mouth Daily       Probiotic Product (SOLUBLE FIBER/PROBIOTICS PO) Take by mouth 2 times daily      metFORMIN (GLUCOPHAGE) 500 MG tablet TAKE 2 TABLETS BY MOUTH TWO TIMES A DAY WITH MEALS 120 tablet 3    ipratropium-albuterol (DUONEB) 0.5-2.5 (3) MG/3ML SOLN nebulizer solution Inhale 3 mLs into the lungs every 4 hours as needed for Shortness of Breath 360 mL 2     No current facility-administered medications for this visit. She is allergic to dapagliflozin, ibuprofen, other, ativan [lorazepam], codeine, darvon [propoxyphene], and lisinopril. .    She  reports that she has never smoked. She has never used smokeless tobacco.      Objective:    Vitals:    01/02/23 1050   BP: 118/80   Pulse: (!) 104   Temp: 97.1 °F (36.2 °C)   TempSrc: Tympanic   SpO2: 95%   Weight: 210 lb (95.3 kg)   Height: 5' 6\" (1.676 m)     Body mass index is 33.89 kg/m². Review of Systems   Constitutional: Negative. Negative for fever. HENT:  Positive for sore throat. Negative for congestion, postnasal drip and rhinorrhea. Respiratory:  Positive for cough and wheezing. Negative for chest tightness and shortness of breath. Cardiovascular: Negative. Negative for chest pain. Gastrointestinal: Negative. Skin:  Negative for rash. Neurological:  Negative for headaches. Physical Exam  Vitals and nursing note reviewed. Constitutional:       Appearance: Normal appearance. She is well-developed. HENT:      Head: Normocephalic. Jaw: There is normal jaw occlusion. Right Ear: Tympanic membrane, ear canal and external ear normal.      Left Ear: Tympanic membrane, ear canal and external ear normal.      Nose: Nose normal.      Mouth/Throat:      Lips: Pink. Mouth: Mucous membranes are moist.      Pharynx: Oropharynx is clear. Uvula midline. Eyes:      Pupils: Pupils are equal, round, and reactive to light. Cardiovascular:      Rate and Rhythm: Regular rhythm. Tachycardia present. Heart sounds: Normal heart sounds.    Pulmonary:      Effort: Pulmonary effort is normal. Breath sounds: Normal air entry. Wheezing present. Comments: Dry cough noted  Musculoskeletal:      Cervical back: Normal range of motion and neck supple. Lymphadenopathy:      Cervical: No cervical adenopathy. Skin:     General: Skin is warm and dry. Neurological:      General: No focal deficit present. Mental Status: She is alert and oriented to person, place, and time. Psychiatric:         Behavior: Behavior normal.         Thought Content: Thought content normal.       Assessment and Plan:    No results found for this visit on 01/02/23. Diagnosis Orders   1. Moderate persistent asthma with exacerbation  predniSONE (DELTASONE) 20 MG tablet    doxycycline hyclate (VIBRA-TABS) 100 MG tablet      2. Acute cough  doxycycline hyclate (VIBRA-TABS) 100 MG tablet        Take prednisone as directed. Complete full course of antibiotic. Increase water intake. Use cool mist humidifier at bedtime. she was instructed to return if there is no improvement or symptoms worsen. The use, risks, benefits, and side effects of prescribed or recommended medications were discussed. All questions were answered and the patient/caregiver voiced understanding. No orders of the defined types were placed in this encounter.         Electronically signed by DEREK Oleary CNP on 1/2/23 at 11:16 AM EST

## 2023-01-12 ENCOUNTER — OFFICE VISIT (OUTPATIENT)
Dept: CARDIOLOGY | Age: 63
End: 2023-01-12
Payer: COMMERCIAL

## 2023-01-12 VITALS
SYSTOLIC BLOOD PRESSURE: 124 MMHG | DIASTOLIC BLOOD PRESSURE: 82 MMHG | WEIGHT: 206 LBS | BODY MASS INDEX: 32.33 KG/M2 | HEART RATE: 86 BPM | HEIGHT: 67 IN

## 2023-01-12 DIAGNOSIS — I42.8 NONISCHEMIC CARDIOMYOPATHY (HCC): Primary | ICD-10-CM

## 2023-01-12 DIAGNOSIS — I10 HYPERTENSION, ESSENTIAL: ICD-10-CM

## 2023-01-12 DIAGNOSIS — I50.23 ACUTE ON CHRONIC SYSTOLIC CONGESTIVE HEART FAILURE (HCC): ICD-10-CM

## 2023-01-12 DIAGNOSIS — Z95.810 ICD (IMPLANTABLE CARDIOVERTER-DEFIBRILLATOR) IN PLACE: ICD-10-CM

## 2023-01-12 PROCEDURE — 3074F SYST BP LT 130 MM HG: CPT | Performed by: INTERNAL MEDICINE

## 2023-01-12 PROCEDURE — 1036F TOBACCO NON-USER: CPT | Performed by: INTERNAL MEDICINE

## 2023-01-12 PROCEDURE — G8427 DOCREV CUR MEDS BY ELIG CLIN: HCPCS | Performed by: INTERNAL MEDICINE

## 2023-01-12 PROCEDURE — 99214 OFFICE O/P EST MOD 30 MIN: CPT | Performed by: INTERNAL MEDICINE

## 2023-01-12 PROCEDURE — G8484 FLU IMMUNIZE NO ADMIN: HCPCS | Performed by: INTERNAL MEDICINE

## 2023-01-12 PROCEDURE — 3017F COLORECTAL CA SCREEN DOC REV: CPT | Performed by: INTERNAL MEDICINE

## 2023-01-12 PROCEDURE — G8417 CALC BMI ABV UP PARAM F/U: HCPCS | Performed by: INTERNAL MEDICINE

## 2023-01-12 PROCEDURE — 93000 ELECTROCARDIOGRAM COMPLETE: CPT | Performed by: INTERNAL MEDICINE

## 2023-01-12 PROCEDURE — 3079F DIAST BP 80-89 MM HG: CPT | Performed by: INTERNAL MEDICINE

## 2023-01-12 RX ORDER — FUROSEMIDE 20 MG/1
20 TABLET ORAL DAILY PRN
Qty: 30 TABLET | Refills: 2 | Status: SHIPPED | OUTPATIENT
Start: 2023-01-12

## 2023-01-12 NOTE — PROGRESS NOTES
Cardiology Consultation/Follow Up. [facility]    CC: follow up for CHF. HPI:  Yohan Mccartney presents for routine follow up for CHF. Patient had COVID-19 infection around [place], still suffering from bronchitis, restarted on higher dose of steroid  She is otherwise stable from cardiac standpoint, denies having any chest pain, worsening dyspnea, orthopnea, or lower extremity edema. Overall her functional capacity is good and she is working fulltime. .  No alarms or shocks from the ICD. Last interrogation on 6/24/2022 reviewed and within normal limits.       Past Medical:  Past Medical History:   Diagnosis Date    Acute on chronic systolic congestive heart failure ([facility] 01/21/2020    \"viral heart failure\" per patient    AICD (automatic cardioverter/defibrillator) present     Asthma     Uses daily Symbicort, rarely uses rescue inhaler, managed by PCP (10/27/22)    Bilateral corneal scars     COVID-19 11/10/2020    Disease of blood and blood forming organ     Diverticulitis     ON COLONSCOPY    DJD (degenerative joint disease), lumbar     Dry eye syndrome     DVT (deep venous thrombosis) ([facility]     after splenic artery repair in right calf    Gastro - esophageal reflux disease     Hiatal hernia     History of blood transfusion 1982    History of echocardiogram 04/2020    Hyperlipidemia     HIGH CHOLESTEROL/HIGH TRIGLYCERIDES    Hyperplastic colon polyp     Hypertension     PCP DR Anastasiya Richey, [place]    Hypothyroidism     LGSIL (low grade squamous intraepithelial dysplasia) 11/2014    referred to GYN/S/P colpo with bx LGSIL, repeat pap in 6 months    Meningioma ([facility] 05/2022    Non-celiac gluten sensitivity     wheezing is directly proportionate to wheat intake    NSTEMI (non-ST elevated myocardial infarction) ([facility] 01/16/2020    DR Musa Howe    Osteoarthritis     Rivas. knees    Other disorders of kidney and ureter in diseases classified elsewhere     Sciatica of right side     Seborrheic dermatitis     Splenic artery aneurysm [facility])     s/p splenic artery repair    Tinnitus     Type 2 diabetes mellitus, controlled ([address] 2011    PCP DR Sepulveda Part    Vocal cord paralysis 2022    Right vocal cord       Past Surgical:  Past Surgical History:   Procedure Laterality Date    ABDOMEN SURGERY      ARTERIAL ANEURYSM REPAIR      splenic artery coil repair ([facility], Dr. Mayuri Simon)    [address]  2020    No stents    CARDIAC DEFIBRILLATOR PLACEMENT Left 2020    [employer]TRONIC     SECTION  1986    COLONOSCOPY  2012    HYPERPLASTIC POLYP, DIVERTICULAR DISEASE    COLONOSCOPY  2017    moderate sigmoid diverticulosis. Dr. Benito Cr    COLONOSCOPY N/A 03/15/2022    moderate sigmoid diverticulosis otherwise normal; Dr. Benito Cr at [address] [facility] [facility]  2015    with BX LGSIL    CYSTOSCOPY Bilateral 2017    CYSTO insertion lighted stents performed by Lauryn Olivares MD at [address] [facility]      ENDOSCOPY, COLON, DIAGNOSTIC      EYE SURGERY      radial keratomy (Dr. Petra Sandoval)    GASTRIC FUNDOPLICATION N/A     XI LAPAROSCOPIC ROBOTIC HIATAL HERNIA REPAIR WITH MESH  NISSEN FUNDOPLICATION.  EGD performed by Kenna Fitzpatrick DO at [facility] ARTHROSCOPY Right     PARTIAL SYNOVECTOMY AND CHONDROPLASTY DUE TO MENSICUS TEAR    LAPAROSCOPY N/A 2017    EXPLORATORY LAPAROSCOPY converted to open exploration with drainage of pelvic abscess performed by Chase Flores MD at [address]  2014    clearing an infection done at [facility] [address] Bilateral 10/31/2022    BILATERAL TURBINATE REDUCTION performed by Adry Figueroa MD at [address] [facility]  10/25/2011    L4/L5 epidural steroid injection    OTHER SURGICAL HISTORY Right  and     synvisc injections, two sets    OTHER SURGICAL HISTORY  2020    Lead revision    NE COLON CA SCRN NOT HI RSK IND N/A 2017    COLONOSCOPY performed by Erwin Mulligan MD at [address] IND N/A 09/08/2017    COLONOSCOPY performed by Erwin Mulligan MD at [address] [facility] [address] EGD TRANSORAL BIOPSY SINGLE/MULTIPLE N/A 02/27/2017    EGD BIOPSY performed by Erwin Mulligan MD at [address] Bilateral 10/31/2022    FESS- WITH NAVIGATION BILATERAL ETHMOIDECTOMY,MAXILLARY ANTROSTOMY,FRONTAL SINUS EXPLORATION, SPHENOIDOTOMY (CT DONE [facility] [place] 5/23/22)        *PT HAS DEFIBRILLATOR performed by Alexandra Aiken MD at [facility]  2014    Dr. Earl Flanagan, [address] TAG REMOVAL      SMALL INTESTINE SURGERY N/A 08/30/2017    ATTEMPTED LAPAROCOPY PROCEDURE CONVERTED TO OPEN Sigmoid Colectomy PERFORMED BY DR. VO resection colovaginal fistula PERFORMED BY. DR. Gris Espinal  performed by Erwin Mulligan MD at [address] [facility] Right 07/2011    TOTAL    TUBAL LIGATION  1989    UPPER GASTROINTESTINAL ENDOSCOPY N/A 01/02/2020    EGD BIOPSY W/ 50 HR LEARY performed by Erwin Mulligan MD at [address] N/A 03/15/2022    mild gastritis and esophagitis; wrap intact; Dr. Nicki Trujillo at [address]       Family History:  Family History   Problem Relation Age of Onset    High Blood Pressure Mother     High Cholesterol Mother     Allergies Mother     Arthritis Mother     Coronary Art Dis Mother     Diabetes Daughter         pre diabetes    Other Maternal Grandfather         PUD GI problems    Breast Cancer Maternal Aunt     No Known Problems Maternal Aunt     No Known Problems Maternal Aunt     No Known Problems Maternal Aunt     Cataracts Neg Hx     Glaucoma Neg Hx        Social History:  Social History     Tobacco Use    Smoking status: Never    Smokeless tobacco: Never    Tobacco comments:     never smoked syant rrt 10/13/2017   Vaping Use    Vaping Use: Never used   Substance Use Topics    Alcohol use: Yes     Comment: Consume about 4 wine coolers a year    Drug use: No        REVIEW OF SYSTEMS:    Constitutional: there has been no unanticipated weight loss. There's been No change in energy level, No change in activity level. Eyes: No visual changes or diplopia. No scleral icterus. ENT: No Headaches, hearing loss or vertigo. No mouth sores or sore throat. Cardiovascular: AS HPI  Respiratory: AS HPI  Gastrointestinal: No abdominal pain, appetite loss, blood in stools. No change in bowel or bladder habits. Genitourinary: No dysuria, trouble voiding, or hematuria. Musculoskeletal:  No radial site hematoma  Integumentary: No rash or pruritis. Neurological: No headache, diplopia, change in muscle strength, numbness or tingling. No change in gait, balance, coordination, mood, affect, memory, mentation, behavior. Psychiatric: No new anxiety or depression. Endocrine: No temperature intolerance. No excessive thirst, fluid intake, or urination. No tremor. Hematologic/Lymphatic: No abnormal bruising or bleeding, blood clots or swollen lymph nodes. Allergic/Immunologic: No nasal congestion or hives.     Medications:  Current Outpatient Medications   Medication Sig Dispense Refill    PREDNISONE, BELKYS, PO Take by mouth 2nd belkys, starting at 60 mg      AMOXICILLIN PO Take by mouth      furosemide (LASIX) 20 MG tablet Take 1 tablet by mouth daily as needed (for edema and weight gain) 30 tablet 2    spironolactone (ALDACTONE) 25 MG tablet Take 0.5 tablets by mouth daily 45 tablet 2    loratadine (CLARITIN) 10 MG tablet Take 10 mg by mouth daily      glimepiride (AMARYL) 1 MG tablet Take 1 tablet by mouth every morning (before breakfast) 30 tablet 3    metoprolol succinate (TOPROL XL) 100 MG extended release tablet Take 1 tablet by mouth 2 times daily 180 tablet 3    sacubitril-valsartan (ENTRESTO) 24-26 MG per tablet Take 1 tablet by mouth 2 times daily 180 tablet 3    famotidine (PEPCID) 40 MG tablet Take 1 tablet by mouth every evening 90 tablet 3 pantoprazole (PROTONIX) 40 MG tablet Take one tablet once daily 30-60 minutes prior to a meal 90 tablet 3    budesonide-formoterol (SYMBICORT) 160-4.5 MCG/ACT AERO USE 2 INHALATIONS TWICE A DAY (RINSE MOUTH WELL AFTER USE) 3 Inhaler 3    Cholecalciferol (VITAMIN D3) 50 MCG (2000 UT) CAPS Take 1 capsule by mouth daily      SYNTHROID 175 MCG tablet Take 175 mcg by mouth Daily       ipratropium-albuterol (DUONEB) 0.5-2.5 (3) MG/3ML SOLN nebulizer solution Inhale 3 mLs into the lungs every 4 hours as needed for Shortness of Breath 360 mL 2    Probiotic Product (SOLUBLE FIBER/PROBIOTICS PO) Take by mouth 2 times daily      metFORMIN (GLUCOPHAGE) 500 MG tablet TAKE 2 TABLETS BY MOUTH TWO TIMES A DAY WITH MEALS 120 tablet 3    albuterol sulfate  (90 Base) MCG/ACT inhaler  (Patient not taking: Reported on 1/12/2023)       No current facility-administered medications for this visit. Physical Exam:   Vitals: /82 (Site: Left Upper Arm, Position: Sitting, Cuff Size: Medium Adult)   Pulse 86   Ht 5' 6.5\" (1.689 m)   Wt 206 lb (93.4 kg)   LMP 04/29/2009 (Approximate)   BMI 32.75 kg/m²     General appearance: alert and cooperative with exam  HEENT: Head: Normocephalic, no lesions, without obvious abnormality.   Neck: no carotid bruit, no JVD  Lungs: clear to auscultation bilaterally, scattered exp rhonchi heard, no rales  Heart: regular rate and rhythm, S1, S2 normal, no murmur, click, rub or gallop  Abdomen: soft, non-tender; bowel sounds normal; no masses,  no organomegaly  Extremities: extremities normal, atraumatic, no cyanosis or edema  Neurologic: Mental status: Alert, oriented, thought content appropriate    Labs:  Lab Results   Component Value Date    CHOL 216 05/06/2020    TRIG 195 05/06/2020    HDL 52 05/06/2020    LDLCHOLESTEROL 112 01/17/2020    LDLCALC 130 05/06/2020    VLDL NOT REPORTED (H) 01/17/2020    CHOLHDLRATIO 4.2 05/06/2020       Lab Results   Component Value Date     (L) 10/27/2022    K 4.5 10/27/2022     10/27/2022    CO2 16 (L) 10/27/2022    BUN 18 10/27/2022    CREATININE 0.62 10/27/2022    GLUCOSE 357 (H) 10/27/2022    CALCIUM 9.6 10/27/2022    PROT 6.6 07/15/2022    LABALBU 4.1 07/15/2022    BILITOT 0.46 07/15/2022    ALKPHOS 73 07/15/2022    AST 19 07/15/2022    ALT 26 07/15/2022    LABGLOM >60 10/27/2022    GFRAA >60 07/15/2022    GLOB 3.0 06/30/2017       EKG: Normal sinus rhythm, normal ECG    LAST ECHO:   Results for orders placed or performed during the hospital encounter of 01/16/20   Echocardiogram complete 2D with doppler with color  Summary  Moderately dilated LV size , normal wall thickness. Severe global hypokinesis. Severely reduced LV systolic function with LVEF 25-30%. Normal RV size and function. Normal size LA and RA. No obvious significant structural valvular abnormality noted. Mild AR noted . Normal aortic root dimension. No significant pericardial effusion. No obvious intra-cardiac mass or shunt noted. LAST CATH:  Results for orders placed or performed during the hospital encounter of 01/16/20   Cardiac Catheterization   Procedure Summary   Minimal non obstructive CAD   LV systolic dysfunction- EF 32%         ECHO: 4/30/2020  Left ventricle is in the upper limits of normal in size. Left ventricular systolic function is severely reduced. Global hypokinesis. Left ventricular ejection fraction 30 %. Grade I (mild) left ventricular diastolic dysfunction. Normal aortic valve structure. Mild aortic insufficiency. Mild mitral valve thickening with annular calcification. Moderate mitral regurgitation. Normal function of other valves. No pericardial effusion. AICD placed: 5/11/20    RV lead revised 5/19/20. Past Medical and Surgical History, Problem List, Allergies, Medications, Labs, Imaging, all reviewed extensively in EMR and with the patient.       Assessment & Plan:    1. 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 Currently compensated with no signs of volume overload on examination. Maintained on Toprol- mg twice daily, Entresto 24/26 mg and aldactone 12.5 mg qd. Could not tolerate SGLT2 inhibitors due to recurrent yeast infections. Continue phase 3 cardiac rehab.    2. S/p AICD on 5/11/20 followed by RV lead revision 5/19/20. Routine interrogation in 06/2022 reviewed and within normal limits. Patient does not have any volume overload although the OptiVol index has shown otherwise. We will continue monitoring every 6 months    3. DL    5. Minimal CAD on in cath 1/20    6. Has chronic long standing GERD/Esophagitis/Hiatal Hernia status post fundoplication in February 2021    7. DM. Controlled, per PCP, plan for stating [facility]  as above and this was already discussed by patient with Dr Benjamín Figueroa    8. Acute bronchitis, COVID 19 Infection in 12.2022, restarted on steroids, advised to monitor leg edema, lasix as needed for edema or weight gain. The patient is to continue heart healthy diet, weight loss and exercise as tolerated. Patient's medications and side effects were discussed. Medication refills were provided if needed. Follow up appointment timing was discussed. All questions and concerns were addressed to patient's satisfaction. The patient is to follow up in 6 months or sooner if necessary. Sparkle Wilson MD   [facility]  [facility]  [phone]

## 2023-01-13 RX ORDER — METOPROLOL SUCCINATE 100 MG/1
100 TABLET, EXTENDED RELEASE ORAL 2 TIMES DAILY
Qty: 180 TABLET | Refills: 3 | Status: SHIPPED | OUTPATIENT
Start: 2023-01-13

## 2023-01-13 NOTE — TELEPHONE ENCOUNTER
Pt forgot to ask for reefills yesterday at her appt. Entresto 24-26 mg 1 tab twice daily  Metoprolol succinate 100 mg 1 tab twice daily. To [facility]    Advise if the metoprolol is suppose to be BID since it is the extended release.     Last Appt:  1/12/2023  Next Appt:   2/24/2023  Med verified in [address]

## 2023-01-26 ENCOUNTER — HOSPITAL ENCOUNTER (OUTPATIENT)
Dept: CARDIAC REHAB | Age: 63
Discharge: HOME | End: 2023-01-26

## 2023-01-26 PROCEDURE — 9900000065 HC CARDIAC REHAB PHASE 3 - 1 VISIT

## 2023-01-27 ENCOUNTER — HOSPITAL ENCOUNTER (OUTPATIENT)
Dept: CARDIAC REHAB | Age: 63
Discharge: HOME | End: 2023-01-27

## 2023-01-27 PROCEDURE — 9900000065 HC CARDIAC REHAB PHASE 3 - 1 VISIT

## 2023-01-31 ENCOUNTER — HOSPITAL ENCOUNTER (OUTPATIENT)
Dept: CARDIAC REHAB | Age: 63
Setting detail: THERAPIES SERIES
Discharge: HOME | End: 2023-01-31

## 2023-01-31 PROCEDURE — 9900000065 HC CARDIAC REHAB PHASE 3 - 1 VISIT

## 2023-02-24 ENCOUNTER — PROCEDURE VISIT (OUTPATIENT)
Dept: CARDIOLOGY | Age: 63
End: 2023-02-24

## 2023-02-24 DIAGNOSIS — Z95.810 ICD (IMPLANTABLE CARDIOVERTER-DEFIBRILLATOR) IN PLACE: ICD-10-CM

## 2023-02-24 DIAGNOSIS — I42.8 NONISCHEMIC CARDIOMYOPATHY (HCC): Primary | ICD-10-CM

## 2023-03-01 ENCOUNTER — HOSPITAL ENCOUNTER (OUTPATIENT)
Dept: CARDIAC REHAB | Age: 63
Discharge: HOME | End: 2023-03-01

## 2023-03-01 PROCEDURE — 9900000065 HC CARDIAC REHAB PHASE 3 - 1 VISIT

## 2023-03-07 ENCOUNTER — HOSPITAL ENCOUNTER (OUTPATIENT)
Dept: CARDIAC REHAB | Age: 63
Discharge: HOME | End: 2023-03-07

## 2023-03-07 PROCEDURE — 9900000065 HC CARDIAC REHAB PHASE 3 - 1 VISIT

## 2023-03-09 ENCOUNTER — HOSPITAL ENCOUNTER (OUTPATIENT)
Dept: CARDIAC REHAB | Age: 63
Discharge: HOME | End: 2023-03-09

## 2023-03-09 PROCEDURE — 9900000065 HC CARDIAC REHAB PHASE 3 - 1 VISIT

## 2023-04-18 ENCOUNTER — HOSPITAL ENCOUNTER (OUTPATIENT)
Dept: CARDIAC REHAB | Age: 63
Discharge: HOME | End: 2023-04-18

## 2023-04-18 PROCEDURE — 9900000065 HC CARDIAC REHAB PHASE 3 - 1 VISIT

## 2023-04-20 ENCOUNTER — HOSPITAL ENCOUNTER (OUTPATIENT)
Dept: CARDIAC REHAB | Age: 63
Discharge: HOME | End: 2023-04-20

## 2023-04-20 PROCEDURE — 9900000065 HC CARDIAC REHAB PHASE 3 - 1 VISIT

## 2023-04-27 ENCOUNTER — HOSPITAL ENCOUNTER (OUTPATIENT)
Dept: CARDIAC REHAB | Age: 63
Discharge: HOME | End: 2023-04-27

## 2023-04-27 PROCEDURE — 9900000065 HC CARDIAC REHAB PHASE 3 - 1 VISIT

## 2023-05-24 ENCOUNTER — HOSPITAL ENCOUNTER (OUTPATIENT)
Dept: CARDIAC REHAB | Age: 63
Discharge: HOME | End: 2023-05-24

## 2023-05-24 PROCEDURE — 9900000065 HC CARDIAC REHAB PHASE 3 - 1 VISIT

## 2023-06-02 ENCOUNTER — HOSPITAL ENCOUNTER (OUTPATIENT)
Dept: CARDIAC REHAB | Age: 63
Discharge: HOME | End: 2023-06-02

## 2023-06-02 PROCEDURE — 9900000065 HC CARDIAC REHAB PHASE 3 - 1 VISIT

## 2023-06-22 ENCOUNTER — HOSPITAL ENCOUNTER (OUTPATIENT)
Dept: CARDIAC REHAB | Age: 63
Discharge: HOME | End: 2023-06-22

## 2023-06-22 PROCEDURE — 9900000065 HC CARDIAC REHAB PHASE 3 - 1 VISIT

## 2023-06-23 ENCOUNTER — HOSPITAL ENCOUNTER (OUTPATIENT)
Dept: CARDIAC REHAB | Age: 63
Discharge: HOME | End: 2023-06-23

## 2023-06-23 PROCEDURE — 9900000065 HC CARDIAC REHAB PHASE 3 - 1 VISIT

## 2023-06-26 ENCOUNTER — HOSPITAL ENCOUNTER (OUTPATIENT)
Dept: CARDIAC REHAB | Age: 63
Discharge: HOME | End: 2023-06-26

## 2023-06-26 PROCEDURE — 9900000065 HC CARDIAC REHAB PHASE 3 - 1 VISIT

## 2023-06-29 ENCOUNTER — HOSPITAL ENCOUNTER (OUTPATIENT)
Dept: CARDIAC REHAB | Age: 63
Discharge: HOME | End: 2023-06-29

## 2023-06-29 PROCEDURE — 9900000065 HC CARDIAC REHAB PHASE 3 - 1 VISIT

## 2023-07-06 ENCOUNTER — HOSPITAL ENCOUNTER (OUTPATIENT)
Dept: CARDIAC REHAB | Age: 63
Discharge: HOME | End: 2023-07-06

## 2023-07-06 PROCEDURE — 9900000065 HC CARDIAC REHAB PHASE 3 - 1 VISIT

## 2023-07-18 ENCOUNTER — HOSPITAL ENCOUNTER (OUTPATIENT)
Dept: CARDIAC REHAB | Age: 63
Discharge: HOME | End: 2023-07-18

## 2023-07-18 PROCEDURE — 9900000065 HC CARDIAC REHAB PHASE 3 - 1 VISIT

## 2023-07-20 ENCOUNTER — OFFICE VISIT (OUTPATIENT)
Dept: CARDIOLOGY | Age: 63
End: 2023-07-20
Payer: COMMERCIAL

## 2023-07-20 VITALS
BODY MASS INDEX: 31.77 KG/M2 | HEART RATE: 90 BPM | DIASTOLIC BLOOD PRESSURE: 62 MMHG | SYSTOLIC BLOOD PRESSURE: 116 MMHG | WEIGHT: 199.8 LBS

## 2023-07-20 DIAGNOSIS — Z95.810 ICD (IMPLANTABLE CARDIOVERTER-DEFIBRILLATOR) IN PLACE: ICD-10-CM

## 2023-07-20 DIAGNOSIS — I42.8 NONISCHEMIC CARDIOMYOPATHY (HCC): Primary | ICD-10-CM

## 2023-07-20 PROCEDURE — 1036F TOBACCO NON-USER: CPT | Performed by: INTERNAL MEDICINE

## 2023-07-20 PROCEDURE — G8417 CALC BMI ABV UP PARAM F/U: HCPCS | Performed by: INTERNAL MEDICINE

## 2023-07-20 PROCEDURE — 93000 ELECTROCARDIOGRAM COMPLETE: CPT | Performed by: INTERNAL MEDICINE

## 2023-07-20 PROCEDURE — G8427 DOCREV CUR MEDS BY ELIG CLIN: HCPCS | Performed by: INTERNAL MEDICINE

## 2023-07-20 PROCEDURE — 3078F DIAST BP <80 MM HG: CPT | Performed by: INTERNAL MEDICINE

## 2023-07-20 PROCEDURE — 3017F COLORECTAL CA SCREEN DOC REV: CPT | Performed by: INTERNAL MEDICINE

## 2023-07-20 PROCEDURE — 3074F SYST BP LT 130 MM HG: CPT | Performed by: INTERNAL MEDICINE

## 2023-07-20 PROCEDURE — 99214 OFFICE O/P EST MOD 30 MIN: CPT | Performed by: INTERNAL MEDICINE

## 2023-07-20 RX ORDER — TIRZEPATIDE 2.5 MG/.5ML
INJECTION, SOLUTION SUBCUTANEOUS
COMMUNITY
Start: 2023-07-14

## 2023-07-20 RX ORDER — BUDESONIDE 0.5 MG/2ML
INHALANT ORAL
COMMUNITY
Start: 2023-05-19

## 2023-07-20 RX ORDER — ROSUVASTATIN CALCIUM 5 MG/1
TABLET, COATED ORAL
COMMUNITY
Start: 2023-07-11

## 2023-07-20 RX ORDER — SPIRONOLACTONE 25 MG/1
12.5 TABLET ORAL DAILY
Qty: 45 TABLET | Refills: 2 | Status: SHIPPED | OUTPATIENT
Start: 2023-07-20

## 2023-07-20 NOTE — PROGRESS NOTES
Cardiology Consultation/Follow Up. [facility]    CC: follow up for CHF and preoperative stratification prior to knee arthroplasty    HPI:  Leyla De La Garza presents for routine follow up for CHF. She is stable from cardiac standpoint, denies having any chest pain, dyspnea, orthopnea, or lower extremity edema. Overall her functional capacity is good and she is working fulltime. No alarms or shocks from the ICD. Last interrogation from April 2023 reviewed and within normal limits.   Patient is scheduled to have left knee arthroplasty in October 2022  She is also here for preoperative stratification      Past Medical:  Past Medical History:   Diagnosis Date    Acute on chronic systolic congestive heart failure ([address]) 01/21/2020    \"viral heart failure\" per patient    AICD (automatic cardioverter/defibrillator) present     Asthma     Uses daily Symbicort, rarely uses rescue inhaler, managed by PCP (10/27/22)    Bilateral corneal scars     COVID-19 11/10/2020    Disease of blood and blood forming organ     Diverticulitis     ON COLONSCOPY    DJD (degenerative joint disease), lumbar     Dry eye syndrome     DVT (deep venous thrombosis) ([address])     after splenic artery repair in right calf    Gastro - esophageal reflux disease     Hiatal hernia     History of blood transfusion 1982    History of echocardiogram 04/2020    Hyperlipidemia     HIGH CHOLESTEROL/HIGH TRIGLYCERIDES    Hyperplastic colon polyp     Hypertension     PCP DR Alejandro Moya, [place]    Hypothyroidism     LGSIL (low grade squamous intraepithelial dysplasia) 11/2014    referred to GYN/S/P colpo with bx LGSIL, repeat pap in 6 months    Meningioma ([address]) 05/2022    Non-celiac gluten sensitivity     wheezing is directly proportionate to wheat intake    NSTEMI (non-ST elevated myocardial infarction) ([address]) 01/16/2020    DR Guerra Grain    Osteoarthritis     Rivas. knees    Other disorders of kidney and ureter in diseases classified elsewhere

## 2023-07-25 ENCOUNTER — HOSPITAL ENCOUNTER (OUTPATIENT)
Dept: CARDIAC REHAB | Age: 63
Discharge: HOME | End: 2023-07-25

## 2023-07-25 PROCEDURE — 9900000065 HC CARDIAC REHAB PHASE 3 - 1 VISIT

## 2023-07-26 ENCOUNTER — TELEPHONE (OUTPATIENT)
Dept: CARDIOLOGY | Age: 63
End: 2023-07-26

## 2023-07-26 ENCOUNTER — HOSPITAL ENCOUNTER (OUTPATIENT)
Age: 63
Discharge: HOME | End: 2023-07-28
Attending: INTERNAL MEDICINE
Payer: COMMERCIAL

## 2023-07-26 VITALS
BODY MASS INDEX: 31.98 KG/M2 | DIASTOLIC BLOOD PRESSURE: 75 MMHG | SYSTOLIC BLOOD PRESSURE: 112 MMHG | WEIGHT: 199 LBS | HEIGHT: 66 IN | HEART RATE: 88 BPM

## 2023-07-26 DIAGNOSIS — I42.8 NONISCHEMIC CARDIOMYOPATHY (HCC): ICD-10-CM

## 2023-07-26 LAB
ECHO AO ROOT DIAM: 3.4 CM
ECHO AO ROOT INDEX: 1.7 CM/M2
ECHO BSA: 2.05 M2
ECHO LV FRACTIONAL SHORTENING: 19 % (ref 28–44)
ECHO LV INTERNAL DIMENSION DIASTOLE INDEX: 2.15 CM/M2
ECHO LV INTERNAL DIMENSION DIASTOLIC: 4.3 CM (ref 3.9–5.3)
ECHO LV INTERNAL DIMENSION SYSTOLIC INDEX: 1.75 CM/M2
ECHO LV INTERNAL DIMENSION SYSTOLIC: 3.5 CM
ECHO LV IVSD: 0.9 CM (ref 0.6–0.9)
ECHO LV MASS 2D: 132.7 G (ref 67–162)
ECHO LV MASS INDEX 2D: 66.4 G/M2 (ref 43–95)
ECHO LV POSTERIOR WALL DIASTOLIC: 1 CM (ref 0.6–0.9)
ECHO LV RELATIVE WALL THICKNESS RATIO: 0.47

## 2023-07-26 PROCEDURE — 93308 TTE F-UP OR LMTD: CPT | Performed by: INTERNAL MEDICINE

## 2023-07-26 PROCEDURE — 93308 TTE F-UP OR LMTD: CPT

## 2023-08-09 ENCOUNTER — TELEPHONE (OUTPATIENT)
Dept: CARDIOLOGY | Age: 63
End: 2023-08-09

## 2023-08-09 NOTE — TELEPHONE ENCOUNTER
Pt called to have a prescription for spironolactone sent [facility] because [facility] does not have any in stock. Also. .. pt has been taking a full tab of the medication instead of a half tab. How should she proceed?     [phone]

## 2023-08-10 RX ORDER — SPIRONOLACTONE 25 MG/1
12.5 TABLET ORAL DAILY
Qty: 90 TABLET | Refills: 4 | Status: SHIPPED | OUTPATIENT
Start: 2023-08-10

## 2023-08-14 ENCOUNTER — HOSPITAL ENCOUNTER (OUTPATIENT)
Dept: CARDIAC REHAB | Age: 63
Discharge: HOME | End: 2023-08-14

## 2023-08-14 PROCEDURE — 9900000065 HC CARDIAC REHAB PHASE 3 - 1 VISIT

## 2023-08-25 ENCOUNTER — PROCEDURE VISIT (OUTPATIENT)
Dept: CARDIOLOGY | Age: 63
End: 2023-08-25

## 2023-08-25 ENCOUNTER — HOSPITAL ENCOUNTER (OUTPATIENT)
Dept: CARDIAC REHAB | Age: 63
Discharge: HOME | End: 2023-08-25

## 2023-08-25 DIAGNOSIS — I42.8 NONISCHEMIC CARDIOMYOPATHY (HCC): Primary | ICD-10-CM

## 2023-08-25 DIAGNOSIS — Z95.810 AICD (AUTOMATIC CARDIOVERTER/DEFIBRILLATOR) PRESENT: ICD-10-CM

## 2023-08-25 PROCEDURE — 9900000065 HC CARDIAC REHAB PHASE 3 - 1 VISIT

## 2023-09-01 ENCOUNTER — HOSPITAL ENCOUNTER (OUTPATIENT)
Dept: CARDIAC REHAB | Age: 63
Discharge: HOME | End: 2023-09-01

## 2023-09-01 PROCEDURE — 9900000065 HC CARDIAC REHAB PHASE 3 - 1 VISIT

## 2023-09-29 ENCOUNTER — HOSPITAL ENCOUNTER (OUTPATIENT)
Dept: CARDIAC REHAB | Age: 63
Discharge: HOME | End: 2023-09-29

## 2023-09-29 PROCEDURE — 9900000065 HC CARDIAC REHAB PHASE 3 - 1 VISIT

## 2023-10-03 ENCOUNTER — HOSPITAL ENCOUNTER (OUTPATIENT)
Dept: CARDIAC REHAB | Age: 63
Discharge: HOME | End: 2023-10-03

## 2023-10-03 PROCEDURE — 9900000065 HC CARDIAC REHAB PHASE 3 - 1 VISIT

## 2023-10-06 ENCOUNTER — HOSPITAL ENCOUNTER (OUTPATIENT)
Dept: CARDIAC REHAB | Age: 63
Discharge: HOME | End: 2023-10-06

## 2023-10-06 PROCEDURE — 9900000065 HC CARDIAC REHAB PHASE 3 - 1 VISIT

## 2023-10-12 ENCOUNTER — HOSPITAL ENCOUNTER (OUTPATIENT)
Dept: CARDIAC REHAB | Age: 63
Discharge: HOME | End: 2023-10-12

## 2023-10-12 PROCEDURE — 9900000065 HC CARDIAC REHAB PHASE 3 - 1 VISIT

## 2023-10-16 ENCOUNTER — HOSPITAL ENCOUNTER (OUTPATIENT)
Dept: MAMMOGRAPHY | Age: 63
Discharge: HOME | End: 2023-10-18
Payer: COMMERCIAL

## 2023-10-16 VITALS — BODY MASS INDEX: 31.8 KG/M2 | WEIGHT: 197 LBS

## 2023-10-16 DIAGNOSIS — Z12.31 BREAST CANCER SCREENING BY MAMMOGRAM: ICD-10-CM

## 2023-10-16 PROCEDURE — 77063 BREAST TOMOSYNTHESIS BI: CPT

## 2023-10-18 ENCOUNTER — HOSPITAL ENCOUNTER (OUTPATIENT)
Dept: CARDIAC REHAB | Age: 63
Discharge: HOME | End: 2023-10-18

## 2023-10-18 PROCEDURE — 9900000065 HC CARDIAC REHAB PHASE 3 - 1 VISIT

## 2024-01-19 ENCOUNTER — HOSPITAL ENCOUNTER (OUTPATIENT)
Dept: CARDIAC REHAB | Age: 64
Discharge: HOME | End: 2024-01-19

## 2024-01-19 PROCEDURE — 9900000065 HC CARDIAC REHAB PHASE 3 - 1 VISIT

## 2024-01-23 ENCOUNTER — HOSPITAL ENCOUNTER (OUTPATIENT)
Dept: CARDIAC REHAB | Age: 64
Discharge: HOME | End: 2024-01-23

## 2024-01-23 PROCEDURE — 9900000065 HC CARDIAC REHAB PHASE 3 - 1 VISIT

## 2024-01-25 RX ORDER — PREDNISONE 5 MG/1
TABLET ORAL
Qty: 32 TABLET | Refills: 1 | OUTPATIENT
Start: 2024-01-25

## 2024-01-30 ENCOUNTER — HOSPITAL ENCOUNTER (OUTPATIENT)
Dept: CARDIAC REHAB | Age: 64
Discharge: HOME | End: 2024-01-30

## 2024-01-30 PROCEDURE — 9900000065 HC CARDIAC REHAB PHASE 3 - 1 VISIT

## 2024-02-01 ENCOUNTER — OFFICE VISIT (OUTPATIENT)
Dept: CARDIOLOGY | Age: 64
End: 2024-02-01
Payer: COMMERCIAL

## 2024-02-01 VITALS
BODY MASS INDEX: 31.38 KG/M2 | WEIGHT: 194.4 LBS | HEART RATE: 96 BPM | DIASTOLIC BLOOD PRESSURE: 60 MMHG | SYSTOLIC BLOOD PRESSURE: 102 MMHG

## 2024-02-01 DIAGNOSIS — K21.9 LPRD (LARYNGOPHARYNGEAL REFLUX DISEASE): ICD-10-CM

## 2024-02-01 DIAGNOSIS — K21.9 GERD WITHOUT ESOPHAGITIS: ICD-10-CM

## 2024-02-01 PROCEDURE — 3017F COLORECTAL CA SCREEN DOC REV: CPT | Performed by: INTERNAL MEDICINE

## 2024-02-01 PROCEDURE — 1123F ACP DISCUSS/DSCN MKR DOCD: CPT | Performed by: INTERNAL MEDICINE

## 2024-02-01 PROCEDURE — G8428 CUR MEDS NOT DOCUMENT: HCPCS | Performed by: INTERNAL MEDICINE

## 2024-02-01 PROCEDURE — 99214 OFFICE O/P EST MOD 30 MIN: CPT | Performed by: INTERNAL MEDICINE

## 2024-02-01 PROCEDURE — 3074F SYST BP LT 130 MM HG: CPT | Performed by: INTERNAL MEDICINE

## 2024-02-01 PROCEDURE — G8484 FLU IMMUNIZE NO ADMIN: HCPCS | Performed by: INTERNAL MEDICINE

## 2024-02-01 PROCEDURE — G8417 CALC BMI ABV UP PARAM F/U: HCPCS | Performed by: INTERNAL MEDICINE

## 2024-02-01 PROCEDURE — 1090F PRES/ABSN URINE INCON ASSESS: CPT | Performed by: INTERNAL MEDICINE

## 2024-02-01 PROCEDURE — G8399 PT W/DXA RESULTS DOCUMENT: HCPCS | Performed by: INTERNAL MEDICINE

## 2024-02-01 PROCEDURE — 93000 ELECTROCARDIOGRAM COMPLETE: CPT | Performed by: INTERNAL MEDICINE

## 2024-02-01 PROCEDURE — 3078F DIAST BP <80 MM HG: CPT | Performed by: INTERNAL MEDICINE

## 2024-02-01 PROCEDURE — 1036F TOBACCO NON-USER: CPT | Performed by: INTERNAL MEDICINE

## 2024-02-01 RX ORDER — METOPROLOL SUCCINATE 100 MG/1
100 TABLET, EXTENDED RELEASE ORAL 2 TIMES DAILY
Qty: 180 TABLET | Refills: 3 | Status: SHIPPED | OUTPATIENT
Start: 2024-02-01

## 2024-02-01 RX ORDER — PANTOPRAZOLE SODIUM 40 MG/1
TABLET, DELAYED RELEASE ORAL
Qty: 90 TABLET | Refills: 3 | Status: SHIPPED | OUTPATIENT
Start: 2024-02-01

## 2024-02-01 NOTE — PROGRESS NOTES
Cardiology Consultation/Follow Up.  [facility]    CC: follow up for CHF    HPI:  Tanya S Brunner presents for routine follow up for CHF.  She is stable from cardiac standpoint, denies having any chest pain, dyspnea, orthopnea, or lower extremity edema.    Overall her functional capacity is good and she is working fulltime.  No alarms or shocks from the ICD.  Last interrogation from April 2023 reviewed and within normal limits.  Underwent left knee arthroplasty since last evaluation, no perioperative complications noted      Past Medical:  Past Medical History:   Diagnosis Date    Acute on chronic systolic congestive heart failure (HCC) 01/21/2020    \"viral heart failure\" per patient    AICD (automatic cardioverter/defibrillator) present     Asthma     Uses daily Symbicort, rarely uses rescue inhaler, managed by PCP (10/27/22)    Bilateral corneal scars     COVID-19 11/10/2020    Disease of blood and blood forming organ     Diverticulitis     ON COLONSCOPY    DJD (degenerative joint disease), lumbar     Dry eye syndrome     DVT (deep venous thrombosis) ([facility])     after splenic artery repair in right calf    Gastro - esophageal reflux disease     Hiatal hernia     History of blood transfusion 1982    History of echocardiogram 04/2020    Hyperlipidemia     HIGH CHOLESTEROL/HIGH TRIGLYCERIDES    Hyperplastic colon polyp     Hypertension     PCP DR BENJY AGUIRRE, [place]    Hypothyroidism     LGSIL (low grade squamous intraepithelial dysplasia) 11/2014    referred to GYN/S/P colpo with bx LGSIL, repeat pap in 6 months    Meningioma ([facility]) 05/2022    Non-celiac gluten sensitivity     wheezing is directly proportionate to wheat intake    NSTEMI (non-ST elevated myocardial infarction) ([facility]) 01/16/2020    DR MONGE    Osteoarthritis     Rivas. knees    Other disorders of kidney and ureter in diseases classified elsewhere     Sciatica of right side     Seborrheic dermatitis     Splenic artery aneurysm ([facility])

## 2024-02-12 ENCOUNTER — HOSPITAL ENCOUNTER (OUTPATIENT)
Dept: CARDIAC REHAB | Age: 64
Discharge: HOME | End: 2024-02-12

## 2024-02-12 PROCEDURE — 9900000065 HC CARDIAC REHAB PHASE 3 - 1 VISIT

## 2024-02-21 ENCOUNTER — HOSPITAL ENCOUNTER (OUTPATIENT)
Dept: CARDIAC REHAB | Age: 64
Discharge: HOME | End: 2024-02-21

## 2024-02-21 PROCEDURE — 9900000065 HC CARDIAC REHAB PHASE 3 - 1 VISIT

## 2024-02-23 ENCOUNTER — PROCEDURE VISIT (OUTPATIENT)
Dept: CARDIOLOGY | Age: 64
End: 2024-02-23

## 2024-02-23 DIAGNOSIS — I50.23 ACUTE ON CHRONIC SYSTOLIC CONGESTIVE HEART FAILURE (HCC): Primary | ICD-10-CM

## 2024-02-23 DIAGNOSIS — Z95.810 AICD (AUTOMATIC CARDIOVERTER/DEFIBRILLATOR) PRESENT: ICD-10-CM

## 2024-02-27 ENCOUNTER — HOSPITAL ENCOUNTER (OUTPATIENT)
Dept: CARDIAC REHAB | Age: 64
Discharge: HOME | End: 2024-02-27

## 2024-02-27 PROCEDURE — 9900000065 HC CARDIAC REHAB PHASE 3 - 1 VISIT

## 2024-03-07 ENCOUNTER — HOSPITAL ENCOUNTER (OUTPATIENT)
Dept: CARDIAC REHAB | Age: 64
Discharge: HOME | End: 2024-03-07

## 2024-03-12 ENCOUNTER — HOSPITAL ENCOUNTER (OUTPATIENT)
Dept: CARDIAC REHAB | Age: 64
Discharge: HOME | End: 2024-03-12

## 2024-03-12 PROCEDURE — 9900000065 HC CARDIAC REHAB PHASE 3 - 1 VISIT

## 2024-03-20 ENCOUNTER — HOSPITAL ENCOUNTER (OUTPATIENT)
Dept: CARDIAC REHAB | Age: 64
Discharge: HOME | End: 2024-03-20

## 2024-03-20 PROCEDURE — 9900000065 HC CARDIAC REHAB PHASE 3 - 1 VISIT

## 2024-03-28 ENCOUNTER — HOSPITAL ENCOUNTER (OUTPATIENT)
Dept: CARDIAC REHAB | Age: 64
Discharge: HOME | End: 2024-03-28

## 2024-03-28 PROCEDURE — 9900000065 HC CARDIAC REHAB PHASE 3 - 1 VISIT

## 2024-04-02 ENCOUNTER — HOSPITAL ENCOUNTER (OUTPATIENT)
Dept: CARDIAC REHAB | Age: 64
Discharge: HOME | End: 2024-04-02

## 2024-04-09 ENCOUNTER — HOSPITAL ENCOUNTER (OUTPATIENT)
Dept: CARDIAC REHAB | Age: 64
Discharge: HOME | End: 2024-04-09

## 2024-04-09 PROCEDURE — 9900000065 HC CARDIAC REHAB PHASE 3 - 1 VISIT

## 2024-06-04 ENCOUNTER — HOSPITAL ENCOUNTER (OUTPATIENT)
Dept: CARDIAC REHAB | Age: 64
Discharge: HOME | End: 2024-06-04

## 2024-06-11 ENCOUNTER — HOSPITAL ENCOUNTER (OUTPATIENT)
Dept: CARDIAC REHAB | Age: 64
Discharge: HOME | End: 2024-06-11

## 2024-06-11 PROCEDURE — 9900000065 HC CARDIAC REHAB PHASE 3 - 1 VISIT

## 2024-06-18 ENCOUNTER — HOSPITAL ENCOUNTER (OUTPATIENT)
Dept: CARDIAC REHAB | Age: 64
Discharge: HOME | End: 2024-06-18

## 2024-06-18 PROCEDURE — 9900000065 HC CARDIAC REHAB PHASE 3 - 1 VISIT

## 2024-06-25 ENCOUNTER — HOSPITAL ENCOUNTER (OUTPATIENT)
Dept: CARDIAC REHAB | Age: 64
Discharge: HOME | End: 2024-06-25

## 2024-06-25 PROCEDURE — 9900000065 HC CARDIAC REHAB PHASE 3 - 1 VISIT

## 2024-06-26 ENCOUNTER — HOSPITAL ENCOUNTER (EMERGENCY)
Age: 64
Discharge: ANOTHER HOSPITAL | End: 2024-06-26
Attending: EMERGENCY MEDICINE
Payer: COMMERCIAL

## 2024-06-26 ENCOUNTER — APPOINTMENT (OUTPATIENT)
Dept: GENERAL RADIOLOGY | Age: 64
End: 2024-06-26
Payer: COMMERCIAL

## 2024-06-26 ENCOUNTER — APPOINTMENT (OUTPATIENT)
Dept: CT IMAGING | Age: 64
End: 2024-06-26
Payer: COMMERCIAL

## 2024-06-26 ENCOUNTER — HOSPITAL ENCOUNTER (INPATIENT)
Age: 64
LOS: 1 days | Discharge: HOME | End: 2024-06-27
Attending: PSYCHIATRY & NEUROLOGY | Admitting: PSYCHIATRY & NEUROLOGY
Payer: MEDICARE

## 2024-06-26 VITALS
DIASTOLIC BLOOD PRESSURE: 65 MMHG | TEMPERATURE: 97.4 F | BODY MASS INDEX: 30.67 KG/M2 | WEIGHT: 190 LBS | SYSTOLIC BLOOD PRESSURE: 121 MMHG | RESPIRATION RATE: 16 BRPM | OXYGEN SATURATION: 98 % | HEART RATE: 93 BPM

## 2024-06-26 DIAGNOSIS — I63.9 CEREBROVASCULAR ACCIDENT (CVA), UNSPECIFIED MECHANISM (HCC): Primary | ICD-10-CM

## 2024-06-26 DIAGNOSIS — G45.9 TIA (TRANSIENT ISCHEMIC ATTACK): Primary | ICD-10-CM

## 2024-06-26 DIAGNOSIS — R29.90 STROKE-LIKE SYMPTOMS: ICD-10-CM

## 2024-06-26 LAB
ALBUMIN SERPL-MCNC: 4.2 G/DL (ref 3.5–5.2)
ALBUMIN/GLOB SERPL: 1.6 {RATIO} (ref 1–2.5)
ALP SERPL-CCNC: 58 U/L (ref 35–104)
ALT SERPL-CCNC: 17 U/L (ref 5–33)
ANION GAP SERPL CALCULATED.3IONS-SCNC: 14 MMOL/L (ref 9–17)
AST SERPL-CCNC: 13 U/L
BASOPHILS # BLD: 0.03 K/UL (ref 0–0.2)
BASOPHILS NFR BLD: 1 % (ref 0–2)
BILIRUB SERPL-MCNC: 0.4 MG/DL (ref 0.3–1.2)
BILIRUB UR QL STRIP: NEGATIVE
BUN SERPL-MCNC: 15 MG/DL (ref 8–23)
BUN/CREAT SERPL: 21 (ref 9–20)
CALCIUM SERPL-MCNC: 9.4 MG/DL (ref 8.6–10.4)
CHLORIDE SERPL-SCNC: 102 MMOL/L (ref 98–107)
CLARITY UR: CLEAR
CO2 SERPL-SCNC: 22 MMOL/L (ref 20–31)
COLOR UR: YELLOW
COMMENT: ABNORMAL
CREAT SERPL-MCNC: 0.7 MG/DL (ref 0.5–0.9)
EOSINOPHIL # BLD: 0.12 K/UL (ref 0–0.44)
EOSINOPHILS RELATIVE PERCENT: 2 % (ref 1–4)
ERYTHROCYTE [DISTWIDTH] IN BLOOD BY AUTOMATED COUNT: 13.3 % (ref 11.8–14.4)
GFR, ESTIMATED: >90 ML/MIN/1.73M2
GLUCOSE SERPL-MCNC: 178 MG/DL (ref 70–99)
GLUCOSE UR STRIP-MCNC: ABNORMAL MG/DL
HCT VFR BLD AUTO: 35.2 % (ref 36.3–47.1)
HGB BLD-MCNC: 11.9 G/DL (ref 11.9–15.1)
HGB UR QL STRIP.AUTO: NEGATIVE
IMM GRANULOCYTES # BLD AUTO: 0.04 K/UL (ref 0–0.3)
IMM GRANULOCYTES NFR BLD: 1 %
INR PPP: 1
KETONES UR STRIP-MCNC: NEGATIVE MG/DL
LEUKOCYTE ESTERASE UR QL STRIP: NEGATIVE
LYMPHOCYTES NFR BLD: 0.94 K/UL (ref 1.1–3.7)
LYMPHOCYTES RELATIVE PERCENT: 15 % (ref 24–43)
MCH RBC QN AUTO: 29.1 PG (ref 25.2–33.5)
MCHC RBC AUTO-ENTMCNC: 33.8 G/DL (ref 25.2–33.5)
MCV RBC AUTO: 86.1 FL (ref 82.6–102.9)
MONOCYTES NFR BLD: 0.56 K/UL (ref 0.1–1.2)
MONOCYTES NFR BLD: 9 % (ref 3–12)
NEUTROPHILS NFR BLD: 72 % (ref 36–65)
NEUTS SEG NFR BLD: 4.47 K/UL (ref 1.5–8.1)
NITRITE UR QL STRIP: NEGATIVE
NRBC BLD-RTO: 0 PER 100 WBC
PH UR STRIP: 6 [PH] (ref 5–6)
PLATELET # BLD AUTO: 182 K/UL (ref 138–453)
PMV BLD AUTO: 9.2 FL (ref 8.1–13.5)
POTASSIUM SERPL-SCNC: 3.9 MMOL/L (ref 3.7–5.3)
PROT SERPL-MCNC: 6.8 G/DL (ref 6.4–8.3)
PROT UR STRIP-MCNC: NEGATIVE MG/DL
PROTHROMBIN TIME: 12.7 SEC (ref 11.5–14.2)
RBC # BLD AUTO: 4.09 M/UL (ref 3.95–5.11)
SODIUM SERPL-SCNC: 138 MMOL/L (ref 135–144)
SP GR UR STRIP: 1.01 (ref 1.01–1.02)
TROPONIN I SERPL HS-MCNC: 7 NG/L (ref 0–14)
UROBILINOGEN UR STRIP-ACNC: NORMAL EU/DL (ref 0–1)
WBC OTHER # BLD: 6.2 K/UL (ref 3.5–11.3)

## 2024-06-26 PROCEDURE — 99285 EMERGENCY DEPT VISIT HI MDM: CPT

## 2024-06-26 PROCEDURE — 6370000000 HC RX 637 (ALT 250 FOR IP): Performed by: EMERGENCY MEDICINE

## 2024-06-26 PROCEDURE — 6360000002 HC RX W HCPCS: Performed by: PSYCHIATRY & NEUROLOGY

## 2024-06-26 PROCEDURE — 2060000000 HC ICU INTERMEDIATE R&B

## 2024-06-26 PROCEDURE — 6360000004 HC RX CONTRAST MEDICATION: Performed by: EMERGENCY MEDICINE

## 2024-06-26 PROCEDURE — 80053 COMPREHEN METABOLIC PANEL: CPT

## 2024-06-26 PROCEDURE — 84484 ASSAY OF TROPONIN QUANT: CPT

## 2024-06-26 PROCEDURE — 36415 COLL VENOUS BLD VENIPUNCTURE: CPT

## 2024-06-26 PROCEDURE — 71045 X-RAY EXAM CHEST 1 VIEW: CPT

## 2024-06-26 PROCEDURE — 81003 URINALYSIS AUTO W/O SCOPE: CPT

## 2024-06-26 PROCEDURE — 93005 ELECTROCARDIOGRAM TRACING: CPT | Performed by: EMERGENCY MEDICINE

## 2024-06-26 PROCEDURE — 85610 PROTHROMBIN TIME: CPT

## 2024-06-26 PROCEDURE — 85025 COMPLETE CBC W/AUTO DIFF WBC: CPT

## 2024-06-26 PROCEDURE — 70450 CT HEAD/BRAIN W/O DYE: CPT

## 2024-06-26 PROCEDURE — 6370000000 HC RX 637 (ALT 250 FOR IP): Performed by: PSYCHIATRY & NEUROLOGY

## 2024-06-26 PROCEDURE — 2709999900 CTA HEAD NECK W CONTRAST

## 2024-06-26 RX ORDER — ASPIRIN 300 MG/1
300 SUPPOSITORY RECTAL DAILY
Status: DISCONTINUED | OUTPATIENT
Start: 2024-06-26 | End: 2024-06-27 | Stop reason: HOSPADM

## 2024-06-26 RX ORDER — ROSUVASTATIN CALCIUM 20 MG/1
40 TABLET, COATED ORAL NIGHTLY
Status: DISCONTINUED | OUTPATIENT
Start: 2024-06-26 | End: 2024-06-27 | Stop reason: HOSPADM

## 2024-06-26 RX ORDER — CLOPIDOGREL BISULFATE 75 MG/1
75 TABLET ORAL DAILY
Status: DISCONTINUED | OUTPATIENT
Start: 2024-06-27 | End: 2024-06-27 | Stop reason: HOSPADM

## 2024-06-26 RX ORDER — ONDANSETRON 4 MG/1
4 TABLET, ORALLY DISINTEGRATING ORAL EVERY 8 HOURS PRN
Status: DISCONTINUED | OUTPATIENT
Start: 2024-06-26 | End: 2024-06-27 | Stop reason: HOSPADM

## 2024-06-26 RX ORDER — SODIUM CHLORIDE 0.9 % (FLUSH) 0.9 %
5-40 SYRINGE (ML) INJECTION EVERY 12 HOURS SCHEDULED
Status: DISCONTINUED | OUTPATIENT
Start: 2024-06-26 | End: 2024-06-27 | Stop reason: HOSPADM

## 2024-06-26 RX ORDER — ONDANSETRON 2 MG/ML
4 INJECTION INTRAMUSCULAR; INTRAVENOUS EVERY 6 HOURS PRN
Status: DISCONTINUED | OUTPATIENT
Start: 2024-06-26 | End: 2024-06-27 | Stop reason: HOSPADM

## 2024-06-26 RX ORDER — SODIUM CHLORIDE 0.9 % (FLUSH) 0.9 %
5-40 SYRINGE (ML) INJECTION PRN
Status: DISCONTINUED | OUTPATIENT
Start: 2024-06-26 | End: 2024-06-27 | Stop reason: HOSPADM

## 2024-06-26 RX ORDER — SODIUM CHLORIDE 9 MG/ML
INJECTION, SOLUTION INTRAVENOUS PRN
Status: DISCONTINUED | OUTPATIENT
Start: 2024-06-26 | End: 2024-06-27 | Stop reason: HOSPADM

## 2024-06-26 RX ORDER — TIRZEPATIDE 7.5 MG/.5ML
7.5 INJECTION, SOLUTION SUBCUTANEOUS WEEKLY
COMMUNITY
Start: 2024-05-30

## 2024-06-26 RX ORDER — MIDAZOLAM HYDROCHLORIDE 2 MG/2ML
2 INJECTION, SOLUTION INTRAMUSCULAR; INTRAVENOUS ONCE
Status: COMPLETED | OUTPATIENT
Start: 2024-06-26 | End: 2024-06-27

## 2024-06-26 RX ORDER — ASPIRIN 81 MG/1
81 TABLET, CHEWABLE ORAL DAILY
Status: DISCONTINUED | OUTPATIENT
Start: 2024-06-26 | End: 2024-06-27 | Stop reason: HOSPADM

## 2024-06-26 RX ORDER — POLYETHYLENE GLYCOL 3350 17 G/17G
17 POWDER, FOR SOLUTION ORAL DAILY PRN
Status: DISCONTINUED | OUTPATIENT
Start: 2024-06-26 | End: 2024-06-27 | Stop reason: HOSPADM

## 2024-06-26 RX ORDER — CLOPIDOGREL BISULFATE 75 MG/1
300 TABLET ORAL ONCE
Status: COMPLETED | OUTPATIENT
Start: 2024-06-26 | End: 2024-06-26

## 2024-06-26 RX ORDER — ENOXAPARIN SODIUM 100 MG/ML
40 INJECTION SUBCUTANEOUS DAILY
Status: DISCONTINUED | OUTPATIENT
Start: 2024-06-26 | End: 2024-06-27 | Stop reason: HOSPADM

## 2024-06-26 RX ORDER — ASPIRIN 81 MG/1
324 TABLET, CHEWABLE ORAL ONCE
Status: COMPLETED | OUTPATIENT
Start: 2024-06-26 | End: 2024-06-26

## 2024-06-26 RX ORDER — ATORVASTATIN CALCIUM 40 MG/1
40 TABLET, FILM COATED ORAL DAILY
COMMUNITY

## 2024-06-26 RX ADMIN — POLYETHYLENE GLYCOL 3350 17 G: 17 POWDER, FOR SOLUTION ORAL at 21:15

## 2024-06-26 RX ADMIN — ASPIRIN 324 MG: 81 TABLET, CHEWABLE ORAL at 16:14

## 2024-06-26 RX ADMIN — ASPIRIN 81 MG 81 MG: 81 TABLET ORAL at 21:11

## 2024-06-26 RX ADMIN — IOPAMIDOL 75 ML: 755 INJECTION, SOLUTION INTRAVENOUS at 14:18

## 2024-06-26 RX ADMIN — ENOXAPARIN SODIUM 40 MG: 100 INJECTION SUBCUTANEOUS at 21:11

## 2024-06-26 RX ADMIN — CLOPIDOGREL BISULFATE 300 MG: 75 TABLET ORAL at 16:19

## 2024-06-26 ASSESSMENT — ENCOUNTER SYMPTOMS
COUGH: 0
CONSTIPATION: 0
DIARRHEA: 0
EYE PAIN: 0
ABDOMINAL PAIN: 0
SHORTNESS OF BREATH: 0
BLOOD IN STOOL: 0
BACK PAIN: 0
VOMITING: 0
NAUSEA: 0

## 2024-06-26 ASSESSMENT — PAIN - FUNCTIONAL ASSESSMENT
PAIN_FUNCTIONAL_ASSESSMENT: NONE - DENIES PAIN
PAIN_FUNCTIONAL_ASSESSMENT: NONE - DENIES PAIN

## 2024-06-26 NOTE — H&P
Stroke H&P Note      Patient: Tanya S Brunner : 1960  MRN: [id number]   Date of admission: 24   Reason for admission: TIA episode  Information obtained from: Patient, chart review  Allergies:  Dapagliflozin, Ibuprofen, Other, Ativan [lorazepam], Codeine, Darvon [propoxyphene], and Lisinopril    HISTORY OF PRESENTING ILLNESS     Tanya S Brunner is a 64 y.o. right handed female with a history of nonischemic cardiomyopathy, congestive heart failure status post AICD placement, hypertension, hyperlipidemia, type 2 diabetes, and a known prior subcortical stroke (anterior limb of the right internal capsule) not on antiplatelet therapy who is transferred to our facility for further care and management after experiencing a prolonged transient 2-hour episode of speech abnormality.    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  While at work earlier today, patient experienced inability to appropriately express her language lasting for approximately 2 hours.  Patient did not seek immediate care.  Furthermore, patient adds that she had experienced a similar episode 2 months prior.  She presented to an outside facility with initial cranial imaging not suggestive of an acute process.  Upon transfer to our facility, patient did not have any focal deficits.  She did not have any paraphasic errors.  Lastly, patient does not report any new symptoms.     PAST MEDICAL HISTORIES         Diagnosis Date    Acute on chronic systolic congestive heart failure (HCC) 2020    \"viral heart failure\" per patient    AICD (automatic cardioverter/defibrillator) present     Asthma     Uses daily Symbicort, rarely uses rescue inhaler, managed by PCP (10/27/22)    Bilateral corneal scars     COVID-19 11/10/2020    Disease of blood and blood forming organ     Diverticulitis     ON COLONSCOPY    DJD (degenerative joint disease), lumbar     Dry eye syndrome     DVT (deep venous thrombosis) ([facility])     after splenic artery repair

## 2024-06-26 NOTE — ED NOTES
Patient comes in with  from [facility] she was having trouble finding her words and some difficulty speaking.  drove her here. Patient ambulates well to room and refuses need for wheelchair.

## 2024-06-26 NOTE — ED PROVIDER NOTES
[facility]  eMERGENCY dEPARTMENT eNCOUnter      Pt Name: Tanya S Brunner  MRN: [id number]  Birthdate 1960  Date of evaluation: 6/26/2024      CHIEF COMPLAINT       Chief Complaint   Patient presents with    Aphasia         HISTORY OF PRESENT ILLNESS    Tanya S Brunner is a 64 y.o. female who presents with chief complaint of difficulty speaking she said she felt funny yesterday she had a little visual change she has had problems with the left eye before she had a prior history of a stroke over a year ago that resolved on its own where she lost some ability to speak today she was speaking and all of a sudden had problems finding the right words she was a distance away from the hospital and was driven here by family onset was 1115 today by time she arrived in the room her speech was back to normal there was no headache no focal weakness no trauma  She has a history of heart disease    REVIEW OF SYSTEMS         Review of Systems   Constitutional:  Negative for chills and fever.   HENT:  Negative for congestion and ear pain.    Eyes:  Positive for visual disturbance. Negative for pain.   Respiratory:  Negative for cough and shortness of breath.    Cardiovascular:  Negative for chest pain, palpitations and leg swelling.   Gastrointestinal:  Negative for abdominal pain, blood in stool, constipation, diarrhea, nausea and vomiting.   Endocrine: Negative for polydipsia and polyuria.   Genitourinary:  Negative for difficulty urinating, dysuria, frequency, vaginal bleeding and vaginal discharge.   Musculoskeletal:  Negative for back pain, joint swelling, myalgias, neck pain and neck stiffness.   Skin:  Negative for rash.   Neurological:  Positive for speech difficulty. Negative for dizziness, weakness and headaches.   Hematological:  Negative for adenopathy. Does not bruise/bleed easily.   Psychiatric/Behavioral:  Negative for confusion, self-injury and suicidal ideas.        PAST MEDICAL HISTORY    has a past  Rate and Rhythm: Normal rate and regular rhythm.   Pulmonary:      Effort: Pulmonary effort is normal.      Breath sounds: Normal breath sounds.   Abdominal:      General: Bowel sounds are normal.      Palpations: Abdomen is soft.   Musculoskeletal:         General: No tenderness. Normal range of motion.      Cervical back: Normal range of motion.      Right lower leg: No edema.      Left lower leg: No edema.   Skin:     General: Skin is warm and dry.   Neurological:      General: No focal deficit present.      Mental Status: She is alert and oriented to person, place, and time.      Comments: NIH is 0 her gait is steady her speech is clear there is no facial asymmetry   Psychiatric:         Behavior: Behavior normal.           DIFFERENTIAL DIAGNOSIS/ MDM:     TIA will do a workup including CT CTA    DIAGNOSTIC RESULTS     EKG: All EKG's are interpreted by the Emergency Department Physician who either signs or Co-signs this chart in the absence of a cardiologist.  EKG shows normal sinus rhythm rate of 93 bpm parables 118 ms QS durations 88 ms QT corrected 462 ms axis is 60 there is no acute ST or T wave changes normal EKG      RADIOLOGY:   CTA HEAD NECK W CONTRAST   Final Result   No significant intracranial stenosis or evidence for occlusion.      Approximately 30-40% stenosis of the proximal left internal carotid artery.         CT HEAD WO CONTRAST   Final Result   No acute intracranial process identified.         XR CHEST PORTABLE   Final Result   No acute cardiopulmonary process.            I directly visualized the following  images and reviewed the radiologist interpretations:          ED BEDSIDE ULTRASOUND:       LABS:  Labs Reviewed   CBC WITH AUTO DIFFERENTIAL - Abnormal; Notable for the following components:       Result Value    Hematocrit 35.2 (*)     MCHC 33.8 (*)     Neutrophils % 72 (*)     Lymphocytes % 15 (*)     Immature Granulocytes % 1 (*)     Lymphocytes Absolute 0.94 (*)     All other

## 2024-06-27 ENCOUNTER — APPOINTMENT (OUTPATIENT)
Age: 64
End: 2024-06-27
Attending: PSYCHIATRY & NEUROLOGY
Payer: MEDICARE

## 2024-06-27 ENCOUNTER — APPOINTMENT (OUTPATIENT)
Dept: MRI IMAGING | Age: 64
End: 2024-06-27
Attending: PSYCHIATRY & NEUROLOGY
Payer: MEDICARE

## 2024-06-27 VITALS
HEIGHT: 67 IN | BODY MASS INDEX: 29.98 KG/M2 | RESPIRATION RATE: 16 BRPM | WEIGHT: 191 LBS | SYSTOLIC BLOOD PRESSURE: 114 MMHG | TEMPERATURE: 98 F | DIASTOLIC BLOOD PRESSURE: 68 MMHG | HEART RATE: 86 BPM | OXYGEN SATURATION: 98 %

## 2024-06-27 PROBLEM — I63.9 CEREBROVASCULAR ACCIDENT (CVA) (HCC): Status: ACTIVE | Noted: 2024-06-27

## 2024-06-27 LAB
ALBUMIN SERPL-MCNC: 4.1 G/DL (ref 3.5–5.2)
ALBUMIN/GLOB SERPL: 2 {RATIO} (ref 1–2.5)
ALP SERPL-CCNC: 46 U/L (ref 35–104)
ALT SERPL-CCNC: 12 U/L (ref 10–35)
ANION GAP SERPL CALCULATED.3IONS-SCNC: 12 MMOL/L (ref 9–16)
AST SERPL-CCNC: 18 U/L (ref 10–35)
BILIRUB SERPL-MCNC: 0.4 MG/DL (ref 0–1.2)
BUN SERPL-MCNC: 11 MG/DL (ref 8–23)
CALCIUM SERPL-MCNC: 8.9 MG/DL (ref 8.6–10.4)
CHLORIDE SERPL-SCNC: 105 MMOL/L (ref 98–107)
CHOLEST SERPL-MCNC: 133 MG/DL (ref 0–199)
CHOLESTEROL/HDL RATIO: 3
CO2 SERPL-SCNC: 22 MMOL/L (ref 20–31)
CREAT SERPL-MCNC: 0.6 MG/DL (ref 0.5–0.9)
ECHO AO ROOT DIAM: 3.3 CM
ECHO AO ROOT INDEX: 1.67 CM/M2
ECHO AR MAX VEL PISA: 4.1 M/S
ECHO AV AREA PEAK VELOCITY: 2.9 CM2
ECHO AV AREA VTI: 2.9 CM2
ECHO AV AREA/BSA PEAK VELOCITY: 1.5 CM2/M2
ECHO AV AREA/BSA VTI: 1.5 CM2/M2
ECHO AV MEAN GRADIENT: 5 MMHG
ECHO AV MEAN VELOCITY: 1 M/S
ECHO AV PEAK GRADIENT: 8 MMHG
ECHO AV PEAK VELOCITY: 1.4 M/S
ECHO AV REGURGITANT PHT: 510 MS
ECHO AV VELOCITY RATIO: 0.93
ECHO AV VTI: 25.8 CM
ECHO BSA: 2.02 M2
ECHO EST RA PRESSURE: 8 MMHG
ECHO IVC PROX: 1.7 CM
ECHO LA AREA 2C: 15.9 CM2
ECHO LA AREA 4C: 11 CM2
ECHO LA DIAMETER INDEX: 1.52 CM/M2
ECHO LA DIAMETER: 3 CM
ECHO LA MAJOR AXIS: 4.4 CM
ECHO LA MINOR AXIS: 4.6 CM
ECHO LA TO AORTIC ROOT RATIO: 0.91
ECHO LA VOL BP: 31 ML (ref 22–52)
ECHO LA VOL MOD A2C: 44 ML (ref 22–52)
ECHO LA VOL MOD A4C: 23 ML (ref 22–52)
ECHO LA VOL/BSA BIPLANE: 16 ML/M2 (ref 16–34)
ECHO LA VOLUME INDEX MOD A2C: 22 ML/M2 (ref 16–34)
ECHO LA VOLUME INDEX MOD A4C: 12 ML/M2 (ref 16–34)
ECHO LV E' LATERAL VELOCITY: 11 CM/S
ECHO LV E' SEPTAL VELOCITY: 5 CM/S
ECHO LV EDV A2C: 42 ML
ECHO LV EDV A4C: 77 ML
ECHO LV EDV INDEX A4C: 39 ML/M2
ECHO LV EDV NDEX A2C: 21 ML/M2
ECHO LV EJECTION FRACTION A2C: 38 %
ECHO LV EJECTION FRACTION A4C: 65 %
ECHO LV EJECTION FRACTION BIPLANE: 55 % (ref 55–100)
ECHO LV ESV A2C: 26 ML
ECHO LV ESV A4C: 27 ML
ECHO LV ESV INDEX A2C: 13 ML/M2
ECHO LV ESV INDEX A4C: 14 ML/M2
ECHO LV INTERNAL DIMENSION DIASTOLE INDEX: 2.27 CM/M2
ECHO LV INTERNAL DIMENSION DIASTOLIC: 4.5 CM (ref 3.9–5.3)
ECHO LV IVSD: 0.9 CM (ref 0.6–0.9)
ECHO LV MASS 2D: 123.1 G (ref 67–162)
ECHO LV MASS INDEX 2D: 62.2 G/M2 (ref 43–95)
ECHO LV POSTERIOR WALL DIASTOLIC: 0.8 CM (ref 0.6–0.9)
ECHO LV RELATIVE WALL THICKNESS RATIO: 0.36
ECHO LVOT AREA: 3.1 CM2
ECHO LVOT AV VTI INDEX: 0.92
ECHO LVOT DIAM: 2 CM
ECHO LVOT MEAN GRADIENT: 4 MMHG
ECHO LVOT PEAK GRADIENT: 7 MMHG
ECHO LVOT PEAK VELOCITY: 1.3 M/S
ECHO LVOT STROKE VOLUME INDEX: 37.6 ML/M2
ECHO LVOT SV: 74.4 ML
ECHO LVOT VTI: 23.7 CM
ECHO MV A VELOCITY: 0.89 M/S
ECHO MV AREA VTI: 4.2 CM2
ECHO MV E DECELERATION TIME (DT): 159 MS
ECHO MV E VELOCITY: 0.64 M/S
ECHO MV E/A RATIO: 0.72
ECHO MV E/E' LATERAL: 5.82
ECHO MV E/E' RATIO (AVERAGED): 9.31
ECHO MV E/E' SEPTAL: 12.8
ECHO MV LVOT VTI INDEX: 0.76
ECHO MV MAX VELOCITY: 1.1 M/S
ECHO MV MEAN GRADIENT: 2 MMHG
ECHO MV MEAN VELOCITY: 0.7 M/S
ECHO MV PEAK GRADIENT: 5 MMHG
ECHO MV VTI: 17.9 CM
ECHO PV MAX VELOCITY: 0.9 M/S
ECHO PV PEAK GRADIENT: 3 MMHG
ECHO RIGHT VENTRICULAR SYSTOLIC PRESSURE (RVSP): 27 MMHG
ECHO RV BASAL DIMENSION: 2.7 CM
ECHO RV FREE WALL PEAK S': 11 CM/S
ECHO RV INTERNAL DIMENSION: 1.5 CM
ECHO RV MID DIMENSION: 2.7 CM
ECHO RV TAPSE: 1.7 CM (ref 1.7–?)
ECHO TV REGURGITANT MAX VELOCITY: 2.16 M/S
ECHO TV REGURGITANT PEAK GRADIENT: 19 MMHG
EKG ATRIAL RATE: 93 BPM
EKG P AXIS: 6 DEGREES
EKG P-R INTERVAL: 118 MS
EKG Q-T INTERVAL: 372 MS
EKG QRS DURATION: 88 MS
EKG QTC CALCULATION (BAZETT): 462 MS
EKG R AXIS: 16 DEGREES
EKG T AXIS: 28 DEGREES
EKG VENTRICULAR RATE: 93 BPM
ERYTHROCYTE [DISTWIDTH] IN BLOOD BY AUTOMATED COUNT: 13.4 % (ref 11.8–14.4)
EST. AVERAGE GLUCOSE BLD GHB EST-MCNC: 143 MG/DL
GFR, ESTIMATED: >90 ML/MIN/1.73M2
GLUCOSE SERPL-MCNC: 101 MG/DL (ref 74–99)
HBA1C MFR BLD: 6.6 % (ref 4–6)
HCT VFR BLD AUTO: 35.6 % (ref 36.3–47.1)
HDLC SERPL-MCNC: 43 MG/DL
HGB BLD-MCNC: 11.6 G/DL (ref 11.9–15.1)
LDLC SERPL CALC-MCNC: 52 MG/DL (ref 0–100)
MCH RBC QN AUTO: 27.9 PG (ref 25.2–33.5)
MCHC RBC AUTO-ENTMCNC: 32.6 G/DL (ref 28.4–34.8)
MCV RBC AUTO: 85.6 FL (ref 82.6–102.9)
NRBC BLD-RTO: 0 PER 100 WBC
PLATELET # BLD AUTO: 182 K/UL (ref 138–453)
PMV BLD AUTO: 9.2 FL (ref 8.1–13.5)
POTASSIUM SERPL-SCNC: 3.8 MMOL/L (ref 3.7–5.3)
PROT SERPL-MCNC: 6.6 G/DL (ref 6.6–8.7)
RBC # BLD AUTO: 4.16 M/UL (ref 3.95–5.11)
SODIUM SERPL-SCNC: 139 MMOL/L (ref 136–145)
TRIGL SERPL-MCNC: 188 MG/DL
VLDLC SERPL CALC-MCNC: 38 MG/DL
WBC OTHER # BLD: 4.7 K/UL (ref 3.5–11.3)

## 2024-06-27 PROCEDURE — 80053 COMPREHEN METABOLIC PANEL: CPT

## 2024-06-27 PROCEDURE — 83036 HEMOGLOBIN GLYCOSYLATED A1C: CPT

## 2024-06-27 PROCEDURE — 2580000003 HC RX 258: Performed by: PSYCHIATRY & NEUROLOGY

## 2024-06-27 PROCEDURE — 36415 COLL VENOUS BLD VENIPUNCTURE: CPT

## 2024-06-27 PROCEDURE — 6370000000 HC RX 637 (ALT 250 FOR IP): Performed by: PSYCHIATRY & NEUROLOGY

## 2024-06-27 PROCEDURE — 6360000002 HC RX W HCPCS: Performed by: PSYCHIATRY & NEUROLOGY

## 2024-06-27 PROCEDURE — 93306 TTE W/DOPPLER COMPLETE: CPT | Performed by: INTERNAL MEDICINE

## 2024-06-27 PROCEDURE — 93306 TTE W/DOPPLER COMPLETE: CPT

## 2024-06-27 PROCEDURE — 99238 HOSP IP/OBS DSCHRG MGMT 30/<: CPT | Performed by: PSYCHIATRY & NEUROLOGY

## 2024-06-27 PROCEDURE — 92523 SPEECH SOUND LANG COMPREHEN: CPT

## 2024-06-27 PROCEDURE — 97162 PT EVAL MOD COMPLEX 30 MIN: CPT

## 2024-06-27 PROCEDURE — 70551 MRI BRAIN STEM W/O DYE: CPT

## 2024-06-27 PROCEDURE — 99254 IP/OBS CNSLTJ NEW/EST MOD 60: CPT | Performed by: PSYCHIATRY & NEUROLOGY

## 2024-06-27 PROCEDURE — 85027 COMPLETE CBC AUTOMATED: CPT

## 2024-06-27 PROCEDURE — 80061 LIPID PANEL: CPT

## 2024-06-27 RX ORDER — ASPIRIN 81 MG/1
81 TABLET, CHEWABLE ORAL DAILY
Qty: 30 TABLET | Refills: 3 | Status: SHIPPED | OUTPATIENT
Start: 2024-06-28

## 2024-06-27 RX ADMIN — CLOPIDOGREL BISULFATE 75 MG: 75 TABLET ORAL at 09:06

## 2024-06-27 RX ADMIN — SODIUM CHLORIDE, PRESERVATIVE FREE 10 ML: 5 INJECTION INTRAVENOUS at 09:07

## 2024-06-27 RX ADMIN — ENOXAPARIN SODIUM 40 MG: 100 INJECTION SUBCUTANEOUS at 09:06

## 2024-06-27 RX ADMIN — ASPIRIN 81 MG 81 MG: 81 TABLET ORAL at 09:06

## 2024-06-27 RX ADMIN — MIDAZOLAM 2 MG: 1 INJECTION INTRAMUSCULAR; INTRAVENOUS at 11:58

## 2024-06-27 NOTE — CARE COORDINATION
Case Management Assessment  Initial Evaluation    Date/Time of Evaluation: 6/27/2024 10:26 AM  Assessment Completed by: Lexus Siddiqui RN    If patient is discharged prior to next notation, then this note serves as note for discharge by case management.    Patient Name: Tanya S Brunner                   YOB: 1960  Diagnosis: Stroke-like symptoms [R29.90]                   Date / Time: 6/26/2024  6:43 PM    Patient Admission Status: Inpatient   Readmission Risk (Low < 19, Mod (19-27), High > 27): Readmission Risk Score: 9    Current PCP: Mallorie Craven MD  PCP verified by CM? (P) Yes (Dr. Mallorie Craven)    Chart Reviewed: Yes      History Provided by: Patient  Patient Orientation: Alert and Oriented, Person, Place, Situation    Patient Cognition: Alert    Hospitalization in the last 30 days (Readmission):  No    If yes, Readmission Assessment in CM Navigator will be completed.    Advance Directives:      Code Status: Full Code   Patient's Primary Decision Maker is: (P) Legal Next of Kin      Discharge Planning:    Patient lives with: (P) Spouse/Significant Other Type of Home: (P) House  Primary Care Giver: Self  Patient Support Systems include: (P) Spouse/Significant Other, Children, Family Members   Current Financial resources: (P) Other (Comment) (Medical [facility])  Current community resources: (P) None  Current services prior to admission: (P) None            Current DME:              Type of Home Care services:  (P) None    ADLS  Prior functional level: (P) Independent in ADLs/IADLs  Current functional level: (P) Assistance with the following:, Mobility    PT AM-PAC: 24 /24  OT AM-PAC:   /24    Family can provide assistance at DC: (P) Yes  Would you like Case Management to discuss the discharge plan with any other family members/significant others, and if so, who? (P) No  Plans to Return to Present Housing: (P) Yes  Other Identified Issues/Barriers to RETURNING to current housing: na  Potential  Assistance needed at discharge: (P) N/A            Potential DME:    Patient expects to discharge to: (P) House  Plan for transportation at discharge: (P) Family    Financial    Payor: [employer] / Plan: [employer] [address] / Product Type: *No Product type* /     Does insurance require precert for SNF: Yes    Potential assistance Purchasing Medications: (P) No  Meds-to-Beds request: Yes      [id number] - [place], [place] - [address] - P [phone] - F [phone]  [address]  [place] [address]  Phone: [phone] Fax: [phone]    [facility] - [place], [place] - [address] [place] [address] - P [phone] - F [phone]  [address] [facility] [address]  Phone: [phone] Fax: [phone]      Notes:    Factors facilitating achievement of predicted outcomes: Family support, Motivated, Cooperative, Pleasant, and Sense of humor    Barriers to discharge: Medical complications    Additional Case Management Notes: Spoke with patient and spouse at bedside, role explained. Plan to return home with spouse, has transportation, denies further needs. Address, PCP and insurance reviewed.     The Plan for Transition of Care is related to the following treatment goals of Stroke-like symptoms [R29.90]    IF APPLICABLE: The Patient and/or patient representative Michelle and her family were provided with a choice of provider and agrees with the discharge plan. Freedom of choice list with basic dialogue that supports the patient's individualized plan of care/goals and shares the quality data associated with the providers was provided to: (P) Patient   Patient Representative Name:       The Patient and/or Patient Representative Agree with the Discharge Plan? (P) Yes    Lexus Siddiqui RN  Case Management Department  Ph: [phone]

## 2024-06-27 NOTE — PROGRESS NOTES
CLINICAL PHARMACY NOTE: MEDS TO BEDS    Total # of Prescriptions Filled: 1   The following medications were delivered to the patient:  aspirin    Additional Documentation:

## 2024-06-27 NOTE — PLAN OF CARE
Problem: Chronic Conditions and Co-morbidities  Goal: Patient's chronic conditions and co-morbidity symptoms are monitored and maintained or improved  Outcome: Progressing     Problem: Discharge Planning  Goal: Discharge to home or other facility with appropriate resources  Outcome: Progressing  Flowsheets (Taken 6/26/2024 2052)  Discharge to home or other facility with appropriate resources: Identify barriers to discharge with patient and caregiver     Problem: Safety - Adult  Goal: Free from fall injury  Outcome: Progressing

## 2024-06-27 NOTE — PLAN OF CARE
Problem: Physical Therapy - Adult  Goal: By Discharge: Performs mobility at highest level of function for planned discharge setting.  See evaluation for individualized goals.  6/27/2024 0936 by Cici Ha, PT  Outcome: Progressing

## 2024-06-27 NOTE — PROGRESS NOTES
surgery (N/A, 08/30/2017); Cystoscopy (Bilateral, 08/30/2017); laparoscopy (N/A, 09/08/2017); pr colon ca scrn not hi rsk ind (N/A, 09/08/2017); Upper gastrointestinal endoscopy (N/A, 01/02/2020); Abdomen surgery; skin biopsy; vascular surgery; Endoscopy, colon, diagnostic; eye surgery (1992); Dilatation, esophagus; Cardiac catheterization (01/20/2020); Cardiac defibrillator placement (Left, 05/11/2020); other surgical history (05/19/2020); Gastric fundoplication (N/A, 02/09/2021); Upper gastrointestinal endoscopy (N/A, 03/15/2022); Colonoscopy (N/A, 03/15/2022); sinus surgery (2014); Sinus endoscopy (Bilateral, 10/31/2022); Nose surgery (Bilateral, 10/31/2022); and joint replacement (Left).    Assessment   Assessment: Pt able to ambulate 200 ft without device and SUP, no loss of balance. Pt negotiate 12 stairs with SUP, 1 rail. Pt indep for transfers without device. Pt at baseline for mobility and gait, no further therapy needs at this time.  Therapy Prognosis: Good  Requires PT Follow-Up: No  Activity Tolerance  Activity Tolerance: Patient tolerated evaluation without incident     Plan   Physical Therapy Plan  General Plan: Discharge with evaluation only  Safety Devices  Type of Devices: Call light within reach, Gait belt, Left in chair  Restraints  Restraints Initially in Place: No     Restrictions  Restrictions/Precautions  Restrictions/Precautions: Up as Tolerated  Required Braces or Orthoses?: No     Subjective   General  Chart Reviewed: Yes  Patient assessed for rehabilitation services?: Yes  Family / Caregiver Present: No  Follows Commands: Within Functional Limits  General Comment  Comments: RN and pt agreeable to treatment session  Subjective  Subjective: Pt reports no pain, numbness or tingling.         Social/Functional History  Social/Functional History  Lives With: Spouse  Type of Home: House  Home Layout: One level  Home Access: Stairs to enter without rails  Entrance Stairs - Number of Steps: 3  Home  Equipment: None  Has the patient had two or more falls in the past year or any fall with injury in the past year?: No  ADL Assistance: Independent  Homemaking Assistance: Independent  Homemaking Responsibilities: Yes  Ambulation Assistance: Independent  Transfer Assistance: Independent  Active : Yes  Mode of Transportation: Car  Occupation: Part time employment  Type of Occupation:  affairs  Additional Comments: spouse supportive and available  Vision/Hearing  Vision  Vision: Within Functional Limits  Hearing  Hearing: Within functional limits    Cognition   Orientation  Overall Orientation Status: Within Normal Limits  Cognition  Overall Cognitive Status: WNL     Objective   Temp: 98 °F (36.7 °C)  Pulse: 86  Heart Rate Source: Monitor  Respirations: 16  SpO2: 98 %  O2 Device: None (Room air)  BP: 114/68  MAP (Calculated): 83  BP Location: Right upper arm  BP Method: Automatic  Patient Position: Semi fowlers              AROM RLE (degrees)  RLE AROM: WFL  AROM LLE (degrees)  LLE AROM : WFL  AROM RUE (degrees)  RUE AROM : WFL  AROM LUE (degrees)  LUE AROM : WFL  Strength RLE  Strength RLE: WFL  Strength LLE  Strength LLE: WFL  Strength RUE  Strength RUE: WFL  Strength LUE  Strength LUE: WFL           Bed mobility  Rolling to Right: Independent  Supine to Sit: Independent  Scooting: Independent  Transfers  Sit to Stand: Independent  Stand to Sit: Independent  Comment: up without device  Ambulation  Surface: Level tile  Device: No Device  Assistance: Supervision  Gait Deviations: None  Distance: 200 ft  Stairs/Curb  Stairs?: Yes  Stairs  # Steps : 12  Stairs Height: 6\"  Rails: Left ascending  Device: No Device  Assistance: Supervision     Balance  Posture: Good  Sitting - Static: Good  Sitting - Dynamic: Good  Standing - Static: Good  Standing - Dynamic: Good;-  Comments: no UE support           OutComes Score         AM-PAC - Mobility    AM-PAC Basic Mobility - Inpatient   How much help is needed turning

## 2024-06-27 NOTE — PROGRESS NOTES
Patient discharged, IV removed, Belongings returned, AVS/DC education given, all questions answered. Patient ambulated to her ride

## 2024-06-27 NOTE — DISCHARGE SUMMARY
Neurology Discharge Summary     Patient ID: Tanya S Brunner  :  1960   MRN: [id number]     ACCOUNT:  [id number]   Patient's PCP: Mallorie Craven MD  Admit Date: 2024   Discharge Date: 2024     Length of Stay: 1  Code Status:  Prior  Admitting Physician: Denis Harmon MD  Discharge Physician: Adri Masterson MD     Active Discharge Diagnoses:       Primary Problem  Stroke-like symptoms      Hospital Problems  Active Hospital Problems    Diagnosis Date Noted    Stroke-like symptoms [R29.90] 2024       Condition on the discharge: good     Hospital Stay:       Hospital Course:  Tanya S Brunner is a 64 y.o. female who was admitted for the management of   Stroke-like symptoms , presented to ER with epiosde of speech abnormality.   Tanya S Brunner is a 64 y.o. right handed female with a history of nonischemic cardiomyopathy, congestive heart failure status post AICD placement, hypertension, hyperlipidemia, type 2 diabetes, and a known prior subcortical stroke (anterior limb of the right internal capsule) not on antiplatelet therapy who is transferred to our facility for further care and management after experiencing a prolonged transient 2-hour episode of speech abnormality.     Patient works for a [employer] group.  While at work earlier today, patient experienced inability to appropriately express her language lasting for approximately 2 hours.  Patient did not seek immediate care.  Furthermore, patient adds that she had experienced a similar episode 2 months prior.  She presented to an outside facility with initial cranial imaging not suggestive of an acute process.  Upon transfer to our facility, patient did not have any focal deficits.  She did not have any paraphasic errors.  Lastly, patient does not report any new symptoms.     MRI brain was obtained and did not reveal ant signs of acute abnormality. Upon further questioning, the episode was deemed to be complicated  or confirmed emergency medical condition->Emergency Medical Condition (MA) Reason for Exam: was having difficulty finding words and speaking, have resolved FINDINGS: BRAIN/VENTRICLES: There is no acute infarct or acute intracranial hemorrhage present.  There is no mass effect or midline shift present. There is a stable focal area of hypoattenuation within the anterior limb of the right internal capsule suggesting a remote area of ischemia.  There is no ventriculomegaly or abnormal extra-axial fluid collection present. ORBITS: Limited evaluation of the orbits is unremarkable. SINUSES: Mild mucosal thickening is present within the paranasal sinuses. The paranasal sinuses and mastoid air cells are otherwise clear. SOFT TISSUES/SKULL:  No lytic or blastic osseous lesions are identified.     No acute intracranial process identified.     XR CHEST PORTABLE    Result Date: 6/26/2024  EXAMINATION: ONE XRAY VIEW OF THE CHEST 6/26/2024 1:38 pm COMPARISON: None. HISTORY: ORDERING SYSTEM PROVIDED HISTORY: Difficulty speaking TECHNOLOGIST PROVIDED HISTORY: Difficulty speaking Reason for Exam: Difficulty speaking FINDINGS: The lungs appear clear.  The heart is unremarkable.  The AICD pacer lead is in good position.  Bony structures are unremarkable.     No acute cardiopulmonary process.         Consultations:    Consults:     Final Specialist Recommendations/Findings:   None      The patient was seen and examined on day of discharge and this discharge summary is in conjunction with any daily progress note from day of discharge.    Discharge plan:       Disposition: Home    Physician Follow Up:     Adri Masterson MD  [address] [address] [address]  [place] [id number]  [phone]    Schedule an appointment as soon as possible for a visit in 3 month(s)         Requiring Further Evaluation/Follow Up POST HOSPITALIZATION/Incidental Findings: EEG    Diet: cardiac diet    Activity: As tolerated    Instructions to Patient:  - Take all

## 2024-06-27 NOTE — PROGRESS NOTES
[facility]  Occupational Therapy Not Seen Note    DATE: 2024    NAME: Tanya S Brunner  MRN: [id number]   : 1960      Patient not seen this date for Occupational Therapy due to:    Patient independent with ADLs and functional tasks with no acute OT needs. Will defer OT evaluation at this time. Please reorder OT if future needs arise.       Electronically signed by Ryanne Beavers OT on 2024 at 9:49 AM

## 2024-06-27 NOTE — PROGRESS NOTES
SLP ALL NOTES  Facility/Department: [address] NEURO  Initial Speech/Language/Cognitive Assessment    NAME: Tanya S Brunner  : 1960   MRN: [id number]  ADMISSION DATE: 2024  ADMITTING DIAGNOSIS: has Hiatal hernia with GERD; Hypothyroidism; Essential hypertension; Hyperlipidemia; DJD (degenerative joint disease), lumbar; Acute diverticulitis of intestine; Hyperplastic colon polyp; Tinnitus; Sciatica of right side; Splenic artery aneurysm (HCC); Presence of endovascular aneurysm coil compatible with safe magnetic resonance imaging; DVT (deep venous thrombosis) (HCC); LGSIL (low grade squamous intraepithelial dysplasia); Sinusitis, chronic; Asthma; Non-celiac gluten sensitivity; Severe persistent asthma; Varicose veins of bilateral lower extremities with pain; Bowel perforation (HCC); Diverticulitis of large intestine with abscess without bleeding; Colovaginal fistula; Partial small bowel obstruction (HCC); Diabetes type 2, no ocular involvement (HCC); History of radial keratotomy; Combined forms of age-related cataract of both eyes; Respiratory distress; Exertional dyspnea; Thrombophlebitis of superficial veins of right lower extremity; Nonischemic cardiomyopathy (HCC); Acute on chronic systolic congestive heart failure (HCC); Acute respiratory failure (HCC); Acute pulmonary edema (HCC); S/P repair of paraesophageal hernia; and Stroke-like symptoms on their problem list.      Date of Eval: 2024   Evaluating Therapist: KAYLAH CAMERON    RECENT RESULTS  CT OF HEAD/MRI:     IMPRESSION: 24  No significant intracranial stenosis or evidence for occlusion.    Primary Complaint:   Tanya S Brunner is a 64 y.o. right handed female with a history of nonischemic cardiomyopathy, congestive heart failure status post AICD placement, hypertension, hyperlipidemia, type 2 diabetes, and a known prior subcortical stroke (anterior limb of the right internal capsule) not on antiplatelet therapy who is transferred to our

## 2024-07-02 ENCOUNTER — HOSPITAL ENCOUNTER (OUTPATIENT)
Dept: CARDIAC REHAB | Age: 64
Discharge: HOME | End: 2024-07-02

## 2024-07-05 ENCOUNTER — HOSPITAL ENCOUNTER (OUTPATIENT)
Dept: NEUROLOGY | Age: 64
Discharge: HOME | End: 2024-07-05
Payer: COMMERCIAL

## 2024-07-05 DIAGNOSIS — R29.90 STROKE-LIKE SYMPTOMS: ICD-10-CM

## 2024-07-05 PROCEDURE — 95816 EEG AWAKE AND DROWSY: CPT

## 2024-07-06 NOTE — PROCEDURES
PROCEDURE NOTE  Date: 7/6/2024   Name: Tanya S Brunner  YOB: 1960    Procedures    EEG REPORT     CLINICAL NEUROPHYSIOLOGY LABORATORY  DEPARTMENT OF NEUROLOGY  [facility]       Patient: Tanya S Brunner Age: 64 y.o.  MRN: [id number]      Referring Provider: Adri Masterson MD    History: This routine 30 minute scalp EEG was recorded with video- monitoring for a 64 y.o.. female  who presented with encephalopathy. This EEG was performed to evaluate for focal and epileptiform abnormalities.     Tanya S Brunner   Current Outpatient Medications   Medication Sig Dispense Refill    aspirin 81 MG chewable tablet Take 1 tablet by mouth daily 30 tablet 3    MOUNJARO 7.5 MG/0.5ML SOPN SC injection Inject 0.5 mLs into the skin once a week      atorvastatin (LIPITOR) 40 MG tablet Take 1 tablet by mouth daily      pantoprazole (PROTONIX) 40 MG tablet Take one tablet once daily 30-60 minutes prior to a meal 90 tablet 3    sacubitril-valsartan (ENTRESTO) 24-26 MG per tablet Take 1 tablet by mouth 2 times daily 180 tablet 3    metoprolol succinate (TOPROL XL) 100 MG extended release tablet Take 1 tablet by mouth 2 times daily (Patient taking differently: Take 0.5 tablets by mouth 2 times daily) 180 tablet 3    spironolactone (ALDACTONE) 25 MG tablet Take 0.5 tablets by mouth daily 90 tablet 4    budesonide (PULMICORT) 0.5 MG/2ML nebulizer suspension USE 1 VIAL TWICE DAILY IN SINUS RINSE BOTTLE      loratadine (CLARITIN) 10 MG tablet Take 1 tablet by mouth daily      albuterol sulfate  (90 Base) MCG/ACT inhaler       famotidine (PEPCID) 40 MG tablet Take 1 tablet by mouth every evening 90 tablet 3    budesonide-formoterol (SYMBICORT) 160-4.5 MCG/ACT AERO USE 2 INHALATIONS TWICE A DAY (RINSE MOUTH WELL AFTER USE) (Patient taking differently: as needed USE 2 INHALATIONS TWICE A DAY (RINSE MOUTH WELL AFTER USE)) 3 Inhaler 3    Cholecalciferol (VITAMIN D3) 50 MCG (2000 UT) CAPS Take 1 capsule by mouth daily

## 2024-07-09 ENCOUNTER — HOSPITAL ENCOUNTER (OUTPATIENT)
Dept: CARDIAC REHAB | Age: 64
Discharge: HOME | End: 2024-07-09

## 2024-07-09 PROCEDURE — 9900000065 HC CARDIAC REHAB PHASE 3 - 1 VISIT

## 2024-07-16 ENCOUNTER — HOSPITAL ENCOUNTER (OUTPATIENT)
Dept: CARDIAC REHAB | Age: 64
Discharge: HOME | End: 2024-07-16

## 2024-07-16 PROCEDURE — 9900000065 HC CARDIAC REHAB PHASE 3 - 1 VISIT

## 2024-07-23 ENCOUNTER — HOSPITAL ENCOUNTER (OUTPATIENT)
Dept: CARDIAC REHAB | Age: 64
Discharge: HOME | End: 2024-07-23

## 2024-07-23 PROCEDURE — 9900000065 HC CARDIAC REHAB PHASE 3 - 1 VISIT

## 2024-07-29 ENCOUNTER — OFFICE VISIT (OUTPATIENT)
Dept: PRIMARY CARE CLINIC | Age: 64
End: 2024-07-29
Payer: COMMERCIAL

## 2024-07-29 VITALS
DIASTOLIC BLOOD PRESSURE: 82 MMHG | OXYGEN SATURATION: 97 % | SYSTOLIC BLOOD PRESSURE: 120 MMHG | TEMPERATURE: 96 F | BODY MASS INDEX: 29.7 KG/M2 | WEIGHT: 189.2 LBS | HEART RATE: 91 BPM

## 2024-07-29 DIAGNOSIS — J01.41 ACUTE RECURRENT PANSINUSITIS: Primary | ICD-10-CM

## 2024-07-29 PROCEDURE — 1090F PRES/ABSN URINE INCON ASSESS: CPT | Performed by: NURSE PRACTITIONER

## 2024-07-29 PROCEDURE — G8427 DOCREV CUR MEDS BY ELIG CLIN: HCPCS | Performed by: NURSE PRACTITIONER

## 2024-07-29 PROCEDURE — G8399 PT W/DXA RESULTS DOCUMENT: HCPCS | Performed by: NURSE PRACTITIONER

## 2024-07-29 PROCEDURE — G8417 CALC BMI ABV UP PARAM F/U: HCPCS | Performed by: NURSE PRACTITIONER

## 2024-07-29 PROCEDURE — 99213 OFFICE O/P EST LOW 20 MIN: CPT | Performed by: NURSE PRACTITIONER

## 2024-07-29 PROCEDURE — 3079F DIAST BP 80-89 MM HG: CPT | Performed by: NURSE PRACTITIONER

## 2024-07-29 PROCEDURE — 1036F TOBACCO NON-USER: CPT | Performed by: NURSE PRACTITIONER

## 2024-07-29 PROCEDURE — 1123F ACP DISCUSS/DSCN MKR DOCD: CPT | Performed by: NURSE PRACTITIONER

## 2024-07-29 PROCEDURE — 3017F COLORECTAL CA SCREEN DOC REV: CPT | Performed by: NURSE PRACTITIONER

## 2024-07-29 PROCEDURE — 3074F SYST BP LT 130 MM HG: CPT | Performed by: NURSE PRACTITIONER

## 2024-07-29 RX ORDER — FLUCONAZOLE 150 MG/1
150 TABLET ORAL ONCE
Qty: 1 TABLET | Refills: 0 | Status: SHIPPED | OUTPATIENT
Start: 2024-07-29 | End: 2024-07-29

## 2024-07-29 RX ORDER — AMOXICILLIN AND CLAVULANATE POTASSIUM 875; 125 MG/1; MG/1
1 TABLET, FILM COATED ORAL 2 TIMES DAILY
Qty: 14 TABLET | Refills: 0 | Status: SHIPPED | OUTPATIENT
Start: 2024-07-29 | End: 2024-08-05

## 2024-07-29 ASSESSMENT — ENCOUNTER SYMPTOMS
SHORTNESS OF BREATH: 0
ABDOMINAL PAIN: 0
CHEST TIGHTNESS: 0
CONSTIPATION: 0
SORE THROAT: 0
ABDOMINAL DISTENTION: 0
SINUS PAIN: 1
NAUSEA: 0
COUGH: 0
VOMITING: 0
DIARRHEA: 0
WHEEZING: 0
RHINORRHEA: 1
COLOR CHANGE: 0
SINUS PRESSURE: 1

## 2024-07-29 NOTE — PROGRESS NOTES
[facility] [place] [facility]  [address]  [place] [address]  Dept: [phone]  Dept Fax: [phone]    Tanya S Brunner is a 64 y.o. female who presents today for her medical conditions/complaintsas noted below.  Tanya S Brunner is c/o of   Chief Complaint   Patient presents with    URI     Cough, thick yellow mucus, post nasal drainage, st for 8 days      HPI:     Patient presents to the walk in clinic for an acute evaluation. Normally a patient of Dr. Craven. Presents for sinus symptoms. Reports history of chronic symptoms.       Sinusitis  This is a chronic problem. The current episode started 1 to 4 weeks ago (x8 days). The problem is unchanged. There has been no fever. The pain is moderate. Associated symptoms include congestion, headaches and sinus pressure. Pertinent negatives include no chills, coughing, diaphoresis, ear pain, shortness of breath or sore throat. Treatments tried: sudafed. The treatment provided mild relief.       Hemoglobin A1C (%)   Date Value   06/27/2024 6.6 (H)   10/27/2022 7.1 (H)   05/06/2020 8.7             ( goal A1Cis < 7)   No components found for: \"LABMICR\"  No components found for: \"LDLCHOLESTEROL\", \"LDLCALC\"    (goal LDL is <100)   AST (U/L)   Date Value   06/27/2024 18     ALT (U/L)   Date Value   06/27/2024 12     BUN (mg/dL)   Date Value   06/27/2024 11     BP Readings from Last 3 Encounters:   07/29/24 120/82   06/27/24 114/68   06/26/24 121/65          (goal 120/80)    Past Medical History:   Diagnosis Date    Acute on chronic systolic congestive heart failure (HCC) 01/21/2020    \"viral heart failure\" per patient    AICD (automatic cardioverter/defibrillator) present     Asthma     Uses daily Symbicort, rarely uses rescue inhaler, managed by PCP (10/27/22)    Bilateral corneal scars     COVID-19 11/10/2020    Disease of blood and blood forming organ

## 2024-07-30 ENCOUNTER — HOSPITAL ENCOUNTER (OUTPATIENT)
Dept: CARDIAC REHAB | Age: 64
Discharge: HOME | End: 2024-07-30

## 2024-07-30 PROCEDURE — 9900000065 HC CARDIAC REHAB PHASE 3 - 1 VISIT

## 2024-08-07 ENCOUNTER — HOSPITAL ENCOUNTER (OUTPATIENT)
Dept: CARDIAC REHAB | Age: 64
Setting detail: THERAPIES SERIES
Discharge: HOME | End: 2024-08-07
Payer: MEDICARE

## 2024-08-07 PROCEDURE — 9900000065 HC CARDIAC REHAB PHASE 3 - 1 VISIT

## 2024-08-23 ENCOUNTER — PROCEDURE VISIT (OUTPATIENT)
Dept: CARDIOLOGY | Age: 64
End: 2024-08-23

## 2024-08-23 DIAGNOSIS — Z95.810 ICD (IMPLANTABLE CARDIOVERTER-DEFIBRILLATOR) IN PLACE: ICD-10-CM

## 2024-08-23 DIAGNOSIS — I42.8 NICM (NONISCHEMIC CARDIOMYOPATHY) (HCC): Primary | ICD-10-CM

## 2024-08-27 ENCOUNTER — HOSPITAL ENCOUNTER (OUTPATIENT)
Dept: CARDIAC REHAB | Age: 64
Discharge: HOME | End: 2024-08-27

## 2024-08-27 PROCEDURE — 9900000065 HC CARDIAC REHAB PHASE 3 - 1 VISIT

## 2024-08-28 ENCOUNTER — OFFICE VISIT (OUTPATIENT)
Dept: NEUROLOGY | Age: 64
End: 2024-08-28

## 2024-08-28 VITALS
DIASTOLIC BLOOD PRESSURE: 68 MMHG | SYSTOLIC BLOOD PRESSURE: 106 MMHG | WEIGHT: 185.2 LBS | BODY MASS INDEX: 29.77 KG/M2 | HEIGHT: 66 IN | HEART RATE: 112 BPM

## 2024-08-28 DIAGNOSIS — I10 ESSENTIAL HYPERTENSION: ICD-10-CM

## 2024-08-28 DIAGNOSIS — G45.9 TIA (TRANSIENT ISCHEMIC ATTACK): Primary | ICD-10-CM

## 2024-08-28 ASSESSMENT — ENCOUNTER SYMPTOMS
EYES NEGATIVE: 1
ALLERGIC/IMMUNOLOGIC NEGATIVE: 1
GASTROINTESTINAL NEGATIVE: 1
RESPIRATORY NEGATIVE: 1

## 2024-08-28 NOTE — PROGRESS NOTES
I saw and evaluated the patient, performing the key elements of the service.  I discussed the findings, assessment and plan with the resident and agree with the resident's findings and plan as documented in the resident's note.    Sincerely,     Chyna Nickerson DO  Neurology  Director of Multiple Sclerosis & Neuroimmunology  [facility] [address], [address]  [place] [address]  P: [phone]  F: [phone]    NEUROLOGY CLINIC NOTE     PATIENT NAME: Tanya S Brunner  PATIENT MRN: [id number]  PRIMARY CARE PHYSICIAN: Mallorie Craven MD    HPI:      Tanya S Brunner is a 64 y.o. right handed female with a history of nonischemic cardiomyopathy, congestive heart failure status post AICD placement, hypertension, hyperlipidemia, type 2 diabetes, and a known prior subcortical stroke (anterior limb of the right internal capsule) not on antiplatelet therapy who is transferred to our facility for further care and management after experiencing a prolonged transient 2-hour episode of speech abnormality.  CT and MRI brain was obtained and did not reveal ant signs of acute abnormality.  CTA that shows 30 to 40% stenosis of proximal left ICA, Patient was on aspirin for secondary stroke prevention given the sequela of previous ischemic episodes on the neuroimaging. Lipid panel came back with LDL at 52, cholesterol 133 and triglycerides 188, A1c 6.6,   was seen in the clinic for follow-up after hospital admission   EEG was done and was unremarkable, patient denies any new symptom, patient denies any migraine headache.    Most likely patient has TIA  Patient on aspirin 81 and Lipitor 40 mg, plan to continue aspirin and Lipitor and to follow-up on lipid panel with controlling of modifiable risk factors which include but not limited to hypertension, hyperlipidemia, diabetes mellitus.  History obtained from Patient     PATIENT HISTORY:     Past Medical     moderate sigmoid diverticulosis. Dr. Jj    COLONOSCOPY N/A 03/15/2022    moderate sigmoid diverticulosis otherwise normal; Dr. Jj at [facility]    COLPOSCOPY  11/2015    with BX LGSIL    CYSTOSCOPY Bilateral 08/30/2017    CYSTO insertion lighted stents performed by Stephen Rosen MD at [facility] OR    DILATATION, ESOPHAGUS      ENDOSCOPY, COLON, DIAGNOSTIC      EYE SURGERY  1992    radial keratomy (Dr. Fitch)    GASTRIC FUNDOPLICATION N/A 02/09/2021    XI LAPAROSCOPIC ROBOTIC HIATAL HERNIA REPAIR WITH MESH  NISSEN FUNDOPLICATION. EGD performed by Cristopher Johnson DO at [facility] OR    JOINT REPLACEMENT Left     knee    KNEE ARTHROSCOPY Right 2004    PARTIAL SYNOVECTOMY AND CHONDROPLASTY DUE TO MENSICUS TEAR    LAPAROSCOPY N/A 09/08/2017    EXPLORATORY LAPAROSCOPY converted to open exploration with drainage of pelvic abscess performed by Praneeth Jj MD at [facility] OR    NASAL SINUS SURGERY  08/19/2014    clearing an infection done at [facility]    NOSE SURGERY Bilateral 10/31/2022    BILATERAL TURBINATE REDUCTION performed by Pipe Valiente MD at [facility] OR    OTHER SURGICAL HISTORY  10/25/2011    L4/L5 epidural steroid injection    OTHER SURGICAL HISTORY Right 2006 and 2010    synvisc injections, two sets    OTHER SURGICAL HISTORY  05/19/2020    Lead revision    SC COLON CA SCRN NOT HI RSK IND N/A 02/27/2017    COLONOSCOPY performed by Praneeth Jj MD at [facility] OR    SC COLON CA SCRN NOT HI RSK IND N/A 09/08/2017    COLONOSCOPY performed by Praneeth Jj MD at [facility] OR    SC EGD TRANSORAL BIOPSY SINGLE/MULTIPLE N/A 02/27/2017    EGD BIOPSY performed by Praneeth Jj MD at [facility] OR    SINUS ENDOSCOPY Bilateral 10/31/2022    FESS- WITH NAVIGATION BILATERAL ETHMOIDECTOMY,MAXILLARY ANTROSTOMY,FRONTAL SINUS EXPLORATION, SPH[facility]OIDOTOMY [facility]        *PT HAS DEFIBRILLATOR performed by Pipe Valiente MD at [facility] OR    SINUS SURGERY  2014    Dr. Harrison, [facility]    SKIN BIOPSY      SKIN TAG REMOVAL      SMALL

## 2024-09-10 RX ORDER — SPIRONOLACTONE 25 MG/1
12.5 TABLET ORAL DAILY
Qty: 45 TABLET | Refills: 3 | Status: SHIPPED | OUTPATIENT
Start: 2024-09-10

## 2024-09-17 ENCOUNTER — HOSPITAL ENCOUNTER (OUTPATIENT)
Dept: CARDIAC REHAB | Age: 64
Discharge: HOME | End: 2024-09-17

## 2024-09-17 PROCEDURE — 9900000065 HC CARDIAC REHAB PHASE 3 - 1 VISIT

## 2024-09-18 ENCOUNTER — PATIENT MESSAGE (OUTPATIENT)
Age: 64
End: 2024-09-18

## 2024-09-24 ENCOUNTER — HOSPITAL ENCOUNTER (OUTPATIENT)
Dept: CARDIAC REHAB | Age: 64
Discharge: HOME | End: 2024-09-24

## 2024-09-24 PROCEDURE — 9900000065 HC CARDIAC REHAB PHASE 3 - 1 VISIT

## 2024-09-27 ENCOUNTER — NURSE ONLY (OUTPATIENT)
Dept: CARDIOLOGY | Age: 64
End: 2024-09-27

## 2024-09-27 ENCOUNTER — TRANSCRIBE ORDERS (OUTPATIENT)
Dept: GENERAL RADIOLOGY | Age: 64
End: 2024-09-27

## 2024-09-27 DIAGNOSIS — I42.8 NONISCHEMIC CARDIOMYOPATHY (HCC): ICD-10-CM

## 2024-09-27 DIAGNOSIS — Z95.810 ICD (IMPLANTABLE CARDIOVERTER-DEFIBRILLATOR) IN PLACE: ICD-10-CM

## 2024-09-27 DIAGNOSIS — I50.23 ACUTE ON CHRONIC SYSTOLIC CONGESTIVE HEART FAILURE (HCC): ICD-10-CM

## 2024-09-27 DIAGNOSIS — Z12.31 VISIT FOR SCREENING MAMMOGRAM: Primary | ICD-10-CM

## 2024-09-27 DIAGNOSIS — I10 HYPERTENSION, ESSENTIAL: Primary | ICD-10-CM

## 2024-10-03 ENCOUNTER — HOSPITAL ENCOUNTER (OUTPATIENT)
Dept: CARDIAC REHAB | Age: 64
Setting detail: THERAPIES SERIES
Discharge: HOME | End: 2024-10-03

## 2024-10-08 ENCOUNTER — HOSPITAL ENCOUNTER (OUTPATIENT)
Dept: CARDIAC REHAB | Age: 64
Setting detail: THERAPIES SERIES
Discharge: HOME | End: 2024-10-08

## 2024-10-08 PROCEDURE — 9900000065 HC CARDIAC REHAB PHASE 3 - 1 VISIT

## 2024-10-14 ENCOUNTER — OFFICE VISIT (OUTPATIENT)
Dept: PRIMARY CARE CLINIC | Age: 64
End: 2024-10-14
Payer: COMMERCIAL

## 2024-10-14 VITALS
OXYGEN SATURATION: 99 % | SYSTOLIC BLOOD PRESSURE: 116 MMHG | HEART RATE: 74 BPM | DIASTOLIC BLOOD PRESSURE: 64 MMHG | BODY MASS INDEX: 29.38 KG/M2 | TEMPERATURE: 98.3 F | WEIGHT: 182 LBS

## 2024-10-14 DIAGNOSIS — J01.40 ACUTE NON-RECURRENT PANSINUSITIS: Primary | ICD-10-CM

## 2024-10-14 DIAGNOSIS — J45.41 MODERATE PERSISTENT ASTHMA WITH EXACERBATION: ICD-10-CM

## 2024-10-14 PROCEDURE — G8484 FLU IMMUNIZE NO ADMIN: HCPCS | Performed by: STUDENT IN AN ORGANIZED HEALTH CARE EDUCATION/TRAINING PROGRAM

## 2024-10-14 PROCEDURE — G8399 PT W/DXA RESULTS DOCUMENT: HCPCS | Performed by: STUDENT IN AN ORGANIZED HEALTH CARE EDUCATION/TRAINING PROGRAM

## 2024-10-14 PROCEDURE — 1036F TOBACCO NON-USER: CPT | Performed by: STUDENT IN AN ORGANIZED HEALTH CARE EDUCATION/TRAINING PROGRAM

## 2024-10-14 PROCEDURE — 1123F ACP DISCUSS/DSCN MKR DOCD: CPT | Performed by: STUDENT IN AN ORGANIZED HEALTH CARE EDUCATION/TRAINING PROGRAM

## 2024-10-14 PROCEDURE — G8427 DOCREV CUR MEDS BY ELIG CLIN: HCPCS | Performed by: STUDENT IN AN ORGANIZED HEALTH CARE EDUCATION/TRAINING PROGRAM

## 2024-10-14 PROCEDURE — G8417 CALC BMI ABV UP PARAM F/U: HCPCS | Performed by: STUDENT IN AN ORGANIZED HEALTH CARE EDUCATION/TRAINING PROGRAM

## 2024-10-14 PROCEDURE — 1090F PRES/ABSN URINE INCON ASSESS: CPT | Performed by: STUDENT IN AN ORGANIZED HEALTH CARE EDUCATION/TRAINING PROGRAM

## 2024-10-14 PROCEDURE — 3078F DIAST BP <80 MM HG: CPT | Performed by: STUDENT IN AN ORGANIZED HEALTH CARE EDUCATION/TRAINING PROGRAM

## 2024-10-14 PROCEDURE — 3017F COLORECTAL CA SCREEN DOC REV: CPT | Performed by: STUDENT IN AN ORGANIZED HEALTH CARE EDUCATION/TRAINING PROGRAM

## 2024-10-14 PROCEDURE — 3074F SYST BP LT 130 MM HG: CPT | Performed by: STUDENT IN AN ORGANIZED HEALTH CARE EDUCATION/TRAINING PROGRAM

## 2024-10-14 PROCEDURE — 99214 OFFICE O/P EST MOD 30 MIN: CPT | Performed by: STUDENT IN AN ORGANIZED HEALTH CARE EDUCATION/TRAINING PROGRAM

## 2024-10-14 RX ORDER — FLUCONAZOLE 150 MG/1
150 TABLET ORAL
Qty: 2 TABLET | Refills: 0 | Status: SHIPPED | OUTPATIENT
Start: 2024-10-14 | End: 2024-10-20

## 2024-10-14 RX ORDER — PREDNISONE 20 MG/1
20 TABLET ORAL 2 TIMES DAILY
Qty: 10 TABLET | Refills: 0 | Status: SHIPPED | OUTPATIENT
Start: 2024-10-14 | End: 2024-10-19

## 2024-10-14 ASSESSMENT — ENCOUNTER SYMPTOMS
VOMITING: 0
WHEEZING: 1
COUGH: 1
NAUSEA: 0
TROUBLE SWALLOWING: 0
ABDOMINAL PAIN: 0
EYE PAIN: 0
DIARRHEA: 0
BLOOD IN STOOL: 0
SHORTNESS OF BREATH: 1
SINUS PAIN: 1
SINUS PRESSURE: 1
CONSTIPATION: 0
RHINORRHEA: 1

## 2024-10-14 ASSESSMENT — PATIENT HEALTH QUESTIONNAIRE - PHQ9
1. LITTLE INTEREST OR PLEASURE IN DOING THINGS: NOT AT ALL
SUM OF ALL RESPONSES TO PHQ QUESTIONS 1-9: 0
SUM OF ALL RESPONSES TO PHQ QUESTIONS 1-9: 0
SUM OF ALL RESPONSES TO PHQ9 QUESTIONS 1 & 2: 0
SUM OF ALL RESPONSES TO PHQ QUESTIONS 1-9: 0
2. FEELING DOWN, DEPRESSED OR HOPELESS: NOT AT ALL
SUM OF ALL RESPONSES TO PHQ QUESTIONS 1-9: 0

## 2024-10-14 NOTE — PROGRESS NOTES
for Shortness of Breath 360 mL 2    Probiotic Product (SOLUBLE FIBER/PROBIOTICS PO) Take by mouth 2 times daily      metFORMIN (GLUCOPHAGE) 500 MG tablet TAKE 2 TABLETS BY MOUTH TWO TIMES A DAY WITH MEALS 120 tablet 3     No current facility-administered medications for this visit.        She is allergic to dapagliflozin, ibuprofen, other, ativan [lorazepam], codeine, darvon [propoxyphene], and lisinopril.    She  reports that she has never smoked. She has never used smokeless tobacco.      Objective:    Vitals:    10/14/24 0908   BP: 116/64   Site: Left Upper Arm   Position: Sitting   Cuff Size: Large Adult   Pulse: 74   Temp: 98.3 °F (36.8 °C)   TempSrc: Tympanic   SpO2: 99%   Weight: 82.6 kg (182 lb)     Body mass index is 29.38 kg/m².    Physical Exam  Vitals and nursing note reviewed.   Constitutional:       General: She is not in acute distress.     Appearance: She is well-developed. She is not diaphoretic.   HENT:      Head: Normocephalic and atraumatic.      Right Ear: External ear normal.      Left Ear: External ear normal.      Nose: Congestion and rhinorrhea present.      Mouth/Throat:      Pharynx: Posterior oropharyngeal erythema present.   Eyes:      General: No scleral icterus.        Right eye: No discharge.         Left eye: No discharge.      Conjunctiva/sclera: Conjunctivae normal.   Cardiovascular:      Rate and Rhythm: Normal rate and regular rhythm.      Heart sounds: Normal heart sounds. No murmur heard.  Pulmonary:      Effort: Pulmonary effort is normal.      Breath sounds: Wheezing present.   Musculoskeletal:      Cervical back: Normal range of motion.   Skin:     General: Skin is warm and dry.      Findings: No erythema or rash.   Neurological:      Mental Status: She is alert and oriented to person, place, and time.      Cranial Nerves: No cranial nerve deficit.   Psychiatric:         Behavior: Behavior normal.         Thought Content: Thought content normal.         Judgment: Judgment

## 2024-10-17 ENCOUNTER — HOSPITAL ENCOUNTER (OUTPATIENT)
Dept: MAMMOGRAPHY | Age: 64
Discharge: HOME | End: 2024-10-19
Payer: MEDICARE

## 2024-10-17 VITALS — HEIGHT: 66 IN | WEIGHT: 182 LBS | BODY MASS INDEX: 29.25 KG/M2

## 2024-10-17 DIAGNOSIS — Z12.31 VISIT FOR SCREENING MAMMOGRAM: ICD-10-CM

## 2024-10-17 PROCEDURE — 77063 BREAST TOMOSYNTHESIS BI: CPT

## 2024-10-22 ENCOUNTER — HOSPITAL ENCOUNTER (OUTPATIENT)
Dept: CARDIAC REHAB | Age: 64
Setting detail: THERAPIES SERIES
Discharge: HOME | End: 2024-10-22

## 2024-10-29 ENCOUNTER — HOSPITAL ENCOUNTER (OUTPATIENT)
Dept: CARDIAC REHAB | Age: 64
Setting detail: THERAPIES SERIES
Discharge: HOME | End: 2024-10-29

## 2024-11-05 ENCOUNTER — HOSPITAL ENCOUNTER (OUTPATIENT)
Dept: CARDIAC REHAB | Age: 64
Setting detail: THERAPIES SERIES
Discharge: HOME | End: 2024-11-05

## 2024-11-19 ENCOUNTER — HOSPITAL ENCOUNTER (OUTPATIENT)
Dept: CARDIAC REHAB | Age: 64
Setting detail: THERAPIES SERIES
Discharge: HOME | End: 2024-11-19

## 2024-11-19 PROCEDURE — 9900000065 HC CARDIAC REHAB PHASE 3 - 1 VISIT

## 2024-12-04 ENCOUNTER — HOSPITAL ENCOUNTER (OUTPATIENT)
Dept: CARDIAC REHAB | Age: 64
Setting detail: THERAPIES SERIES
Discharge: HOME | End: 2024-12-04

## 2024-12-12 ENCOUNTER — TELEPHONE (OUTPATIENT)
Dept: CARDIOLOGY | Age: 64
End: 2024-12-12

## 2025-01-02 ENCOUNTER — OFFICE VISIT (OUTPATIENT)
Dept: PRIMARY CARE CLINIC | Age: 65
End: 2025-01-02

## 2025-01-02 VITALS
BODY MASS INDEX: 29.38 KG/M2 | SYSTOLIC BLOOD PRESSURE: 130 MMHG | HEART RATE: 102 BPM | WEIGHT: 182 LBS | OXYGEN SATURATION: 97 % | TEMPERATURE: 98.2 F | DIASTOLIC BLOOD PRESSURE: 74 MMHG

## 2025-01-02 DIAGNOSIS — J01.41 ACUTE RECURRENT PANSINUSITIS: Primary | ICD-10-CM

## 2025-01-02 RX ORDER — PREDNISONE 20 MG/1
TABLET ORAL
Qty: 15 TABLET | Refills: 0 | Status: SHIPPED | OUTPATIENT
Start: 2025-01-02

## 2025-01-02 RX ORDER — FLUCONAZOLE 150 MG/1
TABLET ORAL
Qty: 2 TABLET | Refills: 1 | Status: SHIPPED | OUTPATIENT
Start: 2025-01-02

## 2025-01-02 ASSESSMENT — PATIENT HEALTH QUESTIONNAIRE - PHQ9
SUM OF ALL RESPONSES TO PHQ QUESTIONS 1-9: 0
SUM OF ALL RESPONSES TO PHQ9 QUESTIONS 1 & 2: 0
2. FEELING DOWN, DEPRESSED OR HOPELESS: NOT AT ALL
1. LITTLE INTEREST OR PLEASURE IN DOING THINGS: NOT AT ALL
SUM OF ALL RESPONSES TO PHQ QUESTIONS 1-9: 0

## 2025-01-02 ASSESSMENT — ENCOUNTER SYMPTOMS
EYE REDNESS: 0
EYE DISCHARGE: 0
SHORTNESS OF BREATH: 0
CONSTIPATION: 0
DIARRHEA: 0
SINUS PRESSURE: 1
WHEEZING: 0
COUGH: 1
SINUS PAIN: 1
TROUBLE SWALLOWING: 0
RHINORRHEA: 1
NAUSEA: 0
VOMITING: 0
SORE THROAT: 1
ABDOMINAL PAIN: 0

## 2025-01-02 NOTE — PROGRESS NOTES
2025     Tanya S Brunner (:  1960) is a 64 y.o. female, here for evaluation of the following medical concerns:    Cough  This is a new problem. The current episode started 1 to 4 weeks ago (started with cough on 24). The problem has been gradually worsening. The problem occurs constantly. The cough is Productive of purulent sputum. Associated symptoms include ear pain (left ear), headaches, nasal congestion, postnasal drip, rhinorrhea and a sore throat. Pertinent negatives include no chest pain, chills, eye redness, fever, myalgias, rash, shortness of breath or wheezing. Associated symptoms comments: fatigued. Treatments tried: sudafed, tylenol. The treatment provided no relief. There is no history of environmental allergies.     Did review patient's med list, allergies, social history,pmhx and pshx today as noted in the record.      Review of Systems   Constitutional:  Negative for chills, fatigue and fever.   HENT:  Positive for congestion, ear pain (left ear), postnasal drip, rhinorrhea, sinus pressure, sinus pain and sore throat. Negative for trouble swallowing.    Eyes:  Negative for discharge and redness.   Respiratory:  Positive for cough. Negative for shortness of breath and wheezing.    Cardiovascular:  Negative for chest pain.   Gastrointestinal:  Negative for abdominal pain, constipation, diarrhea, nausea and vomiting.   Genitourinary:  Negative for dysuria, flank pain, frequency and urgency.   Musculoskeletal:  Negative for arthralgias, myalgias and neck pain.   Skin:  Negative for rash and wound.   Allergic/Immunologic: Negative for environmental allergies.   Neurological:  Positive for headaches. Negative for dizziness, weakness and light-headedness.   Hematological:  Negative for adenopathy.   Psychiatric/Behavioral: Negative.         Prior to Visit Medications    Medication Sig Taking? Authorizing Provider   sacubitril-valsartan (ENTRESTO) 24-26 MG per tablet Take 1 tablet by

## 2025-01-08 ENCOUNTER — OFFICE VISIT (OUTPATIENT)
Dept: CARDIOLOGY | Age: 65
End: 2025-01-08
Payer: COMMERCIAL

## 2025-01-08 VITALS
BODY MASS INDEX: 29.54 KG/M2 | DIASTOLIC BLOOD PRESSURE: 66 MMHG | SYSTOLIC BLOOD PRESSURE: 118 MMHG | HEIGHT: 66 IN | WEIGHT: 183.8 LBS | HEART RATE: 99 BPM

## 2025-01-08 DIAGNOSIS — I10 HYPERTENSION, ESSENTIAL: Primary | ICD-10-CM

## 2025-01-08 PROCEDURE — 1090F PRES/ABSN URINE INCON ASSESS: CPT | Performed by: NURSE PRACTITIONER

## 2025-01-08 PROCEDURE — G8427 DOCREV CUR MEDS BY ELIG CLIN: HCPCS | Performed by: NURSE PRACTITIONER

## 2025-01-08 PROCEDURE — G8417 CALC BMI ABV UP PARAM F/U: HCPCS | Performed by: NURSE PRACTITIONER

## 2025-01-08 PROCEDURE — 99214 OFFICE O/P EST MOD 30 MIN: CPT | Performed by: NURSE PRACTITIONER

## 2025-01-08 PROCEDURE — M1308 PR FLU IMMUNIZE NO ADMIN: HCPCS | Performed by: NURSE PRACTITIONER

## 2025-01-08 PROCEDURE — 3078F DIAST BP <80 MM HG: CPT | Performed by: NURSE PRACTITIONER

## 2025-01-08 PROCEDURE — 1036F TOBACCO NON-USER: CPT | Performed by: NURSE PRACTITIONER

## 2025-01-08 PROCEDURE — 93000 ELECTROCARDIOGRAM COMPLETE: CPT | Performed by: NURSE PRACTITIONER

## 2025-01-08 PROCEDURE — 1123F ACP DISCUSS/DSCN MKR DOCD: CPT | Performed by: NURSE PRACTITIONER

## 2025-01-08 PROCEDURE — 3074F SYST BP LT 130 MM HG: CPT | Performed by: NURSE PRACTITIONER

## 2025-01-08 PROCEDURE — 3017F COLORECTAL CA SCREEN DOC REV: CPT | Performed by: NURSE PRACTITIONER

## 2025-01-08 PROCEDURE — G8399 PT W/DXA RESULTS DOCUMENT: HCPCS | Performed by: NURSE PRACTITIONER

## 2025-01-08 RX ORDER — METOPROLOL SUCCINATE 100 MG/1
100 TABLET, EXTENDED RELEASE ORAL 2 TIMES DAILY
Qty: 180 TABLET | Refills: 3 | Status: SHIPPED | OUTPATIENT
Start: 2025-01-08

## 2025-01-08 NOTE — PROGRESS NOTES
Aortic Valve: Trileaflet valve. Mild regurgitation.    Tricuspid Valve: The estimated RVSP is 27 mmHg.    Interatrial Septum: Agitated saline study was negative with and without provocation. No PFO present visible by color Doppler and saline contrast.    Image quality is good.       Past Medical and Surgical History, Problem List, Allergies, Medications, Labs, Imaging, all reviewed extensively in EMR and with the patient.      Assessment & Plan:    1. HFrEF secondary to NICM, likely viral versus stress-induced cardiomyopathy, normal coronary arteries on heart cath in 2020.   Currently compensated with no signs of volume overload on examination. EF 55% 6/24.   Maintained on Toprol- mg twice daily, Entresto 24/26 mg and aldactone 12.5 mg qd.  Could not tolerate SGLT2 inhibitors due to recurrent yeast infections.  Lasix PRN     2. S/p AICD on 5/11/20 followed by RV lead revision 5/19/20.  Routine interrogation through carelink reviewed and within normal limits. We will continue monitoring every 6 months    3. DL, maintained on Crestor    5. Minimal CAD on in cath 1/20    6. Has chronic long standing GERD/Esophagitis/Hiatal Hernia status post fundoplication in February 2021    7. DM. Controlled, per PCP    The patient is to continue heart healthy diet, weight loss and exercise as tolerated. Patient's medications and side effects were discussed. Medication refills were provided if needed. Follow up appointment timing was discussed. All questions and concerns were addressed to patient's satisfaction.     The patient is to follow up in 6 months or sooner if necessary.    TANGELA KHAN, APRN - CNP

## 2025-01-15 ENCOUNTER — HOSPITAL ENCOUNTER (OUTPATIENT)
Dept: CARDIAC REHAB | Age: 65
Setting detail: THERAPIES SERIES
Discharge: HOME | End: 2025-01-15

## 2025-01-23 ENCOUNTER — HOSPITAL ENCOUNTER (OUTPATIENT)
Dept: CARDIAC REHAB | Age: 65
Setting detail: THERAPIES SERIES
Discharge: HOME | End: 2025-01-23

## 2025-01-23 PROCEDURE — 9900000065 HC CARDIAC REHAB PHASE 3 - 1 VISIT

## 2025-01-23 RX ORDER — SACUBITRIL AND VALSARTAN 24; 26 MG/1; MG/1
1 TABLET, FILM COATED ORAL 2 TIMES DAILY
Qty: 240 TABLET | Refills: 0 | OUTPATIENT
Start: 2025-01-23

## 2025-01-28 ENCOUNTER — HOSPITAL ENCOUNTER (OUTPATIENT)
Dept: CARDIAC REHAB | Age: 65
Setting detail: THERAPIES SERIES
Discharge: HOME | End: 2025-01-28

## 2025-02-05 ENCOUNTER — HOSPITAL ENCOUNTER (OUTPATIENT)
Dept: CARDIAC REHAB | Age: 65
Setting detail: THERAPIES SERIES
Discharge: HOME | End: 2025-02-05

## 2025-02-21 ENCOUNTER — HOSPITAL ENCOUNTER (EMERGENCY)
Age: 65
Discharge: HOME | End: 2025-02-21
Attending: EMERGENCY MEDICINE
Payer: COMMERCIAL

## 2025-02-21 VITALS
SYSTOLIC BLOOD PRESSURE: 131 MMHG | RESPIRATION RATE: 23 BRPM | HEART RATE: 105 BPM | OXYGEN SATURATION: 96 % | DIASTOLIC BLOOD PRESSURE: 79 MMHG | WEIGHT: 182 LBS | BODY MASS INDEX: 29.38 KG/M2 | TEMPERATURE: 98 F

## 2025-02-21 DIAGNOSIS — T78.40XA ALLERGIC REACTION TO DRUG, INITIAL ENCOUNTER: Primary | ICD-10-CM

## 2025-02-21 LAB
ALBUMIN SERPL-MCNC: 4.2 G/DL (ref 3.5–5.2)
ALBUMIN/GLOB SERPL: 1.3 {RATIO} (ref 1–2.5)
ALP SERPL-CCNC: 63 U/L (ref 35–104)
ALT SERPL-CCNC: 13 U/L (ref 5–33)
ANION GAP SERPL CALCULATED.3IONS-SCNC: 15 MMOL/L (ref 9–17)
AST SERPL-CCNC: 14 U/L
BASOPHILS # BLD: 0.03 K/UL (ref 0–0.2)
BASOPHILS NFR BLD: 1 % (ref 0–2)
BILIRUB SERPL-MCNC: 0.3 MG/DL (ref 0.3–1.2)
BUN SERPL-MCNC: 17 MG/DL (ref 8–23)
BUN/CREAT SERPL: 24 (ref 9–20)
CALCIUM SERPL-MCNC: 9.7 MG/DL (ref 8.6–10.4)
CHLORIDE SERPL-SCNC: 103 MMOL/L (ref 98–107)
CO2 SERPL-SCNC: 21 MMOL/L (ref 20–31)
CREAT SERPL-MCNC: 0.7 MG/DL (ref 0.5–0.9)
EKG ATRIAL RATE: 110 BPM
EKG P AXIS: 68 DEGREES
EKG P-R INTERVAL: 148 MS
EKG Q-T INTERVAL: 354 MS
EKG QRS DURATION: 86 MS
EKG QTC CALCULATION (BAZETT): 479 MS
EKG R AXIS: 12 DEGREES
EKG T AXIS: 52 DEGREES
EKG VENTRICULAR RATE: 110 BPM
EOSINOPHIL # BLD: 0.31 K/UL (ref 0–0.44)
EOSINOPHILS RELATIVE PERCENT: 6 % (ref 1–4)
ERYTHROCYTE [DISTWIDTH] IN BLOOD BY AUTOMATED COUNT: 13.4 % (ref 11.8–14.4)
GFR, ESTIMATED: >90 ML/MIN/1.73M2
GLUCOSE SERPL-MCNC: 151 MG/DL (ref 70–99)
HCT VFR BLD AUTO: 42.6 % (ref 36.3–47.1)
HGB BLD-MCNC: 13.9 G/DL (ref 11.9–15.1)
IMM GRANULOCYTES # BLD AUTO: <0.03 K/UL (ref 0–0.3)
IMM GRANULOCYTES NFR BLD: 0 %
LYMPHOCYTES NFR BLD: 1.89 K/UL (ref 1.1–3.7)
LYMPHOCYTES RELATIVE PERCENT: 34 % (ref 24–43)
MCH RBC QN AUTO: 28.4 PG (ref 25.2–33.5)
MCHC RBC AUTO-ENTMCNC: 32.6 G/DL (ref 25.2–33.5)
MCV RBC AUTO: 86.9 FL (ref 82.6–102.9)
MONOCYTES NFR BLD: 0.46 K/UL (ref 0.1–1.2)
MONOCYTES NFR BLD: 8 % (ref 3–12)
NEUTROPHILS NFR BLD: 51 % (ref 36–65)
NEUTS SEG NFR BLD: 2.84 K/UL (ref 1.5–8.1)
NRBC BLD-RTO: 0 PER 100 WBC
PLATELET # BLD AUTO: 261 K/UL (ref 138–453)
PMV BLD AUTO: 10.4 FL (ref 8.1–13.5)
POTASSIUM SERPL-SCNC: 4 MMOL/L (ref 3.7–5.3)
PROT SERPL-MCNC: 7.4 G/DL (ref 6.4–8.3)
RBC # BLD AUTO: 4.9 M/UL (ref 3.95–5.11)
SODIUM SERPL-SCNC: 139 MMOL/L (ref 135–144)
TROPONIN I SERPL HS-MCNC: 7 NG/L (ref 0–14)
WBC OTHER # BLD: 5.6 K/UL (ref 3.5–11.3)

## 2025-02-21 PROCEDURE — 96374 THER/PROPH/DIAG INJ IV PUSH: CPT

## 2025-02-21 PROCEDURE — 2500000003 HC RX 250 WO HCPCS: Performed by: EMERGENCY MEDICINE

## 2025-02-21 PROCEDURE — 96375 TX/PRO/DX INJ NEW DRUG ADDON: CPT

## 2025-02-21 PROCEDURE — 2580000003 HC RX 258: Performed by: EMERGENCY MEDICINE

## 2025-02-21 PROCEDURE — 6360000002 HC RX W HCPCS: Performed by: EMERGENCY MEDICINE

## 2025-02-21 PROCEDURE — 84484 ASSAY OF TROPONIN QUANT: CPT

## 2025-02-21 PROCEDURE — 36415 COLL VENOUS BLD VENIPUNCTURE: CPT

## 2025-02-21 PROCEDURE — 94640 AIRWAY INHALATION TREATMENT: CPT

## 2025-02-21 PROCEDURE — 93005 ELECTROCARDIOGRAM TRACING: CPT | Performed by: EMERGENCY MEDICINE

## 2025-02-21 PROCEDURE — 99284 EMERGENCY DEPT VISIT MOD MDM: CPT

## 2025-02-21 PROCEDURE — 80053 COMPREHEN METABOLIC PANEL: CPT

## 2025-02-21 PROCEDURE — 85025 COMPLETE CBC W/AUTO DIFF WBC: CPT

## 2025-02-21 PROCEDURE — 96372 THER/PROPH/DIAG INJ SC/IM: CPT

## 2025-02-21 RX ORDER — DIPHENHYDRAMINE HYDROCHLORIDE 50 MG/ML
50 INJECTION INTRAMUSCULAR; INTRAVENOUS ONCE
Status: COMPLETED | OUTPATIENT
Start: 2025-02-21 | End: 2025-02-21

## 2025-02-21 RX ORDER — DIPHENHYDRAMINE HCL 25 MG
50 CAPSULE ORAL EVERY 6 HOURS PRN
Qty: 30 CAPSULE | Refills: 0 | Status: SHIPPED | OUTPATIENT
Start: 2025-02-21

## 2025-02-21 RX ORDER — EPINEPHRINE 1 MG/ML
0.3 INJECTION, SOLUTION, CONCENTRATE INTRAVENOUS ONCE
Status: COMPLETED | OUTPATIENT
Start: 2025-02-21 | End: 2025-02-21

## 2025-02-21 RX ORDER — DEXAMETHASONE SODIUM PHOSPHATE 4 MG/ML
4 INJECTION, SOLUTION INTRA-ARTICULAR; INTRALESIONAL; INTRAMUSCULAR; INTRAVENOUS; SOFT TISSUE ONCE
Status: COMPLETED | OUTPATIENT
Start: 2025-02-21 | End: 2025-02-21

## 2025-02-21 RX ORDER — ALBUTEROL SULFATE 0.83 MG/ML
2.5 SOLUTION RESPIRATORY (INHALATION) EVERY 6 HOURS PRN
Status: DISCONTINUED | OUTPATIENT
Start: 2025-02-21 | End: 2025-02-21 | Stop reason: HOSPADM

## 2025-02-21 RX ORDER — PREDNISONE 10 MG/1
TABLET ORAL
Qty: 20 TABLET | Refills: 0 | Status: SHIPPED | OUTPATIENT
Start: 2025-02-21 | End: 2025-03-03

## 2025-02-21 RX ORDER — FAMOTIDINE 20 MG/1
20 TABLET, FILM COATED ORAL 2 TIMES DAILY
Qty: 10 TABLET | Refills: 0 | Status: SHIPPED | OUTPATIENT
Start: 2025-02-21

## 2025-02-21 RX ADMIN — ALBUTEROL SULFATE 2.5 MG: 2.5 SOLUTION RESPIRATORY (INHALATION) at 13:26

## 2025-02-21 RX ADMIN — DEXAMETHASONE SODIUM PHOSPHATE 4 MG: 4 INJECTION INTRA-ARTICULAR; INTRALESIONAL; INTRAMUSCULAR; INTRAVENOUS; SOFT TISSUE at 13:31

## 2025-02-21 RX ADMIN — EPINEPHRINE 0.3 MG: 1 INJECTION, SOLUTION, CONCENTRATE INTRAVENOUS at 13:25

## 2025-02-21 RX ADMIN — FAMOTIDINE 20 MG: 10 INJECTION, SOLUTION INTRAVENOUS at 13:31

## 2025-02-21 RX ADMIN — DIPHENHYDRAMINE HYDROCHLORIDE 50 MG: 50 INJECTION INTRAMUSCULAR; INTRAVENOUS at 13:30

## 2025-02-21 ASSESSMENT — PAIN - FUNCTIONAL ASSESSMENT
PAIN_FUNCTIONAL_ASSESSMENT: 0-10
PAIN_FUNCTIONAL_ASSESSMENT: NONE - DENIES PAIN

## 2025-02-21 ASSESSMENT — PAIN DESCRIPTION - LOCATION: LOCATION: HEAD

## 2025-02-21 ASSESSMENT — PAIN SCALES - GENERAL: PAINLEVEL_OUTOF10: 7

## 2025-02-21 NOTE — ED NOTES
Patient states headache following medications, dr tripathi aware, states to wait and let patient rest at this time and see if headache resolves . Patient resting, pillow given

## 2025-02-21 NOTE — ED PROVIDER NOTES
[facility]  EMERGENCY DEPARTMENT ENCOUNTER      Pt Name: Tanya S Brunner  MRN: [id number]  Birthdate 1960  Date of evaluation: 2/21/2025      CHIEF COMPLAINT       Chief Complaint   Patient presents with    Allergic Reaction     Can't breathe, n/v, felt like lips burning, dizzy, ears ringing, lump in throat. After taking first dose of doxycycline         HISTORY OF PRESENT ILLNESS      The patient presents for possible allergic reaction.  She just darted doxycycline.  She feels like her throat is closing.  This is the first time she has taken this medicine to her knowledge.  Her symptoms occurred within an hour of taking the doxycycline.  She is not having any hives but she does report burning in her lips, dizziness, ears ringing, and a feeling like her throat is tight.  The patient has had recent sinusitis symptoms which is why she was prescribed a medication.  She often has sinus issues.      REVIEW OF SYSTEMS       Concern for allergic reaction as noted above.  History of CAD.  Patient has AICD.    PAST MEDICAL HISTORY    has a past medical history of Acute on chronic systolic congestive heart failure ([facility]), AICD (automatic cardioverter/defibrillator) present, Asthma, Bilateral corneal scars, COVID-19, Disease of blood and blood forming organ, Diverticulitis, DJD (degenerative joint disease), lumbar, Dry eye syndrome, DVT (deep venous thrombosis) ([facility]), Gastro - esophageal reflux disease, Hiatal hernia, History of blood transfusion, History of echocardiogram, Hyperlipidemia, Hyperplastic colon polyp, Hypertension, Hypothyroidism, LGSIL (low grade squamous intraepithelial dysplasia), Meningioma ([facility]), Non-celiac gluten sensitivity, NSTEMI (non-ST elevated myocardial infarction) ([facility]), Osteoarthritis, Other disorders of kidney and ureter in diseases classified elsewhere, Sciatica of right side, Seborrheic dermatitis, Splenic artery aneurysm, Tinnitus, Type 2 diabetes mellitus, controlled ([facility]), and  Chloride 103 98 - 107 mmol/L    CO2 21 20 - 31 mmol/L    Anion Gap 15 9 - 17 mmol/L    Glucose 151 (H) 70 - 99 mg/dL    BUN 17 8 - 23 mg/dL    Creatinine 0.7 0.5 - 0.9 mg/dL    Est, Glom Filt Rate >90 >60 mL/min/1.73m2    BUN/Creatinine Ratio 24 (H) 9 - 20    Calcium 9.7 8.6 - 10.4 mg/dL    Total Protein 7.4 6.4 - 8.3 g/dL    Albumin 4.2 3.5 - 5.2 g/dL    Albumin/Globulin Ratio 1.3 1.0 - 2.5    Total Bilirubin 0.3 0.3 - 1.2 mg/dL    Alkaline Phosphatase 63 35 - 104 U/L    ALT 13 5 - 33 U/L    AST 14 <32 U/L   Troponin   Result Value Ref Range    Troponin, High Sensitivity 7 0 - 14 ng/L   EKG 12 Lead   Result Value Ref Range    Ventricular Rate 110 BPM    Atrial Rate 110 BPM    P-R Interval 148 ms    QRS Duration 86 ms    Q-T Interval 354 ms    QTc Calculation (Bazett) 479 ms    P Axis 68 degrees    R Axis 12 degrees    T Axis 52 degrees         EMERGENCY DEPARTMENT COURSE:   Vitals:    Vitals:    02/21/25 1415 02/21/25 1420 02/21/25 1437 02/21/25 1444   BP:       Pulse: (!) 107 (!) 109 (!) 110    Resp: 19 20 19    Temp:    98 °F (36.7 °C)   TempSrc:    Temporal   SpO2: 94% 96% 93%    Weight:         -------------------------  BP: (!) 142/79, Temp: 98 °F (36.7 °C), Pulse: (!) 110, Respirations: 19      Re-evaluation Notes    1432 on reevaluation, the patient says that her throat discomfort has improved.  She is mildly tachycardic.  She otherwise feels okay    The patient continues to feel well in the ED.  Return precautions were provided.  The patient is discharged in good condition.    FINAL IMPRESSION      1. Allergic reaction to drug, initial encounter          DISPOSITION/PLAN   DISPOSITION Decision To Discharge 02/21/2025 04:41:10 PM   DISPOSITION CONDITION Stable           Condition on Disposition    good    PATIENT REFERRED TO:  Mallorie Craven MD  [id number] [id number]  [phone]    In 3 days        DISCHARGE MEDICATIONS:  New Prescriptions    AMOXICILLIN-CLAVULANATE (AUGMENTIN) [phone]

## 2025-02-21 NOTE — DISCHARGE INSTRUCTIONS
Do not take doxycycline anymore.  You are allergic to it.  May take Augmentin as directed for sinus symptoms.  Benadryl, Pepcid, and prednisone as directed.  Return for difficulty breathing or swallowing, swelling, hives, vomiting, or if worse in any way.    Please understand that at this time there is no evidence for a more serious underlying process, but that early in the process of an illness or injury, an emergency department workup can be falsely reassuring.  You should contact your family doctor within the next 48 hours for a follow up appointment    THANK YOU!!!    From [facility] and [facility]    On behalf of the Emergency Department staff at [facility], I would like to thank you for giving us the opportunity to address your health care needs and concerns.    We hope that during your visit, our service was delivered in a professional and caring manner. Please keep [facility] in mind as we walk with you down the path to your own personal wellness.     Please expect an automated text message or email from us so we can ask a few questions about your health and progress. Based on your answers, a clinician may call you back to offer help and instructions.    Please understand that early in the process of an illness or injury, an emergency department workup can be falsely reassuring.  If you notice any worsening, changing or persistent symptoms please call your family doctor or return to the ER immediately.     Tell us how we did during your visit at [email or web address]   and let us know about your experience

## 2025-03-03 RX ORDER — SACUBITRIL AND VALSARTAN 24; 26 MG/1; MG/1
1 TABLET, FILM COATED ORAL 2 TIMES DAILY
Qty: 240 TABLET | Refills: 0 | OUTPATIENT
Start: 2025-03-03

## 2025-03-03 RX ORDER — SACUBITRIL AND VALSARTAN 24; 26 MG/1; MG/1
1 TABLET, FILM COATED ORAL 2 TIMES DAILY
Qty: 180 TABLET | Refills: 3 | Status: SHIPPED | OUTPATIENT
Start: 2025-03-03

## 2025-03-21 DIAGNOSIS — K21.9 GERD WITHOUT ESOPHAGITIS: ICD-10-CM

## 2025-03-21 DIAGNOSIS — K21.9 LPRD (LARYNGOPHARYNGEAL REFLUX DISEASE): ICD-10-CM

## 2025-03-21 RX ORDER — PANTOPRAZOLE SODIUM 40 MG/1
TABLET, DELAYED RELEASE ORAL
Qty: 90 TABLET | Refills: 3 | Status: SHIPPED | OUTPATIENT
Start: 2025-03-21

## 2025-08-04 ENCOUNTER — OFFICE VISIT (OUTPATIENT)
Dept: CARDIOLOGY | Age: 65
End: 2025-08-04
Payer: COMMERCIAL

## 2025-08-04 VITALS
SYSTOLIC BLOOD PRESSURE: 106 MMHG | HEART RATE: 90 BPM | BODY MASS INDEX: 28.45 KG/M2 | WEIGHT: 177 LBS | HEIGHT: 66 IN | DIASTOLIC BLOOD PRESSURE: 66 MMHG

## 2025-08-04 DIAGNOSIS — R06.02 SHORTNESS OF BREATH: ICD-10-CM

## 2025-08-04 DIAGNOSIS — Z95.810 ICD (IMPLANTABLE CARDIOVERTER-DEFIBRILLATOR) IN PLACE: ICD-10-CM

## 2025-08-04 DIAGNOSIS — I42.8 NONISCHEMIC CARDIOMYOPATHY (HCC): ICD-10-CM

## 2025-08-04 DIAGNOSIS — I10 HYPERTENSION, ESSENTIAL: ICD-10-CM

## 2025-08-04 DIAGNOSIS — R94.31 ABNORMAL ECG: ICD-10-CM

## 2025-08-04 DIAGNOSIS — I42.8 NICM (NONISCHEMIC CARDIOMYOPATHY) (HCC): ICD-10-CM

## 2025-08-04 DIAGNOSIS — Z95.810 AICD (AUTOMATIC CARDIOVERTER/DEFIBRILLATOR) PRESENT: Primary | ICD-10-CM

## 2025-08-04 PROCEDURE — 3017F COLORECTAL CA SCREEN DOC REV: CPT | Performed by: INTERNAL MEDICINE

## 2025-08-04 PROCEDURE — 93000 ELECTROCARDIOGRAM COMPLETE: CPT | Performed by: INTERNAL MEDICINE

## 2025-08-04 PROCEDURE — 1036F TOBACCO NON-USER: CPT | Performed by: INTERNAL MEDICINE

## 2025-08-04 PROCEDURE — 1090F PRES/ABSN URINE INCON ASSESS: CPT | Performed by: INTERNAL MEDICINE

## 2025-08-04 PROCEDURE — 3074F SYST BP LT 130 MM HG: CPT | Performed by: INTERNAL MEDICINE

## 2025-08-04 PROCEDURE — 3078F DIAST BP <80 MM HG: CPT | Performed by: INTERNAL MEDICINE

## 2025-08-04 PROCEDURE — G8427 DOCREV CUR MEDS BY ELIG CLIN: HCPCS | Performed by: INTERNAL MEDICINE

## 2025-08-04 PROCEDURE — 99214 OFFICE O/P EST MOD 30 MIN: CPT | Performed by: INTERNAL MEDICINE

## 2025-08-04 PROCEDURE — G8417 CALC BMI ABV UP PARAM F/U: HCPCS | Performed by: INTERNAL MEDICINE

## 2025-08-04 PROCEDURE — 1123F ACP DISCUSS/DSCN MKR DOCD: CPT | Performed by: INTERNAL MEDICINE

## 2025-08-04 PROCEDURE — G8399 PT W/DXA RESULTS DOCUMENT: HCPCS | Performed by: INTERNAL MEDICINE

## 2025-08-04 RX ORDER — SACUBITRIL AND VALSARTAN 24; 26 MG/1; MG/1
0.5 TABLET, FILM COATED ORAL 2 TIMES DAILY
Qty: 90 TABLET | Refills: 3 | Status: SHIPPED | OUTPATIENT
Start: 2025-08-04

## 2025-08-13 RX ORDER — SPIRONOLACTONE 25 MG/1
12.5 TABLET ORAL DAILY
Qty: 45 TABLET | Refills: 3 | Status: SHIPPED | OUTPATIENT
Start: 2025-08-13

## 2025-08-15 ENCOUNTER — HOSPITAL ENCOUNTER (OUTPATIENT)
Age: 65
Discharge: HOME | End: 2025-08-17
Attending: INTERNAL MEDICINE
Payer: MEDICARE

## 2025-08-15 ENCOUNTER — TELEPHONE (OUTPATIENT)
Dept: CARDIOLOGY | Age: 65
End: 2025-08-15

## 2025-08-15 VITALS — WEIGHT: 177 LBS | HEIGHT: 66 IN | BODY MASS INDEX: 28.45 KG/M2

## 2025-08-15 DIAGNOSIS — I10 HYPERTENSION, ESSENTIAL: ICD-10-CM

## 2025-08-15 DIAGNOSIS — Z95.810 AICD (AUTOMATIC CARDIOVERTER/DEFIBRILLATOR) PRESENT: ICD-10-CM

## 2025-08-15 DIAGNOSIS — I42.8 NONISCHEMIC CARDIOMYOPATHY (HCC): ICD-10-CM

## 2025-08-15 DIAGNOSIS — Z95.810 ICD (IMPLANTABLE CARDIOVERTER-DEFIBRILLATOR) IN PLACE: ICD-10-CM

## 2025-08-15 DIAGNOSIS — R06.02 SHORTNESS OF BREATH: ICD-10-CM

## 2025-08-15 DIAGNOSIS — I42.8 NICM (NONISCHEMIC CARDIOMYOPATHY) (HCC): ICD-10-CM

## 2025-08-15 DIAGNOSIS — R94.31 ABNORMAL ECG: ICD-10-CM

## 2025-08-15 LAB
ECHO AO ROOT DIAM: 3.5 CM
ECHO AO ROOT INDEX: 1.84 CM/M2
ECHO AV AREA PEAK VELOCITY: 2.4 CM2
ECHO AV AREA VTI: 2.4 CM2
ECHO AV AREA/BSA PEAK VELOCITY: 1.3 CM2/M2
ECHO AV AREA/BSA VTI: 1.3 CM2/M2
ECHO AV MEAN GRADIENT: 4 MMHG
ECHO AV MEAN GRADIENT: 4 MMHG
ECHO AV MEAN VELOCITY: 0.9 M/S
ECHO AV PEAK GRADIENT: 8 MMHG
ECHO AV PEAK GRADIENT: 8 MMHG
ECHO AV PEAK VELOCITY: 1.4 M/S
ECHO AV VELOCITY RATIO: 0.57
ECHO AV VTI: 26.6 CM
ECHO BSA: 1.93 M2
ECHO EST RA PRESSURE: 3 MMHG
ECHO IVC PROX: 1.7 CM
ECHO LA AREA 2C: 18.2 CM2
ECHO LA AREA 4C: 16.5 CM2
ECHO LA DIAMETER INDEX: 1.42 CM/M2
ECHO LA DIAMETER: 2.7 CM
ECHO LA MAJOR AXIS: 5.2 CM
ECHO LA MINOR AXIS: 4.8 CM
ECHO LA TO AORTIC ROOT RATIO: 0.77
ECHO LA VOL BP: 50 ML (ref 22–52)
ECHO LA VOL MOD A2C: 56 ML (ref 22–52)
ECHO LA VOL MOD A4C: 43 ML (ref 22–52)
ECHO LA VOL/BSA BIPLANE: 26 ML/M2 (ref 16–34)
ECHO LA VOLUME INDEX MOD A2C: 29 ML/M2 (ref 16–34)
ECHO LA VOLUME INDEX MOD A4C: 23 ML/M2 (ref 16–34)
ECHO LV E' LATERAL VELOCITY: 7.72 CM/S
ECHO LV E' SEPTAL VELOCITY: 5.98 CM/S
ECHO LV EDV A4C: 71 ML
ECHO LV EDV INDEX A4C: 37 ML/M2
ECHO LV EF PHYSICIAN: 55 %
ECHO LV EJECTION FRACTION A4C: 46 %
ECHO LV ESV A4C: 38 ML
ECHO LV ESV INDEX A4C: 20 ML/M2
ECHO LV FRACTIONAL SHORTENING: 40 % (ref 28–44)
ECHO LV INTERNAL DIMENSION DIASTOLE INDEX: 2.47 CM/M2
ECHO LV INTERNAL DIMENSION DIASTOLIC: 4.7 CM (ref 3.9–5.3)
ECHO LV INTERNAL DIMENSION SYSTOLIC INDEX: 1.47 CM/M2
ECHO LV INTERNAL DIMENSION SYSTOLIC: 2.8 CM
ECHO LV IVSD: 1 CM (ref 0.6–0.9)
ECHO LV MASS 2D: 153.4 G (ref 67–162)
ECHO LV MASS INDEX 2D: 80.7 G/M2 (ref 43–95)
ECHO LV POSTERIOR WALL DIASTOLIC: 0.9 CM (ref 0.6–0.9)
ECHO LV RELATIVE WALL THICKNESS RATIO: 0.38
ECHO LVOT AREA: 4.2 CM2
ECHO LVOT AV VTI INDEX: 0.58
ECHO LVOT DIAM: 2.3 CM
ECHO LVOT MEAN GRADIENT: 1 MMHG
ECHO LVOT PEAK GRADIENT: 3 MMHG
ECHO LVOT PEAK VELOCITY: 0.8 M/S
ECHO LVOT STROKE VOLUME INDEX: 33.9 ML/M2
ECHO LVOT SV: 64.4 ML
ECHO LVOT VTI: 15.5 CM
ECHO MV A VELOCITY: 1.01 M/S
ECHO MV E DECELERATION TIME (DT): 193 MS
ECHO MV E VELOCITY: 0.78 M/S
ECHO MV E/A RATIO: 0.77
ECHO MV E/E' LATERAL: 10.1
ECHO MV E/E' RATIO (AVERAGED): 11.57
ECHO MV E/E' SEPTAL: 13.04
ECHO RIGHT VENTRICULAR SYSTOLIC PRESSURE (RVSP): 20 MMHG
ECHO RV FREE WALL PEAK S': 11.5 CM/S
ECHO RV TAPSE: 1.7 CM (ref 1.7–?)
ECHO TV REGURGITANT MAX VELOCITY: 2.04 M/S
ECHO TV REGURGITANT PEAK GRADIENT: 17 MMHG

## 2025-08-15 PROCEDURE — 93306 TTE W/DOPPLER COMPLETE: CPT

## 2025-08-15 PROCEDURE — 93306 TTE W/DOPPLER COMPLETE: CPT | Performed by: INTERNAL MEDICINE

## 2025-08-22 ENCOUNTER — PROCEDURE VISIT (OUTPATIENT)
Dept: CARDIOLOGY | Age: 65
End: 2025-08-22

## 2025-08-22 DIAGNOSIS — I63.9 CEREBROVASCULAR ACCIDENT (CVA), UNSPECIFIED MECHANISM (HCC): Primary | ICD-10-CM

## 2025-08-22 DIAGNOSIS — Z95.810 AICD (AUTOMATIC CARDIOVERTER/DEFIBRILLATOR) PRESENT: ICD-10-CM

## 2025-08-30 PROBLEM — Z86.73 HISTORY OF STROKE: Status: ACTIVE | Noted: 2024-06-27

## 2025-09-03 ENCOUNTER — OFFICE VISIT (OUTPATIENT)
Dept: NEUROLOGY | Age: 65
End: 2025-09-03
Payer: COMMERCIAL

## 2025-09-03 VITALS
OXYGEN SATURATION: 94 % | HEIGHT: 66 IN | SYSTOLIC BLOOD PRESSURE: 111 MMHG | WEIGHT: 180.6 LBS | HEART RATE: 89 BPM | BODY MASS INDEX: 29.02 KG/M2 | DIASTOLIC BLOOD PRESSURE: 76 MMHG

## 2025-09-03 DIAGNOSIS — G45.9 TIA (TRANSIENT ISCHEMIC ATTACK): Primary | ICD-10-CM

## 2025-09-03 DIAGNOSIS — D32.9 MENINGIOMA (HCC): ICD-10-CM

## 2025-09-03 PROCEDURE — 1036F TOBACCO NON-USER: CPT

## 2025-09-03 PROCEDURE — 3074F SYST BP LT 130 MM HG: CPT

## 2025-09-03 PROCEDURE — 99214 OFFICE O/P EST MOD 30 MIN: CPT

## 2025-09-03 PROCEDURE — 3017F COLORECTAL CA SCREEN DOC REV: CPT

## 2025-09-03 PROCEDURE — 3078F DIAST BP <80 MM HG: CPT

## 2025-09-03 PROCEDURE — 1123F ACP DISCUSS/DSCN MKR DOCD: CPT

## 2025-09-03 PROCEDURE — G8427 DOCREV CUR MEDS BY ELIG CLIN: HCPCS

## 2025-09-03 PROCEDURE — G8417 CALC BMI ABV UP PARAM F/U: HCPCS

## 2025-09-03 PROCEDURE — G8399 PT W/DXA RESULTS DOCUMENT: HCPCS

## 2025-09-03 PROCEDURE — 1090F PRES/ABSN URINE INCON ASSESS: CPT

## 2025-09-03 ASSESSMENT — ENCOUNTER SYMPTOMS
ALLERGIC/IMMUNOLOGIC NEGATIVE: 1
GASTROINTESTINAL NEGATIVE: 1
RESPIRATORY NEGATIVE: 1
EYES NEGATIVE: 1

## (undated) DEVICE — Z DISCONTINUED USE 2131664 WIPE INSTR L4XW5IN SPNG MEROCEL

## (undated) DEVICE — MERCY DEFIANCE ENDO KIT: Brand: MEDLINE INDUSTRIES, INC.

## (undated) DEVICE — STAPLER INT L55MM CUT LN L53MM STPL LN L57MM BLU B FRM 8

## (undated) DEVICE — SYRINGE BLB 2 PC STER 50

## (undated) DEVICE — FORCEPS BX L240CM JAW DIA2.4MM ORNG L CAP W/ NDL DISP RAD

## (undated) DEVICE — PENCIL ES L3M BTTN SWCH HOLSTER W/ BLDE ELECTRD EDGE

## (undated) DEVICE — ANES EXTENSION SET 90IN-LF: Brand: MEDLINE INDUSTRIES, INC.

## (undated) DEVICE — TRAY URIN CATH 16FR DRNGE BG STATLOK STBL DEV F SURSTP

## (undated) DEVICE — CONVERTED USE 248063 TOWELS OR BL ST

## (undated) DEVICE — JELLY LUBRICATING 4OZ FLIP TOP TB E Z

## (undated) DEVICE — SOLUTION SCRB 4OZ 4% CHG CLN BASE FOR PT SKIN ANTISEPSIS

## (undated) DEVICE — PACK SURG PROC REMINDER N WVN DISPOSABLE BEAC TIME OUT

## (undated) DEVICE — 3M™ TEGADERM™ TRANSPARENT FILM DRESSING FRAME STYLE, 1624W, 2-3/8 IN X 2-3/4 IN (6 CM X 7 CM), 100/CT 4CT/CASE: Brand: 3M™ TEGADERM™

## (undated) DEVICE — Z DISCONTINUED USE 2624853 GLOVE SURG SZ 75 L12IN THK91MIL BRN LTX FREE

## (undated) DEVICE — STAPLER INT L60MM REG TISS BLU B FRM 8 FIRING 2 ROW AUTO

## (undated) DEVICE — CYSTO/BLADDER IRRIGATION SET, REGULATING CLAMP

## (undated) DEVICE — SUTURE SZ 0 27IN 5/8 CIR UR-6  TAPER PT VIOLET ABSRB VICRYL J603H

## (undated) DEVICE — 9165 UNIVERSAL PATIENT PLATE: Brand: 3M™

## (undated) DEVICE — Device

## (undated) DEVICE — INSUFFLATION TUBING SET WITH FILTER, FUNNEL CONNECTOR AND LUER LOCK: Brand: JOSNOE MEDICAL INC

## (undated) DEVICE — GLOVE ORANGE PI 7   MSG9070

## (undated) DEVICE — TROCAR: Brand: KII® SLEEVE

## (undated) DEVICE — GLOVE SURG SZ 65 THK91MIL LTX FREE SYN POLYISOPRENE

## (undated) DEVICE — CONNECTOR TBNG AUX H2O JET DISP FOR OLY 160/180 SER

## (undated) DEVICE — MINOR BSIN PK

## (undated) DEVICE — SEALER LAP L20CM SHFT DIA10MM TISS FUS OPN INSTR STR BILAT

## (undated) DEVICE — SUTURE ABSRB BRAID COAT VLT SH 3-0 27IN VCRL J311H

## (undated) DEVICE — PAD,ABDOMINAL,5"X9",ST,LF,25/BX: Brand: MEDLINE INDUSTRIES, INC.

## (undated) DEVICE — 3M™ IOBAN™ 2 ANTIMICROBIAL INCISE DRAPE 6650EZ: Brand: IOBAN™ 2

## (undated) DEVICE — MEDICINE CUP, GRADUATED, STER: Brand: MEDLINE

## (undated) DEVICE — DISSECTOR ENDOSCP L39CM JAW L14.5MM 360DEG SHFT ROT STR JAW

## (undated) DEVICE — SUTURE VCRL SZ 3-0 L27IN ABSRB UD L26MM CT-2 1/2 CIR J232H

## (undated) DEVICE — APPLIER CLP M/L SHFT DIA5MM 15 LIG LIGAMAX 5

## (undated) DEVICE — 3M™ WARMING BLANKET, UPPER BODY, 10 PER CASE, 42268: Brand: BAIR HUGGER™

## (undated) DEVICE — GAUZE,SPONGE,FLUFF,6"X6.75",STRL,5/TRAY: Brand: MEDLINE

## (undated) DEVICE — KIT EVAC 100CC W10MMXL20CM SIL FULL PERF HUBLESS FLAT DRN

## (undated) DEVICE — LAP CHOLE PK

## (undated) DEVICE — POOLE SUCTION HANDLE: Brand: CARDINAL HEALTH

## (undated) DEVICE — TROCARS: Brand: KII® BALLOON BLUNT TIP SYSTEM

## (undated) DEVICE — TRAP,MUCUS SPECIMEN, 80CC: Brand: MEDLINE

## (undated) DEVICE — BLANKET WRM W40.2XL55.9IN IORT LO BODY + MISTRAL AIR

## (undated) DEVICE — SUTURE VCRL SZ 2-0 L27IN ABSRB VLT L26MM SH 1/2 CIR J317H

## (undated) DEVICE — COVER,TABLE,77X90,STERILE: Brand: MEDLINE

## (undated) DEVICE — INSTRUMENT TRACKER 9733533XOM ENT 1PK

## (undated) DEVICE — 1200CC GUARDIAN II: Brand: GUARDIAN

## (undated) DEVICE — AGENT HEMSTAT W4XL8IN OXIDIZED REGENERATED CELOS ABSRB

## (undated) DEVICE — TROCAR: Brand: KII FIOS FIRST ENTRY

## (undated) DEVICE — SUTURE PERMAHAND SZ 0 L30IN NONABSORBABLE BLK SILK UNIDIR SA86G

## (undated) DEVICE — COVER LT HNDL BLU PLAS

## (undated) DEVICE — CHLORAPREP 26ML ORANGE

## (undated) DEVICE — GOWN,AURORA,NONREINFORCED,LARGE: Brand: MEDLINE

## (undated) DEVICE — Q-CARE COVERED YANKAUER, SUCTION HANDLE, AND Y- CONNECTOR: Brand: Q-CARE

## (undated) DEVICE — GLOVE ORANGE PI 7 1/2   MSG9075

## (undated) DEVICE — SEALER ENDOSCP L37CM NANO COAT BLNT TIP LAP DIV

## (undated) DEVICE — KIT,ANTI FOG,W/SPONGE & FLUID,SOFT PACK: Brand: MEDLINE

## (undated) DEVICE — PLUMEPORT SEO LAPAROSCOPIC SMOKE FILTRATION DEVICE: Brand: PLUMEPORT

## (undated) DEVICE — POSITIONER HD W8XH4XL8.5IN RASPBERRY FOAM SLT

## (undated) DEVICE — PACK,LAPAROTOMY,PK IV,AURORA: Brand: MEDLINE

## (undated) DEVICE — CANNULA SEAL

## (undated) DEVICE — CANNULA NSL AD L2IN ETCO2 SAMP SFT CRUSH RESIST FEM AIRLFE

## (undated) DEVICE — GAUZE,SPONGE,2"X2",8PLY,STERILE,LF,2'S: Brand: MEDLINE

## (undated) DEVICE — SUTURE ETHBND EXCEL SZ 0 L30IN NONABSORBABLE GRN L26MM CT-2 X412H

## (undated) DEVICE — SUTURE VCRL SZ 0 L27IN ABSRB VLT L26MM CT-2 1/2 CIR J334H

## (undated) DEVICE — TOWEL SURG W16XL26IN GRN NONFENESTRATED RADPQ ST

## (undated) DEVICE — 20 ML SYRINGE LUER-LOCK TIP: Brand: MONOJECT

## (undated) DEVICE — CONVERTED USE 338908 SPONGES LAP 18X18 ST

## (undated) DEVICE — VESSEL SEALER EXTEND: Brand: ENDOWRIST

## (undated) DEVICE — GLOVE ORANGE PI 8   MSG9080

## (undated) DEVICE — INTENDED FOR TISSUE SEPARATION, AND OTHER PROCEDURES THAT REQUIRE A SHARP SURGICAL BLADE TO PUNCTURE OR CUT.: Brand: BARD-PARKER ® CARBON RIB-BACK BLADES

## (undated) DEVICE — SEALER LAP L37CM SHFT DIA10MM TISS FUS HAND/FOOT SWCH BLNT

## (undated) DEVICE — SUTURE MCRYL + SZ 4 0 L18IN ABSRB UD PC 3 L16MM 3 8 CIR PRIM MCP845G

## (undated) DEVICE — BINDER ABD 2XL W9IN CIRC 62 73IN 3 PNL E HK AND LOOP CLSR W

## (undated) DEVICE — TUBING 1895522 5PK STRAIGHTSHOT TO XPS: Brand: STRAIGHTSHOT®

## (undated) DEVICE — APPLICATOR MEDICATED 26 CC SOLUTION HI LT ORNG CHLORAPREP

## (undated) DEVICE — ELECTRODE PT RET AD L9FT HI MOIST COND ADH HYDRGEL CORDED

## (undated) DEVICE — Z INACTIVE USE 2660664 SOLUTION IRRIG 3000ML 0.9% SOD CHL USP UROMATIC PLAS CONT

## (undated) DEVICE — GOWN,AURORA,NON-REINFORCED,2XL: Brand: MEDLINE

## (undated) DEVICE — BLADE ES L6IN ELASTOMERIC COAT EXT DURABLE BEND UPTO 90DEG

## (undated) DEVICE — DISCONTINUED USE 419147 KIT ENDOSCOPY CUSTOM PACK

## (undated) DEVICE — DISSECTOR: Brand: ENDO DISSECT

## (undated) DEVICE — COLONOSCOPE ENDOSCP ABSORBENT SM PEDIATRIC 104 MM VISION

## (undated) DEVICE — SYSTEM COLL CANSTR LID SEAL CAP W/O TISS TRAP FOR 3/8IN

## (undated) DEVICE — SHEET, ORTHO, SPLIT, STERILE: Brand: MEDLINE

## (undated) DEVICE — STAPLER EXT 65MM S STL AUTO DISP PURSTRING

## (undated) DEVICE — POUCH INSTR W40XL26IN BUTT FLD COLL FLTR DRNGE PRT

## (undated) DEVICE — SOLUTION IV IRRIG POUR BRL 0.9% SODIUM CHL 2F7124

## (undated) DEVICE — DRAPE SLUSH W O PLT POLYUR 44INCHX44INCHX112CM

## (undated) DEVICE — CODMAN® SURGICAL PATTIES 1/2" X 3" (1.27CM X 7.62CM): Brand: CODMAN®

## (undated) DEVICE — IRRIGATION PUMP: Brand: AHTO

## (undated) DEVICE — 60 ML SYRINGE REGULAR TIP: Brand: MONOJECT

## (undated) DEVICE — TOWEL,OR,DSP,ST,NATURAL,DLX,4/PK,20PK/CS: Brand: MEDLINE

## (undated) DEVICE — MEDI-VAC YANK SUCT HNDL W/TPRD BULBOUS TIP & NON-CONDUCTIVE: Brand: CARDINAL HEALTH

## (undated) DEVICE — HYPODERMIC SAFETY NEEDLE: Brand: MAGELLAN

## (undated) DEVICE — GLOVE SURG 9 PF CRM STRL SENSICARE PI MIC LF

## (undated) DEVICE — GLOVE SURG SZ 85 L12IN THK91MIL BRN LTX FREE

## (undated) DEVICE — MASTISOL ADHESIVE LIQ 2/3ML

## (undated) DEVICE — NEEDLE, QUINCKE, 25GX2.5": Brand: MEDLINE

## (undated) DEVICE — 3M™ STERI-STRIP™ REINFORCED ADHESIVE SKIN CLOSURES, R1547, 1/2 IN X 4 IN (12 MM X 100 MM), 6 STRIPS/ENVELOPE: Brand: 3M™ STERI-STRIP™

## (undated) DEVICE — SOCK SPEC L9IN WHT UNIV W/ STD PRT FOR FLD MGMT

## (undated) DEVICE — GLOVE SURG SZ 6 THK91MIL LTX FREE SYN POLYISOPRENE ANTI

## (undated) DEVICE — STRIP WND PK W0.25INXL5YD IODO GZ TIGHTLY WVN CURAD

## (undated) DEVICE — SPONGE GZ W2XL2IN NONWOVEN 4 PLY FASTER WICKING ABIL AVANT

## (undated) DEVICE — SCISSOR SURG CRV ENDOCUT TIP FOR LAP DISP

## (undated) DEVICE — LINE SAMP O2 6.5FT W/FEMALE CONN F/ADULT CAPNOLINE PLUS

## (undated) DEVICE — SMARTSLEEVE SURGICAL GOWN, 3XL LONG: Brand: CONVERTORS

## (undated) DEVICE — SEALER LAP L37CM MARYLAND JAW OPN NANO COAT MULTIFUNCTIONAL

## (undated) DEVICE — REFLEX ULTRA 45 WITH INTEGRATED CABLE: Brand: COBLATION

## (undated) DEVICE — SOLUTION IRRIG 500ML 0.9% SOD CHL USP POUR PLAS BTL

## (undated) DEVICE — BLADELESS OBTURATOR: Brand: WECK VISTA

## (undated) DEVICE — TUBING, SUCTION, 1/4" X 12', STRAIGHT: Brand: MEDLINE

## (undated) DEVICE — SUTURE PERMAHAND SZ 3-0 L18IN NONABSORBABLE BLK L26MM SH C013D

## (undated) DEVICE — ARM DRAPE

## (undated) DEVICE — DUP USE 240185 SOLUTION IV IRRIG WATER 1000ML IRRIG BAG 2B7114

## (undated) DEVICE — SCISSOR SURG METZ CRV TIP

## (undated) DEVICE — BITE BLOCK W/VELCRO STRAP

## (undated) DEVICE — GOWN,AURORA,NONRNF,XL,30/CS: Brand: MEDLINE

## (undated) DEVICE — 3000CC GUARDIAN II: Brand: GUARDIAN

## (undated) DEVICE — SUTURE VCRL SZ 4-0 L18IN ABSRB UD L19MM PS-2 3/8 CIR PRIM J496H

## (undated) DEVICE — LEGGINGS, PAIR, 33X51 XL, STERILE: Brand: MEDLINE

## (undated) DEVICE — DISSECTOR LAP DIA5MM BLNT TIP ENDOPATH

## (undated) DEVICE — STAPLER INT L28CM DIA29MM CLS STPL H10-2.5MM OPN LEG L5.5MM

## (undated) DEVICE — BLADE CLIPPER GEN PURP NS

## (undated) DEVICE — ADHESIVE SKIN CLSR 0.7ML TOP DERMBND ADV

## (undated) DEVICE — PATIENT TRACKER 9734887XOM NON-INVASIVE

## (undated) DEVICE — BRAVO CF CAPSULE  DELIVERY DEV, 5-PK: Brand: BRAVO

## (undated) DEVICE — DRAPE THER FLUID WARMING 66X44 IN FLAT SLUSH DBL DISC ORS

## (undated) DEVICE — FD: Brand: NASOPORE

## (undated) DEVICE — SOLUTION ANTIFOG VIS SYS CLEARIFY LAPSCP

## (undated) DEVICE — GAUZE,SPONGE,4"X4",12PLY,STERILE,LF,2'S: Brand: MEDLINE

## (undated) DEVICE — SYRINGES CONTROL 10ML W/ROTATOR

## (undated) DEVICE — DEVICE TRCR 12X9X3IN WHT CLSR DISP OMNICLOSE

## (undated) DEVICE — BLADE 1884380EM QUADCUT 4.3MMX13CM ROHS: Brand: ROTATABLE FUSION®

## (undated) DEVICE — SUTURE PROL SZ 0 L30IN NONABSORBABLE BLU L36MM CT-1 1/2 CIR 8424H

## (undated) DEVICE — INSTRUMENT WARMER: Brand: SCOPE WARMER DISPOSABLE SEAL